# Patient Record
Sex: MALE | Race: BLACK OR AFRICAN AMERICAN | NOT HISPANIC OR LATINO | Employment: FULL TIME | URBAN - METROPOLITAN AREA
[De-identification: names, ages, dates, MRNs, and addresses within clinical notes are randomized per-mention and may not be internally consistent; named-entity substitution may affect disease eponyms.]

---

## 2017-04-03 ENCOUNTER — HOSPITAL ENCOUNTER (EMERGENCY)
Facility: MEDICAL CENTER | Age: 42
End: 2017-04-04
Attending: EMERGENCY MEDICINE
Payer: MEDICAID

## 2017-04-03 ENCOUNTER — APPOINTMENT (OUTPATIENT)
Dept: RADIOLOGY | Facility: MEDICAL CENTER | Age: 42
End: 2017-04-03
Attending: EMERGENCY MEDICINE
Payer: MEDICAID

## 2017-04-03 DIAGNOSIS — S29.012A MUSCLE STRAIN OF RIGHT UPPER BACK, INITIAL ENCOUNTER: ICD-10-CM

## 2017-04-03 DIAGNOSIS — R10.9 RIGHT FLANK PAIN: ICD-10-CM

## 2017-04-03 PROCEDURE — 700111 HCHG RX REV CODE 636 W/ 250 OVERRIDE (IP): Performed by: EMERGENCY MEDICINE

## 2017-04-03 PROCEDURE — 700102 HCHG RX REV CODE 250 W/ 637 OVERRIDE(OP): Performed by: EMERGENCY MEDICINE

## 2017-04-03 PROCEDURE — A9270 NON-COVERED ITEM OR SERVICE: HCPCS | Performed by: EMERGENCY MEDICINE

## 2017-04-03 PROCEDURE — 80048 BASIC METABOLIC PNL TOTAL CA: CPT

## 2017-04-03 PROCEDURE — 85025 COMPLETE CBC W/AUTO DIFF WBC: CPT

## 2017-04-03 PROCEDURE — 81003 URINALYSIS AUTO W/O SCOPE: CPT

## 2017-04-03 PROCEDURE — 96372 THER/PROPH/DIAG INJ SC/IM: CPT

## 2017-04-03 PROCEDURE — 71020 DX-CHEST-2 VIEWS: CPT

## 2017-04-03 PROCEDURE — 99284 EMERGENCY DEPT VISIT MOD MDM: CPT

## 2017-04-03 RX ORDER — KETOROLAC TROMETHAMINE 30 MG/ML
60 INJECTION, SOLUTION INTRAMUSCULAR; INTRAVENOUS ONCE
Status: COMPLETED | OUTPATIENT
Start: 2017-04-03 | End: 2017-04-03

## 2017-04-03 RX ORDER — DIAZEPAM 5 MG/1
5 TABLET ORAL ONCE
Status: COMPLETED | OUTPATIENT
Start: 2017-04-03 | End: 2017-04-03

## 2017-04-03 RX ADMIN — DIAZEPAM 5 MG: 5 TABLET ORAL at 23:58

## 2017-04-03 RX ADMIN — KETOROLAC TROMETHAMINE 60 MG: 30 INJECTION, SOLUTION INTRAMUSCULAR; INTRAVENOUS at 23:57

## 2017-04-03 ASSESSMENT — LIFESTYLE VARIABLES: DO YOU DRINK ALCOHOL: NO

## 2017-04-03 NOTE — ED AVS SNAPSHOT
4/4/2017          Jonna Brian  [address] [place] [address]  [place] [address]    Dear Jonna:    [facility] wants to ensure your discharge home is safe and you or your loved ones have had all your questions answered regarding your care after you leave the hospital.    You may receive a telephone call within two days of your discharge.  This call is to make certain you understand your discharge instructions as well as ensure we provided you with the best care possible during your stay with us.     The call will only last approximately 3-5 minutes and will be done by a nurse.    Once again, we want to ensure your discharge home is safe and that you have a clear understanding of any next steps in your care.  If you have any questions or concerns, please do not hesitate to contact us, we are here for you.  Thank you for choosing [facility] for your healthcare needs.    Sincerely,    Hasmukh Riddle    [facility]

## 2017-04-03 NOTE — ED AVS SNAPSHOT
Home Care Instructions                                                                                                                Jonna Brian   MRN: [id number]    Department:  [facility], Emergency Dept   Date of Visit:  4/3/2017            [facility], Emergency Dept    [address] [place] [id number]    Phone:  [phone]      You were seen by     Sherry Pack M.D.      Your Diagnosis Was     Right flank pain     R10.9       These are the medications you received during your hospitalization from 04/03/2017 2109 to 04/04/2017 0041     Date/Time Order Dose Route Action    04/03/2017 2357 ketorolac (TORADOL) injection 60 mg 60 mg Intramuscular Given    04/03/2017 2358 diazepam (VALIUM) tablet 5 mg 5 mg Oral Given      Follow-up Information     1. Follow up with [facility], Emergency Dept.    Specialty:  Emergency Medicine    Why:  If symptoms worsen - difficulty breathing, chest pain, nausea, vomiting    Contact information    [address] [place] [id number] [phone]      Medication Information     Review all of your home medications and newly ordered medications with your primary doctor and/or pharmacist as soon as possible. Follow medication instructions as directed by your doctor and/or pharmacist.     Please keep your complete medication list with you and share with your physician. Update the information when medications are discontinued, doses are changed, or new medications (including over-the-counter products) are added; and carry medication information at all times in the event of emergency situations.               Medication List      ASK your doctor about these medications        Instructions    Morning Afternoon Evening Bedtime    * hydrocodone-acetaminophen 5-325 MG Tabs per tablet   Commonly known as:  NORCO        Take 1-2 Tabs by mouth every four hours as needed.   Dose:  1-2 Tab                        *  hydrocodone-acetaminophen 5-325 MG Tabs per tablet   Commonly known as:  NORCO        Take 1 Tab by mouth every 6 hours as needed.   Dose:  1 Tab                        * lisinopril 20 MG Tabs   Commonly known as:  PRINIVIL        Take 1 Tab by mouth every day.   Dose:  20 mg                        * lisinopril 10 MG Tabs   Commonly known as:  PRINIVIL        Take 1 Tab by mouth every day.   Dose:  10 mg                        * lisinopril 20 MG Tabs   Commonly known as:  PRINIVIL        Take 1 Tab by mouth every day.   Dose:  20 mg                        * Notice:  This list has 5 medication(s) that are the same as other medications prescribed for you. Read the directions carefully, and ask your doctor or other care provider to review them with you.            Procedures and tests performed during your visit     BASIC METABOLIC PANEL    CBC WITH DIFFERENTIAL    DX-CHEST-2 VIEWS    ESTIMATED GFR    URINALYSIS CULTURE, IF INDICATED        Discharge Instructions       Flank Pain  Flank pain refers to pain that is located on the side of the body between the upper abdomen and the back. The pain may occur over a short period of time (acute) or may be long-term or reoccurring (chronic). It may be mild or severe. Flank pain can be caused by many things.  CAUSES   Some of the more common causes of flank pain include:  · Muscle strains.    · Muscle spasms.    · A disease of your spine (vertebral disk disease).    · A lung infection (pneumonia).    · Fluid around your lungs (pulmonary edema).    · A kidney infection.    · Kidney stones.    · A very painful skin rash caused by the chickenpox virus (shingles).    · Gallbladder disease.    HOME CARE INSTRUCTIONS   Home care will depend on the cause of your pain. In general,  · Rest as directed by your caregiver.  · Drink enough fluids to keep your urine clear or pale yellow.  · Only take over-the-counter or prescription medicines as directed by your caregiver. Some medicines may  help relieve the pain.  · Tell your caregiver about any changes in your pain.  · Follow up with your caregiver as directed.  SEEK IMMEDIATE MEDICAL CARE IF:   · Your pain is not controlled with medicine.    · You have new or worsening symptoms.  · Your pain increases.    · You have abdominal pain.    · You have shortness of breath.    · You have persistent nausea or vomiting.    · You have swelling in your abdomen.    · You feel faint or pass out.    · You have blood in your urine.  · You have a fever or persistent symptoms for more than 2-3 days.  · You have a fever and your symptoms suddenly get worse.  MAKE SURE YOU:   · Understand these instructions.  · Will watch your condition.  · Will get help right away if you are not doing well or get worse.     This information is not intended to replace advice given to you by your health care provider. Make sure you discuss any questions you have with your health care provider.     Document Released: 02/08/2007 Document Revised: 09/11/2013 Document Reviewed: 08/01/2013  [employer] Patient Education ©2016 [employer]            Patient Information     Patient Information    Following emergency treatment: all patient requiring follow-up care must return either to a private physician or a clinic if your condition worsens before you are able to obtain further medical attention, please return to the emergency room.     Billing Information    At [facility], we work to make the billing process streamlined for our patients.  Our Representatives are here to answer any questions you may have regarding your hospital bill.  If you have insurance coverage and have supplied your insurance information to us, we will submit a claim to your insurer on your behalf.  Should you have any questions regarding your bill, we can be reached online or by phone as follows:  Online: You are able pay your bills online or live chat with our representatives about any billing questions  you may have. We are here to help Monday - Friday from 8:00am to 7:30pm and 9:00am - 12:00pm on Saturdays.  Please visit [email or web address]  for more information.   Phone:  [phone] or [phone]    Please note that your emergency physician, surgeon, pathologist, radiologist, anesthesiologist, and other specialists are not employed by [facility] and will therefore bill separately for their services.  Please contact them directly for any questions concerning their bills at the numbers below:     Emergency Physician Services:  [phone]  [facility]:  [phone]  [facility]:  [phone]  [facility]:  [phone]    1. Your final bill may vary from the amount quoted upon discharge if all procedures are not complete at that time, or if your doctor has additional procedures of which we are not aware. You will receive an additional bill if you return to the Emergency Department at [facility] for suture removal regardless of the facility of which the sutures were placed.     2. Please arrange for settlement of this account at the emergency registration.    3. All self-pay accounts are due in full at the time of treatment.  If you are unable to meet this obligation then payment is expected within 4-5 days.     4. If you have had radiology studies (CT, X-ray, Ultrasound, MRI), you have received a preliminary result during your emergency department visit. Please contact the radiology department [phone] to receive a copy of your final result. Please discuss the Final result with your primary physician or with the follow up physician provided.     Crisis Hotline:  National Crisis Hotline:  7-618-HFNJKEX or 1-483.944.4445  [place] Crisis Hotline:    1-822.180.1188 or 402-941-8602         ED Discharge Follow Up Questions    1. In order to provide you with very good care, we would like to follow up with a phone call in the  next few days.  May we have your permission to contact you?     YES /  NO    2. What is the best phone number to call you? (       )_____-__________    3. What is the best time to call you?      Morning  /  Afternoon  /  Evening                   Patient Signature:  ____________________________________________________________    Date:  ____________________________________________________________

## 2017-04-03 NOTE — ED AVS SNAPSHOT
[employer] Access Code: [email or web address]  Expires: 4/9/2017  2:54 PM    Your email address is not on file at [facility].  Email Addresses are required for you to sign up for [employer], please contact [phone] to verify your personal information and to provide your email address prior to attempting to register for [employer].    Jonna Brian  [address] [place] [address] [address]  [place] [address]    [employer]  A secure, online tool to manage your health information     [facility]’s CollegeMapper® is a secure, online tool that connects you to your personalized health information from the privacy of your home -- day or night - making it very easy for you to manage your healthcare. Once the activation process is completed, you can even access your medical information using the CollegeMapper roxanna, which is available for free in the Apple Roxanna store or Google Play store.     To learn more about CollegeMapper, visit [email or web address]    There are two levels of access available (as shown below):   My Chart Features  [facility] Primary Care Doctor [facility]  Specialists [facility]  Urgent  Care Non-[facility] Primary Care Doctor   Email your healthcare team securely and privately 24/7 X X X    Manage appointments: schedule your next appointment; view details of past/upcoming appointments X      Request prescription refills. X      View recent personal medical records, including lab and immunizations X X X X   View health record, including health history, allergies, medications X X X X   Read reports about your outpatient visits, procedures, consult and ER notes X X X X   See your discharge summary, which is a recap of your hospital and/or ER visit that includes your diagnosis, lab results, and care plan X X  X     How to register for [employer]Mapper:  Once your e-mail address has been verified, follow the following steps to sign up for LifeSc[employer]et.     1. Go to  [email or web address]  2. Click on the Sign Up Now box, which takes you to the New Member Sign Up page.  You will need to provide the following information:  a. Enter your Q-go Access Code exactly as it appears at the top of this page. (You will not need to use this code after you’ve completed the sign-up process. If you do not sign up before the expiration date, you must request a new code.)   b. Enter your date of birth.   c. Enter your home email address.   d. Click Submit, and follow the next screen’s instructions.  3. Create a OKpandat ID. This will be your Q-go login ID and cannot be changed, so think of one that is secure and easy to remember.  4. Create a Q-go password. You can change your password at any time.  5. Enter your Password Reset Question and Answer. This can be used at a later time if you forget your password.   6. Enter your e-mail address. This allows you to receive e-mail notifications when new information is available in Q-go.  7. Click Sign Up. You can now view your health information.    For assistance activating your Q-go account, call [phone]

## 2017-04-04 VITALS
SYSTOLIC BLOOD PRESSURE: 155 MMHG | RESPIRATION RATE: 18 BRPM | HEIGHT: 73 IN | OXYGEN SATURATION: 96 % | HEART RATE: 67 BPM | DIASTOLIC BLOOD PRESSURE: 77 MMHG | WEIGHT: 256.39 LBS | TEMPERATURE: 98.1 F | BODY MASS INDEX: 33.98 KG/M2

## 2017-04-04 LAB
ANION GAP SERPL CALC-SCNC: 5 MMOL/L (ref 0–11.9)
APPEARANCE UR: CLEAR
BASOPHILS # BLD AUTO: 0.5 % (ref 0–1.8)
BASOPHILS # BLD: 0.03 K/UL (ref 0–0.12)
BILIRUB UR QL STRIP.AUTO: NEGATIVE
BUN SERPL-MCNC: 9 MG/DL (ref 8–22)
CALCIUM SERPL-MCNC: 9.3 MG/DL (ref 8.5–10.5)
CHLORIDE SERPL-SCNC: 105 MMOL/L (ref 96–112)
CO2 SERPL-SCNC: 25 MMOL/L (ref 20–33)
COLOR UR: YELLOW
CREAT SERPL-MCNC: 1.06 MG/DL (ref 0.5–1.4)
CULTURE IF INDICATED INDCX: NO UA CULTURE
EOSINOPHIL # BLD AUTO: 0.09 K/UL (ref 0–0.51)
EOSINOPHIL NFR BLD: 1.5 % (ref 0–6.9)
ERYTHROCYTE [DISTWIDTH] IN BLOOD BY AUTOMATED COUNT: 38.5 FL (ref 35.9–50)
GFR SERPL CREATININE-BSD FRML MDRD: >60 ML/MIN/1.73 M 2
GLUCOSE SERPL-MCNC: 107 MG/DL (ref 65–99)
GLUCOSE UR STRIP.AUTO-MCNC: NEGATIVE MG/DL
HCT VFR BLD AUTO: 44.7 % (ref 42–52)
HGB BLD-MCNC: 14.9 G/DL (ref 14–18)
IMM GRANULOCYTES # BLD AUTO: 0.02 K/UL (ref 0–0.11)
IMM GRANULOCYTES NFR BLD AUTO: 0.3 % (ref 0–0.9)
KETONES UR STRIP.AUTO-MCNC: NEGATIVE MG/DL
LEUKOCYTE ESTERASE UR QL STRIP.AUTO: NEGATIVE
LYMPHOCYTES # BLD AUTO: 2.2 K/UL (ref 1–4.8)
LYMPHOCYTES NFR BLD: 37.4 % (ref 22–41)
MCH RBC QN AUTO: 27.3 PG (ref 27–33)
MCHC RBC AUTO-ENTMCNC: 33.3 G/DL (ref 33.7–35.3)
MCV RBC AUTO: 82 FL (ref 81.4–97.8)
MICRO URNS: NORMAL
MONOCYTES # BLD AUTO: 0.47 K/UL (ref 0–0.85)
MONOCYTES NFR BLD AUTO: 8 % (ref 0–13.4)
NEUTROPHILS # BLD AUTO: 3.08 K/UL (ref 1.82–7.42)
NEUTROPHILS NFR BLD: 52.3 % (ref 44–72)
NITRITE UR QL STRIP.AUTO: NEGATIVE
NRBC # BLD AUTO: 0 K/UL
NRBC BLD AUTO-RTO: 0 /100 WBC
PH UR STRIP.AUTO: 5.5 [PH]
PLATELET # BLD AUTO: 228 K/UL (ref 164–446)
PMV BLD AUTO: 9.4 FL (ref 9–12.9)
POTASSIUM SERPL-SCNC: 3.8 MMOL/L (ref 3.6–5.5)
PROT UR QL STRIP: NEGATIVE MG/DL
RBC # BLD AUTO: 5.45 M/UL (ref 4.7–6.1)
RBC UR QL AUTO: NEGATIVE
SODIUM SERPL-SCNC: 135 MMOL/L (ref 135–145)
SP GR UR STRIP.AUTO: 1.02
WBC # BLD AUTO: 5.9 K/UL (ref 4.8–10.8)

## 2017-04-04 PROCEDURE — 36415 COLL VENOUS BLD VENIPUNCTURE: CPT

## 2017-04-04 NOTE — ED PROVIDER NOTES
"ED Provider Note    CHIEF COMPLAINT  Chief Complaint   Patient presents with   • Flank Pain     right sided. it would come and go in the morning. has been constant since.    • Numbness     right arm x 10 minutes around 1900 this evening while at work        HPI  Jonna Brian is a 41 y.o. male who presents to the emergency Department chief complaint right-sided flank pain. The patient states began this morning and then continued and became consistent throughout the afternoon. He endorses a worsening of that back pain with a deep inhale of breath however denies any difficulty breathing or chest pain. He states that his right arm was numb for about 10 minutes earlier this evening and then after he shook it out for a while this numbness resolved. He denies any weakness pain onset tingling in the lower extremities any headache any vision changes any difficulty in breathing. He denies any trauma any blood in his urine denies any other medical problems besides hypertension. Pain is currently a 4 out of 10 and only worse with movement and palpation.    REVIEW OF SYSTEMS  See [facility] for further details. All other systems are negative.     PAST MEDICAL HISTORY   has a past medical history of Hypertension.    SOCIAL HISTORY  Social History     Social History Main Topics   • Smoking status: Never Smoker    • Smokeless tobacco: Not on file   • Alcohol Use: Yes      Comment: Rare   • Drug Use: No   • Sexual Activity: Not on file       SURGICAL HISTORY  patient denies any surgical history    CURRENT MEDICATIONS  Home Medications     **Home medications have not yet been reviewed for this encounter**          ALLERGIES  No Known Allergies    PHYSICAL EXAM  VITAL SIGNS: /77 mmHg  Pulse 77  Temp(Src) 36.7 °C (98.1 °F)  Resp 18  Ht 1.854 m (6' 1\")  Wt 116.3 kg (256 lb 6.3 oz)  BMI 33.83 kg/m2  SpO2 95%   Pulse ox interpretation: I interpret this pulse ox as normal.  Constitutional: Alert in no apparent distress.  HENT: " Normocephalic, Atraumatic  Eyes: Pupils are equal and reactive. Conjunctiva normal, non-icteric.   Heart: Regular rate and rythm, no murmurs.    Lungs: Clear to auscultation bilaterally.  Abdomen: Non-tender, non-distended, normal bowel sounds; R CVA ttp including paraspinal muscular tenderness no midline tenderness no erythema and no swelling no rash  Skin: Warm, Dry, No erythema, No rash.   Neurologic: Alert, Grossly non-focal. Symmetric smile, eyes shut tight bilaterally, forehead wrinkles bilaterally, sensation intact to light touch bilateral face, tongue midline, head turn and shoulder shrug with full strength. Hearing intact grossly bilaterally. 5/5  strength bilaterally, 5/5 tricep and bicep strength bilaterally. Sensation intact to light touch r, m, u, axillary nerves bilaterally. 5/5 strength quadricep, plantarflexion/dorsiflexion/extensor hallicus longus bilaterally. Sensation intact to light touch bilateral lower extremities in all nerve distributions. No clonus at ankle or elbow. normal gait.        DIFFERENTIAL DIAGNOSIS AND WORK UP PLAN    This is a 41 y.o. male who presents with right flank pain and paraspinal muscular tenderness differential includes muscle strain or sprain versus pilonidal versus renal stone versus occult effusion though breath sounds are clear versus costochondritis. Discussed with patient will perform laboratory analysis urinalysis chest x-ray as well as IM Toradol and Valium for his muscle spasm and discomfort. Patient understands and feels comfortable w the plan    Pertinent Lab Findings  CBC BMP within normal limits except for mildly elevated glucose of 107, urinalysis without evidence of red blood cells or infection      Radiology  DX-CHEST-2 VIEWS   Final Result         1.  No acute cardiopulmonary disease.        The radiologist's interpretation of all radiological studies have been reviewed by me.    COURSE & MEDICAL DECISION MAKING  Pertinent Labs & Imaging studies  "reviewed. (See chart for details)    12:30 AM  Reassessed the patient discussed with him all the normal findings unsure with the mild numbness of his hand was advised and a partial nerve palsy but no evidence or concern. CVA. The patient otherwise has a normal examination he likely has a posterior back muscle strain or sprain versus early zoster. Discussed with the patient strict return precautions which include nor worsening back pain associated with difficulty breathing nausea vomiting or diaphoresis. Patient understands feels comfortable going home and following up with his primary physician for blood pressure management though he does have a history of hypertension    /91 mmHg  Pulse 67  Temp(Src) 36.7 °C (98.1 °F)  Resp 18  Ht 1.854 m (6' 1\")  Wt 116.3 kg (256 lb 6.3 oz)  BMI 33.83 kg/m2  SpO2 96%    FOLLOW UP    [facility], Emergency Dept  [address] [facility] [id number] [phone]    If symptoms worsen - difficulty breathing, chest pain, nausea, vomiting      FINAL IMPRESSION  1. Right flank pain    2. Muscle strain of right upper back, initial encounter        Electronically signed by: Sherry Pack, 4/3/2017 11:26 PM    This dictation has been created using voice recognition software and/or scribes. The accuracy of the dictation is limited by the abilities of the software and the expertise of the scribes. I expect there may be some errors of grammar and possibly content. I made every attempt to manually correct the errors within my dictation. However, errors related to voice recognition software and/or scribes may still exist and should be interpreted within the appropriate context.    "

## 2017-04-04 NOTE — ED NOTES
Pt medicated per MAR for pain.  Blood drawn  Pt amb to and from restroom. Urine sample obtained  All samples sent to lab

## 2017-04-04 NOTE — ED NOTES
Discharge instructions provided to pt.  Pt states understanding.  Pt states all questions have been answered.  Copy of discharge provided to pt.  Signed copy in chart. Pt states that all personal belongings are in possession. Pt amb from red 1 with steady gait.

## 2017-04-04 NOTE — ED NOTES
Chief Complaint   Patient presents with   • Flank Pain     right sided. it would come and go in the morning. has been constant since.    • Numbness     right arm x 10 minutes around 1900 this evening while at work      Patient ambulatory to triage for the above complaints. NAD observed.

## 2017-07-06 ENCOUNTER — RESOLUTE PROFESSIONAL BILLING HOSPITAL PROF FEE (OUTPATIENT)
Dept: HOSPITALIST | Facility: MEDICAL CENTER | Age: 42
End: 2017-07-06
Payer: MEDICAID

## 2017-07-06 ENCOUNTER — HOSPITAL ENCOUNTER (INPATIENT)
Facility: MEDICAL CENTER | Age: 42
LOS: 1 days | DRG: 153 | End: 2017-07-07
Attending: EMERGENCY MEDICINE | Admitting: INTERNAL MEDICINE
Payer: MEDICAID

## 2017-07-06 ENCOUNTER — APPOINTMENT (OUTPATIENT)
Dept: RADIOLOGY | Facility: MEDICAL CENTER | Age: 42
DRG: 153 | End: 2017-07-06
Attending: EMERGENCY MEDICINE
Payer: MEDICAID

## 2017-07-06 DIAGNOSIS — J39.2 PHARYNGEAL EDEMA: ICD-10-CM

## 2017-07-06 DIAGNOSIS — J02.9 PHARYNGITIS, UNSPECIFIED ETIOLOGY: ICD-10-CM

## 2017-07-06 PROBLEM — J05.0: Status: ACTIVE | Noted: 2017-07-06

## 2017-07-06 PROBLEM — I10 HYPERTENSION: Status: ACTIVE | Noted: 2017-07-06

## 2017-07-06 PROBLEM — T78.3XXA ANGIOEDEMA: Status: ACTIVE | Noted: 2017-07-06

## 2017-07-06 PROBLEM — J04.0 LARYNGITIS WITHOUT OBSTRUCTION, ACUTE: Status: ACTIVE | Noted: 2017-07-06

## 2017-07-06 LAB
ALBUMIN SERPL BCP-MCNC: 4 G/DL (ref 3.2–4.9)
ALBUMIN/GLOB SERPL: 1.3 G/DL
ALP SERPL-CCNC: 71 U/L (ref 30–99)
ALT SERPL-CCNC: 30 U/L (ref 2–50)
AMPHET UR QL SCN: NEGATIVE
ANION GAP SERPL CALC-SCNC: 7 MMOL/L (ref 0–11.9)
APPEARANCE UR: CLEAR
AST SERPL-CCNC: 24 U/L (ref 12–45)
BARBITURATES UR QL SCN: NEGATIVE
BASOPHILS # BLD AUTO: 0.3 % (ref 0–1.8)
BASOPHILS # BLD: 0.02 K/UL (ref 0–0.12)
BENZODIAZ UR QL SCN: NEGATIVE
BILIRUB SERPL-MCNC: 0.3 MG/DL (ref 0.1–1.5)
BILIRUB UR QL STRIP.AUTO: NEGATIVE
BUN SERPL-MCNC: 9 MG/DL (ref 8–22)
BZE UR QL SCN: NEGATIVE
CALCIUM SERPL-MCNC: 9.3 MG/DL (ref 8.5–10.5)
CANNABINOIDS UR QL SCN: NEGATIVE
CHLORIDE SERPL-SCNC: 106 MMOL/L (ref 96–112)
CO2 SERPL-SCNC: 25 MMOL/L (ref 20–33)
COLOR UR: YELLOW
CREAT SERPL-MCNC: 0.93 MG/DL (ref 0.5–1.4)
CRP SERPL HS-MCNC: 1.37 MG/DL (ref 0–0.75)
EOSINOPHIL # BLD AUTO: 0.05 K/UL (ref 0–0.51)
EOSINOPHIL NFR BLD: 0.7 % (ref 0–6.9)
ERYTHROCYTE [DISTWIDTH] IN BLOOD BY AUTOMATED COUNT: 38.5 FL (ref 35.9–50)
ERYTHROCYTE [SEDIMENTATION RATE] IN BLOOD BY WESTERGREN METHOD: 12 MM/HOUR (ref 0–15)
FERRITIN SERPL-MCNC: 119.7 NG/ML (ref 22–322)
GFR SERPL CREATININE-BSD FRML MDRD: >60 ML/MIN/1.73 M 2
GLOBULIN SER CALC-MCNC: 3.1 G/DL (ref 1.9–3.5)
GLUCOSE SERPL-MCNC: 110 MG/DL (ref 65–99)
GLUCOSE UR STRIP.AUTO-MCNC: NEGATIVE MG/DL
HAV IGM SERPL QL IA: NEGATIVE
HBV CORE IGM SER QL: NEGATIVE
HBV SURFACE AG SER QL: NEGATIVE
HCT VFR BLD AUTO: 45.2 % (ref 42–52)
HCV AB SER QL: NEGATIVE
HETEROPH AB SER QL: NEGATIVE
HGB BLD-MCNC: 15 G/DL (ref 14–18)
HIV 1+2 AB+HIV1 P24 AG SERPL QL IA: NON REACTIVE
IMM GRANULOCYTES # BLD AUTO: 0.04 K/UL (ref 0–0.11)
IMM GRANULOCYTES NFR BLD AUTO: 0.6 % (ref 0–0.9)
KETONES UR STRIP.AUTO-MCNC: NEGATIVE MG/DL
LEUKOCYTE ESTERASE UR QL STRIP.AUTO: NEGATIVE
LYMPHOCYTES # BLD AUTO: 1.28 K/UL (ref 1–4.8)
LYMPHOCYTES NFR BLD: 18.3 % (ref 22–41)
MCH RBC QN AUTO: 27.1 PG (ref 27–33)
MCHC RBC AUTO-ENTMCNC: 33.2 G/DL (ref 33.7–35.3)
MCV RBC AUTO: 81.6 FL (ref 81.4–97.8)
METHADONE UR QL SCN: NEGATIVE
MICRO URNS: ABNORMAL
MONOCYTES # BLD AUTO: 0.56 K/UL (ref 0–0.85)
MONOCYTES NFR BLD AUTO: 8 % (ref 0–13.4)
NEUTROPHILS # BLD AUTO: 5.05 K/UL (ref 1.82–7.42)
NEUTROPHILS NFR BLD: 72.1 % (ref 44–72)
NITRITE UR QL STRIP.AUTO: NEGATIVE
NRBC # BLD AUTO: 0 K/UL
NRBC BLD AUTO-RTO: 0 /100 WBC
OPIATES UR QL SCN: NEGATIVE
OXYCODONE UR QL SCN: NEGATIVE
PCP UR QL SCN: NEGATIVE
PH UR STRIP.AUTO: 8.5 [PH]
PLATELET # BLD AUTO: 247 K/UL (ref 164–446)
PMV BLD AUTO: 9.3 FL (ref 9–12.9)
POTASSIUM SERPL-SCNC: 3.8 MMOL/L (ref 3.6–5.5)
PROPOXYPH UR QL SCN: NEGATIVE
PROT SERPL-MCNC: 7.1 G/DL (ref 6–8.2)
PROT UR QL STRIP: NEGATIVE MG/DL
RBC # BLD AUTO: 5.54 M/UL (ref 4.7–6.1)
RBC UR QL AUTO: NEGATIVE
SODIUM SERPL-SCNC: 138 MMOL/L (ref 135–145)
SP GR UR REFRACTOMETRY: >1.05
WBC # BLD AUTO: 7 K/UL (ref 4.8–10.8)

## 2017-07-06 PROCEDURE — 700105 HCHG RX REV CODE 258: Performed by: EMERGENCY MEDICINE

## 2017-07-06 PROCEDURE — 80053 COMPREHEN METABOLIC PANEL: CPT

## 2017-07-06 PROCEDURE — 96365 THER/PROPH/DIAG IV INF INIT: CPT | Mod: XU

## 2017-07-06 PROCEDURE — 80307 DRUG TEST PRSMV CHEM ANLYZR: CPT

## 2017-07-06 PROCEDURE — 99223 1ST HOSP IP/OBS HIGH 75: CPT | Mod: GC | Performed by: INTERNAL MEDICINE

## 2017-07-06 PROCEDURE — 80074 ACUTE HEPATITIS PANEL: CPT

## 2017-07-06 PROCEDURE — 99285 EMERGENCY DEPT VISIT HI MDM: CPT

## 2017-07-06 PROCEDURE — 81003 URINALYSIS AUTO W/O SCOPE: CPT

## 2017-07-06 PROCEDURE — 700111 HCHG RX REV CODE 636 W/ 250 OVERRIDE (IP): Performed by: INTERNAL MEDICINE

## 2017-07-06 PROCEDURE — 85652 RBC SED RATE AUTOMATED: CPT

## 2017-07-06 PROCEDURE — 96375 TX/PRO/DX INJ NEW DRUG ADDON: CPT | Mod: XU

## 2017-07-06 PROCEDURE — 70491 CT SOFT TISSUE NECK W/DYE: CPT

## 2017-07-06 PROCEDURE — 700105 HCHG RX REV CODE 258: Performed by: INTERNAL MEDICINE

## 2017-07-06 PROCEDURE — 770020 HCHG ROOM/CARE - TELE (206)

## 2017-07-06 PROCEDURE — 87389 HIV-1 AG W/HIV-1&-2 AB AG IA: CPT

## 2017-07-06 PROCEDURE — 85025 COMPLETE CBC W/AUTO DIFF WBC: CPT

## 2017-07-06 PROCEDURE — 36415 COLL VENOUS BLD VENIPUNCTURE: CPT

## 2017-07-06 PROCEDURE — 86140 C-REACTIVE PROTEIN: CPT

## 2017-07-06 PROCEDURE — 94760 N-INVAS EAR/PLS OXIMETRY 1: CPT

## 2017-07-06 PROCEDURE — 700101 HCHG RX REV CODE 250: Performed by: INTERNAL MEDICINE

## 2017-07-06 PROCEDURE — 96361 HYDRATE IV INFUSION ADD-ON: CPT

## 2017-07-06 PROCEDURE — 700111 HCHG RX REV CODE 636 W/ 250 OVERRIDE (IP): Performed by: EMERGENCY MEDICINE

## 2017-07-06 PROCEDURE — 700117 HCHG RX CONTRAST REV CODE 255: Performed by: EMERGENCY MEDICINE

## 2017-07-06 PROCEDURE — 82728 ASSAY OF FERRITIN: CPT

## 2017-07-06 PROCEDURE — 86308 HETEROPHILE ANTIBODY SCREEN: CPT

## 2017-07-06 RX ORDER — DIPHENHYDRAMINE HYDROCHLORIDE 50 MG/ML
25 INJECTION INTRAMUSCULAR; INTRAVENOUS EVERY 6 HOURS
Status: DISCONTINUED | OUTPATIENT
Start: 2017-07-06 | End: 2017-07-06

## 2017-07-06 RX ORDER — METHYLPREDNISOLONE SODIUM SUCCINATE 125 MG/2ML
62.5 INJECTION, POWDER, LYOPHILIZED, FOR SOLUTION INTRAMUSCULAR; INTRAVENOUS EVERY 8 HOURS
Status: DISCONTINUED | OUTPATIENT
Start: 2017-07-06 | End: 2017-07-06

## 2017-07-06 RX ORDER — SODIUM CHLORIDE 9 MG/ML
INJECTION, SOLUTION INTRAVENOUS CONTINUOUS
Status: DISCONTINUED | OUTPATIENT
Start: 2017-07-06 | End: 2017-07-07

## 2017-07-06 RX ORDER — KETOROLAC TROMETHAMINE 30 MG/ML
30 INJECTION, SOLUTION INTRAMUSCULAR; INTRAVENOUS ONCE
Status: COMPLETED | OUTPATIENT
Start: 2017-07-06 | End: 2017-07-06

## 2017-07-06 RX ORDER — LABETALOL HYDROCHLORIDE 5 MG/ML
10 INJECTION, SOLUTION INTRAVENOUS EVERY 4 HOURS PRN
Status: DISCONTINUED | OUTPATIENT
Start: 2017-07-06 | End: 2017-07-07 | Stop reason: HOSPADM

## 2017-07-06 RX ORDER — SODIUM CHLORIDE 9 MG/ML
1000 INJECTION, SOLUTION INTRAVENOUS ONCE
Status: COMPLETED | OUTPATIENT
Start: 2017-07-06 | End: 2017-07-06

## 2017-07-06 RX ORDER — DIPHENHYDRAMINE HYDROCHLORIDE 50 MG/ML
25 INJECTION INTRAMUSCULAR; INTRAVENOUS EVERY 6 HOURS
Status: DISCONTINUED | OUTPATIENT
Start: 2017-07-06 | End: 2017-07-07 | Stop reason: HOSPADM

## 2017-07-06 RX ORDER — CEFTRIAXONE 1 G/1
1 INJECTION, POWDER, FOR SOLUTION INTRAMUSCULAR; INTRAVENOUS ONCE
Status: COMPLETED | OUTPATIENT
Start: 2017-07-06 | End: 2017-07-06

## 2017-07-06 RX ORDER — DEXAMETHASONE SODIUM PHOSPHATE 4 MG/ML
10 INJECTION, SOLUTION INTRA-ARTICULAR; INTRALESIONAL; INTRAMUSCULAR; INTRAVENOUS; SOFT TISSUE ONCE
Status: COMPLETED | OUTPATIENT
Start: 2017-07-06 | End: 2017-07-06

## 2017-07-06 RX ORDER — SODIUM CHLORIDE 9 MG/ML
INJECTION, SOLUTION INTRAVENOUS
Status: ACTIVE
Start: 2017-07-06 | End: 2017-07-07

## 2017-07-06 RX ORDER — DEXAMETHASONE SODIUM PHOSPHATE 4 MG/ML
4 INJECTION, SOLUTION INTRA-ARTICULAR; INTRALESIONAL; INTRAMUSCULAR; INTRAVENOUS; SOFT TISSUE EVERY 6 HOURS
Status: DISCONTINUED | OUTPATIENT
Start: 2017-07-06 | End: 2017-07-07 | Stop reason: HOSPADM

## 2017-07-06 RX ORDER — IPRATROPIUM BROMIDE AND ALBUTEROL SULFATE 2.5; .5 MG/3ML; MG/3ML
3 SOLUTION RESPIRATORY (INHALATION)
Status: DISCONTINUED | OUTPATIENT
Start: 2017-07-06 | End: 2017-07-07 | Stop reason: HOSPADM

## 2017-07-06 RX ADMIN — KETOROLAC TROMETHAMINE 30 MG: 30 INJECTION, SOLUTION INTRAMUSCULAR at 09:52

## 2017-07-06 RX ADMIN — LABETALOL HYDROCHLORIDE 10 MG: 5 INJECTION, SOLUTION INTRAVENOUS at 21:05

## 2017-07-06 RX ADMIN — DEXAMETHASONE SODIUM PHOSPHATE 10 MG: 4 INJECTION, SOLUTION INTRAMUSCULAR; INTRAVENOUS at 09:52

## 2017-07-06 RX ADMIN — SODIUM CHLORIDE 1000 ML: 9 INJECTION, SOLUTION INTRAVENOUS at 09:52

## 2017-07-06 RX ADMIN — CEFTRIAXONE 1 G: 1 INJECTION, POWDER, FOR SOLUTION INTRAMUSCULAR; INTRAVENOUS at 09:52

## 2017-07-06 RX ADMIN — LABETALOL HYDROCHLORIDE 10 MG: 5 INJECTION, SOLUTION INTRAVENOUS at 22:22

## 2017-07-06 RX ADMIN — SODIUM CHLORIDE: 9 INJECTION, SOLUTION INTRAVENOUS at 14:57

## 2017-07-06 RX ADMIN — SODIUM CHLORIDE: 9 INJECTION, SOLUTION INTRAVENOUS at 22:24

## 2017-07-06 RX ADMIN — DIPHENHYDRAMINE HYDROCHLORIDE 25 MG: 50 INJECTION, SOLUTION INTRAMUSCULAR; INTRAVENOUS at 23:08

## 2017-07-06 RX ADMIN — DEXAMETHASONE SODIUM PHOSPHATE 4 MG: 4 INJECTION, SOLUTION INTRAMUSCULAR; INTRAVENOUS at 23:08

## 2017-07-06 RX ADMIN — DIPHENHYDRAMINE HYDROCHLORIDE 25 MG: 50 INJECTION, SOLUTION INTRAMUSCULAR; INTRAVENOUS at 17:21

## 2017-07-06 RX ADMIN — DEXAMETHASONE SODIUM PHOSPHATE 4 MG: 4 INJECTION, SOLUTION INTRAMUSCULAR; INTRAVENOUS at 17:21

## 2017-07-06 RX ADMIN — IOHEXOL 100 ML: 350 INJECTION, SOLUTION INTRAVENOUS at 12:05

## 2017-07-06 ASSESSMENT — LIFESTYLE VARIABLES
ALCOHOL_USE: NO
EVER_SMOKED: NEVER
DO YOU DRINK ALCOHOL: NO
DO YOU DRINK ALCOHOL: NO
SUBSTANCE_ABUSE: 0
EVER_SMOKED: NEVER

## 2017-07-06 ASSESSMENT — ENCOUNTER SYMPTOMS
DOUBLE VISION: 0
BLURRED VISION: 0
EYE REDNESS: 0
WEIGHT LOSS: 0
FOCAL WEAKNESS: 0
INSOMNIA: 0
BLOOD IN STOOL: 0
NECK PAIN: 0
MYALGIAS: 0
DIAPHORESIS: 0
SPEECH CHANGE: 0
PND: 0
CONSTIPATION: 0
VOMITING: 0
POLYDIPSIA: 0
STRIDOR: 0
DIZZINESS: 0
EYE DISCHARGE: 0
SPUTUM PRODUCTION: 0
ABDOMINAL PAIN: 0
HEMOPTYSIS: 1
BRUISES/BLEEDS EASILY: 0
NERVOUS/ANXIOUS: 0
FLANK PAIN: 0
HEADACHES: 0
CLAUDICATION: 0
SENSORY CHANGE: 0
FALLS: 0
EYE PAIN: 0
DIARRHEA: 0
SEIZURES: 0
TINGLING: 0
LOSS OF CONSCIOUSNESS: 0
COUGH: 1
HEARTBURN: 0
MUSCULOSKELETAL NEGATIVE: 1
SHORTNESS OF BREATH: 1
DEPRESSION: 0
PALPITATIONS: 0
WEAKNESS: 0
ORTHOPNEA: 0
WHEEZING: 1
PHOTOPHOBIA: 0
FEVER: 0
BACK PAIN: 0
NAUSEA: 0
TREMORS: 0
CHILLS: 0

## 2017-07-06 ASSESSMENT — PAIN SCALES - GENERAL
PAINLEVEL_OUTOF10: 1
PAINLEVEL_OUTOF10: 0
PAINLEVEL_OUTOF10: 7

## 2017-07-06 ASSESSMENT — COPD QUESTIONNAIRES
DURING THE PAST 4 WEEKS HOW MUCH DID YOU FEEL SHORT OF BREATH: SOME OF THE TIME
COPD SCREENING SCORE: 1
COPD SCREENING SCORE: 1
DO YOU EVER COUGH UP ANY MUCUS OR PHLEGM?: NO/ONLY WITH OCCASIONAL COLDS OR INFECTIONS
HAVE YOU SMOKED AT LEAST 100 CIGARETTES IN YOUR ENTIRE LIFE: NO/DON'T KNOW
DO YOU EVER COUGH UP ANY MUCUS OR PHLEGM?: NO/ONLY WITH OCCASIONAL COLDS OR INFECTIONS
DURING THE PAST 4 WEEKS HOW MUCH DID YOU FEEL SHORT OF BREATH: SOME OF THE TIME
HAVE YOU SMOKED AT LEAST 100 CIGARETTES IN YOUR ENTIRE LIFE: NO/DON'T KNOW

## 2017-07-06 NOTE — ED NOTES
Pt to triage c/o SOB since yesterday with productive cough. Blood in phlegm. Denies fever. Pt able to speak in full sentences.

## 2017-07-06 NOTE — PROGRESS NOTES
Pt laying in bed, no signs of distress, wants to eat is only request, denies other needs, VS stable.

## 2017-07-06 NOTE — PROGRESS NOTES
MD paged back and is consulting with attending to see if the pt is appropriate to eat, aware of swallow eval and pts agitation due to inability to eat.

## 2017-07-06 NOTE — PROGRESS NOTES
Pt arrived to the floor from ED,  With transport on gurOolitic, no signs of distress, alert and oriented.

## 2017-07-06 NOTE — IP AVS SNAPSHOT
" Home Care Instructions                                                                                                                  Name:Jonna Brian  Medical Record Number:[id number]  CSN: [id number]    YOB: 1975   Age: 41 y.o.  Sex: male  HT:1.854 m (6' 1\") WT: 117.4 kg (258 lb 13.1 oz)          Admit Date: 7/6/2017     Discharge Date:   Today's Date: 7/7/2017  Attending Doctor:  CARLO Guadarrama*                  Allergies:  Review of patient's allergies indicates no known allergies.            Discharge Instructions       Discharge Instructions    Discharged to home by car with relative. Discharged via wheelchair, hospital escort: Yes.  Special equipment needed: Not Applicable    Be sure to schedule a follow-up appointment with your primary care doctor or any specialists as instructed.     Discharge Plan:   Diet Plan: Discussed  Activity Level: Discussed  Confirmed Follow up Appointment: Patient to Call and Schedule Appointment  Confirmed Symptoms Management: Discussed  Medication Reconciliation Updated: Yes  Influenza Vaccine Indication: Patient Refuses    I understand that a diet low in cholesterol, fat, and sodium is recommended for good health. Unless I have been given specific instructions below for another diet, I accept this instruction as my diet prescription.   Other diet: regular diet    Special Instructions: None    · Is patient discharged on Warfarin / Coumadin?   No     · Is patient Post Blood Transfusion?  No    Pharyngitis  Pharyngitis is redness, pain, and swelling (inflammation) of your pharynx.   CAUSES   Pharyngitis is usually caused by infection. Most of the time, these infections are from viruses (viral) and are part of a cold. However, sometimes pharyngitis is caused by bacteria (bacterial). Pharyngitis can also be caused by allergies. Viral pharyngitis may be spread from person to person by coughing, sneezing, and personal items or utensils (cups, forks, spoons, " toothbrushes). Bacterial pharyngitis may be spread from person to person by more intimate contact, such as kissing.   SIGNS AND SYMPTOMS   Symptoms of pharyngitis include:    · Sore throat.    · Tiredness (fatigue).    · Low-grade fever.    · Headache.  · Joint pain and muscle aches.  · Skin rashes.  · Swollen lymph nodes.  · Plaque-like film on throat or tonsils (often seen with bacterial pharyngitis).  DIAGNOSIS   Your health care provider will ask you questions about your illness and your symptoms. Your medical history, along with a physical exam, is often all that is needed to diagnose pharyngitis. Sometimes, a rapid strep test is done. Other lab tests may also be done, depending on the suspected cause.   TREATMENT   Viral pharyngitis will usually get better in 3-4 days without the use of medicine. Bacterial pharyngitis is treated with medicines that kill germs (antibiotics).   HOME CARE INSTRUCTIONS   · Drink enough water and fluids to keep your urine clear or pale yellow.    · Only take over-the-counter or prescription medicines as directed by your health care provider:    ¨ If you are prescribed antibiotics, make sure you finish them even if you start to feel better.    ¨ Do not take aspirin.    · Get lots of rest.    · Gargle with 8 oz of salt water (½ tsp of salt per 1 qt of water) as often as every 1-2 hours to soothe your throat.    · Throat lozenges (if you are not at risk for choking) or sprays may be used to soothe your throat.  SEEK MEDICAL CARE IF:   · You have large, tender lumps in your neck.  · You have a rash.  · You cough up green, yellow-brown, or bloody spit.  SEEK IMMEDIATE MEDICAL CARE IF:   · Your neck becomes stiff.  · You drool or are unable to swallow liquids.  · You vomit or are unable to keep medicines or liquids down.  · You have severe pain that does not go away with the use of recommended medicines.  · You have trouble breathing (not caused by a stuffy nose).  MAKE SURE YOU:    · Understand these instructions.  · Will watch your condition.  · Will get help right away if you are not doing well or get worse.     This information is not intended to replace advice given to you by your health care provider. Make sure you discuss any questions you have with your health care provider.     Document Released: 12/18/2006 Document Revised: 10/08/2014 Document Reviewed: 08/25/2014  [employer] [facility] Interactive Patient Education ©2016 [employer]      Depression / Suicide Risk    As you are discharged from this [facility] facility, it is important to learn how to keep safe from harming yourself.    Recognize the warning signs:  · Abrupt changes in personality, positive or negative- including increase in energy   · Giving away possessions  · Change in eating patterns- significant weight changes-  positive or negative  · Change in sleeping patterns- unable to sleep or sleeping all the time   · Unwillingness or inability to communicate  · Depression  · Unusual sadness, discouragement and loneliness  · Talk of wanting to die  · Neglect of personal appearance   · Rebelliousness- reckless behavior  · Withdrawal from people/activities they love  · Confusion- inability to concentrate     If you or a loved one observes any of these behaviors or has concerns about self-harm, here's what you can do:  · Talk about it- your feelings and reasons for harming yourself  · Remove any means that you might use to hurt yourself (examples: pills, rope, extension cords, firearm)  · Get professional help from the community (Mental Health, Substance Abuse, psychological counseling)  · Do not be alone:Call your Safe Contact- someone whom you trust who will be there for you.  · Call your local CRISIS HOTLINE 400-6236 or 294-435-3799  · Call your local Children's Mobile Crisis Response Team [place] (940) 179-2643 or www.Bloc  · Call the toll free National Suicide Prevention Hotlines   · National Suicide  Prevention Lifeline 169-092-UADF (8208)  · National [place] Line Network 800-SUICIDE (068-8797)        Your appointments     Jul 24, 2017 10:00 AM   New Patient with Dalton Vogel M.D.   [facility] - Internal Medicine (--)    [address]  [address]  [place] [address]   [phone]           Please bring Photo ID, Insurance Cards, All Medication Bottles and copies of any legal documents (such as Living Will, Power of ) If speaking a language besides English please bring an adult . Please arrive 30 minutes prior for check in and registration. You will be receiving a confirmation call a few days before your appointment from our automated call confirmation system.                 Discharge Medication Instructions:    Below are the medications your physician expects you to take upon discharge:    Review all your home medications and newly ordered medications with your doctor and/or pharmacist. Follow medication instructions as directed by your doctor and/or pharmacist.    Please keep your medication list with you and share with your physician.               Medication List      START taking these medications        Instructions    Morning Afternoon Evening Bedtime    amlodipine 10 MG Tabs   Last time this was given:  10 mg on 7/7/2017  1:07 PM   Commonly known as:  NORVASC   Next Dose Due:  tomorrow        Take 1 Tab by mouth every day.   Dose:  10 mg                        EPINEPHrine 0.3 MG/0.3ML Soaj solution for injection   Commonly known as:  EPIPEN   Next Dose Due:  As needed         Doctor's comments:  Keep this medication with you all the time   0.3 mL by Intramuscular route Once for 1 dose.   Dose:  0.3 mg                        hydrochlorothiazide 12.5 MG tablet   Last time this was given:  12.5 mg on 7/7/2017  1:07 PM   Commonly known as:  HYDRODIURIL   Next Dose Due:  tomorrow        Take 1 Tab by mouth every day.   Dose:  12.5 mg                         predniSONE 20 MG Tabs   Commonly known as:  DELTASONE   Next Dose Due:  tomorrow        Doctor's comments:  60mg for 07/08 40mg for 07/09-07/10 20mg for 07/11-07/12   60mg for 07/08, 40mg for 07/09-07/10, 20mg for 07/11-07/12                          CONTINUE taking these medications        Instructions    Morning Afternoon Evening Bedtime    diphenhydrAMINE 25 MG capsule   Commonly known as:  BENADRYL   Next Dose Due:  tonight        Take 1 Cap by mouth 3 times a day.   Dose:  25 mg                          STOP taking these medications     lisinopril 10 MG Tabs   Commonly known as:  PRINIVIL                    Where to Get Your Medications      Information about where to get these medications is not yet available     ! Ask your nurse or doctor about these medications    - amlodipine 10 MG Tabs  - EPINEPHrine 0.3 MG/0.3ML Soaj solution for injection  - hydrochlorothiazide 12.5 MG tablet  - predniSONE 20 MG Tabs            Instructions           Diet / Nutrition:    Follow any diet instructions given to you by your doctor or the dietician, including how much salt (sodium) you are allowed each day.    If you are overweight, talk to your doctor about a weight reduction plan.    Activity:    Remain physically active following your doctor's instructions about exercise and activity.    Rest often.     Any time you become even a little tired or short of breath, SIT DOWN and rest.    Worsening Symptoms:    Report any of the following signs and symptoms to the doctor's office immediately:    *Pain of jaw, arm, or neck  *Chest pain not relieved by medication                               *Dizziness or loss of consciousness  *Difficulty breathing even when at rest   *More tired than usual                                       *Bleeding drainage or swelling of surgical site  *Swelling of feet, ankles, legs or stomach                 *Fever (>100ºF)  *Pink or blood tinged sputum  *Weight gain (3lbs/day or 5lbs /week)            *Shock from internal defibrillator (if applicable)  *Palpitations or irregular heartbeats                *Cool and/or numb extremities    Stroke Awareness    Common Risk Factors for Stroke include:    Age  Atrial Fibrillation  Carotid Artery Stenosis  Diabetes Mellitus  Excessive alcohol consumption  High blood pressure  Overweight   Physical inactivity  Smoking    Warning signs and symptoms of a stroke include:    *Sudden numbness or weakness of the face, arm or leg (especially on one side of the body).  *Sudden confusion, trouble speaking or understanding.  *Sudden trouble seeing in one or both eyes.  *Sudden trouble walking, dizziness, loss of balance or coordination.Sudden severe headache with no known cause.    It is very important to get treatment quickly when a stroke occurs. If you experience any of the above warning signs, call 911 immediately.                   Disclaimer         Quit Smoking / Tobacco Use:    I understand the use of any tobacco products increases my chance of suffering from future heart disease or stroke and could cause other illnesses which may shorten my life. Quitting the use of tobacco products is the single most important thing I can do to improve my health. For further information on smoking / tobacco cessation call a Toll Free Quit Line at 1-884.713.1647 (*National Cancer [place]) or 1-718.307.2491 (American Lung Association) or you can access the web based program at www.lungusa.org.    [place] Tobacco Users Help Line:  (644) 334-8874       Toll Free: 1-642.175.2581  Quit Tobacco Program [facility] (896)524-2038    Crisis Hotline:    [place] Crisis Hotline:  4-744-IXUIXQF or 1-210.201.4778    [place] Crisis Hotline:    1-115.232.6421 or 241-838-3365    Discharge Survey:   Thank you for choosing [facility]. We hope we did everything we could to make your hospital stay a pleasant one. You may be receiving a phone survey and we would appreciate your time  and participation in answering the questions. Your input is very valuable to us in our efforts to improve our service to our patients and their families.        My signature on this form indicates that:    1. I have reviewed and understand the above information.  2. My questions regarding this information have been answered to my satisfaction.  3. I have formulated a plan with my discharge nurse to obtain my prescribed medications for home.                  Disclaimer         __________________________________                     __________       ________                       Patient Signature                                                 Date                    Time

## 2017-07-06 NOTE — H&P
[facility] Ad[facility] and Physical    Name Jonna Brian       1975   Age/Sex 41 y.o. male   MRN [id number]   Code Status FULL CODE     After 5PM or if no immediate response to page, please call for cross-coverage  Attending/Team: Dr. Dockery/Cleopatra Use Tiger Text to page 6AM-5PM  Call [phone] to page after hours   1st Call - Day Intern (R1):   Dr. OQUENDO 2nd Call - Day Sr. Resident (R2/R3):   Dr. ORTEGA       Chief Complaint:  SOB x 2 days, Cough    HPI:  Patient is a 42 yo male with PMH of HTN, presents with shortness of breath and a dry cough. These symptoms have been present for 2 days and have been increasing severity since this AM. The patient notes that his cough initially had streaks of blood but it currently does not. The patient also started having a choking feeling with difficulty swallowing. He denies any drooling. Patient aiso c/o of having a hoarse voice with heavy breathing. Patient had no episodes of dizziness or syncopal attacks. Patient is a known hypertensive on lisinopril but is not compliant with his medication and his last dose was 2 months ago.     In the ED, the patients BP qll778/112, HR was 77, Temp was 36.8, RR was 10, SpO2 of 97% on RA. Patient then received toradol 30mg, Decadron 10mg and Rocephin 1g. CT with contrast revealed thickening and edema of the mucosa of the nasopharynx, oropharynx and hypopharynx. It also showed no evidence of retropharyngeal or peritonsillar abscess.     Patient was then admitted to the floor and placed on NPO status. He states that he is feeling better and that his voice is less hoarse. He also notes that the SOB has decreased since this morning.         Review of Systems   Constitutional: Negative for fever, chills, weight loss, malaise/fatigue and diaphoresis.   HENT: Negative for congestion, ear pain, hearing loss, nosebleeds and tinnitus.    Eyes: Negative for blurred vision, double vision, photophobia, pain,  discharge and redness.   Respiratory: Positive for cough, hemoptysis, shortness of breath and wheezing. Negative for sputum production and stridor.    Cardiovascular: Negative for chest pain, palpitations, orthopnea, claudication, leg swelling and PND.   Gastrointestinal: Negative for heartburn, nausea, vomiting, abdominal pain, diarrhea, constipation, blood in stool and melena.   Genitourinary: Negative.  Negative for dysuria, urgency, frequency, hematuria and flank pain.   Musculoskeletal: Negative.  Negative for myalgias, back pain, joint pain, falls and neck pain.   Skin: Negative for itching and rash.   Neurological: Negative for dizziness, tingling, tremors, sensory change, speech change, focal weakness, seizures, loss of consciousness, weakness and headaches.   Endo/Heme/Allergies: Negative for environmental allergies and polydipsia. Does not bruise/bleed easily.   Psychiatric/Behavioral: Negative for depression, suicidal ideas and substance abuse. The patient is not nervous/anxious and does not have insomnia.              Past Medical History:   Past Medical History   Diagnosis Date   • Hypertension        Past Surgical History:.N   No past surgical history on file.    Current Outpatient Medications:  Home Medications     Reviewed by Sarahi Parekh (Pharmacy Tech) on 07/06/17 at 1330  Med List Status: Complete    Medication Last Dose Status    lisinopril (PRINIVIL) 10 MG Tab unknown Active                Medication Allergy/Sensitivities:  Allergies   Allergen Reactions   • Bloodless          Family History:  No family history on file.    Social History:  Social History     Social History   • Marital Status: Single     Spouse Name: N/A   • Number of Children: N/A   • Years of Education: N/A     Occupational History   • Not on file.     Social History Main Topics   • Smoking status: Never Smoker    • Smokeless tobacco: Not on file   • Alcohol Use: Yes      Comment: Rare   • Drug Use: No   • Sexual  "Activity: Not on file     Other Topics Concern   • Not on file     Social History Narrative     Living situation:   PCP :       Physical Exam     Filed Vitals:    07/06/17 1301 07/06/17 1438 07/06/17 1503 07/06/17 1617   BP:  172/108 163/95    Pulse: 71 84 79 84   Temp:  36.3 °C (97.4 °F) 36.1 °C (97 °F)    TempSrc:       Resp: 7 18 17 16   Height:       Weight:       SpO2: 93% 93% 90% 96%     Body mass index is 34.15 kg/(m^2).  /95 mmHg  Pulse 84  Temp(Src) 36.1 °C (97 °F) (Temporal)  Resp 16  Ht 1.854 m (6' 1\")  Wt 117.4 kg (258 lb 13.1 oz)  BMI 34.15 kg/m2  SpO2 96%  O2 therapy: Pulse Oximetry: 96 %, O2 (LPM): 0, O2 Delivery: None (Room Air)    Physical Exam   Constitutional: He is oriented to person, place, and time. He appears distressed.   HENT:   Head: Normocephalic and atraumatic.   Mouth/Throat: Oropharyngeal exudate present.   Neck: Neck supple. No JVD present. No tracheal deviation present. No thyromegaly present.   Cardiovascular: Normal rate, regular rhythm, normal heart sounds and intact distal pulses.  Exam reveals no gallop and no friction rub.    Pulmonary/Chest: Effort normal. Stridor present. No respiratory distress. He has wheezes. He has no rales. He exhibits no tenderness.   Abdominal: Soft. Bowel sounds are normal. He exhibits no distension and no mass. There is no tenderness. There is no rebound and no guarding.   Musculoskeletal: He exhibits no edema or tenderness.   Lymphadenopathy:     He has no cervical adenopathy.   Neurological: He is alert and oriented to person, place, and time. He displays normal reflexes. No cranial nerve deficit. He exhibits normal muscle tone. Coordination normal. GCS score is 15.   Skin: Skin is warm and dry. No rash noted. No erythema. No pallor.   Psychiatric: Memory, affect and judgment normal. His mood appears anxious. His affect is not inappropriate. He is not agitated. He does not express impulsivity. He does not exhibit a depressed mood. "             Data Review       Old Records Request:   Completed  Current Records review and summary: Completed    Lab Data Review:  Recent Results (from the past 24 hour(s))   MONONUCLEOSIS TEST QUAL    Collection Time: 07/06/17  9:43 AM   Result Value Ref Range    Heterophile Screen Negative Negative   HIV ANTIBODIES    Collection Time: 07/06/17  9:43 AM   Result Value Ref Range    HIV Ag/Ab Combo Assay Non Reactive Non Reactive   HEPATITIS PANEL ACUTE(4 COMPONENTS)    Collection Time: 07/06/17  9:43 AM   Result Value Ref Range    Hepatitis B Surface Antigen Negative Negative    Hepatitis C Antibody Negative Negative    Hepatitis B Cors Ab,IgM Negative Negative    Hepatitis A Virus Ab, IgM Negative Negative   CBC WITH DIFFERENTIAL    Collection Time: 07/06/17  9:45 AM   Result Value Ref Range    WBC 7.0 4.8 - 10.8 K/uL    RBC 5.54 4.70 - 6.10 M/uL    Hemoglobin 15.0 14.0 - 18.0 g/dL    Hematocrit 45.2 42.0 - 52.0 %    MCV 81.6 81.4 - 97.8 fL    MCH 27.1 27.0 - 33.0 pg    MCHC 33.2 (L) 33.7 - 35.3 g/dL    RDW 38.5 35.9 - 50.0 fL    Platelet Count 247 164 - 446 K/uL    MPV 9.3 9.0 - 12.9 fL    Neutrophils-Polys 72.10 (H) 44.00 - 72.00 %    Lymphocytes 18.30 (L) 22.00 - 41.00 %    Monocytes 8.00 0.00 - 13.40 %    Eosinophils 0.70 0.00 - 6.90 %    Basophils 0.30 0.00 - 1.80 %    Immature Granulocytes 0.60 0.00 - 0.90 %    Nucleated RBC 0.00 /100 WBC    Neutrophils (Absolute) 5.05 1.82 - 7.42 K/uL    Lymphs (Absolute) 1.28 1.00 - 4.80 K/uL    Monos (Absolute) 0.56 0.00 - 0.85 K/uL    Eos (Absolute) 0.05 0.00 - 0.51 K/uL    Baso (Absolute) 0.02 0.00 - 0.12 K/uL    Immature Granulocytes (abs) 0.04 0.00 - 0.11 K/uL    NRBC (Absolute) 0.00 K/uL   COMP METABOLIC PANEL    Collection Time: 07/06/17  9:45 AM   Result Value Ref Range    Sodium 138 135 - 145 mmol/L    Potassium 3.8 3.6 - 5.5 mmol/L    Chloride 106 96 - 112 mmol/L    Co2 25 20 - 33 mmol/L    Anion Gap 7.0 0.0 - 11.9    Glucose 110 (H) 65 - 99 mg/dL    Bun 9 8 - 22  mg/dL    Creatinine 0.93 0.50 - 1.40 mg/dL    Calcium 9.3 8.5 - 10.5 mg/dL    AST(SGOT) 24 12 - 45 U/L    ALT(SGPT) 30 2 - 50 U/L    Alkaline Phosphatase 71 30 - 99 U/L    Total Bilirubin 0.3 0.1 - 1.5 mg/dL    Albumin 4.0 3.2 - 4.9 g/dL    Total Protein 7.1 6.0 - 8.2 g/dL    Globulin 3.1 1.9 - 3.5 g/dL    A-G Ratio 1.3 g/dL   ESTIMATED GFR    Collection Time: 07/06/17  9:45 AM   Result Value Ref Range    GFR If African American >60 >60 mL/min/1.73 m 2    GFR If Non African American >60 >60 mL/min/1.73 m 2   WESTERGREN SED RATE    Collection Time: 07/06/17  9:45 AM   Result Value Ref Range    Sed Rate Westergren 12 0 - 15 mm/hour   CRP QUANTITIVE (NON-CARDIAC)    Collection Time: 07/06/17  9:45 AM   Result Value Ref Range    Stat C-Reactive Protein 1.37 (H) 0.00 - 0.75 mg/dL   FERRITIN    Collection Time: 07/06/17  9:45 AM   Result Value Ref Range    Ferritin 119.7 22.0 - 322.0 ng/mL   URINE DRUG SCREEN    Collection Time: 07/06/17  2:50 PM   Result Value Ref Range    Amphetamines Urine Negative Negative    Barbiturates Negative Negative    Benzodiazepines Negative Negative    Cocaine Metabolite Negative Negative    Methadone Negative Negative    Opiates Negative Negative    Oxycodone Negative Negative    Phencyclidine -Pcp Negative Negative    Propoxyphene Negative Negative    Cannabinoid Metab Negative Negative       Imaging/Procedures Review:    ndependant Imaging Review: Completed  CT-SOFT TISSUE NECK WITH   Final Result      1.  Thickening and edema of the mucosa of the nasopharynx, oropharynx and hypopharynx and mild prominence of the palatine tonsils. No evidence of retropharyngeal or peritonsillar abscess.      2.  Mucosal thickening within the maxillary sinuses bilaterally.               EKG:   EKG Independant Review: Deferred  QTc:, HR: , Normal Sinus Rhythm, no ST/T changes     Records reviewed and summarized in current documentation             Assessment/Plan     Laryngitis without obstruction,  acute  Assessment & Plan  - Hoarseness of voice this AM associated with mild stridor  - Examination revealed oropharangeal exudate, tonsillary swelling and hyperaemia   - CT scan 7/6/16 reveals mucus retention cysts in maxillary sinus bilaterally.with no significant sinus disease and diffuse thickening and edema of the mucosa of nasopharynx,pharynx and hypopharynx with prominence of the palatine tonsils  - Received Rocephin 1 gm   - Currently on Decadron 4mg IV every 6 hours, Benadryl 25mg IV Q6H      Angioedema  Assessment & Plan  - Patient presented with difficulty breathing with mild stridor and intermittent wheezing this AM,swelling in the neck, difficulty swallowing  - Patient currently on steroid therapy with some resolution of symptoms, voice slowly returning to normal  - If patient has difficulty breathing or SOB, increase Decadron to 10 mg IV and consider use of nebulizer      Hypertension  Assessment & Plan  - Patients BP is 140-150/  - Was non compliant on medications for hypertension, was prescribed lisinopril 20 mg.  - Patient started on labetolol 10mg IV PRN for hypertension  - Will continue to monitor BP and adjust medications accordingly      Pharyngitis  Assessment & Plan  - Swelling  in the throat with posterior pharyngeal wall swelling and erythema on exam.  - CT scan reveals post pharangeal wall edema  - Continue Decadron, Benadryl        Anticipated Hospital stay: Observation admit        Quality Measures  Labs reviewed, Medications reviewed and Radiology images reviewed  Argueta catheter: No Argueta

## 2017-07-06 NOTE — IP AVS SNAPSHOT
[email or web address] Access Code: [email or web address]  Expires: 8/6/2017  3:08 PM    Your email address is not on file at [facility].  Email Addresses are required for you to sign up for [email or web address], please contact [phone] to verify your personal information and to provide your email address prior to attempting to register for [email or web address].    Jonna Aran[place]ad  [place] [address]    [employer] System[employer]  A secure, online tool to manage your health information     [facility]’s Teez.mobi® is a secure, online tool that connects you to your personalized health information from the privacy of your home -- day or night - making it very easy for you to manage your healthcare. Once the activation process is completed, you can even access your medical information using the Teez.mobi roxanna, which is available for free in the Apple Roxanna store or Google Play store.     To learn more about [email or web address].[email or web address], visit [email or web address]    There are two levels of access available (as shown below):   My Chart Features  [facility] Primary Care Doctor [facility]  Specialists [facility]  Urgent  Care Non-[facility] Primary Care Doctor   Email your healthcare team securely and privately 24/7 X X X    Manage appointments: schedule your next appointment; view details of past/upcoming appointments X      Request prescription refills. X      View recent personal medical records, including lab and immunizations X X X X   View health record, including health history, allergies, medications X X X X   Read reports about your outpatient visits, procedures, consult and ER notes X X X X   See your discharge summary, which is a recap of your hospital and/or ER visit that includes your diagnosis, lab results, and care plan X X  X     How to register for [employer] System[employer]:  Once your e-mail address has been verified, follow the following steps to sign up for [employer].     1. Go to  [email or web address]  2. Click on the Sign Up Now box, which takes you to the New Member Sign Up page. You will need  to provide the following information:  a. Enter your [employer] Access Code exactly as it appears at the top of this page. (You will not need to use this code after you’ve completed the sign-up process. If you do not sign up before the expiration date, you must request a new code.)   b. Enter your date of birth.   c. Enter your home email address.   d. Click Submit, and follow the next screen’s instructions.  3. Create a [employer]ud ID. This will be your [employer] login ID and cannot be changed, so think of one that is secure and easy to remember.  4. Create a [employer] password. You can change your password at any time.  5. Enter your Password Reset Question and Answer. This can be used at a later time if you forget your password.   6. Enter your e-mail address. This allows you to receive e-mail notifications when new information is available in [employer].  7. Click Sign Up. You can now view your health information.    For assistance activating your [employer] account, call [phone]

## 2017-07-06 NOTE — ED NOTES
Pt ambulatory to [facility]. Pt states SOB and hoarse voice. No hx of same. Pt denies any other associated symptoms.  Cardiac monitor, BP and Spo2 in place.  Awaiting ERP eval.

## 2017-07-06 NOTE — ED NOTES
Med rec complete. Patient stated he ran out of meds and it has been over a month.   Called his pharmacy, he picked up Lisinopril 10mg on 6-30-17. Patient stated he didn't pick them up.  Allergies reviewed  No antibiotics within last 30 days

## 2017-07-06 NOTE — ED NOTES
Pt states he feels like something is blocking his airway. Pt does not recall eating something that might have gotten stuck.

## 2017-07-06 NOTE — IP AVS SNAPSHOT
" <p align=\"LEFT\"><IMG SRC=\"//[email or web address]$/[email or web address]\" alt=\"Image\" WIDTH=\"50%\" HEIGHT=\"200\" BORDER=\"\"></p>                   Name:Jonna Brian  Medical Record Number:[id number]  CSN: [id number]    YOB: 1975   Age: 41 y.o.  Sex: male  HT:1.854 m (6' 1\") WT: 117.4 kg (258 lb 13.1 oz)          Admit Date: 7/6/2017     Discharge Date:   Today's Date: 7/7/2017  Attending Doctor:  CARLO Guadarrama*                  Allergies:  Review of patient's allergies indicates no known allergies.          Your appointments     Jul 24, 2017 10:00 AM   New Patient with Dalton Vogel M.D.   [facility] - Internal Medicine (--)    [address] [place] [id number] [phone]           Please bring Photo ID, Insurance Cards, All Medication Bottles and copies of any legal documents (such as Living Will, Power of ) If speaking a language besides English please bring an adult . Please arrive 30 minutes prior for check in and registration. You will be receiving a confirmation call a few days before your appointment from our automated call confirmation system.                 Medication List      Take these Medications        Instructions    amlodipine 10 MG Tabs   Commonly known as:  NORVASC    Take 1 Tab by mouth every day.   Dose:  10 mg       diphenhydrAMINE 25 MG capsule   Commonly known as:  BENADRYL    Take 1 Cap by mouth 3 times a day.   Dose:  25 mg       EPINEPHrine 0.3 MG/0.3ML Soaj solution for injection   Commonly known as:  EPIPEN    Doctor's comments:  Keep this medication with you all the time   0.3 mL by Intramuscular route Once for 1 dose.   Dose:  0.3 mg       hydrochlorothiazide 12.5 MG tablet   Commonly known as:  HYDRODIURIL    Take 1 Tab by mouth every day.   Dose:  12.5 mg       predniSONE 20 MG Tabs   Commonly known as:  DELTASONE    Doctor's comments:  60mg for 07/08 40mg for 07/09-07/10 20mg for 07/11-07/12   60mg for 07/08, " 40mg for 07/09-07/10, 20mg for 07/11-07/12

## 2017-07-06 NOTE — ED PROVIDER NOTES
ED Provider Note    Scribed for Joleen Dumont M.D. by Armaan Montejo. 7/6/2017  9:34 AM    Primary care provider: Pcp Pt States None  Means of arrival: walk in  History obtained from: patient  History limited by: none    CHIEF COMPLAINT  Chief Complaint   Patient presents with   • Shortness of Breath       HPI  Jonna Brian is a 41 y.o. male who presents to the Emergency Department complaining of shortness of breath starting 2 days ago. He states that movement exacerbates his shortness of breath. Patient reports associated mild productive cough. He has a history of hypertension. Patient denies fever, history of diabetes, history of asthma.    REVIEW OF SYSTEMS  HEENT:  No ear pain, congestion, or sore throat   CARDIAC: no chest pain, no palpitations    PULMONARY: Positive dyspnea, cough. No congestion   GI: no vomiting, diarrhea, or abdominal pain   Musculoskeletal: no swelling, deformity, pain, or joint swelling  Endocrine: no fevers, sweating, or weight loss   SKIN: no rash, erythema, or contusions     See history of present illness. All other systems are negative.  C.    PAST MEDICAL HISTORY   has a past medical history of Hypertension.    SURGICAL HISTORY  patient denies any surgical history    SOCIAL HISTORY  Social History   Substance Use Topics   • Smoking status: Never Smoker    • Smokeless tobacco: None noted   • Alcohol Use: Yes      Comment: Rare      History   Drug Use No       FAMILY HISTORY  None noted    CURRENT MEDICATIONS  No current facility-administered medications for this encounter.    Current outpatient prescriptions:   •  lisinopril (PRINIVIL) 20 MG Tab, Take 1 Tab by mouth every day., Disp: 30 Tab, Rfl: 0  •  hydrocodone-acetaminophen (NORCO) 5-325 MG Tab per tablet, Take 1 Tab by mouth every 6 hours as needed., Disp: 12 Tab, Rfl: 0  •  lisinopril (PRINIVIL) 10 MG Tab, Take 1 Tab by mouth every day., Disp: 30 Tab, Rfl: 11  •  hydrocodone-acetaminophen (NORCO) 5-325 MG Tab per tablet,  "Take 1-2 Tabs by mouth every four hours as needed., Disp: 20 Tab, Rfl: 0  •  lisinopril (PRINIVIL) 20 MG TABS, Take 1 Tab by mouth every day., Disp: 30 Tab, Rfl: 11    ALLERGIES  No Known Allergies    PHYSICAL EXAM  VITAL SIGNS: /112 mmHg  Pulse 77  Temp(Src) 36.8 °C (98.3 °F) (Temporal)  Resp 10  Ht 1.854 m (6' 1\")  Wt 117.4 kg (258 lb 13.1 oz)  BMI 34.15 kg/m2  SpO2 97%    Constitutional: Well developed, Well nourished, No acute distress, Non-toxic appearance. Hoarse and stridorous speech.  HEENT: Normocephalic, Atraumatic,  external ears normal,  Mucous  Membranes moist, No rhinorrhea or mucosal edema. Bilateral tonsillar edema and erythema. No peritonsillar fullness. No deformities noted.   Eyes: PERRL, EOMI, Conjunctiva normal, No discharge.   Neck: Normal range of motion, No tenderness, Supple, No stridor.   Lymphatic: No lymphadenopathy    Cardiovascular: Regular Rate and Rhythm, No murmurs,  rubs, or gallops.   Thorax & Lungs: Lungs clear to auscultation bilaterally, No respiratory distress, No wheezes, rhales or rhonchi, No chest wall tenderness. Hoarse and stridorous speech.    Abdomen: Bowel sounds normal, Soft, non tender, non distended,  No pulsatile masses., no rebound guarding or peritoneal signs.   Skin: Warm, Dry, No erythema, No rash,   Back:  No CVA tenderness,  No spinal tenderness, bony crepitance, step offs, or instability.   Neurologic: Alert & oriented x 3, Normal motor function, Normal sensory function, No focal deficits noted. Normal reflexes. Normal Cranial Nerves.  Extremities: Equal, intact distal pulses, No cyanosis, clubbing or edema,  No tenderness.   Musculoskeletal: Good range of motion in all major joints. No tenderness to palpation or major    DIAGNOSTIC STUDIES / PROCEDURES    LABS  Results for orders placed or performed during the hospital encounter of 07/06/17   CBC WITH DIFFERENTIAL   Result Value Ref Range    WBC 7.0 4.8 - 10.8 K/uL    RBC 5.54 4.70 - 6.10 M/uL "    Hemoglobin 15.0 14.0 - 18.0 g/dL    Hematocrit 45.2 42.0 - 52.0 %    MCV 81.6 81.4 - 97.8 fL    MCH 27.1 27.0 - 33.0 pg    MCHC 33.2 (L) 33.7 - 35.3 g/dL    RDW 38.5 35.9 - 50.0 fL    Platelet Count 247 164 - 446 K/uL    MPV 9.3 9.0 - 12.9 fL    Neutrophils-Polys 72.10 (H) 44.00 - 72.00 %    Lymphocytes 18.30 (L) 22.00 - 41.00 %    Monocytes 8.00 0.00 - 13.40 %    Eosinophils 0.70 0.00 - 6.90 %    Basophils 0.30 0.00 - 1.80 %    Immature Granulocytes 0.60 0.00 - 0.90 %    Nucleated RBC 0.00 /100 WBC    Neutrophils (Absolute) 5.05 1.82 - 7.42 K/uL    Lymphs (Absolute) 1.28 1.00 - 4.80 K/uL    Monos (Absolute) 0.56 0.00 - 0.85 K/uL    Eos (Absolute) 0.05 0.00 - 0.51 K/uL    Baso (Absolute) 0.02 0.00 - 0.12 K/uL    Immature Granulocytes (abs) 0.04 0.00 - 0.11 K/uL    NRBC (Absolute) 0.00 K/uL   COMP METABOLIC PANEL   Result Value Ref Range    Sodium 138 135 - 145 mmol/L    Potassium 3.8 3.6 - 5.5 mmol/L    Chloride 106 96 - 112 mmol/L    Co2 25 20 - 33 mmol/L    Anion Gap 7.0 0.0 - 11.9    Glucose 110 (H) 65 - 99 mg/dL    Bun 9 8 - 22 mg/dL    Creatinine 0.93 0.50 - 1.40 mg/dL    Calcium 9.3 8.5 - 10.5 mg/dL    AST(SGOT) 24 12 - 45 U/L    ALT(SGPT) 30 2 - 50 U/L    Alkaline Phosphatase 71 30 - 99 U/L    Total Bilirubin 0.3 0.1 - 1.5 mg/dL    Albumin 4.0 3.2 - 4.9 g/dL    Total Protein 7.1 6.0 - 8.2 g/dL    Globulin 3.1 1.9 - 3.5 g/dL    A-G Ratio 1.3 g/dL   ESTIMATED GFR   Result Value Ref Range    GFR If African American >60 >60 mL/min/1.73 m 2    GFR If Non African American >60 >60 mL/min/1.73 m 2   All labs reviewed by me.    RADIOLOGY  CT-SOFT TISSUE NECK WITH   Final Result      1.  Thickening and edema of the mucosa of the nasopharynx, oropharynx and hypopharynx and mild prominence of the palatine tonsils. No evidence of retropharyngeal or peritonsillar abscess.      2.  Mucosal thickening within the maxillary sinuses bilaterally.      The radiologist's interpretation of all radiological studies have been  reviewed by me.    COURSE & MEDICAL DECISION MAKING  Nursing notes, VS, PMSFHx reviewed in chart.    9:34 AM - Patient seen and examined at bedside. Informed the patient that he will be treated with IV antibiotics and IV steroids to improve his airway restriction and improve his breathing. Patient verbalizes understanding and agreement to this plan of care. Patient will be treated with NS 1000 ml for fluid resuscitation, Toradol 30 mg, Decadron 10 mg, Rocephin 1 g. Ordered Estimated GFR, CBC with differential, CMP to evaluate his symptoms. The differential diagnoses include but are not limited to: tonsillitis, pharyngitis, croup, rule out retropharyngeal abscess    10:32 AM - Patient reevaluated at bedside. Patient reports that he does not feel improved after interventions. Discussed evaluation with CT with contrast. He verbalizes agreement. Ordered CT soft tissue of neck    12:28 PM - Based on his CT I informed the patient that he should be admitted for observation and additional steroid treatment. He verbalizes agreement.     12:30 PM - Paged [facility] internal medicine.     12:34 PM - I discussed the patient's case and the above findings with Dr. To ([facility] internal medicine) who agrees to admit the patient.     DISPOSITION:  Patient will be admitted to hospital in guarded condition.      FINAL IMPRESSION  1. Pharyngitis, unspecified etiology    2. Pharyngeal edema          Armaan GRIFFIN (Mona), am scribing for, and in the presence of, Joleen Dumont M.D..    Electronically signed by: Armaan Montejo (Mona), 7/6/2017    Joleen GRIFFIN M.D. personally performed the services described in this documentation, as scribed by Armaan Montejo in my presence, and it is both accurate and complete.    The note accurately reflects work and decisions made by me.  Joleen Dumont  7/6/2017  1:27 PM

## 2017-07-07 VITALS
HEIGHT: 73 IN | WEIGHT: 258.82 LBS | DIASTOLIC BLOOD PRESSURE: 101 MMHG | HEART RATE: 90 BPM | RESPIRATION RATE: 17 BRPM | TEMPERATURE: 97.4 F | OXYGEN SATURATION: 95 % | SYSTOLIC BLOOD PRESSURE: 154 MMHG | BODY MASS INDEX: 34.3 KG/M2

## 2017-07-07 PROBLEM — J02.9 PHARYNGITIS: Status: RESOLVED | Noted: 2017-07-06 | Resolved: 2017-07-07

## 2017-07-07 PROBLEM — T78.3XXA ANGIOEDEMA: Status: RESOLVED | Noted: 2017-07-06 | Resolved: 2017-07-07

## 2017-07-07 PROBLEM — J04.0 LARYNGITIS WITHOUT OBSTRUCTION, ACUTE: Status: RESOLVED | Noted: 2017-07-06 | Resolved: 2017-07-07

## 2017-07-07 LAB
ALBUMIN SERPL BCP-MCNC: 3.7 G/DL (ref 3.2–4.9)
ALBUMIN/GLOB SERPL: 1.1 G/DL
ALP SERPL-CCNC: 68 U/L (ref 30–99)
ALT SERPL-CCNC: 31 U/L (ref 2–50)
ANION GAP SERPL CALC-SCNC: 9 MMOL/L (ref 0–11.9)
AST SERPL-CCNC: 20 U/L (ref 12–45)
BASOPHILS # BLD AUTO: 0.2 % (ref 0–1.8)
BASOPHILS # BLD: 0.02 K/UL (ref 0–0.12)
BILIRUB SERPL-MCNC: 0.3 MG/DL (ref 0.1–1.5)
BUN SERPL-MCNC: 15 MG/DL (ref 8–22)
CALCIUM SERPL-MCNC: 9.3 MG/DL (ref 8.5–10.5)
CHLORIDE SERPL-SCNC: 104 MMOL/L (ref 96–112)
CO2 SERPL-SCNC: 21 MMOL/L (ref 20–33)
CREAT SERPL-MCNC: 1.06 MG/DL (ref 0.5–1.4)
EOSINOPHIL # BLD AUTO: 0 K/UL (ref 0–0.51)
EOSINOPHIL NFR BLD: 0 % (ref 0–6.9)
ERYTHROCYTE [DISTWIDTH] IN BLOOD BY AUTOMATED COUNT: 38.7 FL (ref 35.9–50)
GFR SERPL CREATININE-BSD FRML MDRD: >60 ML/MIN/1.73 M 2
GLOBULIN SER CALC-MCNC: 3.3 G/DL (ref 1.9–3.5)
GLUCOSE SERPL-MCNC: 212 MG/DL (ref 65–99)
HCT VFR BLD AUTO: 43.8 % (ref 42–52)
HGB BLD-MCNC: 14.4 G/DL (ref 14–18)
IMM GRANULOCYTES # BLD AUTO: 0.07 K/UL (ref 0–0.11)
IMM GRANULOCYTES NFR BLD AUTO: 0.6 % (ref 0–0.9)
LYMPHOCYTES # BLD AUTO: 0.8 K/UL (ref 1–4.8)
LYMPHOCYTES NFR BLD: 6.8 % (ref 22–41)
MAGNESIUM SERPL-MCNC: 1.6 MG/DL (ref 1.5–2.5)
MCH RBC QN AUTO: 26.9 PG (ref 27–33)
MCHC RBC AUTO-ENTMCNC: 32.9 G/DL (ref 33.7–35.3)
MCV RBC AUTO: 81.7 FL (ref 81.4–97.8)
MONOCYTES # BLD AUTO: 0.47 K/UL (ref 0–0.85)
MONOCYTES NFR BLD AUTO: 4 % (ref 0–13.4)
NEUTROPHILS # BLD AUTO: 10.46 K/UL (ref 1.82–7.42)
NEUTROPHILS NFR BLD: 88.4 % (ref 44–72)
NRBC # BLD AUTO: 0 K/UL
NRBC BLD AUTO-RTO: 0 /100 WBC
PLATELET # BLD AUTO: 255 K/UL (ref 164–446)
PMV BLD AUTO: 9.9 FL (ref 9–12.9)
POTASSIUM SERPL-SCNC: 3.8 MMOL/L (ref 3.6–5.5)
PROT SERPL-MCNC: 7 G/DL (ref 6–8.2)
RBC # BLD AUTO: 5.36 M/UL (ref 4.7–6.1)
SODIUM SERPL-SCNC: 134 MMOL/L (ref 135–145)
T3 SERPL-MCNC: 80.1 NG/DL (ref 60–181)
T4 FREE SERPL-MCNC: 0.87 NG/DL (ref 0.53–1.43)
TSH SERPL DL<=0.005 MIU/L-ACNC: 0.29 UIU/ML (ref 0.3–3.7)
WBC # BLD AUTO: 11.8 K/UL (ref 4.8–10.8)

## 2017-07-07 PROCEDURE — G8997 SWALLOW GOAL STATUS: HCPCS | Mod: CH

## 2017-07-07 PROCEDURE — 83735 ASSAY OF MAGNESIUM: CPT

## 2017-07-07 PROCEDURE — 80053 COMPREHEN METABOLIC PANEL: CPT

## 2017-07-07 PROCEDURE — A9270 NON-COVERED ITEM OR SERVICE: HCPCS | Performed by: INTERNAL MEDICINE

## 2017-07-07 PROCEDURE — 92610 EVALUATE SWALLOWING FUNCTION: CPT

## 2017-07-07 PROCEDURE — 84439 ASSAY OF FREE THYROXINE: CPT

## 2017-07-07 PROCEDURE — G8998 SWALLOW D/C STATUS: HCPCS | Mod: CH

## 2017-07-07 PROCEDURE — 84480 ASSAY TRIIODOTHYRONINE (T3): CPT

## 2017-07-07 PROCEDURE — 36415 COLL VENOUS BLD VENIPUNCTURE: CPT

## 2017-07-07 PROCEDURE — 85025 COMPLETE CBC W/AUTO DIFF WBC: CPT

## 2017-07-07 PROCEDURE — 84443 ASSAY THYROID STIM HORMONE: CPT

## 2017-07-07 PROCEDURE — 700111 HCHG RX REV CODE 636 W/ 250 OVERRIDE (IP): Performed by: INTERNAL MEDICINE

## 2017-07-07 PROCEDURE — 700105 HCHG RX REV CODE 258: Performed by: INTERNAL MEDICINE

## 2017-07-07 PROCEDURE — G8996 SWALLOW CURRENT STATUS: HCPCS | Mod: CH

## 2017-07-07 PROCEDURE — 99239 HOSP IP/OBS DSCHRG MGMT >30: CPT | Mod: GC | Performed by: INTERNAL MEDICINE

## 2017-07-07 PROCEDURE — 700101 HCHG RX REV CODE 250: Performed by: INTERNAL MEDICINE

## 2017-07-07 PROCEDURE — 700102 HCHG RX REV CODE 250 W/ 637 OVERRIDE(OP): Performed by: INTERNAL MEDICINE

## 2017-07-07 RX ORDER — AMLODIPINE BESYLATE 10 MG/1
10 TABLET ORAL
Status: DISCONTINUED | OUTPATIENT
Start: 2017-07-07 | End: 2017-07-07 | Stop reason: HOSPADM

## 2017-07-07 RX ORDER — AMLODIPINE BESYLATE 10 MG/1
10 TABLET ORAL DAILY
Qty: 30 TAB | Refills: 1 | Status: SHIPPED | OUTPATIENT
Start: 2017-07-07 | End: 2017-07-24 | Stop reason: SINTOL

## 2017-07-07 RX ORDER — DIPHENHYDRAMINE HCL 25 MG
25 CAPSULE ORAL 3 TIMES DAILY
Qty: 9 CAP | Refills: 0 | COMMUNITY
Start: 2017-07-07 | End: 2017-08-30

## 2017-07-07 RX ORDER — HYDROCHLOROTHIAZIDE 25 MG/1
12.5 TABLET ORAL
Status: DISCONTINUED | OUTPATIENT
Start: 2017-07-07 | End: 2017-07-07 | Stop reason: HOSPADM

## 2017-07-07 RX ORDER — PREDNISONE 20 MG/1
TABLET ORAL
Qty: 9 TAB | Refills: 0 | Status: SHIPPED | OUTPATIENT
Start: 2017-07-07 | End: 2017-07-24

## 2017-07-07 RX ORDER — EPINEPHRINE 0.3 MG/.3ML
0.3 INJECTION SUBCUTANEOUS ONCE
Qty: 0.3 ML | Refills: 0 | Status: SHIPPED | OUTPATIENT
Start: 2017-07-07 | End: 2017-07-07

## 2017-07-07 RX ORDER — HYDROCHLOROTHIAZIDE 12.5 MG/1
12.5 TABLET ORAL DAILY
Qty: 30 TAB | Refills: 1 | Status: SHIPPED | OUTPATIENT
Start: 2017-07-07 | End: 2017-07-24

## 2017-07-07 RX ADMIN — AMLODIPINE BESYLATE 10 MG: 10 TABLET ORAL at 13:07

## 2017-07-07 RX ADMIN — SODIUM CHLORIDE: 9 INJECTION, SOLUTION INTRAVENOUS at 05:59

## 2017-07-07 RX ADMIN — DEXAMETHASONE SODIUM PHOSPHATE 4 MG: 4 INJECTION, SOLUTION INTRAMUSCULAR; INTRAVENOUS at 11:50

## 2017-07-07 RX ADMIN — LABETALOL HYDROCHLORIDE 10 MG: 5 INJECTION, SOLUTION INTRAVENOUS at 11:59

## 2017-07-07 RX ADMIN — DEXAMETHASONE SODIUM PHOSPHATE 4 MG: 4 INJECTION, SOLUTION INTRAMUSCULAR; INTRAVENOUS at 05:59

## 2017-07-07 RX ADMIN — DIPHENHYDRAMINE HYDROCHLORIDE 25 MG: 50 INJECTION, SOLUTION INTRAMUSCULAR; INTRAVENOUS at 11:51

## 2017-07-07 RX ADMIN — HYDROCHLOROTHIAZIDE 12.5 MG: 25 TABLET ORAL at 13:07

## 2017-07-07 RX ADMIN — DIPHENHYDRAMINE HYDROCHLORIDE 25 MG: 50 INJECTION, SOLUTION INTRAMUSCULAR; INTRAVENOUS at 06:00

## 2017-07-07 ASSESSMENT — ENCOUNTER SYMPTOMS
HALLUCINATIONS: 0
DEPRESSION: 0
LOSS OF CONSCIOUSNESS: 0
WEIGHT LOSS: 0
SENSORY CHANGE: 0
NERVOUS/ANXIOUS: 0
HEADACHES: 0
FOCAL WEAKNESS: 0
SORE THROAT: 0
ORTHOPNEA: 0
BLURRED VISION: 0
INSOMNIA: 0
SHORTNESS OF BREATH: 0
VOMITING: 0
FEVER: 0
TREMORS: 0
PALPITATIONS: 0
POLYDIPSIA: 0
WEAKNESS: 0
BRUISES/BLEEDS EASILY: 0
HEARTBURN: 0
FLANK PAIN: 0
SEIZURES: 0
HEMOPTYSIS: 0
BLOOD IN STOOL: 0
SPUTUM PRODUCTION: 0
MYALGIAS: 0
FALLS: 0
WHEEZING: 0
TINGLING: 0
DIZZINESS: 0
DIAPHORESIS: 0
EYE PAIN: 0
SPEECH CHANGE: 0
NECK PAIN: 0
COUGH: 0
BACK PAIN: 0
CHILLS: 0
CONSTITUTIONAL NEGATIVE: 1
NAUSEA: 0
ABDOMINAL PAIN: 0

## 2017-07-07 ASSESSMENT — PAIN SCALES - GENERAL
PAINLEVEL_OUTOF10: 0

## 2017-07-07 ASSESSMENT — LIFESTYLE VARIABLES
SUBSTANCE_ABUSE: 0
DO YOU DRINK ALCOHOL: NO

## 2017-07-07 NOTE — ASSESSMENT & PLAN NOTE
- Patients BP is 140-150/  - Was non compliant on medications for hypertension, was prescribed lisinopril 20 mg,previously .  - Patient started on labetolol 10mg IV PRN for hypertension  - Will continue to monitor BP and adjust medications accordingly.  -called by Nurse increasing Bp150/109 mm hg on iv fluids.asked to discontinue fluids.  -started on Norvasc 10 mg and Hctz 12.5 mg for hypertension,D/clisinopril.IV Labetoloi 10 mg .  -

## 2017-07-07 NOTE — PROGRESS NOTES
MD called back and stated the pt is to remain NPO except ice chips at this time, MD feels it is too risky and unsafe to feed the pt at this time, will notify pt of this.

## 2017-07-07 NOTE — DISCHARGE SUMMARY
[facility] [place] [facility] Discharge Summary      Admit Date:  7/6/2017       Discharge Date:   07/07 2017    Service:   [facility] Internal Medicine Whtie Team  Attending Physician(s):   Dr. Dockery       Senior Resident(s):   Dr. Franco  D[place] Resident(s):   Dr. Lagunas      Primary Diagnosis:   Angioedema, mucosal swelling over nasopharynx, oropharynx and hypopharynx    Hypertension   Subclinical hyperthyroidism  High sugar, might be related to steroid     Secondary Diagnoses:                Active Problems:    Laryngitis without obstruction, acute    Angioedema POA: Unknown    Hypertension POA: Unknown    Pharyngitis POA: Unknown  Resolved Problems:    * No resolved hospital problems. *    H/P note written by Dr. Lagunas on 07/06  Patient is a 42 yo male with PMH of HTN, presents with shortness of breath and a dry cough. These symptoms have been present for 2 days and have been increasing severity since this AM. The patient notes that his cough initially had streaks of blood but it currently does not. The patient also started having a choking feeling with difficulty swallowing. He denies any drooling. Patient aiso c/o of having a hoarse voice with heavy breathing. Patient had no episodes of dizziness or syncopal attacks. Patient is a known hypertensive on lisinopril but is not compliant with his medication and his last dose was 2 months ago.     In the ED, the patients BP voq730/112, HR was 77, Temp was 36.8, RR was 10, SpO2 of 97% on RA. Patient then received toradol 30mg, Decadron 10mg and Rocephin 1g. CT with contrast revealed thickening and edema of the mucosa of the nasopharynx, oropharynx and hypopharynx. It also showed no evidence of retropharyngeal or peritonsillar abscess.     Patient was then admitted to the floor and placed on NPO status. He states that he is feeling better and that his voice is less hoarse. He also notes that the SOB has decreased since this morning.      Hospital Summary (Brief  Narrative):       Decadron and benadryl continued after admission. The patient started to eat after he passed his bedside swallowing eval. The patient's breathing pattern/sound improved slowly. On 07/07, the patient has no drooling, no SOB, mild subjective feeling of throat swelling, passed full speech/swallow evaluation. The patient is discharged with stable condition.     Patient /Hospital Summary (Details -- Problem Oriented) :        Angioedema, mucosal swelling over nasopharynx, oropharynx and hypopharynx    Improved after admission with steroid and anti histamine.   No resp distress, no drooling, no definite stridor.   Passed swallow eval, on regular diet.   No sure about the etiology of his event, however, she SHOULD not take ACEI or ARB in his future.   Steroid will be continued for 5 days with taper. Epi shot will be given. OTC benadryl.     Hypertension   Up to 180-190 systolic on 07/07. No symptoms from his high blood pressure.   Has been high for a while, no HTN meds in last 2 months, per the patient.   Changed to Norvasc and HCTZ.     Subclinical hyperthyroidism  Normal T4 and T3.   No evidence of acute heart issue or osteoporosis  41 yrs old.   Observe now, PCP follow up    High sugar, might be related to steroid   Might be related to high dose steroid now.   PCP follow up. Need A1C at follow up (lab team busy, probably not done before he left).       Consultants:     None    Procedures:        None    Imaging/ Testing:      CT-SOFT TISSUE NECK WITH   Final Result      1.  Thickening and edema of the mucosa of the nasopharynx, oropharynx and hypopharynx and mild prominence of the palatine tonsils. No evidence of retropharyngeal or peritonsillar abscess.      2.  Mucosal thickening within the maxillary sinuses bilaterally.            Discharge Medications:         Medication Reconciliation: Completed     Medication List      START taking these medications       Instructions    amlodipine 10 MG Tabs    Last time this was given:  10 mg on 7/7/2017  1:07 PM   Commonly known as:  NORVASC    Take 1 Tab by mouth every day.   Dose:  10 mg       EPINEPHrine 0.3 MG/0.3ML Soaj solution for injection   Commonly known as:  EPIPEN    Doctor's comments:  Keep this medication with you all the time   0.3 mL by Intramuscular route Once for 1 dose.   Dose:  0.3 mg       hydrochlorothiazide 12.5 MG tablet   Last time this was given:  12.5 mg on 7/7/2017  1:07 PM   Commonly known as:  HYDRODIURIL    Take 1 Tab by mouth every day.   Dose:  12.5 mg       predniSONE 20 MG Tabs   Commonly known as:  DELTASONE    Doctor's comments:  60mg for 07/08 40mg for 07/09-07/10 20mg for 07/11-07/12   60mg for 07/08, 40mg for 07/09-07/10, 20mg for 07/11-07/12         CONTINUE taking these medications       Instructions    diphenhydrAMINE 25 MG capsule   Commonly known as:  BENADRYL    Take 1 Cap by mouth 3 times a day.   Dose:  25 mg         STOP taking these medications          lisinopril 10 MG Tabs   Commonly known as:  PRINIVIL                 Disposition:   home    Diet:   There is no new restriction from this admission.       Activity:   There is no new restriction from this admission.       Instructions:         The patient was instructed to return to the ER in the event of worsening symptoms. I have counseled the patient on the importance of compliance and the patient has agreed to proceed with all medical recommendations and follow up plan indicated above.   The patient understands that all medications come with benefits and risks. Risks may include permanent injury or death and these risks can be minimized with close reassessment and monitoring.        Primary Care Provider:    BRENDONR  Discharge summary faxed to primary care provider:  Completed  Copy of discharge summary given to the patient: Deferred    Follow up appointment details :      July 24 10am Dr. Vilma OLIVASR IM    Pending Studies:        None    Time spent on discharge  day patient visit, preparing discharge paperwork and arranging for patient follow up.    Summary of follow up issues:   Blood pressure  Throat condition  BMI  High sugar  Subclinical hyperthyroid.     Discharge Time (Minutes) :    46mins        Condition on Discharge    ______________________________________________________________________    Interval history/exam for day of discharge:    Stable vitals, better blood pressure after meds.   No problem swallowing.   No resp distress.     Filed Vitals:    07/07/17 0842 07/07/17 1135 07/07/17 1312 07/07/17 1415   BP: 166/104 198/107 185/106 154/101   Pulse: 87 92 80 90   Temp: 36.2 °C (97.2 °F) 36.3 °C (97.4 °F)     TempSrc:       Resp: 17 17     Height:       Weight:       SpO2: 97% 95%       Weight/BMI: Body mass index is 34.15 kg/(m^2).  Pulse Oximetry: 95 %, O2 (LPM): 0, O2 Delivery: None (Room Air)    General: Stable vitals. Good spirit/activity.   CVS: S1 S2 noted, no chest pain, no palpitation   Neck: stridor subsided.   PULM: Clear breathing sound. No resp distress.   Other systems checked; grossly normal.       Most Recent Labs:    Lab Results   Component Value Date/Time    WBC 11.8* 07/07/2017 02:54 AM    RBC 5.36 07/07/2017 02:54 AM    HEMOGLOBIN 14.4 07/07/2017 02:54 AM    HEMATOCRIT 43.8 07/07/2017 02:54 AM    MCV 81.7 07/07/2017 02:54 AM    MCH 26.9* 07/07/2017 02:54 AM    MCHC 32.9* 07/07/2017 02:54 AM    MPV 9.9 07/07/2017 02:54 AM    NEUTROPHILS-POLYS 88.40* 07/07/2017 02:54 AM    LYMPHOCYTES 6.80* 07/07/2017 02:54 AM    MONOCYTES 4.00 07/07/2017 02:54 AM    EOSINOPHILS 0.00 07/07/2017 02:54 AM    BASOPHILS 0.20 07/07/2017 02:54 AM      Lab Results   Component Value Date/Time    SODIUM 134* 07/07/2017 02:54 AM    POTASSIUM 3.8 07/07/2017 02:54 AM    CHLORIDE 104 07/07/2017 02:54 AM    CO2 21 07/07/2017 02:54 AM    GLUCOSE 212* 07/07/2017 02:54 AM    BUN 15 07/07/2017 02:54 AM    CREATININE 1.06 07/07/2017 02:54 AM      Lab Results   Component Value  Date/Time    ALT(SGPT) 31 07/07/2017 02:54 AM    AST(SGOT) 20 07/07/2017 02:54 AM    ALKALINE PHOSPHATASE 68 07/07/2017 02:54 AM    TOTAL BILIRUBIN 0.3 07/07/2017 02:54 AM    ALBUMIN 3.7 07/07/2017 02:54 AM    GLOBULIN 3.3 07/07/2017 02:54 AM     No results found for: PROTHROMBTM, INR

## 2017-07-07 NOTE — CARE PLAN
Problem: Safety  Goal: Will remain free from injury  Outcome: PROGRESSING AS EXPECTED  Fall precautions in place. Bed wheels locked, bed is in the lowest position. Call light in reach. Non-skid socks provided. Patient provides successful verbalization of fall and safety precautions and demonstration of use of call bell. Upper side rails are up. Hourly rounding in progress.         Problem: Knowledge Deficit  Goal: Knowledge of disease process/condition, treatment plan, diagnostic tests, and medications will improve  Outcome: PROGRESSING SLOWER THAN EXPECTED  POC discussed with the patient and family. All questions and concerns addressed and answered. Patient is involved in POC and verbalizes the understanding of the disease process, medications, tests and treatments. Questions on POC encouraged throughout care. Pt noncompliant with NPO status. Pt verbalizes understanding on why order is in place and the risks of eating with throat swelling but continues to be non-compliant with the order.

## 2017-07-07 NOTE — ASSESSMENT & PLAN NOTE
Posterior pharyngeal wall swelling and thickening initially,resolving on iv decadron 4 mg.and benadryl 25mg.  -blood tests reviewed hepatitis/heterophile antibody test/Hiv tests negative.  -  -

## 2017-07-07 NOTE — PROGRESS NOTES
[facility] [place] [facility] Medicine Interval Note    Name Jonna Brian       1975   Age/Sex 41 y.o. male   MRN [id number]   Code Status FULL CODE     After 5PM or if no immediate response to page, please call for cross-coverage  Attending/Team: DR.RAHEEL PARKER Use [place] Text to page  6AM-5PM  Call [phone] to page afterhours   1st Call - Day Intern (R1):DR.SESHAGIRI OQUENDO 2nd Call - Day Sr. Resident (R2/R3):   .         Chief complaint/ reason for interval visit (Primary Diagnosis)   ANGIOEDEMA.  ACUTE LARYNGITIS WITHOUT OBSTRUCTION.  HYPERTENSION.  ACUTE PHARYNGITIS.        Interval Problem Daily Status Update    Patient comfortable,concious,coperative,anicteric,acyanotic,mucus membranes pink and moist no pedal edema,  Review of Systems   Constitutional: Negative.  Negative for fever, chills, weight loss, malaise/fatigue and diaphoresis.   HENT: Negative for congestion, ear pain and sore throat.    Eyes: Negative for blurred vision and pain.   Respiratory: Negative for cough, hemoptysis, sputum production, shortness of breath and wheezing.    Cardiovascular: Negative for chest pain, palpitations, orthopnea and leg swelling.   Gastrointestinal: Negative for heartburn, nausea, vomiting, abdominal pain, blood in stool and melena.   Genitourinary: Negative for dysuria, urgency, frequency, hematuria and flank pain.   Musculoskeletal: Negative for myalgias, back pain, joint pain, falls and neck pain.   Skin: Negative for itching and rash.   Neurological: Negative for dizziness, tingling, tremors, sensory change, speech change, focal weakness, seizures, loss of consciousness, weakness and headaches.   Endo/Heme/Allergies: Negative for environmental allergies and polydipsia. Does not bruise/bleed easily.   Psychiatric/Behavioral: Negative for depression, suicidal ideas, hallucinations and substance abuse. The patient is not nervous/anxious and does not have insomnia.    All  other systems reviewed and are negative.    Patient in no cardiopulmonary distress,pharyngeal,mucosal edema and neck swelling resolving-cough resolving,air entry equal bilaterally,no added sounds noted on respiratory examination.  -patient does not complain of any headaches,weakness or syncopal attacks.  Consultants/Specialty  Internal medicine.      Disposition  inpatient    Quality Measures  Labs reviewed, Medications reviewed and Radiology images reviewed  Argueta catheter: No Argueta      DVT Prophylaxis: Enoxaparin (Lovenox)                  Physical Exam       Filed Vitals:    07/07/17 0500 07/07/17 0842 07/07/17 1135 07/07/17 1312   BP: 134/97 166/104 198/107 185/106   Pulse: 87 87 92 80   Temp: 36.7 °C (98.1 °F) 36.2 °C (97.2 °F) 36.3 °C (97.4 °F)    TempSrc:       Resp: 18 17 17    Height:       Weight:       SpO2: 97% 97% 95%      Body mass index is 34.15 kg/(m^2).    Oxygen Therapy:  Pulse Oximetry: 95 %, O2 (LPM): 0, O2 Delivery: None (Room Air)    Physical Exam   Constitutional: He is oriented to person, place, and time and well-developed, well-nourished, and in no distress. No distress.   HENT:   Head: Normocephalic and atraumatic.   Mouth/Throat: Oropharynx is clear and moist. No oropharyngeal exudate.   Eyes: Pupils are equal, round, and reactive to light.   Neck: Neck supple. No JVD present. No tracheal deviation present.   Cardiovascular: Normal rate, regular rhythm and normal heart sounds.  Exam reveals no friction rub.    Pulmonary/Chest: Effort normal and breath sounds normal. No respiratory distress. He has no wheezes. He has no rales. He exhibits no tenderness.   Abdominal: Soft. Bowel sounds are normal. He exhibits no distension. There is no tenderness. There is no rebound and no guarding.   Musculoskeletal: Normal range of motion. He exhibits no edema or tenderness.   Lymphadenopathy:     He has no cervical adenopathy.   Neurological: He is alert and oriented to person, place, and time. He is  not agitated and not disoriented. He displays no tremor, normal speech and normal reflexes. No cranial nerve deficit or sensory deficit. He exhibits normal muscle tone. Coordination normal. GCS score is 15.   Reflexes DTR2+.   Skin: No bruising, no ecchymosis, no laceration, no petechiae and no rash noted. Rash is not vesicular. He is not diaphoretic. No erythema. No pallor.   Psychiatric: Mood, affect and judgment normal.         Lab Data Review:      7/7/2017  1:58 PM    Recent Labs      07/06/17 0945  07/07/17   0254   SODIUM  138  134*   POTASSIUM  3.8  3.8   CHLORIDE  106  104   CO2  25  21   BUN  9  15   CREATININE  0.93  1.06   MAGNESIUM   --   1.6   CALCIUM  9.3  9.3       Recent Labs      07/06/17 0945  07/07/17   0254   ALTSGPT  30  31   ASTSGOT  24  20   ALKPHOSPHAT  71  68   TBILIRUBIN  0.3  0.3   GLUCOSE  110*  212*       Recent Labs      07/06/17 0945  07/07/17   0254   RBC  5.54  5.36   HEMOGLOBIN  15.0  14.4   HEMATOCRIT  45.2  43.8   PLATELETCT  247  255   FERRITIN  119.7   --        Recent Labs      07/06/17 0945  07/07/17   0254   WBC  7.0  11.8*   NEUTSPOLYS  72.10*  88.40*   LYMPHOCYTES  18.30*  6.80*   MONOCYTES  8.00  4.00   EOSINOPHILS  0.70  0.00   BASOPHILS  0.30  0.20   ASTSGOT  24  20   ALTSGPT  30  31   ALKPHOSPHAT  71  68   TBILIRUBIN  0.3  0.3           Assessment/Plan     Laryngitis without obstruction, acute  Assessment & Plan  - Hoarseness of voice resolved,no evidence of stridor.  - Examination revealed oropharangeal exudate, tonsillary swelling and hyperaemia   - CT scan 7/6/16 reveals mucus retention cysts in maxillary sinus bilaterally.with no significant sinus disease and diffuse thickening and edema of the mucosa of nasopharynx,pharynx and hypopharynx with prominence of the palatine tonsils  - Received Rocephin 1 gm   - Currently on Decadron 4mg IV every 6 hours, Benadryl 25mg IV Q6H.  -Change and tapering down to oral prednisolone.  -Patient passed a swallow  evaluation test,for oral feeds  --Resume oral feeds prn,MONITOR CLOSELY.      Angioedema  Assessment & Plan  - Patient presented with difficulty breathing with mild stridor and intermittent wheezing and,swelling in the neck with difficulty swallowing on presentation.  - Patient currently on steroid therapy with  resolution of symptoms and voice slowly returning to normal,and decrease in neck swelling and with better air entry.  - If patient has difficulty breathing or SOB, increase Decadron to 10 mg IV and consider use of nebulizer  -leucocytosis-wbc count-11.8,and blood glucose levels of 212 due to concurrent steroid use.  -Epinephrine pen to be given at discharge for any  further anaphylactic reactions.    Hypertension  Assessment & Plan  - Patients BP is 140-150/  - Was non compliant on medications for hypertension, was prescribed lisinopril 20 mg,previously .  - Patient started on labetolol 10mg IV PRN for hypertension  - Will continue to monitor BP and adjust medications accordingly.  -called by Nurse increasing Bp150/109 mm hg on iv fluids.asked to discontinue fluids.  -started on Norvasc 10 mg and Hctz 12.5 mg for hypertension,D/clisinopril.IV Labetoloi 10 mg .  -      Pharyngitis  Assessment & Plan  Posterior pharyngeal wall swelling and thickening initially,resolving on iv decadron 4 mg.and benadryl 25mg.  -blood tests reviewed hepatitis/heterophile antibody test/Hiv tests negative.  -  -

## 2017-07-07 NOTE — PROGRESS NOTES
Bedside report received from day RN. Assumed pt care. Pt sitting up in chair. POC discussed. Pt denies any needs at this time. Call light within reach. Hourly rounding in place.

## 2017-07-07 NOTE — SENIOR ADMIT NOTE
41 years old M with Hx of HTN (should be on lisinopril, but not taking it for 2 months) and high BMI presented to ED due to sudden onset throat discomfort in early morning.     The patient was doing okay last night, no problem eating, swallowing or breathing. Today, in early morning, the patient feels his throat is different, a bit difficulty speech and felt something swollen there later on. The patient skip his breakfast and went ED. Some SOB mentioned, but no definite resp distress. The patient one dose of Ceftriaxone, 10mg decadron at ED, which mildly improved his symptoms.     Lab was grossly normal and CT neck showed swelling of this pharyngeal region. [facility] was paged to admit the patient.     We saw the patient at ED, on RA with good Sat, no overt distress, difficult making sound out, no drooling, increased breathing sound at throat, both insp and exp phase.     1. Angioedema, mucosal swelling over nasopharynx, oropharynx and hypopharynx   - Probably not related to his lisinopril  - No definite etiology could be found at this moment.  - Some response to steroid at ED.   - Not much resp distress now, but need close monitor with tele floor, saturation and regular bedside check-up.   - Decadron and iv benadryl, NPO, Iv fluid.   - Need ICU transfer and Pul/airway specialist if rest distress noted.

## 2017-07-07 NOTE — H&P
[facility] ATTENDING NOTE:       Date & Time note created:   7/6/2017   7:56 PM           PATIENT ID  Name:             Jonna Brian     YOB: 1975  Age:                 41 y.o.  male   MRN:               [id number]  Admit:  7/6/2017    The patient was evaluated with the resident staff.  Please refer resident  note for complete information.I am actively involved in the patient's care.                                                                             Filed Vitals:    07/06/17 1301 07/06/17 1438 07/06/17 1503 07/06/17 1617   BP:  172/108 163/95    Pulse: 71 84 79 84   Temp:  36.3 °C (97.4 °F) 36.1 °C (97 °F)    TempSrc:       Resp: 7 18 17 16   Height:       Weight:       SpO2: 93% 93% 90% 96%     Weight/BMI: Body mass index is 34.15 kg/(m^2).  Pulse Oximetry: 96 %, O2 (LPM): 0, O2 Delivery: None (Room Air)  No intake or output data in the 24 hours ending 07/06/17 1956        ASSESSMENT/PLAN;         Active Hospital Problems    Diagnosis   •         • Angioedema [T78.3XXA]   • Hypertension [I10]   • Pharyngitis [J02.9]   • Pharynx or nasopharynx edema [J39.2]     Monitor resp status/airways closely  Dexamethasone,can increase the dose and frequency if minimal or no improvement  IS,Continous pulse ox  ESR,CRP,Ferritin  Discussed plan with Pt and RN                                                                           LALO PARKER MD   [facility] Hospitalist

## 2017-07-07 NOTE — PROGRESS NOTES
Pt refuses bed alarm. Two RN's educated pt on fall risk and benefits of alarm. Pt verbalizes understanding and continues to refuse.

## 2017-07-07 NOTE — PROGRESS NOTES
"Pt's family brought him food despite signs on door stating no food or drink to be brought in. Pt states \"I do not have a problem swallowing food and I will not starve myself.\" Pt and family educated on risks of eating and drinking with angioedema. Both verbalize understanding. Pt states \"I understand that but I will not starve myself and I will not wait all day for them to do the swallow evaluation.\" Family was asked not to bring pt anymore food until after swallow evaluation has been completed.   "

## 2017-07-07 NOTE — THERAPY
"Speech Language Therapy Clinical Swallow Evaluation completed.    Functional Status: Patient AAOx4 and reported he consumed pizza and nuggets last night despite NPO status without difficulty. He was very eager for PO. Oral motor examination was WFL. Patient consumed ice chips, NTL, puree, soft solids, mixed consistencies, hard solids, and thin liquids via straw with no overt s/sx of aspiration. Swallow trigger timely and voice remained clear throughout all trials. Laryngeal elevation and hyolaryngeal excursion complete upon palpation. Patient educated in regards to current status, s/sx of aspiration, risk of aspiration, and SLP recs. At this time, no further speech therapy services warranted at the acute level of care. Please re-consult with change in status.    Recommendations - Diet: Regular/Thin Liquids                             Strategies: Monitor during meals and Head of Bed at 90 Degrees                            Medication Administration: Whole with Liquid Wash     Plan of Care: Patient with no further skilled SLP needs in the acute care setting at this time    Post-Acute Therapy: Currently anticipate no further skilled therapy needs once patient is discharged from the inpatient setting.    See \"Rehab Therapy-Acute\" Patient Summary Report for complete documentation. Thank you for the consult.       "

## 2017-07-07 NOTE — PROGRESS NOTES
Pt given paperwork for bloodless program and educated on bloodless program. Pt made decision to not be apart of the bloodless program at this time.

## 2017-07-07 NOTE — PROGRESS NOTES
Pt educated on purpose of lovenox as ordered, pt continues to refuse, verbalized understanding of risks.

## 2017-07-07 NOTE — PROGRESS NOTES
Speech eval completed by speech therapist, therapist requested I place the pt on regular diet as he had no complications swallowing, order was placed.

## 2017-07-07 NOTE — PROGRESS NOTES
Bedside report received, pt laying in bed, no signs of distress, denies SOB or upper airway swelling, pt denies needs, VS stable

## 2017-07-07 NOTE — PROGRESS NOTES
Paged to notify MD of BP and possibly d/c fluids as well as to notify that the pt did pass swallow eval. Waiting on callback.

## 2017-07-07 NOTE — PROGRESS NOTES
"RN noted a pizza box at pt bedside. Pt states \"you did not see that.\" Pt educated on NPO status and the risk of eating food with angioedema and swollen airway. Pt states \"I swallow fine and have no problems.\" Will continue to educate pt and notify MD.   "

## 2017-07-07 NOTE — ASSESSMENT & PLAN NOTE
- Hoarseness of voice resolved,no evidence of stridor.  - Examination revealed oropharangeal exudate, tonsillary swelling and hyperaemia   - CT scan 7/6/16 reveals mucus retention cysts in maxillary sinus bilaterally.with no significant sinus disease and diffuse thickening and edema of the mucosa of nasopharynx,pharynx and hypopharynx with prominence of the palatine tonsils  - Received Rocephin 1 gm   - Currently on Decadron 4mg IV every 6 hours, Benadryl 25mg IV Q6H.  -Change and tapering down to oral prednisolone.  -Patient passed a swallow evaluation test,for oral feeds  --Resume oral feeds prn,MONITOR CLOSELY.

## 2017-07-07 NOTE — DISCHARGE INSTRUCTIONS
Discharge Instructions    Discharged to home by car with relative. Discharged via wheelchair, hospital escort: Yes.  Special equipment needed: Not Applicable    Be sure to schedule a follow-up appointment with your primary care doctor or any specialists as instructed.     Discharge Plan:   Diet Plan: Discussed  Activity Level: Discussed  Confirmed Follow up Appointment: Patient to Call and Schedule Appointment  Confirmed Symptoms Management: Discussed  Medication Reconciliation Updated: Yes  Influenza Vaccine Indication: Patient Refuses    I understand that a diet low in cholesterol, fat, and sodium is recommended for good health. Unless I have been given specific instructions below for another diet, I accept this instruction as my diet prescription.   Other diet: regular diet    Special Instructions: None    · Is patient discharged on Warfarin / Coumadin?   No     · Is patient Post Blood Transfusion?  No    Pharyngitis  Pharyngitis is redness, pain, and swelling (inflammation) of your pharynx.   CAUSES   Pharyngitis is usually caused by infection. Most of the time, these infections are from viruses (viral) and are part of a cold. However, sometimes pharyngitis is caused by bacteria (bacterial). Pharyngitis can also be caused by allergies. Viral pharyngitis may be spread from person to person by coughing, sneezing, and personal items or utensils (cups, forks, spoons, toothbrushes). Bacterial pharyngitis may be spread from person to person by more intimate contact, such as kissing.   SIGNS AND SYMPTOMS   Symptoms of pharyngitis include:    · Sore throat.    · Tiredness (fatigue).    · Low-grade fever.    · Headache.  · Joint pain and muscle aches.  · Skin rashes.  · Swollen lymph nodes.  · Plaque-like film on throat or tonsils (often seen with bacterial pharyngitis).  DIAGNOSIS   Your health care provider will ask you questions about your illness and your symptoms. Your medical history, along with a physical exam, is  often all that is needed to diagnose pharyngitis. Sometimes, a rapid strep test is done. Other lab tests may also be done, depending on the suspected cause.   TREATMENT   Viral pharyngitis will usually get better in 3-4 days without the use of medicine. Bacterial pharyngitis is treated with medicines that kill germs (antibiotics).   HOME CARE INSTRUCTIONS   · Drink enough water and fluids to keep your urine clear or pale yellow.    · Only take over-the-counter or prescription medicines as directed by your health care provider:    ¨ If you are prescribed antibiotics, make sure you finish them even if you start to feel better.    ¨ Do not take aspirin.    · Get lots of rest.    · Gargle with 8 oz of salt water (½ tsp of salt per 1 qt of water) as often as every 1-2 hours to soothe your throat.    · Throat lozenges (if you are not at risk for choking) or sprays may be used to soothe your throat.  SEEK MEDICAL CARE IF:   · You have large, tender lumps in your neck.  · You have a rash.  · You cough up green, yellow-brown, or bloody spit.  SEEK IMMEDIATE MEDICAL CARE IF:   · Your neck becomes stiff.  · You drool or are unable to swallow liquids.  · You vomit or are unable to keep medicines or liquids down.  · You have severe pain that does not go away with the use of recommended medicines.  · You have trouble breathing (not caused by a stuffy nose).  MAKE SURE YOU:   · Understand these instructions.  · Will watch your condition.  · Will get help right away if you are not doing well or get worse.     This information is not intended to replace advice given to you by your health care provider. Make sure you discuss any questions you have with your health care provider.     Document Released: 12/18/2006 Document Revised: 10/08/2014 Document Reviewed: 08/25/2014  [employer] Interactive Patient Education ©2016 [employer]      Depression / Suicide Risk    As you are discharged from this [facility] facility, it is important  to learn how to keep safe from harming yourself.    Recognize the warning signs:  · Abrupt changes in personality, positive or negative- including increase in energy   · Giving away possessions  · Change in eating patterns- significant weight changes-  positive or negative  · Change in sleeping patterns- unable to sleep or sleeping all the time   · Unwillingness or inability to communicate  · Depression  · Unusual sadness, discouragement and loneliness  · Talk of wanting to die  · Neglect of personal appearance   · Rebelliousness- reckless behavior  · Withdrawal from people/activities they love  · Confusion- inability to concentrate     If you or a loved one observes any of these behaviors or has concerns about self-harm, here's what you can do:  · Talk about it- your feelings and reasons for harming yourself  · Remove any means that you might use to hurt yourself (examples: pills, rope, extension cords, firearm)  · Get professional help from the community (Mental Health, Substance Abuse, psychological counseling)  · Do not be alone:Call your Safe Contact- someone whom you trust who will be there for you.  · Call your local CRISIS HOTLINE 313-5325 or 219-622-5039  · Call your local Children's Mobile Crisis Response Team [place] (226) 330-6238 or www.[employer]  · Call the toll free National Suicide Prevention Hotlines   · National Suicide Prevention Lifeline 637-961-CZOI (6224)  · National Hope Line Network 800-SUICIDE (731-3065)

## 2017-07-07 NOTE — ASSESSMENT & PLAN NOTE
- Patient presented with difficulty breathing with mild stridor and intermittent wheezing and,swelling in the neck with difficulty swallowing on presentation.  - Patient currently on steroid therapy with  resolution of symptoms and voice slowly returning to normal,and decrease in neck swelling and with better air entry.  - If patient has difficulty breathing or SOB, increase Decadron to 10 mg IV and consider use of nebulizer  -leucocytosis-wbc count-11.8,and blood glucose levels of 212 due to concurrent steroid use.  -Epinephrine pen to be given at discharge for any  further anaphylactic reactions.

## 2017-07-07 NOTE — PROGRESS NOTES
Pt resting comfortably in bed. VSS. Denies any needs at this time. Bed locked and in lowest position. Call light within reach. Hourly rounding in place.

## 2017-07-07 NOTE — PROGRESS NOTES
D/c instructions given to the pt, verbalized understanding, denies needs or questions. Pt with no signs of distress at time of d/c home. MR notified of pts d/c home. Removed tele monitor.

## 2017-07-07 NOTE — PROGRESS NOTES
Pt educated to keep continuous pulse ox meter in place per MD order, the pt verbalized understanding, however continues to take the monitor off to walk in the hallways.

## 2017-07-07 NOTE — PROGRESS NOTES
Pt laying in bed, no signs of distress, VS with elevated BP, medicated per MAR and will recheck, otherwise denies needs.

## 2017-07-24 ENCOUNTER — OFFICE VISIT (OUTPATIENT)
Dept: INTERNAL MEDICINE | Facility: MEDICAL CENTER | Age: 42
End: 2017-07-24
Payer: MEDICAID

## 2017-07-24 VITALS
HEART RATE: 97 BPM | BODY MASS INDEX: 33.66 KG/M2 | WEIGHT: 254 LBS | HEIGHT: 73 IN | DIASTOLIC BLOOD PRESSURE: 98 MMHG | OXYGEN SATURATION: 99 % | TEMPERATURE: 98.6 F | SYSTOLIC BLOOD PRESSURE: 148 MMHG

## 2017-07-24 DIAGNOSIS — F32.1 MODERATE SINGLE CURRENT EPISODE OF MAJOR DEPRESSIVE DISORDER (HCC): ICD-10-CM

## 2017-07-24 DIAGNOSIS — I10 ESSENTIAL HYPERTENSION: ICD-10-CM

## 2017-07-24 DIAGNOSIS — N52.9 ERECTILE DYSFUNCTION, UNSPECIFIED ERECTILE DYSFUNCTION TYPE: ICD-10-CM

## 2017-07-24 DIAGNOSIS — T50.905A MEDICATION SIDE EFFECT, INITIAL ENCOUNTER: ICD-10-CM

## 2017-07-24 PROCEDURE — 99204 OFFICE O/P NEW MOD 45 MIN: CPT | Mod: GC | Performed by: INTERNAL MEDICINE

## 2017-07-24 RX ORDER — HYDROCHLOROTHIAZIDE 25 MG/1
25 TABLET ORAL DAILY
Qty: 30 TAB | Refills: 0 | Status: SHIPPED | OUTPATIENT
Start: 2017-07-24 | End: 2017-08-30

## 2017-07-24 ASSESSMENT — PAIN SCALES - GENERAL: PAINLEVEL: 9=SEVERE PAIN

## 2017-07-24 ASSESSMENT — PATIENT HEALTH QUESTIONNAIRE - PHQ9
CLINICAL INTERPRETATION OF PHQ2 SCORE: 6
SUM OF ALL RESPONSES TO PHQ QUESTIONS 1-9: 20
5. POOR APPETITE OR OVEREATING: 3 - NEARLY EVERY DAY

## 2017-07-25 ENCOUNTER — TELEPHONE (OUTPATIENT)
Dept: INTERNAL MEDICINE | Facility: MEDICAL CENTER | Age: 42
End: 2017-07-25

## 2017-07-25 PROBLEM — F32.1 MODERATE SINGLE CURRENT EPISODE OF MAJOR DEPRESSIVE DISORDER (HCC): Status: ACTIVE | Noted: 2017-07-25

## 2017-07-25 PROBLEM — F32.1 MODERATE SINGLE CURRENT EPISODE OF MAJOR DEPRESSIVE DISORDER (HCC): Status: RESOLVED | Noted: 2017-07-25 | Resolved: 2017-07-25

## 2017-07-25 PROBLEM — N52.9 ERECTILE DYSFUNCTION: Status: ACTIVE | Noted: 2017-07-25

## 2017-07-25 NOTE — PROGRESS NOTES
"New Patient to Establish    Reason to establish: Hospital follow-up    CC: Headache     HPI: 42 y/o M with PMH of HTN and hospitalization for angioedema 2/2 ACEi use on 7/6/2017 comes to the clinic for a follow up visit. On discharge, Pt was given Amlodipine and HCTZ for BP MGMT. Today, he c/o intense episodic non radiating frontal headache that starts 1 hr after BP medication ingestion but denies orthostatic changes. Additionally, He c/o problems with obtaining and maintaining and erections that started 4 months ago. He states that when he does get an erection, they are 5/10 strength and last for 3-4 min. He reports loss of morning erections and an inability to masturbate. This has brought several arguments with his curent partner and anxiety. Pt reports anhedonia to sexual activities and for things he used to enjoy (going to the [employer]). He is waking up earlier and has problems staying asleep. He admits to feeling sad, and having suicidal thought but states \"Will never do it because I have to stay alive for my kids\". He denies chest pain, SOB, palpitations, ABD pain, or N/V., He admits to frequent urination and polydipsia but denies dysuria, urinary tenesmus, or dribbling.        Patient Active Problem List    Diagnosis Date Noted   • Moderate single current episode of major depressive disorder (CMS-[facility]) 07/25/2017   • Erectile dysfunction 07/25/2017   • Hypertension 07/06/2017       Past Medical History   Diagnosis Date   • Hypertension    • Headache    • Depression        Current Outpatient Prescriptions   Medication Sig Dispense Refill   • hydrochlorothiazide (HYDRODIURIL) 25 MG Tab Take 1 Tab by mouth every day. 30 Tab 0   • diphenhydrAMINE (BENADRYL) 25 MG capsule Take 1 Cap by mouth 3 times a day. 9 Cap 0     No current facility-administered medications for this visit.       Allergies as of 07/24/2017   • (No Known Allergies)       Social History     Social History   • Marital Status: Single     Spouse Name: " "N/A   • Number of Children: N/A   • Years of Education: N/A     Occupational History   • Not on file.     Social History Main Topics   • Smoking status: Never Smoker    • Smokeless tobacco: Not on file   • Alcohol Use: Yes      Comment: Rare   • Drug Use: No   • Sexual Activity: Not on file     Other Topics Concern   • Not on file     Social History Narrative       Family History   Problem Relation Age of Onset   • Family history unknown: Yes       History reviewed. No pertinent past surgical history.    ROS: As per HPI. Additional pertinent symptoms as noted below.    Eyes: Blurry vision  ENT: none  Cardiovascular: none  Respiratory: none  GI: none  : frequent urination  Psychological: Depressed mood    /98 mmHg  Pulse 97  Temp(Src) 37 °C (98.6 °F)  Ht 1.854 m (6' 1\")  Wt 115.214 kg (254 lb)  BMI 33.52 kg/m2  SpO2 99%    Physical Exam  General:  Alert and oriented, No apparent distress. At times, Joking and laughing.   Eyes: Pupils equal and reactive. No scleral icterus.  Throat: Clear no erythema or exudates noted.  Neck: Supple. No lymphadenopathy noted. Thyroid not enlarged.  Lungs: Clear to auscultation and percussion bilaterally.  Cardiovascular: Regular rate and rhythm. No murmurs, rubs or gallops.  Abdomen:  Benign. No rebound or guarding noted.  Rectal exam: No prostate enlargement.    Extremities: No clubbing, cyanosis, edema.  Skin: Clear. No rash or suspicious skin lesions noted.      Assessment and Plan    1. Medication side effect, initial encounter  - Headache probably secondary to Amlodipine  - D/C Amlodipine       2. Erectile dysfunction, unspecified erectile dysfunction type  - Pt w/o morning erections suggestive of Organic ED Possibly 2/2 long term HTN  - Arguments with partner 2/2 Organic ED has resulted in sex anhedonia and may be contributing to new onset Phycogenic ED.  - R/O other causes of Organinc ED  - Rectal exam with no sign of BPH  - Possibly 2/2 Testosterone deficiency  - " Ordered serum Testosterone   - F/U in 5 weeks      3. MDD  - Reports feeling sad  - Anhedonia  - Weakling up early  - Fights with partner and going through child support fights with Childrens mother back in [place].  - thought of suicide but denies plan or possibility of such  - Denies Guilt, loss of concentration, loss of energy or change in appetite.   - Shows new interest in controlling HTN with aragon in diet and taking medication  - Discussed CBT and Antidepressant drugs  - Will F/U in 5 weeks    4. HTN  - D/C Amlodipine  - Doubled HCTZ dose      Signed by: Aquiles Jimenes M.D.

## 2017-07-25 NOTE — TELEPHONE ENCOUNTER
1. Caller Name: Pt                      Call Back Number: [phone] (home)     2. Message: Patient called and left a message stating he picked up his new BP meds but didn't take them since he still had some left from his old prescription. He states he took his BP this morning around 10:45am and reading was 158/112. He is calling to find out what he Dr. Esparza would like for him to do. Jonna is wondering if Dr. Esparza would like for him to take new BP meds or come back to office. He still continues to have a headache.    3. Patient approves office to leave a detailed voicemail/MyChart message: N\A

## 2017-07-26 DIAGNOSIS — N52.9 ERECTILE DYSFUNCTION, UNSPECIFIED ERECTILE DYSFUNCTION TYPE: ICD-10-CM

## 2017-07-26 NOTE — TELEPHONE ENCOUNTER
Spoke to pt. He reports that today, he obtained his new BP medication and took it today. He reports his BP readings were better today and has not had a headache. We discoursed starting CBT but pt preferred holding off on that on till next visit.

## 2017-08-04 NOTE — TELEPHONE ENCOUNTER
Patient called again 08/04, his employer is worried about him. His B/P readings yesterday were 218/118. He has been having headaches with those readings. Doesn't know what to do. I called patient back with no answer and I left him a voicemail letting him know we had an opening today if he would like to get scheduled to get seen in office, before the weekend. Let him know to call [phone] and ask for our  to further assist with scheduling.

## 2017-08-28 ENCOUNTER — TELEPHONE (OUTPATIENT)
Dept: INTERNAL MEDICINE | Facility: MEDICAL CENTER | Age: 42
End: 2017-08-28

## 2017-08-28 NOTE — TELEPHONE ENCOUNTER
Spoke to Pt. and reports non compliance with BP medication. States that medication made him feel weak and stopped taking it. Reports that BP is at the 160/110 range. Would like to come to clinic to explore other BP mgmt options. Pt to be scheduled as soon as possible. Thank you.

## 2017-08-30 ENCOUNTER — OFFICE VISIT (OUTPATIENT)
Dept: INTERNAL MEDICINE | Facility: MEDICAL CENTER | Age: 42
End: 2017-08-30
Payer: MEDICAID

## 2017-08-30 VITALS
SYSTOLIC BLOOD PRESSURE: 158 MMHG | OXYGEN SATURATION: 95 % | DIASTOLIC BLOOD PRESSURE: 94 MMHG | WEIGHT: 267 LBS | BODY MASS INDEX: 35.39 KG/M2 | HEIGHT: 73 IN | RESPIRATION RATE: 16 BRPM | TEMPERATURE: 98.6 F | HEART RATE: 79 BPM

## 2017-08-30 DIAGNOSIS — I10 ESSENTIAL HYPERTENSION: ICD-10-CM

## 2017-08-30 PROCEDURE — 99214 OFFICE O/P EST MOD 30 MIN: CPT | Mod: GC | Performed by: INTERNAL MEDICINE

## 2017-08-30 RX ORDER — CHLORTHALIDONE 25 MG/1
25 TABLET ORAL DAILY
Qty: 30 TAB | Refills: 0 | Status: SHIPPED | OUTPATIENT
Start: 2017-08-30 | End: 2017-10-03 | Stop reason: SDUPTHER

## 2017-08-30 RX ORDER — AMLODIPINE BESYLATE 10 MG/1
10 TABLET ORAL DAILY
Qty: 30 TAB | Refills: 0 | Status: SHIPPED | OUTPATIENT
Start: 2017-08-30 | End: 2017-10-03 | Stop reason: SDUPTHER

## 2017-08-30 ASSESSMENT — ENCOUNTER SYMPTOMS
FOCAL WEAKNESS: 0
TINGLING: 0
BLURRED VISION: 0
WEIGHT LOSS: 0
HEARTBURN: 0
FEVER: 0
EYE REDNESS: 0
NAUSEA: 0
DIARRHEA: 0
ABDOMINAL PAIN: 0
VOMITING: 0
EYE PAIN: 0
COUGH: 0
SHORTNESS OF BREATH: 0
CONSTIPATION: 0
PALPITATIONS: 0
DOUBLE VISION: 0
DIZZINESS: 0
HEADACHES: 1
LOSS OF CONSCIOUSNESS: 0
SENSORY CHANGE: 0
MYALGIAS: 0
CHILLS: 0

## 2017-08-30 ASSESSMENT — PAIN SCALES - GENERAL: PAINLEVEL: NO PAIN

## 2017-08-30 NOTE — PATIENT INSTRUCTIONS
- start taking amlodipine 10 mg po and chlorthalidone 25 mg po   -do the labs 2 days before the next visit   -  Schedule appointment within 10 days

## 2017-08-30 NOTE — PROGRESS NOTES
Established Patient    Jonna presents today with the following:    CC: Following up for hypertension    HPI: Mr. Coyle is a pleasant 42 years old male with history of hypertension, erectile dysfunction, headache and angioedema recently diagnosed, patient was diagnosed with hypertension two and a half years ago, Bp was controlled with lisinopril 10 mg od, patient was hospitalized a month ago because of SOB and cough, diagnosed with angioedema, discharged with Amlodipine 10mg and then was switched to HCTZ in his follow up visit, patient reports that he took the med for 2 weeks only because his BP was still high he was feeling tired because of the med, he is monitoring his BP at home and his reading ranging from 150-170/ 112-120, also he is complaining of headache sometimes, denies change in vision, chest pain, SOB, palpitation and exertional dyspnea.      Patient Active Problem List    Diagnosis Date Noted   • Moderate single current episode of major depressive disorder (CMS-[facility]) 07/25/2017   • Erectile dysfunction 07/25/2017   • Hypertension 07/06/2017       Current Outpatient Prescriptions   Medication Sig Dispense Refill   • amlodipine (NORVASC) 10 MG Tab Take 1 Tab by mouth every day. 30 Tab 0   • chlorthalidone (HYGROTON) 25 MG Tab Take 1 Tab by mouth every day. 30 Tab 0     No current facility-administered medications for this visit.        Social History     Social History   • Marital status: Single     Spouse name: N/A   • Number of children: N/A   • Years of education: N/A     Occupational History   • Not on file.     Social History Main Topics   • Smoking status: Never Smoker   • Smokeless tobacco: Never Used   • Alcohol use Yes      Comment: Rare   • Drug use: No   • Sexual activity: Not on file     Other Topics Concern   • Not on file     Social History Narrative   • No narrative on file       Family History   Problem Relation Age of Onset   • Family history unknown: Yes       ROS: As per HPI. Additional  "pertinent symptoms as noted below.  Review of Systems   Constitutional: Negative for chills, fever, malaise/fatigue and weight loss.   Eyes: Negative for blurred vision, double vision, pain and redness.   Respiratory: Negative for cough and shortness of breath.    Cardiovascular: Negative for chest pain and palpitations.   Gastrointestinal: Negative for abdominal pain, constipation, diarrhea, heartburn, nausea and vomiting.   Musculoskeletal: Negative for myalgias.   Skin: Negative for itching and rash.   Neurological: Positive for headaches (on and off ). Negative for dizziness, tingling, sensory change, focal weakness and loss of consciousness.            /94   Pulse 79   Temp 37 °C (98.6 °F)   Resp 16   Ht 1.854 m (6' 1\")   Wt 121.1 kg (267 lb)   SpO2 95%   BMI 35.23 kg/m²     Physical Exam   Constitutional: He is oriented to person, place, and time and well-developed, well-nourished, and in no distress. No distress.   HENT:   Head: Normocephalic and atraumatic.   Cardiovascular: Normal rate, regular rhythm and normal heart sounds.  Exam reveals no gallop and no friction rub.    No murmur heard.  Pulmonary/Chest: Effort normal and breath sounds normal. No respiratory distress. He has no wheezes. He has no rales.   Neurological: He is alert and oriented to person, place, and time.   Skin: He is not diaphoretic.   Vitals reviewed.         Assessment and Plan    1. Essential hypertension  History of hypertension diagnosed 2 years ago, also control with lisinopril 10 mg once a day, recently diagnosed with angioedema, swish to hydrochlorothiazide, home blood pressure monitoring range from 150-170/112- 120.  Plan:  - Stop the hydrochlorothiazide  - Prescribed Chlorthalidone 25 mg once daily  - Prescribed amlodipine 10 mg once daily  - ordered CMP to be done next week   - Follow up in 10 days.          Signed by: Rubén Moody M.D.    "

## 2017-09-08 ENCOUNTER — APPOINTMENT (OUTPATIENT)
Dept: INTERNAL MEDICINE | Facility: MEDICAL CENTER | Age: 42
End: 2017-09-08
Payer: MEDICAID

## 2017-09-22 ENCOUNTER — HOSPITAL ENCOUNTER (EMERGENCY)
Facility: MEDICAL CENTER | Age: 42
End: 2017-09-22
Payer: MEDICAID

## 2017-09-23 ENCOUNTER — APPOINTMENT (OUTPATIENT)
Dept: RADIOLOGY | Facility: MEDICAL CENTER | Age: 42
End: 2017-09-23
Attending: EMERGENCY MEDICINE
Payer: MEDICAID

## 2017-09-23 ENCOUNTER — HOSPITAL ENCOUNTER (EMERGENCY)
Facility: MEDICAL CENTER | Age: 42
End: 2017-09-23
Attending: EMERGENCY MEDICINE
Payer: MEDICAID

## 2017-09-23 VITALS
BODY MASS INDEX: 35.53 KG/M2 | HEART RATE: 88 BPM | SYSTOLIC BLOOD PRESSURE: 150 MMHG | TEMPERATURE: 98.5 F | OXYGEN SATURATION: 95 % | HEIGHT: 73 IN | WEIGHT: 268.08 LBS | DIASTOLIC BLOOD PRESSURE: 94 MMHG | RESPIRATION RATE: 18 BRPM

## 2017-09-23 DIAGNOSIS — M72.2 PLANTAR FASCIITIS OF LEFT FOOT: ICD-10-CM

## 2017-09-23 DIAGNOSIS — G89.29 OTHER CHRONIC PAIN: ICD-10-CM

## 2017-09-23 DIAGNOSIS — M79.672 FOOT PAIN, LEFT: ICD-10-CM

## 2017-09-23 PROCEDURE — 73630 X-RAY EXAM OF FOOT: CPT | Mod: LT

## 2017-09-23 PROCEDURE — 99284 EMERGENCY DEPT VISIT MOD MDM: CPT | Mod: EDC

## 2017-09-23 RX ORDER — HYDROCODONE BITARTRATE AND ACETAMINOPHEN 5; 325 MG/1; MG/1
1-2 TABLET ORAL EVERY 6 HOURS PRN
Qty: 20 TAB | Refills: 0 | Status: SHIPPED | OUTPATIENT
Start: 2017-09-23 | End: 2017-10-17

## 2017-09-23 ASSESSMENT — ENCOUNTER SYMPTOMS: TINGLING: 0

## 2017-09-23 ASSESSMENT — PAIN SCALES - GENERAL: PAINLEVEL_OUTOF10: 10

## 2017-09-23 NOTE — DISCHARGE INSTRUCTIONS
Take Norco for pain.  Follow up with orthopedics and your doctor.  Return to the ER for pain, or other concerns.  Stretch  You calf twice a day    Plantar Fasciitis  Plantar fasciitis is a painful foot condition that affects the heel. It occurs when the band of tissue that connects the toes to the heel bone (plantar fascia) becomes irritated. This can happen after exercising too much or doing other repetitive activities (overuse injury). The pain from plantar fasciitis can range from mild irritation to severe pain that makes it difficult for you to walk or move. The pain is usually worse in the morning or after you have been sitting or lying down for a while.  CAUSES  This condition may be caused by:  · Standing for long periods of time.  · Wearing shoes that do not fit.  · Doing high-impact activities, including running, aerobics, and ballet.  · Being overweight.  · Having an abnormal way of walking (gait).  · Having tight calf muscles.  · Having high arches in your feet.  · Starting a new athletic activity.  SYMPTOMS  The main symptom of this condition is heel pain. Other symptoms include:  · Pain that gets worse after activity or exercise.  · Pain that is worse in the morning or after resting.  · Pain that goes away after you walk for a few minutes.  DIAGNOSIS  This condition may be diagnosed based on your signs and symptoms. Your health care provider will also do a physical exam to check for:  · A tender area on the bottom of your foot.  · A high arch in your foot.  · Pain when you move your foot.  · Difficulty moving your foot.  You may also need to have imaging studies to confirm the diagnosis. These can include:  · X-rays.  · Ultrasound.  · MRI.  TREATMENT   Treatment for plantar fasciitis depends on the severity of the condition. Your treatment may include:  · Rest, ice, and over-the-counter pain medicines to manage your pain.  · Exercises to stretch your calves and your plantar fascia.  · A splint that  holds your foot in a stretched, upward position while you sleep (night splint).  · Physical therapy to relieve symptoms and prevent problems in the future.  · Cortisone injections to relieve severe pain.  · Extracorporeal shockwave therapy (ESWT) to stimulate damaged plantar fascia with electrical impulses. It is often used as a last resort before surgery.  · Surgery, if other treatments have not worked after 12 months.  HOME CARE INSTRUCTIONS  · Take medicines only as directed by your health care provider.  · Avoid activities that cause pain.  · Roll the bottom of your foot over a bag of ice or a bottle of cold water. Do this for 20 minutes, 3-4 times a day.  · Perform simple stretches as directed by your health care provider.  · Try wearing athletic shoes with air-sole or gel-sole cushions or soft shoe inserts.  · Wear a night splint while sleeping, if directed by your health care provider.  · Keep all follow-up appointments with your health care provider.  PREVENTION   · Do not perform exercises or activities that cause heel pain.  · Consider finding low-impact activities if you continue to have problems.  · Lose weight if you need to.  The best way to prevent plantar fasciitis is to avoid the activities that aggravate your plantar fascia.  SEEK MEDICAL CARE IF:  · Your symptoms do not go away after treatment with home care measures.  · Your pain gets worse.  · Your pain affects your ability to move or do your daily activities.     This information is not intended to replace advice given to you by your health care provider. Make sure you discuss any questions you have with your health care provider.     Document Released: 09/12/2002 Document Revised: 05/03/2016 Document Reviewed: 10/28/2015  [employer] Interactive Patient Education ©2016 [employer]        Heel Spur  A heel spur is a bony growth that forms on the bottom of your heel bone (calcaneus). Heel spurs are common and do not always cause pain. However,  heel spurs often cause inflammation in the strong band of tissue that runs underneath the bone of your foot (plantar fascia). When this happens, you may feel pain on the bottom of your foot, near your heel.   CAUSES   The cause of heel spurs is not completely understood. They may be caused by pressure on the heel. Or, they may stem from the muscle attachments (tendons) near the spur pulling on the heel.   RISK FACTORS  You may be at risk for a heel spur if you:  · Are older than 40.  · Are overweight.  · Have wear and tear arthritis (osteoarthritis).  · Have plantar fascia inflammation.  SIGNS AND SYMPTOMS   Some people have heel spurs but no symptoms. If you do have symptoms, they may include:   · Pain in the bottom of your heel.  · Pain that is worse when you first get out of bed.  · Pain that gets worse after walking or standing.  DIAGNOSIS   Your health care provider may diagnose a heel spur based on your symptoms and a physical exam. You may also have an X-ray of your foot to check for a bony growth coming from the calcaneus.   TREATMENT  Treatment aims to relieve the pain from the heel spur. This may include:  · Stretching exercises.  · Losing weight.  · Wearing specific shoes, inserts, or orthotics for comfort and support.  · Wearing splints at night to properly position your feet.  · Taking over-the-counter medicine to relieve pain.  · Being treated with high-intensity sound waves to break up the heel spur (extracorporeal shock wave therapy).  · Getting steroid injections in your heel to reduce swelling and ease pain.  · Having surgery if your heel spur causes long-term (chronic) pain.  HOME CARE INSTRUCTIONS   · Take medicines only as directed by your health care provider.  · Ask your health care provider if you should use ice or cold packs on the painful areas of your heel or foot.  · Avoid activities that cause you pain until you recover or as directed by your health care provider.  · Stretch before  exercising or being physically active.  · Wear supportive shoes that fit well as directed by your health care provider. You might need to buy new shoes. Wearing old shoes or shoes that do not fit correctly may not provide the support that you need.  · Lose weight if your health care provider thinks you should. This can relieve pressure on your foot that may be causing pain and discomfort.  SEEK MEDICAL CARE IF:   · Your pain continues or gets worse.     This information is not intended to replace advice given to you by your health care provider. Make sure you discuss any questions you have with your health care provider.     Document Released: 01/24/2007 Document Revised: 01/08/2016 Document Reviewed: 02/18/2015  [employer] Interactive Patient Education ©2016 [employer]

## 2017-09-23 NOTE — ED NOTES
".  Chief Complaint   Patient presents with   • Foot Pain     10/10 left foot pain, denies recent trauma, \"I think I broke it a while ago...over ten years ago...and it didn't heal right...I just need to have it looked at\"     ./88   Pulse 88   Temp 37.2 °C (98.9 °F)   Resp 18   Ht 1.854 m (6' 1\")   Wt 121.6 kg (268 lb 1.3 oz)   SpO2 95%   BMI 35.37 kg/m²     Ambulatory to triage with above complaint, educated on triage process, placed in lobby, told to inform staff of any changes in condition.    "

## 2017-09-23 NOTE — ED PROVIDER NOTES
"ED Provider Note    Scribed for Vincenzo Otoole M.D. by Marcella Mandel. 9/23/2017, 11:17 AM.    Primary care provider: Dalton Vogel M.D.  Means of arrival: Walk-in  History obtained from: Patient  History limited by: None    CHIEF COMPLAINT  Chief Complaint   Patient presents with   • Foot Pain     10/10 left foot pain, denies recent trauma, \"I think I broke it a while ago...over ten years ago...and it didn't heal right...I just need to have it looked at\"     ROSA Brian is a 42 y.o. male who presents to the Emergency Department for left foot pain. Patient reports chronic foot pain onset one month ago. He states foot pain is especially bad at this time. Pain is sharp in nature and rated 10/10 at this time. Denies recent trauma to the foot. Denies numbness or tingling. He states pain is worst in the morning. Patient reports history of possible left foot fracture approximately 10 years ago. He denies foot was evaluated by a podiatrist. Patient reports history of hypertension.     REVIEW OF SYSTEMS  Review of Systems   Musculoskeletal: Joint pain: left foot pain    Neurological: Negative for tingling.     PAST MEDICAL HISTORY   has a past medical history of Depression; Headache; and Hypertension.    SURGICAL HISTORY  patient denies any surgical history    SOCIAL HISTORY  Social History   Substance Use Topics   • Smoking status: Never Smoker   • Smokeless tobacco: Never Used   • Alcohol use Yes      Comment: Rare      History   Drug Use No     FAMILY HISTORY  Family History   Problem Relation Age of Onset   • Family history unknown: Yes     CURRENT MEDICATIONS  No current facility-administered medications on file prior to encounter.      Current Outpatient Prescriptions on File Prior to Encounter   Medication Sig Dispense Refill   • amlodipine (NORVASC) 10 MG Tab Take 1 Tab by mouth every day. 30 Tab 0   • chlorthalidone (HYGROTON) 25 MG Tab Take 1 Tab by mouth every day. 30 Tab 0 " "    ALLERGIES  No Known Allergies    PHYSICAL EXAM  VITAL SIGNS: /88   Pulse 88   Temp 37.2 °C (98.9 °F)   Resp 18   Ht 1.854 m (6' 1\")   Wt 121.6 kg (268 lb 1.3 oz)   SpO2 95%   BMI 35.37 kg/m²   Vitals reviewed.  Constitutional: Well developed, Well nourished, No acute distress, Non-toxic appearance.   Left foot: Good pulses, normal perfusion.  No ankle tenderness.  Tenderness of the medial calcaneal tubercle.  He has significant flatness in his foot.  There is no redness, infection, or tenderness.  Neurologic: Alert, No focal deficits noted.   Psychiatric: Affect normal    RADIOLOGY  DX-FOOT-COMPLETE 3+ LEFT   Final Result      1.  Negative for fracture or malalignment      2.  Plantar calcaneal spur        The radiologist's interpretation of all radiological studies have been reviewed by me.    COURSE & MEDICAL DECISION MAKING  Pertinent Labs & Imaging studies reviewed. (See chart for details)    11:17 AM Patient seen and examined at bedside. The patient presents with Foot pain and tenderness that is chronic in nature.  Ordered for DX-foot complete 3+ left to evaluate.     12:27 PM Recheck: Patient is resting comfortably and reports feeling improved. I updated him on his results, which showed no indication of fracture but showed heel spur. I explained that he is now stable for discharge with a prescription for Norco. I instructed patient to do calf stretches twice daily as well to relieve plantar fascitis pain. I advised him to follow up with a podiatrist in two days and to return to the ED for worsening foot pain. He understands and will comply.     The patient does have some tenderness of the medial calcaneal tubercle.  This could be from his heel spur or could be from plantar fasciitis.  He indicates he has chronic pain.  He is referred to orthopedic doctor on call.  Does not require crutches.  He is prescribed pain medicines.  He is advised on the stretcher.  Plantar fasciitis and return " precautions for both heel spur and plantar fasciitis.  He is discharged in good condition.    I reviewed prescription monitoring program for patient's narcotic use before prescribing a scheduled drug.The patient will not drink alcohol nor drive with prescribed medications. The patient will return for new or worsening symptoms and is stable at the time of discharge.    The patient is referred to a primary physician for blood pressure management, diabetic screening, and for all other preventative health concerns.    DISPOSITION:  Patient will be discharged home in stable condition.    FOLLOW UP:  Rosas Jimenez M.D.  [address]  [address]  [facility] [address]  [phone]    In 2 days    OUTPATIENT MEDICATIONS:  New Prescriptions    HYDROCODONE-ACETAMINOPHEN (NORCO) 5-325 MG TAB PER TABLET    Take 1-2 Tabs by mouth every 6 hours as needed.     FINAL IMPRESSION  1. Foot pain, left    2. Plantar fasciitis of left foot    3. Other chronic pain        IMarcella (Scribe), am scribing for, and in the presence of, Vincenzo Otoole M.D..    Electronically signed by: Marcella Mandel (Scribe), 9/23/2017    IVincenzo M.D. personally performed the services described in this documentation, as scribed by Marcella Mandel in my presence, and it is both accurate and complete.    The note accurately reflects work and decisions made by me.  Vincenzo Otoole  9/23/2017  4:47 PM

## 2017-10-03 ENCOUNTER — OFFICE VISIT (OUTPATIENT)
Dept: INTERNAL MEDICINE | Facility: MEDICAL CENTER | Age: 42
End: 2017-10-03
Payer: MEDICAID

## 2017-10-03 VITALS
DIASTOLIC BLOOD PRESSURE: 90 MMHG | HEART RATE: 84 BPM | HEIGHT: 73 IN | BODY MASS INDEX: 35.92 KG/M2 | SYSTOLIC BLOOD PRESSURE: 110 MMHG | WEIGHT: 271 LBS | TEMPERATURE: 98 F | OXYGEN SATURATION: 95 %

## 2017-10-03 DIAGNOSIS — I10 ESSENTIAL HYPERTENSION: ICD-10-CM

## 2017-10-03 PROCEDURE — 99214 OFFICE O/P EST MOD 30 MIN: CPT | Mod: GC | Performed by: INTERNAL MEDICINE

## 2017-10-03 RX ORDER — CHLORTHALIDONE 25 MG/1
25 TABLET ORAL DAILY
Qty: 30 TAB | Refills: 2 | Status: SHIPPED | OUTPATIENT
Start: 2017-10-03 | End: 2017-11-09 | Stop reason: SDUPTHER

## 2017-10-03 RX ORDER — AMLODIPINE BESYLATE 10 MG/1
10 TABLET ORAL DAILY
Qty: 30 TAB | Refills: 2 | Status: SHIPPED | OUTPATIENT
Start: 2017-10-03 | End: 2017-11-09 | Stop reason: SDUPTHER

## 2017-10-03 ASSESSMENT — PAIN SCALES - GENERAL: PAINLEVEL: 10=SEVERE PAIN

## 2017-10-03 NOTE — PROGRESS NOTES
Established Patient    Jonna presents today with the following:    CC: Left foot plantar fasciitis    HPI: 42-year-old patient with past medical history of hypertension, ED, headaches, angioedema, and recent discharge from the ED on 9/23 for which she was diagnosed with left foot plantar fascia fasciitis and is to follow up today with orthopedic surgery comes to clinic for a blood pressure follow-up visit. Patient denies headaches, chest pain, palpitation, blurry vision, dysuria, dizziness or loss of consciousness. Patient denies SI/HD. Patient's blood pressure is stable today, and is to continue with chlorthalidone and amlodipine regimen. Follow up in 5 weeks with orthopedic surgery recommendations.    Patient Active Problem List    Diagnosis Date Noted   • Moderate single current episode of major depressive disorder (CMS-HCC) 07/25/2017   • Erectile dysfunction 07/25/2017   • Hypertension 07/06/2017       Current Outpatient Prescriptions   Medication Sig Dispense Refill   • chlorthalidone (HYGROTON) 25 MG Tab Take 1 Tab by mouth every day. 30 Tab 2   • amlodipine (NORVASC) 10 MG Tab Take 1 Tab by mouth every day. 30 Tab 2   • hydrocodone-acetaminophen (NORCO) 5-325 MG Tab per tablet Take 1-2 Tabs by mouth every 6 hours as needed. 20 Tab 0     No current facility-administered medications for this visit.        ROS: As per HPI. Additional pertinent symptoms as noted below.  Constitutional: no fevers, chills, weight change  Eyes: no blurred vision, discharge, eye pain  ENT: no rhinorrhea, sore throat, neck masses  Cardiovascular: no angina, palpitations, PND, orthopnea, edema  Respiratory: no cough, sputum, or dyspnea  GI: no nausea, vomiting, abdominal pain, constipation, or diarrhea  : no dysuria, hematuria, frequency   Musculo-skeletal: Left foot pain  Skin: no rashes or open wounds  Neurological: no headaches, dizziness, motor/sensory loss  Psychological: no anxiety or depression      /90   Pulse  "84   Temp 36.7 °C (98 °F)   Ht 1.854 m (6' 1\")   Wt 122.9 kg (271 lb)   SpO2 95%   BMI 35.75 kg/m²     Physical Exam   Constitutional: oriented to person, place, and time. No distress.   Eyes: Pupils are equal, round, and reactive to light. No scleral icterus.  Neck: Neck supple. No thyromegaly present.   Cardiovascular: Normal rate, regular rhythm and normal heart sounds.  Exam reveals no gallop and no friction rub.  No murmur heard.  Pulmonary/Chest: Breath sounds normal. Chest wall is not dull to percussion.   Musculoskeletal:   no edema.   Lymphadenopathy: no cervical adenopathy  Neurological: alert and oriented to person, place, and time.   Skin: No cyanosis. Nails show no clubbing.    Note: I have reviewed all pertinent labs and diagnostic tests associated with this visit with specific comments listed under the assessment and plan below    Assessment and Plan    1. Essential hypertension  - Patient's blood pressure stable today  - Refilled chlorthalidone and amlodipine  - Follow up in 5 weeks    2. Left foot plantar fasciitis  - Patient discharged from the ED on 9/23 for plantar fasciitis  - ERP referred to orthopedic surgery   - Patient has orthopedic surgery appointment later on today  - Will follow orthopedic surgery recommendation  - Follow up in 5 weeks    Followup: Return in about 5 weeks (around 11/7/2017).      Signed by: Dalton Vogel M.D.  "

## 2017-10-13 ENCOUNTER — HOSPITAL ENCOUNTER (EMERGENCY)
Facility: MEDICAL CENTER | Age: 42
End: 2017-10-13
Attending: EMERGENCY MEDICINE
Payer: MEDICAID

## 2017-10-13 ENCOUNTER — APPOINTMENT (OUTPATIENT)
Dept: RADIOLOGY | Facility: MEDICAL CENTER | Age: 42
End: 2017-10-13
Attending: EMERGENCY MEDICINE
Payer: MEDICAID

## 2017-10-13 VITALS
RESPIRATION RATE: 18 BRPM | TEMPERATURE: 98.2 F | SYSTOLIC BLOOD PRESSURE: 142 MMHG | HEIGHT: 73 IN | HEART RATE: 90 BPM | OXYGEN SATURATION: 98 % | BODY MASS INDEX: 35.94 KG/M2 | WEIGHT: 271.17 LBS | DIASTOLIC BLOOD PRESSURE: 80 MMHG

## 2017-10-13 DIAGNOSIS — R10.9 LEFT FLANK PAIN: ICD-10-CM

## 2017-10-13 LAB
ALBUMIN SERPL BCP-MCNC: 4.5 G/DL (ref 3.2–4.9)
ALBUMIN/GLOB SERPL: 1.2 G/DL
ALP SERPL-CCNC: 83 U/L (ref 30–99)
ALT SERPL-CCNC: 98 U/L (ref 2–50)
ANION GAP SERPL CALC-SCNC: 10 MMOL/L (ref 0–11.9)
APPEARANCE UR: CLEAR
APPEARANCE UR: CLEAR
AST SERPL-CCNC: 44 U/L (ref 12–45)
BACTERIA #/AREA URNS HPF: NEGATIVE /HPF
BASOPHILS # BLD AUTO: 0.3 % (ref 0–1.8)
BASOPHILS # BLD: 0.02 K/UL (ref 0–0.12)
BILIRUB SERPL-MCNC: 0.3 MG/DL (ref 0.1–1.5)
BILIRUB UR QL STRIP.AUTO: NEGATIVE
BUN SERPL-MCNC: 8 MG/DL (ref 8–22)
CALCIUM SERPL-MCNC: 10.2 MG/DL (ref 8.5–10.5)
CHLORIDE SERPL-SCNC: 99 MMOL/L (ref 96–112)
CO2 SERPL-SCNC: 26 MMOL/L (ref 20–33)
COLOR UR AUTO: YELLOW
COLOR UR: YELLOW
CREAT SERPL-MCNC: 1.03 MG/DL (ref 0.5–1.4)
CULTURE IF INDICATED INDCX: NO UA CULTURE
EOSINOPHIL # BLD AUTO: 0.13 K/UL (ref 0–0.51)
EOSINOPHIL NFR BLD: 1.9 % (ref 0–6.9)
EPI CELLS #/AREA URNS HPF: NEGATIVE /HPF
ERYTHROCYTE [DISTWIDTH] IN BLOOD BY AUTOMATED COUNT: 38.3 FL (ref 35.9–50)
GFR SERPL CREATININE-BSD FRML MDRD: >60 ML/MIN/1.73 M 2
GLOBULIN SER CALC-MCNC: 3.7 G/DL (ref 1.9–3.5)
GLUCOSE SERPL-MCNC: 118 MG/DL (ref 65–99)
GLUCOSE UR QL STRIP.AUTO: NEGATIVE MG/DL
GLUCOSE UR STRIP.AUTO-MCNC: NEGATIVE MG/DL
HCT VFR BLD AUTO: 48.2 % (ref 42–52)
HGB BLD-MCNC: 16.3 G/DL (ref 14–18)
HYALINE CASTS #/AREA URNS LPF: ABNORMAL /LPF
IMM GRANULOCYTES # BLD AUTO: 0.03 K/UL (ref 0–0.11)
IMM GRANULOCYTES NFR BLD AUTO: 0.4 % (ref 0–0.9)
KETONES UR QL STRIP.AUTO: NEGATIVE MG/DL
KETONES UR STRIP.AUTO-MCNC: NEGATIVE MG/DL
LEUKOCYTE ESTERASE UR QL STRIP.AUTO: NEGATIVE
LEUKOCYTE ESTERASE UR QL STRIP.AUTO: NEGATIVE
LIPASE SERPL-CCNC: 8 U/L (ref 11–82)
LYMPHOCYTES # BLD AUTO: 1.45 K/UL (ref 1–4.8)
LYMPHOCYTES NFR BLD: 21.3 % (ref 22–41)
MCH RBC QN AUTO: 27.5 PG (ref 27–33)
MCHC RBC AUTO-ENTMCNC: 33.8 G/DL (ref 33.7–35.3)
MCV RBC AUTO: 81.3 FL (ref 81.4–97.8)
MICRO URNS: ABNORMAL
MONOCYTES # BLD AUTO: 0.55 K/UL (ref 0–0.85)
MONOCYTES NFR BLD AUTO: 8.1 % (ref 0–13.4)
NEUTROPHILS # BLD AUTO: 4.64 K/UL (ref 1.82–7.42)
NEUTROPHILS NFR BLD: 68 % (ref 44–72)
NITRITE UR QL STRIP.AUTO: NEGATIVE
NITRITE UR QL STRIP.AUTO: NEGATIVE
NRBC # BLD AUTO: 0 K/UL
NRBC BLD AUTO-RTO: 0 /100 WBC
PH UR STRIP.AUTO: 6.5 [PH]
PH UR STRIP.AUTO: 7 [PH]
PLATELET # BLD AUTO: 277 K/UL (ref 164–446)
PMV BLD AUTO: 9.4 FL (ref 9–12.9)
POTASSIUM SERPL-SCNC: 3.8 MMOL/L (ref 3.6–5.5)
PROT SERPL-MCNC: 8.2 G/DL (ref 6–8.2)
PROT UR QL STRIP: NEGATIVE MG/DL
PROT UR QL STRIP: NEGATIVE MG/DL
RBC # BLD AUTO: 5.93 M/UL (ref 4.7–6.1)
RBC # URNS HPF: ABNORMAL /HPF
RBC UR QL AUTO: ABNORMAL
RBC UR QL AUTO: ABNORMAL
SODIUM SERPL-SCNC: 135 MMOL/L (ref 135–145)
SP GR UR STRIP.AUTO: 1.02
SP GR UR: 1.02
UROBILINOGEN UR STRIP.AUTO-MCNC: 0.2 MG/DL
WBC # BLD AUTO: 6.8 K/UL (ref 4.8–10.8)
WBC #/AREA URNS HPF: ABNORMAL /HPF

## 2017-10-13 PROCEDURE — 85025 COMPLETE CBC W/AUTO DIFF WBC: CPT

## 2017-10-13 PROCEDURE — 81001 URINALYSIS AUTO W/SCOPE: CPT

## 2017-10-13 PROCEDURE — 80053 COMPREHEN METABOLIC PANEL: CPT

## 2017-10-13 PROCEDURE — A9270 NON-COVERED ITEM OR SERVICE: HCPCS | Performed by: EMERGENCY MEDICINE

## 2017-10-13 PROCEDURE — 36415 COLL VENOUS BLD VENIPUNCTURE: CPT

## 2017-10-13 PROCEDURE — 96360 HYDRATION IV INFUSION INIT: CPT

## 2017-10-13 PROCEDURE — 99285 EMERGENCY DEPT VISIT HI MDM: CPT

## 2017-10-13 PROCEDURE — 81002 URINALYSIS NONAUTO W/O SCOPE: CPT

## 2017-10-13 PROCEDURE — 74176 CT ABD & PELVIS W/O CONTRAST: CPT

## 2017-10-13 PROCEDURE — 700102 HCHG RX REV CODE 250 W/ 637 OVERRIDE(OP): Performed by: EMERGENCY MEDICINE

## 2017-10-13 PROCEDURE — 700105 HCHG RX REV CODE 258: Performed by: EMERGENCY MEDICINE

## 2017-10-13 PROCEDURE — 83690 ASSAY OF LIPASE: CPT

## 2017-10-13 RX ORDER — IBUPROFEN 800 MG/1
800 TABLET ORAL EVERY 8 HOURS PRN
Qty: 30 TAB | Refills: 0 | Status: SHIPPED | OUTPATIENT
Start: 2017-10-13 | End: 2018-01-15

## 2017-10-13 RX ORDER — SODIUM CHLORIDE 9 MG/ML
1000 INJECTION, SOLUTION INTRAVENOUS ONCE
Status: COMPLETED | OUTPATIENT
Start: 2017-10-13 | End: 2017-10-13

## 2017-10-13 RX ORDER — CYCLOBENZAPRINE HCL 10 MG
10 TABLET ORAL 3 TIMES DAILY PRN
Qty: 15 TAB | Refills: 0 | Status: SHIPPED | OUTPATIENT
Start: 2017-10-13 | End: 2017-10-17

## 2017-10-13 RX ORDER — CYCLOBENZAPRINE HCL 10 MG
10 TABLET ORAL ONCE
Status: COMPLETED | OUTPATIENT
Start: 2017-10-13 | End: 2017-10-13

## 2017-10-13 RX ORDER — IBUPROFEN 600 MG/1
600 TABLET ORAL ONCE
Status: COMPLETED | OUTPATIENT
Start: 2017-10-13 | End: 2017-10-13

## 2017-10-13 RX ADMIN — CYCLOBENZAPRINE 10 MG: 10 TABLET, FILM COATED ORAL at 11:19

## 2017-10-13 RX ADMIN — SODIUM CHLORIDE 1000 ML: 9 INJECTION, SOLUTION INTRAVENOUS at 09:58

## 2017-10-13 RX ADMIN — IBUPROFEN 600 MG: 600 TABLET, FILM COATED ORAL at 11:20

## 2017-10-13 ASSESSMENT — ENCOUNTER SYMPTOMS
DIZZINESS: 0
FEVER: 1
HEADACHES: 0
SHORTNESS OF BREATH: 1
HEARTBURN: 0
ABDOMINAL PAIN: 1
FLANK PAIN: 1

## 2017-10-13 ASSESSMENT — LIFESTYLE VARIABLES: DO YOU DRINK ALCOHOL: NO

## 2017-10-13 NOTE — ED PROVIDER NOTES
"ED Provider Note    Scribed for Marissa Bean D.O. by Minnie Thornton. 10/13/2017, 9:38 AM.    Primary care provider: Dalton Vogel M.D.  Means of arrival: Walk-in  History obtained from: Patient  History limited by: None    CHIEF COMPLAINT  Chief Complaint   Patient presents with   • Flank Pain     bilat, 2 days    • Abdominal Pain       HPI  Jonna Brian is a 42 y.o. male who presents to the Emergency Department for evaluation of bilateral flank pain and abdominal pain onset 2 days ago. He describes his pain as a \"pressure.\"  He rates his pain worse than a 10/10 in severity. The patient also reports a tactile fever which he thought was due to a recent sickness and difficulty breathing secondary to pain. Per patient, nothing alleviates or exacerbates his pain. He denies any changes in his urine, pain with urination, or nausea. He denies any recent trauma or falls. No new workout. History of kidney stones. The patient does not have a history of similar symptoms.    REVIEW OF SYSTEMS  Review of Systems   Constitutional: Positive for fever.   Respiratory: Positive for shortness of breath.    Cardiovascular: Negative for chest pain.   Gastrointestinal: Positive for abdominal pain. Negative for heartburn.   Genitourinary: Positive for flank pain. Negative for dysuria and hematuria.   Neurological: Negative for dizziness and headaches.   All other systems reviewed and are negative.  C.    PAST MEDICAL HISTORY   has a past medical history of Depression; Headache(784.0); and Hypertension.    SURGICAL HISTORY  patient denies any surgical history    SOCIAL HISTORY  Social History   Substance Use Topics   • Smoking status: Never Smoker   • Smokeless tobacco: Never Used   • Alcohol use No      History   Drug Use No       FAMILY HISTORY  Family History   Problem Relation Age of Onset   • Family history unknown: Yes       CURRENT MEDICATIONS  No current facility-administered medications on file prior to " "encounter.      Current Outpatient Prescriptions on File Prior to Encounter   Medication Sig Dispense Refill   • chlorthalidone (HYGROTON) 25 MG Tab Take 1 Tab by mouth every day. 30 Tab 2   • amlodipine (NORVASC) 10 MG Tab Take 1 Tab by mouth every day. 30 Tab 2   • hydrocodone-acetaminophen (NORCO) 5-325 MG Tab per tablet Take 1-2 Tabs by mouth every 6 hours as needed. 20 Tab 0       ALLERGIES  No Known Allergies    PHYSICAL EXAM  VITAL SIGNS: /80   Pulse 92   Temp 36.7 °C (98.1 °F) (Temporal)   Resp 18   Ht 1.854 m (6' 1\")   Wt 123 kg (271 lb 2.7 oz)   SpO2 93%   BMI 35.78 kg/m²   Vitals reviewed.  Constitutional: Patient is oriented to person, place, and time. Appears well-developed and well-nourished. No distress.    Head: Normocephalic and atraumatic.   Ears: Normal external ears bilaterally.   Mouth/Throat: Oropharynx is clear and moist.  Eyes: Conjunctivae are normal. Pupils are equal, round, and reactive to light.   Cardiovascular: Normal rate, regular rhythm and normal heart sounds. Normal peripheral pulses.  Pulmonary/Chest: Effort normal and breath sounds normal. No respiratory distress, no wheezes, rhonchi, or rales.   Abdominal: Soft. Bowel sounds are normal. There is no tenderness, rebound or guarding, or peritoneal signs. Left CVA tenderness.  Musculoskeletal: Left lower extremity in immobilizer boot. No edema and no tenderness.   Neurological: No focal deficits.   Skin: Skin is warm and dry. No erythema. No pallor.   Psychiatric: Patient has a normal mood and affect.     LABS  Results for orders placed or performed during the hospital encounter of 10/13/17   CBC WITH DIFFERENTIAL   Result Value Ref Range    WBC 6.8 4.8 - 10.8 K/uL    RBC 5.93 4.70 - 6.10 M/uL    Hemoglobin 16.3 14.0 - 18.0 g/dL    Hematocrit 48.2 42.0 - 52.0 %    MCV 81.3 (L) 81.4 - 97.8 fL    MCH 27.5 27.0 - 33.0 pg    MCHC 33.8 33.7 - 35.3 g/dL    RDW 38.3 35.9 - 50.0 fL    Platelet Count 277 164 - 446 K/uL    MPV 9.4 " 9.0 - 12.9 fL    Neutrophils-Polys 68.00 44.00 - 72.00 %    Lymphocytes 21.30 (L) 22.00 - 41.00 %    Monocytes 8.10 0.00 - 13.40 %    Eosinophils 1.90 0.00 - 6.90 %    Basophils 0.30 0.00 - 1.80 %    Immature Granulocytes 0.40 0.00 - 0.90 %    Nucleated RBC 0.00 /100 WBC    Neutrophils (Absolute) 4.64 1.82 - 7.42 K/uL    Lymphs (Absolute) 1.45 1.00 - 4.80 K/uL    Monos (Absolute) 0.55 0.00 - 0.85 K/uL    Eos (Absolute) 0.13 0.00 - 0.51 K/uL    Baso (Absolute) 0.02 0.00 - 0.12 K/uL    Immature Granulocytes (abs) 0.03 0.00 - 0.11 K/uL    NRBC (Absolute) 0.00 K/uL   COMP METABOLIC PANEL   Result Value Ref Range    Sodium 135 135 - 145 mmol/L    Potassium 3.8 3.6 - 5.5 mmol/L    Chloride 99 96 - 112 mmol/L    Co2 26 20 - 33 mmol/L    Anion Gap 10.0 0.0 - 11.9    Glucose 118 (H) 65 - 99 mg/dL    Bun 8 8 - 22 mg/dL    Creatinine 1.03 0.50 - 1.40 mg/dL    Calcium 10.2 8.5 - 10.5 mg/dL    AST(SGOT) 44 12 - 45 U/L    ALT(SGPT) 98 (H) 2 - 50 U/L    Alkaline Phosphatase 83 30 - 99 U/L    Total Bilirubin 0.3 0.1 - 1.5 mg/dL    Albumin 4.5 3.2 - 4.9 g/dL    Total Protein 8.2 6.0 - 8.2 g/dL    Globulin 3.7 (H) 1.9 - 3.5 g/dL    A-G Ratio 1.2 g/dL   LIPASE   Result Value Ref Range    Lipase 8 (L) 11 - 82 U/L   URINALYSIS CULTURE, IF INDICATED   Result Value Ref Range    Color Yellow     Character Clear     Specific Gravity 1.017 <1.035    Ph 6.5 5.0 - 8.0    Glucose Negative Negative mg/dL    Ketones Negative Negative mg/dL    Protein Negative Negative mg/dL    Bilirubin Negative Negative    Urobilinogen, Urine 0.2 Negative    Nitrite Negative Negative    Leukocyte Esterase Negative Negative    Occult Blood Trace (A) Negative    Micro Urine Req Microscopic     Culture Indicated No UA Culture   URINE MICROSCOPIC (W/UA)   Result Value Ref Range    WBC 0-2 (A) /hpf    RBC 2-5 (A) /hpf    Bacteria Negative None /hpf    Epithelial Cells Negative /hpf    Hyaline Cast 0-2 /lpf   ESTIMATED GFR   Result Value Ref Range    GFR If   American >60 >60 mL/min/1.73 m 2    GFR If Non African American >60 >60 mL/min/1.73 m 2   POC UA   Result Value Ref Range    POC Color Yellow     POC Appearance Clear     POC Glucose Negative Negative mg/dL    POC Ketones Negative Negative mg/dL    POC Specific Gravity 1.020 1.005 - 1.030    POC Blood Small (A) Negative    POC Urine PH 7.0 5.0 - 8.0    POC Protein Negative Negative mg/dL    POC Nitrites Negative Negative    POC Leukocyte Esterase Negative Negative     All labs reviewed by me.    EKG Interpretation  Interpreted by me    Rhythm: normal sinus   Rate: normal  Axis: normal  Ectopy: none  Conduction: normal  ST Segments: no acute change  T Waves: no acute change  Q Waves: none    Clinical Impression: no acute changes and normal EKG    RADIOLOGY  CT-RENAL COLIC EVALUATION(A/P W/O)   Final Result      No evidence of nephroureterolithiasis or obstructive uropathy.        The radiologist's interpretation of all radiological studies have been reviewed by me.    COURSE & MEDICAL DECISION MAKING  Pertinent Labs & Imaging studies reviewed. (See chart for details)    9:38 AM - Patient seen and examined at bedside. He is overall well-appearing and nontoxic. He presents with left flank pain. He declines any pain medication. His vital signs are normal. He is not tachypneic, tachycardic or toxic. Patient will be treated with 1,000 ml NS Infusion secondary to suspected kidney stone and assistance with passage. Ordered renal colic CT, CBC with differential, CMP, Lipase, Urinalysis with culture if indicated, and POC UA to evaluate his symptoms. The differential diagnoses include but are not limited to: kidney stone, UTI, pyelonephritis, muscle strain.     12:50 PM patient's reevaluated. No new complaints. We discussed CT findings which show no evidence of kidney stone, pyelonephritis tumor or other abnormalities. Kidney function was normal. No evidence of urinary tract infection. At this time, I feel the patient can  safely be discharged home. I suspect now, that this is more likely musculoskeletal in its etiology and I have suggested anti-inflammatories and a muscle relaxer. Patient was requesting the day off work. Then he will rest over the weekend and return to work on Monday. This is a reasonable plan of care. He's been given strict return precautions regarding fever, worsening pain, nausea, vomiting or other concerning symptoms. Wife at the bedside. She will assist him home. Patient be discharged to home in stable condition.    FINAL IMPRESSION  1. Left flank pain          Minnie GRIFFIN (Mona), am scribing for, and in the presence of, Marissa Bean D.O..    Electronically signed by: Minnie Thornton (Mona), 10/13/2017    Marissa GRIFFIN D.O. personally performed the services described in this documentation, as scribed by Minnie Thornton in my presence, and it is both accurate and complete.    The note accurately reflects work and decisions made by me.  Marissa Bean  10/13/2017  11:39 AM

## 2017-10-13 NOTE — ED NOTES
41 y/o male ambulate to triage   Chief Complaint   Patient presents with   • Flank Pain     bilat, 2 days    • Abdominal Pain     Pt denies dysuria

## 2017-10-13 NOTE — ED NOTES
PT ambulates to room.  Changed into gown.  A&ox4.  Urine collected & dipped.  Chart up for ERP evaluation.

## 2017-10-13 NOTE — ED NOTES
Discharge instructions given, pt verbalized understanding.  Prescription instructions given, pt verbalized understanding.  Understands NOT to drive or drink alcohol while taking flexeril.  A&ox4.  VSS.  Ambulates out of ER with friend.  Friend to drive pt home.

## 2017-10-17 ENCOUNTER — APPOINTMENT (OUTPATIENT)
Dept: RADIOLOGY | Facility: MEDICAL CENTER | Age: 42
End: 2017-10-17
Attending: EMERGENCY MEDICINE
Payer: MEDICAID

## 2017-10-17 ENCOUNTER — RESOLUTE PROFESSIONAL BILLING HOSPITAL PROF FEE (OUTPATIENT)
Dept: HOSPITALIST | Facility: MEDICAL CENTER | Age: 42
End: 2017-10-17
Payer: MEDICAID

## 2017-10-17 ENCOUNTER — HOSPITAL ENCOUNTER (OUTPATIENT)
Facility: MEDICAL CENTER | Age: 42
End: 2017-10-17
Attending: EMERGENCY MEDICINE | Admitting: HOSPITALIST
Payer: MEDICAID

## 2017-10-17 VITALS
OXYGEN SATURATION: 92 % | WEIGHT: 269.84 LBS | HEART RATE: 84 BPM | RESPIRATION RATE: 18 BRPM | DIASTOLIC BLOOD PRESSURE: 84 MMHG | SYSTOLIC BLOOD PRESSURE: 151 MMHG | HEIGHT: 73 IN | BODY MASS INDEX: 35.76 KG/M2 | TEMPERATURE: 98 F

## 2017-10-17 DIAGNOSIS — R07.9 CHEST PAIN, UNSPECIFIED TYPE: ICD-10-CM

## 2017-10-17 PROBLEM — E66.9 OBESITY (BMI 30-39.9): Status: ACTIVE | Noted: 2017-10-17

## 2017-10-17 PROBLEM — M72.2 PLANTAR FASCIITIS OF LEFT FOOT: Status: ACTIVE | Noted: 2017-10-17

## 2017-10-17 LAB
DEPRECATED D DIMER PPP IA-ACNC: <200 NG/ML(D-DU)
EKG IMPRESSION: NORMAL
EKG IMPRESSION: NORMAL
TROPONIN I SERPL-MCNC: <0.01 NG/ML (ref 0–0.04)
TROPONIN I SERPL-MCNC: <0.01 NG/ML (ref 0–0.04)

## 2017-10-17 PROCEDURE — 700111 HCHG RX REV CODE 636 W/ 250 OVERRIDE (IP): Performed by: HOSPITALIST

## 2017-10-17 PROCEDURE — 36415 COLL VENOUS BLD VENIPUNCTURE: CPT

## 2017-10-17 PROCEDURE — 99285 EMERGENCY DEPT VISIT HI MDM: CPT

## 2017-10-17 PROCEDURE — 99218 PR INITIAL OBSERVATION CARE,LEVL I: CPT | Performed by: HOSPITALIST

## 2017-10-17 PROCEDURE — 93005 ELECTROCARDIOGRAM TRACING: CPT | Performed by: HOSPITALIST

## 2017-10-17 PROCEDURE — A9270 NON-COVERED ITEM OR SERVICE: HCPCS | Performed by: HOSPITALIST

## 2017-10-17 PROCEDURE — G0378 HOSPITAL OBSERVATION PER HR: HCPCS

## 2017-10-17 PROCEDURE — 700102 HCHG RX REV CODE 250 W/ 637 OVERRIDE(OP): Performed by: HOSPITALIST

## 2017-10-17 PROCEDURE — 71020 DX-CHEST-2 VIEWS: CPT

## 2017-10-17 PROCEDURE — 96374 THER/PROPH/DIAG INJ IV PUSH: CPT

## 2017-10-17 PROCEDURE — 85379 FIBRIN DEGRADATION QUANT: CPT

## 2017-10-17 PROCEDURE — 93005 ELECTROCARDIOGRAM TRACING: CPT | Performed by: EMERGENCY MEDICINE

## 2017-10-17 PROCEDURE — 84484 ASSAY OF TROPONIN QUANT: CPT

## 2017-10-17 PROCEDURE — 93010 ELECTROCARDIOGRAM REPORT: CPT | Performed by: INTERNAL MEDICINE

## 2017-10-17 RX ORDER — KETOROLAC TROMETHAMINE 30 MG/ML
30 INJECTION, SOLUTION INTRAMUSCULAR; INTRAVENOUS EVERY 6 HOURS
Status: DISCONTINUED | OUTPATIENT
Start: 2017-10-17 | End: 2017-10-17 | Stop reason: HOSPADM

## 2017-10-17 RX ORDER — AMLODIPINE BESYLATE 10 MG/1
10 TABLET ORAL DAILY
Status: DISCONTINUED | OUTPATIENT
Start: 2017-10-18 | End: 2017-10-17 | Stop reason: HOSPADM

## 2017-10-17 RX ORDER — ACETAMINOPHEN 325 MG/1
650 TABLET ORAL EVERY 6 HOURS PRN
Status: DISCONTINUED | OUTPATIENT
Start: 2017-10-17 | End: 2017-10-17 | Stop reason: HOSPADM

## 2017-10-17 RX ORDER — OXYCODONE HYDROCHLORIDE 5 MG/1
5 TABLET ORAL
Status: DISCONTINUED | OUTPATIENT
Start: 2017-10-17 | End: 2017-10-17 | Stop reason: HOSPADM

## 2017-10-17 RX ORDER — HYDROCODONE BITARTRATE AND ACETAMINOPHEN 5; 325 MG/1; MG/1
1 TABLET ORAL
Qty: 15 TAB | Refills: 0 | Status: SHIPPED | OUTPATIENT
Start: 2017-10-17 | End: 2017-10-17

## 2017-10-17 RX ORDER — CHLORTHALIDONE 25 MG/1
25 TABLET ORAL DAILY
Status: DISCONTINUED | OUTPATIENT
Start: 2017-10-18 | End: 2017-10-17 | Stop reason: HOSPADM

## 2017-10-17 RX ORDER — PROMETHAZINE HYDROCHLORIDE 25 MG/1
12.5-25 SUPPOSITORY RECTAL EVERY 4 HOURS PRN
Status: DISCONTINUED | OUTPATIENT
Start: 2017-10-17 | End: 2017-10-17 | Stop reason: HOSPADM

## 2017-10-17 RX ORDER — CLONIDINE HYDROCHLORIDE 0.1 MG/1
0.1 TABLET ORAL EVERY 6 HOURS PRN
Status: DISCONTINUED | OUTPATIENT
Start: 2017-10-17 | End: 2017-10-17 | Stop reason: HOSPADM

## 2017-10-17 RX ORDER — ONDANSETRON 2 MG/ML
4 INJECTION INTRAMUSCULAR; INTRAVENOUS EVERY 4 HOURS PRN
Status: DISCONTINUED | OUTPATIENT
Start: 2017-10-17 | End: 2017-10-17 | Stop reason: HOSPADM

## 2017-10-17 RX ORDER — OXYCODONE HYDROCHLORIDE 5 MG/1
2.5 TABLET ORAL
Status: DISCONTINUED | OUTPATIENT
Start: 2017-10-17 | End: 2017-10-17 | Stop reason: HOSPADM

## 2017-10-17 RX ORDER — MORPHINE SULFATE 4 MG/ML
2 INJECTION, SOLUTION INTRAMUSCULAR; INTRAVENOUS
Status: DISCONTINUED | OUTPATIENT
Start: 2017-10-17 | End: 2017-10-17 | Stop reason: HOSPADM

## 2017-10-17 RX ORDER — KETOROLAC TROMETHAMINE 30 MG/ML
30 INJECTION, SOLUTION INTRAMUSCULAR; INTRAVENOUS EVERY 6 HOURS PRN
Status: DISCONTINUED | OUTPATIENT
Start: 2017-10-18 | End: 2017-10-17 | Stop reason: HOSPADM

## 2017-10-17 RX ORDER — POLYETHYLENE GLYCOL 3350 17 G/17G
1 POWDER, FOR SOLUTION ORAL
Status: DISCONTINUED | OUTPATIENT
Start: 2017-10-17 | End: 2017-10-17 | Stop reason: HOSPADM

## 2017-10-17 RX ORDER — BISACODYL 10 MG
10 SUPPOSITORY, RECTAL RECTAL
Status: DISCONTINUED | OUTPATIENT
Start: 2017-10-17 | End: 2017-10-17 | Stop reason: HOSPADM

## 2017-10-17 RX ORDER — NAPROXEN 500 MG/1
500 TABLET ORAL 2 TIMES DAILY WITH MEALS
Qty: 14 TAB | Refills: 0 | Status: SHIPPED | OUTPATIENT
Start: 2017-10-17 | End: 2018-01-15

## 2017-10-17 RX ORDER — ONDANSETRON 4 MG/1
4 TABLET, ORALLY DISINTEGRATING ORAL EVERY 4 HOURS PRN
Status: DISCONTINUED | OUTPATIENT
Start: 2017-10-17 | End: 2017-10-17 | Stop reason: HOSPADM

## 2017-10-17 RX ORDER — PROMETHAZINE HYDROCHLORIDE 25 MG/1
12.5-25 TABLET ORAL EVERY 4 HOURS PRN
Status: DISCONTINUED | OUTPATIENT
Start: 2017-10-17 | End: 2017-10-17 | Stop reason: HOSPADM

## 2017-10-17 RX ORDER — KETOROLAC TROMETHAMINE 30 MG/ML
30 INJECTION, SOLUTION INTRAMUSCULAR; INTRAVENOUS EVERY 6 HOURS
Status: DISCONTINUED | OUTPATIENT
Start: 2017-10-17 | End: 2017-10-17

## 2017-10-17 RX ORDER — AMOXICILLIN 250 MG
2 CAPSULE ORAL 2 TIMES DAILY
Status: DISCONTINUED | OUTPATIENT
Start: 2017-10-18 | End: 2017-10-17 | Stop reason: HOSPADM

## 2017-10-17 RX ADMIN — OXYCODONE HYDROCHLORIDE 5 MG: 5 TABLET ORAL at 13:16

## 2017-10-17 RX ADMIN — KETOROLAC TROMETHAMINE 30 MG: 30 INJECTION, SOLUTION INTRAMUSCULAR at 14:00

## 2017-10-17 ASSESSMENT — ENCOUNTER SYMPTOMS
SPUTUM PRODUCTION: 0
CHILLS: 0
FEVER: 0
BLURRED VISION: 0
DIARRHEA: 0
SHORTNESS OF BREATH: 0
NAUSEA: 0
DOUBLE VISION: 0
ABDOMINAL PAIN: 0
PALPITATIONS: 0
COUGH: 0
VOMITING: 0
CONSTIPATION: 0
HEADACHES: 0

## 2017-10-17 ASSESSMENT — LIFESTYLE VARIABLES
ALCOHOL_USE: NO
EVER_SMOKED: NEVER

## 2017-10-17 ASSESSMENT — PATIENT HEALTH QUESTIONNAIRE - PHQ9
2. FEELING DOWN, DEPRESSED, IRRITABLE, OR HOPELESS: NOT AT ALL
1. LITTLE INTEREST OR PLEASURE IN DOING THINGS: NOT AT ALL
SUM OF ALL RESPONSES TO PHQ9 QUESTIONS 1 AND 2: 0
SUM OF ALL RESPONSES TO PHQ QUESTIONS 1-9: 0

## 2017-10-17 ASSESSMENT — PAIN SCALES - GENERAL
PAINLEVEL_OUTOF10: 7
PAINLEVEL_OUTOF10: 10

## 2017-10-17 NOTE — PROGRESS NOTES
PT ADMITTED TO UNIT  ORIENTED TO ROOM DISCUSSED PLAN OF CARE ALL QUESTIONS ANSWERED BED IN LOW POSITION CALL LIGHT WITHIN REACH NURSING HISTORY AND ASSESSMENT DONE CONDITION STABLE

## 2017-10-17 NOTE — ASSESSMENT & PLAN NOTE
Most likely musculoskeletal, however in the setting of essential hypertension, need to rule out presence of cardiac disease as a cause. Plan for trending troponin. Electrocardiogram for any further chest pain.  Toradol standing order placed.

## 2017-10-17 NOTE — LETTER
October 17, 2017    To Whom It May Concern:         This is confirmation that Jonna Brian was admitted to the hospital on 10/17/2017.  He should be excused from work until 10/23/2017, at which time he will be able to return to work without limitations.           If you have any questions please do not hesitate to call me at the phone number listed below.    Sincerely,          Darnell Bledsoe  [phone]

## 2017-10-17 NOTE — H&P
Hospital Medicine History and Physical    Date of Service  10/17/2017    Chief Complaint:  Chest pain     History of Presenting Illness  42 y.o. Male with history of essential hypertension was in his usual state of health until approximately one week prior to admission. He reports at that time he was wearing a special boot for plantar fasciitis on the left side, and developed an unevenness of his gait. He began to develop pain in the left inferior portion of his chest, which over the ensuing days became worse, was associated with activity, not associated with shortness of breath, was pressure-like in nature, and nonradiating. He initially presented to the emergency department for a workup, and was given muscle relaxants and pain medication, and went home. He continued to have the pain despite all this and presented again to the emergency department for further evaluation. He currently states that his pain is controlled, he has no shortness of breath, he denies headache or vision changes, no bowel pain, nausea vomiting, diarrhea or constipation. He has no other complaints.     Primary Care Physician  Dalton Vogel M.D.    Consultants  None    Code Status  Full code    Review of Systems  Review of Systems   Constitutional: Negative for chills and fever.   Eyes: Negative for blurred vision and double vision.   Respiratory: Negative for cough, sputum production and shortness of breath.    Cardiovascular: Negative for chest pain and palpitations.        Left lower chest wall pain    Gastrointestinal: Negative for abdominal pain, constipation, diarrhea, nausea and vomiting.   Genitourinary: Negative for dysuria.   Neurological: Negative for headaches.        Past Medical History  Past Medical History:   Diagnosis Date   • Depression    • Headache(784.0)    • Hypertension        Surgical History  No past surgical history on file.    Medications  No current facility-administered medications on file prior to  encounter.      Current Outpatient Prescriptions on File Prior to Encounter   Medication Sig Dispense Refill   • cyclobenzaprine (FLEXERIL) 10 MG Tab Take 1 Tab by mouth 3 times a day as needed. 15 Tab 0   • ibuprofen (MOTRIN) 800 MG Tab Take 1 Tab by mouth every 8 hours as needed for Moderate Pain or Inflammation. 30 Tab 0   • chlorthalidone (HYGROTON) 25 MG Tab Take 1 Tab by mouth every day. 30 Tab 2   • amlodipine (NORVASC) 10 MG Tab Take 1 Tab by mouth every day. 30 Tab 2       Family History  Family History   Problem Relation Age of Onset   • Family history unknown: Yes       Social History  Social History   Substance Use Topics   • Smoking status: Never Smoker   • Smokeless tobacco: Never Used   • Alcohol use No       Allergies  No Known Allergies     Physical Exam  Laboratory   Hemodynamics  Temp (24hrs), Av.7 °C (98 °F), Min:36.7 °C (98 °F), Max:36.7 °C (98 °F)   Temperature: 36.7 °C (98 °F)  Pulse  Av.3  Min: 69  Max: 90    Blood Pressure: 145/64, NIBP: 120/77      Respiratory      Respiration: 16, Pulse Oximetry: 94 %             Physical Exam   Constitutional: He is oriented to person, place, and time. He appears well-developed and well-nourished. No distress.   HENT:   Head: Normocephalic.   Eyes: Pupils are equal, round, and reactive to light.   Neck: Normal range of motion. Neck supple.   Cardiovascular: Normal rate, regular rhythm and normal heart sounds.  Exam reveals no gallop and no friction rub.    No murmur heard.  Pulmonary/Chest: Effort normal and breath sounds normal. No respiratory distress. He has no wheezes.   Tenderness to palpation of the ribs in the left lower chest.     Abdominal: Soft. Bowel sounds are normal. He exhibits no distension and no mass. There is no tenderness. There is no rebound and no guarding.   Musculoskeletal: Normal range of motion. He exhibits no edema.   Neurological: He is alert and oriented to person, place, and time. No cranial nerve deficit.   Skin: He  is not diaphoretic.               No results for input(s): ALTSGPT, ASTSGOT, ALKPHOSPHAT, TBILIRUBIN, DBILIRUBIN, GAMMAGT, AMYLASE, LIPASE, ALB, PREALBUMIN, GLUCOSE in the last 72 hours.              Lab Results   Component Value Date    TROPONINI <0.01 10/17/2017     Urinalysis:    Lab Results  Component Value Date/Time   SPECGRAVITY 1.017 10/13/2017 0915   GLUCOSEUR Negative 10/13/2017 0915   KETONES Negative 10/13/2017 0915   NITRITE Negative 10/13/2017 0915   WBCURINE 0-2 (A) 10/13/2017 0915   RBCURINE 2-5 (A) 10/13/2017 0915   BACTERIA Negative 10/13/2017 0915   EPITHELCELL Negative 10/13/2017 0915        Imaging  Normal chest radiograph    Assessment/Plan     I anticipate this patient is appropriate for observation status at this time.    * Chest pain   Assessment & Plan    Most likely musculoskeletal, however in the setting of essential hypertension, need to rule out presence of cardiac disease as a cause. Plan for trending troponin. Electrocardiogram for any further chest pain.  Toradol standing order placed.          Plantar fasciitis of left foot   Assessment & Plan    Possible cause of chest wall pain, given patient needing to compensate on left side.          Obesity (BMI 30-39.9)   Assessment & Plan    Stable        Essential hypertension- (present on admission)   Assessment & Plan    Controlled with current medication regimen of amlodipine and chlorthalidone. Continue the same. As needed medications added.            VTE prophylaxis: lovenox, SCDS

## 2017-10-17 NOTE — ED PROVIDER NOTES
"ED Provider Note    CHIEF COMPLAINT  Rib pain or left-sided chest pain    HPI  Jonna Brian is a 42 y.o. male who presentsComplaining of some pain to the left lateral midaxillary line area of his chest. The pain is worse with deep inspiration. The pain is worse with lifting his left arm. He was here a few days ago and was evaluated by Dr. Marissa whitfield thought that he might be passing a kidney stone. The CT of his abdomen was normal. Lab work was normal. He has no anterior chest pain. He has no left-sided chest pain. It appears to be musculoskeletal pain in the left trapezius area and left ribs.    REVIEW OF SYSTEMS  No headache, no difficulty breathing. No abdominal pain.  ALL OTHER SYSTEMS NEGATIVE    ALLERGIES  No Known Allergies    CURRENT MEDICATIONS  Home Medications     Reviewed by Aparna Neal R.N. (Registered Nurse) on 10/17/17 at 0920  Med List Status: Complete   Medication Last Dose Status   amlodipine (NORVASC) 10 MG Tab 10/17/2017 Active   chlorthalidone (HYGROTON) 25 MG Tab 10/17/2017 Active   cyclobenzaprine (FLEXERIL) 10 MG Tab not filled Active   ibuprofen (MOTRIN) 800 MG Tab 10/17/2017 Active                PAST MEDICAL HISTORY  Past Medical History:   Diagnosis Date   • Depression    • Headache(784.0)    • Hypertension        FAMILY HISTORY  Family History   Problem Relation Age of Onset   • Family history unknown: Yes       SOCIAL HISTORY  Nonsmoker    PHYSICAL EXAM  GENERAL: Alert smiling male adult  VITAL SIGNS: /64   Pulse 90   Temp 36.7 °C (98 °F)   Resp 16   Ht 1.854 m (6' 1\")   Wt 122.4 kg (269 lb 13.5 oz)   SpO2 98%   BMI 35.60 kg/m²    Constitutional: Alert healthy-appearing adult HENT: Scalp is normal size and nontender. Ears are clear. Nose is clear. Throat is clear with no stridor no drooling no trismus. Teeth are all intact.  Eyes: Pupils equal round and reactive to light, extraocular motor fall. There is no scleral icterus.  Neck: Neck is supple and nontender. " There is no meningismus. No adenitis. No thyromegaly.  Lymphatic: No adenopathy.   Cardiovascular: Heart regular rhythm without murmurs or gallops   Thorax & Lungs: No chest wall tenderness. Lungs are clear. Patient has good breath sounds bilateral. No rales, no rhonchi, no wheezes.  Abdomen: Abdomen is soft, nontender, not rigid, no guarding, and no organomegaly. There is no palpable hernia   Skin: Warm, pink, and dry with no erythema and no rash.   Back: Nontender, no midline bony tenderness, no flank tenderness.  Extremities: Full range of motion  No tenderness to palpation and no deformities noted. No calf or thigh swelling. No calf or thigh tenderness. No clinical DVT.  Neurologic: Alert & oriented . Cranial nerves are grossly intact as tested. Patient moves all 4 extremities well. Patient has good strong flexion and extension of the ankle joints knee joints hip joints and elbow joints. Sensation is normal and symmetrical in the upper and lower extremities.   Psychiatric: Patient is alert oriented coherent and rational.     EKG  EKG Interpretation    Interpreted by me    Rhythm: normal sinus   Rate: normal  Axis: normal  Ectopy: none  Conduction: normal  ST Segments: no acute change  T Waves: no acute change  Q Waves: none    Clinical Impression: Normal sinus rhythm with no specific ST-T wave change. Early repolarization change.    RADIOLOGY/PROCEDURES  DX-CHEST-2 VIEWS   Final Result      No consolidation.            COURSE & MEDICAL DECISION MAKING  Patient appears to musculoskeletal left lateral chest wall midaxillary line rib pain. Patient has no pain at rest. He only has pain when he lifts his left arm laterally with abduction. And he has tenderness over the left latissimus dorsi area and left chest wall .    Plan: #1 d-dimer #2 chest x-ray #3 EKG #4 troponin. 5 patient just had an evaluation with CBC and chemistry panel etc. Also a CAT scan of the abdomen was normal.    Laboratory and reexamination: Chest  x-ray is normal. D-dimer is negative. Troponin is negative. EKG is normal.  Results for orders placed or performed during the hospital encounter of 10/17/17   D-DIMER   Result Value Ref Range    D-Dimer Screen <200 <250 ng/mL(D-DU)   TROPONIN   Result Value Ref Range    Troponin I <0.01 0.00 - 0.04 ng/mL   EKG (ER)   Result Value Ref Range    Report       [facility] Emergency Dept.    Test Date:  2017-10-17  Pt Name:    CHARLENE BUCKLEY              Department: ER  MRN:        [id number]                      Room:       [facility]  Gender:     M                            Technician: [id number]  :        1975                   Requested By:GARY GANSERT  Order #:    [id number]                    Reading MD:    Measurements  Intervals                                Axis  Rate:       82                           P:          18  CA:         176                          QRS:        23  QRSD:       88                           T:          -9  QT:         364  QTc:        425    Interpretive Statements  SINUS RHYTHM  BORDERLINE T ABNORMALITIES, INFERIOR LEADS  ST ELEV, PROBABLE NORMAL EARLY REPOL PATTERN  No previous ECG available for comparison         The patient been admitted for chest pain evaluation. The hospitalist has been called and case discussed. He'll need a stress test. Dr. Bledsoe the hospitalist.    FINAL IMPRESSION  1.Left latissimus dorsi and left chest wall rib pain.  2. Chest pain           Electronically signed by: Gary Gansert, 10/17/2017 9:33 AM

## 2017-10-17 NOTE — ED NOTES
Chief Complaint   Patient presents with   • Abdominal Pain     Pt BIB self to triage/ pt reports left side abd pain/ was told to come back for re eval if pain increased/ pt describes pain to be constant no position of comfort.      Explained to pt triage process, made pt aware to tell this RN of any changes/concerns, pt verbalized understanding of process and instructions given. Pt to ER lobby.

## 2017-10-17 NOTE — DISCHARGE SUMMARY
CHIEF COMPLAINT ON ADMISSION  Chief Complaint   Patient presents with   • Abdominal Pain     Pt BIB self to triage/ pt reports left side abd pain/ was told to come back for re eval if pain increased/ pt describes pain to be constant no position of comfort.        CODE STATUS  Full Code    HPI & HOSPITAL COURSE  This is a 42 y.o. male here with chest pain.     Patient was monitored on the hospitalist service.  He was noted to have tenderness to his left chest wall, which improved somewhat with toradol and heat/ice packs.  Out of concern for possible cardiac etiology in the setting of prior diagnosis of hypertension, troponin was trended and was negative.  There were no concerning ECG findings, and when he was in clinically stable condition, he was discharged home.  He will continue oral pain management regimen, as well as massage and heat/ice packs, and should be excused from work until 10/23/17.      Therefore, he is discharged in good and stable condition with close outpatient follow-up.    SPECIFIC OUTPATIENT FOLLOW-UP  Primary care physician as scheduled     DISCHARGE PROBLEM LIST  Principal Problem:    Chest pain POA: Unknown  Active Problems:    Essential hypertension POA: Yes    Obesity (BMI 30-39.9) POA: Unknown    Plantar fasciitis of left foot POA: Unknown  Resolved Problems:    * No resolved hospital problems. *      FOLLOW UP  No future appointments.  No follow-up provider specified.    MEDICATIONS ON DISCHARGE   Jonna Brian   Home Medication Instructions TARAH:[id number]    Printed on:10/17/17 1612   Medication Information                      amlodipine (NORVASC) 10 MG Tab  Take 1 Tab by mouth every day.             chlorthalidone (HYGROTON) 25 MG Tab  Take 1 Tab by mouth every day.             hydrocodone-acetaminophen (NORCO) 5-325 MG Tab per tablet  Take 1 Tab by mouth 1 time daily as needed (nighttime for pain).             ibuprofen (MOTRIN) 800 MG Tab  Take 1 Tab by mouth every 8 hours as needed  for Moderate Pain or Inflammation.             naproxen (NAPROSYN) 500 MG Tab  Take 1 Tab by mouth 2 times a day, with meals.                 DIET  Orders Placed This Encounter   Procedures   • DIET ORDER     Standing Status:   Standing     Number of Occurrences:   1     Order Specific Question:   Diet:     Answer:   Cardiac [6]     Order Specific Question:   Miscellaneous modifications:     Answer:   No Decaf, No Caffeine(for test) [11]       ACTIVITY  As tolerated.  Weight bearing as tolerated      CONSULTATIONS  none    PROCEDURES  None     LABORATORY  Lab Results   Component Value Date/Time    SODIUM 135 10/13/2017 09:54 AM    POTASSIUM 3.8 10/13/2017 09:54 AM    CHLORIDE 99 10/13/2017 09:54 AM    CO2 26 10/13/2017 09:54 AM    GLUCOSE 118 (H) 10/13/2017 09:54 AM    BUN 8 10/13/2017 09:54 AM    CREATININE 1.03 10/13/2017 09:54 AM        Lab Results   Component Value Date/Time    WBC 6.8 10/13/2017 09:54 AM    HEMOGLOBIN 16.3 10/13/2017 09:54 AM    HEMATOCRIT 48.2 10/13/2017 09:54 AM    PLATELETCT 277 10/13/2017 09:54 AM        Total time of the discharge process exceeds 31 minutes

## 2017-10-17 NOTE — ASSESSMENT & PLAN NOTE
Controlled with current medication regimen of amlodipine and chlorthalidone. Continue the same. As needed medications added.

## 2017-10-17 NOTE — DISCHARGE INSTRUCTIONS
Discharge Instructions    Discharged to home by car with self. Discharged via walking, hospital escort: Yes.  Special equipment needed: Not Applicable    Be sure to schedule a follow-up appointment with your primary care doctor or any specialists as instructed.     Discharge Plan:   Diet Plan: Discussed  Activity Level: Discussed  Confirmed Follow up Appointment: Patient to Call and Schedule Appointment  Confirmed Symptoms Management: Discussed  Medication Reconciliation Updated: Yes  Influenza Vaccine Indication: Patient Refuses    I understand that a diet low in cholesterol, fat, and sodium is recommended for good health. Unless I have been given specific instructions below for another diet, I accept this instruction as my diet prescription.   Other diet: LOW FAT    Special Instructions: None    · Is patient discharged on Warfarin / Coumadin?   No     · Is patient Post Blood Transfusion?  No    Depression / Suicide Risk    As you are discharged from this [facility] facility, it is important to learn how to keep safe from harming yourself.    Recognize the warning signs:  · Abrupt changes in personality, positive or negative- including increase in energy   · Giving away possessions  · Change in eating patterns- significant weight changes-  positive or negative  · Change in sleeping patterns- unable to sleep or sleeping all the time   · Unwillingness or inability to communicate  · Depression  · Unusual sadness, discouragement and loneliness  · Talk of wanting to die  · Neglect of personal appearance   · Rebelliousness- reckless behavior  · Withdrawal from people/activities they love  · Confusion- inability to concentrate     If you or a loved one observes any of these behaviors or has concerns about self-harm, here's what you can do:  · Talk about it- your feelings and reasons for harming yourself  · Remove any means that you might use to hurt yourself (examples: pills, rope, extension cords, firearm)  · Get  professional help from the community (Mental Health, Substance Abuse, psychological counseling)  · Do not be alone:Call your Safe Contact- someone whom you trust who will be there for you.  · Call your local CRISIS HOTLINE 380-6745 or 163-374-8300  · Call your local Children's Mobile Crisis Response Team [place] (370) 913-5062 or www.Flocasts  · Call the toll free National Suicide Prevention Hotlines   · National Suicide Prevention Lifeline 980-850-LHIM (8545)  · National Hope Line Network 800-SUICIDE (043-8119)

## 2017-10-17 NOTE — ED NOTES
"Pt states that he was here Friday for same, pain has continued. Was diagnosed with musculoskeletal etiology at that time. States that now he is having difficulty lifting his L arm because the pain is so severe. States that he was told to stay home from work but he has continued to work and is now \"miserable\". Pt states that he has not filled his medications for muscle relaxers. Has been taking Advil.   "

## 2017-11-02 ENCOUNTER — TELEPHONE (OUTPATIENT)
Dept: INTERNAL MEDICINE | Facility: MEDICAL CENTER | Age: 42
End: 2017-11-02

## 2017-11-02 NOTE — TELEPHONE ENCOUNTER
1. Caller Name: Pt                      Call Back Number: [phone] (home)       2. Message: Patient called and left a message stating he needed a call from Dr. Esparza.    I called patient to see what we can help him with. Left a message asking for him to return our call.    3. Patient approves office to leave a detailed voicemail/MyChart message: N\A

## 2017-11-03 NOTE — TELEPHONE ENCOUNTER
Patient called once again and would not like to speak with me he states his questions are personal and would like for Dr. Esparza to call him back.

## 2017-11-09 ENCOUNTER — OFFICE VISIT (OUTPATIENT)
Dept: INTERNAL MEDICINE | Facility: MEDICAL CENTER | Age: 42
End: 2017-11-09
Payer: MEDICAID

## 2017-11-09 VITALS
SYSTOLIC BLOOD PRESSURE: 122 MMHG | TEMPERATURE: 98 F | HEIGHT: 73 IN | DIASTOLIC BLOOD PRESSURE: 80 MMHG | HEART RATE: 85 BPM | BODY MASS INDEX: 35.2 KG/M2 | WEIGHT: 265.6 LBS | OXYGEN SATURATION: 96 %

## 2017-11-09 DIAGNOSIS — I10 ESSENTIAL HYPERTENSION: ICD-10-CM

## 2017-11-09 DIAGNOSIS — N52.9 ERECTILE DYSFUNCTION, UNSPECIFIED ERECTILE DYSFUNCTION TYPE: ICD-10-CM

## 2017-11-09 DIAGNOSIS — M72.2 PLANTAR FASCIITIS OF LEFT FOOT: ICD-10-CM

## 2017-11-09 PROCEDURE — 99214 OFFICE O/P EST MOD 30 MIN: CPT | Mod: GC | Performed by: INTERNAL MEDICINE

## 2017-11-09 RX ORDER — CYCLOBENZAPRINE HCL 10 MG
10 TABLET ORAL 3 TIMES DAILY PRN
Qty: 30 TAB | Refills: 0 | Status: SHIPPED | OUTPATIENT
Start: 2017-11-09 | End: 2018-01-15

## 2017-11-09 RX ORDER — CHLORTHALIDONE 25 MG/1
25 TABLET ORAL DAILY
Qty: 30 TAB | Refills: 2 | Status: SHIPPED | OUTPATIENT
Start: 2017-11-09 | End: 2018-02-20 | Stop reason: SDUPTHER

## 2017-11-09 RX ORDER — TADALAFIL 10 MG/1
20 TABLET ORAL PRN
Qty: 2 TAB | Refills: 0 | Status: SHIPPED | OUTPATIENT
Start: 2017-11-09 | End: 2017-12-12 | Stop reason: SDUPTHER

## 2017-11-09 RX ORDER — AMLODIPINE BESYLATE 10 MG/1
10 TABLET ORAL DAILY
Qty: 30 TAB | Refills: 2 | Status: SHIPPED | OUTPATIENT
Start: 2017-11-09 | End: 2018-02-20 | Stop reason: SDUPTHER

## 2017-11-09 NOTE — TELEPHONE ENCOUNTER
Called patient today to scheduled follow up for today. He did not answered. Left a message to call back.

## 2017-11-10 ENCOUNTER — TELEPHONE (OUTPATIENT)
Dept: INTERNAL MEDICINE | Facility: MEDICAL CENTER | Age: 42
End: 2017-11-10

## 2017-11-10 NOTE — PROGRESS NOTES
Established Patient    Jonna presents today with the following:    CC: Erectile dysfunction    HPI: 42-year-old patient with past medical history of hypertension, headaches, angioedema, and left foot plantar fasciitis comes to the clinic complaining of continuous erectile dysfunction since priorly reported on 7/24/2017. His last trial of intercourse was on 10/4/2017 for which he sustained a 3 out of 10 level erection that resulted in unsuccessful attempt. He denies morning erections or night admissions. Patient reports lack of interest in going to the gym for which he attributes to plantar fasciitis induced physical limitations. He denies change in energy, concentration, appetite and denies guilt, psychomotor retardation and suicidal ideation. Of note, patient's blood pressure is adequately managed with current medical regimen. Additionally, patient complains of left foot plantar fasciitis induced pain for which he is running out of his orthopedic prescribed pain regimen and desires a refill. Patient denies chest pain, palpitation, dyspnea on rest or exertion, abdominal pain, orthostatic changes, dizziness or loss of consciousness.    Patient Active Problem List    Diagnosis Date Noted   • Chest pain 10/17/2017   • Obesity (BMI 30-39.9) 10/17/2017   • Plantar fasciitis of left foot 10/17/2017   • Moderate single current episode of major depressive disorder (CMS-[facility]) 07/25/2017   • Erectile dysfunction 07/25/2017   • Essential hypertension 07/06/2017       Current Outpatient Prescriptions   Medication Sig Dispense Refill   • chlorthalidone (HYGROTON) 25 MG Tab Take 1 Tab by mouth every day. 30 Tab 2   • amlodipine (NORVASC) 10 MG Tab Take 1 Tab by mouth every day. 30 Tab 2   • cyclobenzaprine (FLEXERIL) 10 MG Tab Take 1 Tab by mouth 3 times a day as needed. 30 Tab 0   • tadalafil (CIALIS) 10 MG tablet Take 2 Tabs by mouth as needed for Erectile Dysfunction. 2 Tab 0   • ibuprofen (MOTRIN) 800 MG Tab Take 1 Tab  "by mouth every 8 hours as needed for Moderate Pain or Inflammation. 30 Tab 0   • naproxen (NAPROSYN) 500 MG Tab Take 1 Tab by mouth 2 times a day, with meals. 14 Tab 0     No current facility-administered medications for this visit.        ROS: As per HPI. Additional pertinent symptoms as noted below.  Constitutional: no fevers, chills, weight change  Eyes: no blurred vision, discharge, eye pain  ENT: no rhinorrhea, sore throat, neck masses  Cardiovascular: no angina, palpitations, PND, orthopnea, edema  Respiratory: no cough, sputum, or dyspnea  GI: no nausea, vomiting, abdominal pain, constipation, or diarrhea  : no dysuria, hematuria, frequency   Musculo-skeletal: Left foot plantar pain  Skin: no rashes or open wounds  Neurological: no headaches, dizziness, motor/sensory loss  Psychological: no anxiety or depression      /80   Pulse 85   Temp 36.7 °C (98 °F)   Ht 1.854 m (6' 1\")   Wt 120.5 kg (265 lb 9.6 oz)   SpO2 96%   BMI 35.04 kg/m²     Physical Exam   Constitutional:  oriented to person, place, and time. No distress.   Eyes: Pupils are equal, round, and reactive to light. No scleral icterus.  Neck: Neck supple. No thyromegaly present.   Cardiovascular: Normal rate, regular rhythm and normal heart sounds.  Exam reveals no gallop and no friction rub.  No murmur heard.  Pulmonary/Chest: Breath sounds normal. Chest wall is not dull to percussion.   Musculoskeletal: Patient noted for left orthopedic boot  Lymphadenopathy: no cervical adenopathy  Neurological: alert and oriented to person, place, and time.   Skin: No cyanosis. Nails show no clubbing.    Note: I have reviewed all pertinent labs and diagnostic tests associated with this visit with specific comments listed under the assessment and plan below    Assessment and Plan    1. Erectile dysfunction, unspecified erectile dysfunction type  - Patient with erectile dysfunction symptoms since 7/24/17  - ED seems to be refractory to blood pressure " correction  - Reports morning erections or night admissions  - Last trial of intercourse on 10/4/2017 for which patient reports a 3 out of 10 level erection  - SIGECAPS only positive for lack of interest in going to the gym for which he attributes to plantar fasciitis induced physical limitations.  - Testosterone levels within reference range  - Patient was started on a 2 pill trial dose of Cialis  - Patient was counseled on Cialis side effect and advised to go to the ED if he experiences any chest pain, palpitations, orthostatic changes, presyncope or syncope  - Patient is to report results of trial to PCP  - If Cialis trial unsuccessful, follow-up with urology will be warranted  -Patient follow-up in 5 weeks     2. Plantar fasciitis of left foot  - Patient discharged from the ED on 9/23 for plantar fasciitis  - ERP referred to orthopedic surgery   - Orthopedic surgery following with patient's case  - Next orthopedic surgery follow-up scheduled for 11/21/2017  - Refilled cyclobenzaprine for foot pain  - Follow up in 5 weeks    3. Essential hypertension  - Patient's blood pressure stable today  - Refilled chlorthalidone and amlodipine  - Follow up in 5 weeks      Followup: Return in about 5 weeks (around 12/14/2017) for Short.      Signed by: Dalton Vogel M.D.

## 2017-11-10 NOTE — TELEPHONE ENCOUNTER
[facility]    [facility]armacy Phone#: [phone]    Pharmacy Fax#: [phone]    Request received via: fax    Message: plan requires specific directions to process rx please fax back with frequency of how pt will be using the medication and days supply limitations (cialis)

## 2017-11-10 NOTE — TELEPHONE ENCOUNTER
Patient called and left a message stating pharmacy let him know medication is not covered under insurance. Needs alternative

## 2017-12-07 ENCOUNTER — TELEPHONE (OUTPATIENT)
Dept: INTERNAL MEDICINE | Facility: MEDICAL CENTER | Age: 42
End: 2017-12-07

## 2017-12-07 NOTE — TELEPHONE ENCOUNTER
1. Caller Name: Pt                      Call Back Number: [phone] (home)     2. Message: Patient called and left a message stating his rx that was prescribed was not covered and what is the next step? He would like a call back from Dr. Esparza when possible. As well he would like for Dr. Esparza to know that he has his surgery date set up for 01/23 with [facility].    3. Patient approves office to leave a detailed voicemail/MyChart message: N\A

## 2017-12-12 RX ORDER — TADALAFIL 10 MG/1
20 TABLET ORAL PRN
Qty: 4 TAB | Refills: 0 | Status: SHIPPED | OUTPATIENT
Start: 2017-12-12 | End: 2018-01-15

## 2017-12-12 NOTE — TELEPHONE ENCOUNTER
Call patient. Patient Cialis trial regimen was adjusted to 2 trials of 20 mg. patient stated that he has surgery with orthopedics scheduled for 1/23/18. Patient to follow-up after surgery, preferably on 2/19/17. Patient stated that his new number for which to contact him is [phone].

## 2018-01-02 LAB
ALBUMIN SERPL BCP-MCNC: 4.3 G/DL (ref 3.2–4.9)
ALBUMIN/GLOB SERPL: 1.5 G/DL
ALP SERPL-CCNC: 43 U/L (ref 30–99)
ALT SERPL-CCNC: 15 U/L (ref 2–50)
ANION GAP SERPL CALC-SCNC: 12 MMOL/L (ref 0–11.9)
APTT PPP: 30.3 SEC (ref 24.7–36)
AST SERPL-CCNC: 19 U/L (ref 12–45)
BASOPHILS # BLD AUTO: 0.5 % (ref 0–1.8)
BASOPHILS # BLD: 0.03 K/UL (ref 0–0.12)
BILIRUB SERPL-MCNC: 0.4 MG/DL (ref 0.1–1.5)
BNP SERPL-MCNC: 5 PG/ML (ref 0–100)
BUN SERPL-MCNC: 11 MG/DL (ref 8–22)
CALCIUM SERPL-MCNC: 9.2 MG/DL (ref 8.5–10.5)
CHLORIDE SERPL-SCNC: 100 MMOL/L (ref 96–112)
CO2 SERPL-SCNC: 22 MMOL/L (ref 20–33)
CREAT SERPL-MCNC: 1.33 MG/DL (ref 0.5–1.4)
EKG IMPRESSION: NORMAL
EOSINOPHIL # BLD AUTO: 0.05 K/UL (ref 0–0.51)
EOSINOPHIL NFR BLD: 0.8 % (ref 0–6.9)
ERYTHROCYTE [DISTWIDTH] IN BLOOD BY AUTOMATED COUNT: 37.8 FL (ref 35.9–50)
FLUAV RNA SPEC QL NAA+PROBE: POSITIVE
FLUBV RNA SPEC QL NAA+PROBE: NEGATIVE
GFR SERPL CREATININE-BSD FRML MDRD: 59 ML/MIN/1.73 M 2
GLOBULIN SER CALC-MCNC: 2.8 G/DL (ref 1.9–3.5)
GLUCOSE SERPL-MCNC: 115 MG/DL (ref 65–99)
HCT VFR BLD AUTO: 41.7 % (ref 42–52)
HGB BLD-MCNC: 14.3 G/DL (ref 14–18)
IMM GRANULOCYTES # BLD AUTO: 0.02 K/UL (ref 0–0.11)
IMM GRANULOCYTES NFR BLD AUTO: 0.3 % (ref 0–0.9)
INR PPP: 0.96 (ref 0.87–1.13)
LIPASE SERPL-CCNC: 15 U/L (ref 11–82)
LYMPHOCYTES # BLD AUTO: 0.64 K/UL (ref 1–4.8)
LYMPHOCYTES NFR BLD: 10.6 % (ref 22–41)
MCH RBC QN AUTO: 27.4 PG (ref 27–33)
MCHC RBC AUTO-ENTMCNC: 34.3 G/DL (ref 33.7–35.3)
MCV RBC AUTO: 80 FL (ref 81.4–97.8)
MONOCYTES # BLD AUTO: 0.74 K/UL (ref 0–0.85)
MONOCYTES NFR BLD AUTO: 12.3 % (ref 0–13.4)
NEUTROPHILS # BLD AUTO: 4.55 K/UL (ref 1.82–7.42)
NEUTROPHILS NFR BLD: 75.5 % (ref 44–72)
NRBC # BLD AUTO: 0 K/UL
NRBC BLD-RTO: 0 /100 WBC
PLATELET # BLD AUTO: 252 K/UL (ref 164–446)
PMV BLD AUTO: 9.3 FL (ref 9–12.9)
POTASSIUM SERPL-SCNC: 3 MMOL/L (ref 3.6–5.5)
PROT SERPL-MCNC: 7.1 G/DL (ref 6–8.2)
PROTHROMBIN TIME: 12.5 SEC (ref 12–14.6)
RBC # BLD AUTO: 5.21 M/UL (ref 4.7–6.1)
SODIUM SERPL-SCNC: 134 MMOL/L (ref 135–145)
TROPONIN I SERPL-MCNC: <0.01 NG/ML (ref 0–0.04)
WBC # BLD AUTO: 6 K/UL (ref 4.8–10.8)

## 2018-01-02 PROCEDURE — 87502 INFLUENZA DNA AMP PROBE: CPT

## 2018-01-02 PROCEDURE — 80053 COMPREHEN METABOLIC PANEL: CPT

## 2018-01-02 PROCEDURE — 84484 ASSAY OF TROPONIN QUANT: CPT

## 2018-01-02 PROCEDURE — 85730 THROMBOPLASTIN TIME PARTIAL: CPT

## 2018-01-02 PROCEDURE — 85610 PROTHROMBIN TIME: CPT

## 2018-01-02 PROCEDURE — 83880 ASSAY OF NATRIURETIC PEPTIDE: CPT

## 2018-01-02 PROCEDURE — 93005 ELECTROCARDIOGRAM TRACING: CPT

## 2018-01-02 PROCEDURE — 99283 EMERGENCY DEPT VISIT LOW MDM: CPT

## 2018-01-02 PROCEDURE — 36415 COLL VENOUS BLD VENIPUNCTURE: CPT

## 2018-01-02 PROCEDURE — 85025 COMPLETE CBC W/AUTO DIFF WBC: CPT

## 2018-01-02 PROCEDURE — 83690 ASSAY OF LIPASE: CPT

## 2018-01-02 PROCEDURE — 93005 ELECTROCARDIOGRAM TRACING: CPT | Performed by: EMERGENCY MEDICINE

## 2018-01-03 ENCOUNTER — APPOINTMENT (OUTPATIENT)
Dept: RADIOLOGY | Facility: MEDICAL CENTER | Age: 43
End: 2018-01-03
Attending: EMERGENCY MEDICINE
Payer: MEDICAID

## 2018-01-03 ENCOUNTER — HOSPITAL ENCOUNTER (EMERGENCY)
Facility: MEDICAL CENTER | Age: 43
End: 2018-01-03
Attending: EMERGENCY MEDICINE
Payer: MEDICAID

## 2018-01-03 VITALS
WEIGHT: 264.77 LBS | DIASTOLIC BLOOD PRESSURE: 76 MMHG | RESPIRATION RATE: 18 BRPM | OXYGEN SATURATION: 97 % | TEMPERATURE: 98.6 F | HEIGHT: 72 IN | HEART RATE: 74 BPM | BODY MASS INDEX: 35.86 KG/M2 | SYSTOLIC BLOOD PRESSURE: 125 MMHG

## 2018-01-03 DIAGNOSIS — J10.1 INFLUENZA A VIRUS PRESENT: ICD-10-CM

## 2018-01-03 PROCEDURE — A9270 NON-COVERED ITEM OR SERVICE: HCPCS | Performed by: EMERGENCY MEDICINE

## 2018-01-03 PROCEDURE — 700102 HCHG RX REV CODE 250 W/ 637 OVERRIDE(OP): Performed by: EMERGENCY MEDICINE

## 2018-01-03 RX ORDER — ACETAMINOPHEN 325 MG/1
650 TABLET ORAL ONCE
Status: COMPLETED | OUTPATIENT
Start: 2018-01-03 | End: 2018-01-03

## 2018-01-03 RX ORDER — IBUPROFEN 600 MG/1
600 TABLET ORAL ONCE
Status: COMPLETED | OUTPATIENT
Start: 2018-01-03 | End: 2018-01-03

## 2018-01-03 RX ADMIN — IBUPROFEN 600 MG: 600 TABLET, FILM COATED ORAL at 01:17

## 2018-01-03 RX ADMIN — ACETAMINOPHEN 650 MG: 325 TABLET, FILM COATED ORAL at 01:17

## 2018-01-03 NOTE — ED NOTES
Charge RN: Lab called with critical result of Flu A positive at 2013. Triage tech informed. Mask placed on patient

## 2018-01-03 NOTE — ED NOTES
Reviewed discharge instructions, pt verbalized understanding of instructions. States he will schedule follow-up appointment. Denies further questions at this time. Provided additional masks for home. Pt ambulatory out of ER with stable gait.

## 2018-01-03 NOTE — DISCHARGE INSTRUCTIONS
"You were seen in the ER for cough, fever, and loss of consciousness. Your symptoms are due to the flu and the remainder of your labs do not reveal any acute abnormality that requires further workup, consultation, or admission to the hospital. You are safe for discharge. I would like you to remain hydrated by drinking lots of clear liquids, please take Tylenol and ibuprofen as directed on the bottle for symptom management. I would like you to follow up with your primary care doctor within the next 2-3 days for recheck. Return to the ER with new or worsening symptoms.    Influenza, Adult  Influenza (\"the flu\") is a viral infection of the respiratory tract. It occurs more often in winter months because people spend more time in close contact with one another. Influenza can make you feel very sick. Influenza easily spreads from person to person (contagious).  CAUSES   Influenza is caused by a virus that infects the respiratory tract. You can catch the virus by breathing in droplets from an infected person's cough or sneeze. You can also catch the virus by touching something that was recently contaminated with the virus and then touching your mouth, nose, or eyes.  RISKS AND COMPLICATIONS  You may be at risk for a more severe case of influenza if you smoke cigarettes, have diabetes, have chronic heart disease (such as heart failure) or lung disease (such as asthma), or if you have a weakened immune system. Elderly people and pregnant women are also at risk for more serious infections. The most common problem of influenza is a lung infection (pneumonia). Sometimes, this problem can require emergency medical care and may be life threatening.  SIGNS AND SYMPTOMS   Symptoms typically last 4 to 10 days and may include:  · Fever.  · Chills.  · Headache, body aches, and muscle aches.  · Sore throat.  · Chest discomfort and cough.  · Poor appetite.  · Weakness or feeling tired.  · Dizziness.  · Nausea or vomiting.  DIAGNOSIS "   Diagnosis of influenza is often made based on your history and a physical exam. A nose or throat swab test can be done to confirm the diagnosis.  TREATMENT   In mild cases, influenza goes away on its own. Treatment is directed at relieving symptoms. For more severe cases, your health care provider may prescribe antiviral medicines to shorten the sickness. Antibiotic medicines are not effective because the infection is caused by a virus, not by bacteria.  HOME CARE INSTRUCTIONS  · Take medicines only as directed by your health care provider.  · Use a cool mist humidifier to make breathing easier.  · Get plenty of rest until your temperature returns to normal. This usually takes 3 to 4 days.  · Drink enough fluid to keep your urine clear or pale yellow.  · Cover your mouth and nose when coughing or sneezing, and wash your hands well to prevent the virus from spreading.  · Stay home from work or school until the fever is gone for at least 1 full day.  PREVENTION   An annual influenza vaccination (flu shot) is the best way to avoid getting influenza. An annual flu shot is now routinely recommended for all adults in the [place]  SEEK MEDICAL CARE IF:  · You experience chest pain, your cough worsens, or you produce more mucus.  · You have nausea, vomiting, or diarrhea.  · Your fever returns or gets worse.  SEEK IMMEDIATE MEDICAL CARE IF:  · You have trouble breathing, you become short of breath, or your skin or nails become bluish.  · You have severe pain or stiffness in the neck.  · You develop a sudden headache, or pain in the face or ear.  · You have nausea or vomiting that you cannot control.  MAKE SURE YOU:   · Understand these instructions.  · Will watch your condition.  · Will get help right away if you are not doing well or get worse.     This information is not intended to replace advice given to you by your health care provider. Make sure you discuss any questions you have with your health care provider.      Document Released: 12/15/2001 Document Revised: 01/08/2016 Document Reviewed: 03/18/2013  [employer] Interactive Patient Education ©2016 [employer]

## 2018-01-03 NOTE — ED NOTES
".Jonna Brian  .  Chief Complaint   Patient presents with   • Cough     x 2 days   • Syncope     patient reports \"I pass out everytime i cough really hard\".   • Chest Pain     x 2 days     Patient to triage with above complaint. EKG completed. ./76   Pulse (!) 119   Temp 37.2 °C (99 °F) (Temporal)   Resp 16   Ht 1.829 m (6')   Wt 120.1 kg (264 lb 12.4 oz)   SpO2 97%   BMI 35.91 kg/m²     Patient to lobby and instructed to inform staff of any needs.  "

## 2018-01-03 NOTE — ED PROVIDER NOTES
"ED Provider Note    Scribed for Tarun Metz M.D. by Israel Finnegan. 1/3/2018, 12:43 AM.    Primary care provider: Dalton Vogel M.D.  Means of arrival: Walk-in  History obtained from: Patient  History limited by: None    CHIEF COMPLAINT  Chief Complaint   Patient presents with   • Cough     x 2 days   • Syncope     patient reports \"I pass out everytime i cough really hard\".   • Chest Pain     x 2 days       HPI  Jonna Brian is a 42 y.o. male with as history of hypertension who presents to the Emergency Department complaining of syncope that occurs every time he experiences an episode of severe coughing. He reports experiencing four episodes of syncope today. His most recent syncopal episode occurred while laying supine, but the patient fell from ground level during one syncopal episode today. He denies any head trauma. He denies a history of similar symptoms. No one else has witnessed his syncopal episodes and states that he arouses without an idea of how long he was unconscious. The patient reports that he experieneces generalized chest pain and shortness of breath when coughing. The patient reports an associated tactile fever. He denies any relief of his fever after taking two 800 mg ibuprofen every four hours. He denies any bowel incontinence, urinary incontinence, vomiting, hemoptysis, head pain, or productive cough. He denies a history DVT or PE, surgeries within the last four weeks, or recent trauma. Patient denies any alcohol consumption, illicit drug use, or smoking cigarettes. 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    REVIEW OF SYSTEMS  Pertinent positives include syncope, cough, generalized chest pain and shortness of breath when coughing, and tactile fever. Pertinent negatives include no head trauma, bowel incontinence, urinary incontinence, vomiting, hemoptysis, head pain, or productive cough. As " above, all other systems reviewed and are negative.   See HPI for further details.   C    PAST MEDICAL HISTORY   has a past medical history of Depression; Headache(784.0); and Hypertension.    SURGICAL HISTORY  patient denies any surgical history    SOCIAL HISTORY  Social History   Substance Use Topics   • Smoking status: Never Smoker   • Smokeless tobacco: Never Used   • Alcohol use Yes      History   Drug Use No       FAMILY HISTORY  Family History   Problem Relation Age of Onset   • Family history unknown: Yes       CURRENT MEDICATIONS  Home Medications     Reviewed by Robert Lopez R.N. (Registered Nurse) on 01/02/18 at 1900  Med List Status: Complete   Medication Last Dose Status   amlodipine (NORVASC) 10 MG Tab  Active   chlorthalidone (HYGROTON) 25 MG Tab  Active   cyclobenzaprine (FLEXERIL) 10 MG Tab  Active   ibuprofen (MOTRIN) 800 MG Tab 11/9/2017 Active   naproxen (NAPROSYN) 500 MG Tab  Active   tadalafil (CIALIS) 10 MG tablet  Active                ALLERGIES  No Known Allergies    PHYSICAL EXAM  VITAL SIGNS: /76   Pulse (!) 119   Temp 37.2 °C (99 °F) (Temporal)   Resp 16   Ht 1.829 m (6')   Wt 120.1 kg (264 lb 12.4 oz)   SpO2 97%   BMI 35.91 kg/m²   Vitals reviewed.  Constitutional: Alert in no apparent distress.  HENT: No signs of trauma, No signs of depressed skull fracture, Bilateral external ears normal, Nose normal. No hemotypanum, no calhoun's sign, no periorbital ecchymosis. No blood in the posterior oropharynx  Eyes: Pupils are equal and reactive, Conjunctiva normal, Non-icteric.   Neck: Normal range of motion, No cervical spine tenderness, Supple, No stridor.   Lymphatic: No lymphadenopathy noted.   Cardiovascular: Regular rate and rhythm, no murmurs.   Thorax & Lungs: Normal breath sounds, No respiratory distress, No wheezing, No chest tenderness.   Abdomen: Bowel sounds normal, Soft, No tenderness, No peritoneal signs, No masses, No pulsatile masses.   Skin: Warm, Dry, No  erythema, No rash.   Back: No bony tenderness, No CVA tenderness.   Extremities: Intact distal pulses, No edema, No tenderness, No cyanosis  Musculoskeletal: Good range of motion in all major joints. No tenderness to palpation or major deformities noted.   Neurologic: Alert , Normal motor function, Normal sensory function, No focal deficits noted.   Psychiatric: Affect normal, Judgment normal, Mood normal.     DIAGNOSTIC STUDIES / PROCEDURES    LABS  Labs Reviewed   CBC WITH DIFFERENTIAL - Abnormal; Notable for the following:        Result Value    Hematocrit 41.7 (*)     MCV 80.0 (*)     Neutrophils-Polys 75.50 (*)     Lymphocytes 10.60 (*)     Lymphs (Absolute) 0.64 (*)     All other components within normal limits    Narrative:     Indicate which anticoagulants the patient is on:->UNKNOWN   COMP METABOLIC PANEL - Abnormal; Notable for the following:     Sodium 134 (*)     Potassium 3.0 (*)     Anion Gap 12.0 (*)     Glucose 115 (*)     All other components within normal limits    Narrative:     Indicate which anticoagulants the patient is on:->UNKNOWN   INFLUENZA A/B BY PCR - Abnormal; Notable for the following:     Influenza virus A RNA POSITIVE (*)     All other components within normal limits   ESTIMATED GFR - Abnormal; Notable for the following:     GFR If Non  59 (*)     All other components within normal limits    Narrative:     Indicate which anticoagulants the patient is on:->UNKNOWN   TROPONIN    Narrative:     Indicate which anticoagulants the patient is on:->UNKNOWN   BTYPE NATRIURETIC PEPTIDE    Narrative:     Indicate which anticoagulants the patient is on:->UNKNOWN   PROTHROMBIN TIME    Narrative:     Indicate which anticoagulants the patient is on:->UNKNOWN   APTT    Narrative:     Indicate which anticoagulants the patient is on:->UNKNOWN   LIPASE    Narrative:     Indicate which anticoagulants the patient is on:->UNKNOWN      All labs reviewed by me.    EKG  Interpretation:  Interpreted by me    12 Lead EKG interpreted by me to show:  Sinus tachycardia  Rate: 102  Axis: Normal  Intervals: Normal  Isolated T wave inversion in lead III  Isolated J point elevation in lead II  Normal ST segments  My impression of this EKG: Does not indicate STEMI or arrhythmia at this time.    RADIOLOGY  DX-CHEST-LIMITED (1 VIEW)    (Results Pending)     The radiologist's interpretation of all radiological studies have been reviewed by me.    COURSE & MEDICAL DECISION MAKING  Nursing notes, VS, PMSFHx reviewed in chart.  Differential diagnoses include but not limited to: Influenza, PE ,viral URI, ACS. Less likely CVA/TIA.     12:43 AM Patient seen and examined at bedside. Patient arrives to triage tachycardic with normal vital signs. By the time I evaluated the patient, his heart rate had returned to normal without intervention. Patient appears well hydrated and non-toxic. The physical exam is unremarkable. Ordered for DX chest, Estimated GFR, Troponin, BNP, CBC with differential, CMP, PTT, APTT, Lipase, Influenza A/B by PCR, old EKG, and EKG to evaluate.     1:00 AM Review of laboratory results reveal the patient is positive for influenza.    Per [place] [facility] CT rules I will not perform a CT head today. Per [facility] Criteria, I will not perform c-spine imagine today.    1:07 AM - Re-examined; The patient is resting in bed comfortably. I discussed his above findings plans for discharge with a referral to his primary care and instructed to return to the ED if his symptoms worsen. He was also told to continue taking alternating Motrin and Tylenol and in-taking copious fluids. I also discussed this with his girlfriend over the telephone at the patient's request. Further instructions are outlined in the patient's discharge instructions handout. Patient understands and agrees.    The patient will return for new or worsening symptoms and is stable at the time of discharge.    DISPOSITION:  Patient  will be discharged home in stable condition.    FOLLOW UP:  Dalton Vogel M.D.  [address]  [address]  [place] [id number] [phone]    Schedule an appointment as soon as possible for a visit in 2 days  for recheck    FINAL IMPRESSION  1. Influenza A virus present          IIsrael (Scribe), am scribing for, and in the presence of, Tarun Metz M.D..    Electronically signed by: Israel Finnegan (Scribe), 1/3/2018    ITarun M.D. personally performed the services described in this documentation, as scribed by Israel Finnegan in my presence, and it is both accurate and complete.    The note accurately reflects work and decisions made by me.  Tarun Metz  1/3/2018  5:59 AM

## 2018-01-15 ENCOUNTER — APPOINTMENT (OUTPATIENT)
Dept: ADMISSIONS | Facility: MEDICAL CENTER | Age: 43
End: 2018-01-15
Attending: ORTHOPAEDIC SURGERY
Payer: MEDICAID

## 2018-01-23 ENCOUNTER — APPOINTMENT (OUTPATIENT)
Dept: RADIOLOGY | Facility: MEDICAL CENTER | Age: 43
End: 2018-01-23
Attending: ORTHOPAEDIC SURGERY
Payer: MEDICAID

## 2018-01-23 ENCOUNTER — HOSPITAL ENCOUNTER (OUTPATIENT)
Facility: MEDICAL CENTER | Age: 43
End: 2018-01-23
Attending: ORTHOPAEDIC SURGERY | Admitting: ORTHOPAEDIC SURGERY
Payer: MEDICAID

## 2018-01-23 VITALS
RESPIRATION RATE: 20 BRPM | HEIGHT: 73 IN | HEART RATE: 100 BPM | BODY MASS INDEX: 33.86 KG/M2 | DIASTOLIC BLOOD PRESSURE: 65 MMHG | TEMPERATURE: 97.7 F | OXYGEN SATURATION: 100 % | SYSTOLIC BLOOD PRESSURE: 133 MMHG | WEIGHT: 255.51 LBS

## 2018-01-23 PROCEDURE — 73610 X-RAY EXAM OF ANKLE: CPT | Mod: LT

## 2018-01-23 PROCEDURE — 160009 HCHG ANES TIME/MIN: Performed by: ORTHOPAEDIC SURGERY

## 2018-01-23 PROCEDURE — 160041 HCHG SURGERY MINUTES - EA ADDL 1 MIN LEVEL 4: Performed by: ORTHOPAEDIC SURGERY

## 2018-01-23 PROCEDURE — A9270 NON-COVERED ITEM OR SERVICE: HCPCS

## 2018-01-23 PROCEDURE — E0114 CRUTCH UNDERARM PAIR NO WOOD: HCPCS | Performed by: ORTHOPAEDIC SURGERY

## 2018-01-23 PROCEDURE — 160047 HCHG PACU  - EA ADDL 30 MINS PHASE II: Performed by: ORTHOPAEDIC SURGERY

## 2018-01-23 PROCEDURE — 160046 HCHG PACU - 1ST 60 MINS PHASE II: Performed by: ORTHOPAEDIC SURGERY

## 2018-01-23 PROCEDURE — C1713 ANCHOR/SCREW BN/BN,TIS/BN: HCPCS | Performed by: ORTHOPAEDIC SURGERY

## 2018-01-23 PROCEDURE — 700111 HCHG RX REV CODE 636 W/ 250 OVERRIDE (IP)

## 2018-01-23 PROCEDURE — 500881 HCHG PACK, EXTREMITY: Performed by: ORTHOPAEDIC SURGERY

## 2018-01-23 PROCEDURE — 160025 RECOVERY II MINUTES (STATS): Performed by: ORTHOPAEDIC SURGERY

## 2018-01-23 PROCEDURE — 501838 HCHG SUTURE GENERAL: Performed by: ORTHOPAEDIC SURGERY

## 2018-01-23 PROCEDURE — 160035 HCHG PACU - 1ST 60 MINS PHASE I: Performed by: ORTHOPAEDIC SURGERY

## 2018-01-23 PROCEDURE — 160029 HCHG SURGERY MINUTES - 1ST 30 MINS LEVEL 4: Performed by: ORTHOPAEDIC SURGERY

## 2018-01-23 PROCEDURE — A6223 GAUZE >16<=48 NO W/SAL W/O B: HCPCS | Performed by: ORTHOPAEDIC SURGERY

## 2018-01-23 PROCEDURE — 503036 HCHG GUIDE PIN,OIC: Performed by: ORTHOPAEDIC SURGERY

## 2018-01-23 PROCEDURE — 700102 HCHG RX REV CODE 250 W/ 637 OVERRIDE(OP)

## 2018-01-23 PROCEDURE — 160048 HCHG OR STATISTICAL LEVEL 1-5: Performed by: ORTHOPAEDIC SURGERY

## 2018-01-23 PROCEDURE — 160002 HCHG RECOVERY MINUTES (STAT): Performed by: ORTHOPAEDIC SURGERY

## 2018-01-23 PROCEDURE — 500423 HCHG DRESSING, ABD COMBINE: Performed by: ORTHOPAEDIC SURGERY

## 2018-01-23 PROCEDURE — A6454 SELF-ADHER BAND W>=3" <5"/YD: HCPCS | Performed by: ORTHOPAEDIC SURGERY

## 2018-01-23 PROCEDURE — 700101 HCHG RX REV CODE 250

## 2018-01-23 PROCEDURE — A4306 DRUG DELIVERY SYSTEM <=50 ML: HCPCS | Performed by: ANESTHESIOLOGY

## 2018-01-23 PROCEDURE — 700111 HCHG RX REV CODE 636 W/ 250 OVERRIDE (IP): Performed by: ANESTHESIOLOGY

## 2018-01-23 PROCEDURE — 110371 HCHG SHELL REV 272: Performed by: ORTHOPAEDIC SURGERY

## 2018-01-23 PROCEDURE — 160036 HCHG PACU - EA ADDL 30 MINS PHASE I: Performed by: ORTHOPAEDIC SURGERY

## 2018-01-23 DEVICE — WASHER FOR 7.3MM CANNULATED SCREWS 13.0MM  (3TX18=54) (6EA/PK): Type: IMPLANTABLE DEVICE | Status: FUNCTIONAL

## 2018-01-23 DEVICE — SCREW CANNULATED FULLY THREADED 7.3MM X 105MM (3TX3=9): Type: IMPLANTABLE DEVICE | Status: FUNCTIONAL

## 2018-01-23 RX ORDER — LIDOCAINE AND PRILOCAINE 25; 25 MG/G; MG/G
1 CREAM TOPICAL
Status: DISCONTINUED | OUTPATIENT
Start: 2018-01-23 | End: 2018-01-23 | Stop reason: HOSPADM

## 2018-01-23 RX ORDER — SODIUM CHLORIDE, SODIUM LACTATE, POTASSIUM CHLORIDE, CALCIUM CHLORIDE 600; 310; 30; 20 MG/100ML; MG/100ML; MG/100ML; MG/100ML
INJECTION, SOLUTION INTRAVENOUS CONTINUOUS
Status: DISCONTINUED | OUTPATIENT
Start: 2018-01-23 | End: 2018-01-23 | Stop reason: HOSPADM

## 2018-01-23 RX ORDER — OXYCODONE HCL 5 MG/5 ML
SOLUTION, ORAL ORAL
Status: COMPLETED
Start: 2018-01-23 | End: 2018-01-23

## 2018-01-23 RX ORDER — LIDOCAINE HYDROCHLORIDE 10 MG/ML
0.5 INJECTION, SOLUTION INFILTRATION; PERINEURAL
Status: DISCONTINUED | OUTPATIENT
Start: 2018-01-23 | End: 2018-01-23 | Stop reason: HOSPADM

## 2018-01-23 RX ORDER — MAGNESIUM HYDROXIDE 1200 MG/15ML
LIQUID ORAL
Status: DISCONTINUED | OUTPATIENT
Start: 2018-01-23 | End: 2018-01-23 | Stop reason: HOSPADM

## 2018-01-23 RX ORDER — LIDOCAINE HYDROCHLORIDE 10 MG/ML
INJECTION, SOLUTION INFILTRATION; PERINEURAL
Status: COMPLETED
Start: 2018-01-23 | End: 2018-01-23

## 2018-01-23 RX ORDER — HYDROMORPHONE HYDROCHLORIDE 2 MG/ML
INJECTION, SOLUTION INTRAMUSCULAR; INTRAVENOUS; SUBCUTANEOUS
Status: COMPLETED
Start: 2018-01-23 | End: 2018-01-23

## 2018-01-23 RX ADMIN — LIDOCAINE HYDROCHLORIDE 0.5 ML: 10 INJECTION, SOLUTION INFILTRATION; PERINEURAL at 12:45

## 2018-01-23 RX ADMIN — HYDROMORPHONE HYDROCHLORIDE 0.5 MG: 2 INJECTION INTRAMUSCULAR; INTRAVENOUS; SUBCUTANEOUS at 17:15

## 2018-01-23 RX ADMIN — HYDROMORPHONE HYDROCHLORIDE 0.5 MG: 2 INJECTION INTRAMUSCULAR; INTRAVENOUS; SUBCUTANEOUS at 17:30

## 2018-01-23 RX ADMIN — FENTANYL CITRATE 50 MCG: 50 INJECTION, SOLUTION INTRAMUSCULAR; INTRAVENOUS at 17:35

## 2018-01-23 RX ADMIN — FENTANYL CITRATE 25 MCG: 50 INJECTION, SOLUTION INTRAMUSCULAR; INTRAVENOUS at 17:45

## 2018-01-23 RX ADMIN — FENTANYL CITRATE 50 MCG: 50 INJECTION, SOLUTION INTRAMUSCULAR; INTRAVENOUS at 17:01

## 2018-01-23 RX ADMIN — OXYCODONE HYDROCHLORIDE 10 MG: 5 SOLUTION ORAL at 17:05

## 2018-01-23 RX ADMIN — SODIUM CHLORIDE, SODIUM LACTATE, POTASSIUM CHLORIDE, CALCIUM CHLORIDE: 600; 310; 30; 20 INJECTION, SOLUTION INTRAVENOUS at 12:45

## 2018-01-23 ASSESSMENT — PAIN SCALES - GENERAL
PAINLEVEL_OUTOF10: 0
PAINLEVEL_OUTOF10: 10
PAINLEVEL_OUTOF10: 10
PAINLEVEL_OUTOF10: 8
PAINLEVEL_OUTOF10: 8

## 2018-01-24 NOTE — DISCHARGE INSTRUCTIONS
ACTIVITY: Rest and take it easy for the first 24 hours.  A responsible adult is recommended to remain with you during that time.  It is normal to feel sleepy.  We encourage you to not do anything that requires balance, judgment or coordination.    MILD FLU-LIKE SYMPTOMS ARE NORMAL. YOU MAY EXPERIENCE GENERALIZED MUSCLE ACHES, THROAT IRRITATION, HEADACHE AND/OR SOME NAUSEA.    FOR 24 HOURS DO NOT:  Drive, operate machinery or run household appliances.  Drink beer or alcoholic beverages.   Make important decisions or sign legal documents.    SPECIAL INSTRUCTIONS: Non Weight Bearing Left leg  Keep splint clean/dry/intact  Elevate/ice  Take pain meds as prescribed  Follow up with Dr. Kohler in 2 weeks    DIET: To avoid nausea, slowly advance diet as tolerated, avoiding spicy or greasy foods for the first day.  Add more substantial food to your diet according to your physician's instructions.  Babies can be fed formula or breast milk as soon as they are hungry.  INCREASE FLUIDS AND FIBER TO AVOID CONSTIPATION.    SURGICAL DRESSING/BATHING: see above    FOLLOW-UP APPOINTMENT:  A follow-up appointment should be arranged with your doctor in 1-2 weeks; call to schedule.    You should CALL YOUR PHYSICIAN if you develop:  Fever greater than 101 degrees F.  Pain not relieved by medication, or persistent nausea or vomiting.  Excessive bleeding (blood soaking through dressing) or unexpected drainage from the wound.  Extreme redness or swelling around the incision site, drainage of pus or foul smelling drainage.  Inability to urinate or empty your bladder within 8 hours.  Problems with breathing or chest pain.    You should call 911 if you develop problems with breathing or chest pain.  If you are unable to contact your doctor or surgical center, you should go to the nearest emergency room or urgent care center.  Physician's telephone #: Dr. Kohler [phone]    If any questions arise, call your doctor.  If your doctor is  not available, please feel free to call the Surgical Center at [phone].  The Center is open Monday through Friday from 7AM to 7PM.  You can also call the HEALTH HOTLINE open 24 hours/day, 7 days/week and speak to a nurse at [phone], or toll free at [phone].    A registered nurse may call you a few days after your surgery to see how you are doing after your procedure.    MEDICATIONS: Resume taking daily medication.  Take prescribed pain medication with food.  If no medication is prescribed, you may take non-aspirin pain medication if needed.  PAIN MEDICATION CAN BE VERY CONSTIPATING.  Take a stool softener or laxative such as senokot, pericolace, or milk of magnesia if needed.    Prescriptions with family.  Last pain medication given at 5:05 pm.    If your physician has prescribed pain medication that includes Acetaminophen (Tylenol), do not take additional Acetaminophen (Tylenol) while taking the prescribed medication.    Depression / Suicide Risk    As you are discharged from this [facility] facility, it is important to learn how to keep safe from harming yourself.    Recognize the warning signs:  · Abrupt changes in personality, positive or negative- including increase in energy   · Giving away possessions  · Change in eating patterns- significant weight changes-  positive or negative  · Change in sleeping patterns- unable to sleep or sleeping all the time   · Unwillingness or inability to communicate  · Depression  · Unusual sadness, discouragement and loneliness  · Talk of wanting to die  · Neglect of personal appearance   · Rebelliousness- reckless behavior  · Withdrawal from people/activities they love  · Confusion- inability to concentrate     If you or a loved one observes any of these behaviors or has concerns about self-harm, here's what you can do:  · Talk about it- your feelings and reasons for harming yourself  · Remove any means that you might use to hurt yourself (examples:  pills, rope, extension cords, firearm)  · Get professional help from the community (Mental Health, Substance Abuse, psychological counseling)  · Do not be alone:Call your Safe Contact- someone whom you trust who will be there for you.  · Call your local CRISIS HOTLINE 833-0052 or 425-625-5277  · Call your local Children's Mobile Crisis Response Team [place] (141) 210-6193 or www.Ensequence  · Call the toll free National Suicide Prevention Hotlines   · National Suicide Prevention Lifeline 243-360-YZJT (6485)  · National Hope Line Network 800-SUICIDE (214-1313)

## 2018-01-24 NOTE — OP REPORT
DATE OF SERVICE:  01/23/2018    PREOPERATIVE DIAGNOSES:  1.  Left tarsal coalition.  2.  Left subtalar arthritis.  3.  Left planovalgus foot alignment.    POSTOPERATIVE DIAGNOSES:  1.  Left tarsal coalition.  2.  Left subtalar arthritis.  3.  Left planovalgus foot alignment.    PROCEDURES PERFORMED:  1.  Left gastrocsoleus recession.  2.  Left subtalar fusion.  3.  Excision of tarsal coalition.  4.  Left open intertarsal medial talonavicular release for contracture.    SURGEON:  Abhilash Kohler MD.    FIRST ASSISTANT:  Dalton Lopez MD.    SECOND ASSISTANT:  Priscila Bain.    ANESTHESIA:  General endotracheal with popliteal block per my request for   postoperative pain management.    ESTIMATED BLOOD LOSS:  None.    COMPLICATIONS:  None.    POSTOPERATIVE PLAN:  1.  Nonweightbearing.  2.  Preoperative antibiotics.  3.  Follow up in 2 weeks.    INDICATIONS:  Patient is a pleasant 42-year-old male who has had problems with   his left foot.    Diagnosis was made and options were discussed with him including operative and   nonoperative.  He elected to undergo operative intervention.  The above   procedure was discussed and all questions were answered.  Risks of surgery   were explained, which include, but not limited to wound problems, infection,   nerve injury, vascular injury, need for further surgery.  He understands he   could have persistent risk of his pain, malunion, nonunion, and need for   further surgery.  He understands and accepts these risks and agrees to   proceed.  His site was marked by myself prior to receiving psychotropic   medicines.    PROCEDURE IN DETAIL:  The patient was given a popliteal block per my request   for postoperative pain management.  He was brought into the operating room and   underwent general endotracheal anesthesia without complications.  His left   lower extremity was prepped and draped in standard fashion in supine position   with all appropriate padding.  Positive  site verification confirmed his left   lower extremity as well as the above procedure and confirmation that he   received preoperative antibiotics.  Esmarch was used to exsanguinate his foot   and ankle and leg tourniquet was inflated to 250 mmHg.  Posteromedial incision   was made at the level of musculotendinous junction of the gastroc.  It was   directed through the crural fascia exposing the gastroc tendon.  Under direct   visualization from lateral to medial, the gastroc tendon was released.  This   gave significant improvement in dorsiflexion.  The wound was then irrigated   with copious irrigation and was closed in layered fashion using 3-0 Vicryl and   3-0 nylon.  Next, a lateral incision was made starting at the tip of the   lateral malleolus and extended distally.  It was brought through the sinus   tarsi area.  Through there, the osteotome was then used to punch through the   medial facet coalition area to free up this joint.  A lamina  was   placed.  This allowed for removal of all cartilage and other soft tissue in   the subtalar joint.  The joint surfaces were then prepped and a guidepin was   placed from the calcaneus into the neck of the talus.  Its position was   confirmed on C-arm imaging.  It was then filled with an OIC 7.3 cannulated   screw under lag screw technique.  It had excellent fixation.  Next, we saw   some forefoot contracture and varus.  A medial incision was made, it was   dissected through the spring ligament and was released and repaired and this   allowed for rotation of the foot out of forefoot varus.  On standing he had   mild-to-moderate valgus of the hindfoot, neutral supination and pronation and   forefoot varus valgus.  Wounds were then irrigated with copious irrigation and   closed in layered fashion using 3-0 Vicryl and 3-0 nylon.  Sterile dressings   were applied.  Tourniquet was released.  All toes were pink.  He was placed in   a posterior sugar tong splint.  He  awoke from general anesthesia without   complications and was transferred to the recovery room in good condition.    Utilization of Dr. Lopez was necessary for patient's positioning,   holding, retracting, wound closure, dressing and splint placement.  He was   present throughout the entire procedure.       ____________________________________     MD CANDY TERRELL / BETSY    DD:  01/23/2018 16:57:17  DT:  01/23/2018 18:00:33    D#:  [id number]  Job#:  [id number]

## 2018-01-29 ENCOUNTER — RESOLUTE PROFESSIONAL BILLING HOSPITAL PROF FEE (OUTPATIENT)
Dept: HOSPITALIST | Facility: MEDICAL CENTER | Age: 43
End: 2018-01-29
Payer: MEDICAID

## 2018-01-29 ENCOUNTER — APPOINTMENT (OUTPATIENT)
Dept: RADIOLOGY | Facility: MEDICAL CENTER | Age: 43
End: 2018-01-29
Attending: EMERGENCY MEDICINE
Payer: MEDICAID

## 2018-01-29 ENCOUNTER — HOSPITAL ENCOUNTER (OUTPATIENT)
Facility: MEDICAL CENTER | Age: 43
LOS: 1 days | End: 2018-01-30
Attending: EMERGENCY MEDICINE | Admitting: HOSPITALIST
Payer: MEDICAID

## 2018-01-29 DIAGNOSIS — M72.2 PLANTAR FASCIITIS OF LEFT FOOT: ICD-10-CM

## 2018-01-29 DIAGNOSIS — R07.9 ACUTE CHEST PAIN: ICD-10-CM

## 2018-01-29 DIAGNOSIS — Z98.890 S/P FOOT SURGERY, LEFT: ICD-10-CM

## 2018-01-29 PROBLEM — R79.89 ABNORMAL LFTS: Status: ACTIVE | Noted: 2018-01-29

## 2018-01-29 PROBLEM — M79.605 LEFT LEG PAIN: Status: ACTIVE | Noted: 2018-01-29

## 2018-01-29 PROBLEM — E87.1 HYPONATREMIA: Status: ACTIVE | Noted: 2018-01-29

## 2018-01-29 PROBLEM — R73.9 HYPERGLYCEMIA: Status: ACTIVE | Noted: 2018-01-29

## 2018-01-29 LAB
ALBUMIN SERPL BCP-MCNC: 4.7 G/DL (ref 3.2–4.9)
ALBUMIN/GLOB SERPL: 1.5 G/DL
ALP SERPL-CCNC: 96 U/L (ref 30–99)
ALT SERPL-CCNC: 77 U/L (ref 2–50)
ANION GAP SERPL CALC-SCNC: 12 MMOL/L (ref 0–11.9)
APTT PPP: 30.8 SEC (ref 24.7–36)
AST SERPL-CCNC: 25 U/L (ref 12–45)
BASOPHILS # BLD AUTO: 0.3 % (ref 0–1.8)
BASOPHILS # BLD: 0.03 K/UL (ref 0–0.12)
BILIRUB SERPL-MCNC: 0.7 MG/DL (ref 0.1–1.5)
BNP SERPL-MCNC: <2 PG/ML (ref 0–100)
BUN SERPL-MCNC: 11 MG/DL (ref 8–22)
CALCIUM SERPL-MCNC: 9.8 MG/DL (ref 8.5–10.5)
CHLORIDE SERPL-SCNC: 97 MMOL/L (ref 96–112)
CO2 SERPL-SCNC: 23 MMOL/L (ref 20–33)
CREAT SERPL-MCNC: 1.18 MG/DL (ref 0.5–1.4)
DEPRECATED D DIMER PPP IA-ACNC: 530 NG/ML(D-DU)
EKG IMPRESSION: NORMAL
EOSINOPHIL # BLD AUTO: 0.06 K/UL (ref 0–0.51)
EOSINOPHIL NFR BLD: 0.7 % (ref 0–6.9)
ERYTHROCYTE [DISTWIDTH] IN BLOOD BY AUTOMATED COUNT: 35.6 FL (ref 35.9–50)
GLOBULIN SER CALC-MCNC: 3.1 G/DL (ref 1.9–3.5)
GLUCOSE SERPL-MCNC: 132 MG/DL (ref 65–99)
HCT VFR BLD AUTO: 44.3 % (ref 42–52)
HGB BLD-MCNC: 15.1 G/DL (ref 14–18)
IMM GRANULOCYTES # BLD AUTO: 0.05 K/UL (ref 0–0.11)
IMM GRANULOCYTES NFR BLD AUTO: 0.6 % (ref 0–0.9)
INR PPP: 1.03 (ref 0.87–1.13)
LIPASE SERPL-CCNC: 8 U/L (ref 11–82)
LYMPHOCYTES # BLD AUTO: 1.81 K/UL (ref 1–4.8)
LYMPHOCYTES NFR BLD: 20 % (ref 22–41)
MCH RBC QN AUTO: 27.3 PG (ref 27–33)
MCHC RBC AUTO-ENTMCNC: 34.1 G/DL (ref 33.7–35.3)
MCV RBC AUTO: 80.1 FL (ref 81.4–97.8)
MONOCYTES # BLD AUTO: 0.79 K/UL (ref 0–0.85)
MONOCYTES NFR BLD AUTO: 8.7 % (ref 0–13.4)
NEUTROPHILS # BLD AUTO: 6.29 K/UL (ref 1.82–7.42)
NEUTROPHILS NFR BLD: 69.7 % (ref 44–72)
NRBC # BLD AUTO: 0 K/UL
NRBC BLD-RTO: 0 /100 WBC
PLATELET # BLD AUTO: 348 K/UL (ref 164–446)
PMV BLD AUTO: 9.1 FL (ref 9–12.9)
POTASSIUM SERPL-SCNC: 3.3 MMOL/L (ref 3.6–5.5)
PROT SERPL-MCNC: 7.8 G/DL (ref 6–8.2)
PROTHROMBIN TIME: 13.2 SEC (ref 12–14.6)
RBC # BLD AUTO: 5.53 M/UL (ref 4.7–6.1)
SODIUM SERPL-SCNC: 132 MMOL/L (ref 135–145)
TROPONIN I SERPL-MCNC: <0.01 NG/ML (ref 0–0.04)
TROPONIN I SERPL-MCNC: <0.01 NG/ML (ref 0–0.04)
WBC # BLD AUTO: 9 K/UL (ref 4.8–10.8)

## 2018-01-29 PROCEDURE — 71045 X-RAY EXAM CHEST 1 VIEW: CPT

## 2018-01-29 PROCEDURE — 94760 N-INVAS EAR/PLS OXIMETRY 1: CPT

## 2018-01-29 PROCEDURE — 93005 ELECTROCARDIOGRAM TRACING: CPT | Performed by: EMERGENCY MEDICINE

## 2018-01-29 PROCEDURE — 83690 ASSAY OF LIPASE: CPT

## 2018-01-29 PROCEDURE — 99285 EMERGENCY DEPT VISIT HI MDM: CPT

## 2018-01-29 PROCEDURE — G0378 HOSPITAL OBSERVATION PER HR: HCPCS

## 2018-01-29 PROCEDURE — 700102 HCHG RX REV CODE 250 W/ 637 OVERRIDE(OP): Performed by: HOSPITALIST

## 2018-01-29 PROCEDURE — 700105 HCHG RX REV CODE 258: Performed by: HOSPITALIST

## 2018-01-29 PROCEDURE — 71275 CT ANGIOGRAPHY CHEST: CPT

## 2018-01-29 PROCEDURE — 84484 ASSAY OF TROPONIN QUANT: CPT

## 2018-01-29 PROCEDURE — 93005 ELECTROCARDIOGRAM TRACING: CPT

## 2018-01-29 PROCEDURE — 700117 HCHG RX CONTRAST REV CODE 255: Performed by: EMERGENCY MEDICINE

## 2018-01-29 PROCEDURE — 96376 TX/PRO/DX INJ SAME DRUG ADON: CPT

## 2018-01-29 PROCEDURE — 85610 PROTHROMBIN TIME: CPT

## 2018-01-29 PROCEDURE — 85025 COMPLETE CBC W/AUTO DIFF WBC: CPT

## 2018-01-29 PROCEDURE — 99220 PR INITIAL OBSERVATION CARE,LEVL III: CPT | Performed by: HOSPITALIST

## 2018-01-29 PROCEDURE — 83880 ASSAY OF NATRIURETIC PEPTIDE: CPT

## 2018-01-29 PROCEDURE — 85730 THROMBOPLASTIN TIME PARTIAL: CPT

## 2018-01-29 PROCEDURE — 700111 HCHG RX REV CODE 636 W/ 250 OVERRIDE (IP): Performed by: HOSPITALIST

## 2018-01-29 PROCEDURE — 96374 THER/PROPH/DIAG INJ IV PUSH: CPT

## 2018-01-29 PROCEDURE — 36415 COLL VENOUS BLD VENIPUNCTURE: CPT

## 2018-01-29 PROCEDURE — 96375 TX/PRO/DX INJ NEW DRUG ADDON: CPT

## 2018-01-29 PROCEDURE — 700111 HCHG RX REV CODE 636 W/ 250 OVERRIDE (IP): Performed by: EMERGENCY MEDICINE

## 2018-01-29 PROCEDURE — 80053 COMPREHEN METABOLIC PANEL: CPT

## 2018-01-29 PROCEDURE — A9270 NON-COVERED ITEM OR SERVICE: HCPCS | Performed by: HOSPITALIST

## 2018-01-29 PROCEDURE — 96372 THER/PROPH/DIAG INJ SC/IM: CPT | Mod: XU

## 2018-01-29 PROCEDURE — 85379 FIBRIN DEGRADATION QUANT: CPT

## 2018-01-29 RX ORDER — POTASSIUM CHLORIDE 20 MEQ/1
40 TABLET, EXTENDED RELEASE ORAL ONCE
Status: COMPLETED | OUTPATIENT
Start: 2018-01-29 | End: 2018-01-29

## 2018-01-29 RX ORDER — AMLODIPINE BESYLATE 10 MG/1
10 TABLET ORAL DAILY
Status: DISCONTINUED | OUTPATIENT
Start: 2018-01-30 | End: 2018-01-30 | Stop reason: HOSPADM

## 2018-01-29 RX ORDER — POLYETHYLENE GLYCOL 3350 17 G/17G
1 POWDER, FOR SOLUTION ORAL
Status: DISCONTINUED | OUTPATIENT
Start: 2018-01-29 | End: 2018-01-30 | Stop reason: HOSPADM

## 2018-01-29 RX ORDER — CHLORTHALIDONE 25 MG/1
25 TABLET ORAL DAILY
Status: DISCONTINUED | OUTPATIENT
Start: 2018-01-30 | End: 2018-01-30 | Stop reason: HOSPADM

## 2018-01-29 RX ORDER — MORPHINE SULFATE 4 MG/ML
4 INJECTION, SOLUTION INTRAMUSCULAR; INTRAVENOUS ONCE
Status: COMPLETED | OUTPATIENT
Start: 2018-01-29 | End: 2018-01-29

## 2018-01-29 RX ORDER — ASPIRIN 300 MG/1
300 SUPPOSITORY RECTAL DAILY
Status: DISCONTINUED | OUTPATIENT
Start: 2018-01-29 | End: 2018-01-30 | Stop reason: HOSPADM

## 2018-01-29 RX ORDER — ONDANSETRON 2 MG/ML
4 INJECTION INTRAMUSCULAR; INTRAVENOUS ONCE
Status: COMPLETED | OUTPATIENT
Start: 2018-01-29 | End: 2018-01-29

## 2018-01-29 RX ORDER — AMOXICILLIN 250 MG
2 CAPSULE ORAL 2 TIMES DAILY
Status: DISCONTINUED | OUTPATIENT
Start: 2018-01-29 | End: 2018-01-30 | Stop reason: HOSPADM

## 2018-01-29 RX ORDER — SODIUM CHLORIDE 9 MG/ML
INJECTION, SOLUTION INTRAVENOUS CONTINUOUS
Status: DISCONTINUED | OUTPATIENT
Start: 2018-01-29 | End: 2018-01-30 | Stop reason: HOSPADM

## 2018-01-29 RX ORDER — HEPARIN SODIUM 5000 [USP'U]/ML
5000 INJECTION, SOLUTION INTRAVENOUS; SUBCUTANEOUS EVERY 8 HOURS
Status: DISCONTINUED | OUTPATIENT
Start: 2018-01-29 | End: 2018-01-30 | Stop reason: HOSPADM

## 2018-01-29 RX ORDER — BISACODYL 10 MG
10 SUPPOSITORY, RECTAL RECTAL
Status: DISCONTINUED | OUTPATIENT
Start: 2018-01-29 | End: 2018-01-30 | Stop reason: HOSPADM

## 2018-01-29 RX ORDER — ASPIRIN 81 MG/1
324 TABLET, CHEWABLE ORAL DAILY
Status: DISCONTINUED | OUTPATIENT
Start: 2018-01-29 | End: 2018-01-30 | Stop reason: HOSPADM

## 2018-01-29 RX ORDER — MORPHINE SULFATE 4 MG/ML
4 INJECTION, SOLUTION INTRAMUSCULAR; INTRAVENOUS EVERY 4 HOURS PRN
Status: DISCONTINUED | OUTPATIENT
Start: 2018-01-29 | End: 2018-01-30 | Stop reason: HOSPADM

## 2018-01-29 RX ORDER — ASPIRIN 325 MG
325 TABLET ORAL DAILY
Status: DISCONTINUED | OUTPATIENT
Start: 2018-01-29 | End: 2018-01-30 | Stop reason: HOSPADM

## 2018-01-29 RX ADMIN — IOHEXOL 100 ML: 350 INJECTION, SOLUTION INTRAVENOUS at 18:06

## 2018-01-29 RX ADMIN — HEPARIN SODIUM 5000 UNITS: 5000 INJECTION, SOLUTION INTRAVENOUS; SUBCUTANEOUS at 22:21

## 2018-01-29 RX ADMIN — SODIUM CHLORIDE: 9 INJECTION, SOLUTION INTRAVENOUS at 22:22

## 2018-01-29 RX ADMIN — ASPIRIN 325 MG: 325 TABLET ORAL at 19:57

## 2018-01-29 RX ADMIN — ONDANSETRON 4 MG: 2 INJECTION INTRAMUSCULAR; INTRAVENOUS at 15:26

## 2018-01-29 RX ADMIN — MORPHINE SULFATE 4 MG: 4 INJECTION INTRAVENOUS at 15:26

## 2018-01-29 RX ADMIN — MORPHINE SULFATE 4 MG: 4 INJECTION INTRAVENOUS at 20:28

## 2018-01-29 RX ADMIN — POTASSIUM CHLORIDE 40 MEQ: 1500 TABLET, EXTENDED RELEASE ORAL at 22:21

## 2018-01-29 ASSESSMENT — ENCOUNTER SYMPTOMS
HEADACHES: 0
FEVER: 0
CHILLS: 0
BRUISES/BLEEDS EASILY: 0
EYE DISCHARGE: 0
NECK PAIN: 0
NAUSEA: 0
DEPRESSION: 0
BLURRED VISION: 0
SENSORY CHANGE: 0
WEAKNESS: 0
NERVOUS/ANXIOUS: 0
DIZZINESS: 0
CONSTIPATION: 0
DOUBLE VISION: 0
SHORTNESS OF BREATH: 1
HEARTBURN: 0
SPEECH CHANGE: 0
FLANK PAIN: 0
MYALGIAS: 0

## 2018-01-29 ASSESSMENT — PAIN SCALES - GENERAL
PAINLEVEL_OUTOF10: 10
PAINLEVEL_OUTOF10: 5
PAINLEVEL_OUTOF10: 3

## 2018-01-29 ASSESSMENT — PATIENT HEALTH QUESTIONNAIRE - PHQ9
SUM OF ALL RESPONSES TO PHQ9 QUESTIONS 1 AND 2: 0
1. LITTLE INTEREST OR PLEASURE IN DOING THINGS: NOT AT ALL
SUM OF ALL RESPONSES TO PHQ QUESTIONS 1-9: 0
2. FEELING DOWN, DEPRESSED, IRRITABLE, OR HOPELESS: NOT AT ALL

## 2018-01-29 ASSESSMENT — LIFESTYLE VARIABLES
ALCOHOL_USE: NO
EVER_SMOKED: NEVER

## 2018-01-29 NOTE — ED PROVIDER NOTES
"ED Provider Note    Scribed for Yg Pearce M.D. by Debbie Esparza. 1/29/2018, 3:08 PM.    Primary care provider: Dalton Vogel M.D.  Means of arrival: ambulance   History obtained from: patient   History limited by: none     CHIEF COMPLAINT  Chief Complaint   Patient presents with   • Shortness of Breath     sudden onset of sob 1hour ago   • Chest Injury       HPI  Jonna Brian is a 42 y.o. male who presents to the Emergency Department via ambulance with complaints of chest pain onset one hour prior to arrival. Patient reports that his chest pain was sudden in onset while sitting on the couch. His pain is located on his mid to left chest.He states the quality of his pain feels as through \"he has something stuck there and as though he had to vomit\". Patient reports associated shortness of breath. Patient denies dyspnea. He recently had subtalor triple fusion performed on his left foot by Dr. Brandt. He denies a known family history of blood clots. Patient denies any other acute complaints or concerns.     REVIEW OF SYSTEMS  Pertinent positives include shortness of breath, chest pain. Pertinent negatives include no dyspnea.   As above, all other systems reviewed and are negative.   See HPI for further details.   C    PAST MEDICAL HISTORY   has a past medical history of Cold; Depression; Headache(784.0); Hypertension; and Influenza A.    SURGICAL HISTORY   has a past surgical history that includes subtalor triple fusion (Left, 1/23/2018).    SOCIAL HISTORY  Social History   Substance Use Topics   • Smoking status: Never Smoker   • Smokeless tobacco: Never Used   • Alcohol use No      History   Drug Use No       FAMILY HISTORY  Family History   Problem Relation Age of Onset   • Family history unknown: Yes       CURRENT MEDICATIONS  Current medications can be reviewed in the nurse's note.     ALLERGIES  Allergies   Allergen Reactions   • Lisinopril Shortness of Breath     Closes throat       PHYSICAL " EXAM  VITAL SIGNS: /77   Pulse (!) 120   Temp 36.6 °C (97.9 °F)   Resp (!) 27   Ht 1.829 m (6')   Wt (!) 126.1 kg (278 lb)   SpO2 100%   BMI 37.70 kg/m²   Vitals reviewed.  Constitutional: Alert.  HENT: No signs of trauma, Bilateral external ears normal, Nose normal.   Eyes: Pupils are equal and reactive, Conjunctiva normal, Non-icteric.   Neck: Normal range of motion, No tenderness, Supple, No stridor.   Lymphatic: No lymphadenopathy noted.   Cardiovascular: Tachycardic. Regular rhythm, no murmurs.   Thorax & Lungs: Normal breath sounds, No respiratory distress, No wheezing, No chest tenderness.   Abdomen: Bowel sounds normal, Soft, No tenderness, No peritoneal signs, No masses, No pulsatile masses.   Skin: Warm, Dry, No erythema, No rash.   Back: No bony tenderness, No CVA tenderness.   Extremities: Intact distal pulses, No edema,  No cyanosis. Cast noted on the left lower extremity.   Musculoskeletal: Cast noted on the left lower extremity.   Neurologic: Alert , Normal motor function, Normal sensory function, No focal deficits noted.   Psychiatric: Affect normal, Judgment normal, Mood normal.     DIAGNOSTIC STUDIES / PROCEDURES    LABS  Labs Reviewed   CBC WITH DIFFERENTIAL - Abnormal; Notable for the following:        Result Value    MCV 80.1 (*)     RDW 35.6 (*)     Lymphocytes 20.00 (*)     All other components within normal limits    Narrative:     Indicate which anticoagulants the patient is on:->UNKNOWN   COMP METABOLIC PANEL - Abnormal; Notable for the following:     Sodium 132 (*)     Potassium 3.3 (*)     Anion Gap 12.0 (*)     Glucose 132 (*)     ALT(SGPT) 77 (*)     All other components within normal limits    Narrative:     Indicate which anticoagulants the patient is on:->UNKNOWN   LIPASE - Abnormal; Notable for the following:     Lipase 8 (*)     All other components within normal limits    Narrative:     Indicate which anticoagulants the patient is on:->UNKNOWN   D-DIMER - Abnormal;  Notable for the following:     D-Dimer Screen 530 (*)     All other components within normal limits   BTYPE NATRIURETIC PEPTIDE    Narrative:     Indicate which anticoagulants the patient is on:->UNKNOWN   PROTHROMBIN TIME    Narrative:     Indicate which anticoagulants the patient is on:->UNKNOWN   APTT    Narrative:     Indicate which anticoagulants the patient is on:->UNKNOWN   TROPONIN    Narrative:     Indicate which anticoagulants the patient is on:->UNKNOWN   ESTIMATED GFR    Narrative:     Indicate which anticoagulants the patient is on:->UNKNOWN      All labs reviewed by me.    EKG Interpretation:  Interpreted by me    12 Lead EKG interpreted by me to show:  Sinus Tachycardic  Rate 116  Axis: Normal  Intervals: Normal  Nonspecific ST-T wave changes   Compared to prior EKG performed on January 7th, 2018, no significant changes   My impression of this EKG: Does not meet STEMI criteria at this time.    RADIOLOGY  CT-CTA CHEST PULMONARY ARTERY W/ RECONS   Final Result      No evidence pulmonary embolus.            DX-CHEST-PORTABLE (1 VIEW)   Final Result      Negative single view of the chest.        The radiologist's interpretation of all radiological studies have been reviewed by me.    COURSE & MEDICAL DECISION MAKING  Nursing notes, VS, PMSFHx reviewed in chart.    Differential diagnoses include but not limited to: ACS, PE, noncardiac chest pain    3:08 PM Patient seen and examined at bedside. Ordered for EKG, CBC, CMP, BNP, PT/INR, APTT, lipase, troponin, DX chest, cardiac monitoring, pulse ox to evaluate. Patient will be treated with morphine 4 mg, Zofran 4 mg for his symptoms.      3:19 PM- The patient was not ruled out for PE by PERC criteria :    AGE < 50  No estrogens, BCPs, recent surgery (4 weeks)  No hx of: DVT/PE or hemoptysis  No unilateral leg swelling  Pulse Ox > 94% and HR <100  Patient failed PERC criteria and therefore I will order a CT-CTA chest pulmonary artery to rule out a PE.     4:54  PM- Reviewed the patient's lab and imaging results. I am still waiting for the patient's CT-CTA pulmonary artery results.     6:41 PM- I spoke with the hospitalist Dr. Navarro who will admit the patient. CT PE study is negative.      FINAL IMPRESSION  1. Acute chest pain    2.      Tachycardia     Debbie GRIFFIN (Scribe), am scribing for, and in the presence of, Yg Pearce M.D..    Electronically signed by: Debbie Esparza (Mona), 1/29/2018    Yg GRIFFIN M.D. personally performed the services described in this documentation, as scribed by Debbie Esparza in my presence, and it is both accurate and complete.    The note accurately reflects work and decisions made by me.  Yg Pearce  1/29/2018  6:42 PM

## 2018-01-29 NOTE — ED TRIAGE NOTES
Chief Complaint   Patient presents with   • Shortness of Breath     sudden onset of sob 1hour ago   • Chest Injury     Pt bib ems , per report , sudden onset mid to left chest pain with sob while sitting couch.   Pt had left lower orthopedic surgery on 1/23.   Blood pressure 114/77, pulse (!) 117, temperature 36.6 °C (97.9 °F), resp. rate 20, height 1.829 m (6'), weight (!) 126.1 kg (278 lb), SpO2 100 %.

## 2018-01-30 ENCOUNTER — APPOINTMENT (OUTPATIENT)
Dept: RADIOLOGY | Facility: MEDICAL CENTER | Age: 43
End: 2018-01-30
Attending: HOSPITALIST
Payer: MEDICAID

## 2018-01-30 VITALS
HEART RATE: 84 BPM | TEMPERATURE: 97.9 F | OXYGEN SATURATION: 98 % | RESPIRATION RATE: 16 BRPM | BODY MASS INDEX: 36.46 KG/M2 | HEIGHT: 72 IN | DIASTOLIC BLOOD PRESSURE: 63 MMHG | WEIGHT: 269.18 LBS | SYSTOLIC BLOOD PRESSURE: 119 MMHG

## 2018-01-30 PROBLEM — E78.6 LOW HDL (UNDER 40): Status: ACTIVE | Noted: 2018-01-30

## 2018-01-30 PROBLEM — Z98.890 S/P FOOT SURGERY, LEFT: Status: ACTIVE | Noted: 2018-01-30

## 2018-01-30 LAB
ALBUMIN SERPL BCP-MCNC: 3.9 G/DL (ref 3.2–4.9)
ALBUMIN/GLOB SERPL: 1.3 G/DL
ALP SERPL-CCNC: 81 U/L (ref 30–99)
ALT SERPL-CCNC: 57 U/L (ref 2–50)
ANION GAP SERPL CALC-SCNC: 10 MMOL/L (ref 0–11.9)
AST SERPL-CCNC: 17 U/L (ref 12–45)
BASOPHILS # BLD AUTO: 0.3 % (ref 0–1.8)
BASOPHILS # BLD: 0.03 K/UL (ref 0–0.12)
BILIRUB SERPL-MCNC: 0.5 MG/DL (ref 0.1–1.5)
BUN SERPL-MCNC: 16 MG/DL (ref 8–22)
CALCIUM SERPL-MCNC: 8.9 MG/DL (ref 8.5–10.5)
CHLORIDE SERPL-SCNC: 99 MMOL/L (ref 96–112)
CHOLEST SERPL-MCNC: 166 MG/DL (ref 100–199)
CO2 SERPL-SCNC: 27 MMOL/L (ref 20–33)
CREAT SERPL-MCNC: 1.24 MG/DL (ref 0.5–1.4)
EKG IMPRESSION: NORMAL
EOSINOPHIL # BLD AUTO: 0.15 K/UL (ref 0–0.51)
EOSINOPHIL NFR BLD: 1.7 % (ref 0–6.9)
ERYTHROCYTE [DISTWIDTH] IN BLOOD BY AUTOMATED COUNT: 35.9 FL (ref 35.9–50)
EST. AVERAGE GLUCOSE BLD GHB EST-MCNC: 146 MG/DL
GLOBULIN SER CALC-MCNC: 3 G/DL (ref 1.9–3.5)
GLUCOSE SERPL-MCNC: 115 MG/DL (ref 65–99)
HBA1C MFR BLD: 6.7 % (ref 0–5.6)
HCT VFR BLD AUTO: 39.8 % (ref 42–52)
HDLC SERPL-MCNC: 30 MG/DL
HGB BLD-MCNC: 13.7 G/DL (ref 14–18)
IMM GRANULOCYTES # BLD AUTO: 0.04 K/UL (ref 0–0.11)
IMM GRANULOCYTES NFR BLD AUTO: 0.5 % (ref 0–0.9)
LDLC SERPL CALC-MCNC: 102 MG/DL
LYMPHOCYTES # BLD AUTO: 2.28 K/UL (ref 1–4.8)
LYMPHOCYTES NFR BLD: 26.3 % (ref 22–41)
MCH RBC QN AUTO: 27.8 PG (ref 27–33)
MCHC RBC AUTO-ENTMCNC: 34.4 G/DL (ref 33.7–35.3)
MCV RBC AUTO: 80.7 FL (ref 81.4–97.8)
MONOCYTES # BLD AUTO: 1.03 K/UL (ref 0–0.85)
MONOCYTES NFR BLD AUTO: 11.9 % (ref 0–13.4)
NEUTROPHILS # BLD AUTO: 5.14 K/UL (ref 1.82–7.42)
NEUTROPHILS NFR BLD: 59.3 % (ref 44–72)
NRBC # BLD AUTO: 0 K/UL
NRBC BLD-RTO: 0 /100 WBC
PLATELET # BLD AUTO: 288 K/UL (ref 164–446)
PMV BLD AUTO: 9 FL (ref 9–12.9)
POTASSIUM SERPL-SCNC: 3.7 MMOL/L (ref 3.6–5.5)
PROT SERPL-MCNC: 6.9 G/DL (ref 6–8.2)
RBC # BLD AUTO: 4.93 M/UL (ref 4.7–6.1)
SODIUM SERPL-SCNC: 136 MMOL/L (ref 135–145)
TRIGL SERPL-MCNC: 170 MG/DL (ref 0–149)
TROPONIN I SERPL-MCNC: <0.01 NG/ML (ref 0–0.04)
TSH SERPL DL<=0.005 MIU/L-ACNC: 1.47 UIU/ML (ref 0.38–5.33)
WBC # BLD AUTO: 8.7 K/UL (ref 4.8–10.8)

## 2018-01-30 PROCEDURE — A9502 TC99M TETROFOSMIN: HCPCS

## 2018-01-30 PROCEDURE — 36415 COLL VENOUS BLD VENIPUNCTURE: CPT

## 2018-01-30 PROCEDURE — 93970 EXTREMITY STUDY: CPT

## 2018-01-30 PROCEDURE — 80061 LIPID PANEL: CPT

## 2018-01-30 PROCEDURE — 84443 ASSAY THYROID STIM HORMONE: CPT

## 2018-01-30 PROCEDURE — 83036 HEMOGLOBIN GLYCOSYLATED A1C: CPT

## 2018-01-30 PROCEDURE — 700105 HCHG RX REV CODE 258: Performed by: HOSPITALIST

## 2018-01-30 PROCEDURE — 99217 PR OBSERVATION CARE DISCHARGE: CPT | Performed by: INTERNAL MEDICINE

## 2018-01-30 PROCEDURE — G8979 MOBILITY GOAL STATUS: HCPCS | Mod: CI

## 2018-01-30 PROCEDURE — 85025 COMPLETE CBC W/AUTO DIFF WBC: CPT

## 2018-01-30 PROCEDURE — G8978 MOBILITY CURRENT STATUS: HCPCS | Mod: CJ

## 2018-01-30 PROCEDURE — A9270 NON-COVERED ITEM OR SERVICE: HCPCS | Performed by: HOSPITALIST

## 2018-01-30 PROCEDURE — 700111 HCHG RX REV CODE 636 W/ 250 OVERRIDE (IP)

## 2018-01-30 PROCEDURE — G0378 HOSPITAL OBSERVATION PER HR: HCPCS

## 2018-01-30 PROCEDURE — 84484 ASSAY OF TROPONIN QUANT: CPT

## 2018-01-30 PROCEDURE — 97161 PT EVAL LOW COMPLEX 20 MIN: CPT

## 2018-01-30 PROCEDURE — 700111 HCHG RX REV CODE 636 W/ 250 OVERRIDE (IP): Performed by: HOSPITALIST

## 2018-01-30 PROCEDURE — 96372 THER/PROPH/DIAG INJ SC/IM: CPT

## 2018-01-30 PROCEDURE — 80053 COMPREHEN METABOLIC PANEL: CPT

## 2018-01-30 PROCEDURE — 700102 HCHG RX REV CODE 250 W/ 637 OVERRIDE(OP): Performed by: HOSPITALIST

## 2018-01-30 RX ORDER — POLYETHYLENE GLYCOL 3350 17 G/17G
POWDER, FOR SOLUTION ORAL
Refills: 3 | COMMUNITY
Start: 2018-01-30 | End: 2018-02-20

## 2018-01-30 RX ORDER — AMOXICILLIN 250 MG
2 CAPSULE ORAL 2 TIMES DAILY
Qty: 30 TAB | Refills: 0 | Status: SHIPPED | OUTPATIENT
Start: 2018-01-30 | End: 2018-02-20

## 2018-01-30 RX ORDER — REGADENOSON 0.08 MG/ML
INJECTION, SOLUTION INTRAVENOUS
Status: COMPLETED
Start: 2018-01-30 | End: 2018-01-30

## 2018-01-30 RX ADMIN — AMLODIPINE BESYLATE 10 MG: 10 TABLET ORAL at 15:27

## 2018-01-30 RX ADMIN — REGADENOSON 0.4 MG: 0.08 INJECTION, SOLUTION INTRAVENOUS at 11:34

## 2018-01-30 RX ADMIN — CHLORTHALIDONE 25 MG: 25 TABLET ORAL at 15:28

## 2018-01-30 RX ADMIN — HEPARIN SODIUM 5000 UNITS: 5000 INJECTION, SOLUTION INTRAVENOUS; SUBCUTANEOUS at 06:45

## 2018-01-30 RX ADMIN — SODIUM CHLORIDE: 9 INJECTION, SOLUTION INTRAVENOUS at 06:48

## 2018-01-30 RX ADMIN — ASPIRIN 325 MG: 325 TABLET ORAL at 15:27

## 2018-01-30 ASSESSMENT — COGNITIVE AND FUNCTIONAL STATUS - GENERAL
WALKING IN HOSPITAL ROOM: A LITTLE
MOVING FROM LYING ON BACK TO SITTING ON SIDE OF FLAT BED: A LOT
CLIMB 3 TO 5 STEPS WITH RAILING: A LOT
MOBILITY SCORE: 18
SUGGESTED CMS G CODE MODIFIER MOBILITY: CK
STANDING UP FROM CHAIR USING ARMS: A LITTLE

## 2018-01-30 ASSESSMENT — GAIT ASSESSMENTS
DEVIATION: ANTALGIC;STEP TO;DECREASED BASE OF SUPPORT
ASSISTIVE DEVICE: FRONT WHEEL WALKER
DISTANCE (FEET): 15
GAIT LEVEL OF ASSIST: CONTACT GUARD ASSIST

## 2018-01-30 ASSESSMENT — PAIN SCALES - GENERAL: PAINLEVEL_OUTOF10: 2

## 2018-01-30 NOTE — THERAPY
"Physical Therapy Evaluation completed.   Bed Mobility:  Supine to Sit: Supervised  Transfers: Sit to Stand: Contact Guard Assist  Gait: Level Of Assist: Contact Guard Assist with Front-Wheel Walker       Plan of Care: Will benefit from Physical Therapy 2 times per week and Plan to complete next treatment by Friday 2/2  Discharge Recommendations: Equipment: Wheelchair. Post-acute therapy Discharge to home with outpatient or home health for additional skilled therapy services.    Pt is a 42 year old male admitted to the hospital for SOB and chest pain. Pt underwent surgery on 1/23 for subtalar fusion, planovalgus foot alignment and gastroc resection. Pt was NWB with crutches following surgery. Since surgery, pt has had 3 falls with crutches. Pt is homeless but currently staying at a friends home. At time of initial evaluation, pt presents with generalized weakness, difficulty with sit to stand transfers, decreased standing balance, difficulty ambulating, pain and poor activity tolerance. Pt only ambulated 15 feet in room and was generally unstable with FWW. Pt reports fatigue with limited mobility and states he feels as through R LE could \"give out\" at any time which is why he has fallen 3x. Pt would benefit from skilled PT intervention while in the acute care setting to address the listed deficits and improve mobility prior to DC. Pt would benefit from WC with elevating leg rest upon DC given recent surgery, NWB status and recent falls. Pt would also benefit from home health or outpatient physical therapy intervention given deficits.     See \"Rehab Therapy-Acute\" Patient Summary Report for complete documentation.     "

## 2018-01-30 NOTE — ASSESSMENT & PLAN NOTE
Cont home BP medications   Advise Jonna Brian to check blood pressure at home and have a log for primary provider to review  Follow up with Dalton Vogel M.D.

## 2018-01-30 NOTE — H&P
Hospital Medicine History and Physical    Date of Service  1/29/2018    Chief Complaint  Chief Complaint   Patient presents with   • Shortness of Breath     sudden onset of sob 1hour ago   • Chest Injury       History of Presenting Illness  42 y.o. male who presented 1/29/2018 with shortness of breath and chest pain. Patient did recently have left leg surgical intervention for chronic plantar fasciitis. He has been bedbound for the last several days almost a week. He does have a history of hypertension and obesity. Presents with sudden onset left sided chest pain feels as if something is stuck there associated shortness of breath. No exacerbating or alleviating factors. No known history of blood clots. No recent travel.   Primary Care Physician  Dalton Vogel M.D.    Consultants  None    Code Status  Full    Review of Systems  Review of Systems   Constitutional: Negative for chills and fever.   HENT: Negative for congestion, hearing loss and tinnitus.    Eyes: Negative for blurred vision, double vision and discharge.   Respiratory: Positive for shortness of breath.    Cardiovascular: Positive for chest pain. Negative for leg swelling.   Gastrointestinal: Negative for constipation, heartburn and nausea.   Genitourinary: Negative for dysuria, flank pain and urgency.   Musculoskeletal: Negative for joint pain, myalgias and neck pain.   Skin: Negative for rash.   Neurological: Negative for dizziness, sensory change, speech change, weakness and headaches.   Endo/Heme/Allergies: Does not bruise/bleed easily.   Psychiatric/Behavioral: Negative for depression. The patient is not nervous/anxious.         Past Medical History  Past Medical History:   Diagnosis Date   • Cold       1/15/17 - no symptoms at this time.   • Depression    • Headache(784.0)    • Hypertension    • Influenza A     positive influenza A RNA test 1/2/2018- no symptoms today       Surgical History  Past Surgical History:   Procedure  Laterality Date   • SUBTALOR TRIPLE FUSION Left 2018    Procedure: SUBTALOR TRIPLE FUSION- W/TALO-CALCANEAL COALITION EXCISION & GASTROC SOLEUS RECESSION;  Surgeon: Abhilash Kohler M.D.;  Location: SURGERY [facility];  Service: Orthopedics       Medications  No current facility-administered medications on file prior to encounter.      Current Outpatient Prescriptions on File Prior to Encounter   Medication Sig Dispense Refill   • chlorthalidone (HYGROTON) 25 MG Tab Take 1 Tab by mouth every day. 30 Tab 2   • amlodipine (NORVASC) 10 MG Tab Take 1 Tab by mouth every day. 30 Tab 2       Family History  Family History   Problem Relation Age of Onset   • Family history unknown: Yes       Social History  Social History   Substance Use Topics   • Smoking status: Never Smoker   • Smokeless tobacco: Never Used   • Alcohol use No       Allergies  Allergies   Allergen Reactions   • Lisinopril Shortness of Breath     Closes throat        Physical Exam  Laboratory   Hemodynamics  Temp (24hrs), Av.6 °C (97.9 °F), Min:36.6 °C (97.9 °F), Max:36.6 °C (97.9 °F)   Temperature: 36.6 °C (97.9 °F)  Pulse  Av.6  Min: 104  Max: 120 Heart Rate (Monitored): 100  Blood Pressure: 114/77, NIBP: 103/77      Respiratory      Respiration: (!) 21, Pulse Oximetry: 99 %        RUL Breath Sounds: Diminished, RML Breath Sounds: Diminished, RLL Breath Sounds: Diminished, SHABNAM Breath Sounds: Diminished, LLL Breath Sounds: Diminished    Physical Exam   Constitutional: He is oriented to person, place, and time. He appears well-developed and well-nourished.   HENT:   Head: Normocephalic and atraumatic.   Eyes: Conjunctivae and EOM are normal. Pupils are equal, round, and reactive to light.   Neck: Normal range of motion. Neck supple. No JVD present.   Cardiovascular: Normal heart sounds and intact distal pulses.    No murmur heard.  tachycardic   Pulmonary/Chest: Effort normal and breath sounds normal. No respiratory distress. He  exhibits no tenderness.   Abdominal: Soft. Bowel sounds are normal. He exhibits no distension. There is no tenderness.   Musculoskeletal:   Left leg cast   Neurological: He is alert and oriented to person, place, and time. No cranial nerve deficit. He exhibits normal muscle tone.   Skin: Skin is warm and dry. No erythema.   Psychiatric: He has a normal mood and affect. His behavior is normal. Judgment and thought content normal.   Nursing note and vitals reviewed.      Recent Labs      01/29/18   1516   WBC  9.0   RBC  5.53   HEMOGLOBIN  15.1   HEMATOCRIT  44.3   MCV  80.1*   MCH  27.3   MCHC  34.1   RDW  35.6*   PLATELETCT  348   MPV  9.1     Recent Labs      01/29/18   1516   SODIUM  132*   POTASSIUM  3.3*   CHLORIDE  97   CO2  23   GLUCOSE  132*   BUN  11   CREATININE  1.18   CALCIUM  9.8     Recent Labs      01/29/18   1516   ALTSGPT  77*   ASTSGOT  25   ALKPHOSPHAT  96   TBILIRUBIN  0.7   LIPASE  8*   GLUCOSE  132*     Recent Labs      01/29/18   1516   APTT  30.8   INR  1.03     Recent Labs      01/29/18   1516   BNPBTYPENAT  <2         Lab Results   Component Value Date    TROPONINI <0.01 01/29/2018     Urinalysis:    Lab Results  Component Value Date/Time   SPECGRAVITY 1.017 10/13/2017 0915   GLUCOSEUR Negative 10/13/2017 0915   KETONES Negative 10/13/2017 0915   NITRITE Negative 10/13/2017 0915   WBCURINE 0-2 (A) 10/13/2017 0915   RBCURINE 2-5 (A) 10/13/2017 0915   BACTERIA Negative 10/13/2017 0915   EPITHELCELL Negative 10/13/2017 0915        Imaging  reviewed   Assessment/Plan     I anticipate this patient will require at least two midnights for appropriate medical management, necessitating inpatient admission.    * Chest pain   Assessment & Plan    Acute onset chest pain, negative troponin EKG wnl   Tachycardic d dimmer elevated CT for PE ruled out this patient does have risk factors given recent surgery  I will check for dvt  Trend trops  Stress test in am        Left leg pain   Assessment & Plan     Recent surgical intervention   Pain control         Hyperglycemia   Assessment & Plan    a1c ordered        Hyponatremia   Assessment & Plan    Hypovolemic   IVF ordered        Abnormal LFTs   Assessment & Plan    Slightly elevated alt   Repeat in am after IVF        Obesity (BMI 30-39.9)- (present on admission)   Assessment & Plan    enrcourage weight loss        Essential hypertension- (present on admission)   Assessment & Plan    Cont home BP medications               VTE prophylaxis: Heparin

## 2018-01-30 NOTE — ASSESSMENT & PLAN NOTE
1.  Left gastrocsoleus recession.  2.  Left subtalar fusion.  3.  Excision of tarsal coalition.  4.  Left open intertarsal medial talonavicular release for contracture.  By Dr. Kohler on 1/23  PT saw him recommended wheelchair and C or outpt PT  Patient prefers home PT and is considering [facility]  Follow up Dalton Vogel M.D. In 1 week  Follow up Dr. Kohler within a week for postop visit

## 2018-01-30 NOTE — DISCHARGE PLANNING
[facility] for [facility] and [facility] for wheel chair faxed to [facility].

## 2018-01-30 NOTE — DISCHARGE SUMMARY
CHIEF COMPLAINT ON ADMISSION  Chief Complaint   Patient presents with   • Shortness of Breath     sudden onset of sob 1hour ago   • Chest Injury       CODE STATUS  Full Code    HPI & HOSPITAL COURSE  Please review Dr. Loyd Rodriguez M.D. notes for further details of history of present illness, past medical/social/family histories, allergies and medications.     * Chest pain   Assessment & Plan    Presented acute onset chest pain, negative troponin EKG wnl   Tachycardic d dimmer elevated CT for PE ruled out this patient does have risk factors given recent surgery  Trend trops  CTPE no PE  LE Duplex no DVT but posterior tibial artery not evaluated as there were bandages there.  Stress test negative for ischemia  At discharge date, afebrile, hemodynamically stable. No chest pain. He wants to go home. PT saw him, recommended HHC. Offerred HHC. Wheelchair recommended. Ordered wheel chair.  Follow up with Dalton Vogel M.D. In 1-2 weeks, and can order left LE Duplex once bandages removed and cleared by Surgery  Follow up Dr. Kohler within a week for postop visit        S/P foot surgery, left   Assessment & Plan    1.  Left gastrocsoleus recession.  2.  Left subtalar fusion.  3.  Excision of tarsal coalition.  4.  Left open intertarsal medial talonavicular release for contracture.  By Dr. Kohler on 1/23  PT saw him recommended wheelchair and HHC or outpt PT  Patient prefers home PT and is considering HHC  Follow up Dalton Vogel M.D. In 1 week  Follow up Dr. Kohler within a week for postop visit        Low HDL (under 40)   Assessment & Plan    Mild.  Encourage exercise and dietary modifications.  Defer to Dalton Vogel M.D. For starting statin since he has slightly elevated ALT        Left leg pain   Assessment & Plan    Recent surgical intervention   Pain control   Stable.        Hyperglycemia   Assessment & Plan    A1c still pending        Hyponatremia   Assessment & Plan     Hypovolemic   IVF ordered        Abnormal LFTs   Assessment & Plan    Slightly elevated ALT  Repeat in am after IVF  Mild at best, follow up with Dalton Vogel M.D. In 1-2 weeks and can order Liver U/S electively        Obesity (BMI 30-39.9)- (present on admission)   Assessment & Plan    enrcourage weight loss        Essential hypertension- (present on admission)   Assessment & Plan    Cont home BP medications   Advise Jonna Brian to check blood pressure at home and have a log for primary provider to review  Follow up with Dalton Vogel M.D.                Discharge Physical Exam  General/Constitutional: No acute distress.   Head: Normocephalic, atraumatic  ENT: Oral mucosa is moist. No obvious pharyngeal exudates  Eyes: Pink conjunctiva, no scleral icterus  Neck: Supple, no lymphadenopathy  Cardiovascular: Normal rate and regular rhythm. S1,2 noted. No murmurs, gallops or rubs.  Pulmonary: Clear to auscultation bilaterally. No wheezes, rales or rhonchi.  Abdominal: Soft, nontender, not distended, bowel sounds normoactive. No guarding or peritoneal signs.  Musculoskeletal: No tenderness to palpation of chest wall. L foot cast/bandages CDI  Neurologic: Alert and oriented. Grossly nonfocal, moving all extremities.  Genitourinary: No gross hematuria  Skin: No obvious rash.  Psychiatric: Pleasant, cooperative.  Vitals Reviewed  Labs Reviewed  Imaging reviewed  Nursing notes reviewed        Therefore, he is discharged in good and stable condition with close outpatient follow-up.    SPECIFIC OUTPATIENT FOLLOW-UP  Follow up Dalton Vogel M.D. In 1 week  Follow up Dr. Kohler within a week for postop visit    FOLLOW UP  Future Appointments  Date Time Provider Department Center   2/20/2018 10:00 AM Dalton Vogel M.D. [facility] None     No follow-up provider specified.    MEDICATIONS ON DISCHARGE   Jonna Brian   Home Medication Instructions TARAH:[id number]    Printed  on:01/30/18 1551   Medication Information                      amlodipine (NORVASC) 10 MG Tab  Take 1 Tab by mouth every day.             chlorthalidone (HYGROTON) 25 MG Tab  Take 1 Tab by mouth every day.             polyethylene glycol/lytes (MIRALAX) Pack  Take  by mouth 1 time daily as needed (if sennosides and docusate ineffective after 24 hours).             senna-docusate (PERICOLACE OR SENOKOT S) 8.6-50 MG Tab  Take 2 Tabs by mouth 2 Times a Day.                 DIET  Orders Placed This Encounter   Procedures   • Diet Order     Standing Status:   Standing     Number of Occurrences:   1     Order Specific Question:   Diet:     Answer:   Cardiac [6]       ACTIVITY  As per outpatient PT or home PT      CONSULTATIONS      PROCEDURES  Dx-ankle 3+ Views Left    Result Date: 1/23/2018 1/23/2018 2:00 PM HISTORY/REASON FOR EXAM:  Intraoperative evaluation TECHNIQUE/EXAM DESCRIPTION AND NUMBER OF VIEWS:  2 views of the LEFT ankle. COMPARISON: None FINDINGS: 3 images were obtained in the operating room. A screw traverses the talus and calcaneus A total of 6 seconds of fluoroscopy time utilized.     Intraoperative images as above described.    Dx-chest-portable (1 View)    Result Date: 1/29/2018 1/29/2018 3:25 PM HISTORY/REASON FOR EXAM:  Chest Pain. TECHNIQUE/EXAM DESCRIPTION AND NUMBER OF VIEWS: Single portable view of the chest. COMPARISON: 1 view chest 10/17/2017 FINDINGS: The lungs are clear. Cardiac silhouette: normal in size. Pleura: There are no pleural effusion or pneumothoraces. Osseous structures: No significant bony abnormality is present.     Negative single view of the chest.    Dx-portable Fluoro > 1 Hour    Result Date: 1/23/2018 1/23/2018 2:00 PM HISTORY/REASON FOR EXAM:  Left subtalar triple fusion, Main OR TECHNIQUE/EXAM DESCRIPTION AND NUMBER OF VIEWS: Portable fluoroscopy for greater than one hour FINDINGS:      The portable fluoroscopy unit was obligated to the procedure for greater than one  hour.   Actual fluoro time was 6 seconds.     Portable fluoroscopy utilized for 6 seconds.    Le Venous Duplex - Dvt ([facility] Only)    Result Date: 2018   Vascular Laboratory  CONCLUSIONS  Normal bilateral superficial and deep venous examination of the lower  extremities.  `Unable to visualize common femoral, and origins of profunda femoral,  superficial femoral and greater saphenous veins.  CHARLENE BUCKLEY  Exam Date:     2018 07:30  Room #:     Inpatient  Priority:     Routine  Ht (in):             Wt (lb):  Ordering Physician:        CARMINA HERNANDEZ  Referring Physician:       [id number] Castellanos  Sonographer:               Kota Dasilva RDCS, RVT  Study Type:                Complete Bilateral  Technical Quality:         Adequate  Age:    42    Gender:     M  MRN:    [id number]  :    1975      BSA:  Indications:     Chest pain, unspecified  CPT Codes:       02896  ICD Codes:       R079  History:         Admitted with chest pain. No pulmonary embolus identified                   with CT. Had left foot surgery one week ago.  Limitations:     Can't evaluate left leg below the knee due to bandages there.  PROCEDURES:  Bilateral lower extremity venous duplex imaging.  The following venous structures were evaluated: common femoral, profunda  femoral, greater saphenous, femoral, popliteal , peroneal and posterior  tibial veins.  Serial compression, augmentation maneuvers,  color and spectral Doppler  flow evaluations were performed.  FINDINGS:  Bilateral lower extremities -.  Complete color filling and compressibility with normal venous flow dynamics  including spontaneous flow, response to augmentation maneuvers, and  respiratory phasicity.  No superficial or deep venous thrombosis.  The left posterior tibial and peroneal veins were not imaged due to  bandages there.  Radames Hernandez  (Electronically Signed)  Final Date:      2018                    09:32    Nm-cardiac Stress Test    Result Date: 2018   Myocardial Perfusion  Report  NUCLEAR IMAGING INTERPRETATION  No evidence of significant jeopardized viable myocardium or prior myocardial  infarction.  Normal left ventricular size, ejection fraction, and wall motion.  CHARLENE BCUKLEY  MRN:    [id number]         Gender:    M  Exam Date: 2018 06:25  Exam Location:      Inpatient  Ordering Phys:     CARMINA HERNANDEZ INDERJIT  NucMed Tech:       Tosha Garcia  Age:    42    :    1975        Ht (in):     72  Wt (lb):     269    BMI:    36.46       Radiologist  Risk Factors:             Hypertension  Indications:              Other chest pain  ICD Codes:                  Cardiac History:          Positive risk factors, Chest Pain  Cardiac Meds:  Meds Past 24 hrs:  Pretest Chest Pain:       No symptoms  STRESS TEST      Pharmacologi                   c  Protoc   Lexiscan       Dose: 0.4 mg  ol:  Post-Injection Exercise:        No exercise followed the intravenous injection  Resting HR (bpm):      90  Peak HR (bpm):         113  Resting BP (mmHg):       144    /   94  Peak BP (mmHg):       151   /   82  MaxPHR:     180     Target HR (bpm):       153  % MaxPHR:     63  Double Product:       87912  BP Response:  Stress Termination:       Completed Protocol  Stress Symptoms:  Chest Pain: None  ECG  Resting ECG:  Stress ECG:  IMAGE PROTOCOL      Rest/Stress 1                      Day          RadiopharmaceuticalDose (mCi)   Imaging  Date      Imaging  Time         Inj to Img Time (min)  Rest:   Tc-99m             7.89         2018        11:07                 30          Tetrofosmin  Stress: Tc-99m             26.5         2018        13:32                 120          Tetrofosmin  Rest:  Administration Site:       Other  Administered by:      Mynor Bertrand RN  Stress:  Administration Site:       Other  Administered by:      Mynor Bertrand RN  % Percent HR Achieved:  SPECT RESULTS   Technical Quality:       Good  Raw Data Analysis:  Summed Stress Score:    0  Summed Rest Score:    0  Summed Difference Score:        1  PERFUSION:  Normal myocardial perfusion with no ischemia.  FUNCTIONAL RESULTS (calculated via Gated SPECT)  Stress Image LV EF:        63     %  Upper Normal Limit  Stress EDV:      96     ml   EDVI:    40      ml/m²  Stress ESV:      36     ml   ESVI:    15      ml/m²  TID:    0.89   TID - 1.19      TID (ed) - 1.23  LV Function:  Normal left ventricular wall motion.  LV ejection fraction = 63%.  Ric Bates  (Electronically Signed)  Final Date:      30 January 2018                   15:06    Ct-cta Chest Pulmonary Artery W/ Recons    Result Date: 1/29/2018 1/29/2018 5:24 PM HISTORY/REASON FOR EXAM:  Pain Shortness of breath. Recent surgery. Tachycardia TECHNIQUE/EXAM DESCRIPTION: CT angiogram scan for pulmonary embolism with contrast, with reconstructions. 1.25 mm helical sections were obtained from the lung apices through the lung bases following the rapid bolus administration of 80 mL of Omnipaque 350 nonionic contrast. Thin-section overlapping reconstruction interval was utilized. Coronal reconstructions were generated from the axial data. MIP post processing was performed and utilized for the interpretation. Low dose optimization technique was utilized for this CT exam including automated exposure control and adjustment of the mA and/or kV according to patient size. COMPARISON: None FINDINGS: Pulmonary Embolism: No. Main Pulmonary Arteries: No. Segmental branches: No. Subsegmental branches: No. Only if positive for PE: RV diameter: cm. LV diameter: cm. RV/LV ratio: . (Greater than 1.3 is abnormal.) Additional Comments: None. Lungs: No suspicious nodules or air space process. Pleura: No pleural effusion. Nodes: No enlarged lymph nodes. Additional findings: .     No evidence pulmonary embolus.       LABORATORY  Lab Results   Component Value Date/Time    SODIUM 136  01/30/2018 12:59 AM    POTASSIUM 3.7 01/30/2018 12:59 AM    CHLORIDE 99 01/30/2018 12:59 AM    CO2 27 01/30/2018 12:59 AM    GLUCOSE 115 (H) 01/30/2018 12:59 AM    BUN 16 01/30/2018 12:59 AM    CREATININE 1.24 01/30/2018 12:59 AM        Lab Results   Component Value Date/Time    WBC 8.7 01/30/2018 12:59 AM    HEMOGLOBIN 13.7 (L) 01/30/2018 12:59 AM    HEMATOCRIT 39.8 (L) 01/30/2018 12:59 AM    PLATELETCT 288 01/30/2018 12:59 AM        Total time of the discharge process exceeds 40 minutes

## 2018-01-30 NOTE — DISCHARGE PLANNING
"Pt signed \"Notification of status change\", explained Code 44 to pt, faxed doc to [phone] and [phone], orig to chart pt provided with copy and letter of explanation.   "

## 2018-01-30 NOTE — ASSESSMENT & PLAN NOTE
Slightly elevated ALT  Repeat in am after IVF  Mild at best, follow up with Dalton Vogel M.D. In 1-2 weeks and can order Liver U/S electively

## 2018-01-30 NOTE — PROGRESS NOTES
Pt is calm and resting. VS stable. On SR 78. Call light within reach. No needs at this time. Will continue to monitor

## 2018-01-30 NOTE — PROGRESS NOTES
Received Pt from ER, AOx 4. On cardiac monitor with SR/Tachy. Fluids started NS runs 100 at ml/hr. Plan of care discussed includes safety, Pain control, labs, stress test in AM (NPO since MN) monitoring and pt understands.    Pt concern about d/c planning since he is homeless, and wheelchair or any mobility aid/devices to move around since he fell using his crutches at home and consulting Dr trujillo for his previous Sx.

## 2018-01-30 NOTE — ASSESSMENT & PLAN NOTE
Presented acute onset chest pain, negative troponin EKG wnl   Tachycardic d dimmer elevated CT for PE ruled out this patient does have risk factors given recent surgery  Trend trops  CTPE no PE  LE Duplex no DVT but posterior tibial artery not evaluated as there were bandages there.  Stress test  Follow up with Dalton Vogel M.D. In 1-2 weeks, and can order left LE Duplex once bandages removed and cleared by Surgery  Follow up Dr. Kohler within a week for postop visit

## 2018-01-30 NOTE — ASSESSMENT & PLAN NOTE
Mild.  Encourage exercise and dietary modifications.  Defer to Dalton Vogel M.D. For starting statin since he has slightly elevated ALT

## 2018-01-30 NOTE — PROGRESS NOTES
Blood drawn and sent to lab. Pt is resting. Call light within reach. No needs at this time. No complains of chest pain, just pain on Lt leg. S.Tachy on 104. Will continue to monitor

## 2018-01-30 NOTE — FACE TO FACE
Face to Face Note  -  Durable Medical Equipment    Hugh Campuzano M.D. - NPI: [id number]  I certify that this patient is under my care and that they had a durable medical equipment(DME)face to face encounter by myself that meets the physician DME face-to-face encounter requirements with this patient on:    Date of encounter:   Patient:                    MRN:                       YOB: 2016  Jonna Brian  [id number]  1975     The encounter with the patient was in whole, or in part, for the following medical condition, which is the primary reason for durable medical equipment:  Post-Op Surgery    I certify that, based on my findings, the following durable medical equipment is medically necessary:  Wheel Chair.    HOME O2 Saturation Measurements:(Values must be present for Home Oxygen orders)         ,     ,         My Clinical findings support the need for the above equipment due to:  Other - postsurgical     Supporting Symptoms: L foot cast/bandages

## 2018-01-30 NOTE — FACE TO FACE
Face to Face Supporting Documentation - Home Health    The encounter with this patient was in whole or in part the primary reason for home health admission.    Date of encounter:   Patient:                    MRN:                       YOB: 2016  Jonna Brian  [id number]  1975     Home health to see patient for:  Physical Therapy evaluation and treatment    Skilled need for:  Surgical Aftercare surgery L foot    Skilled nursing interventions to include:  Comment: wound care    Homebound status evidenced by:  Need the aid of supportive devices such as crutches, canes, wheelchairs or walkers. Leaving home requires a considerable and taxing effort. There is a normal inability to leave the home.    Community Physician to provide follow up care: Dalton Vogel M.D.     Optional Interventions? No      I certify the face to face encounter for this home health care referral meets the CMS requirements and the encounter/clinical assessment with the patient was, in whole, or in part, for the medical condition(s) listed above, which is the primary reason for home health care. Based on my clinical findings: the service(s) are medically necessary, support the need for home health care, and the homebound criteria are met.  I certify that this patient has had a face to face encounter by myself.  Hugh Campuzano M.D. - NPI: [id number]

## 2018-01-31 NOTE — DISCHARGE PLANNING
Received DME choice from Emily at [phone].  DME referral for wheel chair sent to [facility] at 1611.

## 2018-01-31 NOTE — DISCHARGE INSTRUCTIONS
Discharge Instructions    Discharged to home by car with friend. Discharged via wheelchair, hospital escort: Yes.  Special equipment needed: Not Applicable    Be sure to schedule a follow-up appointment with your primary care doctor or any specialists as instructed.     Discharge Plan:   Diet Plan: Discussed  Activity Level: Discussed  Confirmed Follow up Appointment: Appointment Scheduled  Confirmed Symptoms Management: Discussed  Medication Reconciliation Updated: Yes  Influenza Vaccine Indication: Patient Refuses    I understand that a diet low in cholesterol, fat, and sodium is recommended for good health. Unless I have been given specific instructions below for another diet, I accept this instruction as my diet prescription.   Other diet: low fat    Special Instructions: None    · Is patient discharged on Warfarin / Coumadin?   No     Depression / Suicide Risk    As you are discharged from this [facility] facility, it is important to learn how to keep safe from harming yourself.    Recognize the warning signs:  · Abrupt changes in personality, positive or negative- including increase in energy   · Giving away possessions  · Change in eating patterns- significant weight changes-  positive or negative  · Change in sleeping patterns- unable to sleep or sleeping all the time   · Unwillingness or inability to communicate  · Depression  · Unusual sadness, discouragement and loneliness  · Talk of wanting to die  · Neglect of personal appearance   · Rebelliousness- reckless behavior  · Withdrawal from people/activities they love  · Confusion- inability to concentrate     If you or a loved one observes any of these behaviors or has concerns about self-harm, here's what you can do:  · Talk about it- your feelings and reasons for harming yourself  · Remove any means that you might use to hurt yourself (examples: pills, rope, extension cords, firearm)  · Get professional help from the community (Mental Health, Substance  Abuse, psychological counseling)  · Do not be alone:Call your Safe Contact- someone whom you trust who will be there for you.  · Call your local CRISIS HOTLINE 061-5217 or 345-549-0071  · Call your local Children's Mobile Crisis Response Team [place] (613) 940-6219 or www.SUPENTA  · Call the toll free National Suicide Prevention Hotlines   · National Suicide Prevention Lifeline 222-190-HJFE (5083)  · National Hope Line Network 800-SUICIDE (863-6730)

## 2018-01-31 NOTE — DISCHARGE PLANNING
Received Home Health choice from Fabio) at 1553.  Home Health referral sent to [facility] at Home Health.

## 2018-01-31 NOTE — PROGRESS NOTES
IV AND TELE DCED INSTRUCTIONS GIVEN ALL QUESTIONS ANSWERED W/C DELIVERED TO PATIENT  TO LOBBY VIA W/C CONDITION STABLE

## 2018-02-02 ENCOUNTER — TELEPHONE (OUTPATIENT)
Dept: INTERNAL MEDICINE | Facility: MEDICAL CENTER | Age: 43
End: 2018-02-02

## 2018-02-02 NOTE — TELEPHONE ENCOUNTER
1. Caller Name: Pt                      Call Back Number: [phone] (home)     2. Message: Patient called and left a message stating he is out of his pain meds and would like a refill. As well as he is requesting a physical therapy referral.    3. Patient approves office to leave a detailed voicemail/MyChart message: N\A    I do not see any active prescription for pain meds.

## 2018-02-20 ENCOUNTER — OFFICE VISIT (OUTPATIENT)
Dept: INTERNAL MEDICINE | Facility: MEDICAL CENTER | Age: 43
End: 2018-02-20
Payer: MEDICAID

## 2018-02-20 ENCOUNTER — HOME HEALTH ADMISSION (OUTPATIENT)
Dept: HOME HEALTH SERVICES | Facility: HOME HEALTHCARE | Age: 43
End: 2018-02-20
Payer: MEDICAID

## 2018-02-20 ENCOUNTER — TELEPHONE (OUTPATIENT)
Dept: INTERNAL MEDICINE | Facility: MEDICAL CENTER | Age: 43
End: 2018-02-20

## 2018-02-20 VITALS
HEART RATE: 101 BPM | BODY MASS INDEX: 32.74 KG/M2 | DIASTOLIC BLOOD PRESSURE: 82 MMHG | TEMPERATURE: 97.9 F | HEIGHT: 73 IN | OXYGEN SATURATION: 95 % | SYSTOLIC BLOOD PRESSURE: 114 MMHG | WEIGHT: 247 LBS

## 2018-02-20 DIAGNOSIS — E66.9 OBESITY (BMI 30-39.9): ICD-10-CM

## 2018-02-20 DIAGNOSIS — N52.9 ERECTILE DYSFUNCTION, UNSPECIFIED ERECTILE DYSFUNCTION TYPE: ICD-10-CM

## 2018-02-20 DIAGNOSIS — I10 ESSENTIAL HYPERTENSION: ICD-10-CM

## 2018-02-20 DIAGNOSIS — M72.2 PLANTAR FASCIITIS OF LEFT FOOT: ICD-10-CM

## 2018-02-20 DIAGNOSIS — F32.1 MODERATE SINGLE CURRENT EPISODE OF MAJOR DEPRESSIVE DISORDER (HCC): ICD-10-CM

## 2018-02-20 DIAGNOSIS — E78.6 LOW HDL (UNDER 40): ICD-10-CM

## 2018-02-20 PROCEDURE — 99214 OFFICE O/P EST MOD 30 MIN: CPT | Mod: GC | Performed by: INTERNAL MEDICINE

## 2018-02-20 RX ORDER — AMLODIPINE BESYLATE 10 MG/1
10 TABLET ORAL DAILY
Qty: 30 TAB | Refills: 3 | Status: SHIPPED | OUTPATIENT
Start: 2018-02-20 | End: 2018-09-19

## 2018-02-20 RX ORDER — ACETAMINOPHEN 325 MG/1
650 TABLET ORAL EVERY 4 HOURS PRN
Qty: 30 TAB | Refills: 2 | Status: SHIPPED | OUTPATIENT
Start: 2018-02-20 | End: 2018-08-16

## 2018-02-20 RX ORDER — MELOXICAM 7.5 MG/1
7.5 TABLET ORAL DAILY
Qty: 30 TAB | Refills: 0 | Status: SHIPPED | OUTPATIENT
Start: 2018-02-20 | End: 2018-04-09 | Stop reason: SDUPTHER

## 2018-02-20 RX ORDER — CHLORTHALIDONE 25 MG/1
25 TABLET ORAL DAILY
Qty: 30 TAB | Refills: 3 | Status: SHIPPED | OUTPATIENT
Start: 2018-02-20 | End: 2018-08-16

## 2018-02-20 ASSESSMENT — PATIENT HEALTH QUESTIONNAIRE - PHQ9
SUM OF ALL RESPONSES TO PHQ QUESTIONS 1-9: 21
CLINICAL INTERPRETATION OF PHQ2 SCORE: 6
5. POOR APPETITE OR OVEREATING: 3 - NEARLY EVERY DAY

## 2018-02-20 NOTE — PROGRESS NOTES
Face to Face Supporting Documentation - Home Health    The encounter with this patient was in whole or in part the primary reason for home health admission.    Date of encounter:   Patient:                    MRN:                       YOB: 2018  Jonna Brian  [id number]  1975     Home health to see patient for:  Physical Therapy evaluation and treatment    Skilled need for:  Surgical Aftercare Surgical after care and New Onset Medical Diagnosis .    Skilled nursing interventions to include:  Comment: Physical therapy    Homebound status evidenced by:  Have a condition such that leaving his or her home is medically contraindicated. Leaving home requires a considerable and taxing effort. There is a normal inability to leave the home.    Community Physician to provide follow up care: Dalton Vogel M.D.     Optional Interventions? No      I certify the face to face encounter for this home health care referral meets the CMS requirements and the encounter/clinical assessment with the patient was, in whole, or in part, for the medical condition(s) listed above, which is the primary reason for home health care. Based on my clinical findings: the service(s) are medically necessary, support the need for home health care, and the homebound criteria are met.  I certify that this patient has had a face to face encounter by myself.  Dalton Vogel M.D. - NPI: [id number]

## 2018-02-20 NOTE — PROGRESS NOTES
Established Patient    Jonna presents today with the following:    CC: Left foot plantar fasciitis    HPI: 42-year-old patient with past medical history of hypertension, ED, headaches, angioedema, and left foot plantar fascia fasciitis s/p orthopedic intervention By Dr. Kohler on 1/23/18 comes clinic for follow-up. He reports a persistent, worsening, sharp, non radiating Lt. Foot pain that started after his orthopedic surgery on 1/23/18. He f/u with Dr. Kohler and was prescribed oxycodone which he finished earlier than scheduled 2/2 to intense pain. Pt comes to clinic requesting alternative pain mgmt as his apointment with ortho. Is on march and he is  requesting home physical therapy as he is unable to ambulate and has limited means for transportation. Pt reports episodes of anxiety and depression regarding living situations and current medical condition but is optimistic about healing and mantaining communication with his daughters. Additionally, he's happy about controlling his BP and would like Refills for he's BP meds. He denies chest pain, dyspnea at rest/exertion, orthostatic changes, dizziness or LOC.           Patient Active Problem List    Diagnosis Date Noted   • Chest pain 10/17/2017     Priority: High   • Low HDL (under 40) 01/30/2018   • S/P foot surgery, left 01/30/2018   • Abnormal LFTs 01/29/2018   • Hyponatremia 01/29/2018   • Hyperglycemia 01/29/2018   • Left leg pain 01/29/2018   • Obesity (BMI 30-39.9) 10/17/2017   • Plantar fasciitis of left foot 10/17/2017   • Moderate single current episode of major depressive disorder (CMS-[facility]) 07/25/2017   • Erectile dysfunction 07/25/2017   • Essential hypertension 07/06/2017       Current Outpatient Prescriptions   Medication Sig Dispense Refill   • chlorthalidone (HYGROTON) 25 MG Tab Take 1 Tab by mouth every day. 30 Tab 3   • amLODIPine (NORVASC) 10 MG Tab Take 1 Tab by mouth every day. 30 Tab 3   • meloxicam (MOBIC) 7.5 MG Tab Take 1 Tab by  "mouth every day. 30 Tab 0   • acetaminophen (TYLENOL) 325 MG Tab Take 2 Tabs by mouth every four hours as needed. 30 Tab 2     No current facility-administered medications for this visit.        ROS: As per HPI. Additional pertinent symptoms as noted below.  Constitutional: no fevers, chills, weight change  Eyes: no blurred vision, discharge, eye pain  ENT: no rhinorrhea, sore throat, neck masses  Cardiovascular: no angina, palpitations, PND, orthopnea, edema  Respiratory: no cough, sputum, or dyspnea  GI: no nausea, vomiting, abdominal pain, constipation, or diarrhea  : no dysuria, hematuria, frequency   Musculo-skeletal: Lt ankle/foot pain  Skin: no rashes or open wounds  Neurological: no headaches, dizziness, motor/sensory loss  Psychological: no anxiety or depression      /82   Pulse (!) 101   Temp 36.6 °C (97.9 °F)   Ht 1.854 m (6' 1\")   Wt 112 kg (247 lb)   SpO2 95%   BMI 32.59 kg/m²     Physical Exam   Constitutional:  oriented to person, place, and time. Rocking side-to-side, left foot with cast  Eyes: Pupils are equal, round, and reactive to light. No scleral icterus.  Neck: Neck supple. No thyromegaly present.   Cardiovascular: Normal rate, regular rhythm and normal heart sounds.  Exam reveals no gallop and no friction rub.  No murmur heard.  Pulmonary/Chest: Breath sounds normal. Chest wall is not dull to percussion.   Musculoskeletal:  no edema.   Lymphadenopathy: no cervical adenopathy  Neurological: alert and oriented to person, place, and time.   Skin: No cyanosis. Nails show no clubbing.      Note: I have reviewed all pertinent labs and diagnostic tests associated with this visit with specific comments listed under the assessment and plan below    Assessment and Plan    1. Plantar fasciitis of left foot  - S/P Left gastrocsoleus recession, Left subtalar fusion, Excision of tarsal coalition, and Left open intertarsal medial talonavicular release for contracture  By Dr. Kohler on 1/23  - " Reports constant sharp pain  - Orthopedics Rx Oxycodone 5mg which he finish as he is having a lot of pain  - Next f/u with Ortho on March 8th  - On gabapentin HS   - Start Home health for frequent PT f/u  - Start Meloxicam and Tylenol for pain  - Follow up in 5 weeks    2. Moderate single current episode of major depressive disorder (CMS-HCC)  - Reports feeling sad  - Weakling up early  - Frequent Fights with partner and going through child support fights with Childrens mother back in [place].  - Good support system, frequently communicates with daughter and plays video games with here.  - Thought of suicide but denies plan or possibility of such  - No Guilt, loss of concentration, loss of energy or change in appetite.   - Happy about good BP today and looking forward resolution of Lt. Foot pain  - Currently wants to focus one his foot  - Follow up in 5 weeks    3. Essential hypertension  - BP stable today  - Continue Amlodipine and chlorthalidone  - Follow up in 5 weeks    4. Obesity (BMI 30-39.9)  - Maintains a healthy diet  - Unable to exercise 2/2 cast on Lt. Foot    5. Erectile dysfunction, unspecified erectile dysfunction type  - Pt recived an Rx for Cialis trial  - He reports that his Insurance does not cover it and thus can't afford it.    6. Low HDL  - Last HDL at 30  - Probably 2/2 pt current low level of activity due to cast  - Follow-up in 5 weeks      Followup: Return in about 5 weeks (around 3/27/2018) for Long.      Signed by: Dalton Vogel M.D.

## 2018-02-21 ENCOUNTER — PATIENT OUTREACH (OUTPATIENT)
Dept: HEALTH INFORMATION MANAGEMENT | Facility: OTHER | Age: 43
End: 2018-02-21

## 2018-02-21 NOTE — TELEPHONE ENCOUNTER
Pt with ventricular fasciitis status post orthopedic intervention with home health PT, called incuareing about alternative living options and information. Placed a referral for Social work and called patient to notify our such which she understood and agreed. F/U in 5 weeks

## 2018-02-21 NOTE — TELEPHONE ENCOUNTER
1. Caller Name: Pt                      Call Back Number: [phone] (home)     2. Message: Patient called stating he is in today and needs a call back from Dr. Vilma RAMOS.    3. Patient approves office to leave a detailed voicemail/MyChart message: N\A

## 2018-02-22 PROBLEM — R07.9 CHEST PAIN: Status: RESOLVED | Noted: 2017-10-17 | Resolved: 2018-02-22

## 2018-02-22 PROBLEM — R79.89 ABNORMAL LFTS: Status: RESOLVED | Noted: 2018-01-29 | Resolved: 2018-02-22

## 2018-02-22 PROBLEM — E87.1 HYPONATREMIA: Status: RESOLVED | Noted: 2018-01-29 | Resolved: 2018-02-22

## 2018-02-27 ENCOUNTER — TELEPHONE (OUTPATIENT)
Dept: INTERNAL MEDICINE | Facility: MEDICAL CENTER | Age: 43
End: 2018-02-27

## 2018-02-27 NOTE — TELEPHONE ENCOUNTER
1. Caller Name: Pt                      Call Back Number: [phone] (home)     2. Message: Patient called and left a message stating he received a letter from patient care coordinators stating he needs a Prior Auth to why he needs Care management. He said Dr. Esparza would need to call this number [phone] to give them the information on why he needs this type of referral.    3. Patient approves office to leave a detailed voicemail/MyChart message: N\A

## 2018-03-08 NOTE — TELEPHONE ENCOUNTER
Called [phone] which is the number for [facility] for requested Prior Auth. I was inform that the patient was not present in there system.

## 2018-03-15 ENCOUNTER — TELEPHONE (OUTPATIENT)
Dept: INTERNAL MEDICINE | Facility: MEDICAL CENTER | Age: 43
End: 2018-03-15

## 2018-03-15 NOTE — TELEPHONE ENCOUNTER
1. Caller Name: Pt                      Call Back Number: [phone] (home)     2. Message: Patient called and left a message stating he was checking on his physical therapy referral.    3. Patient approves office to leave a detailed voicemail/MyChart message: N\A    I called patient back and let him know I got his message but I didn't see any referral for physical therapy I let him know that I remember Dr. Esparza had put a home health order with physical therapy and I asked him if no one has helped him with it when they visit him and he stated no. I told him I would ask for Dr. Esparza to place a new referral for him for physical therapy. Patient asked me if we could help him with transportation and I let him know if he has looked into any places. He told me no he didn't get anything for resources on transportation. I asked him if he has heard of access rides he said yes he has. I gave him the phone number to call and see if he can get an application faxed over to our facility so he can get rides with them.

## 2018-03-16 NOTE — TELEPHONE ENCOUNTER
Spoke to Dr. Esparza about this on the phone and he said patient could wait until 03/26 if it was okay. Patient had let me know that he needed the referral placed but no one ever started PT. I saw on patient's last ov note the face to face never got co-signed. Hopefully now the patient will be able to get it started.

## 2018-04-09 ENCOUNTER — OFFICE VISIT (OUTPATIENT)
Dept: INTERNAL MEDICINE | Facility: MEDICAL CENTER | Age: 43
End: 2018-04-09
Payer: MEDICAID

## 2018-04-09 VITALS
BODY MASS INDEX: 33.4 KG/M2 | WEIGHT: 252 LBS | DIASTOLIC BLOOD PRESSURE: 87 MMHG | HEART RATE: 83 BPM | SYSTOLIC BLOOD PRESSURE: 140 MMHG | HEIGHT: 73 IN | OXYGEN SATURATION: 95 % | TEMPERATURE: 98.5 F

## 2018-04-09 DIAGNOSIS — E66.9 OBESITY (BMI 30-39.9): ICD-10-CM

## 2018-04-09 DIAGNOSIS — I10 ESSENTIAL HYPERTENSION: ICD-10-CM

## 2018-04-09 DIAGNOSIS — M79.672 LEFT FOOT PAIN: ICD-10-CM

## 2018-04-09 PROCEDURE — 99214 OFFICE O/P EST MOD 30 MIN: CPT | Mod: GC | Performed by: INTERNAL MEDICINE

## 2018-04-09 RX ORDER — MELOXICAM 15 MG/1
15 TABLET ORAL DAILY
Qty: 30 TAB | Refills: 0 | Status: SHIPPED | OUTPATIENT
Start: 2018-04-09 | End: 2018-08-16

## 2018-04-09 RX ORDER — MELOXICAM 7.5 MG/1
7.5 TABLET ORAL DAILY
Qty: 30 TAB | Refills: 0 | Status: SHIPPED | OUTPATIENT
Start: 2018-04-09 | End: 2018-04-09

## 2018-04-09 ASSESSMENT — PATIENT HEALTH QUESTIONNAIRE - PHQ9
SUM OF ALL RESPONSES TO PHQ QUESTIONS 1-9: 5
5. POOR APPETITE OR OVEREATING: 0 - NOT AT ALL
CLINICAL INTERPRETATION OF PHQ2 SCORE: 1

## 2018-04-09 NOTE — PATIENT INSTRUCTIONS
Meloxicam tablets  What is this medicine?  MELOXICAM (haydee OX i cam) is a non-steroidal anti-inflammatory drug (NSAID). It is used to reduce swelling and to treat pain. It may be used for osteoarthritis, rheumatoid arthritis, or juvenile rheumatoid arthritis.  This medicine may be used for other purposes; ask your health care provider or pharmacist if you have questions.  COMMON BRAND NAME(S): Landen  What should I tell my health care provider before I take this medicine?  They need to know if you have any of these conditions:  -bleeding disorders  -cigarette smoker  -coronary artery bypass graft (CABG) surgery within the past 2 weeks  -drink more than 3 alcohol-containing drinks per day  -heart disease  -high blood pressure  -history of stomach bleeding  -kidney disease  -liver disease  -lung or breathing disease, like asthma  -stomach or intestine problems  -an unusual or allergic reaction to meloxicam, aspirin, other NSAIDs, other medicines, foods, dyes, or preservatives  -pregnant or trying to get pregnant  -breast-feeding  How should I use this medicine?  Take this medicine by mouth with a full glass of water. Follow the directions on the prescription label. You can take it with or without food. If it upsets your stomach, take it with food. Take your medicine at regular intervals. Do not take it more often than directed. Do not stop taking except on your doctor's advice.  A special MedGuide will be given to you by the pharmacist with each prescription and refill. Be sure to read this information carefully each time.  Talk to your pediatrician regarding the use of this medicine in children. While this drug may be prescribed for selected conditions, precautions do apply.  Patients over 65 years old may have a stronger reaction and need a smaller dose.  Overdosage: If you think you have taken too much of this medicine contact a poison control center or emergency room at once.  NOTE: This medicine is only for you. Do  not share this medicine with others.  What if I miss a dose?  If you miss a dose, take it as soon as you can. If it is almost time for your next dose, take only that dose. Do not take double or extra doses.  What may interact with this medicine?  Do not take this medicine with any of the following medications:  -cidofovir  -ketorolac  This medicine may also interact with the following medications:  -aspirin and aspirin-like medicines  -certain medicines for blood pressure, heart disease, irregular heart beat  -certain medicines for depression, anxiety, or psychotic disturbances  -certain medicines that treat or prevent blood clots like warfarin, enoxaparin, dalteparin, apixaban, dabigatran, rivaroxaban  -cyclosporine  -digoxin  -diuretics  -methotrexate  -other NSAIDs, medicines for pain and inflammation, like ibuprofen and naproxen  -pemetrexed  This list may not describe all possible interactions. Give your health care provider a list of all the medicines, herbs, non-prescription drugs, or dietary supplements you use. Also tell them if you smoke, drink alcohol, or use illegal drugs. Some items may interact with your medicine.  What should I watch for while using this medicine?  Tell your doctor or healthcare professional if your symptoms do not start to get better or if they get worse.  Do not take other medicines that contain aspirin, ibuprofen, or naproxen with this medicine. Side effects such as stomach upset, nausea, or ulcers may be more likely to occur. Many medicines available without a prescription should not be taken with this medicine.  This medicine can cause ulcers and bleeding in the stomach and intestines at any time during treatment. This can happen with no warning and may cause death. There is increased risk with taking this medicine for a long time. Smoking, drinking alcohol, older age, and poor health can also increase risks. Call your doctor right away if you have stomach pain or blood in your  vomit or stool.  This medicine does not prevent heart attack or stroke. In fact, this medicine may increase the chance of a heart attack or stroke. The chance may increase with longer use of this medicine and in people who have heart disease. If you take aspirin to prevent heart attack or stroke, talk with your doctor or health care professional.  What side effects may I notice from receiving this medicine?  Side effects that you should report to your doctor or health care professional as soon as possible:  -allergic reactions like skin rash, itching or hives, swelling of the face, lips, or tongue  -nausea, vomiting  -signs and symptoms of a blood clot such as breathing problems; changes in vision; chest pain; severe, sudden headache; pain, swelling, warmth in the leg; trouble speaking; sudden numbness or weakness of the face, arm, or leg  -signs and symptoms of bleeding such as bloody or black, tarry stools; red or dark-brown urine; spitting up blood or brown material that looks like coffee grounds; red spots on the skin; unusual bruising or bleeding from the eye, gums, or nose  -signs and symptoms of liver injury like dark yellow or brown urine; general ill feeling or flu-like symptoms; light-colored stools; loss of appetite; nausea; right upper belly pain; unusually weak or tired; yellowing of the eyes or skin  -signs and symptoms of stroke like changes in vision; confusion; trouble speaking or understanding; severe headaches; sudden numbness or weakness of the face, arm, or leg; trouble walking; dizziness; loss of balance or coordination  Side effects that usually do not require medical attention (report to your doctor or health care professional if they continue or are bothersome):  -constipation  -diarrhea  -gas  This list may not describe all possible side effects. Call your doctor for medical advice about side effects. You may report side effects to FDA at 6-060-FDA-6160.  Where should I keep my  medicine?  Keep out of the reach of children.  Store at room temperature between 15 and 30 degrees C (59 and 86 degrees F). Throw away any unused medicine after the expiration date.  NOTE: This sheet is a summary. It may not cover all possible information. If you have questions about this medicine, talk to your doctor, pharmacist, or health care provider.  © 2018 Elsevier/Gold Standard (2017-01-18 19:28:16)  Foot Pain  Introduction  Many things can cause foot pain. Some common causes are:  · An injury.  · A sprain.  · Arthritis.  · Blisters.  · Bunions.  Follow these instructions at home:  Pay attention to any changes in your symptoms. Take these actions to help with your discomfort:  · If directed, put ice on the affected area:  ¨ Put ice in a plastic bag.  ¨ Place a towel between your skin and the bag.  ¨ Leave the ice on for 15-20 minutes, 3?4 times a day for 2 days.  · Take over-the-counter and prescription medicines only as told by your health care provider.  · Wear comfortable, supportive shoes that fit you well. Do not wear high heels.  · Do not stand or walk for long periods of time.  · Do not lift a lot of weight. This can put added pressure on your feet.  · Do stretches to relieve foot pain and stiffness as told by your health care provider.  · Rub your foot gently.  · Keep your feet clean and dry.  Contact a health care provider if:  · Your pain does not get better after a few days of self-care.  · Your pain gets worse.  · You cannot stand on your foot.  Get help right away if:  · Your foot is numb or tingling.  · Your foot or toes are swollen.  · Your foot or toes turn white or blue.  · You have warmth and redness along your foot.  This information is not intended to replace advice given to you by your health care provider. Make sure you discuss any questions you have with your health care provider.  Document Released: 01/13/2017 Document Revised: 05/25/2017 Document Reviewed: 01/13/2016  © 2017  Elsevier

## 2018-04-10 NOTE — PROGRESS NOTES
Established Patient    Jonna presents today with the following:    CC: left foot pain    HPI: 42 yr old male patient with PMHx of obesity, s/p Left gastrocsoleus recession, Left subtalar fusion, Excision of tarsal coalition and Left open intertarsal medial talonavicular release for contracture by Dr. Kohler on 1/23/18 comes in for left foot pain.  Patient states he underwent above surgery in January and since discharge experiencing pain. Followed up with orthopedics twice every 6 weeks. During his follow up he was told by Orthopedician and their staff that healing is going on well. Was initially on oxycodone after the discharge. Later was put on meloxicam. 6 weeks back his present cast was put on at orthopedic clinic. He ran out medications for pain for the past few weeks. Currently he states his pain is 8/10 in severity, dull in quality with no radiation or triggering factors. Patient states tylenol or advil OTC does not help his pain. Denies any symptoms of infection. No swelling or discharge. Unable to walk on the affected side because of pain. Was referred to pain clinic by his orthopedician and has upcoming appointment with Dr Moore in May. Has upcoming appointment at [facility] on 15th April.    Otherwise denies any other complaints.    Patient Active Problem List    Diagnosis Date Noted   • Low HDL (under 40) 01/30/2018   • S/P foot surgery, left 01/30/2018   • Hyperglycemia 01/29/2018   • Left leg pain 01/29/2018   • Obesity (BMI 30-39.9) 10/17/2017   • Plantar fasciitis of left foot 10/17/2017   • Moderate single current episode of major depressive disorder (CMS-[facility]) 07/25/2017   • Erectile dysfunction 07/25/2017   • Essential hypertension 07/06/2017       Current Outpatient Prescriptions   Medication Sig Dispense Refill   • meloxicam (MOBIC) 15 MG tablet Take 1 Tab by mouth every day. 30 Tab 0   • chlorthalidone (HYGROTON) 25 MG Tab Take 1 Tab by mouth every day. 30 Tab 3   • amLODIPine  "(NORVASC) 10 MG Tab Take 1 Tab by mouth every day. 30 Tab 3   • acetaminophen (TYLENOL) 325 MG Tab Take 2 Tabs by mouth every four hours as needed. 30 Tab 2     No current facility-administered medications for this visit.        Social History     Social History   • Marital status: Single     Spouse name: N/A   • Number of children: N/A   • Years of education: N/A     Occupational History   • Not on file.     Social History Main Topics   • Smoking status: Never Smoker   • Smokeless tobacco: Never Used   • Alcohol use No   • Drug use: No   • Sexual activity: Yes     Partners: Female     Other Topics Concern   • Not on file     Social History Narrative   • No narrative on file       Family History   Problem Relation Age of Onset   • Family history unknown: Yes       ROS: As per HPI. Additional pertinent symptoms as noted below.    Constitutional: Denies fever/chills/weight changes.   Eyes: Denies changes/pain in vision  ENT: Denies sore throat/congestion/ear ache.   Cardiovascular: Denies chest pain /palpitations/edema.   Respiratory: Denies SOB/cough/PND/orthopnea  Abdomen: Denies difficulty swallowing/ diarrhea/constipation/abdominal pain/nausea/vomiting  Genitourinary: Normal urinary habits.   Musculo-skeletal: normal ambulation.Left foot in cast with no swelling but positive for pain  Skin: Denies rash/lesions.  Neurological: Denies weakness/tingling/numbness/headache  Psychological: good mood and cooperative. Denies anxiety /depression       /87   Pulse 83   Temp 36.9 °C (98.5 °F)   Ht 1.854 m (6' 1\")   Wt 114.3 kg (252 lb)   SpO2 95%   BMI 33.25 kg/m²     Physical Exam  General:  Alert and oriented, No apparent distress.    Eyes: Pupils equal and reactive. No scleral icterus.    Throat: Clear no erythema or exudates noted.    Neck: Supple. No lymphadenopathy noted. Thyroid not enlarged.    Lungs: Clear to auscultation and percussion bilaterally.    Cardiovascular: Regular rate and rhythm. No murmurs, " rubs or gallops.    Abdomen:  Benign. No rebound or guarding noted.    Extremities: No clubbing, cyanosis, edema. Left foot in Cast     Skin: Clear. No rash or suspicious skin lesions noted.    Neurological: Oriented to time, place, and person .Cranial nerves intact. No motor/sensory deficits.Reflexes were normal and symmetrical in both upper and lower extremities     Musculoskeletal : NROM of all extremities. No tenderness or deformity noted.     Note: I have reviewed all pertinent labs and diagnostic tests associated with this visit with specific comments listed under the assessment and plan below      Assessment and Plan    1. Left foot pain  - has upcoming appointment with [facility] on 15th April.  - current cast of left foot was put on about 6 weeks back at orthopedic clinic  - s/p surgery on 23rd jan 2018, initially was on oxycodone and followed up with orthopedics later twice post discharge and was told wound is healing well.  - was on meloxicam and currently not any medications and complaining of severe pain with no swelling or any symptoms of infection.  - ordered meloxicam 15mg PO once daily for 30 days with 0 refills.  - patient has upcoming appointment at pain clinic with Dr. Moore office in May  - advised to go to [facility] if patient feels severe pain.    2. Essential hypertension  - today BP is 140/87  - currently on amlodipine 10 mg PO once daily and chlorthalidone 25 mg PO once daily   - patient does not record BP at home and advised to monitor at home or at near by pharmacy and bring the log for next appointment  - previous readings normal in the office so will not adjust the medications and will continue to monitor    3. Obesity (BMI 30-39.9)  - advised to eat healthy - DASH diet  - advised to exercise regularly atleast 30 mins a day for 5days a week.      Signed by: Debora Mejia M.D.

## 2018-04-11 ENCOUNTER — TELEPHONE (OUTPATIENT)
Dept: INTERNAL MEDICINE | Facility: MEDICAL CENTER | Age: 43
End: 2018-04-11

## 2018-04-11 NOTE — TELEPHONE ENCOUNTER
[facility] called asking to verify patient medication for the meloxicam.  We sent one in for meloxicam 7.5mg and then sent one in for meloxicam 15mg.    I called back and notified Basia we discontinued the meloxicam 7.5mg.

## 2018-04-12 NOTE — Clinical Note
[facility]  [facility] MED - INTERNAL MEDICINE  [address] [facility] [address]     July 24, 2017    Patient: Jonna Brian   YOB: 1975   Date of Visit: 7/24/2017       To Whom It May Concern:    Jonna Brian was seen and treated in our department on 7/24/2017. Ready to go back to work on 07/25/2017     Sincerely,     Dalton Vogel M.D.                 Yes

## 2018-07-10 ENCOUNTER — TELEPHONE (OUTPATIENT)
Dept: INTERNAL MEDICINE | Facility: MEDICAL CENTER | Age: 43
End: 2018-07-10

## 2018-07-10 RX ORDER — TADALAFIL 10 MG/1
10 TABLET ORAL PRN
Qty: 2 TAB | Refills: 0 | Status: SHIPPED | OUTPATIENT
Start: 2018-07-10 | End: 2018-09-19

## 2018-07-10 NOTE — TELEPHONE ENCOUNTER
1. Caller Name: Pt                      Call Back Number: [phone] (home)     2. Message: Patient has called stating he hasn't been seen by Dr. Esparza in a while but would really like a call from Dr. Vilma RAMOS. He states it is urgent.    3. Patient approves office to leave a detailed voicemail/MyChart message: N\A

## 2018-08-14 ENCOUNTER — TELEPHONE (OUTPATIENT)
Dept: INTERNAL MEDICINE | Facility: MEDICAL CENTER | Age: 43
End: 2018-08-14

## 2018-08-14 NOTE — TELEPHONE ENCOUNTER
1. Caller Name: Pt                      Call Back Number: [phone] (home)     2. Message: Patient called and left a message stating he would like a call back from Dr. Esparza as soon as he could.    3. Patient approves office to leave a detailed voicemail/MyChart message: N\A

## 2018-08-14 NOTE — TELEPHONE ENCOUNTER
DOCUMENTATION OF PAR STATUS:    1. Name of Medication & Dose: Cilias 10 mg     2. Name of Prescription Coverage Company & phone #: Medicaid HPN    3. Date Prior Auth Submitted: 08/14/18    4. What information was given to obtain insurance decision? Dx codes    5. Prior Auth Letter Approved or Denied? pending    6. Action Taken: Pharmacy/Patient Notified: No

## 2018-08-16 ENCOUNTER — OFFICE VISIT (OUTPATIENT)
Dept: INTERNAL MEDICINE | Facility: MEDICAL CENTER | Age: 43
End: 2018-08-16
Payer: MEDICAID

## 2018-08-16 VITALS
WEIGHT: 257.25 LBS | TEMPERATURE: 98.3 F | SYSTOLIC BLOOD PRESSURE: 134 MMHG | DIASTOLIC BLOOD PRESSURE: 94 MMHG | HEART RATE: 68 BPM | HEIGHT: 73 IN | BODY MASS INDEX: 34.09 KG/M2 | OXYGEN SATURATION: 95 %

## 2018-08-16 DIAGNOSIS — I10 ESSENTIAL HYPERTENSION: ICD-10-CM

## 2018-08-16 DIAGNOSIS — Z11.3 SCREENING FOR STD (SEXUALLY TRANSMITTED DISEASE): ICD-10-CM

## 2018-08-16 DIAGNOSIS — N52.9 ERECTILE DYSFUNCTION, UNSPECIFIED ERECTILE DYSFUNCTION TYPE: ICD-10-CM

## 2018-08-16 PROCEDURE — 99214 OFFICE O/P EST MOD 30 MIN: CPT | Mod: GC | Performed by: INTERNAL MEDICINE

## 2018-08-17 NOTE — PROGRESS NOTES
Established Patient    Jonna presents today with the following:    CC: STI screen    HPI: 43-year-old patient with past medical history of hypertension, ED, headaches, angioedema 2/2 ACEi use, and left foot plantar fascia fasciitis s/p orthopedic intervention By Dr. Kohler on 1/23/18 for which he is scheduled for surgery on 1/23/18 comes clinic for STI screen. He has been with one partner within the last year and does not use condoms during intercourse. He denies F/C, night sweats, lymphadenopathy, dysuria or burning on urination, oral/geniatal ulcers or lesions. No diffused body rash, or recreational drug use. He still is unable to sustain a strong erection for intercourse. He has had multiple unsuccessful intercourse attempts with partner. He was provided with a 2 pill trail of Cialis for which he has not been able to perches as he has to save up money for such. Reports marked improvement with mood and relationship with girlfriend. He was on chorthalidone and amlodipine for BP but reports that he has not been taking these medications for the past 4 months as he has been juicing and taking supplements (Tumeric, L-arg). BP at 134/94 today. ASCVD score < 4%. He was noted for A1C of 6.7 on 1/30/18 for which he would like to continue with dietary modification.       Patient Active Problem List    Diagnosis Date Noted   • Screening for STD (sexually transmitted disease) 08/16/2018   • Low HDL (under 40) 01/30/2018   • S/P foot surgery, left 01/30/2018   • Hyperglycemia 01/29/2018   • Left leg pain 01/29/2018   • Obesity (BMI 30-39.9) 10/17/2017   • Plantar fasciitis of left foot 10/17/2017   • Moderate single current episode of major depressive disorder (HCC) 07/25/2017   • Erectile dysfunction 07/25/2017   • Essential hypertension 07/06/2017       Current Outpatient Prescriptions   Medication Sig Dispense Refill   • tadalafil (CIALIS) 10 MG tablet Take 1 Tab by mouth as needed for Erectile Dysfunction. (Patient  "not taking: Reported on 8/16/2018) 2 Tab 0   • amLODIPine (NORVASC) 10 MG Tab Take 1 Tab by mouth every day. (Patient not taking: Reported on 8/16/2018) 30 Tab 3     No current facility-administered medications for this visit.        ROS: As per HPI. Additional pertinent symptoms as noted below.  Constitutional: no fevers, chills, weight change  Eyes: no blurred vision, discharge, eye pain  ENT: no rhinorrhea, sore throat, neck masses  Cardiovascular: no angina, palpitations, PND, orthopnea, edema  Respiratory: no cough, sputum, or dyspnea  GI: no nausea, vomiting, abdominal pain, constipation, or diarrhea  : no dysuria, hematuria, frequency   Musculo-skeletal: no joint or muscle pain  Skin: no rashes or open wounds  Neurological: no headaches, dizziness, motor/sensory loss  Psychological: no anxiety or depression    /94   Pulse 68   Temp 36.8 °C (98.3 °F)   Ht 1.854 m (6' 1\")   Wt 116.7 kg (257 lb 4 oz) Comment: Pt has Left leg with a Boot on  SpO2 95%   BMI 33.94 kg/m²     Physical Exam   Constitutional:  oriented to person, place, and time. No distress.   Eyes: Pupils are equal, round, and reactive to light. No scleral icterus.  Neck: Neck supple. No thyromegaly present.   Cardiovascular: Normal rate, regular rhythm and normal heart sounds.  Exam reveals no gallop and no friction rub.  No murmur heard.  Pulmonary/Chest: Breath sounds normal. Chest wall is not dull to percussion.   Musculoskeletal:   no edema.   Lymphadenopathy: no cervical adenopathy  Neurological: alert and oriented to person, place, and time.  Ext: Wearing left LE brace   Skin: No cyanosis. Nails show no clubbing.      Note: I have reviewed all pertinent labs and diagnostic tests associated with this visit with specific comments listed under the assessment and plan below    Assessment and Plan    1. Screening for STD (sexually transmitted disease)  -Denies constitutional symptoms or LAD  -Would like to be screen for STI as planing " to have a baby  -Pending CG/chlamydia, RPR, HIV, Genital herpes, and Hepatitis panel  -F/U in 5 weeks     2. Erectile dysfunction, unspecified erectile dysfunction type  -2/2 long term HTN  -Testosterone wnl  -Was provided with 2 pill trail of Cialis  -Patient saving money for medication  -Placed referral to Urology  -F/U in 5 weeks      3. Essential hypertension  -BP stable today w/o medications  -Patient takening L-arg  -DC chorthalidone  -Continue Amlodipine  -Patient was instructed to continue monitoring BP  -F/U in 5 weeks      Followup: Return in about 5 weeks (around 9/20/2018) for Long.      Signed by: Dalton Vogel M.D.

## 2018-08-22 ENCOUNTER — TELEPHONE (OUTPATIENT)
Dept: INTERNAL MEDICINE | Facility: MEDICAL CENTER | Age: 43
End: 2018-08-22

## 2018-08-22 DIAGNOSIS — Z11.3 SCREENING FOR STD (SEXUALLY TRANSMITTED DISEASE): ICD-10-CM

## 2018-08-22 DIAGNOSIS — I10 ESSENTIAL HYPERTENSION: ICD-10-CM

## 2018-08-22 NOTE — TELEPHONE ENCOUNTER
1. Caller Name: Pt                      Call Back Number: [phone] (home)     2. Message: Patient called and left a message stating his referral hasn't gotten through to the urologist office.    3. Patient approves office to leave a detailed voicemail/MyChart message: N\A

## 2018-09-19 ENCOUNTER — APPOINTMENT (OUTPATIENT)
Dept: ADMISSIONS | Facility: MEDICAL CENTER | Age: 43
End: 2018-09-19
Attending: ORTHOPAEDIC SURGERY
Payer: MEDICAID

## 2018-09-21 ENCOUNTER — OFFICE VISIT (OUTPATIENT)
Dept: INTERNAL MEDICINE | Facility: MEDICAL CENTER | Age: 43
End: 2018-09-21
Payer: MEDICAID

## 2018-09-21 VITALS
DIASTOLIC BLOOD PRESSURE: 99 MMHG | SYSTOLIC BLOOD PRESSURE: 171 MMHG | HEART RATE: 76 BPM | HEIGHT: 73 IN | WEIGHT: 249 LBS | OXYGEN SATURATION: 95 % | BODY MASS INDEX: 33 KG/M2 | TEMPERATURE: 98.3 F

## 2018-09-21 DIAGNOSIS — M72.2 PLANTAR FASCIITIS OF LEFT FOOT: ICD-10-CM

## 2018-09-21 DIAGNOSIS — R73.9 HYPERGLYCEMIA: ICD-10-CM

## 2018-09-21 DIAGNOSIS — Z11.3 SCREEN FOR STD (SEXUALLY TRANSMITTED DISEASE): ICD-10-CM

## 2018-09-21 DIAGNOSIS — I10 ESSENTIAL HYPERTENSION: ICD-10-CM

## 2018-09-21 PROCEDURE — 99214 OFFICE O/P EST MOD 30 MIN: CPT | Mod: GC | Performed by: INTERNAL MEDICINE

## 2018-09-21 RX ORDER — IBUPROFEN 800 MG/1
800 TABLET ORAL EVERY 8 HOURS PRN
Qty: 30 TAB | Refills: 2 | Status: SHIPPED | OUTPATIENT
Start: 2018-09-21 | End: 2018-09-25

## 2018-09-21 RX ORDER — HYDROCHLOROTHIAZIDE 12.5 MG/1
12.5 TABLET ORAL DAILY
Qty: 30 TAB | Refills: 2 | Status: SHIPPED | OUTPATIENT
Start: 2018-09-21 | End: 2019-01-13 | Stop reason: SDUPTHER

## 2018-09-21 RX ORDER — LIDOCAINE 50 MG/G
1 OINTMENT TOPICAL PRN
Qty: 1 TUBE | Refills: 2 | Status: SHIPPED | OUTPATIENT
Start: 2018-09-21 | End: 2018-09-25

## 2018-09-21 RX ORDER — NAPROXEN 500 MG/1
500 TABLET ORAL 2 TIMES DAILY WITH MEALS
Qty: 60 TAB | Refills: 1 | Status: SHIPPED | OUTPATIENT
Start: 2018-09-21 | End: 2018-09-21

## 2018-09-21 RX ORDER — AMLODIPINE BESYLATE 5 MG/1
5 TABLET ORAL DAILY
Qty: 30 TAB | Refills: 5 | Status: SHIPPED | OUTPATIENT
Start: 2018-09-21 | End: 2019-04-08 | Stop reason: SDUPTHER

## 2018-09-21 NOTE — PROGRESS NOTES
Established Patient    Jonna presents today with the following:    CC: Lab result discussion    HPI: 43-year-old patient with past medical history of hypertension, ED, headaches, angioedema 2/2 ACEi use, and left foot plantar fascia fasciitis s/p orthopedic intervention By Dr. Kohler on 1/23/18  for which he is scheduled for surgery on 10/8/18 comes clinic for STI screening result discussion. 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 Discussed results with patient for which he denies h/o of oral or genital out brakes, or recognition of intercourse with partner with out brakes. Additionally, he was noted for SBP in the 170s and Fasting BG at 145 for which he admits falling of diet and eating a lot of fast foods. No chest pain, palpitation, dyspnea, N/V, dizziness, orthostatic changes or LOC.     Patient Active Problem List    Diagnosis Date Noted   • Screening for STD (sexually transmitted disease) 08/16/2018   • Low HDL (under 40) 01/30/2018   • S/P foot surgery, left 01/30/2018   • Hyperglycemia 01/29/2018   • Left leg pain 01/29/2018   • Obesity (BMI 30-39.9) 10/17/2017   • Plantar fasciitis of left foot 10/17/2017   • Moderate single current episode of major depressive disorder (HCC) 07/25/2017   • Erectile dysfunction 07/25/2017   • Essential hypertension 07/06/2017       Current Outpatient Prescriptions   Medication Sig Dispense Refill   • amLODIPine (NORVASC) 5 MG Tab Take 1 Tab by mouth every day. 30 Tab 5   • hydroCHLOROthiazide (HYDRODIURIL) 12.5 MG tablet Take 1 Tab by mouth every day. 30 Tab 2   • lidocaine (XYLOCAINE) 5 % Ointment Apply 1 g to affected area(s) as needed. 1 Tube 2   • ibuprofen (MOTRIN) 800 MG Tab Take 1 Tab by mouth every 8 hours as needed. 30 Tab 2     No current facility-administered medications for this visit.        ROS: As per HPI. Additional pertinent symptoms as noted below.  Constitutional: no fevers, chills, weight change  Eyes: no blurred vision, discharge, eye  "pain  ENT: no rhinorrhea, sore throat, neck masses  Cardiovascular: no angina, palpitations, PND, orthopnea, edema  Respiratory: no cough, sputum, or dyspnea  GI: no nausea, vomiting, abdominal pain, constipation, or diarrhea  : no dysuria, hematuria, frequency   Musculo-skeletal: no joint or muscle pain  Skin: no rashes or open wounds  Neurological: no headaches, dizziness, motor/sensory loss  Psychological: no anxiety or depression      BP (!) 171/99 (BP Location: Left arm, Patient Position: Sitting, BP Cuff Size: Large adult)   Pulse 76   Temp 36.8 °C (98.3 °F) (Temporal)   Ht 1.854 m (6' 1\")   Wt 112.9 kg (249 lb)   SpO2 95%   BMI 32.85 kg/m²     Physical Exam   Constitutional:  oriented to person, place, and time. No distress.   Eyes: Pupils are equal, round, and reactive to light. No scleral icterus.  Neck: Neck supple. No thyromegaly present.   Cardiovascular: Normal rate, regular rhythm and normal heart sounds.  Exam reveals no gallop and no friction rub.  No murmur heard.  Pulmonary/Chest: Breath sounds normal. Chest wall is not dull to percussion.   Musculoskeletal:   no edema.   Lymphadenopathy: no cervical adenopathy  Neurological: alert and oriented to person, place, and time.   Skin: No cyanosis. Nails show no clubbing.    Note: I have reviewed all pertinent labs and diagnostic tests associated with this visit with specific comments listed under the assessment and plan below    Assessment and Plan    1. Essential hypertension  -SBP in the 170s  -Patient takening L-Arg  -Restart chorthalidone  -Continue Amlodipine  -Patient was instructed to continue monitoring BP  -F/U in 5 weeks    2. Hyperglycemia  -Last A1C at 6.7  -Fasting BG at 145  -Patient reports diet non compliance  -Patient will like to back to diet  -A1C when we meet in 5 weeks  -Follow up in 5 weeks    3. Plantar fasciitis of left foot  -S/P Left gastrocsoleus recession, Left subtalar fusion, Excision of tarsal coalition, and Left " open intertarsal medial talonavicular release for contracture  By Dr. Kohler on 1/23/18  -Reports constant sharp pain  -Started IBU and lidocain cream for foot pain  -F/U with ortho next month  -Follow up in 5 weeks    4. 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      Signed by: Dalton Vogel M.D.

## 2018-09-25 ENCOUNTER — APPOINTMENT (OUTPATIENT)
Dept: RADIOLOGY | Facility: MEDICAL CENTER | Age: 43
End: 2018-09-25
Attending: EMERGENCY MEDICINE
Payer: MEDICAID

## 2018-09-25 ENCOUNTER — HOSPITAL ENCOUNTER (EMERGENCY)
Facility: MEDICAL CENTER | Age: 43
End: 2018-09-25
Attending: EMERGENCY MEDICINE
Payer: MEDICAID

## 2018-09-25 ENCOUNTER — TELEPHONE (OUTPATIENT)
Dept: INTERNAL MEDICINE | Facility: MEDICAL CENTER | Age: 43
End: 2018-09-25

## 2018-09-25 VITALS
SYSTOLIC BLOOD PRESSURE: 139 MMHG | HEART RATE: 97 BPM | DIASTOLIC BLOOD PRESSURE: 79 MMHG | TEMPERATURE: 98.1 F | OXYGEN SATURATION: 98 % | WEIGHT: 247.58 LBS | HEIGHT: 73 IN | RESPIRATION RATE: 16 BRPM | BODY MASS INDEX: 32.81 KG/M2

## 2018-09-25 DIAGNOSIS — S39.011A STRAIN OF ABDOMINAL MUSCLE, INITIAL ENCOUNTER: ICD-10-CM

## 2018-09-25 LAB
ALBUMIN SERPL BCP-MCNC: 4.5 G/DL (ref 3.2–4.9)
ALBUMIN/GLOB SERPL: 1.5 G/DL
ALP SERPL-CCNC: 77 U/L (ref 30–99)
ALT SERPL-CCNC: 13 U/L (ref 2–50)
ANION GAP SERPL CALC-SCNC: 11 MMOL/L (ref 0–11.9)
AST SERPL-CCNC: 16 U/L (ref 12–45)
BASOPHILS # BLD AUTO: 0.4 % (ref 0–1.8)
BASOPHILS # BLD: 0.02 K/UL (ref 0–0.12)
BILIRUB SERPL-MCNC: 0.5 MG/DL (ref 0.1–1.5)
BUN SERPL-MCNC: 13 MG/DL (ref 8–22)
CALCIUM SERPL-MCNC: 10.2 MG/DL (ref 8.5–10.5)
CHLORIDE SERPL-SCNC: 100 MMOL/L (ref 96–112)
CO2 SERPL-SCNC: 24 MMOL/L (ref 20–33)
CREAT SERPL-MCNC: 1.15 MG/DL (ref 0.5–1.4)
DEPRECATED D DIMER PPP IA-ACNC: <0.4 UG/ML (FEU) (ref 0–0.5)
EKG IMPRESSION: NORMAL
EOSINOPHIL # BLD AUTO: 0.04 K/UL (ref 0–0.51)
EOSINOPHIL NFR BLD: 0.8 % (ref 0–6.9)
ERYTHROCYTE [DISTWIDTH] IN BLOOD BY AUTOMATED COUNT: 39.1 FL (ref 35.9–50)
GLOBULIN SER CALC-MCNC: 3.1 G/DL (ref 1.9–3.5)
GLUCOSE SERPL-MCNC: 107 MG/DL (ref 65–99)
HCT VFR BLD AUTO: 48.2 % (ref 42–52)
HGB BLD-MCNC: 16.5 G/DL (ref 14–18)
IMM GRANULOCYTES # BLD AUTO: 0.01 K/UL (ref 0–0.11)
IMM GRANULOCYTES NFR BLD AUTO: 0.2 % (ref 0–0.9)
LIPASE SERPL-CCNC: 10 U/L (ref 11–82)
LYMPHOCYTES # BLD AUTO: 1.25 K/UL (ref 1–4.8)
LYMPHOCYTES NFR BLD: 25.3 % (ref 22–41)
MCH RBC QN AUTO: 27.6 PG (ref 27–33)
MCHC RBC AUTO-ENTMCNC: 34.2 G/DL (ref 33.7–35.3)
MCV RBC AUTO: 80.6 FL (ref 81.4–97.8)
MONOCYTES # BLD AUTO: 0.78 K/UL (ref 0–0.85)
MONOCYTES NFR BLD AUTO: 15.8 % (ref 0–13.4)
NEUTROPHILS # BLD AUTO: 2.85 K/UL (ref 1.82–7.42)
NEUTROPHILS NFR BLD: 57.5 % (ref 44–72)
NRBC # BLD AUTO: 0 K/UL
NRBC BLD-RTO: 0 /100 WBC
PLATELET # BLD AUTO: 262 K/UL (ref 164–446)
PMV BLD AUTO: 9.6 FL (ref 9–12.9)
POTASSIUM SERPL-SCNC: 3.3 MMOL/L (ref 3.6–5.5)
PROT SERPL-MCNC: 7.6 G/DL (ref 6–8.2)
RBC # BLD AUTO: 5.98 M/UL (ref 4.7–6.1)
SODIUM SERPL-SCNC: 135 MMOL/L (ref 135–145)
TROPONIN I SERPL-MCNC: <0.01 NG/ML (ref 0–0.04)
WBC # BLD AUTO: 5 K/UL (ref 4.8–10.8)

## 2018-09-25 PROCEDURE — 85025 COMPLETE CBC W/AUTO DIFF WBC: CPT

## 2018-09-25 PROCEDURE — 83690 ASSAY OF LIPASE: CPT

## 2018-09-25 PROCEDURE — 84484 ASSAY OF TROPONIN QUANT: CPT

## 2018-09-25 PROCEDURE — 99285 EMERGENCY DEPT VISIT HI MDM: CPT

## 2018-09-25 PROCEDURE — 80053 COMPREHEN METABOLIC PANEL: CPT

## 2018-09-25 PROCEDURE — 700111 HCHG RX REV CODE 636 W/ 250 OVERRIDE (IP): Performed by: EMERGENCY MEDICINE

## 2018-09-25 PROCEDURE — 700101 HCHG RX REV CODE 250: Performed by: EMERGENCY MEDICINE

## 2018-09-25 PROCEDURE — 93005 ELECTROCARDIOGRAM TRACING: CPT | Performed by: EMERGENCY MEDICINE

## 2018-09-25 PROCEDURE — 71045 X-RAY EXAM CHEST 1 VIEW: CPT

## 2018-09-25 PROCEDURE — 96374 THER/PROPH/DIAG INJ IV PUSH: CPT

## 2018-09-25 PROCEDURE — 85379 FIBRIN DEGRADATION QUANT: CPT

## 2018-09-25 RX ORDER — KETOROLAC TROMETHAMINE 30 MG/ML
15 INJECTION, SOLUTION INTRAMUSCULAR; INTRAVENOUS ONCE
Status: COMPLETED | OUTPATIENT
Start: 2018-09-25 | End: 2018-09-25

## 2018-09-25 RX ORDER — LIDOCAINE 50 MG/G
1 PATCH TOPICAL EVERY 24 HOURS
Status: DISCONTINUED | OUTPATIENT
Start: 2018-09-25 | End: 2018-09-25 | Stop reason: HOSPADM

## 2018-09-25 RX ORDER — LIDOCAINE 50 MG/G
1 PATCH TOPICAL EVERY 24 HOURS
Qty: 10 PATCH | Refills: 0 | Status: ON HOLD | OUTPATIENT
Start: 2018-09-25 | End: 2018-10-08

## 2018-09-25 RX ORDER — NAPROXEN 250 MG/1
250 TABLET ORAL 2 TIMES DAILY WITH MEALS
COMMUNITY
End: 2018-09-25

## 2018-09-25 RX ORDER — IBUPROFEN 400 MG/1
400 TABLET ORAL EVERY 6 HOURS PRN
Qty: 30 TAB | Refills: 0 | Status: ON HOLD | OUTPATIENT
Start: 2018-09-25 | End: 2018-10-08

## 2018-09-25 RX ADMIN — LIDOCAINE 1 PATCH: 50 PATCH CUTANEOUS at 19:15

## 2018-09-25 RX ADMIN — KETOROLAC TROMETHAMINE 15 MG: 30 INJECTION, SOLUTION INTRAMUSCULAR at 18:17

## 2018-09-25 ASSESSMENT — PAIN SCALES - GENERAL
PAINLEVEL_OUTOF10: 8
PAINLEVEL_OUTOF10: 10

## 2018-09-25 ASSESSMENT — PAIN DESCRIPTION - DESCRIPTORS: DESCRIPTORS: NAGGING;PRESSURE

## 2018-09-25 ASSESSMENT — ENCOUNTER SYMPTOMS
ABDOMINAL PAIN: 1
CHILLS: 0
DIARRHEA: 0
VOMITING: 0
FEVER: 0
BLOOD IN STOOL: 0
CONSTIPATION: 0
BACK PAIN: 0
NAUSEA: 0
NECK PAIN: 0

## 2018-09-25 NOTE — TELEPHONE ENCOUNTER
DOCUMENTATION OF PAR STATUS:    1. Name of Medication & Dose: lidocaine 5% ointment apply 1g to affected area(s) prn    2. Name of Prescription Coverage Company & phone #: medicaid [employer] [phone] #[id number]    3. Date Prior Auth Submitted: 09/25/18    4. What information was given to obtain insurance decision?     5. Prior Auth Letter Approved or Denied? Pending    6. Action Taken: Pharmacy/Patient Notified: No

## 2018-09-25 NOTE — ED TRIAGE NOTES
"Jonna Brian  Chief Complaint   Patient presents with   • Abdominal Pain     Pt ambulatory to triage with above complaint. Pt states s/s started 2 days ago. Pt c/o pain located on LEFT upper and lower abd. Pt states it hurts to take deep breaths and pain increases when coughing. Pt denies N/V/D. Pt denies any recent trauma or fall.    /94   Pulse 85   Temp 36.7 °C (98.1 °F) (Temporal)   Resp 18   Ht 1.854 m (6' 1\")   Wt 112.3 kg (247 lb 9.2 oz)   SpO2 97%   BMI 32.66 kg/m²     Pt informed of triage process and encouraged to notify staff of any changes or concerns. Pt verbalized understanding of instructions. Apologized for long wait time. Pt placed back in lobby.     "

## 2018-09-26 ENCOUNTER — PATIENT OUTREACH (OUTPATIENT)
Dept: HEALTH INFORMATION MANAGEMENT | Facility: OTHER | Age: 43
End: 2018-09-26

## 2018-09-26 NOTE — DISCHARGE INSTRUCTIONS
Your labs today were all unremarkable.  Your EKG showed some nonspecific findings and would like you to follow-up with cardiology for this, therefore I have asked that her scheduling service call you tomorrow to schedule an appointment.  If you develop shortness of breath or find that you get the pain every single time he walk or climb stairs return immediately.  If you develop major changes or bowel movements blood or slimy black stool or have multiple episodes of vomiting or inability to eat please return to the emergency department.  I believe your symptoms are most likely secondary to a muscle strain given your exam.

## 2018-09-26 NOTE — ED NOTES
RN went through discharge paperwork with the pt.   RN emphasized the importance of following up and how/when to take medications.   RN just waiting on lidocaine patch from pharmacy.

## 2018-09-26 NOTE — ED NOTES
Patient discharged with instructions for muscle strain with prescriptions x1 signs and symptoms explained to patient for reasons to return to emergency department, Patient is understanding and has no further questions.

## 2018-09-26 NOTE — ED PROVIDER NOTES
ED Provider Note    CHIEF COMPLAINT  Chief Complaint   Patient presents with   • Abdominal Pain       HPI  Jonna Brian is a 43 y.o. male who presents with chief complaint of upper abdominal pain.  Patient reports that abdominal pain is been present for 3 days.  He reports pain is worse on movement of his trunk and coughing.  He reports one episode of hemoptysis.  He reports overwhelmingly all his cough has been white and yellow mucus.  He denies any fevers or constitutional symptoms.  Patient denies any changes in bowel movements, no melena or hematochezia.  He denies any emesis or anorexia.  Patient reports that he can walk as far seedlike without any difficulty, the pain has no association with exertion, he has no associated diaphoresis or nausea.  Patient denies any decreased exertional capacity.  Patient denies any shortness of breath.  Patient denies any history of blood clots any recent surgery or long bone fracture any history of cancer or use of exogenous estrogens.  The pain is aching in nature, no associated back pain.  Patient denies any urinary symptoms, no hematuria dysuria or testicular pain.    REVIEW OF SYSTEMS  Review of Systems   Constitutional: Negative for chills and fever.   Gastrointestinal: Positive for abdominal pain. Negative for blood in stool, constipation, diarrhea, nausea and vomiting.   Genitourinary: Negative for dysuria, frequency and urgency.   Musculoskeletal: Negative for back pain and neck pain.     See HPI for further details. All other systems are negative.     PAST MEDICAL HISTORY   has a past medical history of Cold; Depression; Headache(784.0); Hypertension; and Influenza A.    SOCIAL HISTORY  Social History     Social History Main Topics   • Smoking status: Never Smoker   • Smokeless tobacco: Never Used   • Alcohol use No   • Drug use: No   • Sexual activity: Yes     Partners: Female       SURGICAL HISTORY   has a past surgical history that includes subtalor triple  fusion (Left, 1/23/2018).    CURRENT MEDICATIONS  Home Medications     Reviewed by Magalis Eduardo R.N. (Registered Nurse) on 09/25/18 at 1526  Med List Status: Partial   Medication Last Dose Status   amLODIPine (NORVASC) 5 MG Tab 9/25/2018 Active   hydroCHLOROthiazide (HYDRODIURIL) 12.5 MG tablet 9/25/2018 Active                ALLERGIES  Allergies   Allergen Reactions   • Lisinopril Shortness of Breath     Closes throat       PHYSICAL EXAM  Physical Exam   Constitutional: He is oriented to person, place, and time. He appears well-developed and well-nourished.   HENT:   Head: Normocephalic.   Eyes: Pupils are equal, round, and reactive to light. Conjunctivae are normal.   Neck: Normal range of motion. Neck supple.   Cardiovascular: Normal rate and regular rhythm.    Pulmonary/Chest: Effort normal and breath sounds normal. No respiratory distress. He has no wheezes. He has no rales.   Abdominal: Soft. Bowel sounds are normal. He exhibits no distension. There is no rebound and no guarding.   Left upper quadrant tenderness to palpation, bedside ultrasound is unremarkable for any signs of hydronephrosis, spleen is grossly normal   Musculoskeletal: Normal range of motion.   Neurological: He is alert and oriented to person, place, and time.   Skin: Skin is warm and dry.     Results for orders placed or performed during the hospital encounter of 09/25/18   CBC WITH DIFFERENTIAL   Result Value Ref Range    WBC 5.0 4.8 - 10.8 K/uL    RBC 5.98 4.70 - 6.10 M/uL    Hemoglobin 16.5 14.0 - 18.0 g/dL    Hematocrit 48.2 42.0 - 52.0 %    MCV 80.6 (L) 81.4 - 97.8 fL    MCH 27.6 27.0 - 33.0 pg    MCHC 34.2 33.7 - 35.3 g/dL    RDW 39.1 35.9 - 50.0 fL    Platelet Count 262 164 - 446 K/uL    MPV 9.6 9.0 - 12.9 fL    Neutrophils-Polys 57.50 44.00 - 72.00 %    Lymphocytes 25.30 22.00 - 41.00 %    Monocytes 15.80 (H) 0.00 - 13.40 %    Eosinophils 0.80 0.00 - 6.90 %    Basophils 0.40 0.00 - 1.80 %    Immature Granulocytes 0.20 0.00 -  0.90 %    Nucleated RBC 0.00 /100 WBC    Neutrophils (Absolute) 2.85 1.82 - 7.42 K/uL    Lymphs (Absolute) 1.25 1.00 - 4.80 K/uL    Monos (Absolute) 0.78 0.00 - 0.85 K/uL    Eos (Absolute) 0.04 0.00 - 0.51 K/uL    Baso (Absolute) 0.02 0.00 - 0.12 K/uL    Immature Granulocytes (abs) 0.01 0.00 - 0.11 K/uL    NRBC (Absolute) 0.00 K/uL   COMP METABOLIC PANEL   Result Value Ref Range    Sodium 135 135 - 145 mmol/L    Potassium 3.3 (L) 3.6 - 5.5 mmol/L    Chloride 100 96 - 112 mmol/L    Co2 24 20 - 33 mmol/L    Anion Gap 11.0 0.0 - 11.9    Glucose 107 (H) 65 - 99 mg/dL    Bun 13 8 - 22 mg/dL    Creatinine 1.15 0.50 - 1.40 mg/dL    Calcium 10.2 8.5 - 10.5 mg/dL    AST(SGOT) 16 12 - 45 U/L    ALT(SGPT) 13 2 - 50 U/L    Alkaline Phosphatase 77 30 - 99 U/L    Total Bilirubin 0.5 0.1 - 1.5 mg/dL    Albumin 4.5 3.2 - 4.9 g/dL    Total Protein 7.6 6.0 - 8.2 g/dL    Globulin 3.1 1.9 - 3.5 g/dL    A-G Ratio 1.5 g/dL   LIPASE   Result Value Ref Range    Lipase 10 (L) 11 - 82 U/L   ESTIMATED GFR   Result Value Ref Range    GFR If African American >60 >60 mL/min/1.73 m 2    GFR If Non African American >60 >60 mL/min/1.73 m 2   D-DIMER   Result Value Ref Range    D-Dimer Screen <0.40 0.00 - 0.50 ug/mL (FEU)   TROPONIN   Result Value Ref Range    Troponin I <0.01 0.00 - 0.04 ng/mL   EKG   Result Value Ref Range    Report       [facility] Emergency Dept.    Test Date:  2018  Pt Name:    CHARLENE BUCKLEY              Department: ER  MRN:        [id number]                      Room:       [facility]  Gender:     Male                         Technician: [id number]  :        1975                   Requested By:ESTEFANY LIU  Order #:    [id number]                    Reading MD:    Measurements  Intervals                                Axis  Rate:       73                           P:          27  AZ:         188                          QRS:        49  QRSD:       80                           T:          -16  QT:          372  QTc:        410    Interpretive Statements  SINUS RHYTHM  VENTRICULAR PREMATURE COMPLEX  ABNORMAL T, CONSIDER ISCHEMIA, INFERIOR LEADS  Compared to ECG 01/29/2018 23:19:50  Ventricular premature complex(es) now present  Possible ischemia now present  Sinus tachycardia no longer present  T-wave abnormality still present       DX-CHEST-PORTABLE (1 VIEW)   Final Result      No acute cardiac or pulmonary abnormality is noted.            COURSE & MEDICAL DECISION MAKING  Pertinent Labs & Imaging studies reviewed. (See chart for details)  Patient here with exam most consistent with abdominal strain.  He has pain on lateral movement and when he tries to flex his abscess the pain is exacerbated.  He has no peritoneal signs however, he can walk without any difficulty and has no rebound or guarding.  Patient is very well-appearing.  He has no anorexia nausea or vomiting or changes in bowel movements to suggest an abdominal pathology.  Patient without any associated chest pain or decreased exertional capacity or any association with symptoms with exertion however his EKG does show some T wave inversions and therefore I have provided patient with an outpatient cardiology follow-up via our expedited cardiology follow-up and scheduling service.  Patient's troponin is negative, given patient's one episode of hemoptysis a d-dimer was also checked and this was also within normal limits.  Patient's chest x-ray failed to reveal any pneumonia or effusion.  Patient will be sent home with supportive care and I discussed return precautions in depth with patient.  The patient will not drink alcohol nor drive with prescribed medications. The patient will return for worsening symptoms and is stable at the time of discharge. The patient verbalizes understanding and will comply.    FINAL IMPRESSION  1.  Abdominal muscle strain, left sided abdominal pain         Electronically signed by: Bony Caicedo, 9/25/2018 5:36 PM

## 2018-10-08 ENCOUNTER — HOSPITAL ENCOUNTER (OUTPATIENT)
Facility: MEDICAL CENTER | Age: 43
End: 2018-10-08
Attending: ORTHOPAEDIC SURGERY | Admitting: ORTHOPAEDIC SURGERY
Payer: MEDICAID

## 2018-10-08 ENCOUNTER — APPOINTMENT (OUTPATIENT)
Dept: RADIOLOGY | Facility: MEDICAL CENTER | Age: 43
End: 2018-10-08
Attending: ORTHOPAEDIC SURGERY
Payer: MEDICAID

## 2018-10-08 VITALS
DIASTOLIC BLOOD PRESSURE: 88 MMHG | BODY MASS INDEX: 32.55 KG/M2 | SYSTOLIC BLOOD PRESSURE: 138 MMHG | WEIGHT: 245.59 LBS | OXYGEN SATURATION: 99 % | TEMPERATURE: 97.1 F | RESPIRATION RATE: 17 BRPM | HEIGHT: 73 IN | HEART RATE: 88 BPM

## 2018-10-08 DIAGNOSIS — Z98.890 S/P FOOT SURGERY, LEFT: ICD-10-CM

## 2018-10-08 PROCEDURE — 500881 HCHG PACK, EXTREMITY: Performed by: ORTHOPAEDIC SURGERY

## 2018-10-08 PROCEDURE — 501838 HCHG SUTURE GENERAL: Performed by: ORTHOPAEDIC SURGERY

## 2018-10-08 PROCEDURE — 160035 HCHG PACU - 1ST 60 MINS PHASE I: Performed by: ORTHOPAEDIC SURGERY

## 2018-10-08 PROCEDURE — 73600 X-RAY EXAM OF ANKLE: CPT | Mod: LT

## 2018-10-08 PROCEDURE — 160002 HCHG RECOVERY MINUTES (STAT): Performed by: ORTHOPAEDIC SURGERY

## 2018-10-08 PROCEDURE — 160029 HCHG SURGERY MINUTES - 1ST 30 MINS LEVEL 4: Performed by: ORTHOPAEDIC SURGERY

## 2018-10-08 PROCEDURE — 160009 HCHG ANES TIME/MIN: Performed by: ORTHOPAEDIC SURGERY

## 2018-10-08 PROCEDURE — 503036 HCHG GUIDE PIN,OIC: Performed by: ORTHOPAEDIC SURGERY

## 2018-10-08 PROCEDURE — 110454 HCHG SHELL REV 250: Performed by: ORTHOPAEDIC SURGERY

## 2018-10-08 PROCEDURE — 700101 HCHG RX REV CODE 250: Mod: JW

## 2018-10-08 PROCEDURE — A6402 STERILE GAUZE <= 16 SQ IN: HCPCS | Performed by: ORTHOPAEDIC SURGERY

## 2018-10-08 PROCEDURE — E0114 CRUTCH UNDERARM PAIR NO WOOD: HCPCS | Performed by: ORTHOPAEDIC SURGERY

## 2018-10-08 PROCEDURE — 160022 HCHG BLOCK: Performed by: ORTHOPAEDIC SURGERY

## 2018-10-08 PROCEDURE — A6454 SELF-ADHER BAND W>=3" <5"/YD: HCPCS | Performed by: ORTHOPAEDIC SURGERY

## 2018-10-08 PROCEDURE — 160036 HCHG PACU - EA ADDL 30 MINS PHASE I: Performed by: ORTHOPAEDIC SURGERY

## 2018-10-08 PROCEDURE — C1713 ANCHOR/SCREW BN/BN,TIS/BN: HCPCS | Performed by: ORTHOPAEDIC SURGERY

## 2018-10-08 PROCEDURE — 502000 HCHG MISC OR IMPLANTS RC 0278: Performed by: ORTHOPAEDIC SURGERY

## 2018-10-08 PROCEDURE — 160046 HCHG PACU - 1ST 60 MINS PHASE II: Performed by: ORTHOPAEDIC SURGERY

## 2018-10-08 PROCEDURE — 160048 HCHG OR STATISTICAL LEVEL 1-5: Performed by: ORTHOPAEDIC SURGERY

## 2018-10-08 PROCEDURE — 500423 HCHG DRESSING, ABD COMBINE: Performed by: ORTHOPAEDIC SURGERY

## 2018-10-08 PROCEDURE — A6223 GAUZE >16<=48 NO W/SAL W/O B: HCPCS | Performed by: ORTHOPAEDIC SURGERY

## 2018-10-08 PROCEDURE — 160047 HCHG PACU  - EA ADDL 30 MINS PHASE II: Performed by: ORTHOPAEDIC SURGERY

## 2018-10-08 PROCEDURE — 160025 RECOVERY II MINUTES (STATS): Performed by: ORTHOPAEDIC SURGERY

## 2018-10-08 PROCEDURE — 160041 HCHG SURGERY MINUTES - EA ADDL 1 MIN LEVEL 4: Performed by: ORTHOPAEDIC SURGERY

## 2018-10-08 PROCEDURE — 700111 HCHG RX REV CODE 636 W/ 250 OVERRIDE (IP)

## 2018-10-08 DEVICE — SUTURE ANCHOR TWINFIX 3.5 WITH NEEDLES SMALL JOINT: Type: IMPLANTABLE DEVICE | Site: ANKLE | Status: FUNCTIONAL

## 2018-10-08 RX ORDER — LIDOCAINE 5% 5 G/100G
1 CREAM TOPICAL EVERY 12 HOURS PRN
COMMUNITY

## 2018-10-08 RX ORDER — ONDANSETRON 2 MG/ML
4 INJECTION INTRAMUSCULAR; INTRAVENOUS
Status: DISCONTINUED | OUTPATIENT
Start: 2018-10-08 | End: 2018-10-08 | Stop reason: HOSPADM

## 2018-10-08 RX ORDER — MAGNESIUM HYDROXIDE 1200 MG/15ML
LIQUID ORAL
Status: COMPLETED | OUTPATIENT
Start: 2018-10-08 | End: 2018-10-08

## 2018-10-08 RX ORDER — HALOPERIDOL 5 MG/ML
1 INJECTION INTRAMUSCULAR
Status: DISCONTINUED | OUTPATIENT
Start: 2018-10-08 | End: 2018-10-08 | Stop reason: HOSPADM

## 2018-10-08 RX ORDER — DIPHENHYDRAMINE HCL 25 MG
25 TABLET ORAL EVERY 6 HOURS PRN
Status: DISCONTINUED | OUTPATIENT
Start: 2018-10-08 | End: 2018-10-08 | Stop reason: HOSPADM

## 2018-10-08 RX ORDER — SODIUM CHLORIDE, SODIUM LACTATE, POTASSIUM CHLORIDE, CALCIUM CHLORIDE 600; 310; 30; 20 MG/100ML; MG/100ML; MG/100ML; MG/100ML
INJECTION, SOLUTION INTRAVENOUS CONTINUOUS
Status: ACTIVE | OUTPATIENT
Start: 2018-10-08 | End: 2018-10-08

## 2018-10-08 RX ORDER — OXYCODONE HYDROCHLORIDE 5 MG/1
10 TABLET ORAL EVERY 4 HOURS PRN
Qty: 60 TAB | Refills: 0 | Status: SHIPPED | OUTPATIENT
Start: 2018-10-08 | End: 2018-10-22

## 2018-10-08 ASSESSMENT — PAIN SCALES - WONG BAKER
WONGBAKER_NUMERICALRESPONSE: DOESN'T HURT AT ALL
WONGBAKER_NUMERICALRESPONSE: DOESN'T HURT AT ALL

## 2018-10-08 ASSESSMENT — PAIN SCALES - GENERAL
PAINLEVEL_OUTOF10: 0

## 2018-10-08 NOTE — OP REPORT
DATE OF SERVICE:  10/08/2018    PREOPERATIVE DIAGNOSES:  1.  Right ankle arthrofibrosis.  2.  Right ankle osteochondral defect of the talus.  3.  Right forefoot varus.    POSTOPERATIVE DIAGNOSES:  1.  Right ankle arthrofibrosis.  2.  Right ankle osteochondral defect of the talus.  3.  Right forefoot varus.    PROCEDURES PERFORMED:  1.  Right ankle arthroscopic extensive debridement.  2.  Right ankle curettage for bone marrow stimulation, osteochondral defect of   the medial talar dome.  3.  Right Cotton osteotomy of the medial cuneiform with harvesting autologous   bone graft.  4.  Right talonavicular arthrotomy and soft tissue release.    SURGEON:  Abhilash Kohler MD    FIRST ASSISTANT:  Saroj Amin MD    SECOND ASSISTANT:  Priscila Bain    ANESTHESIA:  General endotracheal with popliteal block per my request for   postoperative pain management.    ESTIMATED BLOOD LOSS:  None.    COMPLICATIONS:  None.    POSTOPERATIVE PLAN:  1.  Nonweightbearing.  2.  Perioperative antibiotics.  3.  Follow up in 2 weeks.    INDICATIONS:  The patient is a pleasant 43-year-old male who has had problems   with his right foot.  The above diagnoses made and options were discussed with   him including operative and nonoperative.  He elected to undergo operative   intervention.  The above procedures were discussed and all questions were   answered.  Risks of surgery were explained, which include but not limited to   wound problems, infection, nerve injury, vascular injury, need for further   surgery.  He understands he could have persistent risk of his pain, malunion,   nonunion, persistence of deformity, and need for further surgery.  He   understands and accepts these risks and agrees to proceed.  His site was   marked by myself prior to receiving psychotropic medicines.    PROCEDURE IN DETAIL:  The patient was given a popliteal block per my request   for postoperative pain management.  He was brought into the operating room  and   underwent general endotracheal anesthesia without complications.  His right   lower extremity was prepped and draped in standard fashion in supine position   over the well leg waddell.  Positive site verification confirmed his right   lower extremity as well as procedure and confirmation that he received   preoperative antibiotics.  Esmarch was used to exsanguinate his foot and ankle   and leg tourniquet was inflated to 250 mmHg.  An 18-gauge needle was placed   medial to the preoperatively marked tibialis anterior to the level of the   joint line.  Ankle was insufflated with 10 mL of normal saline.  A 15 blade   was used to make full-thickness arthrotomy and a 4.0 scope was introduced.  An   18-gauge needle was then placed lateral to the preoperatively marked peroneus   tertius at the level of the joint line.  Skin incision was made and blunt   dissection was made down into the joint.  Shaver was introduced and debrided   out the anterolateral corner.  He had extensive arthrofibrosis throughout the   anterior part of his ankle.  He was noted to have a medial talar dome lesion.    All instrumentation was removed.  Scope was placed in the lateral portal.    Shaver was introduced to the anteromedial side debriding out the anteromedial   aspect of the ankle doing extensive debridement.  Care was then used to   curette out the osteochondral defect for bone marrow stimulation.  Its final   measurements were approximately 8 mm in diameter.  All instrumentation was   removed.  Scope portals were closed with interrupted nylon suture.  Next, an   incision was made over the posterior aspect of his heel.  It was bluntly   dissected down to the level of the hardware.  The guide pin from the 7.3   cannulated screw was placed up through the screw hole and then using the   appropriate screwdriver, the screw was removed as well as the washer.  This   wound was irrigated out and was closed with interrupted nylon suture.  Next,  a   dorsal incision was made over the medial cuneiform.  It was dissected down to   the level of the medial cuneiform.  Microsagittal saw was used to make an   osteotomy dorsal to plantar, not going through the plantar surface.  This was   then opened up with a distractor.  An 8 mm seemed to do the most correction   without overloading the first ray.  A [employer] 8 mm Puente plate was   placed with 2 screws proximal and 2 screws distal to the osteotomy site.  A   stab incision was then made over the lateral aspect of the calcaneus.    Multiple cores of bone were taken and were placed into the osteotomy site for   packing.  He still had some forefoot varus left approximately 5-10 degrees and   so an incision was made over the talonavicular joint.  It was dissected down   to the level of the joint where soft tissue was released.  This allowed for   some increased mobilization, but not as much as anticipated.  Using a 3.5   anchor, we used to try to de-rotate the talonavicular joint back down to the   posterior tibialis insertion, which did help.  Wound was then irrigated with   copious irrigation, was closed in layered fashion using 3-0 Vicryl and 3-0   nylon.  Sterile dressing was applied.  Tourniquet released.  All toes were   pink.  He was transferred to the recovery room in good condition.    Utilization of Dr. Amin was necessary for patient positioning, holding,   retracting, wound closure, dressing placement.  He was present throughout the   entire procedure.       ____________________________________     MD CANDY TERRELL / BETSY    DD:  10/08/2018 11:20:20  DT:  10/08/2018 11:49:10    D#:  [id number]  Job#:  [id number]

## 2018-10-08 NOTE — DISCHARGE INSTRUCTIONS
ACTIVITY: Rest and take it easy for the first 24 hours.  A responsible adult is recommended to remain with you during that time.  It is normal to feel sleepy.  We encourage you to not do anything that requires balance, judgment or coordination.    MILD FLU-LIKE SYMPTOMS ARE NORMAL. YOU MAY EXPERIENCE GENERALIZED MUSCLE ACHES, THROAT IRRITATION, HEADACHE AND/OR SOME NAUSEA.    FOR 24 HOURS DO NOT:  Drive, operate machinery or run household appliances.  Drink beer or alcoholic beverages.   Make important decisions or sign legal documents.    SPECIAL INSTRUCTIONS:   Non- weight bearing to Left Lower extremity  Elevate Left lower extremity above heart to decrease pain and swelling  Keep dressing clean, dry and intact until follow-up  Resume regular diet  Follow-up Dr. Kohler in 2 weeks      DIET: To avoid nausea, slowly advance diet as tolerated, avoiding spicy or greasy foods for the first day.  Add more substantial food to your diet according to your physician's instructions. INCREASE FLUIDS AND FIBER TO AVOID CONSTIPATION.    SURGICAL DRESSING/BATHING: Keep dressing clean and dry until follow-up    FOLLOW-UP APPOINTMENT:  A follow-up appointment should be arranged with your doctor in 2 weeks; call to schedule.    You should CALL YOUR PHYSICIAN if you develop:  Fever greater than 101 degrees F.  Pain not relieved by medication, or persistent nausea or vomiting.  Excessive bleeding (blood soaking through dressing) or unexpected drainage from the wound.  Extreme redness or swelling around the incision site, drainage of pus or foul smelling drainage.  Inability to urinate or empty your bladder within 8 hours.  Problems with breathing or chest pain.    You should call 911 if you develop problems with breathing or chest pain.  If you are unable to contact your doctor or surgical center, you should go to the nearest emergency room or urgent care center.  Physician's telephone #: Dr. Kohler ([phone])    If any  questions arise, call your doctor.  If your doctor is not available, please feel free to call the Surgical Center at [phone].  The Center is open Monday through Friday from 7AM to 7PM.  You can also call the HEALTH HOTLINE open 24 hours/day, 7 days/week and speak to a nurse at [phone], or toll free at [phone].    A registered nurse may call you a few days after your surgery to see how you are doing after your procedure.    MEDICATIONS: Resume taking daily medication.  Take prescribed pain medication with food.  If no medication is prescribed, you may take non-aspirin pain medication if needed.  PAIN MEDICATION CAN BE VERY CONSTIPATING.  Take a stool softener or laxative such as senokot, pericolace, or milk of magnesia if needed.    Prescription given for Oxycodone.  Last pain medication given at *none**.    If your physician has prescribed pain medication that includes Acetaminophen (Tylenol), do not take additional Acetaminophen (Tylenol) while taking the prescribed medication.    Depression / Suicide Risk    As you are discharged from this [facility] facility, it is important to learn how to keep safe from harming yourself.    Recognize the warning signs:  · Abrupt changes in personality, positive or negative- including increase in energy   · Giving away possessions  · Change in eating patterns- significant weight changes-  positive or negative  · Change in sleeping patterns- unable to sleep or sleeping all the time   · Unwillingness or inability to communicate  · Depression  · Unusual sadness, discouragement and loneliness  · Talk of wanting to die  · Neglect of personal appearance   · Rebelliousness- reckless behavior  · Withdrawal from people/activities they love  · Confusion- inability to concentrate     If you or a loved one observes any of these behaviors or has concerns about self-harm, here's what you can do:  · Talk about it- your feelings and reasons for harming yourself  · Remove  any means that you might use to hurt yourself (examples: pills, rope, extension cords, firearm)  · Get professional help from the community (Mental Health, Substance Abuse, psychological counseling)  · Do not be alone:Call your Safe Contact- someone whom you trust who will be there for you.  · Call your local CRISIS HOTLINE 220-5319 or 488-497-5432  · Call your local Children's Mobile Crisis Response Team [place] (538) 386-6505 or www.[employer]Cred[employer]  · Call the toll free National Suicide Prevention Hotlines   · National Suicide Prevention Lifeline 941-062-LDNN (8045)  · National Hope Line Network 800-SUICIDE (595-4854)

## 2018-10-08 NOTE — OR NURSING
Received from pacu at 1158. Vss. Taking po well.  Dressing dry and intact.  Crutches to bedside.  So called and will be here soon.

## 2018-10-08 NOTE — OR NURSING
POC reviewed with patient. Patient resting comfortably in bed with girlfriend at bedside. Instructed to call for assistance to bathroom. Hourly rounding in place.

## 2018-10-16 VITALS
OXYGEN SATURATION: 98 % | HEART RATE: 101 BPM | DIASTOLIC BLOOD PRESSURE: 74 MMHG | SYSTOLIC BLOOD PRESSURE: 115 MMHG | WEIGHT: 240.08 LBS | RESPIRATION RATE: 18 BRPM | BODY MASS INDEX: 31.67 KG/M2 | TEMPERATURE: 98.9 F

## 2018-10-16 PROCEDURE — 99284 EMERGENCY DEPT VISIT MOD MDM: CPT

## 2018-10-16 PROCEDURE — 96372 THER/PROPH/DIAG INJ SC/IM: CPT

## 2018-10-17 ENCOUNTER — HOSPITAL ENCOUNTER (EMERGENCY)
Facility: MEDICAL CENTER | Age: 43
End: 2018-10-17
Attending: EMERGENCY MEDICINE
Payer: MEDICAID

## 2018-10-17 DIAGNOSIS — G89.18 POSTOPERATIVE PAIN: ICD-10-CM

## 2018-10-17 PROCEDURE — 700111 HCHG RX REV CODE 636 W/ 250 OVERRIDE (IP): Performed by: EMERGENCY MEDICINE

## 2018-10-17 PROCEDURE — 302874 HCHG BANDAGE ACE 2 OR 3""

## 2018-10-17 PROCEDURE — 29515 APPLICATION SHORT LEG SPLINT: CPT

## 2018-10-17 RX ORDER — ONDANSETRON 4 MG/1
4 TABLET, ORALLY DISINTEGRATING ORAL ONCE
Status: COMPLETED | OUTPATIENT
Start: 2018-10-17 | End: 2018-10-17

## 2018-10-17 RX ORDER — HYDROMORPHONE HYDROCHLORIDE 2 MG/ML
0.5 INJECTION, SOLUTION INTRAMUSCULAR; INTRAVENOUS; SUBCUTANEOUS ONCE
Status: COMPLETED | OUTPATIENT
Start: 2018-10-17 | End: 2018-10-17

## 2018-10-17 RX ORDER — TRAMADOL HYDROCHLORIDE 50 MG/1
100 TABLET ORAL EVERY 8 HOURS PRN
Qty: 15 TAB | Refills: 0 | Status: SHIPPED | OUTPATIENT
Start: 2018-10-17 | End: 2018-10-20

## 2018-10-17 RX ADMIN — HYDROMORPHONE HYDROCHLORIDE 0.5 MG: 2 INJECTION INTRAMUSCULAR; INTRAVENOUS; SUBCUTANEOUS at 01:30

## 2018-10-17 RX ADMIN — ONDANSETRON 4 MG: 4 TABLET, ORALLY DISINTEGRATING ORAL at 01:30

## 2018-10-17 ASSESSMENT — PAIN SCALES - GENERAL: PAINLEVEL_OUTOF10: 6

## 2018-10-17 NOTE — ED TRIAGE NOTES
Pt to rm 16 , c/o severe pain/throbbing to left ankle s/p surgery approx 10 days ago, pt states out of pain meds x 2 days without follow up with surgeon, [id number]

## 2018-10-17 NOTE — ED PROVIDER NOTES
ED Provider Note    CHIEF COMPLAINT  Chief Complaint   Patient presents with   • Leg Pain   • Post-Op Pain       HPI  Jonna Brian is a 43 y.o. male here for evaluation of postoperative foot pain.  The patient states that he had surgery couple of weeks ago by Dr. Kohler, and states that over the last few days he has had some increasing pain to the foot itself.  He has no fever, no vomiting, and denies any trauma.  He has been using his crutches and leaving the splint in place as directed.  He denies any new trauma.  He states he is not given anything for pain after his surgery.  He has no noted drainage to any of the incision sites.    PAST MEDICAL HISTORY   has a past medical history of Cold; Depression; Headache(784.0); Hypertension; and Influenza A.    SOCIAL HISTORY  Social History     Social History Main Topics   • Smoking status: Never Smoker   • Smokeless tobacco: Never Used   • Alcohol use No   • Drug use: No   • Sexual activity: Yes     Partners: Female       SURGICAL HISTORY   has a past surgical history that includes subtalor triple fusion (Left, 1/23/2018); ankle arthroscopy (Right, 10/8/2018); hardware removal ortho (Right, 10/8/2018); and orthopedic osteotomy (Right, 10/8/2018).    CURRENT MEDICATIONS  Home Medications     Reviewed by Nidia Galeas R.N. (Registered Nurse) on 10/16/18 at 2348  Med List Status: Complete   Medication Last Dose Status   amLODIPine (NORVASC) 5 MG Tab 10/16/2018 Active   hydroCHLOROthiazide (HYDRODIURIL) 12.5 MG tablet 10/16/2018 Active   L-ARGININE PO 10/1/2018 Active   Lidocaine 5 % Cream not started yet Active   oxyCODONE immediate-release (ROXICODONE) 5 MG Tab out Active   TURMERIC PO 10/7/2018 Active                ALLERGIES  Allergies   Allergen Reactions   • Lisinopril Shortness of Breath     Closes throat       REVIEW OF SYSTEMS  See HPI for further details. Review of systems as above, otherwise all other systems are negative.     PHYSICAL EXAM  VITAL SIGNS:  /74   Pulse (!) 101   Temp 37.2 °C (98.9 °F) (Temporal)   Resp 18   Wt 108.9 kg (240 lb 1.3 oz)   SpO2 98%   BMI 31.67 kg/m²     Constitutional: Well developed, well nourished. mild acute distress.  HEENT: Normocephalic, atraumatic. MMM  Neck: Supple, Full range of motion   Chest/Pulmonary:  No respiratory distress.  Equal expansion   Musculoskeletal: LLE:  Foot with sutures in place.  No drainage, no discharge.  No erythema.  Non tender ankle.   Neuro: Clear speech, appropriate, cooperative, cranial nerves II-XII grossly intact.  Psych: Normal mood and affect      PROCEDURES     MEDICAL RECORD  I have reviewed patient's medical record and pertinent results are listed above.    COURSE & MEDICAL DECISION MAKING  I have reviewed any medical record information, laboratory studies and radiographic results as noted above.    1:22 AM  The pt has a scheduled appt with Edita this week, and agrees to follow up.  We have re splinted his foot, written his prescription for pain, and he has his crutches with him.  He will return for any other medical concerns.  At this time, I do not see a reason for any antibiotics, and he has no calf pain to indicate a dvt.  His discomfort is on the dorsum of the foot.     I you have had any blood pressure issues while here in the emergency department, please see your doctor for a further evaluation or work up.    Differential diagnoses include but not limited to: infection, strain, dvt,     This patient presents with left foot pain, post op .  At this time, I have counseled the patient/family regarding their medications, pain control, and follow up.  They will continue their medications, if any, as prescribed.  They will return immediately for any worsening symptoms and/or any other medical concerns.  They will see their doctor, or contact the doctor provided, in 1-2 days for follow up.       FINAL IMPRESSION  1. Postoperative pain          Electronically signed by: Aniket JIMENEZ  Jasmin, 10/17/2018 1:19 AM

## 2018-10-17 NOTE — ED TRIAGE NOTES
"Chief Complaint   Patient presents with   • Leg Pain   • Post-Op Pain       Patient wheeled to triage. Patient stated he had left foot and leg surgery last Monday. Patient states that he has uncontrolled pain since Sunday. Patient states that the pain pill did not help the pain and he is now out. Patient states \"he just wants the pain to stop\". Patient rocking in wheelchair. Per patient pain is in middle toes and top of foot. CMS intact in left foot.     Patient placed out in lobby and educated on triage process.   "

## 2018-12-10 ENCOUNTER — TELEPHONE (OUTPATIENT)
Dept: INTERNAL MEDICINE | Facility: MEDICAL CENTER | Age: 43
End: 2018-12-10

## 2018-12-10 NOTE — TELEPHONE ENCOUNTER
Patient had called Saturday and left a message with only his name and phone number. I called patient back today and asked what he needed. He just needed the number of his urologist office to see if he was still able to get the medications that were suppose to get prescribed to him.

## 2019-03-07 ENCOUNTER — TELEPHONE (OUTPATIENT)
Dept: INTERNAL MEDICINE | Facility: MEDICAL CENTER | Age: 44
End: 2019-03-07

## 2019-03-07 RX ORDER — METRONIDAZOLE 500 MG/1
2000 TABLET ORAL ONCE
Qty: 4 TAB | Refills: 0 | Status: SHIPPED | OUTPATIENT
Start: 2019-03-07 | End: 2019-03-07

## 2019-03-07 NOTE — TELEPHONE ENCOUNTER
Patient called and left a message. I gave him a call back and he let me know that he had sexual intercourse with his girlfriend but after they were done she notified him that she currently is positive with trichomonas and would like to know if he could get a script sent to the pharmacy for antibiotics to treat this for him and his girlfriend?

## 2019-03-08 NOTE — TELEPHONE ENCOUNTER
Pleased order for Metronidazole. Spoke with patient regarding Rx. He will schedule an appointment to follow up with me. Thank you.

## 2019-03-18 ENCOUNTER — OFFICE VISIT (OUTPATIENT)
Dept: INTERNAL MEDICINE | Facility: MEDICAL CENTER | Age: 44
End: 2019-03-18
Payer: MEDICAID

## 2019-03-18 VITALS
WEIGHT: 250.6 LBS | SYSTOLIC BLOOD PRESSURE: 146 MMHG | HEART RATE: 90 BPM | TEMPERATURE: 97.2 F | DIASTOLIC BLOOD PRESSURE: 93 MMHG | OXYGEN SATURATION: 93 % | HEIGHT: 73 IN | BODY MASS INDEX: 33.21 KG/M2

## 2019-03-18 DIAGNOSIS — I10 ESSENTIAL HYPERTENSION: ICD-10-CM

## 2019-03-18 DIAGNOSIS — E11.9 TYPE 2 DIABETES MELLITUS WITHOUT COMPLICATION, WITHOUT LONG-TERM CURRENT USE OF INSULIN (HCC): ICD-10-CM

## 2019-03-18 DIAGNOSIS — E66.9 OBESITY (BMI 30-39.9): ICD-10-CM

## 2019-03-18 DIAGNOSIS — Z11.3 SCREENING FOR STD (SEXUALLY TRANSMITTED DISEASE): ICD-10-CM

## 2019-03-18 DIAGNOSIS — R73.9 HYPERGLYCEMIA: ICD-10-CM

## 2019-03-18 DIAGNOSIS — Z00.00 ENCOUNTER FOR PREVENTIVE CARE: ICD-10-CM

## 2019-03-18 LAB
HBA1C MFR BLD: 6.1 % (ref ?–5.8)
INT CON NEG: NORMAL
INT CON POS: NORMAL

## 2019-03-18 PROCEDURE — 99214 OFFICE O/P EST MOD 30 MIN: CPT | Mod: GC | Performed by: INTERNAL MEDICINE

## 2019-03-18 PROCEDURE — 83036 HEMOGLOBIN GLYCOSYLATED A1C: CPT | Performed by: INTERNAL MEDICINE

## 2019-03-18 RX ORDER — AMLODIPINE BESYLATE 5 MG/1
TABLET ORAL
COMMUNITY
Start: 2019-01-13 | End: 2019-03-18

## 2019-03-18 RX ORDER — IBUPROFEN 800 MG/1
800 TABLET ORAL
COMMUNITY
End: 2019-04-08 | Stop reason: SDUPTHER

## 2019-03-18 RX ORDER — CHLORTHALIDONE 25 MG/1
25 TABLET ORAL DAILY
Qty: 30 TAB | Refills: 3 | Status: SHIPPED | OUTPATIENT
Start: 2019-03-18

## 2019-03-18 RX ORDER — HYDROCHLOROTHIAZIDE 12.5 MG/1
12.5 TABLET ORAL
Qty: 30 TAB | Refills: 3 | Status: SHIPPED | OUTPATIENT
Start: 2019-03-18 | End: 2019-03-18

## 2019-03-18 RX ORDER — LIDOCAINE 50 MG/G
OINTMENT TOPICAL
Refills: 2 | COMMUNITY
Start: 2019-02-27

## 2019-03-18 NOTE — PROGRESS NOTES
Established Patient    Jonna presents today with the following:    CC:   Presents to request lab work for STD screening    HPI:     Patient is a 43-year-old male presents to the clinic, established patient a PCP Dalton Vogel M.D., requesting lab work for sexually transmitted disease screening.    Patient states early March of this year that he had a sexual intercourse encounter with someone other than his partner, states he developed an itch a day after that sexual intercourse encounter, denies any discharge, bleeding, urinary symptoms such as burning or frequency, skin lesions, erythema.  States that this person was tested and tested positive for trichomonas, received treatment for trichomonas.  Patient contacted medical office and primary care provider, was given metronidazole treatment for his symptoms.    Patient states that after completing metronidazole treatment his symptoms and itch have resolved completely and he has had no recurrent symptoms.  However he is concerned about other sexually transmitted diseases at this time and wants to know that his trichomonas infection has cleared.  He states that his partner is also being tested for trichomonas and is seeing a primary care provider today for possible treatment if that is needed.    Patient also states it has been over a year since his lipid panel was last checked, states his hemoglobin A1c was elevated in the past and that he never got a chance to do the 5-week follow-up hemoglobin A1c that was requested by his primary care provider.    Patient has no other complaints or concerns at this time.      Patient Active Problem List    Diagnosis Date Noted   • Diabetes ([facility]) 03/18/2019   • Type 2 diabetes mellitus without complication, without long-term current use of insulin ([facility]) 03/18/2019   • Screening for STD (sexually transmitted disease) 08/16/2018   • Low HDL (under 40) 01/30/2018   • S/P foot surgery, left 01/30/2018   • Hyperglycemia  "01/29/2018   • Left leg pain 01/29/2018   • Obesity (BMI 30-39.9) 10/17/2017   • Plantar fasciitis of left foot 10/17/2017   • Moderate single current episode of major depressive disorder (HCC) 07/25/2017   • Erectile dysfunction 07/25/2017   • Essential hypertension 07/06/2017       Current Outpatient Prescriptions   Medication Sig Dispense Refill   • NON SPECIFIED Brain Awake     • ibuprofen (MOTRIN) 800 MG Tab Take 800 mg by mouth 1 time daily as needed.     • chlorthalidone (HYGROTON) 25 MG Tab Take 1 Tab by mouth every day. 30 Tab 3   • TURMERIC PO Take 2 Tabs by mouth every day.     • amLODIPine (NORVASC) 5 MG Tab Take 1 Tab by mouth every day. 30 Tab 5   • lidocaine (XYLOCAINE) 5 % Ointment APPLY 1 GRAM TO AFFECTED AREA PRN  2   • Lidocaine 5 % Cream 1 Each by Apply externally route every 12 hours as needed.     • L-ARGININE PO Take 1 Each by mouth every day.       No current facility-administered medications for this visit.        Social History     Social History   • Marital status: Single     Spouse name: N/A   • Number of children: N/A   • Years of education: N/A     Occupational History   • Not on file.     Social History Main Topics   • Smoking status: Never Smoker   • Smokeless tobacco: Never Used   • Alcohol use No   • Drug use: No   • Sexual activity: Yes     Partners: Female     Other Topics Concern   • Not on file     Social History Narrative   • No narrative on file       Family History   Problem Relation Age of Onset   • Family history unknown: Yes       ROS: As per HPI.  All other systems reviewed and were negative.  Additional pertinent symptoms as noted below.    All others negative    /93 (BP Location: Left arm, Patient Position: Sitting)   Pulse 90   Temp 36.2 °C (97.2 °F) (Temporal)   Ht 1.854 m (6' 1\")   Wt 113.7 kg (250 lb 9.6 oz)   SpO2 93%   BMI 33.06 kg/m²     Physical Exam  General:  Alert and oriented, No apparent distress.    Eyes: Pupils equal and reactive. No scleral " icterus.    Throat: Clear no erythema or exudates noted.    Neck: Supple. No lymphadenopathy noted. Thyroid not enlarged.    Lungs: Clear to auscultation and percussion bilaterally.    Cardiovascular: Regular rate and rhythm. No murmurs, rubs or gallops.    Abdomen:  Benign. No rebound or guarding noted.    Extremities: No clubbing, cyanosis, edema.    Skin: Clear. No rash or suspicious skin lesions noted.          Assessment and Plan    1. Screening for STD (sexually transmitted disease)  - States early March of this year that he had a sexual intercourse encounter with someone other than his partner, states he developed an itch a day after that sexual intercourse encounter, denies any discharge, bleeding, urinary symptoms such as burning or frequency, skin lesions, erythema.  - States that this person was tested and tested positive for trichomonas, received treatment for trichomonas.    - Patient contacted medical office and primary care provider, was given metronidazole treatment for his symptoms  - Requests screening for sexually transmitted disease at this time  - Lab work urine gonorrhea and chlamydia swab, trichomonas lab work, HIV screening ordered for patient  - Will follow-up labs and determine whether patient needs any other treatment      2. Essential hypertension  -Patient has history of hypertension on amlodipine 5 mg and hydrochlorothiazide 12.5 mg, per chart review history of elevated blood pressures  -Blood pressure at office visit today 146/93  - Patient states severe allergy to lisinopril ACE inhibitor that almost killed him in the past, not candidate for ACE inhibitor or ARB  - Hydrochlorothiazide 12.5 mg medication discontinued  - Chlorthalidone 25 mg to be ordered to patient's pharmacy  - Continue amlodipine 5 mg daily at this time  - Patient to purchase blood pressure monitoring device and keep blood pressure log with at least 1-2 blood pressures every day randomly  - Discussed DASH diet with  patient, in addition to lifestyle measures such as losing weight with exercise and improving diet with reduce salt and monitoring sodium intake  - Will continue to monitor      3. Type 2 diabetes mellitus without complication, without long-term current use of insulin (HCC)  4. Hyperglycemia  - Hemoglobin A1c from January 2018 revealed A1c 6.7%  - Dietary measures crease exercise lifestyle measures were discussed at last office visit in September by PCP Dalton Vogel M.D., for patient to repeat A1c in 5 weeks, however patient never got this lab work performed  - POCT A1c office visit today 6.1%  - This is diabetes diagnosis as this patient had elevated A1c in the past, currently diet controlled  - Extensive education counseling was provided regarding weight loss and improving dietary intake with more fruits and vegetables, reducing sugar intake, patient declines diabetes education class referral at this time  - May consider metformin medication in the future if elevated A1c  - Will continue to monitor hemoglobin A1c every 6-months        5. Obesity (BMI 30-39.9)  - BMI 33.06  - Extensive time was spent on education and counseling regarding weight loss efforts, patient states he will try to increase exercise efforts are going to the gym in addition to improving dietary intake with more fruits and vegetables, understands the risks of poor diet and lifestyle  - Obesity is a risk factor to develop hyperlipidemia in addition to diabetes, lipid panel ordered  - Will follow-up lab work continue to monitor      6. Encounter for preventive care  - States last Tdap about 5 years ago, declines flu immunization at this time, was educated that he can get the flu vaccination at his local pharmacy      Signed by: Marco Aguirre M.D.

## 2019-03-18 NOTE — PATIENT INSTRUCTIONS
"- Please get all lab work done  - Start chlorthalidone medication for blood pressure 25 mg daily in addition to amlodipine 5 mg daily  - Discontinue hydrochlorothiazide at this time  - Keep blood pressure log with 1-2 readings daily randomly and bring to next office visit  - Monitor dietary intake with reduced salt and sugars  - Exercise daily for weight loss  - Follow-up with Dr Esparza in 3 months.     DASH Eating Plan  DASH stands for \"Dietary Approaches to Stop Hypertension.\" The DASH eating plan is a healthy eating plan that has been shown to reduce high blood pressure (hypertension). Additional health benefits may include reducing the risk of type 2 diabetes mellitus, heart disease, and stroke. The DASH eating plan may also help with weight loss.  What do I need to know about the DASH eating plan?  For the DASH eating plan, you will follow these general guidelines:  · Choose foods with less than 150 milligrams of sodium per serving (as listed on the food label).  · Use salt-free seasonings or herbs instead of table salt or sea salt.  · Check with your health care provider or pharmacist before using salt substitutes.  · Eat lower-sodium products. These are often labeled as \"low-sodium\" or \"no salt added.\"  · Eat fresh foods. Avoid eating a lot of canned foods.  · Eat more vegetables, fruits, and low-fat dairy products.  · Choose whole grains. Look for the word \"whole\" as the first word in the ingredient list.  · Choose fish and skinless chicken or turkey more often than red meat. Limit fish, poultry, and meat to 6 oz (170 g) each day.  · Limit sweets, desserts, sugars, and sugary drinks.  · Choose heart-healthy fats.  · Eat more home-cooked food and less restaurant, buffet, and fast food.  · Limit fried foods.  · Do not anderson foods. Cook foods using methods such as baking, boiling, grilling, and broiling instead.  · When eating at a restaurant, ask that your food be prepared with less salt, or no salt if " possible.  What foods can I eat?  Seek help from a dietitian for individual calorie needs.  Grains   Whole grain or whole wheat bread. Brown rice. Whole grain or whole wheat pasta. Quinoa, bulgur, and whole grain cereals. Low-sodium cereals. Corn or whole wheat flour tortillas. Whole grain cornbread. Whole grain crackers. Low-sodium crackers.  Vegetables   Fresh or frozen vegetables (raw, steamed, roasted, or grilled). Low-sodium or reduced-sodium tomato and vegetable juices. Low-sodium or reduced-sodium tomato sauce and paste. Low-sodium or reduced-sodium canned vegetables.  Fruits   All fresh, canned (in natural juice), or frozen fruits.  Meat and Other Protein Products   Ground beef (85% or leaner), grass-fed beef, or beef trimmed of fat. Skinless chicken or turkey. Ground chicken or turkey. Pork trimmed of fat. All fish and seafood. Eggs. Dried beans, peas, or lentils. Unsalted nuts and seeds. Unsalted canned beans.  Dairy   Low-fat dairy products, such as skim or 1% milk, 2% or reduced-fat cheeses, low-fat ricotta or cottage cheese, or plain low-fat yogurt. Low-sodium or reduced-sodium cheeses.  Fats and Oils   Tub margarines without trans fats. Light or reduced-fat mayonnaise and salad dressings (reduced sodium). Avocado. Safflower, olive, or canola oils. Natural peanut or almond butter.  Other   Unsalted popcorn and pretzels.  The items listed above may not be a complete list of recommended foods or beverages. Contact your dietitian for more options.   What foods are not recommended?  Grains   White bread. White pasta. White rice. Refined cornbread. Bagels and croissants. Crackers that contain trans fat.  Vegetables   Creamed or fried vegetables. Vegetables in a cheese sauce. Regular canned vegetables. Regular canned tomato sauce and paste. Regular tomato and vegetable juices.  Fruits   Canned fruit in light or heavy syrup. Fruit juice.  Meat and Other Protein Products   Fatty cuts of meat. Ribs, chicken  wings, martinez, sausage, bologna, salami, chitterlings, fatback, hot dogs, bratwurst, and packaged luncheon meats. Salted nuts and seeds. Canned beans with salt.  Dairy   Whole or 2% milk, cream, half-and-half, and cream cheese. Whole-fat or sweetened yogurt. Full-fat cheeses or blue cheese. Nondairy creamers and whipped toppings. Processed cheese, cheese spreads, or cheese curds.  Condiments   Onion and garlic salt, seasoned salt, table salt, and sea salt. Canned and packaged gravies. Worcestershire sauce. Tartar sauce. Barbecue sauce. Teriyaki sauce. Soy sauce, including reduced sodium. Steak sauce. Fish sauce. Oyster sauce. Cocktail sauce. Horseradish. Ketchup and mustard. Meat flavorings and tenderizers. Bouillon cubes. Hot sauce. Tabasco sauce. Marinades. Taco seasonings. Relishes.  Fats and Oils   Butter, stick margarine, lard, shortening, ghee, and martinez fat. Coconut, palm kernel, or palm oils. Regular salad dressings.  Other   Pickles and olives. Salted popcorn and pretzels.  The items listed above may not be a complete list of foods and beverages to avoid. Contact your dietitian for more information.   Where can I find more information?  National [employer], [employer], [employer] [place]: www.nhlbi.nih.gov/health/health-topics/topics/dash/  This information is not intended to replace advice given to you by your health care provider. Make sure you discuss any questions you have with your health care provider.  Document Released: 12/06/2012 Document Revised: 05/25/2017 Document Reviewed: 10/22/2014  [employer] Interactive Patient Education © 2017 [employer].    Trichomoniasis  Trichomoniasis is an infection, caused by the Trichomonas organism, that affects both women and men. In women, the outer female genitalia and the vagina are affected. In men, the penis is mainly affected, but the prostate and other reproductive organs can also be involved. Trichomoniasis is a sexually transmitted disease (STD) and is most often  passed to another person through sexual contact. The majority of people who get trichomoniasis, do so from a sexual encounter and are also at risk for other STDs, including gonorrhea and chlamydia, hepatitis B, and HIV. Trichomoniasis can increase the risk for HIV and pregnancy problems.  SYMPTOMS   · Abnormal gray-green, frothy vaginal discharge in women.   · Itching and irritation of the area outside the vagina in women.   · Penile discharge with or without pain in males.   · Inflammation of the urethra (urethritis), causing painful urination.   · Bleeding after sexual intercourse.   HOME CARE INSTRUCTIONS   · Take all medication prescribed by your caregiver.   · Take over-the-counter medication for itching or irritation as directed by your caregiver.   · Do not have sexual intercourse while you have the infection.   · Do not douche or wear tampons while you have the infection.   · Discuss the infection with your partner, as your partner may have acquired the infection from you. Or, your partner may have been the person who transmitted the infection to you.   · Have your sex partner examined and treated if necessary.   · Practice safe, informed, and protected sex.   · See your caregiver for other STD testing.   SEEK MEDICAL CARE IF:  · You still have symptoms after you finish the medication.   · You have an oral temperature above 102° F (38.9° C).   · You develop belly (abdominal) pain.   · You have pain when you urinate.   · You have bleeding after sexual intercourse.   · You develop a rash.   · The medication makes you sick or throw up (vomit).   Document Released: 01/25/2006 Document Revised: 03/11/2013 Document Reviewed: 07/09/2010  [employer]Care® Patient Information ©2013 [employer].    Diabetes Mellitus and Food  It is important for you to manage your blood sugar (glucose) level. Your blood glucose level can be greatly affected by what you eat. Eating healthier foods in the appropriate amounts throughout the  day at about the same time each day will help you control your blood glucose level. It can also help slow or prevent worsening of your diabetes mellitus. Healthy eating may even help you improve the level of your blood pressure and reach or maintain a healthy weight.  General recommendations for healthful eating and cooking habits include:  · Eating meals and snacks regularly. Avoid going long periods of time without eating to lose weight.  · Eating a diet that consists mainly of plant-based foods, such as fruits, vegetables, nuts, legumes, and whole grains.  · Using low-heat cooking methods, such as baking, instead of high-heat cooking methods, such as deep frying.  Work with your dietitian to make sure you understand how to use the Nutrition Facts information on food labels.  How can food affect me?  Carbohydrates   Carbohydrates affect your blood glucose level more than any other type of food. Your dietitian will help you determine how many carbohydrates to eat at each meal and teach you how to count carbohydrates. Counting carbohydrates is important to keep your blood glucose at a healthy level, especially if you are using insulin or taking certain medicines for diabetes mellitus.  Alcohol   Alcohol can cause sudden decreases in blood glucose (hypoglycemia), especially if you use insulin or take certain medicines for diabetes mellitus. Hypoglycemia can be a life-threatening condition. Symptoms of hypoglycemia (sleepiness, dizziness, and disorientation) are similar to symptoms of having too much alcohol.  If your health care provider has given you approval to drink alcohol, do so in moderation and use the following guidelines:  · Women should not have more than one drink per day, and men should not have more than two drinks per day. One drink is equal to:  ¨ 12 oz of beer.  ¨ 5 oz of wine.  ¨ 1½ oz of hard liquor.  · Do not drink on an empty stomach.  · Keep yourself hydrated. Have water, diet soda, or  unsweetened iced tea.  · Regular soda, juice, and other mixers might contain a lot of carbohydrates and should be counted.  What foods are not recommended?  As you make food choices, it is important to remember that all foods are not the same. Some foods have fewer nutrients per serving than other foods, even though they might have the same number of calories or carbohydrates. It is difficult to get your body what it needs when you eat foods with fewer nutrients. Examples of foods that you should avoid that are high in calories and carbohydrates but low in nutrients include:  · Trans fats (most processed foods list trans fats on the Nutrition Facts label).  · Regular soda.  · Juice.  · Candy.  · Sweets, such as cake, pie, doughnuts, and cookies.  · Fried foods.  What foods can I eat?  Eat nutrient-rich foods, which will nourish your body and keep you healthy. The food you should eat also will depend on several factors, including:  · The calories you need.  · The medicines you take.  · Your weight.  · Your blood glucose level.  · Your blood pressure level.  · Your cholesterol level.  You should eat a variety of foods, including:  · Protein.  ¨ Lean cuts of meat.  ¨ Proteins low in saturated fats, such as fish, egg whites, and beans. Avoid processed meats.  · Fruits and vegetables.  ¨ Fruits and vegetables that may help control blood glucose levels, such as apples, mangoes, and yams.  · Dairy products.  ¨ Choose fat-free or low-fat dairy products, such as milk, yogurt, and cheese.  · Grains, bread, pasta, and rice.  ¨ Choose whole grain products, such as multigrain bread, whole oats, and brown rice. These foods may help control blood pressure.  · Fats.  ¨ Foods containing healthful fats, such as nuts, avocado, olive oil, canola oil, and fish.  Does everyone with diabetes mellitus have the same meal plan?  Because every person with diabetes mellitus is different, there is not one meal plan that works for everyone. It  is very important that you meet with a dietitian who will help you create a meal plan that is just right for you.  This information is not intended to replace advice given to you by your health care provider. Make sure you discuss any questions you have with your health care provider.  Document Released: 09/14/2006 Document Revised: 05/25/2017 Document Reviewed: 11/14/2014  [employer] Interactive Patient Education © 2017 [employer]

## 2019-03-25 ENCOUNTER — TELEPHONE (OUTPATIENT)
Dept: INTERNAL MEDICINE | Facility: MEDICAL CENTER | Age: 44
End: 2019-03-25

## 2019-03-25 RX ORDER — NAPROXEN 500 MG/1
TABLET ORAL
Qty: 60 TAB | Refills: 0 | OUTPATIENT
Start: 2019-03-25

## 2019-03-25 NOTE — TELEPHONE ENCOUNTER
Last seen: 03/18/19 by Dr. Aguirre  Next appt: None     Was the patient seen in the last year in this department? Yes   Does patient have an active prescription for medications requested? No   Received Request Via: Pharmacy

## 2019-03-25 NOTE — TELEPHONE ENCOUNTER
Patient called and asked about labs. Notified I did see them come in and that everything for STD testing came back negative. He is requesting for lab results to be mailed out to him once scanned in

## 2019-04-04 ENCOUNTER — TELEPHONE (OUTPATIENT)
Dept: INTERNAL MEDICINE | Facility: MEDICAL CENTER | Age: 44
End: 2019-04-04

## 2019-04-04 NOTE — TELEPHONE ENCOUNTER
Marco Aguirre M.D.  [facility]; Dalton Vogel M.D.             Patient's labs reviewed, screening for sexually transmitted diseases trichomonas, chlamydia, gonorrhea, HIV were negative.  Lipid panel abnormal, however ASCVD risk 2.3%, no risk factors such as smoking or coronary artery disease, recommend lifestyle modifications with reduce saturated fats in diet and increased fruits and vegetables, and increase exercise efforts.     Best,   Dr. Aguirre      Patient notified

## 2019-04-08 NOTE — TELEPHONE ENCOUNTER
Last seen: 03/18/19 by Dr. Aguirre  Next appt: None     Was the patient seen in the last year in this department? Yes   Does patient have an active prescription for medications requested? No   Received Request Via: Patient

## 2019-04-14 RX ORDER — IBUPROFEN 800 MG/1
800 TABLET ORAL
Qty: 30 TAB | Refills: 1 | Status: SHIPPED | OUTPATIENT
Start: 2019-04-14 | End: 2019-04-26

## 2019-04-14 RX ORDER — AMLODIPINE BESYLATE 5 MG/1
5 TABLET ORAL DAILY
Qty: 90 TAB | Refills: 9 | Status: SHIPPED | OUTPATIENT
Start: 2019-04-14

## 2019-04-26 ENCOUNTER — APPOINTMENT (OUTPATIENT)
Dept: RADIOLOGY | Facility: MEDICAL CENTER | Age: 44
End: 2019-04-26
Attending: EMERGENCY MEDICINE
Payer: MEDICAID

## 2019-04-26 ENCOUNTER — HOSPITAL ENCOUNTER (EMERGENCY)
Facility: MEDICAL CENTER | Age: 44
End: 2019-04-26
Attending: EMERGENCY MEDICINE
Payer: MEDICAID

## 2019-04-26 VITALS
RESPIRATION RATE: 16 BRPM | HEART RATE: 60 BPM | BODY MASS INDEX: 31.76 KG/M2 | OXYGEN SATURATION: 98 % | WEIGHT: 239.64 LBS | TEMPERATURE: 97.6 F | DIASTOLIC BLOOD PRESSURE: 62 MMHG | SYSTOLIC BLOOD PRESSURE: 137 MMHG | HEIGHT: 73 IN

## 2019-04-26 DIAGNOSIS — M77.9 TENDONITIS: ICD-10-CM

## 2019-04-26 PROCEDURE — 73610 X-RAY EXAM OF ANKLE: CPT | Mod: LT

## 2019-04-26 PROCEDURE — 73630 X-RAY EXAM OF FOOT: CPT | Mod: LT

## 2019-04-26 PROCEDURE — 99283 EMERGENCY DEPT VISIT LOW MDM: CPT

## 2019-04-26 RX ORDER — IBUPROFEN 800 MG/1
800 TABLET ORAL EVERY 8 HOURS PRN
Qty: 90 TAB | Refills: 0 | Status: SHIPPED | OUTPATIENT
Start: 2019-04-26

## 2019-04-26 NOTE — ED TRIAGE NOTES
Amb to triage w/ c/o n/v x 4 since last night.  Pt also c/o L foot pain x 3days at area where he has surgery in January.  Denies s/s of infection.

## 2019-04-27 NOTE — ED PROVIDER NOTES
ED Provider Note    Scribed for Saroj Sunshine M.D. by Janet Richards. 4/26/2019,  5:29 PM.    Means of Arrival: Walk-in  History obtained from: Patient   History limited by: None    CHIEF COMPLAINT  Chief Complaint   Patient presents with   • N/V     since last night   • Foot Pain     x 2 days       HPI  Jonna Brian is a 43 y.o. male with hypertension (controlled) who presents to the Emergency Department with complaints of progressively worsening left-sided lateral foot pain over the last 3 days. The patient states he had titanium plates placed in January. He reports he has had intermittent pain following the surgery, but endorses significantly worse pain over the last few days. He denies any recent falls or injuries. Patient reports having one episode of vomiting and very minimal diarrhea 3 hours prior to examination followed by dry heaving resulting in his presentation this evening. He denies any dysuria, fevers, chills or tingling. No prior abdominal surgeries. The patient denies taking any over-the-counter medications. He denies any alleviating factors.    REVIEW OF SYSTEMS  CONSTITUTIONAL:  No fever or chills.  GASTROINTESTINAL:  No abdominal pain.  : No dysuria.  MUSCULOSKELETAL: Left lateral foot pain.  NEUROLOGIC: No tingling.    See HPI for further details.     PAST MEDICAL HISTORY  Past Medical History:   Diagnosis Date   • Cold       1/15/17 - no symptoms at this time.   • Depression    • Headache(784.0)    • Hypertension    • Influenza A     positive influenza A RNA test 1/2/2018- no symptoms today       FAMILY HISTORY  Family History   Problem Relation Age of Onset   • Family history unknown: Yes       SOCIAL HISTORY   reports that he has never smoked. He has never used smokeless tobacco. He reports that he does not drink alcohol or use drugs.    SURGICAL HISTORY  Past Surgical History:   Procedure Laterality Date   • ANKLE ARTHROSCOPY Right 10/8/2018    Procedure: ANKLE ARTHROSCOPY;   "Surgeon: Abhilash Kohler M.D.;  Location: SURGERY [facility];  Service: Orthopedics   • HARDWARE REMOVAL ORTHO Right 10/8/2018    Procedure: HARDWARE REMOVAL ORTHO- OF SUBTALAR SCREW;  Surgeon: Abhilash Kohler M.D.;  Location: [facility];  Service: Orthopedics   • ORTHOPEDIC OSTEOTOMY Right 10/8/2018    Procedure: ORTHOPEDIC OSTEOTOMY- COTTON WITH BONE GRAFT;  Surgeon: Abhilash Kohler M.D.;  Location: SURGERY [facility];  Service: Orthopedics   • SUBTALOR TRIPLE FUSION Left 1/23/2018    Procedure: SUBTALOR TRIPLE FUSION- W/TALO-CALCANEAL COALITION EXCISION & GASTROC SOLEUS RECESSION;  Surgeon: Abhilash Kohler M.D.;  Location: [facility];  Service: Orthopedics     ALLERGIES  Allergies   Allergen Reactions   • Lisinopril Shortness of Breath     Closes throat       PHYSICAL EXAM  VITAL SIGNS: /84   Pulse 73   Temp 36.4 °C (97.6 °F) (Temporal)   Resp 16   Ht 1.854 m (6' 1\")   Wt 108.7 kg (239 lb 10.2 oz)   SpO2 98%   BMI 31.62 kg/m²    Gen: alert, no acute distress  HENT: ATNC  Eyes: normal conjuctiva  Resp: No resipiratory distress.   CV: No JVD   Abd: Non-distended  Extremities: No deformity. Left lower extremity; tenderness to palpation on the lateral aspect of the foot from the base of the fifth metatarsal rearward towards the heel and behind the lateral malleolus, neurovascularly intact with persistent diminished sensation over the lateral 3 toes unchanged per patient from postsurgical findings, no bony tenderness.  Well-healing incisions present on calf, dorsum of foot, lateral foot.  No calf tenderness or swelling.    DIAGNOSTIC STUDIES / PROCEDURES     RADIOLOGY  DX-ANKLE 3+ VIEWS LEFT   Final Result      No radiographic evidence of acute bony destruction, displaced fracture or subluxation.      Interval midfoot and hindfoot operative procedures without complications of the procedure is identified      DX-FOOT-COMPLETE 3+ LEFT   Final Result      No " radiographic evidence of acute bony destruction, displaced fracture or subluxation.      Interval midfoot and hindfoot operative procedures without complications of the procedure is identified        The radiologist’s interpretation of all radiology studies have been reviewed by me.    COURSE & MEDICAL DECISION MAKING  Pertinent Labs & Imaging studies reviewed. (See chart for details)    5:29 PM Patient seen and examined at bedside. Ordered for DX-foot and DX-ankle to evaluate. Patient will have orders placed for a PO challenge.    6:54 PM  Patient asymptomatic with p.o. challenge, x-rays initially negative for issues with the foot.  Patient updated on these findings    Medical Decision Making:  Patient with lateral foot pain tenderness over the base of the fifth metatarsal.  The pain extends along the tenderness path concerning for possible tendinitis.  No evidence of obvious trauma.  No evidence of cellulitis.  Will obtain x-rays to rule out occult fracture, other abnormalities such as migration of hardware.  Patient had episodes of vomiting, however denies any nausea, abdominal pain.  There is no abdominal tenderness.  He is not interested in addressing the symptoms.  Will trial p.o. challenge.    X-rays negative, patient has no ongoing vomiting or abdominal symptoms.  Unclear etiology for his vomiting, however given normal exam and normal p.o. challenge, I believe patient is safe to discharge home.        FINAL IMPRESSION  1. Tendonitis            Janet GRIFFIN (Scribe), am scribing for, and in the presence of, Saroj Sunshine M.D..    Electronically signed by: Janet Richards (Scribe), 4/26/2019    Saroj GRIFFIN M.D. personally performed the services described in this documentation, as scribed by Janet Richards in my presence, and it is both accurate and complete. E.    The note accurately reflects work and decisions made by me.  Saroj Sunshine  4/26/2019  6:55 PM

## 2019-04-27 NOTE — DISCHARGE INSTRUCTIONS
He was seen in the emergency department for pain in your foot.  Tenderness appears to run along a tendon Spath, suggestive of tendinitis.  Your x-rays were reassuring.  There is no evidence of fracture or complication from the surgery.  We recommend rest, ice, elevation of the foot.    For pain you can take ibuprofen (Motrin), 600mg every 6 hours as needed for pain (take with food to avoid GI upset). You can also take acetaminophen (Tylenol), 1000mg every 8 hours as needed for pain. Do not take more than 3000mg of acetaminophen in any 24 hour period.  Taking these medications regularly during the day can be very effective in controlling pain.    Please return to the emergency department or seek medical attention if you develop:  [place] of the toes, cold dusky toes, abdominal pain, fevers, recurrent vomiting, any other new or concerning findings

## 2020-07-25 ENCOUNTER — APPOINTMENT (OUTPATIENT)
Dept: RADIOLOGY | Facility: MEDICAL CENTER | Age: 45
End: 2020-07-25
Payer: OTHER MISCELLANEOUS

## 2020-07-25 ENCOUNTER — HOSPITAL ENCOUNTER (EMERGENCY)
Facility: MEDICAL CENTER | Age: 45
End: 2020-07-25
Attending: EMERGENCY MEDICINE
Payer: OTHER MISCELLANEOUS

## 2020-07-25 VITALS
RESPIRATION RATE: 18 BRPM | SYSTOLIC BLOOD PRESSURE: 196 MMHG | TEMPERATURE: 98.3 F | DIASTOLIC BLOOD PRESSURE: 117 MMHG | WEIGHT: 250 LBS | OXYGEN SATURATION: 98 % | HEART RATE: 90 BPM
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
## 2020-07-25 DIAGNOSIS — S16.1XXA STRAIN OF NECK MUSCLE, INITIAL ENCOUNTER: ICD-10-CM

## 2020-07-25 DIAGNOSIS — I10 UNCONTROLLED HYPERTENSION: ICD-10-CM

## 2020-07-25 PROCEDURE — 305948 HCHG GREEN TRAUMA ACT PRE-NOTIFY NO CC

## 2020-07-25 PROCEDURE — 99284 EMERGENCY DEPT VISIT MOD MDM: CPT

## 2020-07-25 RX ORDER — AMLODIPINE BESYLATE 5 MG/1
5 TABLET ORAL DAILY
Qty: 30 TAB | Refills: 3 | Status: SHIPPED | OUTPATIENT
Start: 2020-07-25 | End: 2020-07-25 | Stop reason: SDUPTHER

## 2020-07-25 RX ORDER — AMLODIPINE BESYLATE 5 MG/1
5 TABLET ORAL DAILY
Qty: 30 TAB | Refills: 3 | Status: SHIPPED | OUTPATIENT
Start: 2020-07-25

## 2020-07-25 SDOH — HEALTH STABILITY: MENTAL HEALTH: HOW OFTEN DO YOU HAVE A DRINK CONTAINING ALCOHOL?: NEVER
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
## 2020-07-26 NOTE — ED NOTES
PT discharged. Given paper prescription, lab slip and work note. Discharge instructions given and signed. VSS, hypertensive, given prescription for bp medication.
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
## 2020-07-28 ENCOUNTER — HOSPITAL ENCOUNTER (EMERGENCY)
Facility: MEDICAL CENTER | Age: 45
End: 2020-07-29
Attending: EMERGENCY MEDICINE | Admitting: EMERGENCY MEDICINE

## 2020-07-28 DIAGNOSIS — S06.0X0A CONCUSSION WITHOUT LOSS OF CONSCIOUSNESS, INITIAL ENCOUNTER: Primary | ICD-10-CM

## 2020-07-28 DIAGNOSIS — T14.8XXA MUSCLE STRAIN: ICD-10-CM

## 2020-07-28 PROCEDURE — 99283 EMERGENCY DEPT VISIT LOW MDM: CPT

## 2020-07-28 RX ORDER — ONDANSETRON 4 MG/1
4 TABLET, ORALLY DISINTEGRATING ORAL EVERY 6 HOURS PRN
Qty: 16 TAB | Refills: 0 | Status: SHIPPED | OUTPATIENT
Start: 2020-07-28 | End: 2020-08-01

## 2020-07-28 RX ORDER — METHOCARBAMOL 500 MG/1
1000 TABLET, FILM COATED ORAL ONCE
Status: COMPLETED | OUTPATIENT
Start: 2020-07-29 | End: 2020-07-29

## 2020-07-28 RX ORDER — METHOCARBAMOL 750 MG/1
1500 TABLET, FILM COATED ORAL
Qty: 10 TAB | Refills: 0 | Status: SHIPPED | OUTPATIENT
Start: 2020-07-28 | End: 2020-08-02

## 2020-07-28 RX ORDER — ACETAMINOPHEN 500 MG
1000 TABLET ORAL ONCE
Status: COMPLETED | OUTPATIENT
Start: 2020-07-29 | End: 2020-07-29

## 2020-07-28 ASSESSMENT — FIBROSIS 4 INDEX: FIB4 SCORE: 0.71

## 2020-07-29 VITALS
HEART RATE: 90 BPM | DIASTOLIC BLOOD PRESSURE: 106 MMHG | RESPIRATION RATE: 18 BRPM | TEMPERATURE: 97.6 F | SYSTOLIC BLOOD PRESSURE: 161 MMHG | BODY MASS INDEX: 33.31 KG/M2 | OXYGEN SATURATION: 98 % | WEIGHT: 251.32 LBS | HEIGHT: 73 IN

## 2020-07-29 PROCEDURE — A9270 NON-COVERED ITEM OR SERVICE: HCPCS | Performed by: EMERGENCY MEDICINE

## 2020-07-29 PROCEDURE — 700102 HCHG RX REV CODE 250 W/ 637 OVERRIDE(OP): Performed by: EMERGENCY MEDICINE

## 2020-07-29 RX ADMIN — METHOCARBAMOL TABLETS 1000 MG: 500 TABLET, COATED ORAL at 00:09

## 2020-07-29 RX ADMIN — ACETAMINOPHEN 1000 MG: 500 TABLET ORAL at 00:09
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
## 2020-07-29 NOTE — ED NOTES
Pt to room from lobby. Changed into gown.Agree with triage note. Chart up for ERP. Call light in reach.

## 2020-08-18 ENCOUNTER — HOSPITAL ENCOUNTER (EMERGENCY)
Facility: MEDICAL CENTER | Age: 45
End: 2020-08-19
Attending: EMERGENCY MEDICINE | Admitting: EMERGENCY MEDICINE

## 2020-08-18 ENCOUNTER — APPOINTMENT (OUTPATIENT)
Dept: RADIOLOGY | Facility: MEDICAL CENTER | Age: 45
End: 2020-08-18
Attending: EMERGENCY MEDICINE

## 2020-08-18 DIAGNOSIS — R51.9 RIGHT-SIDED HEADACHE: ICD-10-CM

## 2020-08-18 DIAGNOSIS — M54.2 NECK PAIN ON RIGHT SIDE: ICD-10-CM

## 2020-08-18 LAB
BASOPHILS # BLD AUTO: 0.7 % (ref 0–1.8)
BASOPHILS # BLD: 0.04 K/UL (ref 0–0.12)
EOSINOPHIL # BLD AUTO: 0.13 K/UL (ref 0–0.51)
EOSINOPHIL NFR BLD: 2.3 % (ref 0–6.9)
ERYTHROCYTE [DISTWIDTH] IN BLOOD BY AUTOMATED COUNT: 38.6 FL (ref 35.9–50)
HCT VFR BLD AUTO: 46.1 % (ref 42–52)
HGB BLD-MCNC: 15.3 G/DL (ref 14–18)
IMM GRANULOCYTES # BLD AUTO: 0.01 K/UL (ref 0–0.11)
IMM GRANULOCYTES NFR BLD AUTO: 0.2 % (ref 0–0.9)
LYMPHOCYTES # BLD AUTO: 2.06 K/UL (ref 1–4.8)
LYMPHOCYTES NFR BLD: 36 % (ref 22–41)
MCH RBC QN AUTO: 27.6 PG (ref 27–33)
MCHC RBC AUTO-ENTMCNC: 33.2 G/DL (ref 33.7–35.3)
MCV RBC AUTO: 83.2 FL (ref 81.4–97.8)
MONOCYTES # BLD AUTO: 0.41 K/UL (ref 0–0.85)
MONOCYTES NFR BLD AUTO: 7.2 % (ref 0–13.4)
NEUTROPHILS # BLD AUTO: 3.07 K/UL (ref 1.82–7.42)
NEUTROPHILS NFR BLD: 53.6 % (ref 44–72)
NRBC # BLD AUTO: 0 K/UL
NRBC BLD-RTO: 0 /100 WBC
PLATELET # BLD AUTO: 243 K/UL (ref 164–446)
PMV BLD AUTO: 9.4 FL (ref 9–12.9)
RBC # BLD AUTO: 5.54 M/UL (ref 4.7–6.1)
WBC # BLD AUTO: 5.7 K/UL (ref 4.8–10.8)

## 2020-08-18 PROCEDURE — 70498 CT ANGIOGRAPHY NECK: CPT | Mod: MG

## 2020-08-18 PROCEDURE — 85025 COMPLETE CBC W/AUTO DIFF WBC: CPT

## 2020-08-18 PROCEDURE — 80053 COMPREHEN METABOLIC PANEL: CPT

## 2020-08-18 PROCEDURE — 700117 HCHG RX CONTRAST REV CODE 255: Performed by: EMERGENCY MEDICINE

## 2020-08-18 PROCEDURE — 96374 THER/PROPH/DIAG INJ IV PUSH: CPT

## 2020-08-18 PROCEDURE — 700111 HCHG RX REV CODE 636 W/ 250 OVERRIDE (IP): Performed by: EMERGENCY MEDICINE

## 2020-08-18 PROCEDURE — 99284 EMERGENCY DEPT VISIT MOD MDM: CPT

## 2020-08-18 PROCEDURE — 70496 CT ANGIOGRAPHY HEAD: CPT | Mod: ME

## 2020-08-18 RX ORDER — MORPHINE SULFATE 4 MG/ML
4 INJECTION, SOLUTION INTRAMUSCULAR; INTRAVENOUS ONCE
Status: COMPLETED | OUTPATIENT
Start: 2020-08-18 | End: 2020-08-18

## 2020-08-18 RX ADMIN — MORPHINE SULFATE 4 MG: 4 INJECTION INTRAVENOUS at 22:57

## 2020-08-18 RX ADMIN — IOHEXOL 80 ML: 350 INJECTION, SOLUTION INTRAVENOUS at 11:40

## 2020-08-18 ASSESSMENT — FIBROSIS 4 INDEX: FIB4 SCORE: 0.73

## 2020-08-18 ASSESSMENT — LIFESTYLE VARIABLES: DO YOU DRINK ALCOHOL: NO

## 2020-08-19 ENCOUNTER — APPOINTMENT (OUTPATIENT)
Dept: RADIOLOGY | Facility: MEDICAL CENTER | Age: 45
End: 2020-08-19
Attending: EMERGENCY MEDICINE

## 2020-08-19 VITALS
OXYGEN SATURATION: 95 % | DIASTOLIC BLOOD PRESSURE: 96 MMHG | RESPIRATION RATE: 15 BRPM | BODY MASS INDEX: 41.75 KG/M2 | HEART RATE: 72 BPM | SYSTOLIC BLOOD PRESSURE: 176 MMHG | TEMPERATURE: 98.1 F | HEIGHT: 73 IN | WEIGHT: 315 LBS

## 2020-08-19 LAB
ALBUMIN SERPL BCP-MCNC: 4.1 G/DL (ref 3.2–4.9)
ALBUMIN/GLOB SERPL: 1.5 G/DL
ALP SERPL-CCNC: 87 U/L (ref 30–99)
ALT SERPL-CCNC: 23 U/L (ref 2–50)
ANION GAP SERPL CALC-SCNC: 14 MMOL/L (ref 7–16)
AST SERPL-CCNC: 21 U/L (ref 12–45)
BILIRUB SERPL-MCNC: <0.2 MG/DL (ref 0.1–1.5)
BUN SERPL-MCNC: 12 MG/DL (ref 8–22)
CALCIUM SERPL-MCNC: 9 MG/DL (ref 8.5–10.5)
CHLORIDE SERPL-SCNC: 102 MMOL/L (ref 96–112)
CO2 SERPL-SCNC: 23 MMOL/L (ref 20–33)
CREAT SERPL-MCNC: 1.01 MG/DL (ref 0.5–1.4)
GLOBULIN SER CALC-MCNC: 2.7 G/DL (ref 1.9–3.5)
GLUCOSE SERPL-MCNC: 113 MG/DL (ref 65–99)
POTASSIUM SERPL-SCNC: 3.6 MMOL/L (ref 3.6–5.5)
PROT SERPL-MCNC: 6.8 G/DL (ref 6–8.2)
SODIUM SERPL-SCNC: 139 MMOL/L (ref 135–145)

## 2020-08-19 PROCEDURE — 73030 X-RAY EXAM OF SHOULDER: CPT | Mod: RT

## 2020-08-19 NOTE — ED NOTES
IV started, labs collected and sent, and pt medicated per MAR for pain. No other needs at this time. Will continue to monitor.

## 2020-08-19 NOTE — ED NOTES
Pt back from CT. Pt complaining of rt shoulder pain where he can't lift his arm up. Notified Dr. Barger. No other needs at this time.

## 2020-08-19 NOTE — ED NOTES
Rounded on pt, pt is alert and orientated, speaking in full sentences, responds to commands and answers appropriately.  Resp are even and unlabored.  No behavioral indicators of pain. Patient/family updated on wait status, answered questions, addressed needs,  made pt aware to tell the RN/staff of any changes/concerns, pt verbalized understanding of process and instructions given.  Pt in ER lobby with family

## 2020-08-19 NOTE — ED NOTES
"Assist RN: Pt discharged home via ambulatory to lobby with steady gait, AOx4. IV discontinued and gauze placed, pt in possession of belongings. Pt provided discharge education and information pertaining to medications and follow up appointments. Pt received copy of discharge instructions and verbalized understanding.     BP (!) 176/96 Comment: Hx HTN, denies neuro deficits  Pulse 72   Temp 36.7 °C (98.1 °F) (Temporal)   Resp 15   Ht 1.854 m (6' 1\")   Wt (!) 152 kg (335 lb 1.6 oz)   SpO2 95%   BMI 44.21 kg/m²   "
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
## 2020-08-19 NOTE — ED NOTES
Per Dr. Charbel rob for the pt to eat and drink. Pt provided juice and crackers. No other needs at this time.

## 2020-08-21 ENCOUNTER — HOSPITAL ENCOUNTER (EMERGENCY)
Facility: MEDICAL CENTER | Age: 45
End: 2020-08-21
Attending: EMERGENCY MEDICINE

## 2020-08-21 VITALS
BODY MASS INDEX: 33.43 KG/M2 | SYSTOLIC BLOOD PRESSURE: 182 MMHG | DIASTOLIC BLOOD PRESSURE: 108 MMHG | RESPIRATION RATE: 17 BRPM | TEMPERATURE: 98.4 F | OXYGEN SATURATION: 98 % | HEIGHT: 73 IN | HEART RATE: 90 BPM | WEIGHT: 252.21 LBS

## 2020-08-21 DIAGNOSIS — M79.641 HAND PAIN, RIGHT: ICD-10-CM

## 2020-08-21 DIAGNOSIS — M54.12 CERVICAL RADICULAR PAIN: ICD-10-CM

## 2020-08-21 LAB — EKG IMPRESSION: NORMAL

## 2020-08-21 PROCEDURE — 99284 EMERGENCY DEPT VISIT MOD MDM: CPT

## 2020-08-21 PROCEDURE — 93005 ELECTROCARDIOGRAM TRACING: CPT

## 2020-08-21 PROCEDURE — 700111 HCHG RX REV CODE 636 W/ 250 OVERRIDE (IP): Performed by: EMERGENCY MEDICINE

## 2020-08-21 PROCEDURE — 93005 ELECTROCARDIOGRAM TRACING: CPT | Performed by: EMERGENCY MEDICINE

## 2020-08-21 RX ORDER — PREDNISONE 20 MG/1
60 TABLET ORAL ONCE
Status: COMPLETED | OUTPATIENT
Start: 2020-08-21 | End: 2020-08-21

## 2020-08-21 RX ORDER — PREDNISONE 20 MG/1
60 TABLET ORAL DAILY
Status: DISCONTINUED | OUTPATIENT
Start: 2020-08-22 | End: 2020-08-21 | Stop reason: CLARIF

## 2020-08-21 RX ORDER — METHYLPREDNISOLONE 4 MG/1
TABLET ORAL
Qty: 1 EACH | Refills: 0 | Status: SHIPPED | OUTPATIENT
Start: 2020-08-21

## 2020-08-21 RX ADMIN — PREDNISONE 60 MG: 20 TABLET ORAL at 22:11

## 2020-08-21 ASSESSMENT — FIBROSIS 4 INDEX: FIB4 SCORE: 0.77

## 2020-08-21 NOTE — Clinical Note
Jonna Brian was seen and treated in our emergency department on 8/21/2020.  He may return to work on 08/28/2020.       If you have any questions or concerns, please don't hesitate to call.      Joleen Dumont M.D.
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
## 2020-08-22 NOTE — ED NOTES
Pt medicated per MAR. Given work note. Splint applied to R wrist  Pt discharged home. Pt verbalized understand of discharge and medication instructions, all questions addressed. Pt advised to follow-up with ortho or return to ED for any new or worsening of symptoms. Pt is ambulating well and steady on feet. VSS.

## 2020-08-22 NOTE — ED TRIAGE NOTES
.  Chief Complaint   Patient presents with   • Hand Pain     Right hand      Pt ambulate to triage with above complaint. Pt was seen here Tuesday for neck pain, diagnosed with cervical strain. Now Pt reports right hand pain with shooting pains radiating up and down his right arm. Pt note loss of  strength in right hand with intermittent numbness.    Pt BP in triage is elevated, Pt has Hx of HTN but is not on medication at this time. Pt has been dieting and exercising.    Pt educated on triage process and returned to lobby.

## 2020-08-25 ENCOUNTER — HOSPITAL ENCOUNTER (OUTPATIENT)
Dept: RADIOLOGY | Facility: MEDICAL CENTER | Age: 45
End: 2020-08-25
Attending: EMERGENCY MEDICINE

## 2020-08-25 DIAGNOSIS — M79.641 HAND PAIN, RIGHT: ICD-10-CM

## 2020-08-25 DIAGNOSIS — M54.12 CERVICAL RADICULAR PAIN: ICD-10-CM

## 2020-08-25 PROCEDURE — 72141 MRI NECK SPINE W/O DYE: CPT

## 2020-08-31 ENCOUNTER — APPOINTMENT (OUTPATIENT)
Dept: RADIOLOGY | Facility: MEDICAL CENTER | Age: 45
End: 2020-08-31
Attending: EMERGENCY MEDICINE

## 2020-08-31 ENCOUNTER — HOSPITAL ENCOUNTER (EMERGENCY)
Facility: MEDICAL CENTER | Age: 45
End: 2020-09-01
Attending: EMERGENCY MEDICINE

## 2020-08-31 VITALS
SYSTOLIC BLOOD PRESSURE: 191 MMHG | OXYGEN SATURATION: 94 % | BODY MASS INDEX: 33.66 KG/M2 | TEMPERATURE: 97.7 F | HEIGHT: 73 IN | DIASTOLIC BLOOD PRESSURE: 104 MMHG | RESPIRATION RATE: 18 BRPM | HEART RATE: 80 BPM | WEIGHT: 253.97 LBS

## 2020-08-31 DIAGNOSIS — M79.601 RIGHT ARM PAIN: ICD-10-CM

## 2020-08-31 DIAGNOSIS — S46.311A STRAIN OF RIGHT TRICEPS, INITIAL ENCOUNTER: ICD-10-CM

## 2020-08-31 PROCEDURE — 700111 HCHG RX REV CODE 636 W/ 250 OVERRIDE (IP): Performed by: EMERGENCY MEDICINE

## 2020-08-31 PROCEDURE — 96372 THER/PROPH/DIAG INJ SC/IM: CPT

## 2020-08-31 PROCEDURE — 73060 X-RAY EXAM OF HUMERUS: CPT | Mod: RT

## 2020-08-31 PROCEDURE — 700102 HCHG RX REV CODE 250 W/ 637 OVERRIDE(OP): Performed by: EMERGENCY MEDICINE

## 2020-08-31 PROCEDURE — A9270 NON-COVERED ITEM OR SERVICE: HCPCS | Performed by: EMERGENCY MEDICINE

## 2020-08-31 PROCEDURE — 99284 EMERGENCY DEPT VISIT MOD MDM: CPT

## 2020-08-31 RX ORDER — DIAZEPAM 5 MG/1
5 TABLET ORAL ONCE
Status: COMPLETED | OUTPATIENT
Start: 2020-08-31 | End: 2020-08-31

## 2020-08-31 RX ORDER — KETOROLAC TROMETHAMINE 30 MG/ML
30 INJECTION, SOLUTION INTRAMUSCULAR; INTRAVENOUS ONCE
Status: COMPLETED | OUTPATIENT
Start: 2020-08-31 | End: 2020-08-31

## 2020-08-31 RX ADMIN — DIAZEPAM 5 MG: 5 TABLET ORAL at 21:53

## 2020-08-31 RX ADMIN — KETOROLAC TROMETHAMINE 30 MG: 30 INJECTION, SOLUTION INTRAMUSCULAR at 21:53

## 2020-08-31 ASSESSMENT — FIBROSIS 4 INDEX: FIB4 SCORE: 0.77

## 2020-09-01 PROCEDURE — 99284 EMERGENCY DEPT VISIT MOD MDM: CPT

## 2020-09-01 PROCEDURE — 96372 THER/PROPH/DIAG INJ SC/IM: CPT

## 2020-09-01 NOTE — DISCHARGE INSTRUCTIONS
Follow-up with Dr. Case, orthopedic surgeon, within the next 1 to 2 days.  Please call tomorrow to schedule an appointment.    Also follow-up with the work comp clinic tomorrow.    Wear your sling for the next 2 days.  While you are in the sling be sure you move your arm around several times a day to help prevent frozen shoulder and frozen elbow.    Return to the ER for any worsening arm pain, changing arm pain, worsening swelling of your arm, numbness or tingling into your hand or fingers, discoloration to your arm, blistering or redness over the skin, or for any concerns.    Use ice to help with the pain.    Take over-the-counter Advil to help with the pain.

## 2020-09-01 NOTE — ED NOTES
"Pt discharged home. Pt in possession of belongings. Pt provided discharge education and information pertaining to medications and follow up appointments. Pt received copy of discharge instructions and verbalized understanding. BP (!) 191/104 Comment: patient states has high blood pressure but does not take any medication  Pulse 80   Temp 36.5 °C (97.7 °F) (Oral)   Resp 18   Ht 1.854 m (6' 1\")   Wt 115.2 kg (253 lb 15.5 oz)   SpO2 94%   BMI 33.51 kg/m²     "

## 2020-09-01 NOTE — ED TRIAGE NOTES
"Jonna Slaterd   45 y.o. male   Chief Complaint   Patient presents with   • Arm Pain     Right x 20 minutes     The patient presents to the ED via triage for evaluation of right arm pain related to a pinched nerve in his neck.The patient had an MRI of the cervical spine 6 days ago and has been unable to follow up. The patient was at work and reports having a flare up that made the patient leave work.     Patient ambulatory to triage with a steady gait for above mentioned complaint.  Patient is alert and oriented, speaking in full sentences, following commands and responds appropriately to questions. Not in any apparent distress. Respirations are even and unlabored.   Patient placed in lobby. Patient educated on triage process. Patient encouraged to alert staff of any changes in status.     BP (!) 191/104 Comment: patient states has high blood pressure but does not take any medication  Pulse 80   Temp 36.5 °C (97.7 °F) (Oral)   Resp 18   Ht 1.854 m (6' 1\")   Wt 115.2 kg (253 lb 15.5 oz)   SpO2 94%   BMI 33.51 kg/m²       "
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
## 2021-12-01 ENCOUNTER — NON-PROVIDER VISIT (OUTPATIENT)
Dept: NEUROLOGY | Facility: MEDICAL CENTER | Age: 46
End: 2021-12-01
Attending: PSYCHIATRY & NEUROLOGY
Payer: COMMERCIAL

## 2021-12-01 DIAGNOSIS — G57.52 TARSAL TUNNEL SYNDROME OF LEFT SIDE: ICD-10-CM

## 2021-12-01 PROCEDURE — 95910 NRV CNDJ TEST 7-8 STUDIES: CPT | Performed by: PSYCHIATRY & NEUROLOGY

## 2021-12-01 PROCEDURE — 95886 MUSC TEST DONE W/N TEST COMP: CPT | Mod: 26 | Performed by: PSYCHIATRY & NEUROLOGY

## 2021-12-01 PROCEDURE — 95886 MUSC TEST DONE W/N TEST COMP: CPT | Performed by: PSYCHIATRY & NEUROLOGY

## 2021-12-01 PROCEDURE — 95910 NRV CNDJ TEST 7-8 STUDIES: CPT | Mod: 26 | Performed by: PSYCHIATRY & NEUROLOGY

## 2021-12-01 NOTE — PROCEDURES
"NERVE CONDUCTION STUDIES AND ELECTROMYOGRAPHY REPORT  [facility]  12/01/21          IMPRESSION:  This is an abnormal study.  There is electrodiagnostic evidence of low amplitude left tibial/medial plantar responses.  Left tarsal tunnel with primarily axonal involvement can be considered however there is no robust asymmetry or evidence of demyelination to strongly support this.  Differential includes low amplitude secondary to postsurgical changes.  There is no evidence of left lumbosacral radiculopathy.  Recommend clinical correlation and dedicated studies for evaluation of peripheral polyneuropathy if clinically indicated.        Sherry Rodriguez MD  Neurology - Neurophysiology        REASON FOR REFERRAL:  Mr. Jonna Brian 46 y.o. referred by Dr. Abhilash Kohler for an evaluation of possible tarsal tunnel syndrome.  Patient has had left ankle/foot surgery years ago.  Since then he has noticed gradually progressing numbness in a stocking distribution primarily affecting the sole of his foot.  He is beginning to have symptoms in the right foot as well.  He does have a history of back pain.     Height: 6'1\"  Weight: 260 lbs    Symptom focused neurological exam shows surgical scars in the left medial ankle/foot.  There is decreased sensation to light touch there.  He has difficulty with dorsi and plantar flexion in the left foot.  Achilles reflex is unobtainable.      ELECTRODIAGNOSTIC EXAMINATION:  Nerve conduction studies (NCS) and electromyography (EMG) are utilized to evaluate direct or indirect damage to the peripheral nervous system. NCS are performed to measure the nerve(s) response(s) to electrostimulation across a given nerve segment. EMG evaluates the passive and active electrical activity of the muscle(s) in question.  Muscles are innervated by specific peripheral nerves and roots. Often times, several nerves the muscle to be examined in order to determine the presence or absence of " the disease process. Furthermore, nerves and muscles may need to be tested in a tuxg-ec-[id number] comparison, as well as in additional extremities, as this may be crucial in characterizing the extent of the disease process, which may be diffuse or isolated and of varying degree of severity. The extent of the neurodiagnostic exam is justified as it may help arrive to a proper diagnosis, which ultimately may contribute to better management of the patient. Therefore, the nerves to muscles examined during the study were medically necessary.    Unless otherwise noted, temperature of the extremity(s) study was monitored before and during the examination and remained between 32 and 36 degrees C for the upper extremities, and between 30 and 36 degrees C for the lower extremities. The patient tolerated testing well, without any complications.       NERVE CONDUCTION STUDY SUMMARY:  Selected nerves of the bilateral lower extremity are studied.    Normal left sural sensory response.  Normal left common peroneal motor response at the extensor digitorum brevis.  Abnormal left tibial motor response at the abductor hallucis brevis due to low amplitude.  Normal bilateral lateral plantar mixed responses.  Normal right medial plantar mixed response.  Abnormal left medial plantar response due to low amplitude.      NEEDLE EMG SUMMARY:  Concentric needle study of selected left lower extremity muscles is performed.     Insertion activity is normal in all muscles sampled.   With activation, there are normal morphology (amplitude/duration) motor unit action potentials firing with normal recruitment in muscles tested.       PATIENT DATA TABLES  Nerve Conduction Studies     Stim Site NR Onset (ms) Norm Onset (ms) O-P Amp (µV) Norm O-P Amp Site1 Site2 Delta-P (ms) Dist (cm) Dante (m/s) Norm Dante (m/s)   Left Sural Anti Sensory (Lat Mall)  35.5°C   Calf    3.3 <4.5 8.4 >5 Calf Lat Mall 4.1 14.0 *34 >40        Stim Site NR Onset (ms) Norm Onset (ms)  O-P Amp (mV) Norm O-P Amp Site1 Site2 Delta-0 (ms) Dist (cm) Dante (m/s) Norm Dante (m/s)   Left Peroneal EDB Motor (Ext Dig Brev)  35.5°C   Ankle    3.9 <5.5 5.0 >3.0 B Fib Ankle 9.2 38.0 41 >40   B Fib    13.1  3.9  Poplt B Fib 2.4 10.0 42    Poplt    15.5  3.7          Left Tibial Motor (Abd Sosa Brev)  35.5°C   Ankle    4.5 <6 *6.5 >8 Knee Ankle 11.1 45.5 41 >40   Knee    15.6  5.2               Stim Site NR Peak (ms) Norm Peak (ms) P-T Amp (µV) Norm P-T Amp Site1 Site2 Delta-P (ms) Dist (cm) Dante (m/s) Norm Dante (m/s)   Left Lateral Plantar Mixed (Med Malleolus)  35.5°C   Lateral Foot    3.0 <3.7 8.6 >8             2.8  3.6              3.1  5.5          Right Lateral Plantar Mixed (Med Malleolus)  35.5°C       2.1 <3.7 10.1 >8             2.4  8.3          Left Medial Plantar Mixed (Med Malleolus)  35.5°C   Medial Foot    2.8 <3.7 *8.2 >10             2.8  6.5              2.9  6.0          Right Medial Plantar Mixed (Med Malleolus)  35.5°C   Medial Foot    2.6 <3.7 10.2 >10             2.8  11.9                                Electromyography     Side Muscle Nerve Root Ins Act Fibs Psw Amp Dur Poly Recrt Int Pat Comment   Left AntTibialis Dp Br Fibular L4-5 Nml Nml Nml Nml Nml 0 *Reduced *75%    Left Gastroc Tibial S1-2 Nml Nml Nml Nml Nml 0 *Reduced *50%    Left VastusLat Femoral L2-4 Nml Nml Nml Nml Nml 0 Nml Nml    Left GluteusMed SupGluteal L5-S1 Nml Nml Nml Nml Nml 0 Nml Nml    Left BicepsFemS Sciatic L5-S1 Nml Nml Nml Nml Nml 0 Nml Nml

## 2021-12-09 PROBLEM — G57.52 TARSAL TUNNEL SYNDROME OF LEFT SIDE: Status: ACTIVE | Noted: 2021-12-09

## 2022-04-15 ENCOUNTER — HOSPITAL ENCOUNTER (OUTPATIENT)
Facility: MEDICAL CENTER | Age: 47
End: 2022-04-15
Attending: ANESTHESIOLOGY
Payer: COMMERCIAL

## 2022-04-15 LAB
COVID ORDER STATUS COVID19: NORMAL
SARS-COV-2 RNA RESP QL NAA+PROBE: NOTDETECTED
SPECIMEN SOURCE: NORMAL

## 2022-04-15 PROCEDURE — U0005 INFEC AGEN DETEC AMPLI PROBE: HCPCS

## 2022-04-15 PROCEDURE — U0003 INFECTIOUS AGENT DETECTION BY NUCLEIC ACID (DNA OR RNA); SEVERE ACUTE RESPIRATORY SYNDROME CORONAVIRUS 2 (SARS-COV-2) (CORONAVIRUS DISEASE [COVID-19]), AMPLIFIED PROBE TECHNIQUE, MAKING USE OF HIGH THROUGHPUT TECHNOLOGIES AS DESCRIBED BY CMS-2020-01-R: HCPCS

## 2024-01-01 NOTE — DISCHARGE INSTRUCTIONS
Neck Injury  (Cervical Strain, Care After)    Take ibuprofen 800 mg every 6 hours or naproxen 500 mg every 12 hours for 3-7 days unless it upsets the stomach.  Add Tylenol if needed for persistent pain.  Return for weakness or fever and headache.  See your doctor if not better in 7-10 days.  Obtain blood draw at [facility] as ordered and follow-up at [place] heart clinic for results and blood pressure management.    You had a borderline or high normal blood pressure reading today.  This does not necessarily mean you have hypertension.  Please followup with your/a primary physician for comprehensive blood pressure evaluation and yearly fasting cholesterol assessment.  BP Readings from Last 3 Encounters:   07/25/20 (!) 196/117         You have a cervical strain. The muscles and ligaments in your neck have been stretched. The bones are not broken.    HOME CARE INSTRUCTIONS  While awake, apply ice packs to the neck or areas of pain about every 1-2 hours, for 20 to 30 minutes at a time. Do this for 2 days or as directed. If you were given a cervical collar for support, ask your caregiver if you may remove it for bathing or applying ice.  After 2 days, you may apply heat to the area for 20 to 30 minutes, 4 to 5 times a day.  If given a [place] collar, wear as instructed. Do not remove any collar unless instructed by a caregiver.  Take prescribed medication as directed. You may use ibuprofen (Advil® or Motrin®) and acetaminophen (Tylenol®) for discomfort. Only use these if your caregiver has not given medications that interfere    Recheck with the hospital or clinic after a radiologist has read your x-rays. Recheck with the hospital or clinic to make sure the initial readings are correct. Do this also to determine if you need further studies. It is your responsibility to find out your x-ray results. X-rays are sometimes repeated in one week to ten days. These are often repeated to make sure that a hairline  fracture was not overlooked. Ask your caregiver how you are to find out about your radiology (x-ray) results.    SEEK IMMEDIATE MEDICAL ATTENTION IF:  You develop difficulties swallowing or breathing.  You have numbness, weakness or movement problems in you or your arms or legs.  You develop increasing pain which is uncontrolled with medications.    AGREEMENT BETWEEN PATIENT AND HEALTHCARE TEAM:  Your signature on this document represents an understanding between you and the healthcare team that took care of you today.  That means that you:  Understand these discharge instructions.   Will monitor your condition.  Will seek immediate medical attention as instructed.    Document Released: 12/18/2006  Document Re-Released: 06/30/2008  [employer]® Patient Information ©2008 [employer].     >120

## 2024-07-09 NOTE — ED NOTES
Discharge Planning Assessment  [facility]     Patient Name: Michelle Hoff  MRN: [id number]  Today's Date: 7/9/2024    Admit Date: 7/8/2024    Plan: rehab   Discharge Needs Assessment       Row Name 07/09/24 1028       Living Environment    People in Home alone    Current Living Arrangements home    Potentially Unsafe Housing Conditions none    In the past 12 months has the electric, gas, oil, or water company threatened to shut off services in your home? No    Primary Care Provided by self    Provides Primary Care For no one    Family Caregiver if Needed child(colton), adult    Quality of Family Relationships involved;helpful    Able to Return to Prior Arrangements yes       Resource/Environmental Concerns    Resource/Environmental Concerns none    Transportation Concerns none       Transportation Needs    In the past 12 months, has lack of transportation kept you from medical appointments or from getting medications? no    In the past 12 months, has lack of transportation kept you from meetings, work, or from getting things needed for daily living? No       Food Insecurity    Within the past 12 months, you worried that your food would run out before you got the money to buy more. Never true    Within the past 12 months, the food you bought just didn't last and you didn't have money to get more. Never true       Transition Planning    Patient/Family Anticipates Transition to inpatient rehabilitation facility    Patient/Family Anticipated Services at Transition none    Transportation Anticipated family or friend will provide       Discharge Needs Assessment    Readmission Within the Last 30 Days no previous admission in last 30 days    Equipment Currently Used at Home walker, rolling;grab bar;commode    Concerns to be Addressed discharge planning    Anticipated Changes Related to Illness none    Equipment Needed After Discharge none                   Discharge Plan       Row Name 07/09/24 1029       Plan    Plan  Medicated pt for pain. Updated poc   rehab    Patient/Family in Agreement with Plan yes    Plan Comments Pt lives alone in [facility]. She had been independent with ADLs and mobility prior to admit. Pt owns a rolling walker. She is followed by her PCP and has drug coverage. Pt hopes to return home at discharge and is aware she may need inpt rehab. CM will follow for PT/OT evals and will make referrals as needed                  Continued Care and Services - Admitted Since 7/8/2024    No active coordination exists for this encounter.       Expected Discharge Date and Time       Expected Discharge Date Expected Discharge Time    Jul 10, 2024            Demographic Summary       Row Name 07/09/24 1027       General Information    Admission Type inpatient    Referral Source physician    Reason for Consult discharge planning    Preferred Language English    General Information Comments PCP Alma Casanova       Contact Information    Permission Granted to Share Info With family/designee    Contact Information Comments Sarah [phone]                   Functional Status       Row Name 07/09/24 1027       Functional Status    Usual Activity Tolerance good    Current Activity Tolerance good       Physical Activity    On average, how many days per week do you engage in moderate to strenuous exercise (like a brisk walk)? 0 days    On average, how many minutes do you engage in exercise at this level? 0 min    Number of minutes of exercise per week 0       Functional Status, IADL    Medications independent    Meal Preparation independent    Housekeeping independent    Laundry independent    Shopping independent       Mental Status    General Appearance WDL WDL       Mental Status Summary    Recent Changes in Mental Status/Cognitive Functioning no changes       Employment/    Employment Status retired    Current or Previous Occupation not applicable                   Psychosocial    No documentation.                  Abuse/Neglect    No  documentation.                  Legal    No documentation.                  Substance Abuse    No documentation.                  Patient Forms    No documentation.                     Gabby Jackson RN

## 2025-03-25 ENCOUNTER — APPOINTMENT (OUTPATIENT)
Dept: RADIOLOGY | Facility: MEDICAL CENTER | Age: 50
DRG: 004 | End: 2025-03-25
Attending: EMERGENCY MEDICINE
Payer: COMMERCIAL

## 2025-03-25 ENCOUNTER — HOSPITAL ENCOUNTER (INPATIENT)
Facility: MEDICAL CENTER | Age: 50
End: 2025-03-25
Attending: EMERGENCY MEDICINE
Payer: COMMERCIAL

## 2025-03-25 DIAGNOSIS — G83.9 PARALYSIS (HCC): ICD-10-CM

## 2025-03-25 DIAGNOSIS — R29.818 ACUTE FOCAL NEUROLOGIC DEFICIT WITH PARTIAL RESOLUTION: ICD-10-CM

## 2025-03-25 DIAGNOSIS — R41.82 ALTERED MENTAL STATUS, UNSPECIFIED ALTERED MENTAL STATUS TYPE: Primary | ICD-10-CM

## 2025-03-25 DIAGNOSIS — J96.01 ACUTE RESPIRATORY FAILURE WITH HYPOXIA (HCC): ICD-10-CM

## 2025-03-25 DIAGNOSIS — K63.9 SIGMOID THICKENING: ICD-10-CM

## 2025-03-25 DIAGNOSIS — I63.9 ACUTE ISCHEMIC STROKE (HCC): ICD-10-CM

## 2025-03-25 PROBLEM — G93.40 ACUTE ENCEPHALOPATHY: Status: ACTIVE | Noted: 2025-03-25

## 2025-03-25 PROBLEM — R53.1 ACUTE WEAKNESS: Status: ACTIVE | Noted: 2025-03-25

## 2025-03-25 LAB
ABO + RH BLD: NORMAL
ABO GROUP BLD: NORMAL
ALBUMIN SERPL BCP-MCNC: 4.3 G/DL (ref 3.2–4.9)
ALBUMIN/GLOB SERPL: 1.3 G/DL
ALP SERPL-CCNC: 67 U/L (ref 30–99)
ALT SERPL-CCNC: 25 U/L (ref 2–50)
AMPHET UR QL SCN: NEGATIVE
ANION GAP SERPL CALC-SCNC: 18 MMOL/L (ref 7–16)
APTT PPP: 28.4 SEC (ref 24.7–36)
AST SERPL-CCNC: 25 U/L (ref 12–45)
BARBITURATES UR QL SCN: NEGATIVE
BASOPHILS # BLD AUTO: 0.4 % (ref 0–1.8)
BASOPHILS # BLD: 0.03 K/UL (ref 0–0.12)
BENZODIAZ UR QL SCN: NEGATIVE
BILIRUB SERPL-MCNC: 0.7 MG/DL (ref 0.1–1.5)
BLD GP AB SCN SERPL QL: NORMAL
BUN SERPL-MCNC: 13 MG/DL (ref 8–22)
BURR CELLS/RBC NFR CSF MANUAL: 0 %
BURR CELLS/RBC NFR CSF MANUAL: 0 %
BZE UR QL SCN: NEGATIVE
C GATTII+NEOFOR DNA CSF QL NAA+NON-PROBE: NOT DETECTED
CALCIUM ALBUM COR SERPL-MCNC: 9.4 MG/DL (ref 8.5–10.5)
CALCIUM SERPL-MCNC: 9.6 MG/DL (ref 8.5–10.5)
CANNABINOIDS UR QL SCN: NEGATIVE
CHLORIDE SERPL-SCNC: 101 MMOL/L (ref 96–112)
CLARITY CSF: CLEAR
CLARITY CSF: CLEAR
CMV DNA CSF QL NAA+NON-PROBE: NOT DETECTED
CO2 SERPL-SCNC: 18 MMOL/L (ref 20–33)
COLOR CSF: COLORLESS
COLOR CSF: COLORLESS
COLOR SPUN CSF: COLORLESS
COLOR SPUN CSF: COLORLESS
CREAT SERPL-MCNC: 0.91 MG/DL (ref 0.5–1.4)
CSF COMMENTS 1658: NORMAL
CSF COMMENTS 1658: NORMAL
E COLI K1 DNA CSF QL NAA+NON-PROBE: NOT DETECTED
EKG IMPRESSION: NORMAL
EOSINOPHIL # BLD AUTO: 0.01 K/UL (ref 0–0.51)
EOSINOPHIL NFR BLD: 0.1 % (ref 0–6.9)
ERYTHROCYTE [DISTWIDTH] IN BLOOD BY AUTOMATED COUNT: 38.5 FL (ref 35.9–50)
ETHANOL BLD-MCNC: <10.1 MG/DL
EV RNA CSF QL NAA+NON-PROBE: NOT DETECTED
FENTANYL UR QL: NEGATIVE
FLUAV RNA SPEC QL NAA+PROBE: NEGATIVE
FLUBV RNA SPEC QL NAA+PROBE: NEGATIVE
GFR SERPLBLD CREATININE-BSD FMLA CKD-EPI: 104 ML/MIN/1.73 M 2
GLOBULIN SER CALC-MCNC: 3.2 G/DL (ref 1.9–3.5)
GLUCOSE BLD STRIP.AUTO-MCNC: 190 MG/DL (ref 65–99)
GLUCOSE CSF-MCNC: 71 MG/DL (ref 40–80)
GLUCOSE SERPL-MCNC: 186 MG/DL (ref 65–99)
GP B STREP DNA CSF QL NAA+NON-PROBE: NOT DETECTED
GRAM STN SPEC: NORMAL
HAEM INFLU DNA CSF QL NAA+NON-PROBE: NOT DETECTED
HCT VFR BLD AUTO: 45 % (ref 42–52)
HGB BLD-MCNC: 15.2 G/DL (ref 14–18)
HHV6 DNA CSF QL NAA+NON-PROBE: NOT DETECTED
HOLDING TUBE BB 8507: NORMAL
HSV1 DNA CSF QL NAA+NON-PROBE: NOT DETECTED
HSV2 DNA CSF QL NAA+NON-PROBE: NOT DETECTED
IMM GRANULOCYTES # BLD AUTO: 0.02 K/UL (ref 0–0.11)
IMM GRANULOCYTES NFR BLD AUTO: 0.3 % (ref 0–0.9)
INR PPP: 1.01 (ref 0.87–1.13)
L MONOCYTOG DNA CSF QL NAA+NON-PROBE: NOT DETECTED
LACTATE SERPL-SCNC: 4 MMOL/L (ref 0.5–2)
LYMPHOCYTES # BLD AUTO: 1.36 K/UL (ref 1–4.8)
LYMPHOCYTES NFR BLD: 17.6 % (ref 22–41)
MAGNESIUM SERPL-MCNC: 2 MG/DL (ref 1.5–2.5)
MCH RBC QN AUTO: 27.3 PG (ref 27–33)
MCHC RBC AUTO-ENTMCNC: 33.8 G/DL (ref 32.3–36.5)
MCV RBC AUTO: 80.9 FL (ref 81.4–97.8)
METHADONE UR QL SCN: NEGATIVE
MONOCYTES # BLD AUTO: 0.55 K/UL (ref 0–0.85)
MONOCYTES NFR BLD AUTO: 7.1 % (ref 0–13.4)
N MEN DNA CSF QL NAA+NON-PROBE: NOT DETECTED
NEUTROPHILS # BLD AUTO: 5.74 K/UL (ref 1.82–7.42)
NEUTROPHILS NFR BLD: 74.5 % (ref 44–72)
NRBC # BLD AUTO: 0 K/UL
NRBC BLD-RTO: 0 /100 WBC (ref 0–0.2)
NUC CELL # CSF: 1 CELLS/UL (ref 0–10)
NUC CELL # CSF: 1 CELLS/UL (ref 0–10)
OPIATES UR QL SCN: NEGATIVE
OXYCODONE UR QL SCN: NEGATIVE
PARECHOVIRUS A RNA CSF QL NAA+NON-PROBE: NOT DETECTED
PCP UR QL SCN: NEGATIVE
PLATELET # BLD AUTO: 287 K/UL (ref 164–446)
PMV BLD AUTO: 9 FL (ref 9–12.9)
POTASSIUM SERPL-SCNC: 3.1 MMOL/L (ref 3.6–5.5)
PROPOXYPH UR QL SCN: NEGATIVE
PROT CSF-MCNC: 91 MG/DL (ref 15–45)
PROT SERPL-MCNC: 7.5 G/DL (ref 6–8.2)
PROTHROMBIN TIME: 13.3 SEC (ref 12–14.6)
RBC # BLD AUTO: 5.56 M/UL (ref 4.7–6.1)
RBC # CSF: 48 CELLS/UL
RBC # CSF: <1 CELLS/UL
RH BLD: NORMAL
RSV RNA SPEC QL NAA+PROBE: NEGATIVE
S PNEUM DNA CSF QL NAA+NON-PROBE: NOT DETECTED
SARS-COV-2 RNA RESP QL NAA+PROBE: NOTDETECTED
SIGNIFICANT IND 70042: NORMAL
SITE SITE: NORMAL
SODIUM SERPL-SCNC: 137 MMOL/L (ref 135–145)
SOURCE SOURCE: NORMAL
SPECIMEN VOL CSF: 5.5 ML
SPECIMEN VOL CSF: 5.5 ML
TROPONIN T SERPL-MCNC: 9 NG/L (ref 6–19)
TUBE # CSF: 4
TUBE # CSF: 4
TUBE # CSF: NORMAL
TUBE # CSF: NORMAL
VZV DNA CSF QL NAA+NON-PROBE: NOT DETECTED
WBC # BLD AUTO: 7.7 K/UL (ref 4.8–10.8)

## 2025-03-25 PROCEDURE — 93005 ELECTROCARDIOGRAM TRACING: CPT | Mod: TC | Performed by: EMERGENCY MEDICINE

## 2025-03-25 PROCEDURE — 82784 ASSAY IGA/IGD/IGG/IGM EACH: CPT

## 2025-03-25 PROCEDURE — 0241U HCHG SARS-COV-2 COVID-19 NFCT DS RESP RNA 4 TRGT ED POC: CPT

## 2025-03-25 PROCEDURE — 71045 X-RAY EXAM CHEST 1 VIEW: CPT

## 2025-03-25 PROCEDURE — 85025 COMPLETE CBC W/AUTO DIFF WBC: CPT

## 2025-03-25 PROCEDURE — 700102 HCHG RX REV CODE 250 W/ 637 OVERRIDE(OP)

## 2025-03-25 PROCEDURE — 85730 THROMBOPLASTIN TIME PARTIAL: CPT

## 2025-03-25 PROCEDURE — 83916 OLIGOCLONAL BANDS: CPT

## 2025-03-25 PROCEDURE — 99285 EMERGENCY DEPT VISIT HI MDM: CPT

## 2025-03-25 PROCEDURE — 87483 CNS DNA AMP PROBE TYPE 12-25: CPT

## 2025-03-25 PROCEDURE — 36415 COLL VENOUS BLD VENIPUNCTURE: CPT

## 2025-03-25 PROCEDURE — 70496 CT ANGIOGRAPHY HEAD: CPT

## 2025-03-25 PROCEDURE — 87205 SMEAR GRAM STAIN: CPT

## 2025-03-25 PROCEDURE — 85610 PROTHROMBIN TIME: CPT

## 2025-03-25 PROCEDURE — 99222 1ST HOSP IP/OBS MODERATE 55: CPT

## 2025-03-25 PROCEDURE — 86900 BLOOD TYPING SEROLOGIC ABO: CPT

## 2025-03-25 PROCEDURE — 84484 ASSAY OF TROPONIN QUANT: CPT

## 2025-03-25 PROCEDURE — 94760 N-INVAS EAR/PLS OXIMETRY 1: CPT

## 2025-03-25 PROCEDURE — 770006 HCHG ROOM/CARE - MED/SURG/GYN SEMI*

## 2025-03-25 PROCEDURE — 80307 DRUG TEST PRSMV CHEM ANLYZR: CPT

## 2025-03-25 PROCEDURE — 89051 BODY FLUID CELL COUNT: CPT | Mod: 91

## 2025-03-25 PROCEDURE — 70498 CT ANGIOGRAPHY NECK: CPT

## 2025-03-25 PROCEDURE — 700101 HCHG RX REV CODE 250: Performed by: EMERGENCY MEDICINE

## 2025-03-25 PROCEDURE — 700105 HCHG RX REV CODE 258: Performed by: EMERGENCY MEDICINE

## 2025-03-25 PROCEDURE — 0042T CT-CEREBRAL PERFUSION ANALYSIS: CPT

## 2025-03-25 PROCEDURE — A9270 NON-COVERED ITEM OR SERVICE: HCPCS

## 2025-03-25 PROCEDURE — 700117 HCHG RX CONTRAST REV CODE 255: Performed by: EMERGENCY MEDICINE

## 2025-03-25 PROCEDURE — 86901 BLOOD TYPING SEROLOGIC RH(D): CPT | Mod: 91

## 2025-03-25 PROCEDURE — 80053 COMPREHEN METABOLIC PANEL: CPT

## 2025-03-25 PROCEDURE — 83735 ASSAY OF MAGNESIUM: CPT

## 2025-03-25 PROCEDURE — 82945 GLUCOSE OTHER FLUID: CPT

## 2025-03-25 PROCEDURE — 86850 RBC ANTIBODY SCREEN: CPT

## 2025-03-25 PROCEDURE — 84157 ASSAY OF PROTEIN OTHER: CPT

## 2025-03-25 PROCEDURE — 83605 ASSAY OF LACTIC ACID: CPT

## 2025-03-25 PROCEDURE — 62270 DX LMBR SPI PNXR: CPT

## 2025-03-25 PROCEDURE — 82962 GLUCOSE BLOOD TEST: CPT | Mod: 91

## 2025-03-25 PROCEDURE — 87070 CULTURE OTHR SPECIMN AEROBIC: CPT

## 2025-03-25 PROCEDURE — 70450 CT HEAD/BRAIN W/O DYE: CPT

## 2025-03-25 PROCEDURE — 82077 ASSAY SPEC XCP UR&BREATH IA: CPT

## 2025-03-25 PROCEDURE — 700111 HCHG RX REV CODE 636 W/ 250 OVERRIDE (IP)

## 2025-03-25 PROCEDURE — 99222 1ST HOSP IP/OBS MODERATE 55: CPT | Mod: GC | Performed by: STUDENT IN AN ORGANIZED HEALTH CARE EDUCATION/TRAINING PROGRAM

## 2025-03-25 RX ORDER — AMLODIPINE BESYLATE 10 MG/1
10 TABLET ORAL DAILY
Status: DISCONTINUED | OUTPATIENT
Start: 2025-03-26 | End: 2025-03-27

## 2025-03-25 RX ORDER — HYDROCHLOROTHIAZIDE 25 MG/1
25 TABLET ORAL DAILY
Status: ON HOLD | COMMUNITY
End: 2025-05-13

## 2025-03-25 RX ORDER — ACETAMINOPHEN 325 MG/1
650 TABLET ORAL EVERY 6 HOURS PRN
Status: DISCONTINUED | OUTPATIENT
Start: 2025-03-25 | End: 2025-03-27

## 2025-03-25 RX ORDER — LORAZEPAM 1 MG/1
0.5 TABLET ORAL EVERY 6 HOURS PRN
Status: DISCONTINUED | OUTPATIENT
Start: 2025-03-25 | End: 2025-03-26

## 2025-03-25 RX ORDER — ONDANSETRON 4 MG/1
4 TABLET, ORALLY DISINTEGRATING ORAL EVERY 4 HOURS PRN
Status: DISCONTINUED | OUTPATIENT
Start: 2025-03-25 | End: 2025-03-27

## 2025-03-25 RX ORDER — SODIUM CHLORIDE, SODIUM LACTATE, POTASSIUM CHLORIDE, AND CALCIUM CHLORIDE .6; .31; .03; .02 G/100ML; G/100ML; G/100ML; G/100ML
1000 INJECTION, SOLUTION INTRAVENOUS ONCE
Status: COMPLETED | OUTPATIENT
Start: 2025-03-25 | End: 2025-03-25

## 2025-03-25 RX ORDER — GUAIFENESIN/DEXTROMETHORPHAN 100-10MG/5
10 SYRUP ORAL EVERY 6 HOURS PRN
Status: DISCONTINUED | OUTPATIENT
Start: 2025-03-25 | End: 2025-03-27

## 2025-03-25 RX ORDER — POTASSIUM CHLORIDE 1500 MG/1
40 TABLET, EXTENDED RELEASE ORAL ONCE
Status: COMPLETED | OUTPATIENT
Start: 2025-03-25 | End: 2025-03-25

## 2025-03-25 RX ORDER — INSULIN LISPRO 100 [IU]/ML
1-6 INJECTION, SOLUTION INTRAVENOUS; SUBCUTANEOUS
Status: DISCONTINUED | OUTPATIENT
Start: 2025-03-25 | End: 2025-03-28

## 2025-03-25 RX ORDER — PROMETHAZINE HYDROCHLORIDE 25 MG/1
12.5-25 TABLET ORAL EVERY 4 HOURS PRN
Status: DISCONTINUED | OUTPATIENT
Start: 2025-03-25 | End: 2025-03-27

## 2025-03-25 RX ORDER — DEXTROSE MONOHYDRATE 25 G/50ML
25 INJECTION, SOLUTION INTRAVENOUS
Status: DISCONTINUED | OUTPATIENT
Start: 2025-03-25 | End: 2025-03-25

## 2025-03-25 RX ORDER — AMOXICILLIN 250 MG
2 CAPSULE ORAL EVERY EVENING
Status: DISCONTINUED | OUTPATIENT
Start: 2025-03-25 | End: 2025-03-27

## 2025-03-25 RX ORDER — PROMETHAZINE HYDROCHLORIDE 25 MG/1
12.5-25 SUPPOSITORY RECTAL EVERY 4 HOURS PRN
Status: DISCONTINUED | OUTPATIENT
Start: 2025-03-25 | End: 2025-04-03

## 2025-03-25 RX ORDER — LABETALOL HYDROCHLORIDE 5 MG/ML
10 INJECTION, SOLUTION INTRAVENOUS EVERY 4 HOURS PRN
Status: DISCONTINUED | OUTPATIENT
Start: 2025-03-25 | End: 2025-03-27

## 2025-03-25 RX ORDER — PROCHLORPERAZINE EDISYLATE 5 MG/ML
5-10 INJECTION INTRAMUSCULAR; INTRAVENOUS EVERY 4 HOURS PRN
Status: DISCONTINUED | OUTPATIENT
Start: 2025-03-25 | End: 2025-04-03

## 2025-03-25 RX ORDER — POLYETHYLENE GLYCOL 3350 17 G/17G
1 POWDER, FOR SOLUTION ORAL
Status: DISCONTINUED | OUTPATIENT
Start: 2025-03-25 | End: 2025-03-27

## 2025-03-25 RX ORDER — DEXTROSE MONOHYDRATE 25 G/50ML
25 INJECTION, SOLUTION INTRAVENOUS
Status: DISCONTINUED | OUTPATIENT
Start: 2025-03-25 | End: 2025-03-28

## 2025-03-25 RX ORDER — AMLODIPINE BESYLATE 10 MG/1
10 TABLET ORAL
Status: ON HOLD | COMMUNITY
Start: 2025-02-28 | End: 2025-05-13

## 2025-03-25 RX ORDER — HYDROCHLOROTHIAZIDE 25 MG/1
25 TABLET ORAL DAILY
Status: DISCONTINUED | OUTPATIENT
Start: 2025-03-26 | End: 2025-03-27

## 2025-03-25 RX ORDER — METFORMIN HYDROCHLORIDE 500 MG/1
500 TABLET, EXTENDED RELEASE ORAL
Status: ON HOLD | COMMUNITY
End: 2025-05-13

## 2025-03-25 RX ORDER — ENOXAPARIN SODIUM 100 MG/ML
40 INJECTION SUBCUTANEOUS DAILY
Status: DISCONTINUED | OUTPATIENT
Start: 2025-03-25 | End: 2025-04-03

## 2025-03-25 RX ORDER — LIDOCAINE HYDROCHLORIDE 20 MG/ML
20 INJECTION, SOLUTION INFILTRATION; PERINEURAL ONCE
Status: COMPLETED | OUTPATIENT
Start: 2025-03-25 | End: 2025-03-25

## 2025-03-25 RX ORDER — ONDANSETRON 2 MG/ML
4 INJECTION INTRAMUSCULAR; INTRAVENOUS EVERY 4 HOURS PRN
Status: DISCONTINUED | OUTPATIENT
Start: 2025-03-25 | End: 2025-04-29

## 2025-03-25 RX ADMIN — POTASSIUM CHLORIDE 40 MEQ: 1500 TABLET, EXTENDED RELEASE ORAL at 23:45

## 2025-03-25 RX ADMIN — LIDOCAINE HYDROCHLORIDE 20 ML: 20 INJECTION, SOLUTION INFILTRATION; PERINEURAL at 17:50

## 2025-03-25 RX ADMIN — IOHEXOL 120 ML: 350 INJECTION, SOLUTION INTRAVENOUS at 13:15

## 2025-03-25 RX ADMIN — Medication 5 MG: at 23:45

## 2025-03-25 RX ADMIN — ACETAMINOPHEN 650 MG: 325 TABLET ORAL at 23:45

## 2025-03-25 RX ADMIN — LABETALOL HYDROCHLORIDE 10 MG: 5 INJECTION, SOLUTION INTRAVENOUS at 20:14

## 2025-03-25 RX ADMIN — SODIUM CHLORIDE, POTASSIUM CHLORIDE, SODIUM LACTATE AND CALCIUM CHLORIDE 1000 ML: 600; 310; 30; 20 INJECTION, SOLUTION INTRAVENOUS at 18:20

## 2025-03-25 RX ADMIN — IBUPROFEN 600 MG: 200 TABLET, FILM COATED ORAL at 23:37

## 2025-03-25 RX ADMIN — ENOXAPARIN SODIUM 40 MG: 100 INJECTION SUBCUTANEOUS at 20:14

## 2025-03-25 ASSESSMENT — ENCOUNTER SYMPTOMS
LOSS OF CONSCIOUSNESS: 0
SPEECH CHANGE: 1
WEAKNESS: 1
TREMORS: 0
DIZZINESS: 1
HEADACHES: 1
SEIZURES: 0
RESPIRATORY NEGATIVE: 1
FALLS: 1
TINGLING: 1
SENSORY CHANGE: 1
CONSTITUTIONAL NEGATIVE: 1
PSYCHIATRIC NEGATIVE: 1
CARDIOVASCULAR NEGATIVE: 1
FOCAL WEAKNESS: 1
EYES NEGATIVE: 1
GASTROINTESTINAL NEGATIVE: 1

## 2025-03-25 ASSESSMENT — PAIN DESCRIPTION - PAIN TYPE
TYPE: ACUTE PAIN
TYPE: ACUTE PAIN

## 2025-03-25 NOTE — ED NOTES
from lab called with a critical result of lactic acid = 4.1 at 1459.  Critical lab read back to .  Dr. Holley notified of critical lab result at 1500.  Critical lab result read back by .

## 2025-03-25 NOTE — ED TRIAGE NOTES
"Chief Complaint   Patient presents with    Possible Stroke     Pt BIB EMS for possible stroke. Pt LKW 1630 3/24. At 0400 3/25 pt noted to have global weakness, aphasia and ataxia. NIH 17 on arrival.          BP (!) 196/91   Pulse 63   Resp 20   Ht 1.854 m (6' 0.99\")   Wt 120 kg (264 lb 8.8 oz)   SpO2 100%   BMI 34.91 kg/m²     "

## 2025-03-25 NOTE — CONSULTS
"Neurology STROKE CODE H&P  Vascular Neurology Service, [facility]    Referring Physician: Sedrick Holley M.D.    STROKE CODE: No chief complaint on file.      To obtain the most accurate data regarding the time called, and time patient seen, refer to the stroke run-sheet and chart.  For time of CT, refer to the radiology report. See A&P below for TPA Decision and door to needle time if and when applicable.    HPI: Jonna Brian is a 49 y.o. male  with a past medical history of HTN, DM who presented as pre arrival stroke alert for new onset dizziness, generalized weakness and speech difficulty. No AC use. LKW 1630 night prior, awoke at 0400 with symptoms. On EMS arrival /95, FSBG > 200. On arrival to ER no lateralizing symptoms, generalized weakness, speech and comprehension inconsistent. Words are clear when he answers questions \"I don't know\" \" my eyes are open\". CT stroke protocol completed.         Review of systems: In addition to what is detailed in the HPI above, all other systems reviewed and are negative.    Past Medical History:    has a past medical history of Cold, Depression, Headache(784.0), Hypertension, and Influenza A.    FHx:  Family history is unknown by patient.    SHx:   reports that he has never smoked. He has never used smokeless tobacco. He reports that he does not drink alcohol and does not use drugs.    Allergies:  Allergies   Allergen Reactions    Lisinopril Shortness of Breath     Closes throat    Lisinopril        Medications:  No current facility-administered medications for this encounter.    Current Outpatient Medications:     [employer] LIDOCAINE MAXIMUM STRENGTH 4 % Cream, APPLY 1 G TOPICALLY 3 TIMES A DAY., Disp: 90 g, Rfl: 0    lidocaine (XYLOCAINE) 5 % Ointment, Apply 1 g topically as needed (pain)., Disp: 30 g, Rfl: 2    gabapentin (NEURONTIN) 300 MG Cap, Take 1-2 po qhs, Disp: 30 Capsule, Rfl: 0    hydroCHLOROthiazide (HYDRODIURIL) 25 MG Tab, Take 25 " "mg by mouth every day., Disp: , Rfl:     hydrOXYzine pamoate (VISTARIL) 50 MG Cap, Take 1 Capsule by mouth 3 times a day as needed for Itching (nausea)., Disp: 20 Capsule, Rfl: 1    gabapentin (NEURONTIN) 300 MG Cap, Take 1 po qhs, Disp: 7 Capsule, Rfl: 0    methylPREDNISolone (MEDROL DOSEPAK) 4 MG Tablet Therapy Pack, Use as directed, Disp: 1 Each, Rfl: 0    amLODIPine (NORVASC) 5 MG Tab, Take 1 Tab by mouth every day., Disp: 30 Tab, Rfl: 3    ibuprofen (MOTRIN) 800 MG Tab, Take 1 Tab by mouth every 8 hours as needed., Disp: 90 Tab, Rfl: 0    amLODIPine (NORVASC) 5 MG Tab, Take 1 Tab by mouth every day., Disp: 90 Tab, Rfl: 9    NON SPECIFIED, Brain Awake, Disp: , Rfl:     lidocaine (XYLOCAINE) 5 % Ointment, APPLY 1 GRAM TO AFFECTED AREA PRN, Disp: , Rfl: 2    chlorthalidone (HYGROTON) 25 MG Tab, Take 1 Tab by mouth every day., Disp: 30 Tab, Rfl: 3    Lidocaine 5 % Cream, 1 Each by Apply externally route every 12 hours as needed., Disp: , Rfl:     TURMERIC PO, Take 2 Tabs by mouth every day., Disp: , Rfl:     L-ARGININE PO, Take 1 Each by mouth every day., Disp: , Rfl:     Physical Examination:    There were no vitals filed for this visit.      General: Patient is awake, appears anxious on gurney  Eye: Examination of optic disks not indicated at this time given acuity of consult, conjunctival injection  Neck: There is normal range of motion  CV: Regular rate   Extremities:  Clear, dry, intact, without peripheral edema    NEUROLOGICAL EXAM:     Mental status: Awake, not answering orientation questions   Speech and language: Speech is dependent on examiner, garbled and incomprehensible at times, then clear \"I don't know\", \"my eyes are open\" limited following of commands   Cranial nerve exam: Pupils are equal, round and reactive to light bilaterally. Visual fields are full. There is no nystagmus. Extraocular muscles are intact. Face is symmetric.   Motor exam: No antigravity effort to any extremities. While " "repositioning to CT held arms on chest. No abnormal movements were seen on exam.  Sensory exam:  Reacts to tactile in all 4 distal extremities  Coordination: JAMARI not following   Gait: Deferred due to patient preference.    NIHSS: National Institutes of Health Stroke Scale    [0] 1a:Level of Consciousness    0-alert 1-drowsy   2-stupor   3-coma  [2] 1b:LOC Questions                  0-both  1-one      2-neither  [1] 1c:LOC Commands                   0-both  1-one      2-neither  [0] 2: Best Gaze                     0-nl    1-partial  2-forced  [0] 3: Visual Fields                   0-nl    1-partial  2-complete 3-bilat  [0] 4: Facial Paresis                0-nl    1-minor    2-partial  3-full  MOTOR                       0-nl  [3] 5: Right Arm           1-drift  [3] 6: Left Arm             2-some effort vs gravity  [3] 7: Right Leg           3-no effort vs gravity  [3] 8: Left Leg             4-no movement                             x-untestable  [0] 9: Limb Ataxia                    0-abs   1-1_limb   2-2+_limbs       x-untestable  [0] 10:Sensory                        0-nl    1-partial  2-dense  [1] 11:Best Language/Aphasia         0-nl    1-mild/mod 2-severe   3-mute  [1] 12:Dysarthria                     0-nl    1-mild/mod 2-severe       x-untestable  [0] 13:Neglect/Inattention            0-none  1-partial  2-complete  [17] TOTAL        Baseline Modified Gale Scale (MRS): 0 = No symptoms    Objective Data:    Labs:  CrCl cannot be calculated (Patient's most recent lab result is older than the maximum 7 days allowed.).  No results for input(s): \"SODIUM\", \"POTASSIUM\", \"CHLORIDE\", \"CO2\", \"GLUCOSE\", \"BUN\", \"CREATININE\", \"BUNCREATRAT\" in the last 72 hours.    Invalid input(s): \"GFR\"  No results for input(s): \"GLUCOSE\", \"POCGLUCOSE\" in the last 72 hours.  No results for input(s): \"ASTSGOT\", \"ALTSGPT\", \"ALKPHOSPHAT\" in the last 72 hours.    Invalid input(s): \"BILI\"  No results for input(s): \"WBC\", \"HEMOGLOBIN\", " "\"PLATELETCT\" in the last 72 hours.  Lab Results   Component Value Date/Time    PROTHROMBTM 13.2 01/29/2018 03:16 PM    INR 1.03 01/29/2018 03:16 PM      No results found for: \"TROPONINT\", \"NTPROBNP\"  Lab Results   Component Value Date/Time    HBA1C 6.1 03/18/2019 11:00 AM    HBA1C 6.7 (H) 01/30/2018 12:59 AM     Lab Results   Component Value Date/Time     (H) 01/30/2018 12:59 AM    HDL 30 (A) 01/30/2018 12:59 AM    TRIGLYCERIDE 170 (H) 01/30/2018 12:59 AM    CHOLSTRLTOT 166 01/30/2018 12:59 AM       Imaging/Testing:    I interpreted and/or reviewed the patient's neuroimaging    DX-CHEST-PORTABLE (1 VIEW)   Final Result      No acute cardiopulmonary disease evident.      CT-CEREBRAL PERFUSION ANALYSIS   Final Result      1. Cerebral blood flow less than 30% possibly representing completed infarct = 0 mL. Based on distribution of this finding, this is unlikely to represent artifact.      2. T Max more than 6 seconds possibly representing combination of completed infarct and ischemia = 7 mL. Based on the distribution of this finding, this is possibly artifact.      3. Mismatched volume possibly representing ischemic brain/penumbra= 0 mL      4.  Please note that this cerebral perfusion study and report is Quantitative and targets supratentorial (cerebral) perfusion for evaluation of large vessel territory acute ischemia/infarction. For example, lacunar infarcts, and brainstem/posterior fossa    ischemia/infarction are not evaluated on this study.  Data acquisition is subject to artifacts which can yield non-anatomically plausible perfusion maps which may be due to motion, bolus timing, signal to noise ratio, or other technical factors.    Perfusion map abnormalities which show non-anatomic distributions are likely artifact.   This study is not \"stand-alone\" and should only be utilized for diagnosis, management/treatment in correlation with CT, CTA, and/or MRI and clinical factors.         CT-CTA NECK WITH & " W/O-POST PROCESSING   Final Result      CT angiogram of the neck within normal limits.      CT-CTA HEAD WITH & W/O-POST PROCESS   Final Result      CT angiogram of the [place] within normal limits.      CT-HEAD W/O   Final Result      Head CT without contrast within normal limits. No evidence of acute cerebral infarction, hemorrhage or mass lesion.                       Assessment and Plan:    Jonna Brian is a 49 y.o. male  with vascular risk factors presenting with non-specific neurological findings, NIH 17, artificially elevated d/t generalized weakness and participatory effort.  NCCTH unremarkable. CTA head and neck negative for high grade stenosis or vessel occlusion. CTP with slight motion artifact, otherwise unremarkable. Overall low suspicion for an acute ischemic process given lack of lateralizing symptoms and normal imaging. Not a candidate for acute stroke interventions given time since LKW and failure to identify culprit lesion.  Recommend toxic metabolic workup per ERP. Please reach out to Neurology with any questions.           VANESA Alanis  Vascular Neurology, Acute Care Services         Case reviewed and plan created with Dr. Teixeira, Vascular Neurology. Please call with any questions.

## 2025-03-25 NOTE — ED NOTES
"Labs drawn from pt, who is able to raise arm on command for lab draw.  He is requesting \"something to eat and drink.\"    Covid swab running.  "

## 2025-03-25 NOTE — ED NOTES
Pt passed swallow evaluation and is able to hold glass and lift up to lips.  No issues with swallow noted.      PT also able to sign consent form stating he does not want to be a bloodless candidate anymore and would accept a blood transfusion if needed.

## 2025-03-25 NOTE — ED NOTES
Received call from [facility] requesting Consent for blood transfusion to be signed by pt to specify that he does not want to be a part of Bloodless Program.  Electronic chart shows pt declined participation from bloodless program in 2017.  Will F/U with pt's wishes presently.

## 2025-03-25 NOTE — ED PROVIDER NOTES
ED Provider Note    CHIEF COMPLAINT  Altered mental status    EXTERNAL RECORDS REVIEWED  Mated to outlying facility earlier this month for upper respiratory tract infection Acute bronchitis hypertension and cardiomegaly.    HPI/ROS  LIMITATION TO HISTORY   Select: : None  OUTSIDE HISTORIAN(S):  EMS at time of arrival    Jonna Brian is a 49 y.o. male who presents to the emergency department as a stroke alert from EMS.  Reportedly last seen normal 4:30 PM last night.  Patient reports that after returning from work last night he began having left frontal headache and feeling very weak.  Noted to be more weak and less responsive by family this morning.  Patient reportedly has been a GCS 14 with some intermittent confusion and route.  He has been hesitant to move any extremities and will not localize.  Patient reports ongoing headache at this time.  He reports minor nausea.  No chest pain no palpitations no recent fevers chills cough abdominal pain problems with urination or bowel movements.    PAST MEDICAL HISTORY   has a past medical history of Cold, Depression, Headache(784.0), Hypertension, and Influenza A.    SURGICAL HISTORY   has a past surgical history that includes subtalor triple fusion (Left, 1/23/2018); ankle arthroscopy (Right, 10/8/2018); hardware removal ortho (Right, 10/8/2018); orthopedic osteotomy (Right, 10/8/2018); and tarsal tunnel release (Left, 4/20/2022).    FAMILY HISTORY  Family History   Family history unknown: Yes       SOCIAL HISTORY  Social History     Tobacco Use    Smoking status: Never    Smokeless tobacco: Never   Vaping Use    Vaping status: Never Used   Substance and Sexual Activity    Alcohol use: Never    Drug use: Never    Sexual activity: Yes     Partners: Female       CURRENT MEDICATIONS  Home Medications    **Home medications have not yet been reviewed for this encounter**       Audit from Redirected Encounters    **Home medications have not yet been reviewed for this  "encounter**         ALLERGIES  Allergies   Allergen Reactions    Lisinopril Shortness of Breath     Closes throat    Lisinopril        PHYSICAL EXAM  VITAL SIGNS: BP (!) 176/92   Pulse (!) 59   Temp 36.7 °C (98 °F) (Temporal)   Resp (!) 7   Ht 1.854 m (6' 0.99\")   Wt 120 kg (264 lb 8.8 oz)   SpO2 96%   BMI 34.91 kg/m²      Pulse ox interpretation: I interpret this pulse ox as normal.  Constitutional: Somnolent and somewhat disoriented, moderate distress  HEENT: Atraumatic normocephalic, pupils are equal round reactive to light extraocular movements are intact. The nares is clear, external ears are normal, mouth shows moist mucous membranes normal dentition for age  Neck: Supple, no JVD no tracheal deviation  Cardiovascular: Regular rate and rhythm no murmur rub or gallop 2+ pulses peripherally x4  Thorax & Lungs: No respiratory distress, no wheezes rales or rhonchi, No chest tenderness.   GI: Soft nontender nondistended positive bowel sounds, no peritoneal signs  Skin: Warm dry no acute rash or lesion  Neurologic/MSK:4/5 strength in bilateral upper extremities 2-3 out of 5 strength in bilateral lower extremities and perceived paresthesia of the right lower extremity.  No facial droop minimal   dysarthria minimal confusion no aphasia      NIHSS: National Institutes of Health Stroke Scale     [0] 1a:Level of Consciousness           0-alert 1-drowsy   2-stupor   3-coma  [2] 1b:LOC Questions                         0-both  1-one      2-neither  [1] 1c:LOC Commands                       0-both  1-one      2-neither  [0] 2: Best Gaze                      0-nl    1-partial  2-forced  [0] 3: Visual Fields                              0-nl    1-partial  2-complete 3-bilat  [0] 4: Facial Paresis                0-nl    1-minor    2-partial  3-full  MOTOR                                              0-nl  [3] 5: Right Arm                       1-drift  [3] 6: Left Arm                                     2-some effort vs " gravity  [3] 7: Right Leg                       3-no effort vs gravity  [3] 8: Left Leg                                      4-no movement                                                              x-untestable  [0] 9: Limb Ataxia                    0-abs   1-1_limb   2-2+_limbs                                                              x-untestable  [0] 10:Sensory                        0-nl    1-partial  2-dense  [0] 11:Best Language/Aphasia         0-nl    1-mild/mod 2-severe   3-mute  [0] 12:Dysarthria                     0-nl    1-mild/mod 2-severe                                                              x-untestable  [0] 13:Neglect/Inattention                   0-none  1-partial  2-complete  [15] TOTAL       Psychiatric: Appropriate affect for situation at this time      EKG/LABS  Results for orders placed or performed during the hospital encounter of 03/25/25   CBC WITH DIFFERENTIAL    Collection Time: 03/25/25 12:44 PM   Result Value Ref Range    WBC 7.7 4.8 - 10.8 K/uL    RBC 5.56 4.70 - 6.10 M/uL    Hemoglobin 15.2 14.0 - 18.0 g/dL    Hematocrit 45.0 42.0 - 52.0 %    MCV 80.9 (L) 81.4 - 97.8 fL    MCH 27.3 27.0 - 33.0 pg    MCHC 33.8 32.3 - 36.5 g/dL    RDW 38.5 35.9 - 50.0 fL    Platelet Count 287 164 - 446 K/uL    MPV 9.0 9.0 - 12.9 fL    Neutrophils-Polys 74.50 (H) 44.00 - 72.00 %    Lymphocytes 17.60 (L) 22.00 - 41.00 %    Monocytes 7.10 0.00 - 13.40 %    Eosinophils 0.10 0.00 - 6.90 %    Basophils 0.40 0.00 - 1.80 %    Immature Granulocytes 0.30 0.00 - 0.90 %    Nucleated RBC 0.00 0.00 - 0.20 /100 WBC    Neutrophils (Absolute) 5.74 1.82 - 7.42 K/uL    Lymphs (Absolute) 1.36 1.00 - 4.80 K/uL    Monos (Absolute) 0.55 0.00 - 0.85 K/uL    Eos (Absolute) 0.01 0.00 - 0.51 K/uL    Baso (Absolute) 0.03 0.00 - 0.12 K/uL    Immature Granulocytes (abs) 0.02 0.00 - 0.11 K/uL    NRBC (Absolute) 0.00 K/uL   COMP METABOLIC PANEL    Collection Time: 03/25/25 12:44 PM   Result Value Ref Range    Sodium 137 135  - 145 mmol/L    Potassium 3.1 (L) 3.6 - 5.5 mmol/L    Chloride 101 96 - 112 mmol/L    Co2 18 (L) 20 - 33 mmol/L    Anion Gap 18.0 (H) 7.0 - 16.0    Glucose 186 (H) 65 - 99 mg/dL    Bun 13 8 - 22 mg/dL    Creatinine 0.91 0.50 - 1.40 mg/dL    Calcium 9.6 8.5 - 10.5 mg/dL    Correct Calcium 9.4 8.5 - 10.5 mg/dL    AST(SGOT) 25 12 - 45 U/L    ALT(SGPT) 25 2 - 50 U/L    Alkaline Phosphatase 67 30 - 99 U/L    Total Bilirubin 0.7 0.1 - 1.5 mg/dL    Albumin 4.3 3.2 - 4.9 g/dL    Total Protein 7.5 6.0 - 8.2 g/dL    Globulin 3.2 1.9 - 3.5 g/dL    A-G Ratio 1.3 g/dL   PROTHROMBIN TIME    Collection Time: 03/25/25 12:44 PM   Result Value Ref Range    PT 13.3 12.0 - 14.6 sec    INR 1.01 0.87 - 1.13   APTT    Collection Time: 03/25/25 12:44 PM   Result Value Ref Range    APTT 28.4 24.7 - 36.0 sec   TROPONIN    Collection Time: 03/25/25 12:44 PM   Result Value Ref Range    Troponin T 9 6 - 19 ng/L   ESTIMATED GFR    Collection Time: 03/25/25 12:44 PM   Result Value Ref Range    GFR (CKD-EPI) 104 >60 mL/min/1.73 m 2   HOLD BLOOD BANK SPECIMEN (NOT TESTED)    Collection Time: 03/25/25 12:44 PM   Result Value Ref Range    Holding Tube - Bb DONE    COD - Adult (Type and Screen)    Collection Time: 03/25/25 12:44 PM   Result Value Ref Range    ABO Grouping Only O     Rh Grouping Only POS     Antibody Screen-Cod NEG    POCT glucose device results    Collection Time: 03/25/25 12:46 PM   Result Value Ref Range    POC Glucose, Blood 190 (H) 65 - 99 mg/dL   LACTIC ACID    Collection Time: 03/25/25  1:36 PM   Result Value Ref Range    Lactic Acid 4.0 (HH) 0.5 - 2.0 mmol/L   ABO Rh Confirm    Collection Time: 03/25/25  2:15 PM   Result Value Ref Range    ABO Rh Confirm O POS    DIAGNOSTIC ALCOHOL    Collection Time: 03/25/25  2:18 PM   Result Value Ref Range    Diagnostic Alcohol <10.1 <10.1 mg/dL   POC CoV-2, FLU A/B, RSV by PCR    Collection Time: 03/25/25  2:31 PM   Result Value Ref Range    POC Influenza A RNA, PCR Negative Negative     POC Influenza B RNA, PCR Negative Negative    POC RSV, by PCR Negative Negative    POC SARS-CoV-2, PCR NotDetected NotDetected   URINE DRUG SCREEN    Collection Time: 25  4:16 PM   Result Value Ref Range    Amphetamines Urine Negative Negative    Barbiturates Negative Negative    Benzodiazepines Negative Negative    Cocaine Metabolite Negative Negative    Fentanyl, Urine Negative Negative    Methadone Negative Negative    Opiates Negative Negative    Oxycodone Negative Negative    Phencyclidine -Pcp Negative Negative    Propoxyphene Negative Negative    Cannabinoid Metab Negative Negative   CSF CELL COUNT    Collection Time: 25  6:00 PM   Result Value Ref Range    Number Of Tubes 4     Volume 5.5 mL    Color-Body Fluid Colorless     Character-Body Fluid Clear     Supernatant Appearance Colorless     Total RBC Count 48 cells/uL    Crenated RBC 0 %    CSF Total Nucleated Cells 1 0 - 10 cells/uL    Comments See Comment     CSF Tube Number Tube 1    CSF CELL COUNT    Collection Time: 25  6:00 PM   Result Value Ref Range    Number Of Tubes 4     Volume 5.5 mL    Color-Body Fluid Colorless     Character-Body Fluid Clear     Supernatant Appearance Colorless     Total RBC Count <1 cells/uL    Crenated RBC 0 %    CSF Total Nucleated Cells 1 0 - 10 cells/uL    Comments See Comment     CSF Tube Number Tube 4    EKG (NOW)    Collection Time: 25  6:48 PM   Result Value Ref Range    Report       [facility] Emergency Dept.    Test Date:  2025  Pt Name:    CHARLENE BUCKLEY              Department: ER  MRN:        [id number]                      Room:       [facility]  Gender:     Male                         Technician: [id number]  :        1975                   Requested By:THIAGO MORE  Order #:    [id number]                    Reading MD: THIAGO MORE MD    Measurements  Intervals                                Axis  Rate:       61                           P:           28  NH:         184                          QRS:        53  QRSD:       97                           T:          34  QT:         425  QTc:        428    Interpretive Statements  Sinus rhythm  Probable left ventricular hypertrophy  Anterior ST elevation, probably due to LVH  Compared to ECG 08/21/2020 21:15:58  Left ventricular hypertrophy now present  T-wave abnormality no longer present  ST (T wave) deviation still present  Electronically Signed On 03- 18:48:56 PDT  by THIAGO MORE MD         I have independently interpreted this EKG    RADIOLOGY/PROCEDURES   I have independently interpreted the diagnostic imaging associated with this visit and am waiting the final reading from the radiologist.   My preliminary interpretation is as follows: No acute intracranial abnormality    Radiologist interpretation:  DX-CHEST-PORTABLE (1 VIEW)   Final Result      No acute cardiopulmonary disease evident.      CT-CEREBRAL PERFUSION ANALYSIS   Final Result      1. Cerebral blood flow less than 30% possibly representing completed infarct = 0 mL. Based on distribution of this finding, this is unlikely to represent artifact.      2. T Max more than 6 seconds possibly representing combination of completed infarct and ischemia = 7 mL. Based on the distribution of this finding, this is possibly artifact.      3. Mismatched volume possibly representing ischemic brain/penumbra= 0 mL      4.  Please note that this cerebral perfusion study and report is Quantitative and targets supratentorial (cerebral) perfusion for evaluation of large vessel territory acute ischemia/infarction. For example, lacunar infarcts, and brainstem/posterior fossa    ischemia/infarction are not evaluated on this study.  Data acquisition is subject to artifacts which can yield non-anatomically plausible perfusion maps which may be due to motion, bolus timing, signal to noise ratio, or other technical factors.    Perfusion map abnormalities which  "show non-anatomic distributions are likely artifact.   This study is not \"stand-alone\" and should only be utilized for diagnosis, management/treatment in correlation with CT, CTA, and/or MRI and clinical factors.         CT-CTA NECK WITH & W/O-POST PROCESSING   Final Result      CT angiogram of the neck within normal limits.      CT-CTA HEAD WITH & W/O-POST PROCESS   Final Result      CT angiogram of the [place] within normal limits.      CT-HEAD W/O   Final Result      Head CT without contrast within normal limits. No evidence of acute cerebral infarction, hemorrhage or mass lesion.                   COURSE & MEDICAL DECISION MAKING      Lumbar Puncture Procedure Note    Indication: to obtain spinal fluid for diagnostic testing    Consent: The patient was counseled regarding the procedure, it's indications, risks, potential complications and alternatives and any questions were answered. Consent was obtained.    Procedure: The patient was placed in the sitting, supported by bed side stand, position and the appropriate landmarks were identified. The area was prepped and draped in the usual sterile fashion. Anesthesia was obtained using 5 cc of 2% Lidocaine without epinephrine. A spinal needle was inserted at the L4- L5 level with the stylet in place until spinal fluid was returned. Opening pressure was not measured. At this point 6.0 cc of clear cerebral spinal fluid was obtained and sent for appropriate testing. The stylet was then replaced and the needle was withdrawn. A sterile dressing was placed over the site and the patient was placed in the supine position.    The patient tolerated the procedure well.    Complications: None      ASSESSMENT, COURSE AND PLAN  Care Narrative: 49-year-old male presents with altered mental status and global weakness, slight confusion paresthesias at arrival.  NIH of 15.  His NIH is fluctuated heavily throughout his time here.  Intermittently not able to move extremities " and then will be able to stand/move his arms.  He does have a lactic at 4.  Reports ongoing symptoms been very hypertensive throughout arrival.  Due to the profound weakness in his lower extremities as well as perceived paresthesia without other clear source I did perform lumbar puncture and send these fluids for evaluation patient will also require MRI of the brain and likely entire spine.  I discussed this with the internal medicine housestaff up to this point and patient is admitted for ongoing evaluation and treatment admitted in guarded condition.                FINAL DIAGNOSIS  1. Altered mental status, unspecified altered mental status type Active   2. Paralysis (HCC) Active   3.  Lactic acidosis  4.  Lumbar puncture by ERP       Electronically signed by: Sedrick Holley M.D.,

## 2025-03-26 ENCOUNTER — APPOINTMENT (OUTPATIENT)
Dept: RADIOLOGY | Facility: MEDICAL CENTER | Age: 50
DRG: 004 | End: 2025-03-26
Payer: COMMERCIAL

## 2025-03-26 PROBLEM — F41.9 ANXIETY: Status: ACTIVE | Noted: 2025-03-26

## 2025-03-26 LAB
ALBUMIN SERPL BCP-MCNC: 4.2 G/DL (ref 3.2–4.9)
ALBUMIN/GLOB SERPL: 1.3 G/DL
ALP SERPL-CCNC: 68 U/L (ref 30–99)
ALT SERPL-CCNC: 22 U/L (ref 2–50)
ANION GAP SERPL CALC-SCNC: 13 MMOL/L (ref 7–16)
AST SERPL-CCNC: 20 U/L (ref 12–45)
BASOPHILS # BLD AUTO: 0.3 % (ref 0–1.8)
BASOPHILS # BLD: 0.02 K/UL (ref 0–0.12)
BILIRUB SERPL-MCNC: 0.5 MG/DL (ref 0.1–1.5)
BUN SERPL-MCNC: 12 MG/DL (ref 8–22)
CALCIUM ALBUM COR SERPL-MCNC: 9.5 MG/DL (ref 8.5–10.5)
CALCIUM SERPL-MCNC: 9.7 MG/DL (ref 8.5–10.5)
CHLORIDE SERPL-SCNC: 102 MMOL/L (ref 96–112)
CO2 SERPL-SCNC: 22 MMOL/L (ref 20–33)
CREAT SERPL-MCNC: 0.9 MG/DL (ref 0.5–1.4)
EOSINOPHIL # BLD AUTO: 0.04 K/UL (ref 0–0.51)
EOSINOPHIL NFR BLD: 0.6 % (ref 0–6.9)
ERYTHROCYTE [DISTWIDTH] IN BLOOD BY AUTOMATED COUNT: 39.1 FL (ref 35.9–50)
EST. AVERAGE GLUCOSE BLD GHB EST-MCNC: 134 MG/DL
GFR SERPLBLD CREATININE-BSD FMLA CKD-EPI: 106 ML/MIN/1.73 M 2
GLOBULIN SER CALC-MCNC: 3.3 G/DL (ref 1.9–3.5)
GLUCOSE BLD STRIP.AUTO-MCNC: 134 MG/DL (ref 65–99)
GLUCOSE BLD STRIP.AUTO-MCNC: 147 MG/DL (ref 65–99)
GLUCOSE BLD STRIP.AUTO-MCNC: 154 MG/DL (ref 65–99)
GLUCOSE SERPL-MCNC: 152 MG/DL (ref 65–99)
HBA1C MFR BLD: 6.3 % (ref 4–5.6)
HCT VFR BLD AUTO: 46.3 % (ref 42–52)
HGB BLD-MCNC: 15.7 G/DL (ref 14–18)
IMM GRANULOCYTES # BLD AUTO: 0.02 K/UL (ref 0–0.11)
IMM GRANULOCYTES NFR BLD AUTO: 0.3 % (ref 0–0.9)
LYMPHOCYTES # BLD AUTO: 1.59 K/UL (ref 1–4.8)
LYMPHOCYTES NFR BLD: 24.7 % (ref 22–41)
MCH RBC QN AUTO: 27.4 PG (ref 27–33)
MCHC RBC AUTO-ENTMCNC: 33.9 G/DL (ref 32.3–36.5)
MCV RBC AUTO: 80.8 FL (ref 81.4–97.8)
MONOCYTES # BLD AUTO: 0.63 K/UL (ref 0–0.85)
MONOCYTES NFR BLD AUTO: 9.8 % (ref 0–13.4)
NEUTROPHILS # BLD AUTO: 4.13 K/UL (ref 1.82–7.42)
NEUTROPHILS NFR BLD: 64.3 % (ref 44–72)
NRBC # BLD AUTO: 0 K/UL
NRBC BLD-RTO: 0 /100 WBC (ref 0–0.2)
PLATELET # BLD AUTO: 285 K/UL (ref 164–446)
PMV BLD AUTO: 8.9 FL (ref 9–12.9)
POTASSIUM SERPL-SCNC: 3.6 MMOL/L (ref 3.6–5.5)
PROT SERPL-MCNC: 7.5 G/DL (ref 6–8.2)
RBC # BLD AUTO: 5.73 M/UL (ref 4.7–6.1)
SODIUM SERPL-SCNC: 137 MMOL/L (ref 135–145)
WBC # BLD AUTO: 6.4 K/UL (ref 4.8–10.8)

## 2025-03-26 PROCEDURE — 82962 GLUCOSE BLOOD TEST: CPT | Mod: 91

## 2025-03-26 PROCEDURE — 700111 HCHG RX REV CODE 636 W/ 250 OVERRIDE (IP): Mod: JZ

## 2025-03-26 PROCEDURE — 92610 EVALUATE SWALLOWING FUNCTION: CPT

## 2025-03-26 PROCEDURE — 36415 COLL VENOUS BLD VENIPUNCTURE: CPT

## 2025-03-26 PROCEDURE — 700102 HCHG RX REV CODE 250 W/ 637 OVERRIDE(OP): Performed by: STUDENT IN AN ORGANIZED HEALTH CARE EDUCATION/TRAINING PROGRAM

## 2025-03-26 PROCEDURE — 85025 COMPLETE CBC W/AUTO DIFF WBC: CPT

## 2025-03-26 PROCEDURE — 770006 HCHG ROOM/CARE - MED/SURG/GYN SEMI*

## 2025-03-26 PROCEDURE — A9270 NON-COVERED ITEM OR SERVICE: HCPCS | Performed by: STUDENT IN AN ORGANIZED HEALTH CARE EDUCATION/TRAINING PROGRAM

## 2025-03-26 PROCEDURE — 51798 US URINE CAPACITY MEASURE: CPT

## 2025-03-26 PROCEDURE — 99232 SBSQ HOSP IP/OBS MODERATE 35: CPT | Performed by: STUDENT IN AN ORGANIZED HEALTH CARE EDUCATION/TRAINING PROGRAM

## 2025-03-26 PROCEDURE — 83036 HEMOGLOBIN GLYCOSYLATED A1C: CPT

## 2025-03-26 PROCEDURE — A9270 NON-COVERED ITEM OR SERVICE: HCPCS

## 2025-03-26 PROCEDURE — 80053 COMPREHEN METABOLIC PANEL: CPT

## 2025-03-26 PROCEDURE — 700102 HCHG RX REV CODE 250 W/ 637 OVERRIDE(OP)

## 2025-03-26 RX ORDER — HYDROXYZINE HYDROCHLORIDE 25 MG/1
25 TABLET, FILM COATED ORAL 3 TIMES DAILY PRN
Status: DISCONTINUED | OUTPATIENT
Start: 2025-03-26 | End: 2025-03-27

## 2025-03-26 RX ORDER — ASPIRIN 300 MG/1
300 SUPPOSITORY RECTAL DAILY
Status: DISCONTINUED | OUTPATIENT
Start: 2025-03-26 | End: 2025-03-27

## 2025-03-26 RX ORDER — METHOCARBAMOL 500 MG/1
500 TABLET, FILM COATED ORAL 3 TIMES DAILY
Status: DISCONTINUED | OUTPATIENT
Start: 2025-03-26 | End: 2025-03-27

## 2025-03-26 RX ORDER — CYCLOBENZAPRINE HCL 10 MG
10 TABLET ORAL 3 TIMES DAILY PRN
Status: DISCONTINUED | OUTPATIENT
Start: 2025-03-26 | End: 2025-03-27

## 2025-03-26 RX ORDER — ASPIRIN 81 MG/1
81 TABLET, CHEWABLE ORAL DAILY
Status: DISCONTINUED | OUTPATIENT
Start: 2025-03-26 | End: 2025-03-27

## 2025-03-26 RX ORDER — HYDRALAZINE HYDROCHLORIDE 50 MG/1
25 TABLET, FILM COATED ORAL EVERY 8 HOURS
Status: DISCONTINUED | OUTPATIENT
Start: 2025-03-26 | End: 2025-03-27

## 2025-03-26 RX ORDER — QUETIAPINE FUMARATE 25 MG/1
25 TABLET, FILM COATED ORAL ONCE
Status: ACTIVE | OUTPATIENT
Start: 2025-03-26 | End: 2025-03-27

## 2025-03-26 RX ORDER — ATORVASTATIN CALCIUM 40 MG/1
40 TABLET, FILM COATED ORAL EVERY EVENING
Status: DISCONTINUED | OUTPATIENT
Start: 2025-03-26 | End: 2025-03-27

## 2025-03-26 RX ADMIN — HYDROCHLOROTHIAZIDE 25 MG: 25 TABLET ORAL at 05:20

## 2025-03-26 RX ADMIN — CYCLOBENZAPRINE 10 MG: 10 TABLET, FILM COATED ORAL at 12:50

## 2025-03-26 RX ADMIN — ACETAMINOPHEN 650 MG: 325 TABLET ORAL at 05:20

## 2025-03-26 RX ADMIN — LABETALOL HYDROCHLORIDE 10 MG: 5 INJECTION, SOLUTION INTRAVENOUS at 17:16

## 2025-03-26 RX ADMIN — PROCHLORPERAZINE EDISYLATE 10 MG: 5 INJECTION INTRAMUSCULAR; INTRAVENOUS at 05:50

## 2025-03-26 RX ADMIN — ENOXAPARIN SODIUM 40 MG: 100 INJECTION SUBCUTANEOUS at 17:16

## 2025-03-26 RX ADMIN — LABETALOL HYDROCHLORIDE 10 MG: 5 INJECTION, SOLUTION INTRAVENOUS at 08:33

## 2025-03-26 RX ADMIN — IBUPROFEN 600 MG: 200 TABLET, FILM COATED ORAL at 05:21

## 2025-03-26 RX ADMIN — LORAZEPAM 0.5 MG: 1 TABLET ORAL at 01:34

## 2025-03-26 RX ADMIN — AMLODIPINE BESYLATE 10 MG: 5 TABLET ORAL at 05:20

## 2025-03-26 RX ADMIN — HYDRALAZINE HYDROCHLORIDE 25 MG: 25 TABLET ORAL at 10:01

## 2025-03-26 RX ADMIN — ASPIRIN 81 MG: 81 TABLET, CHEWABLE ORAL at 12:47

## 2025-03-26 RX ADMIN — HYDRALAZINE HYDROCHLORIDE 25 MG: 25 TABLET ORAL at 16:11

## 2025-03-26 ASSESSMENT — ENCOUNTER SYMPTOMS
EYE PAIN: 0
BLURRED VISION: 0
SPEECH CHANGE: 1
CHILLS: 0
LOSS OF CONSCIOUSNESS: 0
PALPITATIONS: 0
TINGLING: 1
DIZZINESS: 0
FEVER: 0
INSOMNIA: 0
SHORTNESS OF BREATH: 0
BACK PAIN: 0
SENSORY CHANGE: 1
FOCAL WEAKNESS: 1
ABDOMINAL PAIN: 0
VOMITING: 0
HEADACHES: 1
NAUSEA: 0
COUGH: 0

## 2025-03-26 ASSESSMENT — COGNITIVE AND FUNCTIONAL STATUS - GENERAL
EATING MEALS: A LITTLE
MOVING FROM LYING ON BACK TO SITTING ON SIDE OF FLAT BED: A LITTLE
SUGGESTED CMS G CODE MODIFIER DAILY ACTIVITY: CK
TOILETING: A LITTLE
HELP NEEDED FOR BATHING: A LOT
DAILY ACTIVITIY SCORE: 16
MOBILITY SCORE: 14
DRESSING REGULAR LOWER BODY CLOTHING: A LOT
CLIMB 3 TO 5 STEPS WITH RAILING: A LOT
MOVING TO AND FROM BED TO CHAIR: A LOT
STANDING UP FROM CHAIR USING ARMS: A LOT
SUGGESTED CMS G CODE MODIFIER MOBILITY: CL
WALKING IN HOSPITAL ROOM: A LOT
PERSONAL GROOMING: A LITTLE
DRESSING REGULAR UPPER BODY CLOTHING: A LITTLE
TURNING FROM BACK TO SIDE WHILE IN FLAT BAD: A LITTLE

## 2025-03-26 ASSESSMENT — PAIN DESCRIPTION - PAIN TYPE
TYPE: ACUTE PAIN
TYPE: ACUTE PAIN

## 2025-03-26 ASSESSMENT — LIFESTYLE VARIABLES: SUBSTANCE_ABUSE: 0

## 2025-03-26 NOTE — PROGRESS NOTES
"[facility] Medicine Daily Progress Note    Date of Service  3/26/2025    Chief Complaint  Jonna Brian is a 49 y.o. male admitted 3/25/2025 with possible stroke.    Hospital Course  Jonna Brian is a 49 y.o. male who presented 3/25/2025 with global weakness, aphasia and ataxia.  Last known normal was at 4:30 PM on 24 March.  Patient tells me he had headaches and dizziness yesterday in the morning and through the day.  But after he went to bed he felt his right arm was numb at 11 PM yesterday like he was sleeping on it and he cannot move it.  But at 4 AM in the morning, patient noticed that he could not get out of bed.  Patient also reported dizziness when he tried to walk with support which is described as feeling of passing out.  He then called his friend who calls the paramedics.  Patient's friend who is at the bedside also tells me that he has been mumbling words.  Patient reports that he had jaw weakness but does not report any word finding difficulties patient and describes it as inability to get the words out.  Patient also reports pins and needle sensation on bilateral feet and spasms in the right arm.  Patient tells me there is no past history of similar symptoms or episodes.  During the entire episode patient never had any loss of consciousness or disorientation.  I noticed that the patient was able to move his right arm and bilateral legs, not against gravity but at least lateralized movements, but patient tells me that he was not able to do it up until 6:30 PM.     Context:  Upon my chart review-\"Labs drawn from pt, who is able to raise arm on command for lab draw. He is requesting \"something to eat and drink.\" \"   \"Pt passed swallow evaluation and is able to hold glass and lift up to lips.  No issues with swallow noted.     \"     In the ED:  -NIH stroke scale 17 on presentation.  Deemed artificially elevated due to generalized weakness and poor effort.  -CBC no white count elevation.  CMP: Potassium " at 3.1.  Bicarb of 18.  Anion gap 18.  Glucose 186.  Kidney function test and liver labs normal.  Troponins normal.  -Code neuro was called.  NCCT head and CTA head and neck unremarkable.  Deemed not a candidate for acute stroke interventions.  -EKG normal.  Chest x-ray no acute disease.  -Lactic acid at 4.0.  Alcohol level less than 10..  Mini respiratory panel negative.  -Urine drug screen negative.  -Subsequent NIH stroke scale 6.  -Lumbar puncture done..    Interval Problem Update  Patient with assisted fall overnight, no head trauma or loss of consciousness.  Patient reports ongoing right sided weakness, right arm pain, difficulty speaking.  He denies hx of strokes or clots in the past, reports compliance with home medications for diabetes and hypertension.  He is quite hypertensive this morning, goal normotension given no large vessel occlusion on CT imaging.  Started hydralazine PO.  MRI brain and spine pending to evaluate patients symptoms.  PT/OT/SLP evaluations pending.  Neurology do not believe patients symptoms are consistent with acute stroke.    I have discussed this patient's plan of care and discharge plan at IDT rounds today with Case Management, Nursing, Nursing leadership, and other members of the IDT team.    Consultants/Specialty  neurology    Code Status  Full Code    Disposition  Medically Cleared  I have placed the appropriate orders for post-discharge needs.    Review of Systems  Review of Systems   Constitutional:  Negative for chills and fever.   Eyes:  Negative for blurred vision and pain.   Respiratory:  Negative for cough and shortness of breath.    Cardiovascular:  Negative for chest pain, palpitations and leg swelling.   Gastrointestinal:  Negative for abdominal pain, nausea and vomiting.   Genitourinary:  Negative for dysuria and urgency.   Musculoskeletal:  Negative for back pain.   Skin:  Negative for itching and rash.   Neurological:  Positive for tingling, sensory change, speech  change, focal weakness and headaches. Negative for dizziness and loss of consciousness.   Psychiatric/Behavioral:  Negative for substance abuse. The patient does not have insomnia.         Physical Exam  Temp:  [36.4 °C (97.5 °F)-36.7 °C (98.1 °F)] 36.4 °C (97.5 °F)  Pulse:  [58-69] 69  Resp:  [10-26] 16  BP: (160-195)/() 163/100  SpO2:  [95 %-98 %] 96 %    Physical Exam  Constitutional:       General: He is not in acute distress.     Appearance: He is not ill-appearing.   HENT:      Head: Normocephalic and atraumatic.      Right Ear: External ear normal.      Left Ear: External ear normal.      Mouth/Throat:      Pharynx: No oropharyngeal exudate or posterior oropharyngeal erythema.   Eyes:      Extraocular Movements: Extraocular movements intact.      Pupils: Pupils are equal, round, and reactive to light.   Cardiovascular:      Rate and Rhythm: Normal rate and regular rhythm.      Pulses: Normal pulses.      Heart sounds: Normal heart sounds.   Pulmonary:      Effort: Pulmonary effort is normal. No respiratory distress.      Breath sounds: Normal breath sounds. No wheezing.   Abdominal:      General: Bowel sounds are normal. There is no distension.      Palpations: Abdomen is soft.      Tenderness: There is no abdominal tenderness. There is no guarding.   Musculoskeletal:         General: No swelling or tenderness.      Cervical back: Normal range of motion and neck supple.   Skin:     General: Skin is warm and dry.   Neurological:      Mental Status: He is oriented to person, place, and time.      Cranial Nerves: No cranial nerve deficit.      Sensory: Sensory deficit present.      Motor: Weakness present.      Comments: Right arm and leg weakness, 2/5 strength; word finding difficulty and stuttering   Psychiatric:         Mood and Affect: Mood normal.         Behavior: Behavior normal.         Fluids    Intake/Output Summary (Last 24 hours) at 3/26/2025 1505  Last data filed at 3/26/2025 0900  Gross per  "24 hour   Intake 1000 ml   Output 750 ml   Net 250 ml        Laboratory  Recent Labs     03/25/25  1244 03/26/25  0303   WBC 7.7 6.4   RBC 5.56 5.73   HEMOGLOBIN 15.2 15.7   HEMATOCRIT 45.0 46.3   MCV 80.9* 80.8*   MCH 27.3 27.4   MCHC 33.8 33.9   RDW 38.5 39.1   PLATELETCT 287 285   MPV 9.0 8.9*     Recent Labs     03/25/25  1244 03/26/25  0303   SODIUM 137 137   POTASSIUM 3.1* 3.6   CHLORIDE 101 102   CO2 18* 22   GLUCOSE 186* 152*   BUN 13 12   CREATININE 0.91 0.90   CALCIUM 9.6 9.7     Recent Labs     03/25/25  1244   APTT 28.4   INR 1.01               Imaging  DX-CHEST-PORTABLE (1 VIEW)   Final Result      No acute cardiopulmonary disease evident.      CT-CEREBRAL PERFUSION ANALYSIS   Final Result      1. Cerebral blood flow less than 30% possibly representing completed infarct = 0 mL. Based on distribution of this finding, this is unlikely to represent artifact.      2. T Max more than 6 seconds possibly representing combination of completed infarct and ischemia = 7 mL. Based on the distribution of this finding, this is possibly artifact.      3. Mismatched volume possibly representing ischemic brain/penumbra= 0 mL      4.  Please note that this cerebral perfusion study and report is Quantitative and targets supratentorial (cerebral) perfusion for evaluation of large vessel territory acute ischemia/infarction. For example, lacunar infarcts, and brainstem/posterior fossa    ischemia/infarction are not evaluated on this study.  Data acquisition is subject to artifacts which can yield non-anatomically plausible perfusion maps which may be due to motion, bolus timing, signal to noise ratio, or other technical factors.    Perfusion map abnormalities which show non-anatomic distributions are likely artifact.   This study is not \"stand-alone\" and should only be utilized for diagnosis, management/treatment in correlation with CT, CTA, and/or MRI and clinical factors.         CT-CTA NECK WITH & W/O-POST PROCESSING "   Final Result      CT angiogram of the neck within normal limits.      CT-CTA HEAD WITH & W/O-POST PROCESS   Final Result      CT angiogram of the [place] within normal limits.      CT-HEAD W/O   Final Result      Head CT without contrast within normal limits. No evidence of acute cerebral infarction, hemorrhage or mass lesion.               MR-BRAIN-WITH & W/O    (Results Pending)   MR-LUMBAR SPINE-WITH & W/O    (Results Pending)   MR-CERVICAL SPINE-WITH & W/O    (Results Pending)   MR-THORACIC SPINE-WITH & W/O    (Results Pending)        Assessment/Plan  * Acute focal neurologic deficit with partial resolution  Assessment & Plan  Patient is a 49-year-old man presenting with acute onset weakness in right arm and bilateral legs [improving], dysarthria [resolved], dizziness, pins-and-needles paresthesias which started at 11 PM last night.  Absolute areflexia noted on right patellar reflex when compared to left patellar reflex at 2+.  Code neuro was called in the ED-deemed not a candidate for acute interventions.  The clinical presentation is not clearly localizing or does not have clear ascending or descending pattern.  However there is acute variation in the weakness, which can be suspecting of psychosomatic component.  Differential is broad at this moment, includes TIA, acute inflammatory demyelinating polyneuropathy, likely to be variant of GBS, but less likely to be transverse myelitis without a distinct sensory level.    lt could be immunologic but less suspicious of infectious pathology.  -Every 4 neurochecks.  -Await lumbar puncture studies.-CSF cell count, protein, glucose, culture.    -Ordered IGG Csf, Oligoclonal bands, and viral PCR CSF.   -Brain MRI with and without contrast.  -Neurology consult in the morning.  -PT OT when able.    Anxiety  Assessment & Plan  Ativan for anxiety.     Type 2 diabetes mellitus (HCC)- (present on admission)  Assessment & Plan  Patient tells me that he does not have  a diagnosis of diabetes, but his blood glucose levels were high in the ED and on presentation.  -Sign scale insulin.  -Hypoglycemia protocol.    Hypertension- (present on admission)  Assessment & Plan  Resume home amlodipine and hydrochlorothiazide.  -Labetalol as needed's ordered.  Start hydralazine         VTE prophylaxis: VTE Selection    I have performed a physical exam and reviewed and updated ROS and Plan today (3/26/2025). In review of yesterday's note (3/25/2025), there are no changes except as documented above.

## 2025-03-26 NOTE — PROGRESS NOTES
The patient had an assisted fall. The patient pressed the call light and when the CNA entered the room the patient was at the edge of the bed and attempting to stand up and requesting to go to the chair. The CNA attempted to help the patient using a bear-hug maneuver. The patient's legs gave out at the CNA helped lower the patient to the ground onto his buttocks. No injury occurred. More staff arrived in response to the bed alarm and assisted the patient into the chair and then back into bed.

## 2025-03-26 NOTE — ED NOTES
Med Rec complete per patient   Allergies reviewed  Antibiotics in the past 30 days:no  Anticoagulant in past 14 days:no  Pharmacy patient utilizes:[facility] on [address]    Pt states he takes his Metformin QOD because he feels loopy every time he takes it

## 2025-03-26 NOTE — PROGRESS NOTES
2000 per ED report patient passed dysphasia screen as was given a turkey sandwhich. This RN also performed dysphasia screen, patient passed with no issues and was provided a dinner tray.    2345 patient presented with slurred speech upon midnight neuro exam and difficulty managing thin liquids, medications were given crushed in applesauce with no issues.    0520 patient still experiencing slurred speech and did not feel confident attempting thin liquids, morning medications given crushed in applesauce with no issues. Provider informed, this RN made patient NPO strict until speech eval can be completed.

## 2025-03-26 NOTE — ASSESSMENT & PLAN NOTE
Differential is broad at this point.  Due to acutely fluctuating nature of the weakness, there could be psychosomatic component.

## 2025-03-26 NOTE — ASSESSMENT & PLAN NOTE
Resume home amlodipine and hydrochlorothiazide.  -Labetalol as needed's ordered.  Start hydralazine

## 2025-03-26 NOTE — PROGRESS NOTES
4 Eyes Skin Assessment Completed by STEVEN Perez and STEVEN Thompson.    Head WDL  Ears WDL  Nose WDL  Mouth WDL  Neck WDL  Breast/Chest WDL  Shoulder Blades WDL  Spine WDL  (R) Arm/Elbow/Hand WDL  (L) Arm/Elbow/Hand Scar  Abdomen WDL  Groin WDL  Scrotum/Coccyx/Buttocks WDL  (R) Leg Scar and Scab  (L) Leg Scar and Scab  (R) Heel/Foot/Toe heel discoloration/calloused  (L) Heel/Foot/Toe calloused          Devices In Places Pulse Ox      Interventions In Place Pillows    Possible Skin Injury No    Pictures Uploaded Into Epic N/A  Wound Consult Placed N/A  RN Wound Prevention Protocol Ordered No

## 2025-03-26 NOTE — PROGRESS NOTES
Pt reporting numbness, tingling, decreased sensations to right side. States he is unable to move right side. Was noted to move right earlier this am. MD notified, at bedside to see patient. MRI pending.

## 2025-03-26 NOTE — ASSESSMENT & PLAN NOTE
Patient tells me that he does not have a diagnosis of diabetes, but his blood glucose levels were high in the ED and on presentation.  -Sign scale insulin.  -Hypoglycemia protocol.

## 2025-03-26 NOTE — H&P
[facility] Internal Medicine History & Physical Note    Date of Service  3/25    [facility] Team: VINICIO   Attending: Mya Montemayor M.d.  Senior Resident: Dr. Dao  Intern:  Dr. Polo   Contact Number: [phone]    Primary Care Physician  Pcp Pt States None    Consultants  neurology    Specialist Names: Adán.      Code Status  Full Code    Chief Complaint  Chief Complaint   Patient presents with    Possible Stroke     Pt BIB EMS for possible stroke. Pt LKW 1630 3/24. At 0400 3/25 pt noted to have global weakness, aphasia and ataxia. NIH 17 on arrival.        History of Presenting Illness (HPI): Jonna Brian is a 49 y.o. male who presented 3/25/2025 with global weakness, aphasia and ataxia.  Last known normal was at 4:30 PM on 24 March.  Patient tells me he had headaches and dizziness yesterday in the morning and through the day.  But after he went to bed he felt his right arm was numb at 11 PM yesterday like he was sleeping on it and he cannot move it.  But at 4 AM in the morning, patient noticed that he could not get out of bed.  Patient also reported dizziness when he tried to walk with support which is described as feeling of passing out.  He then called his friend who calls the paramedics.  Patient's friend who is at the bedside also tells me that he has been mumbling words.  Patient reports that he had jaw weakness but does not report any word finding difficulties patient and describes it as inability to get the words out.  Patient also reports pins and needle sensation on bilateral feet and spasms in the right arm.  Patient tells me there is no past history of similar symptoms or episodes.  During the entire episode patient never had any loss of consciousness or disorientation.  I noticed that the patient was able to move his right arm and bilateral legs, not against gravity but at least lateralized movements, but patient tells me that he was not able to do it up until 6:30 PM.    Context:  Upon my chart  "review-\"Labs drawn from pt, who is able to raise arm on command for lab draw. He is requesting \"something to eat and drink.\" \"   \"Pt passed swallow evaluation and is able to hold glass and lift up to lips.  No issues with swallow noted.     \"    In the ED:  -NIH stroke scale 17 on presentation.  Deemed artificially elevated due to generalized weakness and poor effort.  -CBC no white count elevation.  CMP: Potassium at 3.1.  Bicarb of 18.  Anion gap 18.  Glucose 186.  Kidney function test and liver labs normal.  Troponins normal.  -Code neuro was called.  NCCT head and CTA head and neck unremarkable.  Deemed not a candidate for acute stroke interventions.  -EKG normal.  Chest x-ray no acute disease.  -Lactic acid at 4.0.  Alcohol level less than 10..  Mini respiratory panel negative.  -Urine drug screen negative.  -Subsequent NIH stroke scale 6.  -Lumbar puncture done..    I discussed the plan of care with patient.    Review of Systems  Review of Systems   Constitutional: Negative.    HENT: Negative.     Eyes: Negative.    Respiratory: Negative.     Cardiovascular: Negative.    Gastrointestinal: Negative.    Genitourinary: Negative.    Musculoskeletal:  Positive for falls.   Skin: Negative.    Neurological:  Positive for dizziness, tingling, sensory change, speech change, focal weakness, weakness and headaches. Negative for tremors, seizures and loss of consciousness.   Endo/Heme/Allergies: Negative.    Psychiatric/Behavioral: Negative.         Past Medical History   has a past medical history of Cold, Depression, Headache(784.0), Hypertension, and Influenza A.    Surgical History   has a past surgical history that includes subtalor triple fusion (Left, 1/23/2018); ankle arthroscopy (Right, 10/8/2018); hardware removal ortho (Right, 10/8/2018); orthopedic osteotomy (Right, 10/8/2018); and pr tarsal tunnel release (Left, 4/20/2022).     Family History  Family history is unknown by patient.   Family history reviewed " with patient.     Social History  Patient does not smoke, drink alcohol or use recreational drugs.    Allergies  Allergies   Allergen Reactions    Lisinopril Shortness of Breath     Closes throat    Lisinopril        Medications  Prior to Admission Medications   Prescriptions Last Dose Informant Patient Reported? Taking?   [employer] LIDOCAINE MAXIMUM STRENGTH 4 % Cream   No No   Sig: APPLY 1 G TOPICALLY 3 TIMES A DAY.   L-ARGININE PO  Patient Yes No   Sig: Take 1 Each by mouth every day.   Lidocaine 5 % Cream  Patient Yes No   Si Each by Apply externally route every 12 hours as needed.   NON SPECIFIED   Yes No   Sig: Brain Awake   TURMERIC PO  Patient Yes No   Sig: Take 2 Tabs by mouth every day.   amLODIPine (NORVASC) 5 MG Tab   No No   Sig: Take 1 Tab by mouth every day.   amLODIPine (NORVASC) 5 MG Tab   No No   Sig: Take 1 Tab by mouth every day.   chlorthalidone (HYGROTON) 25 MG Tab   No No   Sig: Take 1 Tab by mouth every day.   gabapentin (NEURONTIN) 300 MG Cap   No No   Sig: Take 1 po qhs   gabapentin (NEURONTIN) 300 MG Cap   No No   Sig: Take 1-2 po qhs   hydrOXYzine pamoate (VISTARIL) 50 MG Cap   No No   Sig: Take 1 Capsule by mouth 3 times a day as needed for Itching (nausea).   hydroCHLOROthiazide (HYDRODIURIL) 25 MG Tab   Yes No   Sig: Take 25 mg by mouth every day.   ibuprofen (MOTRIN) 800 MG Tab   No No   Sig: Take 1 Tab by mouth every 8 hours as needed.   lidocaine (XYLOCAINE) 5 % Ointment   Yes No   Sig: APPLY 1 GRAM TO AFFECTED AREA PRN   lidocaine (XYLOCAINE) 5 % Ointment   No No   Sig: Apply 1 g topically as needed (pain).   methylPREDNISolone (MEDROL DOSEPAK) 4 MG Tablet Therapy Pack   No No   Sig: Use as directed      Facility-Administered Medications: None       Physical Exam  Temp:  [36.7 °C (98 °F)-36.7 °C (98.1 °F)] 36.7 °C (98.1 °F)  Pulse:  [59-68] 63  Resp:  [10-26] 20  BP: (160-196)/() 195/107  SpO2:  [96 %-100 %] 98 %  Blood Pressure: (!) 160/92   Temperature: 36.7 °C (98 °F)    Pulse: (!) 59   Respiration: (!) 10   Pulse Oximetry: 97 %       Physical Exam  Constitutional:       Appearance: Normal appearance. He is normal weight.   HENT:      Head: Normocephalic and atraumatic.      Nose: Nose normal.      Mouth/Throat:      Mouth: Mucous membranes are moist.   Eyes:      Extraocular Movements: Extraocular movements intact.      Conjunctiva/sclera: Conjunctivae normal.      Pupils: Pupils are equal, round, and reactive to light.   Cardiovascular:      Rate and Rhythm: Normal rate and regular rhythm.      Pulses: Normal pulses.   Pulmonary:      Effort: Pulmonary effort is normal.      Breath sounds: Normal breath sounds.   Abdominal:      General: Abdomen is flat.   Musculoskeletal:         General: No swelling or tenderness.      Cervical back: Normal range of motion.   Skin:     General: Skin is warm.      Capillary Refill: Capillary refill takes less than 2 seconds.   Neurological:      Mental Status: He is alert and oriented to person, place, and time. Mental status is at baseline.      GCS: GCS eye subscore is 4. GCS verbal subscore is 5. GCS motor subscore is 6.      Cranial Nerves: No cranial nerve deficit.      Sensory: Sensory deficit present.      Motor: Weakness present. No seizure activity.      Deep Tendon Reflexes: Reflexes abnormal.      Reflex Scores:       Bicep reflexes are 2+ on the right side and 2+ on the left side.       Brachioradialis reflexes are 2+ on the right side and 2+ on the left side.       Patellar reflexes are 0 on the right side and 2+ on the left side.       Achilles reflexes are 0 on the right side and 2+ on the left side.     Comments: Patient strength in right arm is 2 out of 5 and left arm is 4 out of 5.  -Strength in right leg-2 out of 5 and left leg 3 out of 5.  Right foot dorsiflexion plantarflexion-3 out of 5.  Left foot dorsiflexion plantarflexion-3 out of 5.   Psychiatric:         Mood and Affect: Mood normal.         Behavior: Behavior  "normal.         Laboratory:  Recent Labs     03/25/25  1244   WBC 7.7   RBC 5.56   HEMOGLOBIN 15.2   HEMATOCRIT 45.0   MCV 80.9*   MCH 27.3   MCHC 33.8   RDW 38.5   PLATELETCT 287   MPV 9.0     Recent Labs     03/25/25  1244   SODIUM 137   POTASSIUM 3.1*   CHLORIDE 101   CO2 18*   GLUCOSE 186*   BUN 13   CREATININE 0.91   CALCIUM 9.6     Recent Labs     03/25/25  1244   ALTSGPT 25   ASTSGOT 25   ALKPHOSPHAT 67   TBILIRUBIN 0.7   GLUCOSE 186*     Recent Labs     03/25/25  1244   APTT 28.4   INR 1.01     No results for input(s): \"NTPROBNP\" in the last 72 hours.      Recent Labs     03/25/25  1244   TROPONINT 9       Imaging:  DX-CHEST-PORTABLE (1 VIEW)   Final Result      No acute cardiopulmonary disease evident.      CT-CEREBRAL PERFUSION ANALYSIS   Final Result      1. Cerebral blood flow less than 30% possibly representing completed infarct = 0 mL. Based on distribution of this finding, this is unlikely to represent artifact.      2. T Max more than 6 seconds possibly representing combination of completed infarct and ischemia = 7 mL. Based on the distribution of this finding, this is possibly artifact.      3. Mismatched volume possibly representing ischemic brain/penumbra= 0 mL      4.  Please note that this cerebral perfusion study and report is Quantitative and targets supratentorial (cerebral) perfusion for evaluation of large vessel territory acute ischemia/infarction. For example, lacunar infarcts, and brainstem/posterior fossa    ischemia/infarction are not evaluated on this study.  Data acquisition is subject to artifacts which can yield non-anatomically plausible perfusion maps which may be due to motion, bolus timing, signal to noise ratio, or other technical factors.    Perfusion map abnormalities which show non-anatomic distributions are likely artifact.   This study is not \"stand-alone\" and should only be utilized for diagnosis, management/treatment in correlation with CT, CTA, and/or MRI and clinical " factors.         CT-CTA NECK WITH & W/O-POST PROCESSING   Final Result      CT angiogram of the neck within normal limits.      CT-CTA HEAD WITH & W/O-POST PROCESS   Final Result      CT angiogram of the [place] within normal limits.      CT-HEAD W/O   Final Result      Head CT without contrast within normal limits. No evidence of acute cerebral infarction, hemorrhage or mass lesion.               MR-BRAIN-WITH & W/O    (Results Pending)       X-Ray:  I have personally reviewed the images and compared with prior images.    Assessment/Plan:  Problem Representation: I anticipate this patient will require at least two midnights for appropriate medical management, necessitating inpatient admission because acute presentation of the weakness and nonlocalizing presentation.        * Acute focal neurologic deficit with partial resolution  Assessment & Plan  Patient is a 49-year-old man presenting with acute onset weakness in right arm and bilateral legs [improving], dysarthria [resolved], dizziness, pins-and-needles paresthesias which started at 11 PM last night.  Absolute areflexia noted on right patellar reflex when compared to left patellar reflex at 2+.  Code neuro was called in the ED-deemed not a candidate for acute interventions.  The clinical presentation is not clearly localizing or does not have clear ascending or descending pattern.  However there is acute variation in the weakness, which can be suspecting of psychosomatic component.  Differential is broad at this moment, includes TIA, acute inflammatory demyelinating polyneuropathy, likely to be variant of GBS, but less likely to be transverse myelitis without a distinct sensory level.    lt could be immunologic but less suspicious of infectious pathology.  -Every 4 neurochecks.  -Await lumbar puncture studies.-CSF cell count, protein, glucose, culture.    -Ordered IGG Csf, Oligoclonal bands, and viral PCR CSF.   -Brain MRI with and without  contrast.  -Neurology consult in the morning.  -PT OT when able.    Hypertension- (present on admission)  Assessment & Plan  Resume home amlodipine and hydrochlorothiazide.  -Labetalol as needed's ordered.    Anxiety  Assessment & Plan  Ativan for anxiety.     Type 2 diabetes mellitus (HCC)- (present on admission)  Assessment & Plan  Patient tells me that he does not have a diagnosis of diabetes, but his blood glucose levels were high in the ED and on presentation.  -Sign scale insulin.  -Hypoglycemia protocol.        VTE prophylaxis: SCDs/TEDs and enoxaparin ppx

## 2025-03-26 NOTE — CARE PLAN
The patient is Watcher - Medium risk of patient condition declining or worsening    Shift Goals  Clinical Goals: stable neuro status, safety, pain management  Patient Goals: pain management, sleep  Family Goals: comfort    Progress made toward(s) clinical / shift goals:    Problem: Knowledge Deficit - Standard  Goal: Patient and family/care givers will demonstrate understanding of plan of care, disease process/condition, diagnostic tests and medications  Outcome: Progressing     Problem: Pain - Standard  Goal: Alleviation of pain or a reduction in pain to the patient’s comfort goal  Outcome: Progressing     Problem: Optimal Care of the Stroke Patient  Goal: Optimal acute care for the stroke patient  Outcome: Progressing     Problem: Knowledge Deficit - Stroke Education  Goal: Patient's knowledge of stroke and risk factors will improve  Outcome: Progressing     Problem: Discharge Planning - Stroke  Goal: Patient’s continuum of care needs will be met  Outcome: Progressing     Problem: Neuro Status  Goal: Neuro status will remain stable or improve  Outcome: Progressing       Patient is not progressing towards the following goals:

## 2025-03-26 NOTE — ED NOTES
Pt remained flat x 30 min post LP.  Pt requesting food, allowed to eat a sandwich per VO from ERP.    Hospitalist at bedside.

## 2025-03-26 NOTE — ED NOTES
Report to floor nurse Ana RN, sent with patient transport, all belongings accounted for and sent with patient. Pt in NAD and VSS for transport

## 2025-03-26 NOTE — THERAPY
"Speech Language Pathology   Clinical Swallow Evaluation     Patient Name: Jonna Brian  AGE:  49 y.o., SEX:  male  Medical Record #: [id number]  Date of Service: 3/26/2025      History of Present Illness  Patient is a 49 y.o. male who presented n 2/35 with global weakness, aphasia, and ataxia.    PMHx:  Cold, Depression, Headache(784.0), Hypertension, and Influenza A    SLP Hx:  [place] 07/2017: WFL rec RG7/TN0    CXR:  No acute cardiopulmonary disease evident.    CT-Head:  Head CT without contrast within normal limits. No evidence of acute cerebral infarction, hemorrhage or mass lesion.     MRI pending       General Information:  Vitals  O2 Delivery Device: None - Room Air  Level of Consciousness: Awake  Patient Behaviors: Anxious      Prior Living Situation & Level of Function:   Swallowing: WFL       Oral Mechanism Evaluation:  Dentition: Fair   Facial Symmetry: Equal  Facial Sensation: Impaired - right     Labial Observations: WFL   Lingual Observations: Midline       Laryngeal Function:  Secretion Management: Adequate  Voice Quality: WFL (though limited voicing)  Cough: Perceptually WNL       Subjective  Patient asleep, rolled over in bed upon arrival. Roused to verbal cues. Pt reported \"this side is dead,\" referring to right side of body; RN aware. Pt mostly communicating via nonverbal communications; very limited verbalizations.        Assessment  Current Method of Nutrition: NPO until cleared by speech pathology  Positioning: Carreon's (60-90 degrees)  Bolus Administration: Patient  O2 Delivery Device: None - Room Air       Swallowing Trials:  Swallowing Trials  Thin Liquid (TN0): Impaired  Liquidised (LQ3): WFL      Comments: Patient brought cup to oral cavity without difficulty. Immediately following first sip of water; pt reactive and reaching for throat stating \"help.\" Pt reported feeling water was stuck in his throat; SLP cued pt to cough; strong cough appreciated, no vocal wetness observed. Pt refused " further trials of water though agreeable to trial of applesauce. Pt denied difficulties with applesauce; however, refused any further PO with SLP despite education. RN updated.       Clinical Impressions  Patient with limited participation in oral intake this date. Pt demo'd difficulty swallowing single trial of water. Unable to recommend safe initiation of oral diet at this time. Recommend NPO except ice chips for comfort, oral hydration, reduced risk of disuse atrophy, and pharyngeal secretion management. SLP to follow to determine pt readiness for oral diet vs need for a diagnostic swallow study.       Recommendations  Diet Consistency: NPO except ice chips  Instrumentation: Instrumental swallow study pending clinical progress  Medication: Crush with applesauce, as appropriate  Oral Care: Q4h       SLP Treatment Plan  Treatment Plan: Dysphagia Treatment  SLP Frequency: 3x Per Week  Estimated Duration: Until Therapy Goals Met      Anticipated Discharge Needs  Discharge Recommendations: Recommend post-acute placement for additional speech therapy services prior to discharge home   Therapy Recommendations Upon DC: Dysphagia Training, Patient / Family / Caregiver Education        Patient / Family Goals  Patient / Family Goal #1: None expressed  Short Term Goals  Short Term Goal # 1: Patient will participate in prefeeding trials with SLP without overt s/sx of aspiration or decline in respiratory status      Blake Koenig, SLP

## 2025-03-26 NOTE — ASSESSMENT & PLAN NOTE
Patient is a 49-year-old man presenting with acute onset weakness in right arm and bilateral legs [improving], dysarthria [resolved], dizziness, pins-and-needles paresthesias which started at 11 PM last night.  Absolute areflexia noted on right patellar reflex when compared to left patellar reflex at 2+.  Code neuro was called in the ED-deemed not a candidate for acute interventions.  The clinical presentation is not clearly localizing or does not have clear ascending or descending pattern.  However there is acute variation in the weakness, which can be suspecting of psychosomatic component.  Differential is broad at this moment, includes TIA, acute inflammatory demyelinating polyneuropathy, likely to be variant of GBS, but less likely to be transverse myelitis without a distinct sensory level.    lt could be immunologic but less suspicious of infectious pathology.  -Every 4 neurochecks.  -Await lumbar puncture studies.-CSF cell count, protein, glucose, culture.    -Ordered IGG Csf, Oligoclonal bands, and viral PCR CSF.   -Brain MRI with and without contrast pending

## 2025-03-26 NOTE — ED NOTES
Pt found sitting at side of bed, able to transfer himself with girlfriend's help.  PT continues to demonstrate weakness to B LEs.  ERP aware.  Plan for Lumbar puncture.

## 2025-03-26 NOTE — CARE PLAN
Problem: Knowledge Deficit - Standard  Goal: Patient and family/care givers will demonstrate understanding of plan of care, disease process/condition, diagnostic tests and medications  Outcome: Progressing     Problem: Pain - Standard  Goal: Alleviation of pain or a reduction in pain to the patient’s comfort goal  Outcome: Progressing     Problem: Optimal Care of the Stroke Patient  Goal: Optimal acute care for the stroke patient  Outcome: Progressing     Problem: Knowledge Deficit - Stroke Education  Goal: Patient's knowledge of stroke and risk factors will improve  Outcome: Progressing     Problem: Neuro Status  Goal: Neuro status will remain stable or improve  Outcome: Not Progressing     Problem: Mobility - Stroke  Goal: Patient's capacity to carry out activities will improve  Outcome: Not Met     Problem: Self Care  Goal: Patient will have the ability to perform ADLs independently or with assistance (bathe, groom, dress, toilet and feed)  Outcome: Progressing     Problem: Fall Risk  Goal: Patient will remain free from falls  Outcome: Not Met   The patient is Stable - Low risk of patient condition declining or worsening    Shift Goals  Clinical Goals: safety, stable neuro, MRI, slp re-eval  Patient Goals: rest  Family Goals: jennifer    Progress made toward(s) clinical / shift goals:  Verbalized understand of plan of care, stroke risk factors, and pending MRI.     Patient is not progressing towards the following goals: Continued weakness to right side, MD aware, MRI pending. Assisted fall this am, fall precautions and bed alarm in place.       Problem: Neuro Status  Goal: Neuro status will remain stable or improve  Outcome: Not Progressing     Problem: Mobility - Stroke  Goal: Patient's capacity to carry out activities will improve  Outcome: Not Met     Problem: Fall Risk  Goal: Patient will remain free from falls  Outcome: Not Met

## 2025-03-26 NOTE — ED NOTES
Bedside report received from off going RN/tech: Janell HARRIS, assumed care of patient.  POC discussed with patient. Call light within reach, all needs addressed at this time.       Fall risk interventions in place: Move the patient closer to the nurse's station, Patient's personal possessions are with in their safe reach, Place socks on patient, Place fall risk sign on patient's door, Give patient urinal if applicable, and Keep floor surfaces clean and dry (all applicable per [place] Fall risk assessment)   Continuous monitoring: Cardiac Leads, Pulse Ox, or Blood Pressure  IVF/IV medications: Not Applicable   Oxygen: Room Air  Bedside sitter: Not Applicable   Isolation: Not Applicable

## 2025-03-27 ENCOUNTER — APPOINTMENT (OUTPATIENT)
Dept: RADIOLOGY | Facility: MEDICAL CENTER | Age: 50
DRG: 004 | End: 2025-03-27
Attending: STUDENT IN AN ORGANIZED HEALTH CARE EDUCATION/TRAINING PROGRAM
Payer: COMMERCIAL

## 2025-03-27 ENCOUNTER — APPOINTMENT (OUTPATIENT)
Dept: RADIOLOGY | Facility: MEDICAL CENTER | Age: 50
DRG: 004 | End: 2025-03-27
Payer: COMMERCIAL

## 2025-03-27 ENCOUNTER — APPOINTMENT (OUTPATIENT)
Dept: RADIOLOGY | Facility: MEDICAL CENTER | Age: 50
DRG: 004 | End: 2025-03-27
Attending: INTERNAL MEDICINE
Payer: COMMERCIAL

## 2025-03-27 ENCOUNTER — APPOINTMENT (OUTPATIENT)
Dept: RADIOLOGY | Facility: MEDICAL CENTER | Age: 50
End: 2025-03-27
Attending: STUDENT IN AN ORGANIZED HEALTH CARE EDUCATION/TRAINING PROGRAM
Payer: COMMERCIAL

## 2025-03-27 PROBLEM — R06.89 AIRWAY CLEARANCE IMPAIRMENT: Status: ACTIVE | Noted: 2025-03-27

## 2025-03-27 PROBLEM — R29.818 ACUTE FOCAL NEUROLOGIC DEFICIT WITH PARTIAL RESOLUTION: Status: RESOLVED | Noted: 2025-03-25 | Resolved: 2025-03-27

## 2025-03-27 PROBLEM — G70.9 ACUTE NEUROMUSCULAR RESPIRATORY FAILURE (HCC): Status: ACTIVE | Noted: 2025-03-27

## 2025-03-27 PROBLEM — J96.00 ACUTE NEUROMUSCULAR RESPIRATORY FAILURE (HCC): Status: ACTIVE | Noted: 2025-03-27

## 2025-03-27 PROBLEM — J96.90 ACUTE NEUROMUSCULAR RESPIRATORY FAILURE (HCC): Status: ACTIVE | Noted: 2025-03-27

## 2025-03-27 PROBLEM — I63.9 ACUTE ISCHEMIC STROKE (HCC): Status: ACTIVE | Noted: 2025-03-27

## 2025-03-27 LAB
BASE EXCESS BLDA CALC-SCNC: -1 MMOL/L (ref -4–3)
BASE EXCESS BLDA CALC-SCNC: 0 MMOL/L (ref -4–3)
BODY TEMPERATURE: 36.6 CENTIGRADE
BODY TEMPERATURE: ABNORMAL DEGREES
BREATHS SETTING VENT: 20
CHOLEST SERPL-MCNC: 191 MG/DL (ref 100–199)
CO2 BLDA-SCNC: 24 MMOL/L (ref 32–48)
DELSYS IDSYS: ABNORMAL
END TIDAL CARBON DIOXIDE IECO2: 30 MMHG
GLUCOSE BLD STRIP.AUTO-MCNC: 116 MG/DL (ref 65–99)
GLUCOSE BLD STRIP.AUTO-MCNC: 117 MG/DL (ref 65–99)
GLUCOSE BLD STRIP.AUTO-MCNC: 126 MG/DL (ref 65–99)
GLUCOSE BLD STRIP.AUTO-MCNC: 127 MG/DL (ref 65–99)
HCO3 BLDA-SCNC: 22.6 MMOL/L (ref 21–28)
HCO3 BLDA-SCNC: 23 MMOL/L (ref 21–28)
HDLC SERPL-MCNC: 51 MG/DL
HOROWITZ INDEX BLDA+IHG-RTO: 293 MM[HG]
IGG CSF-MCNC: 9.3 MG/DL (ref 0–6)
LACTATE BLD-SCNC: 0.8 MMOL/L (ref 0.5–2)
LACTATE SERPL-SCNC: 0.9 MMOL/L (ref 0.5–2)
LACTATE SERPL-SCNC: 1.8 MMOL/L (ref 0.5–2)
LDLC SERPL CALC-MCNC: 114 MG/DL
MODE IMODE: ABNORMAL
O2/TOTAL GAS SETTING VFR VENT: 90 %
OLIGOCLONAL BANDS CSF ELPH-IMP: NORMAL
OLIGOCLONAL BANDS CSF IEF: NORMAL BANDS (ref 0–1)
OLIGOCLONAL BANDS.IT SER+CSF QL: NEGATIVE
PCO2 BLDA: 31.8 MMHG (ref 32–48)
PCO2 BLDA: 35.9 MMHG (ref 32–48)
PCO2 TEMP ADJ BLDA: 31.1 MMHG (ref 32–48)
PCO2 TEMP ADJ BLDA: 35.1 MMHG (ref 32–48)
PEEP END EXPIRATORY PRESSURE IPEEP: 8 CMH20
PH BLDA: 7.42 [PH] (ref 7.35–7.45)
PH BLDA: 7.46 [PH] (ref 7.35–7.45)
PH TEMP ADJ BLDA: 7.43 [PH] (ref 7.35–7.45)
PH TEMP ADJ BLDA: 7.47 [PH] (ref 7.35–7.45)
PO2 BLDA: 264 MMHG (ref 83–108)
PO2 BLDA: 82 MMHG (ref 83–108)
PO2 TEMP ADJ BLDA: 262 MMHG (ref 83–108)
PO2 TEMP ADJ BLDA: 79.4 MMHG (ref 83–108)
SAO2 % BLDA: 100 % (ref 93–99)
SAO2 % BLDA: 95.8 % (ref 93–99)
SPECIMEN DRAWN FROM PATIENT: ABNORMAL
TIDAL VOLUME IVT: 460 ML
TRIGL SERPL-MCNC: 129 MG/DL (ref 0–149)

## 2025-03-27 PROCEDURE — 70450 CT HEAD/BRAIN W/O DYE: CPT

## 2025-03-27 PROCEDURE — 99291 CRITICAL CARE FIRST HOUR: CPT | Mod: 25 | Performed by: INTERNAL MEDICINE

## 2025-03-27 PROCEDURE — A9270 NON-COVERED ITEM OR SERVICE: HCPCS | Performed by: INTERNAL MEDICINE

## 2025-03-27 PROCEDURE — 31500 INSERT EMERGENCY AIRWAY: CPT | Performed by: INTERNAL MEDICINE

## 2025-03-27 PROCEDURE — 97163 PT EVAL HIGH COMPLEX 45 MIN: CPT

## 2025-03-27 PROCEDURE — 700102 HCHG RX REV CODE 250 W/ 637 OVERRIDE(OP)

## 2025-03-27 PROCEDURE — 770022 HCHG ROOM/CARE - ICU (200)

## 2025-03-27 PROCEDURE — 700111 HCHG RX REV CODE 636 W/ 250 OVERRIDE (IP): Mod: JZ

## 2025-03-27 PROCEDURE — 94002 VENT MGMT INPAT INIT DAY: CPT

## 2025-03-27 PROCEDURE — 80061 LIPID PANEL: CPT

## 2025-03-27 PROCEDURE — 700111 HCHG RX REV CODE 636 W/ 250 OVERRIDE (IP): Performed by: INTERNAL MEDICINE

## 2025-03-27 PROCEDURE — 700111 HCHG RX REV CODE 636 W/ 250 OVERRIDE (IP)

## 2025-03-27 PROCEDURE — 700101 HCHG RX REV CODE 250: Performed by: INTERNAL MEDICINE

## 2025-03-27 PROCEDURE — 94003 VENT MGMT INPAT SUBQ DAY: CPT

## 2025-03-27 PROCEDURE — 82962 GLUCOSE BLOOD TEST: CPT

## 2025-03-27 PROCEDURE — 99232 SBSQ HOSP IP/OBS MODERATE 35: CPT | Performed by: STUDENT IN AN ORGANIZED HEALTH CARE EDUCATION/TRAINING PROGRAM

## 2025-03-27 PROCEDURE — 99232 SBSQ HOSP IP/OBS MODERATE 35: CPT

## 2025-03-27 PROCEDURE — 5A1955Z RESPIRATORY VENTILATION, GREATER THAN 96 CONSECUTIVE HOURS: ICD-10-PCS | Performed by: INTERNAL MEDICINE

## 2025-03-27 PROCEDURE — 99223 1ST HOSP IP/OBS HIGH 75: CPT | Mod: 25 | Performed by: INTERNAL MEDICINE

## 2025-03-27 PROCEDURE — 36600 WITHDRAWAL OF ARTERIAL BLOOD: CPT

## 2025-03-27 PROCEDURE — 700105 HCHG RX REV CODE 258: Performed by: STUDENT IN AN ORGANIZED HEALTH CARE EDUCATION/TRAINING PROGRAM

## 2025-03-27 PROCEDURE — 82803 BLOOD GASES ANY COMBINATION: CPT

## 2025-03-27 PROCEDURE — A9270 NON-COVERED ITEM OR SERVICE: HCPCS

## 2025-03-27 PROCEDURE — 71045 X-RAY EXAM CHEST 1 VIEW: CPT

## 2025-03-27 PROCEDURE — A9579 GAD-BASE MR CONTRAST NOS,1ML: HCPCS | Mod: JZ

## 2025-03-27 PROCEDURE — 700102 HCHG RX REV CODE 250 W/ 637 OVERRIDE(OP): Performed by: INTERNAL MEDICINE

## 2025-03-27 PROCEDURE — 700117 HCHG RX CONTRAST REV CODE 255: Mod: JZ

## 2025-03-27 PROCEDURE — 700105 HCHG RX REV CODE 258: Performed by: INTERNAL MEDICINE

## 2025-03-27 PROCEDURE — 97535 SELF CARE MNGMENT TRAINING: CPT

## 2025-03-27 PROCEDURE — 92526 ORAL FUNCTION THERAPY: CPT

## 2025-03-27 PROCEDURE — 97167 OT EVAL HIGH COMPLEX 60 MIN: CPT

## 2025-03-27 PROCEDURE — 0BH17EZ INSERTION OF ENDOTRACHEAL AIRWAY INTO TRACHEA, VIA NATURAL OR ARTIFICIAL OPENING: ICD-10-PCS | Performed by: INTERNAL MEDICINE

## 2025-03-27 PROCEDURE — 83605 ASSAY OF LACTIC ACID: CPT

## 2025-03-27 RX ORDER — LABETALOL HYDROCHLORIDE 5 MG/ML
10 INJECTION, SOLUTION INTRAVENOUS EVERY 4 HOURS PRN
Status: DISCONTINUED | OUTPATIENT
Start: 2025-03-27 | End: 2025-03-31

## 2025-03-27 RX ORDER — ASPIRIN 300 MG/1
300 SUPPOSITORY RECTAL DAILY
Status: DISCONTINUED | OUTPATIENT
Start: 2025-03-28 | End: 2025-04-01

## 2025-03-27 RX ORDER — FAMOTIDINE 20 MG/1
20 TABLET, FILM COATED ORAL EVERY 12 HOURS
Status: DISCONTINUED | OUTPATIENT
Start: 2025-03-27 | End: 2025-03-31

## 2025-03-27 RX ORDER — HYDRALAZINE HYDROCHLORIDE 20 MG/ML
20 INJECTION INTRAMUSCULAR; INTRAVENOUS EVERY 6 HOURS PRN
Status: DISCONTINUED | OUTPATIENT
Start: 2025-03-27 | End: 2025-03-31

## 2025-03-27 RX ORDER — AMOXICILLIN 250 MG
2 CAPSULE ORAL 2 TIMES DAILY
Status: DISCONTINUED | OUTPATIENT
Start: 2025-03-27 | End: 2025-05-13 | Stop reason: HOSPADM

## 2025-03-27 RX ORDER — NOREPINEPHRINE BITARTRATE 0.03 MG/ML
0-1 INJECTION, SOLUTION INTRAVENOUS CONTINUOUS
Status: DISCONTINUED | OUTPATIENT
Start: 2025-03-27 | End: 2025-03-30

## 2025-03-27 RX ORDER — LORAZEPAM 2 MG/ML
0.5 INJECTION INTRAMUSCULAR ONCE
Status: COMPLETED | OUTPATIENT
Start: 2025-03-27 | End: 2025-03-27

## 2025-03-27 RX ORDER — IBUPROFEN 600 MG/1
600 TABLET, FILM COATED ORAL EVERY 6 HOURS PRN
Status: DISCONTINUED | OUTPATIENT
Start: 2025-03-27 | End: 2025-04-29

## 2025-03-27 RX ORDER — BISACODYL 10 MG
10 SUPPOSITORY, RECTAL RECTAL
Status: DISCONTINUED | OUTPATIENT
Start: 2025-03-27 | End: 2025-05-13 | Stop reason: HOSPADM

## 2025-03-27 RX ORDER — HYDROCHLOROTHIAZIDE 25 MG/1
25 TABLET ORAL DAILY
Status: DISCONTINUED | OUTPATIENT
Start: 2025-03-28 | End: 2025-03-31

## 2025-03-27 RX ORDER — SODIUM CHLORIDE 9 MG/ML
INJECTION, SOLUTION INTRAVENOUS CONTINUOUS
Status: DISCONTINUED | OUTPATIENT
Start: 2025-03-27 | End: 2025-03-27

## 2025-03-27 RX ORDER — AMLODIPINE BESYLATE 5 MG/1
10 TABLET ORAL DAILY
Status: DISCONTINUED | OUTPATIENT
Start: 2025-03-28 | End: 2025-05-13 | Stop reason: HOSPADM

## 2025-03-27 RX ORDER — ONDANSETRON 4 MG/1
4 TABLET, ORALLY DISINTEGRATING ORAL EVERY 4 HOURS PRN
Status: DISCONTINUED | OUTPATIENT
Start: 2025-03-27 | End: 2025-05-13 | Stop reason: HOSPADM

## 2025-03-27 RX ORDER — ASPIRIN 81 MG/1
81 TABLET, CHEWABLE ORAL DAILY
Status: DISCONTINUED | OUTPATIENT
Start: 2025-03-28 | End: 2025-05-13 | Stop reason: HOSPADM

## 2025-03-27 RX ORDER — ETOMIDATE 2 MG/ML
20 INJECTION INTRAVENOUS ONCE
Status: COMPLETED | OUTPATIENT
Start: 2025-03-27 | End: 2025-03-27

## 2025-03-27 RX ORDER — PROMETHAZINE HYDROCHLORIDE 25 MG/1
12.5-25 TABLET ORAL EVERY 4 HOURS PRN
Status: DISCONTINUED | OUTPATIENT
Start: 2025-03-27 | End: 2025-04-03

## 2025-03-27 RX ORDER — PROPOFOL 10 MG/ML
20 INJECTION, EMULSION INTRAVENOUS ONCE
Status: COMPLETED | OUTPATIENT
Start: 2025-03-27 | End: 2025-03-27

## 2025-03-27 RX ORDER — HYDROXYZINE HYDROCHLORIDE 25 MG/1
25 TABLET, FILM COATED ORAL 3 TIMES DAILY PRN
Status: DISCONTINUED | OUTPATIENT
Start: 2025-03-27 | End: 2025-03-27

## 2025-03-27 RX ORDER — METHOCARBAMOL 500 MG/1
500 TABLET, FILM COATED ORAL 3 TIMES DAILY
Status: DISCONTINUED | OUTPATIENT
Start: 2025-03-27 | End: 2025-03-27

## 2025-03-27 RX ORDER — SODIUM CHLORIDE, SODIUM LACTATE, POTASSIUM CHLORIDE, CALCIUM CHLORIDE 600; 310; 30; 20 MG/100ML; MG/100ML; MG/100ML; MG/100ML
INJECTION, SOLUTION INTRAVENOUS CONTINUOUS
Status: DISCONTINUED | OUTPATIENT
Start: 2025-03-27 | End: 2025-03-31

## 2025-03-27 RX ORDER — POLYETHYLENE GLYCOL 3350 17 G/17G
1 POWDER, FOR SOLUTION ORAL
Status: DISCONTINUED | OUTPATIENT
Start: 2025-03-27 | End: 2025-05-13 | Stop reason: HOSPADM

## 2025-03-27 RX ORDER — HYDRALAZINE HYDROCHLORIDE 50 MG/1
25 TABLET, FILM COATED ORAL EVERY 8 HOURS
Status: DISCONTINUED | OUTPATIENT
Start: 2025-03-27 | End: 2025-03-31

## 2025-03-27 RX ORDER — ATORVASTATIN CALCIUM 40 MG/1
80 TABLET, FILM COATED ORAL EVERY EVENING
Status: DISCONTINUED | OUTPATIENT
Start: 2025-03-27 | End: 2025-03-31

## 2025-03-27 RX ORDER — ACETAMINOPHEN 325 MG/1
650 TABLET ORAL EVERY 6 HOURS PRN
Status: DISCONTINUED | OUTPATIENT
Start: 2025-03-27 | End: 2025-04-03

## 2025-03-27 RX ORDER — ROCURONIUM BROMIDE 10 MG/ML
140 INJECTION, SOLUTION INTRAVENOUS ONCE
Status: COMPLETED | OUTPATIENT
Start: 2025-03-27 | End: 2025-03-27

## 2025-03-27 RX ORDER — CYCLOBENZAPRINE HCL 10 MG
10 TABLET ORAL 3 TIMES DAILY PRN
Status: DISCONTINUED | OUTPATIENT
Start: 2025-03-27 | End: 2025-03-27

## 2025-03-27 RX ADMIN — LORAZEPAM 0.5 MG: 2 INJECTION INTRAMUSCULAR; INTRAVENOUS at 00:49

## 2025-03-27 RX ADMIN — SENNOSIDES AND DOCUSATE SODIUM 2 TABLET: 50; 8.6 TABLET ORAL at 18:26

## 2025-03-27 RX ADMIN — FENTANYL CITRATE 100 MCG: 50 INJECTION, SOLUTION INTRAMUSCULAR; INTRAVENOUS at 18:13

## 2025-03-27 RX ADMIN — PROPOFOL 20 MG: 10 INJECTION, EMULSION INTRAVENOUS at 17:00

## 2025-03-27 RX ADMIN — FAMOTIDINE 20 MG: 20 TABLET, FILM COATED ORAL at 18:26

## 2025-03-27 RX ADMIN — PROPOFOL 30 MCG/KG/MIN: 10 INJECTION, EMULSION INTRAVENOUS at 18:00

## 2025-03-27 RX ADMIN — ETOMIDATE 25 MG: 2 INJECTION, SOLUTION INTRAVENOUS at 16:44

## 2025-03-27 RX ADMIN — GADOTERIDOL 20 ML: 279.3 INJECTION, SOLUTION INTRAVENOUS at 23:31

## 2025-03-27 RX ADMIN — ATORVASTATIN CALCIUM 80 MG: 40 TABLET, FILM COATED ORAL at 18:26

## 2025-03-27 RX ADMIN — PROPOFOL 20 MG: 10 INJECTION, EMULSION INTRAVENOUS at 16:49

## 2025-03-27 RX ADMIN — FENTANYL CITRATE 100 MCG: 50 INJECTION, SOLUTION INTRAMUSCULAR; INTRAVENOUS at 18:33

## 2025-03-27 RX ADMIN — NOREPINEPHRINE BITARTRATE 0.1 MCG/KG/MIN: 1 INJECTION, SOLUTION, CONCENTRATE INTRAVENOUS at 19:59

## 2025-03-27 RX ADMIN — SODIUM CHLORIDE: 9 INJECTION, SOLUTION INTRAVENOUS at 09:30

## 2025-03-27 RX ADMIN — PROPOFOL 30 MCG/KG/MIN: 10 INJECTION, EMULSION INTRAVENOUS at 17:07

## 2025-03-27 RX ADMIN — ENOXAPARIN SODIUM 40 MG: 100 INJECTION SUBCUTANEOUS at 18:26

## 2025-03-27 RX ADMIN — ROCURONIUM BROMIDE 140 MG: 10 INJECTION, SOLUTION INTRAVENOUS at 16:45

## 2025-03-27 ASSESSMENT — COGNITIVE AND FUNCTIONAL STATUS - GENERAL
MOBILITY SCORE: 9
SUGGESTED CMS G CODE MODIFIER MOBILITY: CM
WALKING IN HOSPITAL ROOM: TOTAL
SUGGESTED CMS G CODE MODIFIER DAILY ACTIVITY: CL
EATING MEALS: TOTAL
STANDING UP FROM CHAIR USING ARMS: A LOT
DAILY ACTIVITIY SCORE: 13
TURNING FROM BACK TO SIDE WHILE IN FLAT BAD: A LOT
MOVING FROM LYING ON BACK TO SITTING ON SIDE OF FLAT BED: A LOT
DRESSING REGULAR LOWER BODY CLOTHING: A LOT
HELP NEEDED FOR BATHING: A LOT
MOVING TO AND FROM BED TO CHAIR: TOTAL
CLIMB 3 TO 5 STEPS WITH RAILING: TOTAL
DRESSING REGULAR UPPER BODY CLOTHING: A LITTLE
PERSONAL GROOMING: A LITTLE
TOILETING: A LOT

## 2025-03-27 ASSESSMENT — ENCOUNTER SYMPTOMS
PALPITATIONS: 0
HEADACHES: 1
TINGLING: 0
LOSS OF CONSCIOUSNESS: 0
INSOMNIA: 0
NAUSEA: 0
DIARRHEA: 0
DIZZINESS: 0
EYE PAIN: 0
TINGLING: 1
CLAUDICATION: 0
CHILLS: 0
BLURRED VISION: 0
SENSORY CHANGE: 1
WEAKNESS: 1
BACK PAIN: 0
COUGH: 0
FEVER: 0
SHORTNESS OF BREATH: 0
SPEECH CHANGE: 1
FOCAL WEAKNESS: 1
SPUTUM PRODUCTION: 1
ABDOMINAL PAIN: 0
HEADACHES: 0
VOMITING: 0
SORE THROAT: 0

## 2025-03-27 ASSESSMENT — LIFESTYLE VARIABLES: SUBSTANCE_ABUSE: 0

## 2025-03-27 ASSESSMENT — PAIN DESCRIPTION - PAIN TYPE
TYPE: ACUTE PAIN

## 2025-03-27 ASSESSMENT — ACTIVITIES OF DAILY LIVING (ADL): TOILETING: INDEPENDENT

## 2025-03-27 ASSESSMENT — COPD QUESTIONNAIRES: HAVE YOU SMOKED AT LEAST 100 CIGARETTES IN YOUR ENTIRE LIFE: NO/DON'T KNOW

## 2025-03-27 ASSESSMENT — FIBROSIS 4 INDEX: FIB4 SCORE: 0.7

## 2025-03-27 ASSESSMENT — GAIT ASSESSMENTS: GAIT LEVEL OF ASSIST: UNABLE TO PARTICIPATE

## 2025-03-27 NOTE — CARE PLAN
"The patient is Watcher - Medium risk of patient condition declining or worsening    Shift Goals  Clinical Goals: SLP eval, manage secretions, stable neuro status  Patient Goals: \"help me\"  Family Goals: Updates on overnight, POC    Progress made toward(s) clinical / shift goals:    Problem: Mobility - Stroke  Goal: Patient's capacity to carry out activities will improve  Outcome: Progressing  Note: PT/OT evaluated patient for mobility needs. At this time therapy recommending post acute placement for further therapy needs post discharge. Patient continues to experience muscle spasms, rigidity and paralysis in RUE and RLE. Extremities supported with pillows and repositioning as needed     Problem: Neuro Status  Goal: Neuro status will remain stable or improve  Outcome: Progressing  Note: Patient remains oriented x4, anxious and easily agitated.        Patient is not progressing towards the following goals:      Problem: Respiratory - Stroke Patient  Goal: Patient will achieve/maintain optimum respiratory rate/effort  Outcome: Not Progressing  Note: O2 sats remain in the high 90's despite patient consistently struggling to evacuate phlegm in airway. PRN suctioning provided with reassurance and techniques to promote clearance of throat     Problem: Dysphagia  Goal: Dysphagia will improve  Outcome: Not Progressing  Note: Patient refused to participate in speech therapy, therefore patient continues to be NPO.      Problem: Risk for Aspiration  Goal: Patient's risk for aspiration will be absent or decrease  Outcome: Not Progressing  Note: Patient continues to struggle with managing secretions. Provided suctioning as needed. Updated MD on status, new orders received     "

## 2025-03-27 NOTE — CARE PLAN
The patient is Stable - Low risk of patient condition declining or worsening    Shift Goals  Clinical Goals: Stable neuro status, safety  Patient Goals: rest  Family Goals: jennifer    Progress made toward(s) clinical / shift goals:  Pt has been AAOx4 throughout the shift, Q4h neuro checks, fall/aspiration precautions in place. Attempted to get MRI this shift, however pt did not tolerate laying flat after IV ativan given.     Problem: Neuro Status  Goal: Neuro status will remain stable or improve  Outcome: Progressing  Note: Q4h neuro checks      Problem: Optimal Care of the Stroke Patient  Goal: Optimal acute care for the stroke patient  Outcome: Progressing  Note: NIHSS assessment perfromed, NIH score of 9. CT imaging complete, pending MRI of brain and spine.      Problem: Hemodynamic Monitoring  Goal: Patient's hemodynamics, fluid balance and neurologic status will be stable or improve  Outcome: Progressing  Note: Maintain SBP <180.      Problem: Dysphagia  Goal: Dysphagia will improve  Outcome: Progressing  Note: Pt strict NPO, pending SLP evaluation.      Problem: Fall Risk  Goal: Patient will remain free from falls  Outcome: Progressing  Note: Bed alarm on, bed locked and in lowest position, three side rails up, non-skid socks provided, call light and belonging within reach. Pt educated to use call light for assistance. Pt verbalizes understanding and calls appropriately.        Patient is not progressing towards the following goals:

## 2025-03-27 NOTE — PROGRESS NOTES
"[facility] Medicine Daily Progress Note    Date of Service  3/27/2025    Chief Complaint  Jonna Brian is a 49 y.o. male admitted 3/25/2025 with possible stroke.    Hospital Course  Jonna Brian is a 49 y.o. male who presented 3/25/2025 with global weakness, aphasia and ataxia.  Last known normal was at 4:30 PM on 24 March.  Patient tells me he had headaches and dizziness yesterday in the morning and through the day.  But after he went to bed he felt his right arm was numb at 11 PM yesterday like he was sleeping on it and he cannot move it.  But at 4 AM in the morning, patient noticed that he could not get out of bed.  Patient also reported dizziness when he tried to walk with support which is described as feeling of passing out.  He then called his friend who calls the paramedics.  Patient's friend who is at the bedside also tells me that he has been mumbling words.  Patient reports that he had jaw weakness but does not report any word finding difficulties patient and describes it as inability to get the words out.  Patient also reports pins and needle sensation on bilateral feet and spasms in the right arm.  Patient tells me there is no past history of similar symptoms or episodes.  During the entire episode patient never had any loss of consciousness or disorientation.  I noticed that the patient was able to move his right arm and bilateral legs, not against gravity but at least lateralized movements, but patient tells me that he was not able to do it up until 6:30 PM.     Context:  Upon my chart review-\"Labs drawn from pt, who is able to raise arm on command for lab draw. He is requesting \"something to eat and drink.\" \"   \"Pt passed swallow evaluation and is able to hold glass and lift up to lips.  No issues with swallow noted.     \"     In the ED:  -NIH stroke scale 17 on presentation.  Deemed artificially elevated due to generalized weakness and poor effort.  -CBC no white count elevation.  CMP: Potassium " at 3.1.  Bicarb of 18.  Anion gap 18.  Glucose 186.  Kidney function test and liver labs normal.  Troponins normal.  -Code neuro was called.  NCCT head and CTA head and neck unremarkable.  Deemed not a candidate for acute stroke interventions.  -EKG normal.  Chest x-ray no acute disease.  -Lactic acid at 4.0.  Alcohol level less than 10..  Mini respiratory panel negative.  -Urine drug screen negative.  -Subsequent NIH stroke scale 6.  -Lumbar puncture done..    Interval Problem Update  No acute events overnight.  This morning patient stating he can't breathe, appears to be in acute distress. Still having some aphasia.  Not able to tolerate MRI overnight due to secretions/phelgm production.  CXR is clear. Vital signs stable, oxygen saturation good on room air.  STAT ABG and LA ordered which are normal.  Intensivist consulted given respiratory distress and concern for possible [place] syndrome given worsening symptoms and elevated isolation protein in CSF.  STAT CT head recommended, otherwise no acute needs for higher level of care at this time. CT head shows no acute findings, remote R cerebellar stroke. Will continue to monitor closely on neuro floor.  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 Appreciate their recommendations.  Vascular neuro on admission in the ED did not suspect stroke.  NPO, pending SLP swallow evaluation.      I have discussed this patient's plan of care and discharge plan at IDT rounds today with Case Management, Nursing, Nursing leadership, and other members of the IDT team.    Consultants/Specialty  neurology    Code Status  Full Code    Disposition  Medically Cleared  I have placed the appropriate orders for post-discharge needs.    Review of Systems  Review of Systems   Constitutional:  Negative for chills and fever.   Eyes:  Negative for blurred vision and pain.   Respiratory:  Negative for cough and shortness of breath.    Cardiovascular:  Negative for  chest pain, palpitations and leg swelling.   Gastrointestinal:  Negative for abdominal pain, nausea and vomiting.   Genitourinary:  Negative for dysuria and urgency.   Musculoskeletal:  Negative for back pain.   Skin:  Negative for itching and rash.   Neurological:  Positive for tingling, sensory change, speech change, focal weakness and headaches. Negative for dizziness and loss of consciousness.   Psychiatric/Behavioral:  Negative for substance abuse. The patient does not have insomnia.         Physical Exam  Temp:  [36.5 °C (97.7 °F)-36.7 °C (98.1 °F)] 36.5 °C (97.7 °F)  Pulse:  [60-81] 81  Resp:  [16] 16  BP: (154-197)/() 154/98  SpO2:  [91 %-100 %] 100 %    Physical Exam  Constitutional:       General: He is not in acute distress.     Appearance: He is not ill-appearing.   HENT:      Head: Normocephalic and atraumatic.      Right Ear: External ear normal.      Left Ear: External ear normal.      Mouth/Throat:      Pharynx: No oropharyngeal exudate or posterior oropharyngeal erythema.   Eyes:      Extraocular Movements: Extraocular movements intact.      Pupils: Pupils are equal, round, and reactive to light.   Cardiovascular:      Rate and Rhythm: Normal rate and regular rhythm.      Pulses: Normal pulses.      Heart sounds: Normal heart sounds.   Pulmonary:      Effort: Pulmonary effort is normal. No respiratory distress.      Breath sounds: Normal breath sounds. No wheezing.   Abdominal:      General: Bowel sounds are normal. There is no distension.      Palpations: Abdomen is soft.      Tenderness: There is no abdominal tenderness. There is no guarding.   Musculoskeletal:         General: No swelling or tenderness.      Cervical back: Normal range of motion and neck supple.   Skin:     General: Skin is warm and dry.   Neurological:      Mental Status: He is oriented to person, place, and time.      Cranial Nerves: No cranial nerve deficit.      Sensory: Sensory deficit present.      Motor: Weakness  present.      Comments: Right arm and leg weakness, 2/5 strength; word finding difficulty and stuttering   Psychiatric:         Mood and Affect: Mood normal.         Behavior: Behavior normal.         Fluids    Intake/Output Summary (Last 24 hours) at 3/27/2025 1457  Last data filed at 3/27/2025 0816  Gross per 24 hour   Intake 0 ml   Output 250 ml   Net -250 ml        Laboratory  Recent Labs     03/25/25  1244 03/26/25  0303   WBC 7.7 6.4   RBC 5.56 5.73   HEMOGLOBIN 15.2 15.7   HEMATOCRIT 45.0 46.3   MCV 80.9* 80.8*   MCH 27.3 27.4   MCHC 33.8 33.9   RDW 38.5 39.1   PLATELETCT 287 285   MPV 9.0 8.9*     Recent Labs     03/25/25  1244 03/26/25  0303   SODIUM 137 137   POTASSIUM 3.1* 3.6   CHLORIDE 101 102   CO2 18* 22   GLUCOSE 186* 152*   BUN 13 12   CREATININE 0.91 0.90   CALCIUM 9.6 9.7     Recent Labs     03/25/25  1244   APTT 28.4   INR 1.01         Recent Labs     03/27/25  0136   TRIGLYCERIDE 129   HDL 51   *       Imaging  CT-HEAD W/O   Final Result      1.  No acute intracranial abnormality.   2.  Remote right cerebellar infarct.               DX-CHEST-PORTABLE (1 VIEW)   Final Result      No acute cardiopulmonary disease evident.      DX-CHEST-PORTABLE (1 VIEW)   Final Result      No acute cardiopulmonary disease evident.      CT-CEREBRAL PERFUSION ANALYSIS   Final Result      1. Cerebral blood flow less than 30% possibly representing completed infarct = 0 mL. Based on distribution of this finding, this is unlikely to represent artifact.      2. T Max more than 6 seconds possibly representing combination of completed infarct and ischemia = 7 mL. Based on the distribution of this finding, this is possibly artifact.      3. Mismatched volume possibly representing ischemic brain/penumbra= 0 mL      4.  Please note that this cerebral perfusion study and report is Quantitative and targets supratentorial (cerebral) perfusion for evaluation of large vessel territory acute ischemia/infarction. For example,  "lacunar infarcts, and brainstem/posterior fossa    ischemia/infarction are not evaluated on this study.  Data acquisition is subject to artifacts which can yield non-anatomically plausible perfusion maps which may be due to motion, bolus timing, signal to noise ratio, or other technical factors.    Perfusion map abnormalities which show non-anatomic distributions are likely artifact.   This study is not \"stand-alone\" and should only be utilized for diagnosis, management/treatment in correlation with CT, CTA, and/or MRI and clinical factors.         CT-CTA NECK WITH & W/O-POST PROCESSING   Final Result      CT angiogram of the neck within normal limits.      CT-CTA HEAD WITH & W/O-POST PROCESS   Final Result      CT angiogram of the [place] within normal limits.      CT-HEAD W/O   Final Result      Head CT without contrast within normal limits. No evidence of acute cerebral infarction, hemorrhage or mass lesion.               MR-BRAIN-WITH & W/O    (Results Pending)   MR-LUMBAR SPINE-WITH & W/O    (Results Pending)   MR-CERVICAL SPINE-WITH & W/O    (Results Pending)   MR-THORACIC SPINE-WITH & W/O    (Results Pending)        Assessment/Plan  * Acute focal neurologic deficit with partial resolution  Assessment & Plan  Patient is a 49-year-old man presenting with acute onset weakness in right arm and bilateral legs [improving], dysarthria [resolved], dizziness, pins-and-needles paresthesias which started at 11 PM last night.  Absolute areflexia noted on right patellar reflex when compared to left patellar reflex at 2+.  Code neuro was called in the ED-deemed not a candidate for acute interventions.  The clinical presentation is not clearly localizing or does not have clear ascending or descending pattern.  However there is acute variation in the weakness, which can be suspecting of psychosomatic component.  Differential is broad at this moment, includes TIA, acute inflammatory demyelinating polyneuropathy, " likely to be variant of GBS, but less likely to be transverse myelitis without a distinct sensory level.    lt could be immunologic but less suspicious of infectious pathology.  -Every 4 neurochecks.  -Await lumbar puncture studies.-CSF cell count, protein, glucose, culture.    -Ordered IGG Csf, Oligoclonal bands, and viral PCR CSF.   -Brain MRI with and without contrast pending      Anxiety  Assessment & Plan  Ativan for anxiety.     Type 2 diabetes mellitus (HCC)- (present on admission)  Assessment & Plan  Patient tells me that he does not have a diagnosis of diabetes, but his blood glucose levels were high in the ED and on presentation.  -Sign scale insulin.  -Hypoglycemia protocol.    Hypertension- (present on admission)  Assessment & Plan  Resume home amlodipine and hydrochlorothiazide.  -Labetalol as needed's ordered.  Start hydralazine         VTE prophylaxis: VTE Selection    I have performed a physical exam and reviewed and updated ROS and Plan today (3/27/2025). In review of yesterday's note (3/26/2025), there are no changes except as documented above.

## 2025-03-27 NOTE — ASSESSMENT & PLAN NOTE
"Acute infarcts in the lower jero and upper medulla c/w presentation.  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

## 2025-03-27 NOTE — THERAPY
Occupational Therapy   Initial Evaluation     Patient Name: Jonna Brian  Age:  49 y.o., Sex:  male  Medical Record #: [id number]  Today's Date: 3/27/2025     Precautions  Precautions: Fall Risk, Swallow Precautions  Comments: difficulty managing secretions    Assessment  Patient is 49 y.o. male admitted for CVA w/u 2/2 dizziness, RUE paresthesia/weakness, and slurred speech; pending MRI. Pt with difficulty managing secretions during session; SLP and RN aware. Pt's girlfriend present and supportive. Reports she is retired and can assist PRN. Currently limited by decreased functional mobility, activity tolerance, cognition, sensation, strength, AROM, coordination/ataxia, balance, vision, and pain which are currently affecting pt's ability to complete ADLs/txfs/IADLs at baseline. Will continue to follow.      Plan    Occupational Therapy Initial Treatment Plan   Treatment Interventions: Self Care / Activities of Daily Living, Adaptive Equipment, Cognitive Skill Development, Neuro Re-Education / Balance, Therapeutic Exercises, Therapeutic Activity, Family / Caregiver Training  Treatment Frequency: 4 Times per Week  Duration: Until Therapy Goals Met    DC Equipment Recommendations: Unable to determine at this time  Discharge Recommendations: Recommend post-acute placement for additional occupational therapy services prior to discharge home      Objective     03/27/25 0935   Prior Living Situation   Prior Services Home-Independent   Housing / Facility 2 Story Apartment / Condo  (condo; bedroom and bathroom on 2nd floor)   Steps Into Home 2   Bathroom Set up Bathtub / Shower Combination   Equipment Owned None   Lives with - Patient's Self Care Capacity Alone and Able to Care For Self   Comments Pt's girlfriend present and supportive. Reports she is retired and can assist PRN.   Prior Level of ADL Function   Self Feeding Independent   Grooming / Hygiene Independent   Bathing Independent   Dressing Independent   Toileting  Independent   Prior Level of IADL Function   Medication Management Independent   Laundry Independent   Kitchen Mobility Independent   Finances Independent   Home Management Independent   Shopping Independent   Prior Level Of Mobility Independent Without Device in Community;Independent Without Device in Home   Driving / Transportation Driving Independent   Occupation (Pre-Hospital Vocational) Employed Full Time   History of Falls   History of Falls Yes   Date of Last Fall 03/26/25  (fall while in house)   Precautions   Precautions Fall Risk;Swallow Precautions   Comments difficulty managing secretions   Vitals   O2 Delivery Device None - Room Air   Pain 0 - 10 Group   Location Arm;Chest   Location Orientation Right   Therapist Pain Assessment During Activity;Post Activity Pain Same as Prior to Activity;Nurse Notified  (pt with spasms to R pectoral and abdomen causing anterior LOB. RN aware)   Cognition    Cognition / Consciousness X   Speech/ Communication Delayed Responses;Dysarthric;Slurred   Level of Consciousness Responds to voice  (difficulty keeping eyes open at EOB)   Safety Awareness Impaired;Impulsive   New Learning Impaired   Attention Impaired   Sequencing Impaired   Initiation Impaired   Comments cooperative and impulsive; req max v/cs for safety and keeping eyes open, grossly delayed, but impulsive and questionable retention of education. grabbing and gesturing for bucket as unable to manage his secretions; refused using suction instead   Passive ROM Upper Body   Passive ROM Upper Body WDL   Comments tone with internal rotation   Active ROM Upper Body   Active ROM Upper Body  X   Dominant Hand Right   Comments RUE flaccid; LUE <90 degrees at shoulder, but unclear if 2/2 cognition   Strength Upper Body   Upper Body Strength  X   Sensation Upper Body   Upper Extremity Sensation  X   Comments diminished sensation in LUE   Upper Body Muscle Tone   Upper Body Muscle Tone  X   Rt Upper Extremity Muscle Tone  "Hypertonic;Non Functional   Comments RUE with internal rotation tone   Neurological Concerns   Neurological Concerns Yes   Sitting Posture During ADL's Lateral Lean Left;Posterior Lean  (intermittent anterior LOBs)   Rt Upper Extremity Functional Use Absent   Coordination Upper Body   Coordination X   Comments FM and GM absent on RUE; LUE delayed FM opposition and impaired GM. Difficult to fully test 2/2 cognition   Balance Assessment   Sitting Balance (Static) Poor   Sitting Balance (Dynamic) Trace +   Weight Shift Sitting Poor   Comments Req min-mod A for sitting balance, improves with LUE support but still req assist 2/2 \"spasms\" to R pectorals and abdominal muscles causing accelerated anterior LOB req max A to correct. multiple LOBs in multiple directions, but mostly laterally to R.   Bed Mobility    Supine to Sit Maximal Assist   Sit to Supine Maximal Assist   Scooting Maximal Assist   Rolling Maximum Assist to Lt.;Maximal Assist to Rt.   Comments x2 person assistance   ADL Assessment   Eating   (pending SLP consult; unable to manage secretions)   Grooming Maximal Assist   Lower Body Dressing Maximal Assist   Toileting Maximal Assist  (bedpan/urinal)   Comments Educated pt/SO on RUE joint protection, edema mgmt in supine/sitting, safety at EOB w/ anterior LOBs, proper neutral positioning in bed, suction vs. spitting in bucket for secretion mgmt, neuro-recovery and continued attempts to move RUE (even if unable to see it), and importance of continued EOB activity w/staff.   mRS Prior to admission   Prior to admission mRS 0   Modified [place] (mRS)   Modified [place] Score 5   Functional Mobility   Sit to Stand   (deferred for safety)   Mobility EOB only   Visual Perception   Visual Perception  X   Nystagmus Present to Left  (and when eyes in midline both horizontally and vertically)   Comments difficulty keeping eyes open at EOB. difficulty w/ moving eyes in vertical directions   Edema / Skin Assessment   Edema / " Skin  Not Assessed   Activity Tolerance   Comments limited by fatigue, cognition, weakness, and balance   Patient / Family Goals   Patient / Family Goal #1 to go home   Short Term Goals   Short Term Goal # 1 unsupported seated g/h with SPV   Short Term Goal # 2 UB dressing with min A using riddhi-technique   Short Term Goal # 3 BSC txf with mod A   Short Term Goal # 4 sitting EOB unsupported for >2min in prep for LB dressing   Education Group   Education Provided Role of Occupational Therapist;Joint Protection   Role of Occupational Therapist Patient Response Patient;Acceptance;Explanation;Verbal Demonstration;Reinforcement Needed   Joint Protection Patient Response Patient;Acceptance;Explanation;Verbal Demonstration;Reinforcement Needed   Occupational Therapy Initial Treatment Plan    Treatment Interventions Self Care / Activities of Daily Living;Adaptive Equipment;Cognitive Skill Development;Neuro Re-Education / Balance;Therapeutic Exercises;Therapeutic Activity;Family / Caregiver Training   Treatment Frequency 4 Times per Week   Duration Until Therapy Goals Met   Problem List   Problem List Decreased Active Daily Living Skills;Decreased Homemaking Skills;Decreased Upper Extremity Strength Right;Decreased Upper Extremity Strength Left;Decreased Upper Extremity AROM Left;Decreased Functional Mobility;Decreased Activity Tolerance;Safety Awareness Deficits / Cognition;Impaired Posture / Trunk Alignment;Impaired Coordination Right Upper Extremity;Impaired Coordination Left Upper Extremity;Impaired Sensation Left Upper Extremity;Impaired Cognitive Function;Impaired Upper Extremity Tone Left;Impaired Vision;Impaired Postural Control / Balance

## 2025-03-27 NOTE — PROGRESS NOTES
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 He presented 3/25 for new onset dizziness and generalized weakness, speech difficulty and was a stroke alert. He underwent CT head CTA/P with no scute findings other then remote right cerebellar infarct not discussed on admission CT.     Today he has need NT suction 3 times. He had MRI tried to be preformed but unable to be done last night. He also had LP preformed and CSF normal other then elevated protein 91 and IgG 9.3. On exam he is in no acute distress, no facial asymmetry, able to stick out tongue, he has increase tone to right upper arm and flaccid weakness, spasm, right lower leg with clonus. His left side is full strength. Recommend stat CT head to rule out acute change. Discussed with nurse and she is comfortable with secretions burden and NT suction needs. 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 On room air, ABG normal 7.42/35/82, labs, lactate all normal. Abnormal EMG 2021.     Call with change in status.     Juan A Denton MD  Critical Care Medicine

## 2025-03-27 NOTE — CONSULTS
Critical Care Consultation    Date of consult: 3/27/2025    Referring Physician  SYLVIA Guallpa    Reason for Consultation  Concerns for brainstem stroke    History of Presenting Illness  49 y.o. male who presented 3/25/2025 with Hx of HTN, anxiety, DM2, htn that presented 3/25 for dizziness, weakness and speech difficulty. He was evaluated by neuro vascular and had a stroke alert and workup preformed and no LVO was seen and recommend metabolic workup. He since has had progressive right side weakness, bulbar symptoms and difficulty with speech. I saw him earlier for concerns of GBS and elevated protein in his CNS fluid for concerns but his neuro exam didn't fit and recommend repeat CT head which did not show any acute changes. Neurology and Neurovascular evaluated him today and recommend ICU level care and concerns for brainstem stroke.      Code Status  Full Code    Review of Systems  Review of Systems   Constitutional:  Negative for fever and malaise/fatigue.   HENT:  Negative for sore throat.    Respiratory:  Positive for sputum production. Negative for cough.    Cardiovascular:  Negative for chest pain, claudication and leg swelling.   Gastrointestinal:  Negative for abdominal pain, diarrhea, nausea and vomiting.   Genitourinary:  Negative for dysuria and hematuria.   Musculoskeletal:         Right arm spasm   Neurological:  Positive for speech change, focal weakness and weakness. Negative for dizziness, tingling and headaches.       Past Medical History   has a past medical history of Cold, Depression, Headache(784.0), Hypertension, and Influenza A.    Surgical History   has a past surgical history that includes subtalor triple fusion (Left, 1/23/2018); ankle arthroscopy (Right, 10/8/2018); hardware removal ortho (Right, 10/8/2018); orthopedic osteotomy (Right, 10/8/2018); and pr tarsal tunnel release (Left, 4/20/2022).    Family History  Family history is unknown by patient.    Social History   reports that he has  never smoked. He has never used smokeless tobacco. He reports that he does not drink alcohol and does not use drugs.    Medications  Home Medications       Reviewed by Sarahi Ramirez (Pharmacy Tech) on 03/25/25 at 1926  Med List Status: Complete     Medication Last Dose Status   amLODIPine (NORVASC) 10 MG Tab 3/25/2025 Active   hydroCHLOROthiazide 25 MG Tab 3/25/2025 Active   metFORMIN ER (GLUCOPHAGE XR) 500 MG TABLET SR 24 HR 3/24/2025 Active                  Audit from Redirected Encounters    **Home medications have not yet been reviewed for this encounter**       Current Facility-Administered Medications   Medication Dose Route Frequency Provider Last Rate Last Admin    NS infusion   Intravenous Continuous Tony Odom M.D. 100 mL/hr at 03/27/25 0930 New Bag at 03/27/25 0930    hydrALAZINE (Apresoline) tablet 25 mg  25 mg Oral Q8HRS Tony Odom M.D.   25 mg at 03/26/25 1611    cyclobenzaprine (Flexeril) tablet 10 mg  10 mg Oral TID PRN Tony Odom M.D.   10 mg at 03/26/25 1250    atorvastatin (Lipitor) tablet 40 mg  40 mg Oral Q EVENING Tony Odom M.D.        aspirin (Asa) chewable tab 81 mg  81 mg Oral DAILY Tony Odom M.D.   81 mg at 03/26/25 1247    Or    aspirin (Asa) suppository 300 mg  300 mg Rectal DAILY Tony Odom M.D.        hydrOXYzine HCl (Atarax) tablet 25 mg  25 mg Oral TID PRN Tony Odom M.D.        methocarbamol (Robaxin) tablet 500 mg  500 mg Oral TID Tony Odom M.D.        enoxaparin (Lovenox) inj 40 mg  40 mg Subcutaneous DAILY AT 1800 Darrel Polo M.D.   40 mg at 03/26/25 1716    senna-docusate (Pericolace Or Senokot S) 8.6-50 MG per tablet 2 Tablet  2 Tablet Oral Q EVENING Darrel Polo M.D.        And    polyethylene glycol/lytes (Miralax) Packet 1 Packet  1 Packet Oral QDAY PRN Darrel Polo M.D.        acetaminophen (Tylenol) tablet 650 mg  650 mg Oral Q6HRS PRN Darrel Polo M.D.   650 mg at 03/26/25 0520    labetalol  (Normodyne/Trandate) injection 10 mg  10 mg Intravenous Q4HRS PRN Darrel Polo M.D.   10 mg at 03/26/25 1716    ondansetron (Zofran) syringe/vial injection 4 mg  4 mg Intravenous Q4HRS PRN Darrel Polo M.D.        ondansetron (Zofran ODT) dispertab 4 mg  4 mg Oral Q4HRS PRN Darrel Polo M.D.        promethazine (Phenergan) tablet 12.5-25 mg  12.5-25 mg Oral Q4HRS PRN Darrel Polo M.D.        promethazine (Phenergan) suppository 12.5-25 mg  12.5-25 mg Rectal Q4HRS PRN Darrel Polo M.D.        prochlorperazine (Compazine) injection 5-10 mg  5-10 mg Intravenous Q4HRS PRN Darrel Polo M.D.   10 mg at 03/26/25 0550    guaiFENesin dextromethorphan (Robitussin DM) 100-10 MG/5ML syrup 10 mL  10 mL Oral Q6HRS PRN Darrel Polo M.D.        insulin lispro (HumaLOG,AdmeLOG) subcutaneous injection  1-6 Units Subcutaneous 4X/DAY ACHS Darrel Polo M.D.        And    dextrose 50 % (D50W) injection 25 g  25 g Intravenous Q15 MIN PRN Darrel Polo M.D.        hydroCHLOROthiazide tablet 25 mg  25 mg Oral DAILY Darrel Polo M.D.   25 mg at 03/26/25 0520    amLODIPine (Norvasc) tablet 10 mg  10 mg Oral DAILY Darrel Polo M.D.   10 mg at 03/26/25 0520    melatonin tablet 5 mg  5 mg Oral Nightly Darrel Polo M.D.   5 mg at 03/25/25 2345    ibuprofen (Motrin) tablet 600 mg  600 mg Oral Q6HRS PRN Darrel Polo M.D.   600 mg at 03/26/25 0521       Allergies  Allergies   Allergen Reactions    Lisinopril Shortness of Breath     Closes throat       Vital Signs last 24 hours  Temp:  [36.5 °C (97.7 °F)-36.7 °C (98.1 °F)] 36.5 °C (97.7 °F)  Pulse:  [60-81] 81  Resp:  [16] 16  BP: (154-197)/() 154/98  SpO2:  [91 %-100 %] 100 %    Physical Exam  Physical Exam  Vitals and nursing note reviewed.   Constitutional:       General: He is not in acute distress.     Appearance: He is not ill-appearing.      Comments: Well appearing male in no acute  distress sitting up in  neuroscience   HENT:      Head: Normocephalic.      Mouth/Throat:      Mouth: Mucous membranes are moist.   Eyes:      Pupils: Pupils are equal, round, and reactive to light.   Neck:      Comments: No stridor  Cardiovascular:      Rate and Rhythm: Normal rate.      Heart sounds: No murmur heard.  Pulmonary:      Effort: No respiratory distress.      Breath sounds: No stridor. No wheezing or rhonchi.   Chest:      Chest wall: No tenderness.   Abdominal:      General: There is no distension.      Palpations: There is no mass.      Tenderness: There is no abdominal tenderness.      Hernia: No hernia is present.   Musculoskeletal:         General: No swelling or tenderness.   Skin:     Coloration: Skin is not jaundiced or pale.   Neurological:      Mental Status: He is alert.      Comments: Patient is awake has some dysconjugate of gaze, some left upper lid ptosis, left forehead weakness, able to smile symmetric, stick out his tongue, right side arm flaccid with increase tone and, right lower leg flaccid, left full strength, he has dysarthric speech.    Psychiatric:         Mood and Affect: Mood normal.      Comments: Odd affect         Fluids    Intake/Output Summary (Last 24 hours) at 3/27/2025 1617  Last data filed at 3/27/2025 0816  Gross per 24 hour   Intake 0 ml   Output 250 ml   Net -250 ml       Laboratory  Recent Results (from the past 48 hours)   CSF CULTURE    Collection Time: 03/25/25  6:00 PM    Specimen: Tap; CSF   Result Value Ref Range    Significant Indicator NEG     Source CSF     Site TAP     Culture Result No growth at 48 hours.     Gram Stain Result Rare WBCs.  No organisms seen.      CSF GLUCOSE    Collection Time: 03/25/25  6:00 PM   Result Value Ref Range    Glucose CSF 71 40 - 80 mg/dL   CSF PROTEIN    Collection Time: 03/25/25  6:00 PM   Result Value Ref Range    Total Protein, CSF 91 (H) 15 - 45 mg/dL   CSF CELL COUNT    Collection Time: 03/25/25  6:00 PM   Result Value  Ref Range    Number Of Tubes 4     Volume 5.5 mL    Color-Body Fluid Colorless     Character-Body Fluid Clear     Supernatant Appearance Colorless     Total RBC Count 48 cells/uL    Crenated RBC 0 %    CSF Total Nucleated Cells 1 0 - 10 cells/uL    Comments See Comment     CSF Tube Number Tube 1    CSF CELL COUNT    Collection Time: 03/25/25  6:00 PM   Result Value Ref Range    Number Of Tubes 4     Volume 5.5 mL    Color-Body Fluid Colorless     Character-Body Fluid Clear     Supernatant Appearance Colorless     Total RBC Count <1 cells/uL    Crenated RBC 0 %    CSF Total Nucleated Cells 1 0 - 10 cells/uL    Comments See Comment     CSF Tube Number Tube 4    GRAM STAIN    Collection Time: 03/25/25  6:00 PM    Specimen: CSF   Result Value Ref Range    Significant Indicator .     Source CSF     Site TAP     Gram Stain Result Rare WBCs.  No organisms seen.      MENINGITIS/ENCEPHALITIS CSF PANEL BY PCR    Collection Time: 03/25/25  6:00 PM   Result Value Ref Range    Cryptococcus neoformans/gattii by PCR Not Detected     Cytomegalovirus by PCR Not Detected     Enterovirus by PCR Not Detected     Escherichia coli K1 by PCR Not Detected     HAEM influenzae by PCR Not Detected     HSV 1 by PCR Not Detected     HSV 2 by PCR Not Detected     Human Herpesvirus 6 by PCR Not Detected     Human parechovirus by PCR Not Detected     Listeria Monocytogenes by PCR Not Detected     Neisseria meningitidis by PCR Not Detected     Strep Agalactiae by PCR Not Detected     Strep pneumoniae by PCR Not Detected     Varicella Zoster Virus by PCR Not Detected    IGG CSF    Collection Time: 03/25/25  6:00 PM   Result Value Ref Range    IgG CSF 9.3 (H) 0.0 - 6.0 mg/dL   EKG (NOW)    Collection Time: 03/25/25  6:48 PM   Result Value Ref Range    Report       [facility]    Test Date:  2025-03-25  Pt Name:    CHARLENE BUCKLEY              Department: ER  MRN:        [id number]                      Room:         22  Gender:     Male                         Technician: [id number]  :        [id number]                   Requested By:THIAGO MORE  Order #:    [id number]                    Reading MD: THIAGO MORE MD    Measurements  Intervals                                Axis  Rate:       61                           P:          28  OK:         184                          QRS:        53  QRSD:       97                           T:          34  QT:         425  QTc:        428    Interpretive Statements  Sinus rhythm  Probable left ventricular hypertrophy  Anterior ST elevation, probably due to LVH  Compared to ECG 2020 21:15:58  Left ventricular hypertrophy now present  T-wave abnormality no longer present  ST (T wave) deviation still present  Electronically Signed On 2025 18:48:56 PDT  by THIAGO MORE MD     POCT glucose device results    Collection Time: 25 11:50 PM   Result Value Ref Range    POC Glucose, Blood 154 (H) 65 - 99 mg/dL   CBC with Differential    Collection Time: 25  3:03 AM   Result Value Ref Range    WBC 6.4 4.8 - 10.8 K/uL    RBC 5.73 4.70 - 6.10 M/uL    Hemoglobin 15.7 14.0 - 18.0 g/dL    Hematocrit 46.3 42.0 - 52.0 %    MCV 80.8 (L) 81.4 - 97.8 fL    MCH 27.4 27.0 - 33.0 pg    MCHC 33.9 32.3 - 36.5 g/dL    RDW 39.1 35.9 - 50.0 fL    Platelet Count 285 164 - 446 K/uL    MPV 8.9 (L) 9.0 - 12.9 fL    Neutrophils-Polys 64.30 44.00 - 72.00 %    Lymphocytes 24.70 22.00 - 41.00 %    Monocytes 9.80 0.00 - 13.40 %    Eosinophils 0.60 0.00 - 6.90 %    Basophils 0.30 0.00 - 1.80 %    Immature Granulocytes 0.30 0.00 - 0.90 %    Nucleated RBC 0.00 0.00 - 0.20 /100 WBC    Neutrophils (Absolute) 4.13 1.82 - 7.42 K/uL    Lymphs (Absolute) 1.59 1.00 - 4.80 K/uL    Monos (Absolute) 0.63 0.00 - 0.85 K/uL    Eos (Absolute) 0.04 0.00 - 0.51 K/uL    Baso (Absolute) 0.02 0.00 - 0.12 K/uL    Immature Granulocytes (abs) 0.02 0.00 - 0.11 K/uL    NRBC (Absolute) 0.00 K/uL   Comp  Metabolic Panel (CMP)    Collection Time: 03/26/25  3:03 AM   Result Value Ref Range    Sodium 137 135 - 145 mmol/L    Potassium 3.6 3.6 - 5.5 mmol/L    Chloride 102 96 - 112 mmol/L    Co2 22 20 - 33 mmol/L    Anion Gap 13.0 7.0 - 16.0    Glucose 152 (H) 65 - 99 mg/dL    Bun 12 8 - 22 mg/dL    Creatinine 0.90 0.50 - 1.40 mg/dL    Calcium 9.7 8.5 - 10.5 mg/dL    Correct Calcium 9.5 8.5 - 10.5 mg/dL    AST(SGOT) 20 12 - 45 U/L    ALT(SGPT) 22 2 - 50 U/L    Alkaline Phosphatase 68 30 - 99 U/L    Total Bilirubin 0.5 0.1 - 1.5 mg/dL    Albumin 4.2 3.2 - 4.9 g/dL    Total Protein 7.5 6.0 - 8.2 g/dL    Globulin 3.3 1.9 - 3.5 g/dL    A-G Ratio 1.3 g/dL   HEMOGLOBIN A1C    Collection Time: 03/26/25  3:03 AM   Result Value Ref Range    Glycohemoglobin 6.3 (H) 4.0 - 5.6 %    Est Avg Glucose 134 mg/dL   ESTIMATED GFR    Collection Time: 03/26/25  3:03 AM   Result Value Ref Range    GFR (CKD-EPI) 106 >60 mL/min/1.73 m 2   POCT glucose device results    Collection Time: 03/26/25  8:39 AM   Result Value Ref Range    POC Glucose, Blood 147 (H) 65 - 99 mg/dL   POCT glucose device results    Collection Time: 03/26/25  1:03 PM   Result Value Ref Range    POC Glucose, Blood 134 (H) 65 - 99 mg/dL   POCT glucose device results    Collection Time: 03/26/25  5:16 PM   Result Value Ref Range    POC Glucose, Blood 134 (H) 65 - 99 mg/dL   POCT glucose device results    Collection Time: 03/26/25  9:50 PM   Result Value Ref Range    POC Glucose, Blood 134 (H) 65 - 99 mg/dL   Lipid Profile    Collection Time: 03/27/25  1:36 AM   Result Value Ref Range    Cholesterol,Tot 191 100 - 199 mg/dL    Triglycerides 129 0 - 149 mg/dL    HDL 51 >=40 mg/dL     (H) <100 mg/dL   POCT glucose device results    Collection Time: 03/27/25 10:20 AM   Result Value Ref Range    POC Glucose, Blood 126 (H) 65 - 99 mg/dL   ABG - LAB    Collection Time: 03/27/25  1:24 PM   Result Value Ref Range    Ph 7.42 7.35 - 7.45    Pco2 35.9 32.0 - 48.0 mmHg    Po2 82.0 (L)  "83.0 - 108.0 mmHg    O2 Saturation 95.8 93.0 - 99.0 %    Hco3 23 21 - 28 mmol/L    Base Excess -1 -4 - 3 mmol/L    Body Temp 36.6 Centigrade    Ph -TC 7.43 7.35 - 7.45    Pco2 -TC 35.1 32.0 - 48.0 mmHg    Po2 -TC 79.4 (L) 83.0 - 108.0 mmHg   LACTIC ACID    Collection Time: 03/27/25  1:24 PM   Result Value Ref Range    Lactic Acid 0.9 0.5 - 2.0 mmol/L   POCT glucose device results    Collection Time: 03/27/25  2:46 PM   Result Value Ref Range    POC Glucose, Blood 116 (H) 65 - 99 mg/dL       Imaging  CT-HEAD W/O   Final Result      1.  No acute intracranial abnormality.   2.  Remote right cerebellar infarct.               DX-CHEST-PORTABLE (1 VIEW)   Final Result      No acute cardiopulmonary disease evident.      DX-CHEST-PORTABLE (1 VIEW)   Final Result      No acute cardiopulmonary disease evident.      CT-CEREBRAL PERFUSION ANALYSIS   Final Result      1. Cerebral blood flow less than 30% possibly representing completed infarct = 0 mL. Based on distribution of this finding, this is unlikely to represent artifact.      2. T Max more than 6 seconds possibly representing combination of completed infarct and ischemia = 7 mL. Based on the distribution of this finding, this is possibly artifact.      3. Mismatched volume possibly representing ischemic brain/penumbra= 0 mL      4.  Please note that this cerebral perfusion study and report is Quantitative and targets supratentorial (cerebral) perfusion for evaluation of large vessel territory acute ischemia/infarction. For example, lacunar infarcts, and brainstem/posterior fossa    ischemia/infarction are not evaluated on this study.  Data acquisition is subject to artifacts which can yield non-anatomically plausible perfusion maps which may be due to motion, bolus timing, signal to noise ratio, or other technical factors.    Perfusion map abnormalities which show non-anatomic distributions are likely artifact.   This study is not \"stand-alone\" and should only be " utilized for diagnosis, management/treatment in correlation with CT, CTA, and/or MRI and clinical factors.         CT-CTA NECK WITH & W/O-POST PROCESSING   Final Result      CT angiogram of the neck within normal limits.      CT-CTA HEAD WITH & W/O-POST PROCESS   Final Result      CT angiogram of the [place] within normal limits.      CT-HEAD W/O   Final Result      Head CT without contrast within normal limits. No evidence of acute cerebral infarction, hemorrhage or mass lesion.               MR-LUMBAR SPINE-WITH & W/O    (Results Pending)   MR-THORACIC SPINE-WITH & W/O    (Results Pending)   MR-BRAIN-WITH & W/O    (Results Pending)   MR-CERVICAL SPINE-WITH & W/O    (Results Pending)       Assessment/Plan  Acute ischemic stroke (HCC)- (present on admission)  Assessment & Plan  Concerns for brainstem stroke and airway watch.   He is able to stick out his tongue has no stridor or respiratory distress, has required 3 episodes of NT today  Plan to transfer to ICU for above, will do a trial of laying in flat with our nursing staff and see if he is safe for MRI if not will intubate patient to facilitate this diagnostic test.   Would not give any sedative if able could use some dex gtt if absolutely necessary.   Neurovascular consulting  Speech and swallow therapy  Further recommendation after MRI  Aspiration precautions.    Type 2 diabetes mellitus (HCC)- (present on admission)  Assessment & Plan  Hg A1 C 6.2  Sliding scale coverage    Hypertension- (present on admission)  Assessment & Plan  Continue BP medication   Further recommendation once diagnostic preformed  Goal at this time -140         Discussed patient condition and risk of morbidity and/or mortality with Hospitalist, RN, RT, Pharmacy, Charge nurse / hot rounds, Patient, and neurology.      The patient remains critically ill acute airway monitoring.  Critical care time = 49 minutes in directly providing and coordinating critical care and  extensive data review.  No time overlap and excludes procedures.

## 2025-03-27 NOTE — PROGRESS NOTES
Referring Physician: Juan A Denton M.D.    S:  Progressive worsening of symptoms including respiratory involvement, dysphagia and right sided hemiplegia. Requiring intubation with bulbar involvement and unable to maintain secretions.     Scheduled Medications   Medication Dose Frequency    hydrALAZINE  25 mg Q8HRS    atorvastatin  40 mg Q EVENING    aspirin  81 mg DAILY    Or    aspirin  300 mg DAILY    methocarbamol  500 mg TID    enoxaparin (LOVENOX) injection  40 mg DAILY AT 1800    senna-docusate  2 Tablet Q EVENING    insulin lispro  1-6 Units 4X/DAY ACHS    hydroCHLOROthiazide  25 mg DAILY    amLODIPine  10 mg DAILY    melatonin  5 mg Nightly         O:      Vitals:    03/27/25 0816   BP: (!) 154/98   Pulse: 81   Resp: 16   Temp: 36.5 °C (97.7 °F)   SpO2: 100%       Physical Examination:   General: Patient is awake and is in respiratory distress requiring intubation.   Eye: Examination of optic disks not indicated at this time given acuity of consult  Neck: There is normal range of motion  CV: regular rate   Extremities:  clear, dry, intact, without peripheral edema    Neurologic Examination:  Mental status: Awake, alert and ill appearing. Follows commands with left side, hemiplegia on the right   Language: one word sentences, difficulty breathing and managing secretions.   Cranial nerves:    Pupils are equal, round and reactive to light  Intact visual fields  Versions are full    Left sided facial droop    Hearing is intact to conversation   Symmetric elevation of the palate; uvula appears midline   Tongue protrusion is midline  Motor: Hemiplegic on the right. Left sided strength intact 5/5.    Reflexes: Brisk reflexes in the bilateral lowers with spread. Positive hoffmans on the right.   Sensory: decreased sensation on the right.   Coordination: ataxic on the left.   Gait: Unable to assess.     CT-HEAD W/O   Final Result      1.  No acute intracranial abnormality.   2.  Remote right cerebellar infarct.  "              DX-CHEST-PORTABLE (1 VIEW)   Final Result      No acute cardiopulmonary disease evident.      DX-CHEST-PORTABLE (1 VIEW)   Final Result      No acute cardiopulmonary disease evident.      CT-CEREBRAL PERFUSION ANALYSIS   Final Result      1. Cerebral blood flow less than 30% possibly representing completed infarct = 0 mL. Based on distribution of this finding, this is unlikely to represent artifact.      2. T Max more than 6 seconds possibly representing combination of completed infarct and ischemia = 7 mL. Based on the distribution of this finding, this is possibly artifact.      3. Mismatched volume possibly representing ischemic brain/penumbra= 0 mL      4.  Please note that this cerebral perfusion study and report is Quantitative and targets supratentorial (cerebral) perfusion for evaluation of large vessel territory acute ischemia/infarction. For example, lacunar infarcts, and brainstem/posterior fossa    ischemia/infarction are not evaluated on this study.  Data acquisition is subject to artifacts which can yield non-anatomically plausible perfusion maps which may be due to motion, bolus timing, signal to noise ratio, or other technical factors.    Perfusion map abnormalities which show non-anatomic distributions are likely artifact.   This study is not \"stand-alone\" and should only be utilized for diagnosis, management/treatment in correlation with CT, CTA, and/or MRI and clinical factors.         CT-CTA NECK WITH & W/O-POST PROCESSING   Final Result      CT angiogram of the neck within normal limits.      CT-CTA HEAD WITH & W/O-POST PROCESS   Final Result      CT angiogram of the [place] within normal limits.      CT-HEAD W/O   Final Result      Head CT without contrast within normal limits. No evidence of acute cerebral infarction, hemorrhage or mass lesion.               MR-LUMBAR SPINE-WITH & W/O    (Results Pending)   MR-THORACIC SPINE-WITH & W/O    (Results Pending) "   MR-BRAIN-WITH & W/O    (Results Pending)   MR-CERVICAL SPINE-WITH & W/O    (Results Pending)         Impression & Recommendations:    49 year old male initally presented as code stroke due to generalized weakness and speech difficulty. Initial evaluation revealed inconsistent exam, stroke CT protocol unrevealing. Not a candidate for acute interventions. Consulted today for  worsening of symptoms with bulbar involvement and right sided hemiplegia with left sided facial droop. On exam, patient is noted to have brisk reflexes with spread and positive lorenzo's. Will need to obtain a STAT MRI brain and C spine with and without contrast to rule out brainstem infarct vs. cervical myelopathy. Patient is having difficulty with managing secretions with increasing shortness of breath. Stridor noted on exam, intensivist notified of patients status and the need for intubation.     High suspicion for medullary or midbrain stroke. Will need ICU admission and intubation. Case discussed with vascular neurology.     Plan:  - RICU   - Intubation/sedation for airway protection, consider early trach/peg  - Blood pressure parameters normal tensive 100-130/70-90  - Increase Lipitor to 80 mg daily   - A1c 6.3, goal <7. Acutely maintain FSBG   - DVT prophylaxis- continue lovenox   - Pending MRI results, will likely need short term DAPT   - Neuro checks q. 4 hours   - STAT MRI brain and C spine with and without contrast  - Neurology will follow along       The evaluation of the patient, and recommended management, was discussed with Dr. Casas and the care team      VANESA High  Neurohospitalist Services

## 2025-03-27 NOTE — PROGRESS NOTES
0015: Pt to MRI with transport via gurney.     1232: MRI tech informed this RN that pt was not tolerating lying flat and keeps spitting up phlegm. On call hospitalist notified, ordered one time dose of IV ativan.    0049: Pt medicated with IV ativan.     0107: MRI tech informed this RN that pt still not tolerating MRI after administering ativan. Pt will be brought back up to the unit, will attempt to do MRI at a later time.

## 2025-03-27 NOTE — THERAPY
Physical Therapy   Initial Evaluation     Patient Name: Jonna Brian  Age:  49 y.o., Sex:  male  Medical Record #: [id number]  Today's Date: 3/27/2025     Precautions  Precautions: Fall Risk;Swallow Precautions    Assessment  Patient is a 49 y.o. male with hx of DM and HTN admitted with weakness in RUE and BLE, aphasia, and ataxia. Still pending MRI brain. PT eval complete, pt currently presents below his functional baseline due to impaired strength, ROM, balance, activity tolerance, gait, and overall mobility. Pt's SO was present to provide PLOF and housing hx as pt's speech is slurred and difficult to understand. Pt is normally fully independent, however he is now at [facility] for bed mobility due to R hemiplegia and significant balance deficit. Pt noted to have left beating end range nystagmus and R foot clonus. Recommend post acute placement at this time to maximize functional independence. Will follow while admitted for skilled PT intervention.     Plan    Physical Therapy Initial Treatment Plan   Treatment Plan : Bed Mobility, Family / Caregiver Training, Gait Training, Neuro Re-Education / Balance, Self Care / Home Evaluation, Stair Training, Therapeutic Activities, Therapeutic Exercise  Treatment Frequency: 5 Times per Week  Duration: Until Therapy Goals Met    DC Equipment Recommendations: Unable to determine at this time  Discharge Recommendations: Recommend post-acute placement for additional physical therapy services prior to discharge home (IPR)         Vitals   Room Air Oximetry 96   O2 (LPM) 0   O2 Delivery Device None - Room Air   Prior Living Situation   Prior Services None;Home-Independent   Housing / Facility 2 Story House   Steps Into Home 2   Steps In Home   (FOS)   Equipment Owned None   Lives with - Patient's Self Care Capacity Alone and Able to Care For Self   Comments pt's girlfriend present to provide info, states she is retired and can provide daily support if needed   Prior Level of  Functional Mobility   Bed Mobility Independent   Transfer Status Independent   Ambulation Independent   Ambulation Distance community distances   Assistive Devices Used None   Stairs Independent   History of Falls   History of Falls Yes   Date of Last Fall   (fall 3/27/25 while admitted)   Cognition    Cognition / Consciousness X   Speech/ Communication Delayed Responses;Dysarthric;Slurred   Level of Consciousness Responds to voice   Safety Awareness Impaired   New Learning Impaired   Attention Impaired   Sequencing Impaired   Initiation Impaired   Comments pt needing cues to keep his eyes open and remain alert during evaluation. pt is mildy delayed overall but receptive to education and instructions   Passive ROM Lower Body   Passive ROM Lower Body WDL   Active ROM Lower Body    Active ROM Lower Body  X   Comments no active movement on RLE. slightly decreased left hip flexion and DF due to weakness   Strength Lower Body   Lower Body Strength  X   Lt Hip Flexion Strength 4 (G)   Lt Knee Extension Strength 4 (G)   Lt Ankle Dorsiflexion Strength 4 (G)   Lt Ankle Plantar Flexion Strength 5 (N)   Comments RLE 0/5   Sensation Lower Body   Lower Extremity Sensation   X   Comments pt reports decreased sensation to light touch throughout LLE   Neurological Concerns   Neurological Concerns Yes   Clonus   (50-60 beat clonus on the right. consistent when tested again)   Coordination Lower Body    Coordination Lower Body  X   Comments overall impaired coordination bilaterally, right technically worse due to no active movement   Vision   Vision Comments normal visual ROM, noted to have left end range nystagmus when looking to left. terri mild nystagmus when looking straight or to the right   Balance Assessment   Sitting Balance (Static) Poor   Sitting Balance (Dynamic) Trace +   Weight Shift Sitting Poor   Comments pt needing consistent support due to poor balance, pt losing his balance in multiple directions with poor righting  reactions. uses his LUE for support, however he cannot hold himself up   Bed Mobility    Supine to Sit Maximal Assist   Sit to Supine Maximal Assist   Scooting Maximal Assist   Rolling Maximal Assist to Rt.;Maximum Assist to Lt.   Comments 2p assist for safety   Gait Analysis   Gait Level Of Assist Unable to Participate   Functional Mobility   Mobility EOB only   Comments standing NT due to poor EOB balance and R hemiplegia   6 Clicks Assessment - How much HELP from from another person do you currently need... (If the patient hasn't done an activity recently, how much help from another person do you think he/she would need if he/she tried?)   Turning from your back to your side while in a flat bed without using bedrails? 2   Moving from lying on your back to sitting on the side of a flat bed without using bedrails? 2   Moving to and from a bed to a chair (including a wheelchair)? 1   Standing up from a chair using your arms (e.g., wheelchair, or bedside chair)? 2   Walking in hospital room? 1   Climbing 3-5 steps with a railing? 1   6 clicks Mobility Score 9   Short Term Goals    Short Term Goal # 1 pt will move supine<>eob with min a in 6 tx for bed mobility.   Short Term Goal # 2 pt will sit at eob for 5 min with fair- balance in 6 tx for oob tolerance.   Short Term Goal # 3 pt will complete sts with hemiwalker and min a in 6 tx for functional mobility.   Education Group   Education Provided Role of Physical Therapist   Role of Physical Therapist Patient Response Patient;Significant Other;Acceptance;Explanation;Verbal Demonstration   Physical Therapy Initial Treatment Plan    Treatment Plan  Bed Mobility;Family / Caregiver Training;Gait Training;Neuro Re-Education / Balance;Self Care / Home Evaluation;Stair Training;Therapeutic Activities;Therapeutic Exercise   Treatment Frequency 5 Times per Week   Duration Until Therapy Goals Met   Problem List    Problems Impaired Bed Mobility;Impaired Transfers;Impaired  Ambulation;Functional ROM Deficit;Functional Strength Deficit;Impaired Balance;Impaired Coordination;Decreased Activity Tolerance;Safety Awareness Deficits / Cognition   Anticipated Discharge Equipment and Recommendations   DC Equipment Recommendations Unable to determine at this time   Discharge Recommendations Recommend post-acute placement for additional physical therapy services prior to discharge home   Interdisciplinary Plan of Care Collaboration   IDT Collaboration with  Family / Caregiver;Nursing;Occupational Therapist   Patient Position at End of Therapy In Bed;Bed Alarm On;Call Light within Reach;Tray Table within Reach;Phone within Reach;Family / Friend in Room   Collaboration Comments RN updated   Session Information   Date / Session Number  3/27- 1 (1/5, 4/2)     Patient seen for team evaluation with Occupational Therapist for the following reason(s):  Patient required 2 person assistance for safety and to provide effective interventions. Each discipline assisted patient with appropriate and separate goals. Due to the medical complexity and the patient having a fall while admitted, the skill of both practitioners is needed to monitor vitals, patient status, and adjust the intervention to fit the patient's needs and goals.

## 2025-03-27 NOTE — DISCHARGE PLANNING
Case Management Discharge Planning    Admission Date: 3/25/2025  GMLOS: 3  ALOS: 2    6-Clicks ADL Score: 16  6-Clicks Mobility Score: 9  PT and/or OT Eval ordered: Yes  Post-acute Referrals Ordered: Yes  Post-acute Choice Obtained: Yes  Has referral(s) been sent to post-acute provider:  Yes      Anticipated Discharge Dispo: Discharge Disposition: Disch to IP rehab facility or distinct part unit (62)    DME Needed: No    Action(s) Taken: DC Assessment Complete (See below), Choice obtained, and Referral(s) sent    Escalations Completed: Provider    Medically Clear: No    Next Steps: f/u with pt and medical team to discuss dc needs and barriers.    Barriers to Discharge: Medical clearance and Pending Placement    Is the patient up for discharge tomorrow: No      Care Transition Team Assessment    RN CM met with pt and his SO, Susan at bedside to complete assessment. Pt A&Ox4 and able to verify the information on the face sheet. Pt lives alone in a 2 story townhouse in [facility]. Prior admission, Pt was independent  in all his ADL's and IADL's. Pt has not had previous HH nor does he use DME or home O2. Pt is working at [employer], an animal [employer] and he drives for; to (do). Pt denies any history of ETOH/drug abuse.   Pt and his SO is agreeable for post acute placement and prefers [facility]. Pt's SO, susan is Pt's dc support. Pt plans to stay with Susan after post acute placement.     Pt is not medically cleared, pending MRI with sedation. [facility] following.     Information Source  Orientation Level: Oriented X4  Information Given By: Patient, Significant Other  Who is responsible for making decisions for patient? : Patient    Readmission Evaluation  Is this a readmission?: No    Elopement Risk  Legal Hold: No  Ambulatory or Self Mobile in Wheelchair: No-Not an Elopement Risk  Disoriented: No  Psychiatric Symptoms: None  History of Wandering: No  Elopement this Admit: No  Vocalizing Wanting to Leave:  No  Displays Behaviors, Body Language Wanting to Leave: No-Not at Risk for Elopement  Elopement Risk: Not at Risk for Elopement    Interdisciplinary Discharge Planning  Lives with - Patient's Self Care Capacity: Alone and Able to Care For Self  Patient or legal guardian wants to designate a caregiver: No  Housing / Facility: [facility]  Prior Services: None, Home-Independent    Discharge Preparedness  What is your plan after discharge?: Other (comment) (IPR)  What are your discharge supports?: Partner  Prior Functional Level: Ambulatory, Independent with Activities of Daily Living, Drives Self  Difficulity with ADLs: None  Difficulity with IADLs: None    Functional Assesment  Prior Functional Level: Ambulatory, Independent with Activities of Daily Living, Drives Self    Finances  Financial Barriers to Discharge: No  Prescription Coverage: Yes    Vision / Hearing Impairment  Right Eye Vision: Impaired, Wears Glasses  Left Eye Vision: Impaired, Wears Glasses         Advance Directive  Advance Directive?: None  Advance Directive offered?: AD Booklet given    Domestic Abuse  Have you ever been the victim of abuse or violence?: No    Psychological Assessment  History of Substance Abuse: None    Discharge Risks or Barriers  Discharge risks or barriers?: Complex medical needs  Patient risk factors: Complex medical needs    Anticipated Discharge Information  Discharge Disposition: Disch to  rehab facility or distinct part unit (62)

## 2025-03-28 ENCOUNTER — APPOINTMENT (OUTPATIENT)
Dept: RADIOLOGY | Facility: MEDICAL CENTER | Age: 50
End: 2025-03-28
Attending: INTERNAL MEDICINE
Payer: COMMERCIAL

## 2025-03-28 ENCOUNTER — APPOINTMENT (OUTPATIENT)
Dept: RADIOLOGY | Facility: MEDICAL CENTER | Age: 50
DRG: 004 | End: 2025-03-28
Attending: INTERNAL MEDICINE
Payer: COMMERCIAL

## 2025-03-28 LAB
ALBUMIN SERPL BCP-MCNC: 3.9 G/DL (ref 3.2–4.9)
ALBUMIN/GLOB SERPL: 1.3 G/DL
ALP SERPL-CCNC: 66 U/L (ref 30–99)
ALT SERPL-CCNC: 18 U/L (ref 2–50)
ANION GAP SERPL CALC-SCNC: 13 MMOL/L (ref 7–16)
AST SERPL-CCNC: 21 U/L (ref 12–45)
BACTERIA CSF CULT: NORMAL
BASE EXCESS BLDA CALC-SCNC: -1 MMOL/L (ref -4–3)
BASOPHILS # BLD AUTO: 0.3 % (ref 0–1.8)
BASOPHILS # BLD: 0.03 K/UL (ref 0–0.12)
BILIRUB SERPL-MCNC: 0.6 MG/DL (ref 0.1–1.5)
BODY TEMPERATURE: ABNORMAL DEGREES
BREATHS SETTING VENT: 16
BUN SERPL-MCNC: 23 MG/DL (ref 8–22)
CALCIUM ALBUM COR SERPL-MCNC: 9.6 MG/DL (ref 8.5–10.5)
CALCIUM SERPL-MCNC: 9.5 MG/DL (ref 8.5–10.5)
CHLORIDE SERPL-SCNC: 103 MMOL/L (ref 96–112)
CO2 BLDA-SCNC: 25 MMOL/L (ref 32–48)
CO2 SERPL-SCNC: 20 MMOL/L (ref 20–33)
CREAT SERPL-MCNC: 1.13 MG/DL (ref 0.5–1.4)
CRP SERPL HS-MCNC: 1.35 MG/DL (ref 0–0.75)
DELSYS IDSYS: ABNORMAL
EKG IMPRESSION: NORMAL
END TIDAL CARBON DIOXIDE IECO2: 34 MMHG
EOSINOPHIL # BLD AUTO: 0.03 K/UL (ref 0–0.51)
EOSINOPHIL NFR BLD: 0.3 % (ref 0–6.9)
ERYTHROCYTE [DISTWIDTH] IN BLOOD BY AUTOMATED COUNT: 41.2 FL (ref 35.9–50)
GFR SERPLBLD CREATININE-BSD FMLA CKD-EPI: 80 ML/MIN/1.73 M 2
GLOBULIN SER CALC-MCNC: 3 G/DL (ref 1.9–3.5)
GLUCOSE BLD STRIP.AUTO-MCNC: 105 MG/DL (ref 65–99)
GLUCOSE BLD STRIP.AUTO-MCNC: 122 MG/DL (ref 65–99)
GLUCOSE BLD STRIP.AUTO-MCNC: 124 MG/DL (ref 65–99)
GLUCOSE BLD STRIP.AUTO-MCNC: 135 MG/DL (ref 65–99)
GLUCOSE SERPL-MCNC: 132 MG/DL (ref 65–99)
GRAM STN SPEC: NORMAL
HCO3 BLDA-SCNC: 23.7 MMOL/L (ref 21–28)
HCT VFR BLD AUTO: 45.4 % (ref 42–52)
HGB BLD-MCNC: 14.8 G/DL (ref 14–18)
HOROWITZ INDEX BLDA+IHG-RTO: 350 MM[HG]
IMM GRANULOCYTES # BLD AUTO: 0.03 K/UL (ref 0–0.11)
IMM GRANULOCYTES NFR BLD AUTO: 0.3 % (ref 0–0.9)
LACTATE BLD-SCNC: 0.7 MMOL/L (ref 0.5–2)
LYMPHOCYTES # BLD AUTO: 0.99 K/UL (ref 1–4.8)
LYMPHOCYTES NFR BLD: 9.3 % (ref 22–41)
MAGNESIUM SERPL-MCNC: 2 MG/DL (ref 1.5–2.5)
MCH RBC QN AUTO: 27.1 PG (ref 27–33)
MCHC RBC AUTO-ENTMCNC: 32.6 G/DL (ref 32.3–36.5)
MCV RBC AUTO: 83 FL (ref 81.4–97.8)
MODE IMODE: ABNORMAL
MONOCYTES # BLD AUTO: 0.82 K/UL (ref 0–0.85)
MONOCYTES NFR BLD AUTO: 7.7 % (ref 0–13.4)
NEUTROPHILS # BLD AUTO: 8.77 K/UL (ref 1.82–7.42)
NEUTROPHILS NFR BLD: 82.1 % (ref 44–72)
NRBC # BLD AUTO: 0 K/UL
NRBC BLD-RTO: 0 /100 WBC (ref 0–0.2)
O2/TOTAL GAS SETTING VFR VENT: 40 %
PCO2 BLDA: 38.1 MMHG (ref 32–48)
PCO2 TEMP ADJ BLDA: 37.6 MMHG (ref 32–48)
PEEP END EXPIRATORY PRESSURE IPEEP: 8 CMH20
PH BLDA: 7.4 [PH] (ref 7.35–7.45)
PH TEMP ADJ BLDA: 7.41 [PH] (ref 7.35–7.45)
PHOSPHATE SERPL-MCNC: 4.8 MG/DL (ref 2.5–4.5)
PLATELET # BLD AUTO: 259 K/UL (ref 164–446)
PMV BLD AUTO: 8.8 FL (ref 9–12.9)
PO2 BLDA: 140 MMHG (ref 83–108)
PO2 TEMP ADJ BLDA: 138 MMHG (ref 83–108)
POTASSIUM SERPL-SCNC: 4.1 MMOL/L (ref 3.6–5.5)
PROT SERPL-MCNC: 6.9 G/DL (ref 6–8.2)
RBC # BLD AUTO: 5.47 M/UL (ref 4.7–6.1)
SAO2 % BLDA: 99 % (ref 93–99)
SIGNIFICANT IND 70042: NORMAL
SITE SITE: NORMAL
SODIUM SERPL-SCNC: 136 MMOL/L (ref 135–145)
SOURCE SOURCE: NORMAL
SPECIMEN DRAWN FROM PATIENT: ABNORMAL
TIDAL VOLUME IVT: 480 ML
WBC # BLD AUTO: 10.7 K/UL (ref 4.8–10.8)

## 2025-03-28 PROCEDURE — 83605 ASSAY OF LACTIC ACID: CPT

## 2025-03-28 PROCEDURE — 86140 C-REACTIVE PROTEIN: CPT

## 2025-03-28 PROCEDURE — 700105 HCHG RX REV CODE 258: Performed by: NURSE PRACTITIONER

## 2025-03-28 PROCEDURE — 84100 ASSAY OF PHOSPHORUS: CPT

## 2025-03-28 PROCEDURE — 700101 HCHG RX REV CODE 250: Performed by: INTERNAL MEDICINE

## 2025-03-28 PROCEDURE — 72157 MRI CHEST SPINE W/O & W/DYE: CPT

## 2025-03-28 PROCEDURE — 36600 WITHDRAWAL OF ARTERIAL BLOOD: CPT

## 2025-03-28 PROCEDURE — 700111 HCHG RX REV CODE 636 W/ 250 OVERRIDE (IP): Mod: JZ

## 2025-03-28 PROCEDURE — 70553 MRI BRAIN STEM W/O & W/DYE: CPT

## 2025-03-28 PROCEDURE — 700111 HCHG RX REV CODE 636 W/ 250 OVERRIDE (IP): Performed by: INTERNAL MEDICINE

## 2025-03-28 PROCEDURE — 93010 ELECTROCARDIOGRAM REPORT: CPT | Performed by: INTERNAL MEDICINE

## 2025-03-28 PROCEDURE — 80053 COMPREHEN METABOLIC PANEL: CPT

## 2025-03-28 PROCEDURE — 770022 HCHG ROOM/CARE - ICU (200)

## 2025-03-28 PROCEDURE — 700102 HCHG RX REV CODE 250 W/ 637 OVERRIDE(OP): Performed by: INTERNAL MEDICINE

## 2025-03-28 PROCEDURE — 82962 GLUCOSE BLOOD TEST: CPT

## 2025-03-28 PROCEDURE — 700105 HCHG RX REV CODE 258: Performed by: INTERNAL MEDICINE

## 2025-03-28 PROCEDURE — 94003 VENT MGMT INPAT SUBQ DAY: CPT

## 2025-03-28 PROCEDURE — 700102 HCHG RX REV CODE 250 W/ 637 OVERRIDE(OP)

## 2025-03-28 PROCEDURE — 99291 CRITICAL CARE FIRST HOUR: CPT | Performed by: INTERNAL MEDICINE

## 2025-03-28 PROCEDURE — 99232 SBSQ HOSP IP/OBS MODERATE 35: CPT

## 2025-03-28 PROCEDURE — 83735 ASSAY OF MAGNESIUM: CPT

## 2025-03-28 PROCEDURE — 85025 COMPLETE CBC W/AUTO DIFF WBC: CPT

## 2025-03-28 PROCEDURE — 302136 NUTRITION PUMP: Performed by: INTERNAL MEDICINE

## 2025-03-28 PROCEDURE — A9270 NON-COVERED ITEM OR SERVICE: HCPCS

## 2025-03-28 PROCEDURE — 93005 ELECTROCARDIOGRAM TRACING: CPT | Mod: TC

## 2025-03-28 PROCEDURE — 72158 MRI LUMBAR SPINE W/O & W/DYE: CPT

## 2025-03-28 PROCEDURE — 82803 BLOOD GASES ANY COMBINATION: CPT

## 2025-03-28 PROCEDURE — 94799 UNLISTED PULMONARY SVC/PX: CPT

## 2025-03-28 PROCEDURE — A9270 NON-COVERED ITEM OR SERVICE: HCPCS | Performed by: INTERNAL MEDICINE

## 2025-03-28 PROCEDURE — 72156 MRI NECK SPINE W/O & W/DYE: CPT

## 2025-03-28 PROCEDURE — 71045 X-RAY EXAM CHEST 1 VIEW: CPT

## 2025-03-28 RX ORDER — DEXTROSE MONOHYDRATE 25 G/50ML
25 INJECTION, SOLUTION INTRAVENOUS
Status: DISCONTINUED | OUTPATIENT
Start: 2025-03-28 | End: 2025-04-06

## 2025-03-28 RX ORDER — INSULIN LISPRO 100 [IU]/ML
1-6 INJECTION, SOLUTION INTRAVENOUS; SUBCUTANEOUS EVERY 6 HOURS
Status: DISCONTINUED | OUTPATIENT
Start: 2025-03-29 | End: 2025-04-06

## 2025-03-28 RX ADMIN — Medication 5 MG: at 20:38

## 2025-03-28 RX ADMIN — PROPOFOL 50 MCG/KG/MIN: 10 INJECTION, EMULSION INTRAVENOUS at 05:56

## 2025-03-28 RX ADMIN — FENTANYL CITRATE 50 MCG: 50 INJECTION, SOLUTION INTRAMUSCULAR; INTRAVENOUS at 10:26

## 2025-03-28 RX ADMIN — POLYETHYLENE GLYCOL 3350 1 PACKET: 17 POWDER, FOR SOLUTION ORAL at 17:12

## 2025-03-28 RX ADMIN — SODIUM CHLORIDE, POTASSIUM CHLORIDE, SODIUM LACTATE AND CALCIUM CHLORIDE: 600; 310; 30; 20 INJECTION, SOLUTION INTRAVENOUS at 13:55

## 2025-03-28 RX ADMIN — FAMOTIDINE 20 MG: 20 TABLET, FILM COATED ORAL at 17:11

## 2025-03-28 RX ADMIN — FAMOTIDINE 20 MG: 20 TABLET, FILM COATED ORAL at 05:57

## 2025-03-28 RX ADMIN — ASPIRIN 81 MG: 81 TABLET, CHEWABLE ORAL at 05:57

## 2025-03-28 RX ADMIN — NOREPINEPHRINE BITARTRATE 0.1 MCG/KG/MIN: 1 INJECTION, SOLUTION, CONCENTRATE INTRAVENOUS at 08:11

## 2025-03-28 RX ADMIN — FENTANYL CITRATE 50 MCG: 50 INJECTION, SOLUTION INTRAMUSCULAR; INTRAVENOUS at 10:44

## 2025-03-28 RX ADMIN — SODIUM CHLORIDE, POTASSIUM CHLORIDE, SODIUM LACTATE AND CALCIUM CHLORIDE: 600; 310; 30; 20 INJECTION, SOLUTION INTRAVENOUS at 01:06

## 2025-03-28 RX ADMIN — SENNOSIDES AND DOCUSATE SODIUM 2 TABLET: 50; 8.6 TABLET ORAL at 05:57

## 2025-03-28 RX ADMIN — SENNOSIDES AND DOCUSATE SODIUM 2 TABLET: 50; 8.6 TABLET ORAL at 17:11

## 2025-03-28 RX ADMIN — NOREPINEPHRINE BITARTRATE 0.1 MCG/KG/MIN: 1 INJECTION, SOLUTION, CONCENTRATE INTRAVENOUS at 06:19

## 2025-03-28 RX ADMIN — ENOXAPARIN SODIUM 40 MG: 100 INJECTION SUBCUTANEOUS at 17:11

## 2025-03-28 RX ADMIN — ATORVASTATIN CALCIUM 80 MG: 40 TABLET, FILM COATED ORAL at 17:11

## 2025-03-28 RX ADMIN — PROPOFOL 50 MCG/KG/MIN: 10 INJECTION, EMULSION INTRAVENOUS at 02:55

## 2025-03-28 RX ADMIN — FENTANYL CITRATE 50 MCG: 50 INJECTION, SOLUTION INTRAMUSCULAR; INTRAVENOUS at 21:12

## 2025-03-28 ASSESSMENT — FIBROSIS 4 INDEX
FIB4 SCORE: 0.9
FIB4 SCORE: 0.9

## 2025-03-28 ASSESSMENT — PAIN DESCRIPTION - PAIN TYPE
TYPE: ACUTE PAIN

## 2025-03-28 NOTE — PROGRESS NOTES
"I was called by neurology APRN Ashwin Whelan because this patient was in extremis.  He had recently been seen by my colleague Dr. Denton and was able to stick his tongue out, smile, but had some dysarthric speech. He had been NT suctioned a few times throughout the day.  ABG was normal.  On my evaluation less than 1 hour later, patient was stridorous, moving very little air.  He appeared panicked and when I told him we were going to breath for him he moved his hands as if to say \"get on with it.\" I assisted him with the ambu bag while prepping to intubate which he didn't fight at all. SpO2 was good the entire time. BP quite high in the 180s while prepping.  Post-intubation SBP 180s/150s and I gave propofol 20mg. Next SBP 150s.  I gave additional pushes of propofol during transport and on arrival to the ICU for hypertension then started a drip.    I spoke to Dr. Guallpa.  Highest concern is brainstem stroke.  MRI with and without of brain and c-spine is ordered stat and will occur this evening.       The patient remains critically ill on mechanical ventilation.  Critical care time = 40 minutes in directly providing and coordinating critical care and extensive data review.  No time overlap and excludes procedures.    Update: BP low, likely propofol related. Adding levophed for -130 per neuro notes    "

## 2025-03-28 NOTE — ASSESSMENT & PLAN NOTE
GI prophylaxis: h2 blocker  Monitor ventilator waveforms & blood gases, titrate flow/peep and volumes according.   Daily SAT/SBT  All ventilator bundles are in place     Wean to T-Piece as able, Nishi lisandra as able

## 2025-03-28 NOTE — PROGRESS NOTES
Vascular Neurology Progress Note  Vascular Neurology Service, [facility]    Referring Physician: Grant Gabriel M.D.      Interval History: No acute events overnight.    Review of systems: In addition to what is detailed in the HPI and/or updated in the interval history, all other systems reviewed and are negative.    Past Medical History, Past Surgical History and Social History reviewed and unchanged from prior    Medications:    Current Facility-Administered Medications:     atorvastatin (Lipitor) tablet 80 mg, 80 mg, Enteral Tube, Q EVENING, Alixandjessi Whelan, A.P.R.N., 80 mg at 03/27/25 1826    Respiratory Therapy Consult, , Nebulization, Continuous RT, Maddy Torres M.D.    famotidine (Pepcid) tablet 20 mg, 20 mg, Enteral Tube, Q12HRS, 20 mg at 03/28/25 0557 **OR** famotidine (Pepcid) injection 20 mg, 20 mg, Intravenous, Q12HRS, Maddy Torres M.D.    senna-docusate (Pericolace Or Senokot S) 8.6-50 MG per tablet 2 Tablet, 2 Tablet, Enteral Tube, BID, 2 Tablet at 03/28/25 0557 **AND** polyethylene glycol/lytes (Miralax) Packet 1 Packet, 1 Packet, Enteral Tube, QDAY PRN **AND** magnesium hydroxide (Milk Of Magnesia) suspension 30 mL, 30 mL, Enteral Tube, QDAY PRN **AND** bisacodyl (Dulcolax) suppository 10 mg, 10 mg, Rectal, QDAY PRN, Maddy Torres M.D.    MD Alert...ICU Electrolyte Replacement per Pharmacy, , Other, PHARMACY TO DOSE, Maddy Torres M.D.    Pharmacy Consult: Enteral tube insertion - review meds/change route/product selection, 1 Each, Other, PHARMACY TO DOSE, Maddy Torres M.D.    acetaminophen (Tylenol) tablet 650 mg, 650 mg, Enteral Tube, Q6HRS PRN, Maddy Torres M.D.    aspirin (Asa) chewable tab 81 mg, 81 mg, Enteral Tube, DAILY, 81 mg at 03/28/25 0557 **OR** aspirin (Asa) suppository 300 mg, 300 mg, Rectal, DAILY, Maddy Torres M.D.    [Held by provider] hydrALAZINE (Apresoline) tablet 25 mg, 25 mg, Enteral Tube, Q8HRS, Maddy Torres M.D.    ibuprofen  (Motrin) tablet 600 mg, 600 mg, Enteral Tube, Q6HRS PRN, Maddy Torres M.D.    melatonin tablet 5 mg, 5 mg, Enteral Tube, Nightly, Maddy Torres M.D.    [Held by provider] amLODIPine (Norvasc) tablet 10 mg, 10 mg, Enteral Tube, DAILY, Maddy Torres M.D.    [Held by provider] hydroCHLOROthiazide tablet 25 mg, 25 mg, Enteral Tube, DAILY, Maddy Torres M.D.    promethazine (Phenergan) tablet 12.5-25 mg, 12.5-25 mg, Enteral Tube, Q4HRS PRN, Maddy Torres M.D.    ondansetron (Zofran ODT) dispertab 4 mg, 4 mg, Enteral Tube, Q4HRS PRN, Maddy Torres M.D.    fentaNYL (Sublimaze) injection 50 mcg, 50 mcg, Intravenous, Q15 MIN PRN **AND** fentaNYL (Sublimaze) injection 100 mcg, 100 mcg, Intravenous, Q15 MIN PRN, 100 mcg at 03/27/25 1833 **AND** fentaNYL (SUBLIMAZE) 50 mcg/mL in 50mL (Continuous Infusion), , Intravenous, Continuous, Dose not Required at 03/27/25 1845 **AND** propofol (DIPRIVAN) injection, 0-80 mcg/kg/min (Ideal), Intravenous, Continuous, Paused at 03/28/25 0634 **AND** [START ON 3/30/2025] Triglyceride, , , Every 3 Days (0300), Maddy Torres M.D.    labetalol (Normodyne/Trandate) injection 10 mg, 10 mg, Intravenous, Q4HRS PRN, Maddy Torres M.D.    hydrALAZINE (Apresoline) injection 20 mg, 20 mg, Intravenous, Q6HRS PRN, Maddy Torres M.D.    norepinephrine (Levophed) 8 mg in 250 mL NS infusion (premix), 0-1 mcg/kg/min (Ideal), Intravenous, Continuous, Maddy Torres M.D., Last Rate: 15 mL/hr at 03/28/25 0811, 0.1 mcg/kg/min at 03/28/25 0811    lactated ringers infusion, , Intravenous, Continuous, Marion Chen, Last Rate: 75 mL/hr at 03/28/25 0106, New Bag at 03/28/25 0106    enoxaparin (Lovenox) inj 40 mg, 40 mg, Subcutaneous, DAILY AT 1800, Darrel Polo M.D., 40 mg at 03/27/25 1826    ondansetron (Zofran) syringe/vial injection 4 mg, 4 mg, Intravenous, Q4HRS PRN, Darrel Polo M.D.    promethazine (Phenergan) suppository 12.5-25 mg, 12.5-25 mg, Rectal, Q4HRS PRN,  "Darrel Polo M.D.    prochlorperazine (Compazine) injection 5-10 mg, 5-10 mg, Intravenous, Q4HRS PRN, Darrel Polo M.D., 10 mg at 03/26/25 0550    insulin lispro (HumaLOG,AdmeLOG) subcutaneous injection, 1-6 Units, Subcutaneous, 4X/DAY ACHS **AND** POC blood glucose manual result, , , Q AC AND BEDTIME(S) **AND** NOTIFY MD and PharmD, , , Once **AND** Administer 20 grams of glucose (approximately 8 ounces of fruit juice) every 15 minutes PRN FSBG less than 70 mg/dL, , , PRN **AND** dextrose 50 % (D50W) injection 25 g, 25 g, Intravenous, Q15 MIN PRN, Darrel Polo M.D.    Physical Examination:   /60   Pulse (!) 57   Temp 36.5 °C (97.7 °F) (Temporal)   Resp 16   Ht 1.854 m (6' 0.99\")   Wt 96.6 kg (212 lb 15.4 oz)   SpO2 98%   BMI 28.10 kg/m²       General: Patient is intubated and not following commands   Neck: Unable to assess   CV: Regular rate   Extremities:  Warm, dry, and intact, without peripheral lower extremity edema    NEUROLOGICAL EXAM:     Mental status: Awake to verbal stimuli, not following commands   Speech and language: Unable to assess   Cranial nerve exam: Pupils are equal, round and reactive to light bilaterally. Visual fields are full. There is no nystagmus. Extraocular muscles are intact. Left sided facial droop.   Motor exam: Hemiplegic on the right, spontaneous movement on the left.   Sensory exam:  Unable to assess   Coordination: Ataxic on the left from yesterdays exam. Unable to examine today.         Objective Data:    Labs:  Estimated Creatinine Clearance: 99 mL/min (by C-G formula based on SCr of 1.13 mg/dL).  Recent Labs     03/25/25  1244 03/26/25  0303 03/28/25  0405   SODIUM 137 137 136   POTASSIUM 3.1* 3.6 4.1   CHLORIDE 101 102 103   CO2 18* 22 20   GLUCOSE 186* 152* 132*   BUN 13 12 23*   CREATININE 0.91 0.90 1.13     Recent Labs     03/25/25  1244 03/26/25  0303 03/28/25  0405   GLUCOSE 186* 152* 132*     Recent Labs     03/25/25  1244 " 03/26/25  0303 03/28/25  0405   ASTSGOT 25 20 21   ALTSGPT 25 22 18   ALKPHOSPHAT 67 68 66     Recent Labs     03/25/25  1244 03/26/25  0303 03/28/25  0405   WBC 7.7 6.4 10.7   HEMOGLOBIN 15.2 15.7 14.8   PLATELETCT 287 285 259     Lab Results   Component Value Date/Time    PROTHROMBTM 13.3 03/25/2025 12:44 PM    INR 1.01 03/25/2025 12:44 PM      Lab Results   Component Value Date/Time    TROPONINT 9 03/25/2025 12:44 PM     Lab Results   Component Value Date/Time    HBA1C 6.3 (H) 03/26/2025 03:03 AM    HBA1C 6.1 03/18/2019 11:00 AM     Lab Results   Component Value Date/Time     (H) 03/27/2025 01:36 AM    HDL 51 03/27/2025 01:36 AM    TRIGLYCERIDE 129 03/27/2025 01:36 AM    CHOLSTRLTOT 191 03/27/2025 01:36 AM         Imaging/Testing:    I interpreted and/or reviewed the patient's neuroimaging    DX-CHEST-PORTABLE (1 VIEW)   Final Result      Atelectasis within the left lung base.      MR-THORACIC SPINE-WITH & W/O   Final Result      1.  There is no abnormal intramedullary T2 signal intensity in the thoracic spinal cord.   2.  Mild degenerative disease at T8-9.   3.  Left lower segmental consolidation.      MR-CERVICAL SPINE-WITH & W/O   Final Result      1.  Small areas of acute infarcts in the lower jero and upper medulla involving both sides of the midline. There are chronic infarcts in the jero and right inferior cerebellum.   2.  There is no abnormal intramedullary T2 signal intensity in the cervical spinal cord to suggest demyelinating lesions. There is no MR evidence of compressive myelopathy.   3.  Mild degenerative disease.            MR-BRAIN-WITH & W/O   Final Result      1.  Small areas of acute infarcts in the lower jero and upper medulla involving both sides of the midline.   2.  There are chronic infarcts in the jero and right inferior cerebellum. . There are areas of chronic lacunae in the bilateral basal ganglia, right thalamus and right periventricular white matter.   3.  There are  nonspecific T2 hyperintensities in the periventricular white matter likely representing chronic small vessel disease.   4.  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      MR-LUMBAR SPINE-WITH & W/O   Final Result      1.  Unremarkable pre and postcontrast MR examination of the lumbar spine.   2.  Clinical suspicious for GBS: There is no abnormal enhancement of the lumbar nerve roots.      DX-ABDOMEN FOR TUBE PLACEMENT   Final Result      NG tube tip projects at the peripyloric region.      DX-CHEST-PORTABLE (1 VIEW)   Final Result      1.  Low lung volumes without definite acute cardiopulmonary abnormality.   2.  Support apparatus as above.      CT-HEAD W/O   Final Result      1.  No acute intracranial abnormality.   2.  Remote right cerebellar infarct.               DX-CHEST-PORTABLE (1 VIEW)   Final Result      No acute cardiopulmonary disease evident.      DX-CHEST-PORTABLE (1 VIEW)   Final Result      No acute cardiopulmonary disease evident.      CT-CEREBRAL PERFUSION ANALYSIS   Final Result      1. Cerebral blood flow less than 30% possibly representing completed infarct = 0 mL. Based on distribution of this finding, this is unlikely to represent artifact.      2. T Max more than 6 seconds possibly representing combination of completed infarct and ischemia = 7 mL. Based on the distribution of this finding, this is possibly artifact.      3. Mismatched volume possibly representing ischemic brain/penumbra= 0 mL      4.  Please note that this cerebral perfusion study and report is Quantitative and targets supratentorial (cerebral) perfusion for evaluation of large vessel territory acute ischemia/infarction. For example, lacunar infarcts, and brainstem/posterior fossa    ischemia/infarction are not evaluated on this study.  Data acquisition is subject to artifacts which can yield non-anatomically plausible perfusion maps which may be due to motion, bolus  "timing, signal to noise ratio, or other technical factors.    Perfusion map abnormalities which show non-anatomic distributions are likely artifact.   This study is not \"stand-alone\" and should only be utilized for diagnosis, management/treatment in correlation with CT, CTA, and/or MRI and clinical factors.         CT-CTA NECK WITH & W/O-POST PROCESSING   Final Result      CT angiogram of the neck within normal limits.      CT-CTA HEAD WITH & W/O-POST PROCESS   Final Result      CT angiogram of the [place] within normal limits.      CT-HEAD W/O   Final Result      Head CT without contrast within normal limits. No evidence of acute cerebral infarction, hemorrhage or mass lesion.                   Assessment and Plan:    Jonna Brian is a 49 year old male initally presented as code stroke due to generalized weakness and speech difficulty. Initial evaluation revealed inconsistent exam, stroke CT protocol unrevealing. Not a candidate for acute interventions. Consulted today for worsening of symptoms with bulbar involvement and right sided hemiplegia with left sided facial droop. On exam, patient is noted to have brisk reflexes with spread and positive lorenzo's. Pt was intubated and transferred to RICU for higher level of care.     STAT MRI brain with and without contrast reveal an area of infarct in the lower jero and upper medulla which involves both sides of the midline.     Problem list:  - pontine/medullary infarct (lacunar)  - Hyperlipidemia   - Hypertension     Plan:  - RICU   - Trach/peg   - Blood pressure parameters normal tensive 100-130/70-90  - A1c 6.3, goal <7. Acutely maintain FSBG   - , continue Lipitor 80 mg daily , goal LDL < 70  - DVT prophylaxis- continue lovenox   - Begin DAPT once trach/peg completed for the remainder of a 10 day period from stroke nidus    -Aspirin 81 mg daily indefinitely    -Plavix 75 mg daily [end 4/4]  - Ok to defer zio patch and TTE  - Neuro checks q. " 4 hours   - Early rehab consult, will have long term needs   - Follow up stroke clinic   - Vascular neurology will sign off    VANESA High  Vascular Neurology    The evaluation of the patient, and recommended management, was discussed with Dr. Guallpa, the attending Vascular Neurologist. I have performed a physical exam and reviewed and updated ROS and Plan today (3/28/2025).

## 2025-03-28 NOTE — CARE PLAN
Ventilator Daily Summary    Vent Day #1  Airway: 8@25    Ventilator settings: 20 460 +8 100%- ABG pending for 1 hour on vent   Weaning trials: none  Respiratory Procedures: intubated    Plan: Continue current ventilator settings and wean mechanical ventilation as tolerated per physician orders.  Problem: Ventilation  Goal: Ability to achieve and maintain unassisted ventilation or tolerate decreased levels of ventilator support  Description: Target End Date:  4 days Document on Vent flowsheet1.  Support and monitor invasive and noninvasive mechanical ventilation2.  Monitor ventilator weaning response3.  Perform ventilator associated pneumonia prevention interventions4.  Manage ventilation therapy by monitoring diagnostic test results  Outcome: Progressing

## 2025-03-28 NOTE — CARE PLAN
Problem: Ventilation  Goal: Ability to achieve and maintain unassisted ventilation or tolerate decreased levels of ventilator support  Description: Target End Date:  4 days Document on Vent flowsheet1.  Support and monitor invasive and noninvasive mechanical ventilation2.  Monitor ventilator weaning response3.  Perform ventilator associated pneumonia prevention interventions4.  Manage ventilation therapy by monitoring diagnostic test results  Outcome: Not Met     Ventilator Daily Summary    Vent Day #2  Airway: 8.0@25    Ventilator settings: CMV R 16 480 8 30    Weaning trials: None   Respiratory Procedures:     Plan: Continue current ventilator settings and wean mechanical ventilation as tolerated per physician orders.

## 2025-03-28 NOTE — PROGRESS NOTES
Iris Placement    Tube Team verified patient name and medical record number prior to tube placement. Iris feeding tube (43 inches, 10 Georgian) placed at 65cm in left nare. Per Iris picture, tube appears to be in the stomach.    Nursing Instructions: Await KUB to confirm placement before use for medications or feeding. Stylet, may be removed, please place in labeled bag with insufflation bulb and save in patient medication drawer.

## 2025-03-28 NOTE — CARE PLAN
Problem: Ventilation  Goal: Ability to achieve and maintain unassisted ventilation or tolerate decreased levels of ventilator support  Description: Target End Date:  4 days Document on Vent flowsheet1.  Support and monitor invasive and noninvasive mechanical ventilation2.  Monitor ventilator weaning response3.  Perform ventilator associated pneumonia prevention interventions4.  Manage ventilation therapy by monitoring diagnostic test results  Outcome: Not Progressing

## 2025-03-28 NOTE — ASSESSMENT & PLAN NOTE
Stridor with impaired clearance of sputum   Neurovascular providers aware  Pending MRI w/ and w/o   Intubated for airway protection  Transfer to RICU  ABCDEF bundle  Repeat ABG 1-2hrs after initiating vent  Daily SAT

## 2025-03-28 NOTE — PROCEDURES
Intubation    Date/Time: 3/27/2025 5:13 PM    Performed by: Maddy Torres M.D.  Authorized by: Maddy Torres M.D.    Consent:     Consent obtained:  Emergent situation  Pre-procedure details:     Patient status:  Awake    Pretreatment meds: etomidate.    Paralytics:  Rocuronium  Procedure details:     Preoxygenation:  Bag valve mask    CPR in progress: no      Intubation method:  Oral    Oral intubation technique:  Video-assisted    Laryngoscope type:  GlideScope    Laryngoscope blade:  Mac 4    Cormack-Lehane Classification:  Grade 1    Tube size (mm):  8.0    Tube type:  Cuffed    Number of attempts:  1    Ventilation between attempts: yes      Cricoid pressure: no      Tube visualized through cords: yes    Placement assessment:     Tube secured with:  ETT waddell    Breath sounds:  Equal and absent over the epigastrium    Placement verification: chest rise, condensation, direct visualization, equal breath sounds, ETCO2 detector and tube exhalation    Post-procedure details:     Patient tolerance of procedure:  Tolerated well, no immediate complications  Comments:      CXR pending.  Patient was in extremis requiring BVM to support his breathing prior to intubation

## 2025-03-28 NOTE — PROGRESS NOTES
4 Eyes Skin Assessment Completed by STEVEN Bustillo and STEVEN Guardado.    Head WDL  Ears WDL  Nose WDL  Mouth intubated  Neck WDL  Breast/Chest Scar  Shoulder Blades WDL  Spine Incision LP site  (R) Arm/Elbow/Hand Redness, Blanching, Bruising, and Scar  (L) Arm/Elbow/Hand Redness, Blanching, Bruising, Scab, and Scar  Abdomen WDL  Groin WDL  Scrotum/Coccyx/Buttocks WDL  (R) Leg Scar and Scab, discoloration  (L) Leg Scar and Scab, discoloration  (R) Heel/Foot/Toe Redness and Blanching, dry  (L) Heel/Foot/Toe Redness and Blanching, dry          Devices In Places ECG, Blood Pressure Cuff, Pulse Ox, Argueta, SCD's, and ET Tube      Interventions In Place TAP System, Pillows, Q2 Turns, Low Air Loss Mattress, Barrier Cream, and Heels Loaded W/Pillows    Possible Skin Injury No    Pictures Uploaded Into Epic N/A  Wound Consult Placed N/A  RN Wound Prevention Protocol Ordered No

## 2025-03-28 NOTE — DIETARY
"Nutrition Services: Initial Assessment     Day 3 of admit. Jonna Brian is a 49 y.o. male with admitting DX of Acute weakness and Acute ischemic stroke.     Consult received for TF.     Current Hospital Problems List:    Acute ischemic stroke  Acute neuromuscular respiratory failure  Anxiety  T2DM  HTN       Nutrition Assessment:      Height: 185.4 cm (6' 0.99\")  Weight: 96.6 kg (212 lb 15.4 oz)  Weight to Use in Calculations: 95.3 kg (210 lb 1.6 oz)  Weight taken via bed scale  Body mass index is 28.1 kg/m². BMI classification: Overweight.  Wt Readings from Last 6 Encounters:   25 96.6 kg (212 lb 15.4 oz)   23 120 kg (265 lb)   22 (!) 129 kg (285 lb 0.9 oz)   10/08/21 113 kg (250 lb)   20 115 kg (253 lb 15.5 oz)   20 114 kg (252 lb 3.3 oz)        Calculation Equation: REE per MSJ x 1.2 = 2260 kcals  RMR per PSU (VE: 7.7 L/min and Tmax: 36.9 C) = 1997 kcals  Total Calories / day: 2000 - 2300  (Calories / k - 24)  Total Grams Protein / day: 95 - 114  (Grams Protein / k - 1.2)    Objective:   Pertinent Medical Hx: cold, depression, HA, HTN, and influenza.  Indication for Nutrition Support: Pt is intubated on ventilator support.   Enteral Access: IRIS placed, confirmed on KUB in proximal stomach.    Pertinent Labs: Glucose 132, BUN 23, Phos 4.8, A1C 6.3  Pertinent Meds: lipitor, pepcid, senna, anti-emetics and bowel meds prn, Levophed at 0.08 mcg/kg/min  Skin/Wounds: No wound sor edema noted.   Food Allergies: NKA.  Last BM: PTA  Formula based on RD Eval: Lower phosphorus fiber free CHO-controlled formula is indicated to best meet estimated needs r/t current labs and pressor support.    Current Diet Order/Intake:   NPO, TF    Subjective:   Pt is intubated on ventilator support.     Nutrition Focused Physical Exam (NFPE)  Weight Loss: Per weight hx above, pt with 53 lbs weight loss 1.5 years, last weight on file in 2023 which is notable weight loss but not clinically " significant per ASPEN guidelines below.  Muscle Mass: Nourished.  Subcutaneous Fat: Nourished.  Fluid Accumulation: No edema noted.  Reduced  Strength: N/A in acute care setting.    Nutrition Diagnosis:      Inadequate oral intake r/t acute respiratory failure as evidenced by need for nutrition support.    Pt does not meet ASPEN malnutrition criteria at this time.    Nutrition Interventions:      Initiate TF Novasource Renal at 25 ml/hr and advance per protocol to goal rate of 45 ml/hr to provide 2160 kcals, 98 gm protein, 778 ml free water, and 907 mg phosphorus per day.   Monitor Phosphorus lab trend.  Monitor pressor infusion rate.  Fluids per MD.   Patient aware of active plan of care as appropriate.       Nutrition Monitoring and Evaluation:      Monitor for TF advancement to goal rate and tolerance.  Monitor nutrition POC.   Additional fluids per MD/DO  Monitor vital signs pertinent to nutrition.    RD following and will provide updated recommendations as indicated.

## 2025-03-28 NOTE — DISCHARGE PLANNING
Case Management Discharge Planning    Admission Date: 3/25/2025  GMLOS: 3  ALOS: 3    6-Clicks ADL Score: 13  6-Clicks Mobility Score: 9  PT and/or OT Eval ordered: Yes  Post-acute Referrals Ordered: Yes  Post-acute Choice Obtained: No  Has referral(s) been sent to post-acute provider:  Yes    Anticipated Discharge Dispo: Discharge Disposition: Disch to a long term care facility (63)    DME Needed: No    Action(s) Taken:   Chart review was completed. Patient was discussed during IDT rounds.    Pt required emergent intubation and transferred to ICU. Pt is now vent day 2. Plan for GOC discussion with pt's mother and significant other today.     Escalations Completed: None    Medically Clear: No    Next Steps:  CM RN to follow up with medical team to discuss discharge barriers or needs.     Barriers to Discharge: Medical clearance

## 2025-03-28 NOTE — CONSULTS
[facility] ICU Resident Consult Note      Admit Date: 3/25/2025    Resident(s): Aparna Zamora, Student   Attending:  MOUNIKA CHANG/ Dr. Torres    Patient ID:    Name:  Jonna Brian     YOB: 1975  Age:  49 y.o.  male   MRN:  [id number]    HPI:   Jonna Brian is a 49 y.o. male with hx HTN, T2DM, and anxiety who presented to the ED 3/25 for dizziness, weakness, and difficulty with speech. Last known normal was 3/24 at 4:30 pm, pt reports overnight 3/24 he developed left arm weakness and then could not get out of bed in the morning on 3/25. Denies trauma, back or neck pain.   In the ED, pt had stroke workup performed and no large vessel occlusion was seen. Pt was admitted for metabolic workup. Since admission, he has since had progressive right-sided weakness with spacticity, bulbar symptoms, and difficulty with speech.   Neuro and neurovascular teams recommend transfer to ICU for brainstem stroke. Patient has had increasing difficulty protecting airway due to neuromuscular weakness. Worsening respiratory status required intubation at 1700 prior to transferring to RICU.       Consultants:  Critical Care  Neurology  Neruovascular       Vitals Range last 24h:  Temp:  [36.5 °C (97.7 °F)] 36.5 °C (97.7 °F)  Pulse:  [] 133  Resp:  [16-20] 20  BP: (118-193)/() 193/127  SpO2:  [96 %-100 %] 99 %       PHYSICAL EXAM:  Vitals:    03/27/25 1650 03/27/25 1651 03/27/25 1711 03/27/25 1712   BP: (!) 186/150 (!) 158/102 (!) 187/128 (!) 193/127   Pulse: (!) 136 (!) 133 (!) 115 (!) 133   Resp:   20 20   Temp:       TempSrc:       SpO2: 99% 99% 98% 99%   Weight:   95.3 kg (210 lb 1.6 oz)    Height:        Body mass index is 27.73 kg/m².    O2 therapy: Pulse Oximetry: 99 %, O2 (LPM): 0, O2 Delivery Device: Ventilator    Date 03/27/25 0700 - 03/28/25 0659   Shift [id number] 7342-1957 2296-6621 24 Hour Total   INTAKE   Shift Total       OUTPUT   Urine 100   100     Urine Void (mL) 100   100   Shift Total 100   100    NET -100   -100        Physical Exam  Constitutional:       General: He is in acute distress.   HENT:      Head: Atraumatic.   Eyes:      General: No scleral icterus.  Cardiovascular:      Rate and Rhythm: Normal rate and regular rhythm.      Pulses: Normal pulses.   Pulmonary:      Effort: Respiratory distress present.      Breath sounds: Stridor present.   Musculoskeletal:      Cervical back: Normal range of motion.      Right lower leg: No edema.      Left lower leg: No edema.   Skin:     General: Skin is warm and dry.   Neurological:      Mental Status: He is lethargic.      Comments: R-sided weakness 0/5 in upper and lower extremities. RUE with noted spasticity.         Recent Labs     03/27/25  1324   [id number] 7.42   [id number] 35.9   [id number] 82.0*   LMZF9DWO 95.8   ARTHCO3 23   ARTBE -1     Recent Labs     03/25/25  1244 03/25/25  1418 03/26/25  0303   SODIUM 137  --  137   POTASSIUM 3.1*  --  3.6   CHLORIDE 101  --  102   CO2 18*  --  22   BUN 13  --  12   CREATININE 0.91  --  0.90   MAGNESIUM  --  2.0  --    CALCIUM 9.6  --  9.7     Recent Labs     03/25/25  1244 03/26/25  0303   ALTSGPT 25 22   ASTSGOT 25 20   ALKPHOSPHAT 67 68   TBILIRUBIN 0.7 0.5   GLUCOSE 186* 152*     Recent Labs     03/25/25  1244 03/26/25  0303   RBC 5.56 5.73   HEMOGLOBIN 15.2 15.7   HEMATOCRIT 45.0 46.3   PLATELETCT 287 285   PROTHROMBTM 13.3  --    APTT 28.4  --    INR 1.01  --      Recent Labs     03/25/25  1244 03/26/25  0303   WBC 7.7 6.4   NEUTSPOLYS 74.50* 64.30   LYMPHOCYTES 17.60* 24.70   MONOCYTES 7.10 9.80   EOSINOPHILS 0.10 0.60   BASOPHILS 0.40 0.30   ASTSGOT 25 20   ALTSGPT 25 22   ALKPHOSPHAT 67 68   TBILIRUBIN 0.7 0.5       Meds:   NS   Stopped (03/27/25 1742)    atorvastatin  80 mg      Respiratory Therapy Consult        famotidine  20 mg      Or    famotidine  20 mg      senna-docusate  2 Tablet      And    polyethylene glycol/lytes  1 Packet      And    magnesium hydroxide  30 mL      And    bisacodyl  10 mg       MD Alert...Adult ICU Electrolyte Replacement per Pharmacy        Pharmacy  1 Each      fentaNYL  50 mcg      And    fentaNYL  100 mcg      And    fentaNYL        And    propofol  0-40 mcg/kg/min (Ideal) 30 mcg/kg/min (03/27/25 1800)    acetaminophen  650 mg      methocarbamol  500 mg      hydrOXYzine HCl  25 mg      [START ON 3/28/2025] aspirin  81 mg      Or    [START ON 3/28/2025] aspirin  300 mg      cyclobenzaprine  10 mg      hydrALAZINE  25 mg      ibuprofen  600 mg      melatonin  5 mg      [START ON 3/28/2025] amLODIPine  10 mg      [START ON 3/28/2025] hydroCHLOROthiazide  25 mg      promethazine  12.5-25 mg      ondansetron  4 mg      enoxaparin (LOVENOX) injection  40 mg      labetalol  10 mg      ondansetron  4 mg      promethazine  12.5-25 mg      prochlorperazine  5-10 mg      insulin lispro  1-6 Units      And    dextrose bolus  25 g          Imaging:  CT-HEAD W/O   Final Result      1.  No acute intracranial abnormality.   2.  Remote right cerebellar infarct.               DX-CHEST-PORTABLE (1 VIEW)   Final Result      No acute cardiopulmonary disease evident.      DX-CHEST-PORTABLE (1 VIEW)   Final Result      No acute cardiopulmonary disease evident.      CT-CEREBRAL PERFUSION ANALYSIS   Final Result      1. Cerebral blood flow less than 30% possibly representing completed infarct = 0 mL. Based on distribution of this finding, this is unlikely to represent artifact.      2. T Max more than 6 seconds possibly representing combination of completed infarct and ischemia = 7 mL. Based on the distribution of this finding, this is possibly artifact.      3. Mismatched volume possibly representing ischemic brain/penumbra= 0 mL      4.  Please note that this cerebral perfusion study and report is Quantitative and targets supratentorial (cerebral) perfusion for evaluation of large vessel territory acute ischemia/infarction. For example, lacunar infarcts, and brainstem/posterior fossa     "ischemia/infarction are not evaluated on this study.  Data acquisition is subject to artifacts which can yield non-anatomically plausible perfusion maps which may be due to motion, bolus timing, signal to noise ratio, or other technical factors.    Perfusion map abnormalities which show non-anatomic distributions are likely artifact.   This study is not \"stand-alone\" and should only be utilized for diagnosis, management/treatment in correlation with CT, CTA, and/or MRI and clinical factors.         CT-CTA NECK WITH & W/O-POST PROCESSING   Final Result      CT angiogram of the neck within normal limits.      CT-CTA HEAD WITH & W/O-POST PROCESS   Final Result      CT angiogram of the [place] within normal limits.      CT-HEAD W/O   Final Result      Head CT without contrast within normal limits. No evidence of acute cerebral infarction, hemorrhage or mass lesion.               MR-LUMBAR SPINE-WITH & W/O    (Results Pending)   MR-THORACIC SPINE-WITH & W/O    (Results Pending)   MR-BRAIN-WITH & W/O    (Results Pending)   MR-CERVICAL SPINE-WITH & W/O    (Results Pending)   DX-CHEST-PORTABLE (1 VIEW)    (Results Pending)   DX-ABDOMEN FOR TUBE PLACEMENT    (Results Pending)       ASSESSEMENT and PLAN:    Acute neuromuscular respiratory failure (HCC)  Assessment & Plan  Stridor with impaired clearance of sputum   Neurovascular providers aware  Pending MRI w/ and w/o   Intubated for airway protection  Transfer to RICU  ABCDEF bundle  Repeat ABG 1-2hrs after initiating vent  Daily SAT        Acute ischemic stroke (HCC)- (present on admission)  Assessment & Plan  Neurology consulted, high suspicion for midbrain or medullary stroke  80 mg atorvastatin daily   Neurochecks q4hrs  STAT MRI brain and C spine w/ and w/o contrast    Type 2 diabetes mellitus (HCC)- (present on admission)  Assessment & Plan  FSBG goal ; q6hr accu checks with SSI  A1c 6.3    Hypertension- (present on admission)  Assessment & Plan  BP " parameters -130/70-90  Amlodipine 10mg daily   HCTZ 25mg daily  Hydralazine 25mg ET tube q8hrs  PRN IV labetalol or hydralazine q4hrs      CODE STATUS: full code

## 2025-03-28 NOTE — ASSESSMENT & PLAN NOTE
BP parameters -130/70-90  Amlodipine 10mg daily   HCTZ 25mg daily  Hydralazine 25mg ET tube q8hrs  PRN IV labetalol or hydralazine q4hrs

## 2025-03-28 NOTE — PROGRESS NOTES
"Critical Care Progress Note    Date of admission  3/25/2025    Chief Complaint  49 y.o. male who presented 3/25/2025 with Hx of HTN, anxiety, DM2, htn that presented 3/25 for dizziness, weakness and speech difficulty. He was evaluated by neuro vascular and had a stroke alert and workup preformed and no LVO was seen and recommend metabolic workup. He since has had progressive right side weakness, bulbar symptoms and difficulty with speech. I saw him earlier for concerns of GBS and elevated protein in his CNS fluid for concerns but his neuro exam didn't fit and recommend repeat CT head which did not show any acute changes. Neurology and Neurovascular evaluated him today and recommend ICU level care and concerns for brainstem stroke.      Hospital Course  3/27-Shortly after arrival in the ICU patient developed stridor and required emergent intubation.    Interval Problem Update  Reviewed last 24 hour events:              - Tm: Afebrile              - HR: 50-80s              - SBP: 100-1 40s              - Neuro: RASS -3, blinking once for \"yes\", twice for 'no\" appropriately.              - GI: Start EN.              - UOP: 565 mL              - Argueta: Yes              - Lines: 2X PIV              - PPx: H2, Lovenox, aspirin              - CXR (personally reviewed): Tubes and lines in good position.  No acute cardiopulmonary processes noted              - SAT/SBT VD#2, PEEP-8, FiO2- 40%,    - Mobility level 1    Infusion:  Norepinephrine    Review of Systems  Review of Systems   Unable to perform ROS: Intubated        Vital Signs for last 24 hours   Pulse:  [] 62  Resp:  [12-21] 16  BP: ()/() 130/66  SpO2:  [93 %-100 %] 99 %    Hemodynamic parameters for last 24 hours       Respiratory Information for the last 24 hours  Vent Mode: APVCMV  Rate (breaths/min): 16  Vt Target (mL): 480  PEEP/CPAP: 8  MAP: 11  Control VTE (exp VT): 478    Physical Exam  Constitutional:       Comments: Intubated   HENT:      " "Head: Normocephalic and atraumatic.      Mouth/Throat:      Mouth: Mucous membranes are moist.   Eyes:      Pupils: Pupils are equal, round, and reactive to light.   Cardiovascular:      Rate and Rhythm: Normal rate and regular rhythm.      Heart sounds: No murmur heard.     No friction rub. No gallop.   Pulmonary:      Effort: No respiratory distress.      Breath sounds: No wheezing or rales.   Abdominal:      General: There is no distension.      Palpations: Abdomen is soft.   Musculoskeletal:      Cervical back: Neck supple.      Right lower leg: No edema.      Left lower leg: No edema.   Skin:     General: Skin is warm and dry.      Capillary Refill: Capillary refill takes less than 2 seconds.   Neurological:      Mental Status: He is alert.      Comments: Eyes open spontaneously, he blinking \"yes\", twice for 'no\" appropriately.   Psychiatric:      Comments: JAMARI         Medications  Current Facility-Administered Medications   Medication Dose Route Frequency Provider Last Rate Last Admin    atorvastatin (Lipitor) tablet 80 mg  80 mg Enteral Tube Q EVENING Thony Whelan, A.P.R.N.   80 mg at 03/27/25 1826    Respiratory Therapy Consult   Nebulization Continuous RT Maddy Torres M.D.        famotidine (Pepcid) tablet 20 mg  20 mg Enteral Tube Q12HRS Maddy Torres M.D.   20 mg at 03/28/25 0557    Or    famotidine (Pepcid) injection 20 mg  20 mg Intravenous Q12HRS Maddy Torres M.D.        senna-docusate (Pericolace Or Senokot S) 8.6-50 MG per tablet 2 Tablet  2 Tablet Enteral Tube BID Maddy Torres M.D.   2 Tablet at 03/28/25 0557    And    polyethylene glycol/lytes (Miralax) Packet 1 Packet  1 Packet Enteral Tube QDAY PRN Maddy Torres M.D.        And    magnesium hydroxide (Milk Of Magnesia) suspension 30 mL  30 mL Enteral Tube QDAY PRN Maddy Torres M.D.        And    bisacodyl (Dulcolax) suppository 10 mg  10 mg Rectal QDAY PRN Maddy Torres M.D. MD Alert...ICU Electrolyte Replacement per " Pharmacy   Other PHARMACY TO DOSE Maddy Torres M.D.        Pharmacy Consult: Enteral tube insertion - review meds/change route/product selection  1 Each Other PHARMACY TO DOSE Maddy Torres M.D.        acetaminophen (Tylenol) tablet 650 mg  650 mg Enteral Tube Q6HRS PRN Maddy Torres M.D.        aspirin (Asa) chewable tab 81 mg  81 mg Enteral Tube DAILY Maddy Torres M.D.   81 mg at 03/28/25 0557    Or    aspirin (Asa) suppository 300 mg  300 mg Rectal DAILY Maddy Torres M.D.        [Held by provider] hydrALAZINE (Apresoline) tablet 25 mg  25 mg Enteral Tube Q8HRS Maddy Torres M.D.        ibuprofen (Motrin) tablet 600 mg  600 mg Enteral Tube Q6HRS PRN Maddy Torres M.D.        melatonin tablet 5 mg  5 mg Enteral Tube Nightly Maddy Torres M.D.        [Held by provider] amLODIPine (Norvasc) tablet 10 mg  10 mg Enteral Tube DAILY Maddy Torres M.D.        [Held by provider] hydroCHLOROthiazide tablet 25 mg  25 mg Enteral Tube DAILY Maddy Torres M.D.        promethazine (Phenergan) tablet 12.5-25 mg  12.5-25 mg Enteral Tube Q4HRS PRN Maddy Torres M.D.        ondansetron (Zofran ODT) dispertab 4 mg  4 mg Enteral Tube Q4HRS PRN Maddy Torres M.D.        fentaNYL (Sublimaze) injection 50 mcg  50 mcg Intravenous Q15 MIN PRN Maddy Torres M.D.   50 mcg at 03/28/25 1044    And    fentaNYL (Sublimaze) injection 100 mcg  100 mcg Intravenous Q15 MIN PRN Maddy Torres M.D.   100 mcg at 03/27/25 1833    And    fentaNYL (SUBLIMAZE) 50 mcg/mL in 50mL (Continuous Infusion)   Intravenous Continuous Maddy Torres M.D.   Dose not Required at 03/27/25 1845    And    propofol (DIPRIVAN) injection  0-80 mcg/kg/min (Ideal) Intravenous Continuous Maddy Torres M.D.   Paused at 03/28/25 0634    labetalol (Normodyne/Trandate) injection 10 mg  10 mg Intravenous Q4HRS PRN Maddy Torres M.D.        hydrALAZINE (Apresoline) injection 20 mg  20 mg Intravenous Q6HRS PRN Maddy Torres M.D.         norepinephrine (Levophed) 8 mg in 250 mL NS infusion (premix)  0-1 mcg/kg/min (Ideal) Intravenous Continuous Maddyjoão Torres M.D. 12 mL/hr at 03/28/25 1053 0.08 mcg/kg/min at 03/28/25 1053    lactated ringers infusion   Intravenous Continuous Marion L. Latona 75 mL/hr at 03/28/25 0106 New Bag at 03/28/25 0106    enoxaparin (Lovenox) inj 40 mg  40 mg Subcutaneous DAILY AT 1800 Darrel Polo M.D.   40 mg at 03/27/25 1826    ondansetron (Zofran) syringe/vial injection 4 mg  4 mg Intravenous Q4HRS PRN Darrel Polo M.D.        promethazine (Phenergan) suppository 12.5-25 mg  12.5-25 mg Rectal Q4HRS PRN Darrel Polo M.D.        prochlorperazine (Compazine) injection 5-10 mg  5-10 mg Intravenous Q4HRS PRN Darrel Polo M.D.   10 mg at 03/26/25 0550    insulin lispro (HumaLOG,AdmeLOG) subcutaneous injection  1-6 Units Subcutaneous 4X/DAY [facility] Pol[facility] M.D.        And    dextrose 50 % (D50W) injection 25 g  25 g Intravenous Q15 MIN PRN Darrel Polo M.D.           Fluids    Intake/Output Summary (Last 24 hours) at 3/28/2025 1328  Last data filed at 3/28/2025 1000  Gross per 24 hour   Intake 1088.02 ml   Output 521 ml   Net 567.02 ml       Laboratory  Recent Labs     03/27/25  1324 03/27/25  1848 03/28/25  0334   ODZSU20Y 7.42  --   --    [id number] 35.9  --   --    [id number] 82.0*  --   --    IAYV5HYQ 95.8  --   --    ARTHCO3 23  --   --    ARTBE -1  --   --    ISTATAPH  --  7.459* 7.402   ISTATAPCO2  --  31.8* 38.1   ISTATAPO2  --  264* 140*   ISTATATCO2  --  24* 25*   [id number]  --  100* 99   ISTATARTHCO3  --  22.6 23.7   ISTATARTBE  --  0 -1   ISTATTEMP  --  36.5 C 36.7 C   ISTATFIO2  --  90 40   ISTATSPEC  --  Arterial Arterial   ISTATAPHTC  --  7.467* 7.406   [id number]  --  262* 138*         Recent Labs     03/25/25  1418 03/26/25  0303 03/28/25  0405   SODIUM  --  137 136   POTASSIUM  --  3.6 4.1   CHLORIDE  --  102 103   CO2  --  22 20   BUN  --  12 23*   CREATININE   --  0.90 1.13   MAGNESIUM 2.0  --  2.0   PHOSPHORUS  --   --  4.8*   CALCIUM  --  9.7 9.5     Recent Labs     03/26/25  0303 03/28/25  0405   ALTSGPT 22 18   ASTSGOT 20 21   ALKPHOSPHAT 68 66   TBILIRUBIN 0.5 0.6   GLUCOSE 152* 132*     Recent Labs     03/26/25  0303 03/28/25  0405   WBC 6.4 10.7   NEUTSPOLYS 64.30 82.10*   LYMPHOCYTES 24.70 9.30*   MONOCYTES 9.80 7.70   EOSINOPHILS 0.60 0.30   BASOPHILS 0.30 0.30   ASTSGOT 20 21   ALTSGPT 22 18   ALKPHOSPHAT 68 66   TBILIRUBIN 0.5 0.6     Recent Labs     03/26/25  0303 03/28/25  0405   RBC 5.73 5.47   HEMOGLOBIN 15.7 14.8   HEMATOCRIT 46.3 45.4   PLATELETCT 285 259       Imaging  IMPRESSION:     1.  Small areas of acute infarcts in the lower jero and upper medulla involving both sides of the midline.  2.  There are chronic infarcts in the jero and right inferior cerebellum. . There are areas of chronic lacunae in the bilateral basal ganglia, right thalamus and right periventricular white matter.  3.  There are nonspecific T2 hyperintensities in the periventricular white matter likely representing chronic small vessel disease.  4.  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            Assessment/Plan  Acute neuromuscular respiratory failure (HCC)  Assessment & Plan  Intubated for airway protection  Lung protective ventilation strategies  Titrate ventilator prescription to optimize oxygenation, ventilation, and acid base balance.  Daily ABC trials   Anticipate long term ventilator support  Will discuss early tracheostomy and PEG placement.    Hypertension- (present on admission)  Assessment & Plan  Goal at this time -140   Norepinepherine required to meet minimal goals    Acute ischemic stroke (HCC)- (present on admission)  Assessment & Plan  Acute infarcts in the lower jero and upper medulla c/w presentation.  Chronic infarcts I the jero and right inferior cerebellum which explain right riddhi  "deficits  He is functionally \"locked in\" at this time  ASA 81 and Plavix 75 daily  Statin therapy for secondary prevention  Anticipate long term acute care  Recommend early tracheostomy and PEG tube placement      Type 2 diabetes mellitus (HCC)- (present on admission)  Assessment & Plan  Hg A1 C 6.2  Moderate glycemic control with Insulin therapy.           VTE:  Lovenox  Ulcer: H2 Antagonist  Lines: None    I have performed a physical exam and reviewed and updated ROS and Plan today (3/28/2025). In review of yesterday's note (3/27/2025), there are no changes except as documented above.     Discussed patient condition and risk of morbidity and/or mortality with RN, RT, Pharmacy, Patient, and neurology      The patient remains critically ill.  I have assessed and reassessed the respiratory status and made ventilator adjustments based upon arterial blood gas analysis, ventilator waveforms and airway mechanics.  I have assessed and reassessed the blood pressure, hemodynamics, cardiovascular and neurologic status. This patient remains at high risk for worsening cardiopulmonary dysfunction and death without the above critical care interventions.    Critical care time = 60 minutes in directly providing and coordinating critical care and extensive data review.  No time overlap and excludes procedures.  "

## 2025-03-28 NOTE — THERAPY
Speech Language Therapy Contact Note    Patient Name: Jonna Brian  Age:  49 y.o., Sex:  male  Medical Record #: [id number]  Today's Date: 3/28/2025     03/28/25 0719   Treatment Variance   Reason For Missed Therapy Medical - Patient Is Not Medically Stable   Interdisciplinary Plan of Care Collaboration   Collaboration Comments Per chart review pt intubated 3/27. SLP to hold and follow up as medically appropriate.

## 2025-03-28 NOTE — ASSESSMENT & PLAN NOTE
Neurology consulted, high suspicion for midbrain or medullary stroke  80 mg atorvastatin daily   Neurochecks q4hrs  STAT MRI brain and C spine w/ and w/o contrast

## 2025-03-28 NOTE — CARE PLAN
The patient is Watcher - Medium risk of patient condition declining or worsening    Shift Goals  Clinical Goals: MRI, stable hemodynamics, q4 neuro  Patient Goals: jennifer  Family Goals: jennifer    Progress made toward(s) clinical / shift goals:    Problem: Knowledge Deficit - Standard  Goal: Patient and family/care givers will demonstrate understanding of plan of care, disease process/condition, diagnostic tests and medications  Outcome: Progressing     Problem: Pain - Standard  Goal: Alleviation of pain or a reduction in pain to the patient’s comfort goal  Outcome: Progressing     Problem: Optimal Care of the Stroke Patient  Goal: Optimal acute care for the stroke patient  Outcome: Progressing     Problem: Psychosocial - Patient Condition  Goal: Patient's ability to verbalize feelings about condition will improve  Outcome: Progressing     Problem: Neuro Status  Goal: Neuro status will remain stable or improve  Outcome: Progressing     Problem: Mobility - Stroke  Goal: Patient's capacity to carry out activities will improve  Outcome: Progressing     Problem: Fall Risk  Goal: Patient will remain free from falls  Outcome: Progressing     Problem: Skin Integrity  Goal: Skin integrity is maintained or improved  Outcome: Progressing     Problem: Safety - Medical Restraint  Goal: Remains free of injury from restraints (Restraint for Interference with Medical Device)  Outcome: Progressing       Patient is not progressing towards the following goals:

## 2025-03-29 ENCOUNTER — APPOINTMENT (OUTPATIENT)
Dept: RADIOLOGY | Facility: MEDICAL CENTER | Age: 50
End: 2025-03-29
Attending: INTERNAL MEDICINE
Payer: COMMERCIAL

## 2025-03-29 LAB
ALBUMIN SERPL BCP-MCNC: 3.5 G/DL (ref 3.2–4.9)
ALBUMIN/GLOB SERPL: 1.1 G/DL
ALP SERPL-CCNC: 63 U/L (ref 30–99)
ALT SERPL-CCNC: 17 U/L (ref 2–50)
ANION GAP SERPL CALC-SCNC: 8 MMOL/L (ref 7–16)
AST SERPL-CCNC: 22 U/L (ref 12–45)
BASE EXCESS BLDA CALC-SCNC: 4 MMOL/L (ref -4–3)
BASOPHILS # BLD AUTO: 0.2 % (ref 0–1.8)
BASOPHILS # BLD: 0.02 K/UL (ref 0–0.12)
BILIRUB SERPL-MCNC: 0.3 MG/DL (ref 0.1–1.5)
BODY TEMPERATURE: ABNORMAL DEGREES
BREATHS SETTING VENT: 16
BUN SERPL-MCNC: 26 MG/DL (ref 8–22)
CALCIUM ALBUM COR SERPL-MCNC: 9.5 MG/DL (ref 8.5–10.5)
CALCIUM SERPL-MCNC: 9.1 MG/DL (ref 8.5–10.5)
CHLORIDE SERPL-SCNC: 104 MMOL/L (ref 96–112)
CO2 BLDA-SCNC: 30 MMOL/L (ref 32–48)
CO2 SERPL-SCNC: 25 MMOL/L (ref 20–33)
COMPONENT CELLULAR 8504CLL: NORMAL
CREAT SERPL-MCNC: 0.92 MG/DL (ref 0.5–1.4)
DELSYS IDSYS: ABNORMAL
END TIDAL CARBON DIOXIDE IECO2: 40 MMHG
EOSINOPHIL # BLD AUTO: 0.1 K/UL (ref 0–0.51)
EOSINOPHIL NFR BLD: 1.2 % (ref 0–6.9)
ERYTHROCYTE [DISTWIDTH] IN BLOOD BY AUTOMATED COUNT: 41.1 FL (ref 35.9–50)
GFR SERPLBLD CREATININE-BSD FMLA CKD-EPI: 103 ML/MIN/1.73 M 2
GLOBULIN SER CALC-MCNC: 3.1 G/DL (ref 1.9–3.5)
GLUCOSE BLD STRIP.AUTO-MCNC: 137 MG/DL (ref 65–99)
GLUCOSE BLD STRIP.AUTO-MCNC: 144 MG/DL (ref 65–99)
GLUCOSE BLD STRIP.AUTO-MCNC: 144 MG/DL (ref 65–99)
GLUCOSE SERPL-MCNC: 155 MG/DL (ref 65–99)
HCO3 BLDA-SCNC: 28.3 MMOL/L (ref 21–28)
HCT VFR BLD AUTO: 40.4 % (ref 42–52)
HGB BLD-MCNC: 13.3 G/DL (ref 14–18)
HOROWITZ INDEX BLDA+IHG-RTO: 393 MM[HG]
IMM GRANULOCYTES # BLD AUTO: 0.03 K/UL (ref 0–0.11)
IMM GRANULOCYTES NFR BLD AUTO: 0.4 % (ref 0–0.9)
LACTATE BLD-SCNC: 0.8 MMOL/L (ref 0.5–2)
LYMPHOCYTES # BLD AUTO: 1.34 K/UL (ref 1–4.8)
LYMPHOCYTES NFR BLD: 16.2 % (ref 22–41)
MAGNESIUM SERPL-MCNC: 2.1 MG/DL (ref 1.5–2.5)
MCH RBC QN AUTO: 27.4 PG (ref 27–33)
MCHC RBC AUTO-ENTMCNC: 32.9 G/DL (ref 32.3–36.5)
MCV RBC AUTO: 83.3 FL (ref 81.4–97.8)
MODE IMODE: ABNORMAL
MONOCYTES # BLD AUTO: 0.82 K/UL (ref 0–0.85)
MONOCYTES NFR BLD AUTO: 9.9 % (ref 0–13.4)
NEUTROPHILS # BLD AUTO: 5.97 K/UL (ref 1.82–7.42)
NEUTROPHILS NFR BLD: 72.1 % (ref 44–72)
NRBC # BLD AUTO: 0 K/UL
NRBC BLD-RTO: 0 /100 WBC (ref 0–0.2)
O2/TOTAL GAS SETTING VFR VENT: 30 %
PCO2 BLDA: 41.2 MMHG (ref 32–48)
PCO2 TEMP ADJ BLDA: 41.2 MMHG (ref 32–48)
PEEP END EXPIRATORY PRESSURE IPEEP: 8 CMH20
PH BLDA: 7.44 [PH] (ref 7.35–7.45)
PH TEMP ADJ BLDA: 7.44 [PH] (ref 7.35–7.45)
PHOSPHATE SERPL-MCNC: 3.1 MG/DL (ref 2.5–4.5)
PLATELET # BLD AUTO: 261 K/UL (ref 164–446)
PMV BLD AUTO: 9.1 FL (ref 9–12.9)
PO2 BLDA: 118 MMHG (ref 83–108)
PO2 TEMP ADJ BLDA: 118 MMHG (ref 83–108)
POTASSIUM SERPL-SCNC: 4.2 MMOL/L (ref 3.6–5.5)
PROT SERPL-MCNC: 6.6 G/DL (ref 6–8.2)
RBC # BLD AUTO: 4.85 M/UL (ref 4.7–6.1)
SAO2 % BLDA: 99 % (ref 93–99)
SODIUM SERPL-SCNC: 137 MMOL/L (ref 135–145)
SPECIMEN DRAWN FROM PATIENT: ABNORMAL
TIDAL VOLUME IVT: 480 ML
WBC # BLD AUTO: 8.3 K/UL (ref 4.8–10.8)

## 2025-03-29 PROCEDURE — 770022 HCHG ROOM/CARE - ICU (200)

## 2025-03-29 PROCEDURE — 700105 HCHG RX REV CODE 258: Performed by: NURSE PRACTITIONER

## 2025-03-29 PROCEDURE — 80053 COMPREHEN METABOLIC PANEL: CPT

## 2025-03-29 PROCEDURE — 700102 HCHG RX REV CODE 250 W/ 637 OVERRIDE(OP)

## 2025-03-29 PROCEDURE — 71045 X-RAY EXAM CHEST 1 VIEW: CPT

## 2025-03-29 PROCEDURE — 83735 ASSAY OF MAGNESIUM: CPT

## 2025-03-29 PROCEDURE — 94150 VITAL CAPACITY TEST: CPT

## 2025-03-29 PROCEDURE — 99291 CRITICAL CARE FIRST HOUR: CPT | Performed by: INTERNAL MEDICINE

## 2025-03-29 PROCEDURE — A9270 NON-COVERED ITEM OR SERVICE: HCPCS | Performed by: INTERNAL MEDICINE

## 2025-03-29 PROCEDURE — 700102 HCHG RX REV CODE 250 W/ 637 OVERRIDE(OP): Performed by: INTERNAL MEDICINE

## 2025-03-29 PROCEDURE — 94799 UNLISTED PULMONARY SVC/PX: CPT

## 2025-03-29 PROCEDURE — 83605 ASSAY OF LACTIC ACID: CPT

## 2025-03-29 PROCEDURE — 36600 WITHDRAWAL OF ARTERIAL BLOOD: CPT

## 2025-03-29 PROCEDURE — 700111 HCHG RX REV CODE 636 W/ 250 OVERRIDE (IP): Performed by: INTERNAL MEDICINE

## 2025-03-29 PROCEDURE — 82962 GLUCOSE BLOOD TEST: CPT

## 2025-03-29 PROCEDURE — A9270 NON-COVERED ITEM OR SERVICE: HCPCS

## 2025-03-29 PROCEDURE — 94003 VENT MGMT INPAT SUBQ DAY: CPT

## 2025-03-29 PROCEDURE — 700111 HCHG RX REV CODE 636 W/ 250 OVERRIDE (IP): Mod: JZ

## 2025-03-29 PROCEDURE — 99223 1ST HOSP IP/OBS HIGH 75: CPT | Performed by: PHYSICAL MEDICINE & REHABILITATION

## 2025-03-29 PROCEDURE — 99222 1ST HOSP IP/OBS MODERATE 55: CPT | Performed by: SPECIALIST

## 2025-03-29 PROCEDURE — 84100 ASSAY OF PHOSPHORUS: CPT

## 2025-03-29 PROCEDURE — 82803 BLOOD GASES ANY COMBINATION: CPT

## 2025-03-29 PROCEDURE — 97602 WOUND(S) CARE NON-SELECTIVE: CPT

## 2025-03-29 PROCEDURE — 85025 COMPLETE CBC W/AUTO DIFF WBC: CPT

## 2025-03-29 RX ORDER — AMLODIPINE BESYLATE 10 MG/1
10 TABLET ORAL ONCE
Status: COMPLETED | OUTPATIENT
Start: 2025-03-29 | End: 2025-03-29

## 2025-03-29 RX ADMIN — MAGNESIUM HYDROXIDE 30 ML: 2400 SUSPENSION ORAL at 18:02

## 2025-03-29 RX ADMIN — ATORVASTATIN CALCIUM 80 MG: 40 TABLET, FILM COATED ORAL at 18:02

## 2025-03-29 RX ADMIN — ACETAMINOPHEN 650 MG: 325 TABLET ORAL at 12:27

## 2025-03-29 RX ADMIN — FAMOTIDINE 20 MG: 20 TABLET, FILM COATED ORAL at 05:18

## 2025-03-29 RX ADMIN — SODIUM CHLORIDE, POTASSIUM CHLORIDE, SODIUM LACTATE AND CALCIUM CHLORIDE: 600; 310; 30; 20 INJECTION, SOLUTION INTRAVENOUS at 15:36

## 2025-03-29 RX ADMIN — Medication 5 MG: at 21:21

## 2025-03-29 RX ADMIN — SENNOSIDES AND DOCUSATE SODIUM 2 TABLET: 50; 8.6 TABLET ORAL at 18:02

## 2025-03-29 RX ADMIN — SENNOSIDES AND DOCUSATE SODIUM 2 TABLET: 50; 8.6 TABLET ORAL at 05:18

## 2025-03-29 RX ADMIN — ASPIRIN 81 MG: 81 TABLET, CHEWABLE ORAL at 05:18

## 2025-03-29 RX ADMIN — LABETALOL HYDROCHLORIDE 10 MG: 5 INJECTION, SOLUTION INTRAVENOUS at 12:04

## 2025-03-29 RX ADMIN — LABETALOL HYDROCHLORIDE 10 MG: 5 INJECTION, SOLUTION INTRAVENOUS at 04:05

## 2025-03-29 RX ADMIN — SODIUM CHLORIDE, POTASSIUM CHLORIDE, SODIUM LACTATE AND CALCIUM CHLORIDE: 600; 310; 30; 20 INJECTION, SOLUTION INTRAVENOUS at 03:08

## 2025-03-29 RX ADMIN — AMLODIPINE BESYLATE 10 MG: 10 TABLET ORAL at 09:48

## 2025-03-29 RX ADMIN — ENOXAPARIN SODIUM 40 MG: 100 INJECTION SUBCUTANEOUS at 18:02

## 2025-03-29 RX ADMIN — HYDRALAZINE HYDROCHLORIDE 20 MG: 20 INJECTION, SOLUTION INTRAMUSCULAR; INTRAVENOUS at 07:38

## 2025-03-29 RX ADMIN — FAMOTIDINE 20 MG: 20 TABLET, FILM COATED ORAL at 18:02

## 2025-03-29 ASSESSMENT — PULMONARY FUNCTION TESTS: FVC: 0.3

## 2025-03-29 ASSESSMENT — ENCOUNTER SYMPTOMS: SENSORY CHANGE: 1

## 2025-03-29 ASSESSMENT — PAIN DESCRIPTION - PAIN TYPE: TYPE: ACUTE PAIN

## 2025-03-29 NOTE — CARE PLAN
The patient is Watcher - Medium risk of patient condition declining or worsening    Shift Goals  Clinical Goals: Q4 neuros, -130, q4 neuro  Patient Goals: jennifer  Family Goals: jennifer- no family at bedside    Progress made toward(s) clinical / shift goals:    Problem: Pain - Standard  Goal: Alleviation of pain or a reduction in pain to the patient’s comfort goal  Outcome: Progressing     Problem: Knowledge Deficit - Stroke Education  Goal: Patient's knowledge of stroke and risk factors will improve  Outcome: Progressing     Problem: Discharge Planning - Stroke  Goal: Ensure Stroke Core Measures are met prior to discharge  Outcome: Progressing     Problem: Neuro Status  Goal: Neuro status will remain stable or improve  Outcome: Progressing     Problem: Hemodynamic Monitoring  Goal: Patient's hemodynamics, fluid balance and neurologic status will be stable or improve  Outcome: Progressing       Patient is not progressing towards the following goals:

## 2025-03-29 NOTE — CONSULTS
Physical Medicine and Rehabilitation Consultation          Date of initial consultation: 3/29/2025  Consulting provider: Grant Gabriel M.D.   Reason for consultation: assess for acute inpatient rehab appropriateness  LOS: 4 Day(s)    Chief complaint: [place] stroke    HPI: The patient is a 49 y.o. male with a past medical history of hypertension, diabetes, anxiety;  who presented on 3/25/2025 12:42 PM with dizziness, weakness, speech difficulty.  Seen by neurology and found to have a NIH score of 17.  Initial CT head on 3/25 was WNL.  CTA head and neck was negative for high-grade stenosis or occlusion.  Patient was deemed not a candidate for acute stroke interventions due to lack of lateralizing symptoms and normal imaging.  Patient went on to develop poor management of secretions, right-sided hemiplegia, left-sided facial droop.  Patient was intubated and stat MRI found acute infarction in the lower jero/upper medulla that crosses midline.    The patient currently is intubated but awake.  He is only able to blink or roll his eyes.  He does endorse sensation, has no movement.    ROS  Pertinent positives are mentioned in the HPI, all others reviewed and are negative.    Social Hx:  2 SH  2 KAYDEN, 1 FOS inside  With: Alone.  Patient's girlfriend is retired and can provide daily support if needed    THERAPY:  Restrictions: Fall risk, swallow precautions  PT: Functional mobility   3/27: Max assist bed mobility    OT: ADLs  3/27: Max assist ADLs    SLP:   3/27: Poor management of secretion    IMAGING:  MR brain 3/28/2025    1.  Small areas of acute infarcts in the lower jero and upper medulla involving both sides of the midline.  2.  There are chronic infarcts in the jero and right inferior cerebellum. . There are areas of chronic lacunae in the bilateral basal ganglia, right thalamus and right periventricular white matter.  3.  There are nonspecific T2 hyperintensities in the periventricular white matter likely  representing chronic small vessel disease.  4.  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    PROCEDURES:  Intubated 3/27 5:13 PM    PMH:  Past Medical History:   Diagnosis Date    Cold       1/15/17 - no symptoms at this time.    Depression     Headache(784.0)     Hypertension     Influenza A     positive influenza A RNA test 1/2/2018- no symptoms today       PSH:  Past Surgical History:   Procedure Laterality Date    PB TARSAL TUNNEL RELEASE Left 4/20/2022    Procedure: LEFT FOOT TARSAL TUNNEL RELEASE,;  Surgeon: Abhilash Kohler M.D.;  Location: [facility];  Service: Orthopedics    ANKLE ARTHROSCOPY Right 10/8/2018    Procedure: ANKLE ARTHROSCOPY;  Surgeon: Abhilash Kohler M.D.;  Location: SURGERY [facility];  Service: Orthopedics    HARDWARE REMOVAL ORTHO Right 10/8/2018    Procedure: HARDWARE REMOVAL ORTHO- OF SUBTALAR SCREW;  Surgeon: Abhilash Kohler M.D.;  Location: [facility];  Service: Orthopedics    ORTHOPEDIC OSTEOTOMY Right 10/8/2018    Procedure: ORTHOPEDIC OSTEOTOMY- COTTON WITH BONE GRAFT;  Surgeon: Abhilash Kohler M.D.;  Location: SURGERY [facility];  Service: Orthopedics    SUBTALOR TRIPLE FUSION Left 1/23/2018    Procedure: SUBTALOR TRIPLE FUSION- W/TALO-CALCANEAL COALITION EXCISION & GASTROC SOLEUS RECESSION;  Surgeon: Abhilash Kohler M.D.;  Location: [facility];  Service: Orthopedics       FHX:  Family History   Family history unknown: Yes       Medications:  Current Facility-Administered Medications   Medication Dose    insulin lispro (HumaLOG,AdmeLOG) subcutaneous injection  1-6 Units    And    dextrose 50 % (D50W) injection 25 g  25 g    atorvastatin (Lipitor) tablet 80 mg  80 mg    Respiratory Therapy Consult      famotidine (Pepcid) tablet 20 mg  20 mg    senna-docusate (Pericolace Or Senokot S) 8.6-50 MG per tablet 2 Tablet  2 Tablet    And    polyethylene glycol/lytes  "(Miralax) Packet 1 Packet  1 Packet    And    magnesium hydroxide (Milk Of Magnesia) suspension 30 mL  30 mL    And    bisacodyl (Dulcolax) suppository 10 mg  10 mg    MD Alert...ICU Electrolyte Replacement per Pharmacy      Pharmacy Consult: Enteral tube insertion - review meds/change route/product selection  1 Each    acetaminophen (Tylenol) tablet 650 mg  650 mg    aspirin (Asa) chewable tab 81 mg  81 mg    Or    aspirin (Asa) suppository 300 mg  300 mg    [Held by provider] hydrALAZINE (Apresoline) tablet 25 mg  25 mg    ibuprofen (Motrin) tablet 600 mg  600 mg    melatonin tablet 5 mg  5 mg    amLODIPine (Norvasc) tablet 10 mg  10 mg    [Held by provider] hydroCHLOROthiazide tablet 25 mg  25 mg    promethazine (Phenergan) tablet 12.5-25 mg  12.5-25 mg    ondansetron (Zofran ODT) dispertab 4 mg  4 mg    fentaNYL (Sublimaze) injection 50 mcg  50 mcg    And    fentaNYL (Sublimaze) injection 100 mcg  100 mcg    And    fentaNYL (SUBLIMAZE) 50 mcg/mL in 50mL (Continuous Infusion)      And    propofol (DIPRIVAN) injection  0-80 mcg/kg/min (Ideal)    labetalol (Normodyne/Trandate) injection 10 mg  10 mg    hydrALAZINE (Apresoline) injection 20 mg  20 mg    norepinephrine (Levophed) 8 mg in 250 mL NS infusion (premix)  0-1 mcg/kg/min (Ideal)    lactated ringers infusion      enoxaparin (Lovenox) inj 40 mg  40 mg    ondansetron (Zofran) syringe/vial injection 4 mg  4 mg    promethazine (Phenergan) suppository 12.5-25 mg  12.5-25 mg    prochlorperazine (Compazine) injection 5-10 mg  5-10 mg       Allergies:  Allergies   Allergen Reactions    Lisinopril Shortness of Breath     Closes throat         Physical Exam:  Vitals: /66   Pulse 69   Temp 36.5 °C (97.7 °F) (Temporal)   Resp 17   Ht 1.854 m (6' 0.99\")   Wt 100 kg (220 lb 7.4 oz)   SpO2 98%   Gen: NAD  Head: NC/AT  Eyes/ Nose/ Mouth: PERRLA, moist mucous membranes  Cardio: RRR, good distal perfusion, warm extremities  Pulm: Intubated   Abd: Soft NTND, " negative borborygmi   Ext: No peripheral edema. No calf tenderness. No clubbing.    Mental status: Able to blink for responses  Speech: None    Motor:      Upper Extremity  Myotome R L   Shoulder flexion C5 0/5 0/5   Elbow flexion C5 0/5 0/5   Wrist extension C6 0/5 0/5   Elbow extension C7 0/5 0/5   Finger flexion C8 0/5 0/5   Finger abduction T1 0/5 0/5     Lower Extremity Myotome R L   Hip flexion L2 0/5 0/5   Knee extension L3 0/5 0/5   Ankle dorsiflexion L4 0/5 0/5   Toe extension L5 0/5 0/5   Ankle plantarflexion S1 0/5 0/5       Labs: Reviewed and significant for   Recent Labs     03/28/25  0405 03/29/25  0430   RBC 5.47 4.85   HEMOGLOBIN 14.8 13.3*   HEMATOCRIT 45.4 40.4*   PLATELETCT 259 261     Recent Labs     03/28/25  0405 03/29/25  0430   SODIUM 136 137   POTASSIUM 4.1 4.2   CHLORIDE 103 104   CO2 20 25   GLUCOSE 132* 155*   BUN 23* 26*   CREATININE 1.13 0.92   CALCIUM 9.5 9.1     Recent Results (from the past 24 hours)   POCT glucose device results    Collection Time: 03/28/25 11:49 AM   Result Value Ref Range    POC Glucose, Blood 135 (H) 65 - 99 mg/dL   POCT glucose device results    Collection Time: 03/28/25  5:32 PM   Result Value Ref Range    POC Glucose, Blood 124 (H) 65 - 99 mg/dL   POCT glucose device results    Collection Time: 03/28/25  8:37 PM   Result Value Ref Range    POC Glucose, Blood 105 (H) 65 - 99 mg/dL   POCT arterial blood gas device results    Collection Time: 03/29/25  3:01 AM   Result Value Ref Range    Ph 7.444 7.350 - 7.450    Pco2 41.2 32.0 - 48.0 mmHg    Po2 118 (H) 83 - 108 mmHg    Tco2 30 (L) 32 - 48 mmol/L    S02 99 93 - 99 %    Hco3 28.3 (H) 21.0 - 28.0 mmol/L    BE 4 (H) -4 - 3 mmol/L    Body Temp 37.0 C degrees    O2 Therapy 30 %    iPF Ratio 393     Ph Temp Yvonne 7.444 7.350 - 7.450    Pco2 Temp Co 41.2 32.0 - 48.0 mmHg    Po2 Temp Cor 118 (H) 83 - 108 mmHg    Specimen Arterial     DelSys Vent     End Tidal Carbon Dioxide 40 mmhg    Tidal Volume 480 mL    Peep End  Expiratory Pressure 8 cmh20    Set Rate 16     Mode APV-CMV    POCT lactate device results    Collection Time: 03/29/25  3:01 AM   Result Value Ref Range    iStat Lactate 0.8 0.5 - 2.0 mmol/L   CBC with Differential    Collection Time: 03/29/25  4:30 AM   Result Value Ref Range    WBC 8.3 4.8 - 10.8 K/uL    RBC 4.85 4.70 - 6.10 M/uL    Hemoglobin 13.3 (L) 14.0 - 18.0 g/dL    Hematocrit 40.4 (L) 42.0 - 52.0 %    MCV 83.3 81.4 - 97.8 fL    MCH 27.4 27.0 - 33.0 pg    MCHC 32.9 32.3 - 36.5 g/dL    RDW 41.1 35.9 - 50.0 fL    Platelet Count 261 164 - 446 K/uL    MPV 9.1 9.0 - 12.9 fL    Neutrophils-Polys 72.10 (H) 44.00 - 72.00 %    Lymphocytes 16.20 (L) 22.00 - 41.00 %    Monocytes 9.90 0.00 - 13.40 %    Eosinophils 1.20 0.00 - 6.90 %    Basophils 0.20 0.00 - 1.80 %    Immature Granulocytes 0.40 0.00 - 0.90 %    Nucleated RBC 0.00 0.00 - 0.20 /100 WBC    Neutrophils (Absolute) 5.97 1.82 - 7.42 K/uL    Lymphs (Absolute) 1.34 1.00 - 4.80 K/uL    Monos (Absolute) 0.82 0.00 - 0.85 K/uL    Eos (Absolute) 0.10 0.00 - 0.51 K/uL    Baso (Absolute) 0.02 0.00 - 0.12 K/uL    Immature Granulocytes (abs) 0.03 0.00 - 0.11 K/uL    NRBC (Absolute) 0.00 K/uL   Magnesium    Collection Time: 03/29/25  4:30 AM   Result Value Ref Range    Magnesium 2.1 1.5 - 2.5 mg/dL   Phosphorus    Collection Time: 03/29/25  4:30 AM   Result Value Ref Range    Phosphorus 3.1 2.5 - 4.5 mg/dL   Comp Metabolic Panel    Collection Time: 03/29/25  4:30 AM   Result Value Ref Range    Sodium 137 135 - 145 mmol/L    Potassium 4.2 3.6 - 5.5 mmol/L    Chloride 104 96 - 112 mmol/L    Co2 25 20 - 33 mmol/L    Anion Gap 8.0 7.0 - 16.0    Glucose 155 (H) 65 - 99 mg/dL    Bun 26 (H) 8 - 22 mg/dL    Creatinine 0.92 0.50 - 1.40 mg/dL    Calcium 9.1 8.5 - 10.5 mg/dL    Correct Calcium 9.5 8.5 - 10.5 mg/dL    AST(SGOT) 22 12 - 45 U/L    ALT(SGPT) 17 2 - 50 U/L    Alkaline Phosphatase 63 30 - 99 U/L    Total Bilirubin 0.3 0.1 - 1.5 mg/dL    Albumin 3.5 3.2 - 4.9 g/dL    Total  Protein 6.6 6.0 - 8.2 g/dL    Globulin 3.1 1.9 - 3.5 g/dL    A-G Ratio 1.1 g/dL   ESTIMATED GFR    Collection Time: 03/29/25  4:30 AM   Result Value Ref Range    GFR (CKD-EPI) 103 >60 mL/min/1.73 m 2   POCT glucose device results    Collection Time: 03/29/25  5:18 AM   Result Value Ref Range    POC Glucose, Blood 144 (H) 65 - 99 mg/dL         ASSESSMENT:  Patient is a 49 y.o. male admitted with brainstem stroke     Rehabilitation: Impaired ADLs and mobility  Barriers to transfer include: Insurance authorization, TCCs to verify disposition, medical clearance and bed availability. All cases are subject to administrative review.     Disposition recommendations:  -Patient is locked in.  Reviewing his imaging, he has patchy involvement of predominantly left brainstem, but infarctions do cross midline.  Any insult in this area causes widespread effects.  He is currently within the window for post stroke edema, so I feel it is worth watching him for the next 2 to 4 days to see if he gets back any movement.  In the meantime, agree with recommendations to proceed with trach and PEG.   -Will need to have a goals of care conversation with patient and spouse in the coming days.  Even if we do see early return of function, I do not anticipate patient will be able to return to living independently, and I do not anticipate his spouse will be able to care for him as a single provider, therefore he will need placement, likely LTAC      Medical Complexity:    Brainstem stroke  -Locked-in  -MRI evidence of acute ischemic infarct in the lower jero/upper medulla, involving both sides of midline  -Started on DAPT with aspirin 81 mg daily, Plavix 75 mg daily 2 and 4/4  -Agree with trach and PEG  -Monitor progress for the next 2 to 4 days  -Goals of care conversation    Hypertension  -Goal is normotension, -130/70-90  -Primary team managing    Type 2 diabetes mellitus  -Sliding scale insulin    DVT PPX: SCDs      Thank you for  allowing us to participate in the care of this patient.     Total time: >80 minutes. This includes time spent reviewing patient's history, notes, labs, imaging, vitals, medications, examining patient, discussing care with patient and family including prognosis, risk reduction, benefits of treatment, and options for next stage of care, and communicating recommendations to primary team.     Dayne Paniagua, DO   Physical Medicine and Rehabilitation     Please note that this dictation was created using voice recognition software. I have made every reasonable attempt to correct obvious errors, but there may be errors of grammar and possibly content that I did not discover before finalizing the note.

## 2025-03-29 NOTE — CARE PLAN
Problem: Ventilation  Goal: Ability to achieve and maintain unassisted ventilation or tolerate decreased levels of ventilator support  Description: Target End Date:  4 days Document on Vent flowsheet1.  Support and monitor invasive and noninvasive mechanical ventilation2.  Monitor ventilator weaning response3.  Perform ventilator associated pneumonia prevention interventions4.  Manage ventilation therapy by monitoring diagnostic test results  Outcome: Not Met     Ventilator Daily Summary    Vent Day #3  Airway: 8.0@25    Ventilator settings: CM R [phone]    Weaning trials:   Respiratory Procedures:     Plan: Continue current ventilator settings and wean mechanical ventilation as tolerated per physician orders.

## 2025-03-29 NOTE — PROGRESS NOTES
"Critical Care Progress Note    Date of admission  3/25/2025    Chief Complaint  49 y.o. male who presented 3/25/2025 with Hx of HTN, anxiety, DM2, htn that presented 3/25 for dizziness, weakness and speech difficulty. He was evaluated by neuro vascular and had a stroke alert and workup preformed and no LVO was seen and recommend metabolic workup. He since has had progressive right side weakness, bulbar symptoms and difficulty with speech. I saw him earlier for concerns of GBS and elevated protein in his CNS fluid for concerns but his neuro exam didn't fit and recommend repeat CT head which did not show any acute changes. Neurology and Neurovascular evaluated him today and recommend ICU level care and concerns for brainstem stroke.      Hospital Course  3/27-Shortly after arrival in the ICU patient developed stridor and required emergent intubation.  3/28- Blinking once for \"yes\", twice for 'no\" appropriately.    Interval Problem Update  Reviewed last 24 hour events:              - Tm: Afebrile              - HR: 60-80s              - SBP: 130-140s              - Neuro: RASS -1 to 0, blinking once for \"yes\", twice for 'no\" appropriately.              - GI: Start EN.              - UOP: 795mL              - Argueta: Yes              - Lines: 2X PIV              - PPx: H2, Lovenox, aspirin              - CXR (personally reviewed): Tubes and lines in good position.  No acute cardiopulmonary processes noted              - SAT/SBT VD#3, PEEP-8, FiO2- 30%,    - Mobility level 1    Infusion:  Norepinephrine on pause    Review of Systems  Review of Systems   Unable to perform ROS: Intubated   Neurological:  Positive for sensory change.        Vital Signs for last 24 hours   Pulse:  [] 91  Resp:  [13-23] 17  BP: (117-159)/(55-98) 120/59  SpO2:  [97 %-100 %] 98 %    Hemodynamic parameters for last 24 hours       Respiratory Information for the last 24 hours  Vent Mode: APVCMV  Rate (breaths/min): 16  Vt Target (mL): " "480  PEEP/CPAP: 8  MAP: 10  Control VTE (exp VT): 670    Physical Exam  Constitutional:       Comments: Intubated   HENT:      Head: Normocephalic and atraumatic.      Mouth/Throat:      Mouth: Mucous membranes are moist.   Eyes:      Pupils: Pupils are equal, round, and reactive to light.   Cardiovascular:      Rate and Rhythm: Normal rate and regular rhythm.      Heart sounds: No murmur heard.     No friction rub. No gallop.   Pulmonary:      Effort: No respiratory distress.      Breath sounds: No wheezing or rales.   Abdominal:      General: There is no distension.      Palpations: Abdomen is soft.   Musculoskeletal:      Cervical back: Neck supple.      Right lower leg: No edema.      Left lower leg: No edema.   Skin:     General: Skin is warm and dry.      Capillary Refill: Capillary refill takes less than 2 seconds.   Neurological:      Mental Status: He is alert.      Sensory: Sensory deficit present.      Comments: Eyes open spontaneously, he blinking \"yes\", twice for 'no\" appropriately.  Sensation to light touch is absent   Psychiatric:      Comments: JAMARI         Medications  Current Facility-Administered Medications   Medication Dose Route Frequency Provider Last Rate Last Admin    insulin lispro (HumaLOG,AdmeLOG) subcutaneous injection  1-6 Units Subcutaneous Q6HRS Marion Chen        And    dextrose 50 % (D50W) injection 25 g  25 g Intravenous Q15 MIN PRN Marion Chen        atorvastatin (Lipitor) tablet 80 mg  80 mg Enteral Tube Q EVENING Alixamichelle Whelan, A.P.R.N.   80 mg at 03/28/25 1711    Respiratory Therapy Consult   Nebulization Continuous RT Maddy Torres M.D.        famotidine (Pepcid) tablet 20 mg  20 mg Enteral Tube Q12HRS Maddy Torres M.D.   20 mg at 03/29/25 0518    senna-docusate (Pericolace Or Senokot S) 8.6-50 MG per tablet 2 Tablet  2 Tablet Enteral Tube BID Maddy Torres M.D.   2 Tablet at 03/29/25 0518    And    polyethylene glycol/lytes (Miralax) Packet 1 Packet  1 " Packet Enteral Tube QDAY PRN Maddy Torres M.D.   1 Packet at 03/28/25 1712    And    magnesium hydroxide (Milk Of Magnesia) suspension 30 mL  30 mL Enteral Tube QDAY PRN Maddy Torres M.D.        And    bisacodyl (Dulcolax) suppository 10 mg  10 mg Rectal QDAY PRN Maddy Torres M.D. MD Alert...ICU Electrolyte Replacement per Pharmacy   Other PHARMACY TO DOSE Maddy Torres M.D.        Pharmacy Consult: Enteral tube insertion - review meds/change route/product selection  1 Each Other PHARMACY TO DOSE Maddy Torres M.D.        acetaminophen (Tylenol) tablet 650 mg  650 mg Enteral Tube Q6HRS PRN Maddy Torres M.D.        aspirin (Asa) chewable tab 81 mg  81 mg Enteral Tube DAILY Maddy Torres M.D.   81 mg at 03/29/25 0518    Or    aspirin (Asa) suppository 300 mg  300 mg Rectal DAILY Maddy Torres M.D.        [Held by provider] hydrALAZINE (Apresoline) tablet 25 mg  25 mg Enteral Tube Q8HRS Maddy Torres M.D.        ibuprofen (Motrin) tablet 600 mg  600 mg Enteral Tube Q6HRS PRN Maddy Torres M.D.        melatonin tablet 5 mg  5 mg Enteral Tube Nightly Maddy Torres M.D.   5 mg at 03/28/25 2038    amLODIPine (Norvasc) tablet 10 mg  10 mg Enteral Tube DAILY Grant Gabriel M.D.        [Held by provider] hydroCHLOROthiazide tablet 25 mg  25 mg Enteral Tube DAILY Maddy Torres M.D.        promethazine (Phenergan) tablet 12.5-25 mg  12.5-25 mg Enteral Tube Q4HRS PRN Maddy Torres M.D.        ondansetron (Zofran ODT) dispertab 4 mg  4 mg Enteral Tube Q4HRS PRN Maddy Torres M.D.        fentaNYL (Sublimaze) injection 50 mcg  50 mcg Intravenous Q15 MIN PRN Maddy Torres M.D.   50 mcg at 03/28/25 2112    And    fentaNYL (Sublimaze) injection 100 mcg  100 mcg Intravenous Q15 MIN PRN Maddy Torres M.D.   100 mcg at 03/27/25 1833    And    fentaNYL (SUBLIMAZE) 50 mcg/mL in 50mL (Continuous Infusion)   Intravenous Continuous Maddy Torres M.D.   Dose not Required at 03/27/25 1845    And     propofol (DIPRIVAN) injection  0-80 mcg/kg/min (Ideal) Intravenous Continuous Maddy Torres M.D.   Paused at 03/28/25 0634    labetalol (Normodyne/Trandate) injection 10 mg  10 mg Intravenous Q4HRS PRN Maddy Torres M.D.   10 mg at 03/29/25 0405    hydrALAZINE (Apresoline) injection 20 mg  20 mg Intravenous Q6HRS PRN Maddy Torres M.D.   20 mg at 03/29/25 0738    norepinephrine (Levophed) 8 mg in 250 mL NS infusion (premix)  0-1 mcg/kg/min (Ideal) Intravenous Continuous Maddy Torres M.D.   Paused at 03/28/25 1534    lactated ringers infusion   Intravenous Continuous Marion L. Latona 75 mL/hr at 03/29/25 0308 New Bag at 03/29/25 0308    enoxaparin (Lovenox) inj 40 mg  40 mg Subcutaneous DAILY AT 1800 Darrel Polo M.D.   40 mg at 03/28/25 1711    ondansetron (Zofran) syringe/vial injection 4 mg  4 mg Intravenous Q4HRS PRN Darrel Polo M.D.        promethazine (Phenergan) suppository 12.5-25 mg  12.5-25 mg Rectal Q4HRS PRN Darrel Polo M.D.        prochlorperazine (Compazine) injection 5-10 mg  5-10 mg Intravenous Q4HRS PRXAVIER Polo M.D.   10 mg at 03/26/25 0550       Fluids    Intake/Output Summary (Last 24 hours) at 3/29/2025 1130  Last data filed at 3/29/2025 1000  Gross per 24 hour   Intake 2949.49 ml   Output 929 ml   Net 2020.49 ml       Laboratory  Recent Labs     03/27/25  1324 03/27/25  1848 03/28/25  0334 03/29/25  0301   DZJVP43O 7.42  --   --   --    KHRKCH424G 35.9  --   --   --    XNHDO051U 82.0*  --   --   --    MHRH9DAY 95.8  --   --   --    ARTHCO3 23  --   --   --    ARTBE -1  --   --   --    ISTATAPH  --  7.459* 7.402 7.444   ISTATAPCO2  --  31.8* 38.1 41.2   ISTATAPO2  --  264* 140* 118*   ISTATATCO2  --  24* 25* 30*   GZPBSSQ5EAN  --  100* 99 99   ISTATARTHCO3  --  22.6 23.7 28.3*   ISTATARTBE  --  0 -1 4*   ISTATTEMP  --  36.5 C 36.7 C 37.0 C   ISTATFIO2  --  90 40 30   ISTATSPEC  --  Arterial Arterial Arterial   ISTATAPHTC  --  7.467* 7.406 7.444    XQDLJUOX8PT  --  262* 138* 118*         Recent Labs     03/28/25  0405 03/29/25  0430   SODIUM 136 137   POTASSIUM 4.1 4.2   CHLORIDE 103 104   CO2 20 25   BUN 23* 26*   CREATININE 1.13 0.92   MAGNESIUM 2.0 2.1   PHOSPHORUS 4.8* 3.1   CALCIUM 9.5 9.1     Recent Labs     03/28/25  0405 03/29/25  0430   ALTSGPT 18 17   ASTSGOT 21 22   ALKPHOSPHAT 66 63   TBILIRUBIN 0.6 0.3   GLUCOSE 132* 155*     Recent Labs     03/28/25  0405 03/29/25  0430   WBC 10.7 8.3   NEUTSPOLYS 82.10* 72.10*   LYMPHOCYTES 9.30* 16.20*   MONOCYTES 7.70 9.90   EOSINOPHILS 0.30 1.20   BASOPHILS 0.30 0.20   ASTSGOT 21 22   ALTSGPT 18 17   ALKPHOSPHAT 66 63   TBILIRUBIN 0.6 0.3     Recent Labs     03/28/25 0405 03/29/25  0430   RBC 5.47 4.85   HEMOGLOBIN 14.8 13.3*   HEMATOCRIT 45.4 40.4*   PLATELETCT 259 261       Imaging  IMPRESSION:     1.  Small areas of acute infarcts in the lower jero and upper medulla involving both sides of the midline.  2.  There are chronic infarcts in the jero and right inferior cerebellum. . There are areas of chronic lacunae in the bilateral basal ganglia, right thalamus and right periventricular white matter.  3.  There are nonspecific T2 hyperintensities in the periventricular white matter likely representing chronic small vessel disease.  4.  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            Assessment/Plan  Acute neuromuscular respiratory failure (HCC)  Assessment & Plan  Intubated for airway protection  Lung protective ventilation strategies  Titrate ventilator prescription to optimize oxygenation, ventilation, and acid base balance.  Daily ABC trials   Anticipate long term ventilator support  Will discuss early tracheostomy and PEG placement.    Hypertension- (present on admission)  Assessment & Plan  Goal at this time -140       Acute ischemic stroke (HCC)- (present on admission)  Assessment & Plan  Acute infarcts in the lower jero and  "upper medulla c/w presentation.  Chronic infarcts I the jero and right inferior cerebellum which explain right riddhi deficits  He is functionally \"locked in\" at this time  ASA 81 and Plavix 75 daily  Statin therapy for secondary prevention  Anticipate long term acute care  Recommend early tracheostomy and PEG tube placement  Mr. Brian has indicated that he would like us to proceed with Tracheostomy and TEG  placement  PMR consult requested.  GI consult for PEG placement      Type 2 diabetes mellitus (HCC)- (present on admission)  Assessment & Plan  Hg A1 C 6.2  Moderate glycemic control with Insulin therapy.           VTE:  Lovenox  Ulcer: H2 Antagonist  Lines: None    I have performed a physical exam and reviewed and updated ROS and Plan today (3/29/2025). In review of yesterday's note (3/28/2025), there are no changes except as documented above.     Discussed patient condition and risk of morbidity and/or mortality with RN, RT, Pharmacy, Patient, and neurology      The patient remains critically ill.  I have assessed and reassessed the respiratory status and made ventilator adjustments based upon arterial blood gas analysis, ventilator waveforms and airway mechanics.  I have assessed and reassessed the blood pressure, hemodynamics, cardiovascular and neurologic status. This patient remains at high risk for worsening cardiopulmonary dysfunction and death without the above critical care interventions.    Critical care time = 55 minutes in directly providing and coordinating critical care and extensive data review.  No time overlap and excludes procedures.  "

## 2025-03-29 NOTE — WOUND TEAM
[facility]  Inpatient Services  Wound and Skin Care Brief Evaluation    Admission Date: 3/25/2025     Last order of IP CONSULT TO WOUND CARE was found on 3/28/2025 from Hospital Encounter on 3/25/2025     HPI, PMH, SH: Reviewed    Chief Complaint   Patient presents with    Possible Stroke     Pt BIB EMS for possible stroke. Pt LKW 1630 3/24. At 0400 3/25 pt noted to have global weakness, aphasia and ataxia. NIH 17 on arrival.      Diagnosis: Acute weakness [R53.1]  Acute ischemic stroke (HCC) [I63.9]    Unit where seen by Wound Team: R101/00     Wound consult placed regarding L posterior FA. Chart and images reviewed. This discussed with bedside RN. This clinician in to assess patient.  Ecchymosis to L posterior FA, continue offloading.     No pressure injuries or advanced wound care needs identified. Wound consult completed. Wound team signing off, re-consult if patient has further advanced wound care needs.         PREVENTATIVE INTERVENTIONS:    Q shift Lj - performed per nursing policy  Q shift pressure point assessments - performed per nursing policy    Surface/Positioning  ICU Low Airloss - Currently in Place  Reposition q 2 hours - Currently in Place  TAPs Turning system - Currently in Place    Offloading/Redistribution  Sacral offloading dressing (Silicone dressing) - Currently in Place  Heel offloading dressing (Silicone dressing) - Currently in Place  Heel float boots (Prevalon boot) - Currently in Place    Respiratory  Anchorfast - Currently in Place    Containment/Moisture Prevention    Argueta Catheter - Currently in Place

## 2025-03-29 NOTE — CARE PLAN
The patient is Watcher - Medium risk of patient condition declining or worsening    Shift Goals  Clinical Goals: q4 neuro, sbp 100-130, stable hemodynamics  Patient Goals: jennifer  Family Goals: jennifer    Progress made toward(s) clinical / shift goals:    Problem: Knowledge Deficit - Standard  Goal: Patient and family/care givers will demonstrate understanding of plan of care, disease process/condition, diagnostic tests and medications  Outcome: Progressing     Problem: Pain - Standard  Goal: Alleviation of pain or a reduction in pain to the patient’s comfort goal  Outcome: Progressing     Problem: Optimal Care of the Stroke Patient  Goal: Optimal acute care for the stroke patient  Outcome: Progressing     Problem: Knowledge Deficit - Stroke Education  Goal: Patient's knowledge of stroke and risk factors will improve  Outcome: Progressing     Problem: Psychosocial - Patient Condition  Goal: Patient's ability to re-evaluate and adapt role responsibilities will improve  Outcome: Progressing     Problem: Neuro Status  Goal: Neuro status will remain stable or improve  Outcome: Progressing     Problem: Hemodynamic Monitoring  Goal: Patient's hemodynamics, fluid balance and neurologic status will be stable or improve  Outcome: Progressing     Problem: Respiratory - Stroke Patient  Goal: Patient will achieve/maintain optimum respiratory rate/effort  Outcome: Progressing     Problem: Fall Risk  Goal: Patient will remain free from falls  Outcome: Progressing     Problem: Skin Integrity  Goal: Skin integrity is maintained or improved  Outcome: Progressing     Problem: Safety - Medical Restraint  Goal: Remains free of injury from restraints (Restraint for Interference with Medical Device)  Outcome: Progressing       Patient is not progressing towards the following goals:      Problem: Psychosocial - Patient Condition  Goal: Patient's ability to verbalize feelings about condition will improve  Outcome: Not Progressing     Problem:  Mobility - Stroke  Goal: Patient's capacity to carry out activities will improve  Outcome: Not Progressing     Problem: Self Care  Goal: Patient will have the ability to perform ADLs independently or with assistance (bathe, groom, dress, toilet and feed)  Outcome: Not Progressing

## 2025-03-30 LAB
ALBUMIN SERPL BCP-MCNC: 3.3 G/DL (ref 3.2–4.9)
ALBUMIN/GLOB SERPL: 1.1 G/DL
ALP SERPL-CCNC: 70 U/L (ref 30–99)
ALT SERPL-CCNC: 44 U/L (ref 2–50)
ANION GAP SERPL CALC-SCNC: 10 MMOL/L (ref 7–16)
AST SERPL-CCNC: 38 U/L (ref 12–45)
BASOPHILS # BLD AUTO: 0.4 % (ref 0–1.8)
BASOPHILS # BLD: 0.03 K/UL (ref 0–0.12)
BILIRUB SERPL-MCNC: 0.2 MG/DL (ref 0.1–1.5)
BUN SERPL-MCNC: 21 MG/DL (ref 8–22)
CALCIUM ALBUM COR SERPL-MCNC: 9.6 MG/DL (ref 8.5–10.5)
CALCIUM SERPL-MCNC: 9 MG/DL (ref 8.5–10.5)
CHLORIDE SERPL-SCNC: 105 MMOL/L (ref 96–112)
CO2 SERPL-SCNC: 23 MMOL/L (ref 20–33)
CREAT SERPL-MCNC: 0.82 MG/DL (ref 0.5–1.4)
EOSINOPHIL # BLD AUTO: 0.05 K/UL (ref 0–0.51)
EOSINOPHIL NFR BLD: 0.6 % (ref 0–6.9)
ERYTHROCYTE [DISTWIDTH] IN BLOOD BY AUTOMATED COUNT: 42.1 FL (ref 35.9–50)
GFR SERPLBLD CREATININE-BSD FMLA CKD-EPI: 109 ML/MIN/1.73 M 2
GLOBULIN SER CALC-MCNC: 2.9 G/DL (ref 1.9–3.5)
GLUCOSE BLD STRIP.AUTO-MCNC: 111 MG/DL (ref 65–99)
GLUCOSE BLD STRIP.AUTO-MCNC: 112 MG/DL (ref 65–99)
GLUCOSE BLD STRIP.AUTO-MCNC: 115 MG/DL (ref 65–99)
GLUCOSE BLD STRIP.AUTO-MCNC: 130 MG/DL (ref 65–99)
GLUCOSE SERPL-MCNC: 122 MG/DL (ref 65–99)
HCT VFR BLD AUTO: 37.7 % (ref 42–52)
HGB BLD-MCNC: 12.1 G/DL (ref 14–18)
IMM GRANULOCYTES # BLD AUTO: 0.02 K/UL (ref 0–0.11)
IMM GRANULOCYTES NFR BLD AUTO: 0.2 % (ref 0–0.9)
LYMPHOCYTES # BLD AUTO: 1.1 K/UL (ref 1–4.8)
LYMPHOCYTES NFR BLD: 13.4 % (ref 22–41)
MAGNESIUM SERPL-MCNC: 2.3 MG/DL (ref 1.5–2.5)
MCH RBC QN AUTO: 27.2 PG (ref 27–33)
MCHC RBC AUTO-ENTMCNC: 32.1 G/DL (ref 32.3–36.5)
MCV RBC AUTO: 84.7 FL (ref 81.4–97.8)
MONOCYTES # BLD AUTO: 0.96 K/UL (ref 0–0.85)
MONOCYTES NFR BLD AUTO: 11.7 % (ref 0–13.4)
NEUTROPHILS # BLD AUTO: 6.02 K/UL (ref 1.82–7.42)
NEUTROPHILS NFR BLD: 73.7 % (ref 44–72)
NRBC # BLD AUTO: 0 K/UL
NRBC BLD-RTO: 0 /100 WBC (ref 0–0.2)
PHOSPHATE SERPL-MCNC: 3.5 MG/DL (ref 2.5–4.5)
PLATELET # BLD AUTO: 229 K/UL (ref 164–446)
PMV BLD AUTO: 9.1 FL (ref 9–12.9)
POTASSIUM SERPL-SCNC: 4 MMOL/L (ref 3.6–5.5)
PROT SERPL-MCNC: 6.2 G/DL (ref 6–8.2)
RBC # BLD AUTO: 4.45 M/UL (ref 4.7–6.1)
SODIUM SERPL-SCNC: 138 MMOL/L (ref 135–145)
TRIGL SERPL-MCNC: 93 MG/DL (ref 0–149)
WBC # BLD AUTO: 8.2 K/UL (ref 4.8–10.8)

## 2025-03-30 PROCEDURE — 31600 PLANNED TRACHEOSTOMY: CPT | Performed by: STUDENT IN AN ORGANIZED HEALTH CARE EDUCATION/TRAINING PROGRAM

## 2025-03-30 PROCEDURE — 99152 MOD SED SAME PHYS/QHP 5/>YRS: CPT

## 2025-03-30 PROCEDURE — 700111 HCHG RX REV CODE 636 W/ 250 OVERRIDE (IP): Performed by: STUDENT IN AN ORGANIZED HEALTH CARE EDUCATION/TRAINING PROGRAM

## 2025-03-30 PROCEDURE — 700101 HCHG RX REV CODE 250: Performed by: STUDENT IN AN ORGANIZED HEALTH CARE EDUCATION/TRAINING PROGRAM

## 2025-03-30 PROCEDURE — 99233 SBSQ HOSP IP/OBS HIGH 50: CPT | Mod: 25 | Performed by: STUDENT IN AN ORGANIZED HEALTH CARE EDUCATION/TRAINING PROGRAM

## 2025-03-30 PROCEDURE — 85025 COMPLETE CBC W/AUTO DIFF WBC: CPT

## 2025-03-30 PROCEDURE — 31600 PLANNED TRACHEOSTOMY: CPT

## 2025-03-30 PROCEDURE — 700102 HCHG RX REV CODE 250 W/ 637 OVERRIDE(OP)

## 2025-03-30 PROCEDURE — A9270 NON-COVERED ITEM OR SERVICE: HCPCS | Performed by: INTERNAL MEDICINE

## 2025-03-30 PROCEDURE — A9270 NON-COVERED ITEM OR SERVICE: HCPCS

## 2025-03-30 PROCEDURE — 94799 UNLISTED PULMONARY SVC/PX: CPT

## 2025-03-30 PROCEDURE — 700105 HCHG RX REV CODE 258: Performed by: NURSE PRACTITIONER

## 2025-03-30 PROCEDURE — 31624 DX BRONCHOSCOPE/LAVAGE: CPT | Performed by: INTERNAL MEDICINE

## 2025-03-30 PROCEDURE — 770022 HCHG ROOM/CARE - ICU (200)

## 2025-03-30 PROCEDURE — 94003 VENT MGMT INPAT SUBQ DAY: CPT

## 2025-03-30 PROCEDURE — 0BJ08ZZ INSPECTION OF TRACHEOBRONCHIAL TREE, VIA NATURAL OR ARTIFICIAL OPENING ENDOSCOPIC: ICD-10-PCS | Performed by: INTERNAL MEDICINE

## 2025-03-30 PROCEDURE — 700102 HCHG RX REV CODE 250 W/ 637 OVERRIDE(OP): Performed by: PHYSICAL MEDICINE & REHABILITATION

## 2025-03-30 PROCEDURE — 99232 SBSQ HOSP IP/OBS MODERATE 35: CPT | Performed by: NURSE PRACTITIONER

## 2025-03-30 PROCEDURE — 84478 ASSAY OF TRIGLYCERIDES: CPT

## 2025-03-30 PROCEDURE — 99291 CRITICAL CARE FIRST HOUR: CPT | Mod: 25 | Performed by: INTERNAL MEDICINE

## 2025-03-30 PROCEDURE — 84100 ASSAY OF PHOSPHORUS: CPT

## 2025-03-30 PROCEDURE — 700102 HCHG RX REV CODE 250 W/ 637 OVERRIDE(OP): Performed by: INTERNAL MEDICINE

## 2025-03-30 PROCEDURE — 80053 COMPREHEN METABOLIC PANEL: CPT

## 2025-03-30 PROCEDURE — 83735 ASSAY OF MAGNESIUM: CPT

## 2025-03-30 PROCEDURE — A9270 NON-COVERED ITEM OR SERVICE: HCPCS | Performed by: PHYSICAL MEDICINE & REHABILITATION

## 2025-03-30 PROCEDURE — 99232 SBSQ HOSP IP/OBS MODERATE 35: CPT | Performed by: PHYSICAL MEDICINE & REHABILITATION

## 2025-03-30 PROCEDURE — 0B113F4 BYPASS TRACHEA TO CUTANEOUS WITH TRACHEOSTOMY DEVICE, PERCUTANEOUS APPROACH: ICD-10-PCS | Performed by: STUDENT IN AN ORGANIZED HEALTH CARE EDUCATION/TRAINING PROGRAM

## 2025-03-30 PROCEDURE — 82962 GLUCOSE BLOOD TEST: CPT | Mod: 91

## 2025-03-30 PROCEDURE — 700111 HCHG RX REV CODE 636 W/ 250 OVERRIDE (IP): Mod: JZ | Performed by: INTERNAL MEDICINE

## 2025-03-30 RX ORDER — ROCURONIUM BROMIDE 10 MG/ML
100 INJECTION, SOLUTION INTRAVENOUS ONCE
Status: COMPLETED | OUTPATIENT
Start: 2025-03-30 | End: 2025-03-30

## 2025-03-30 RX ORDER — PROPOFOL 10 MG/ML
100 INJECTION, EMULSION INTRAVENOUS ONCE
Status: COMPLETED | OUTPATIENT
Start: 2025-03-30 | End: 2025-03-30

## 2025-03-30 RX ORDER — GABAPENTIN 400 MG/1
400 CAPSULE ORAL 3 TIMES DAILY
Status: DISCONTINUED | OUTPATIENT
Start: 2025-03-30 | End: 2025-03-31

## 2025-03-30 RX ADMIN — GABAPENTIN 400 MG: 400 CAPSULE ORAL at 12:46

## 2025-03-30 RX ADMIN — ASPIRIN 81 MG: 81 TABLET, CHEWABLE ORAL at 05:10

## 2025-03-30 RX ADMIN — PROPOFOL 60 MG: 10 INJECTION, EMULSION INTRAVENOUS at 14:11

## 2025-03-30 RX ADMIN — LABETALOL HYDROCHLORIDE 10 MG: 5 INJECTION, SOLUTION INTRAVENOUS at 02:04

## 2025-03-30 RX ADMIN — Medication 5 MG: at 21:39

## 2025-03-30 RX ADMIN — SODIUM CHLORIDE, POTASSIUM CHLORIDE, SODIUM LACTATE AND CALCIUM CHLORIDE: 600; 310; 30; 20 INJECTION, SOLUTION INTRAVENOUS at 04:01

## 2025-03-30 RX ADMIN — GABAPENTIN 400 MG: 400 CAPSULE ORAL at 17:52

## 2025-03-30 RX ADMIN — FENTANYL CITRATE 50 MCG: 50 INJECTION, SOLUTION INTRAMUSCULAR; INTRAVENOUS at 08:13

## 2025-03-30 RX ADMIN — LABETALOL HYDROCHLORIDE 10 MG: 5 INJECTION, SOLUTION INTRAVENOUS at 23:32

## 2025-03-30 RX ADMIN — FAMOTIDINE 20 MG: 20 TABLET, FILM COATED ORAL at 05:10

## 2025-03-30 RX ADMIN — FENTANYL CITRATE 100 MCG: 50 INJECTION, SOLUTION INTRAMUSCULAR; INTRAVENOUS at 14:15

## 2025-03-30 RX ADMIN — SODIUM CHLORIDE, POTASSIUM CHLORIDE, SODIUM LACTATE AND CALCIUM CHLORIDE: 600; 310; 30; 20 INJECTION, SOLUTION INTRAVENOUS at 17:02

## 2025-03-30 RX ADMIN — AMLODIPINE BESYLATE 10 MG: 10 TABLET ORAL at 05:11

## 2025-03-30 RX ADMIN — FAMOTIDINE 20 MG: 20 TABLET, FILM COATED ORAL at 17:52

## 2025-03-30 RX ADMIN — ROCURONIUM BROMIDE 100 MG: 10 INJECTION INTRAVENOUS at 14:09

## 2025-03-30 RX ADMIN — ATORVASTATIN CALCIUM 80 MG: 40 TABLET, FILM COATED ORAL at 17:52

## 2025-03-30 ASSESSMENT — PAIN DESCRIPTION - PAIN TYPE
TYPE: ACUTE PAIN

## 2025-03-30 ASSESSMENT — ENCOUNTER SYMPTOMS: SENSORY CHANGE: 1

## 2025-03-30 NOTE — FLOWSHEET NOTE
03/30/25 0616   Weaning Parameters   RR (bpm) 17   $ FVC / Vital Capacity (liters)    (Does not perform)   NIF (cm H2O)    (does not perform)   Rapid Shallow Breathing Index (RR/VT) 48   Spontaneous VE 5.7   Spontaneous

## 2025-03-30 NOTE — PROGRESS NOTES
..Gastroenterology Progress Note               Author:  Eryn Guillen, LOUISA,  APRN Date & Time Created: 3/30/2025 8:21 AM       Patient ID:  Name:             Jonna Brian    YOB: 1975  Age:                 49 y.o.  male  MRN:               [id number]    Medical Decision Making, by Problem:  Active Hospital Problems    Diagnosis     Acute ischemic stroke (HCC) [I63.9]     Acute neuromuscular respiratory failure (HCC) [J96.90, G70.9]     Anxiety [F41.9]     Type 2 diabetes mellitus (HCC) [E11.9]     Hypertension [I10]      Presenting Chief Complaint:  Pontine stroke, dysphagia     HISTORY OF PRESENT ILLNESS:  Jonna Brian is a 49 y.o. male with hypertension, DM2 who presented 3/25 for dizziness, weakness and speech difficulty. He was evaluated by neurology and had a stroke alert and workup performed and no large vessel occlusion was seen and metabolic workup recommended. He since has had progressive right side weakness, bulbar symptoms and difficulty with speech. Neurology evaluated him and recommend ICU level care due to concerns for brainstem stroke. Shortly after arrival in the ICU patient developed stridor and required emergent intubation. MRI revealed pontine/medullary infarct    Interval History:  3/30/2025: Patient seen. Hemodynamically stable. Labs unremarkable. Able to blink once for yes and twice for no. Discussed planned PEG placement tomorrow and he blinks once to consent to procedure. Possible trach placement today.     Hospital Medications:  Current Facility-Administered Medications   Medication Dose Frequency Provider Last Rate Last Admin    insulin lispro (HumaLOG,AdmeLOG) subcutaneous injection  1-6 Units Q6HRS Marion Chen        And    dextrose 50 % (D50W) injection 25 g  25 g Q15 MIN PRN Marion Chen        atorvastatin (Lipitor) tablet 80 mg  80 mg Q EVENING Alixandjessi Whelan, A.P.R.N.   80 mg at 03/29/25 1802    Respiratory Therapy Consult   Continuous RT  Maddy Torres M.D.        famotidine (Pepcid) tablet 20 mg  20 mg Q12HRS Maddy Torres M.D.   20 mg at 03/30/25 0510    senna-docusate (Pericolace Or Senokot S) 8.6-50 MG per tablet 2 Tablet  2 Tablet BID Maddy Torres M.D.   2 Tablet at 03/29/25 1802    And    polyethylene glycol/lytes (Miralax) Packet 1 Packet  1 Packet QDAY PRN Maddy Torres M.D.   1 Packet at 03/28/25 1712    And    magnesium hydroxide (Milk Of Magnesia) suspension 30 mL  30 mL QDAY PRN Maddy Torres M.D.   30 mL at 03/29/25 1802    And    bisacodyl (Dulcolax) suppository 10 mg  10 mg QDAY PRN Mdady Torres M.D. MD Alert...ICU Electrolyte Replacement per Pharmacy   PHARMACY TO DOSE Maddy Torres M.D.        Pharmacy Consult: Enteral tube insertion - review meds/change route/product selection  1 Each PHARMACY TO DOSE Maddy Torres M.D.        acetaminophen (Tylenol) tablet 650 mg  650 mg Q6HRS PRN Maddy Torres M.D.   650 mg at 03/29/25 1227    aspirin (Asa) chewable tab 81 mg  81 mg DAILY Maddy Torres M.D.   81 mg at 03/30/25 0510    Or    aspirin (Asa) suppository 300 mg  300 mg DAILY Maddy Torres M.D.        [Held by provider] hydrALAZINE (Apresoline) tablet 25 mg  25 mg Q8HRS Maddy Torres M.D.        ibuprofen (Motrin) tablet 600 mg  600 mg Q6HRS PRN Maddy Torres M.D.        melatonin tablet 5 mg  5 mg Nightly Maddy Torres M.D.   5 mg at 03/29/25 2121    amLODIPine (Norvasc) tablet 10 mg  10 mg DAILY Grant Gabriel M.D.   10 mg at 03/30/25 0511    [Held by provider] hydroCHLOROthiazide tablet 25 mg  25 mg DAILY Maddy Torres M.D.        promethazine (Phenergan) tablet 12.5-25 mg  12.5-25 mg Q4HRS PRN Maddy Torres M.D.        ondansetron (Zofran ODT) dispertab 4 mg  4 mg Q4HRS PRN Maddy Torres M.D.        fentaNYL (Sublimaze) injection 50 mcg  50 mcg Q15 MIN PRN Maddy Torres M.D.   50 mcg at 03/30/25 0813    And    fentaNYL (Sublimaze) injection 100 mcg  100 mcg Q15 MIN PRN Maddy Torres  "M.D.   100 mcg at 03/27/25 1833    labetalol (Normodyne/Trandate) injection 10 mg  10 mg Q4HRS PRN Maddy Torres M.D.   10 mg at 03/30/25 0204    hydrALAZINE (Apresoline) injection 20 mg  20 mg Q6HRS PRN Maddy Torres M.D.   20 mg at 03/29/25 0738    lactated ringers infusion   Continuous Marion L. Latona 75 mL/hr at 03/30/25 0401 New Bag at 03/30/25 0401    enoxaparin (Lovenox) inj 40 mg  40 mg DAILY AT 1800 Darrel Polo M.D.   40 mg at 03/29/25 1802    ondansetron (Zofran) syringe/vial injection 4 mg  4 mg Q4HRS PRN Darrel Polo M.D.        promethazine (Phenergan) suppository 12.5-25 mg  12.5-25 mg Q4HRS PRN Darrel Polo M.D.        prochlorperazine (Compazine) injection 5-10 mg  5-10 mg Q4HRS PRN Darrel Polo M.D.   10 mg at 03/26/25 0550   Last reviewed on 3/25/2025  7:26 PM by Sarahi Ramirez       Review of Systems:  Review of Systems   Unable to perform ROS: Intubated         Vital signs:  Weight/BMI: Body mass index is 29.09 kg/m².  /66   Pulse 77   Temp 36.5 °C (97.7 °F) (Temporal)   Resp 20   Ht 1.854 m (6' 0.99\")   Wt 100 kg (220 lb 7.4 oz)   SpO2 99%   Vitals:    03/30/25 0600 03/30/25 0611 03/30/25 0616 03/30/25 0700   BP: 128/65   132/66   Pulse: 68   77   Resp: 16   20   Temp:       TempSrc:       SpO2: 99%   99%   Weight:       Height:  1.854 m (6' 0.99\") 1.854 m (6' 0.99\")      Oxygen Therapy:  Pulse Oximetry: 99 %, FiO2%: 30 %, O2 Delivery Device: Ventilator    Intake/Output Summary (Last 24 hours) at 3/30/2025 0821  Last data filed at 3/30/2025 0600  Gross per 24 hour   Intake 2816.69 ml   Output 1005 ml   Net 1811.69 ml       Physical Exam  Constitutional:       General: He is not in acute distress.  HENT:      Head: Normocephalic and atraumatic.      Right Ear: External ear normal.      Left Ear: External ear normal.      Nose:      Comments: NGT     Mouth/Throat:      Comments: ET tube  Eyes:      Pupils: Pupils are equal, round, and reactive " to light.   Cardiovascular:      Rate and Rhythm: Normal rate.      Pulses: Normal pulses.      Heart sounds: Normal heart sounds.   Pulmonary:      Comments: Intubated with ventilated breath sounds  Abdominal:      General: Abdomen is flat. Bowel sounds are normal. There is no distension.      Palpations: Abdomen is soft.      Tenderness: There is no abdominal tenderness.   Musculoskeletal:      Right lower leg: No edema.      Left lower leg: No edema.   Skin:     General: Skin is warm and dry.   Neurological:      Mental Status: He is alert.      Comments: No purposeful movement  Able to blink yes or no to questions         Labs:  Recent Labs     03/28/25 0405 03/29/25 0430 03/30/25  0500   SODIUM 136 137 138   POTASSIUM 4.1 4.2 4.0   CHLORIDE 103 104 105   CO2 20 25 23   BUN 23* 26* 21   CREATININE 1.13 0.92 0.82   MAGNESIUM 2.0 2.1 2.3   PHOSPHORUS 4.8* 3.1 3.5   CALCIUM 9.5 9.1 9.0     Recent Labs     03/28/25 0405 03/29/25  0430 03/30/25  0500   ALTSGPT 18 17 44   ASTSGOT 21 22 38   ALKPHOSPHAT 66 63 70   TBILIRUBIN 0.6 0.3 0.2   GLUCOSE 132* 155* 122*     Recent Labs     03/28/25 0405 03/29/25  0430 03/30/25  0500   WBC 10.7 8.3 8.2   NEUTSPOLYS 82.10* 72.10* 73.70*   LYMPHOCYTES 9.30* 16.20* 13.40*   MONOCYTES 7.70 9.90 11.70   EOSINOPHILS 0.30 1.20 0.60   BASOPHILS 0.30 0.20 0.40   ASTSGOT 21 22 38   ALTSGPT 18 17 44   ALKPHOSPHAT 66 63 70   TBILIRUBIN 0.6 0.3 0.2     Recent Labs     03/28/25 0405 03/29/25  0430 03/30/25  0500   RBC 5.47 4.85 4.45*   HEMOGLOBIN 14.8 13.3* 12.1*   HEMATOCRIT 45.4 40.4* 37.7*   PLATELETCT 259 261 229     Recent Results (from the past 24 hours)   POCT glucose device results    Collection Time: 03/29/25 12:08 PM   Result Value Ref Range    POC Glucose, Blood 144 (H) 65 - 99 mg/dL   POCT glucose device results    Collection Time: 03/29/25  6:06 PM   Result Value Ref Range    POC Glucose, Blood 137 (H) 65 - 99 mg/dL   POCT glucose device results    Collection Time: 03/30/25   1:04 AM   Result Value Ref Range    POC Glucose, Blood 130 (H) 65 - 99 mg/dL   CBC with Differential    Collection Time: 03/30/25  5:00 AM   Result Value Ref Range    WBC 8.2 4.8 - 10.8 K/uL    RBC 4.45 (L) 4.70 - 6.10 M/uL    Hemoglobin 12.1 (L) 14.0 - 18.0 g/dL    Hematocrit 37.7 (L) 42.0 - 52.0 %    MCV 84.7 81.4 - 97.8 fL    MCH 27.2 27.0 - 33.0 pg    MCHC 32.1 (L) 32.3 - 36.5 g/dL    RDW 42.1 35.9 - 50.0 fL    Platelet Count 229 164 - 446 K/uL    MPV 9.1 9.0 - 12.9 fL    Neutrophils-Polys 73.70 (H) 44.00 - 72.00 %    Lymphocytes 13.40 (L) 22.00 - 41.00 %    Monocytes 11.70 0.00 - 13.40 %    Eosinophils 0.60 0.00 - 6.90 %    Basophils 0.40 0.00 - 1.80 %    Immature Granulocytes 0.20 0.00 - 0.90 %    Nucleated RBC 0.00 0.00 - 0.20 /100 WBC    Neutrophils (Absolute) 6.02 1.82 - 7.42 K/uL    Lymphs (Absolute) 1.10 1.00 - 4.80 K/uL    Monos (Absolute) 0.96 (H) 0.00 - 0.85 K/uL    Eos (Absolute) 0.05 0.00 - 0.51 K/uL    Baso (Absolute) 0.03 0.00 - 0.12 K/uL    Immature Granulocytes (abs) 0.02 0.00 - 0.11 K/uL    NRBC (Absolute) 0.00 K/uL   Magnesium    Collection Time: 03/30/25  5:00 AM   Result Value Ref Range    Magnesium 2.3 1.5 - 2.5 mg/dL   Phosphorus    Collection Time: 03/30/25  5:00 AM   Result Value Ref Range    Phosphorus 3.5 2.5 - 4.5 mg/dL   Comp Metabolic Panel    Collection Time: 03/30/25  5:00 AM   Result Value Ref Range    Sodium 138 135 - 145 mmol/L    Potassium 4.0 3.6 - 5.5 mmol/L    Chloride 105 96 - 112 mmol/L    Co2 23 20 - 33 mmol/L    Anion Gap 10.0 7.0 - 16.0    Glucose 122 (H) 65 - 99 mg/dL    Bun 21 8 - 22 mg/dL    Creatinine 0.82 0.50 - 1.40 mg/dL    Calcium 9.0 8.5 - 10.5 mg/dL    Correct Calcium 9.6 8.5 - 10.5 mg/dL    AST(SGOT) 38 12 - 45 U/L    ALT(SGPT) 44 2 - 50 U/L    Alkaline Phosphatase 70 30 - 99 U/L    Total Bilirubin 0.2 0.1 - 1.5 mg/dL    Albumin 3.3 3.2 - 4.9 g/dL    Total Protein 6.2 6.0 - 8.2 g/dL    Globulin 2.9 1.9 - 3.5 g/dL    A-G Ratio 1.1 g/dL   Triglyceride     Collection Time: 03/30/25  5:00 AM   Result Value Ref Range    Triglycerides 93 0 - 149 mg/dL   ESTIMATED GFR    Collection Time: 03/30/25  5:00 AM   Result Value Ref Range    GFR (CKD-EPI) 109 >60 mL/min/1.73 m 2   POCT glucose device results    Collection Time: 03/30/25  5:08 AM   Result Value Ref Range    POC Glucose, Blood 115 (H) 65 - 99 mg/dL       Radiology Review:  DX-CHEST-PORTABLE (1 VIEW)   Final Result      No evidence of acute cardiopulmonary process.      DX-ABDOMEN FOR TUBE PLACEMENT   Final Result      1.  Enteric tube extends in the fundus of stomach.      DX-ABDOMEN FOR TUBE PLACEMENT   Final Result      The gastric tube has been removed and replaced with a small bowel feeding tube which terminate in the proximal stomach.      DX-CHEST-PORTABLE (1 VIEW)   Final Result      Atelectasis within the left lung base.      MR-THORACIC SPINE-WITH & W/O   Final Result      1.  There is no abnormal intramedullary T2 signal intensity in the thoracic spinal cord.   2.  Mild degenerative disease at T8-9.   3.  Left lower segmental consolidation.      MR-CERVICAL SPINE-WITH & W/O   Final Result      1.  Small areas of acute infarcts in the lower jero and upper medulla involving both sides of the midline. There are chronic infarcts in the jero and right inferior cerebellum.   2.  There is no abnormal intramedullary T2 signal intensity in the cervical spinal cord to suggest demyelinating lesions. There is no MR evidence of compressive myelopathy.   3.  Mild degenerative disease.            MR-BRAIN-WITH & W/O   Final Result      1.  Small areas of acute infarcts in the lower jero and upper medulla involving both sides of the midline.   2.  There are chronic infarcts in the jero and right inferior cerebellum. . There are areas of chronic lacunae in the bilateral basal ganglia, right thalamus and right periventricular white matter.   3.  There are nonspecific T2 hyperintensities in the periventricular white matter  likely representing chronic small vessel disease.   4.  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      MR-LUMBAR SPINE-WITH & W/O   Final Result      1.  Unremarkable pre and postcontrast MR examination of the lumbar spine.   2.  Clinical suspicious for GBS: There is no abnormal enhancement of the lumbar nerve roots.      DX-ABDOMEN FOR TUBE PLACEMENT   Final Result      NG tube tip projects at the peripyloric region.      DX-CHEST-PORTABLE (1 VIEW)   Final Result      1.  Low lung volumes without definite acute cardiopulmonary abnormality.   2.  Support apparatus as above.      CT-HEAD W/O   Final Result      1.  No acute intracranial abnormality.   2.  Remote right cerebellar infarct.               DX-CHEST-PORTABLE (1 VIEW)   Final Result      No acute cardiopulmonary disease evident.      DX-CHEST-PORTABLE (1 VIEW)   Final Result      No acute cardiopulmonary disease evident.      CT-CEREBRAL PERFUSION ANALYSIS   Final Result      1. Cerebral blood flow less than 30% possibly representing completed infarct = 0 mL. Based on distribution of this finding, this is unlikely to represent artifact.      2. T Max more than 6 seconds possibly representing combination of completed infarct and ischemia = 7 mL. Based on the distribution of this finding, this is possibly artifact.      3. Mismatched volume possibly representing ischemic brain/penumbra= 0 mL      4.  Please note that this cerebral perfusion study and report is Quantitative and targets supratentorial (cerebral) perfusion for evaluation of large vessel territory acute ischemia/infarction. For example, lacunar infarcts, and brainstem/posterior fossa    ischemia/infarction are not evaluated on this study.  Data acquisition is subject to artifacts which can yield non-anatomically plausible perfusion maps which may be due to motion, bolus timing, signal to noise ratio, or other technical factors.   "  Perfusion map abnormalities which show non-anatomic distributions are likely artifact.   This study is not \"stand-alone\" and should only be utilized for diagnosis, management/treatment in correlation with CT, CTA, and/or MRI and clinical factors.         CT-CTA NECK WITH & W/O-POST PROCESSING   Final Result      CT angiogram of the neck within normal limits.      CT-CTA HEAD WITH & W/O-POST PROCESS   Final Result      CT angiogram of the [place] within normal limits.      CT-HEAD W/O   Final Result      Head CT without contrast within normal limits. No evidence of acute cerebral infarction, hemorrhage or mass lesion.                 MDM (Data Review):   -Records reviewed and summarized in current documentation  -I personally reviewed and interpreted the laboratory results  -I personally reviewed the radiology images    Assessment/Recommendations:  Pontine Stroke  Oropharyngeal dysphagia  Fever - yesterday, now afebrile  DM 2  Neuromuscular respiratory failure - pending possible tracheostomy    Recommendations:  Hold lovenox  OK to continue aspirin  Hold tube feedings at midnight  PEG tomorrow     Although I feel patient is consentable, I did call and discuss with his significant other Susan. She agrees with the plan and all her questions were answered.    ..Eryn Guillen, LOUISA,  APRN    Core Quality Measures   Reviewed items::  Labs, Medications and Radiology reports reviewed    "

## 2025-03-30 NOTE — CARE PLAN
The patient is Watcher - Medium risk of patient condition declining or worsening    Shift Goals  Clinical Goals: Q4 neuro, -130, Trach/PEG  Patient Goals: JAMARI  Family Goals: JAMARI    Progress made toward(s) clinical / shift goals:    Problem: Pain - Standard  Goal: Alleviation of pain or a reduction in pain to the patient’s comfort goal  Outcome: Progressing     Problem: Psychosocial - Patient Condition  Goal: Patient's ability to re-evaluate and adapt role responsibilities will improve  Outcome: Progressing     Problem: Safety - Medical Restraint  Goal: Remains free of injury from restraints (Restraint for Interference with Medical Device)  Outcome: Progressing

## 2025-03-30 NOTE — PROGRESS NOTES
"Critical Care Progress Note    Date of admission  3/25/2025    Chief Complaint  49 y.o. male who presented 3/25/2025 with Hx of HTN, anxiety, DM2, htn that presented 3/25 for dizziness, weakness and speech difficulty. He was evaluated by neuro vascular and had a stroke alert and workup preformed and no LVO was seen and recommend metabolic workup. He since has had progressive right side weakness, bulbar symptoms and difficulty with speech. I saw him earlier for concerns of GBS and elevated protein in his CNS fluid for concerns but his neuro exam didn't fit and recommend repeat CT head which did not show any acute changes. Neurology and Neurovascular evaluated him today and recommend ICU level care and concerns for brainstem stroke.      Hospital Course  3/27-Shortly after arrival in the ICU patient developed stridor and required emergent intubation.  3/28- Blinking once for \"yes\", twice for 'no\" appropriately.  3/29-no clinical change.  Consent given for tracheostomy.    Interval Problem Update  Reviewed last 24 hour events:              - Tm: Afebrile              - HR: 70s              - SBP: 130s              - Neuro: RASS -1 to 0, blinking once for \"yes\", twice for 'no\" appropriately. Occasional spontaneous twitching in all extremities.              - GI: Start EN.              - UOP: 1L              - Argueta: Yes              - Lines: 2X PIV              - PPx: H2, Lovenox, aspirin              - CXR (personally reviewed): Tubes and lines in good position.  No acute cardiopulmonary processes noted              - SAT/SBT VD#4, PEEP-8, FiO2- 30%,    - Mobility level 1        Review of Systems  Review of Systems   Unable to perform ROS: Intubated   Neurological:  Positive for sensory change.        Vital Signs for last 24 hours   Pulse:  [] 66  Resp:  [12-28] 15  BP: (117-155)/(57-82) 121/61  SpO2:  [97 %-100 %] 99 %    Hemodynamic parameters for last 24 hours       Respiratory Information for the last 24 " "hours  Vent Mode: APVCMV  Rate (breaths/min): 16  Vt Target (mL): 480  PEEP/CPAP: 8  P Support (PS + PEEP): 13  MAP: 11  Length of Weaning Trial (Hours): 1  Control VTE (exp VT): 476    Physical Exam  Constitutional:       Comments: Intubated   HENT:      Head: Normocephalic and atraumatic.      Mouth/Throat:      Mouth: Mucous membranes are moist.   Eyes:      Pupils: Pupils are equal, round, and reactive to light.   Cardiovascular:      Rate and Rhythm: Normal rate and regular rhythm.      Heart sounds: No murmur heard.     No friction rub. No gallop.   Pulmonary:      Effort: No respiratory distress.      Breath sounds: No wheezing or rales.   Abdominal:      General: There is no distension.      Palpations: Abdomen is soft.   Musculoskeletal:      Cervical back: Neck supple.      Right lower leg: No edema.      Left lower leg: No edema.   Skin:     General: Skin is warm and dry.      Capillary Refill: Capillary refill takes less than 2 seconds.   Neurological:      Mental Status: He is alert.      Sensory: Sensory deficit present.      Comments: Eyes open spontaneously, he blinking \"yes\", twice for 'no\" appropriately.  Sensation to light touch is absent   Psychiatric:      Comments: JAMARI         Medications  Current Facility-Administered Medications   Medication Dose Route Frequency Provider Last Rate Last Admin    insulin lispro (HumaLOG,AdmeLOG) subcutaneous injection  1-6 Units Subcutaneous Q6HRS Marion Chen        And    dextrose 50 % (D50W) injection 25 g  25 g Intravenous Q15 MIN PRN Marion Chen        atorvastatin (Lipitor) tablet 80 mg  80 mg Enteral Tube Q EVENING Thony Whelan, A.P.R.N.   80 mg at 03/29/25 1802    Respiratory Therapy Consult   Nebulization Continuous RT Maddy Torres M.D.        famotidine (Pepcid) tablet 20 mg  20 mg Enteral Tube Q12HRS Maddy Torres M.D.   20 mg at 03/30/25 0510    senna-docusate (Pericolace Or Senokot S) 8.6-50 MG per tablet 2 Tablet  2 Tablet Enteral " Tube BID Maddy Torres M.D.   2 Tablet at 03/29/25 1802    And    polyethylene glycol/lytes (Miralax) Packet 1 Packet  1 Packet Enteral Tube QDAY PRN Maddy Torres M.D.   1 Packet at 03/28/25 1712    And    magnesium hydroxide (Milk Of Magnesia) suspension 30 mL  30 mL Enteral Tube QDAY PRN Maddy Torres M.D.   30 mL at 03/29/25 1802    And    bisacodyl (Dulcolax) suppository 10 mg  10 mg Rectal QDAY PRN Maddy Torres M.D. MD Alert...ICU Electrolyte Replacement per Pharmacy   Other PHARMACY TO DOSE Maddy Torres M.D.        Pharmacy Consult: Enteral tube insertion - review meds/change route/product selection  1 Each Other PHARMACY TO DOSE Maddy Torres M.D.        acetaminophen (Tylenol) tablet 650 mg  650 mg Enteral Tube Q6HRS PRN Maddy Torres M.D.   650 mg at 03/29/25 1227    aspirin (Asa) chewable tab 81 mg  81 mg Enteral Tube DAILY Maddy Torres M.D.   81 mg at 03/30/25 0510    Or    aspirin (Asa) suppository 300 mg  300 mg Rectal DAILY Maddy Torres M.D.        [Held by provider] hydrALAZINE (Apresoline) tablet 25 mg  25 mg Enteral Tube Q8HRS Maddy Torres M.D.        ibuprofen (Motrin) tablet 600 mg  600 mg Enteral Tube Q6HRS PRN Maddy Torres M.D.        melatonin tablet 5 mg  5 mg Enteral Tube Nightly Maddy Torres M.D.   5 mg at 03/29/25 2121    amLODIPine (Norvasc) tablet 10 mg  10 mg Enteral Tube DAILY Grant Gabriel M.D.   10 mg at 03/30/25 0511    [Held by provider] hydroCHLOROthiazide tablet 25 mg  25 mg Enteral Tube DAILY Maddy Torres M.D.        promethazine (Phenergan) tablet 12.5-25 mg  12.5-25 mg Enteral Tube Q4HRS PRN Maddy Torres M.D.        ondansetron (Zofran ODT) dispertab 4 mg  4 mg Enteral Tube Q4HRS PRN Maddy Torres M.D.        fentaNYL (Sublimaze) injection 50 mcg  50 mcg Intravenous Q15 MIN PRN Maddy Torres M.D.   50 mcg at 03/30/25 0813    And    fentaNYL (Sublimaze) injection 100 mcg  100 mcg Intravenous Q15 MIN PRN Maddy Torres M.D.    100 mcg at 03/27/25 1833    labetalol (Normodyne/Trandate) injection 10 mg  10 mg Intravenous Q4HRS PRN Maddy Torres M.D.   10 mg at 03/30/25 0204    hydrALAZINE (Apresoline) injection 20 mg  20 mg Intravenous Q6HRS PRN Maddy Torres M.D.   20 mg at 03/29/25 0738    lactated ringers infusion   Intravenous Continuous Marion L. Latona 75 mL/hr at 03/30/25 0401 New Bag at 03/30/25 0401    [Held by provider] enoxaparin (Lovenox) inj 40 mg  40 mg Subcutaneous DAILY AT 1800 Darrel Polo M.D.   40 mg at 03/29/25 1802    ondansetron (Zofran) syringe/vial injection 4 mg  4 mg Intravenous Q4HRS PRN Darrel Polo M.D.        promethazine (Phenergan) suppository 12.5-25 mg  12.5-25 mg Rectal Q4HRS PRN Darrel Polo M.D.        prochlorperazine (Compazine) injection 5-10 mg  5-10 mg Intravenous Q4HRS PRN Darrel Polo M.D.   10 mg at 03/26/25 0550       Fluids    Intake/Output Summary (Last 24 hours) at 3/30/2025 1156  Last data filed at 3/30/2025 1000  Gross per 24 hour   Intake 2906.46 ml   Output 1075 ml   Net 1831.46 ml       Laboratory  Recent Labs     03/27/25  1324 03/27/25  1848 03/28/25  0334 03/29/25  0301   LEFHV72V 7.42  --   --   --    [id number] 35.9  --   --   --    [id number] 82.0*  --   --   --    UQHN4IPB 95.8  --   --   --    ARTHCO3 23  --   --   --    ARTBE -1  --   --   --    ISTATAPH  --  7.459* 7.402 7.444   ISTATAPCO2  --  31.8* 38.1 41.2   ISTATAPO2  --  264* 140* 118*   ISTATATCO2  --  24* 25* 30*   [id number]  --  100* 99 99   ISTATARTHCO3  --  22.6 23.7 28.3*   ISTATARTBE  --  0 -1 4*   ISTATTEMP  --  36.5 C 36.7 C 37.0 C   ISTATFIO2  --  90 40 30   ISTATSPEC  --  Arterial Arterial Arterial   ISTATAPHTC  --  7.467* 7.406 7.444   [id number]  --  262* 138* 118*         Recent Labs     03/28/25  0405 03/29/25  0430 03/30/25  0500   SODIUM 136 137 138   POTASSIUM 4.1 4.2 4.0   CHLORIDE 103 104 105   CO2 20 25 23   BUN 23* 26* 21   CREATININE 1.13 0.92 0.82   MAGNESIUM  2.0 2.1 2.3   PHOSPHORUS 4.8* 3.1 3.5   CALCIUM 9.5 9.1 9.0     Recent Labs     03/28/25  0405 03/29/25  0430 03/30/25  0500   ALTSGPT 18 17 44   ASTSGOT 21 22 38   ALKPHOSPHAT 66 63 70   TBILIRUBIN 0.6 0.3 0.2   GLUCOSE 132* 155* 122*     Recent Labs     03/28/25  0405 03/29/25  0430 03/30/25  0500   WBC 10.7 8.3 8.2   NEUTSPOLYS 82.10* 72.10* 73.70*   LYMPHOCYTES 9.30* 16.20* 13.40*   MONOCYTES 7.70 9.90 11.70   EOSINOPHILS 0.30 1.20 0.60   BASOPHILS 0.30 0.20 0.40   ASTSGOT 21 22 38   ALTSGPT 18 17 44   ALKPHOSPHAT 66 63 70   TBILIRUBIN 0.6 0.3 0.2     Recent Labs     03/28/25  0405 03/29/25  0430 03/30/25  0500   RBC 5.47 4.85 4.45*   HEMOGLOBIN 14.8 13.3* 12.1*   HEMATOCRIT 45.4 40.4* 37.7*   PLATELETCT 259 261 229       Imaging  IMPRESSION:     1.  Small areas of acute infarcts in the lower jero and upper medulla involving both sides of the midline.  2.  There are chronic infarcts in the jero and right inferior cerebellum. . There are areas of chronic lacunae in the bilateral basal ganglia, right thalamus and right periventricular white matter.  3.  There are nonspecific T2 hyperintensities in the periventricular white matter likely representing chronic small vessel disease.  4.  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            Assessment/Plan  Acute neuromuscular respiratory failure (HCC)  Assessment & Plan  Intubated for airway protection  Lung protective ventilation strategies  Titrate ventilator prescription to optimize oxygenation, ventilation, and acid base balance.  Anticipate long term ventilator support, trach and peg pending  PMR consulting      Hypertension- (present on admission)  Assessment & Plan  Goal at this time -140       Acute ischemic stroke (HCC)- (present on admission)  Assessment & Plan  Acute infarcts in the lower jero and upper medulla c/w presentation.  Chronic infarcts I the jero and right inferior cerebellum  "which explain right riddhi deficits  He is functionally \"locked in\" at this time  ASA 81 and Plavix 75 daily  Statin therapy for secondary prevention  Anticipate long term acute care  Recommend early tracheostomy and PEG tube placement  Mr. Brian has given consent for Tracheostomy and TEG  placement.  PMR consulting  GI consult for PEG placement, scheduled for 3/31  Percutaneous tracheostomy, scheduled for 3/30      Type 2 diabetes mellitus (HCC)- (present on admission)  Assessment & Plan  Hg A1 C 6.2  Moderate glycemic control with Insulin therapy.           VTE:  Lovenox held for procedures  Ulcer: H2 Antagonist  Lines: None    I have performed a physical exam and reviewed and updated ROS and Plan today (3/30/2025). In review of yesterday's note (3/29/2025), there are no changes except as documented above.     Discussed patient condition and risk of morbidity and/or mortality with RN, RT, Pharmacy, Patient, and neurology      The patient remains critically ill.  I have assessed and reassessed the respiratory status and made ventilator adjustments based upon arterial blood gas analysis, ventilator waveforms and airway mechanics.  I have assessed and reassessed the blood pressure, hemodynamics, cardiovascular and neurologic status. This patient remains at high risk for worsening cardiopulmonary dysfunction and death without the above critical care interventions.    Critical care time = 35 minutes in directly providing and coordinating critical care and extensive data review.  No time overlap and excludes procedures.  "

## 2025-03-30 NOTE — PROGRESS NOTES
Physical Medicine and Rehabilitation Consultation          Date of initial consultation: 3/29/2025  Consulting provider: Grant Gabriel M.D.   Reason for consultation: assess for acute inpatient rehab appropriateness  LOS: 5 Day(s)    Chief complaint: [place] stroke    HPI: The patient is a 49 y.o. male with a past medical history of hypertension, diabetes, anxiety;  who presented on 3/25/2025 12:42 PM with dizziness, weakness, speech difficulty.  Seen by neurology and found to have a NIH score of 17.  Initial CT head on 3/25 was WNL.  CTA head and neck was negative for high-grade stenosis or occlusion.  Patient was deemed not a candidate for acute stroke interventions due to lack of lateralizing symptoms and normal imaging.  Patient went on to develop poor management of secretions, right-sided hemiplegia, left-sided facial droop.  Patient was intubated and stat MRI found acute infarction in the lower jero/upper medulla that crosses midline.    The patient currently is intubated but awake.  He is only able to blink or roll his eyes.  He does endorse sensation, has no movement.    INTERVAL UPDATE  3/30/2025: No change in strength, sensation. Trach and peg coming soon. Currently day 5, he is past the point of maximal swelling. If no return soon then condition is more likely to be permanent. Will need to have goals of care conversation sometime next week, hopefully with PMV he can participate. Has neuropathic pain.       ROS  Pertinent positives are mentioned in the HPI, all others reviewed and are negative.    Social Hx:  2 SH  2 KAYDEN, 1 FOS inside  With: Alone.  Patient's girlfriend is retired and can provide daily support if needed    THERAPY:  Restrictions: Fall risk, swallow precautions  PT: Functional mobility   3/27: Max assist bed mobility    OT: ADLs  3/27: Max assist ADLs    SLP:   3/27: Poor management of secretion    IMAGING:  MR brain 3/28/2025    1.  Small areas of acute infarcts in the lower jero and  upper medulla involving both sides of the midline.  2.  There are chronic infarcts in the jero and right inferior cerebellum. . There are areas of chronic lacunae in the bilateral basal ganglia, right thalamus and right periventricular white matter.  3.  There are nonspecific T2 hyperintensities in the periventricular white matter likely representing chronic small vessel disease.  4.  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    PROCEDURES:  Intubated 3/27 5:13 PM    PMH:  Past Medical History:   Diagnosis Date    Cold       1/15/17 - no symptoms at this time.    Depression     Headache(784.0)     Hypertension     Influenza A     positive influenza A RNA test 1/2/2018- no symptoms today       PSH:  Past Surgical History:   Procedure Laterality Date    PB TARSAL TUNNEL RELEASE Left 4/20/2022    Procedure: LEFT FOOT TARSAL TUNNEL RELEASE,;  Surgeon: Abhilash Kohler M.D.;  Location: [facility];  Service: Orthopedics    ANKLE ARTHROSCOPY Right 10/8/2018    Procedure: ANKLE ARTHROSCOPY;  Surgeon: Abhilash Kohler M.D.;  Location: [facility];  Service: Orthopedics    HARDWARE REMOVAL ORTHO Right 10/8/2018    Procedure: HARDWARE REMOVAL ORTHO- OF SUBTALAR SCREW;  Surgeon: Abhilash Kohler M.D.;  Location: [facility];  Service: Orthopedics    ORTHOPEDIC OSTEOTOMY Right 10/8/2018    Procedure: ORTHOPEDIC OSTEOTOMY- COTTON WITH BONE GRAFT;  Surgeon: Abhilash Kohler M.D.;  Location: [facility];  Service: Orthopedics    SUBTALOR TRIPLE FUSION Left 1/23/2018    Procedure: SUBTALOR TRIPLE FUSION- W/TALO-CALCANEAL COALITION EXCISION & GASTROC SOLEUS RECESSION;  Surgeon: Abhilash Kohler M.D.;  Location: [facility];  Service: Orthopedics       FHX:  Family History   Family history unknown: Yes       Medications:  Current Facility-Administered Medications   Medication Dose    insulin lispro  "(HumaLOG,AdmeLOG) subcutaneous injection  1-6 Units    And    dextrose 50 % (D50W) injection 25 g  25 g    atorvastatin (Lipitor) tablet 80 mg  80 mg    Respiratory Therapy Consult      famotidine (Pepcid) tablet 20 mg  20 mg    senna-docusate (Pericolace Or Senokot S) 8.6-50 MG per tablet 2 Tablet  2 Tablet    And    polyethylene glycol/lytes (Miralax) Packet 1 Packet  1 Packet    And    magnesium hydroxide (Milk Of Magnesia) suspension 30 mL  30 mL    And    bisacodyl (Dulcolax) suppository 10 mg  10 mg    MD Alert...ICU Electrolyte Replacement per Pharmacy      Pharmacy Consult: Enteral tube insertion - review meds/change route/product selection  1 Each    acetaminophen (Tylenol) tablet 650 mg  650 mg    aspirin (Asa) chewable tab 81 mg  81 mg    Or    aspirin (Asa) suppository 300 mg  300 mg    [Held by provider] hydrALAZINE (Apresoline) tablet 25 mg  25 mg    ibuprofen (Motrin) tablet 600 mg  600 mg    melatonin tablet 5 mg  5 mg    amLODIPine (Norvasc) tablet 10 mg  10 mg    [Held by provider] hydroCHLOROthiazide tablet 25 mg  25 mg    promethazine (Phenergan) tablet 12.5-25 mg  12.5-25 mg    ondansetron (Zofran ODT) dispertab 4 mg  4 mg    fentaNYL (Sublimaze) injection 50 mcg  50 mcg    And    fentaNYL (Sublimaze) injection 100 mcg  100 mcg    labetalol (Normodyne/Trandate) injection 10 mg  10 mg    hydrALAZINE (Apresoline) injection 20 mg  20 mg    lactated ringers infusion      [Held by provider] enoxaparin (Lovenox) inj 40 mg  40 mg    ondansetron (Zofran) syringe/vial injection 4 mg  4 mg    promethazine (Phenergan) suppository 12.5-25 mg  12.5-25 mg    prochlorperazine (Compazine) injection 5-10 mg  5-10 mg       Allergies:  Allergies   Allergen Reactions    Lisinopril Shortness of Breath     Closes throat         Physical Exam:  Vitals: /59   Pulse 70   Temp 36.5 °C (97.7 °F) (Temporal)   Resp 16   Ht 1.854 m (6' 0.99\")   Wt 100 kg (220 lb 7.4 oz)   SpO2 98%   Gen: NAD  Head: NC/AT  Eyes/ " Nose/ Mouth: PERRLA, moist mucous membranes  Cardio: RRR, good distal perfusion, warm extremities  Pulm: Intubated   Abd: Soft NTND, negative borborygmi   Ext: No peripheral edema. No calf tenderness. No clubbing.    Mental status: Able to blink for responses  Speech: None    Motor:      Upper Extremity  Myotome R L   Shoulder flexion C5 0/5 0/5   Elbow flexion C5 0/5 0/5   Wrist extension C6 0/5 0/5   Elbow extension C7 0/5 0/5   Finger flexion C8 0/5 0/5   Finger abduction T1 0/5 0/5     Lower Extremity Myotome R L   Hip flexion L2 0/5 0/5   Knee extension L3 0/5 0/5   Ankle dorsiflexion L4 0/5 0/5   Toe extension L5 0/5 0/5   Ankle plantarflexion S1 0/5 0/5       Labs: Reviewed and significant for   Recent Labs     03/28/25  0405 03/29/25  0430 03/30/25  0500   RBC 5.47 4.85 4.45*   HEMOGLOBIN 14.8 13.3* 12.1*   HEMATOCRIT 45.4 40.4* 37.7*   PLATELETCT 259 261 229     Recent Labs     03/28/25  0405 03/29/25  0430 03/30/25  0500   SODIUM 136 137 138   POTASSIUM 4.1 4.2 4.0   CHLORIDE 103 104 105   CO2 20 25 23   GLUCOSE 132* 155* 122*   BUN 23* 26* 21   CREATININE 1.13 0.92 0.82   CALCIUM 9.5 9.1 9.0     Recent Results (from the past 24 hours)   POCT glucose device results    Collection Time: 03/29/25 12:08 PM   Result Value Ref Range    POC Glucose, Blood 144 (H) 65 - 99 mg/dL   POCT glucose device results    Collection Time: 03/29/25  6:06 PM   Result Value Ref Range    POC Glucose, Blood 137 (H) 65 - 99 mg/dL   POCT glucose device results    Collection Time: 03/30/25  1:04 AM   Result Value Ref Range    POC Glucose, Blood 130 (H) 65 - 99 mg/dL   CBC with Differential    Collection Time: 03/30/25  5:00 AM   Result Value Ref Range    WBC 8.2 4.8 - 10.8 K/uL    RBC 4.45 (L) 4.70 - 6.10 M/uL    Hemoglobin 12.1 (L) 14.0 - 18.0 g/dL    Hematocrit 37.7 (L) 42.0 - 52.0 %    MCV 84.7 81.4 - 97.8 fL    MCH 27.2 27.0 - 33.0 pg    MCHC 32.1 (L) 32.3 - 36.5 g/dL    RDW 42.1 35.9 - 50.0 fL    Platelet Count 229 164 - 446  K/uL    MPV 9.1 9.0 - 12.9 fL    Neutrophils-Polys 73.70 (H) 44.00 - 72.00 %    Lymphocytes 13.40 (L) 22.00 - 41.00 %    Monocytes 11.70 0.00 - 13.40 %    Eosinophils 0.60 0.00 - 6.90 %    Basophils 0.40 0.00 - 1.80 %    Immature Granulocytes 0.20 0.00 - 0.90 %    Nucleated RBC 0.00 0.00 - 0.20 /100 WBC    Neutrophils (Absolute) 6.02 1.82 - 7.42 K/uL    Lymphs (Absolute) 1.10 1.00 - 4.80 K/uL    Monos (Absolute) 0.96 (H) 0.00 - 0.85 K/uL    Eos (Absolute) 0.05 0.00 - 0.51 K/uL    Baso (Absolute) 0.03 0.00 - 0.12 K/uL    Immature Granulocytes (abs) 0.02 0.00 - 0.11 K/uL    NRBC (Absolute) 0.00 K/uL   Magnesium    Collection Time: 03/30/25  5:00 AM   Result Value Ref Range    Magnesium 2.3 1.5 - 2.5 mg/dL   Phosphorus    Collection Time: 03/30/25  5:00 AM   Result Value Ref Range    Phosphorus 3.5 2.5 - 4.5 mg/dL   Comp Metabolic Panel    Collection Time: 03/30/25  5:00 AM   Result Value Ref Range    Sodium 138 135 - 145 mmol/L    Potassium 4.0 3.6 - 5.5 mmol/L    Chloride 105 96 - 112 mmol/L    Co2 23 20 - 33 mmol/L    Anion Gap 10.0 7.0 - 16.0    Glucose 122 (H) 65 - 99 mg/dL    Bun 21 8 - 22 mg/dL    Creatinine 0.82 0.50 - 1.40 mg/dL    Calcium 9.0 8.5 - 10.5 mg/dL    Correct Calcium 9.6 8.5 - 10.5 mg/dL    AST(SGOT) 38 12 - 45 U/L    ALT(SGPT) 44 2 - 50 U/L    Alkaline Phosphatase 70 30 - 99 U/L    Total Bilirubin 0.2 0.1 - 1.5 mg/dL    Albumin 3.3 3.2 - 4.9 g/dL    Total Protein 6.2 6.0 - 8.2 g/dL    Globulin 2.9 1.9 - 3.5 g/dL    A-G Ratio 1.1 g/dL   Triglyceride    Collection Time: 03/30/25  5:00 AM   Result Value Ref Range    Triglycerides 93 0 - 149 mg/dL   ESTIMATED GFR    Collection Time: 03/30/25  5:00 AM   Result Value Ref Range    GFR (CKD-EPI) 109 >60 mL/min/1.73 m 2   POCT glucose device results    Collection Time: 03/30/25  5:08 AM   Result Value Ref Range    POC Glucose, Blood 115 (H) 65 - 99 mg/dL         ASSESSMENT:  Patient is a 49 y.o. male admitted with brainstem stroke     Rehabilitation:  Impaired ADLs and mobility  Barriers to transfer include: Insurance authorization, TCCs to verify disposition, medical clearance and bed availability. All cases are subject to administrative review.     Disposition recommendations:  -Patient is locked in.  Reviewing his imaging, he has patchy involvement of predominantly left brainstem, but infarctions do cross midline.  Any insult in this area causes widespread effects.  He is currently within the window for post stroke edema, so I feel it is worth watching him for the next 2 to 4 days to see if he gets back any movement.  In the meantime, agree with recommendations to proceed with trach and PEG.   -Will need to have a goals of care conversation with patient and spouse in the coming days.  Even if we do see early return of function, I do not anticipate patient will be able to return to living independently, and I do not anticipate his spouse will be able to care for him as a single provider, therefore he will need placement, likely LTAC. Alternatively, comfort care measures would be appropriate if in line with patients wishes.       Medical Complexity:    Brainstem stroke  -Locked-in  -MRI evidence of acute ischemic infarct in the lower jero/upper medulla, involving both sides of midline  -Started on DAPT with aspirin 81 mg daily, Plavix 75 mg daily 2 and 4/4  -Agree with trach and PEG  -Monitor progress for the next 2 to 4 days  -Goals of care conversation pending trach and peg, and PMV training.     Neuropathic pain   - Starting Gabapentin 400 mg TID    Hypertension  -Goal is normotension, -130/70-90  -Primary team managing    Type 2 diabetes mellitus  -Sliding scale insulin    DVT PPX: SCDs      Thank you for allowing us to participate in the care of this patient.     Total time: >35 minutes. This includes time spent reviewing patient's history, notes, labs, imaging, vitals, medications, examining patient, discussing care with patient and family including  prognosis, risk reduction, benefits of treatment, and options for next stage of care, and communicating recommendations to primary team.     Dayne Paniagua, DO   Physical Medicine and Rehabilitation     Please note that this dictation was created using voice recognition software. I have made every reasonable attempt to correct obvious errors, but there may be errors of grammar and possibly content that I did not discover before finalizing the note.

## 2025-03-30 NOTE — PROCEDURES
Procedure Note    Date: 3/30/2025  Time: 1400    Procedure: Bronchoscopy with bronchoalveolar lavage and therapeutic aspiration of secretions    Indication: Bronchoscopy for percutaneous tracheostomy  Consent: Informed consent obtained from patient or designated decision maker after explaining the benefits/risks of the procedure including but not limited to bleeding, infection, airway trauma or loss therof, pneumothorax/hemothorax, arrythmia, or death. Patient or surrogate expressed understanding and agreement and signed consent which can be found in the patient's chart.    Procedure: After obtaining consent, a time-out was performed. Respiratory therapy and nursing at bedside throughout procedure. Patient provided sedation and analgesia throughout the procedure. Placed on full ventilator support with an FiO2 of 100% throughout the procedure. Using a fiberoptic bronchoscope, trachea entered via ETT.  The trachea, venus, and main bronchi were visualized..  The endotracheal tube was pulled back to a depth of 20 cm.  The finder needle was identified in the anterior portion of the trachea.  This was followed by the guidewire and dilator.  Finally the tracheostomy tube was inserted into the trachea and the cuff was inflated.  The bronchoscope was then removed from the endotracheal tube and inserted into the new tracheostomy tube which confirmed placement in the trachea.  At the conclusion of the procedure the trachea and upper airways were suctioned to clear.  Complications: None

## 2025-03-30 NOTE — PROCEDURES
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     PROCEDURE PERFORMED: Percutaneous tracheostomy under bronchoscopic guidance.    SURGEON:     Elizabeth Briceno M.D.    ASSISTANT/ENDOSCOPIST: Grant Gabriel M.D.    ANESTHESIA:    Anesthesia consisting of intravenous sedation, parenteral analgesia, and pharmacologic restraint was administered.    INDICATIONS: The patient is a 49 year-old man with acute respiratory failure. Percutaneous tracheostomy is carried out at the bedside under bronchoscopic guidance.     PROCEDURE: Following informed consent, the patient was properly identified and optimally positioned in bed. He was preoxygenated with 100% oxygen and placed on a regular ventilatory rate. A combination of intravenous sedation, parenteral analgesia, and pharmacologic restraint was administered. A timeout was conducted with the full attention and participation of all procedure personnel.    The fiberoptic bronchoscope was advance through the indwelling endotracheal tube. The endotracheal tube was repositioned just above the vocal cords under direct vision. Satisfactory ventilation and delivered tidal volumes were confirmed. The anterior neck was palpated and the surface landmark site for the tracheostomy located. Please see Dr Gabriel's dictation for full details of the bronchoscopy.    The anterior neck was prepped. The bronchoscope was withdrawn well back into the endotracheal tube. A Portex® ULTRAperc® Single Stage Dilator Technique Kit was employed. The trachea was cannulated in the midline with a 14 gauge angiocatheter midway between the thyroid cartilage and suprasternal notch. A flexible guidewire was passed without resistance. The scope was advanced distally and satisfactory cannulation and wire placement was confirmed.  Initial attempts to place a dilator and  tracheostomy tube were unsuccessful and a false tract was created.  The wire was withdrawn and the needle was used to gain entry into the trachea again.  The wire was advanced successfully down the airway.  A #8 Blue Line Ultra® Suctionaid tracheostomy tube was then successfully passed using Selldinger technique and secured to the skin with a tracheostomy tape.     The patient tolerated the procedure well. There were no apparent complications.         ____________________________________     Elizabeth Briceno M.D.    DD: 3/30/2025  4:09 PM

## 2025-03-30 NOTE — CONSULTS
DATE OF CONSULTATION:  3/30/2025     REFERRING PHYSICIAN:   Grant Gabriel M.D.     CONSULTING PHYSICIAN:  Elizabeth Briceno M.D.     REASON FOR CONSULTATION:  I have been asked by Dr. Gabriel to see the patient in surgical consultation for evaluation of tracheostomy placement.    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 ACS has been consulted for tracheostomy placement. The patient has no history of prior neck radiation, surgery, tracheostomy. He is able to blink to answer yes and no questions.    PAST MEDICAL HISTORY:  has a past medical history of Cold, Depression, Headache(784.0), Hypertension, and Influenza A.    PAST SURGICAL HISTORY:  has a past surgical history that includes subtalor triple fusion (Left, 1/23/2018); ankle arthroscopy (Right, 10/8/2018); hardware removal ortho (Right, 10/8/2018); orthopedic osteotomy (Right, 10/8/2018); and pr tarsal tunnel release (Left, 4/20/2022).    ALLERGIES:   Allergies   Allergen Reactions    Lisinopril Shortness of Breath     Closes throat       CURRENT MEDICATIONS:    Home Medications       Reviewed by Sarahi Ramirez (Pharmacy Tech) on 03/25/25 at 1926  Med List Status: Complete     Medication Last Dose Status   amLODIPine (NORVASC) 10 MG Tab 3/25/2025 Active   hydroCHLOROthiazide 25 MG Tab 3/25/2025 Active   metFORMIN ER (GLUCOPHAGE XR) 500 MG TABLET SR 24 HR 3/24/2025 Active                  Audit from Redirected Encounters    **Home medications have not yet been reviewed for this encounter**         FAMILY HISTORY: Family history is unknown by patient.    SOCIAL HISTORY:  reports that he has never smoked. He has never used smokeless tobacco. He reports that he does not drink alcohol and does not use drugs.    REVIEW OF SYSTEMS: Comprehensive review of systems is not able to be elicited from the patient secondary to the acuity of the clinical  situation.    PHYSICAL EXAMINATION:    CONSTITUTIONAL: Alert, awake, blinking in response to questions.  HEENT: Normocephalic, atraumatic. PERRL. Conjunctivae normal. Intubated.  NECK: Supple. No surgical scars.  CARDIOVASCULAR: Normal rate, regular rhythm.  PULMONARY/CHEST: No respiratory distress. Chest wall stable, non-tender, normal excursion.  ABDOMEN: Soft, non-distended.  MUSCULOSKELETAL: Unable to move extremities.  SKIN: Warm and dry. No abrasions, lacerations.    LABORATORY VALUES:   Recent Labs     03/28/25  0405 03/29/25  0430 03/30/25  0500   WBC 10.7 8.3 8.2   RBC 5.47 4.85 4.45*   HEMOGLOBIN 14.8 13.3* 12.1*   HEMATOCRIT 45.4 40.4* 37.7*   MCV 83.0 83.3 84.7   MCH 27.1 27.4 27.2   MCHC 32.6 32.9 32.1*   RDW 41.2 41.1 42.1   PLATELETCT 259 261 229   MPV 8.8* 9.1 9.1     Recent Labs     03/28/25  0405 03/29/25  0430 03/30/25  0500   SODIUM 136 137 138   POTASSIUM 4.1 4.2 4.0   CHLORIDE 103 104 105   CO2 20 25 23   GLUCOSE 132* 155* 122*   BUN 23* 26* 21   CREATININE 1.13 0.92 0.82   CALCIUM 9.5 9.1 9.0     Recent Labs     03/28/25  0405 03/29/25  0430 03/30/25  0500   ASTSGOT 21 22 38   ALTSGPT 18 17 44   TBILIRUBIN 0.6 0.3 0.2   ALKPHOSPHAT 66 63 70   GLOBULIN 3.0 3.1 2.9            IMAGING:   CTA neck reviewed. No prohibitive overtly abnormal neck anatomy in the area of prospective tracheostomy.    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    -Plan for percutaneous tracheostomy this afternoon.  -Risks, benefits, and alternatives discussed with the patient's significant other, Susan Singletary, to obtain consent. Procedure also discussed with the patient.  -Continue to hold tube feeds until procedure    DISPOSITION: Medical evaluation and admission. The patient was admitted to the Medical Service prior to surgical consultation. [facility] will follow.     ____________________________________      Elizabeth Briceno M.D.    DD: 3/30/2025  11:40 AM    AAST Grading System for EGS Conditions  ACS NSQIP Surgical Risk Calculator

## 2025-03-30 NOTE — CARE PLAN
The patient is Watcher - Medium risk of patient condition declining or worsening    Shift Goals  Clinical Goals: Q4 Neuro, -130  Patient Goals: JAMARI  Family Goals: updates    Progress made toward(s) clinical / shift goals:      Problem: Bowel Elimination  Goal: Establish and maintain regular bowel function  Outcome: Progressing     Problem: Fall Risk  Goal: Patient will remain free from falls  Outcome: Progressing     Problem: Skin Integrity  Goal: Skin integrity is maintained or improved  Outcome: Progressing     Problem: Safety - Medical Restraint  Goal: Remains free of injury from restraints (Restraint for Interference with Medical Device)  Outcome: Progressing       Patient is not progressing towards the following goals:

## 2025-03-30 NOTE — PROGRESS NOTES
MD Gabriel and MD Briceno bedside for Trach    1405 Time out, all agree    1410 100 HORTENCIA  1411 20 prop  1414 20 prop  1415 100 Fent  1428 20 Prop    1433 end of procedure

## 2025-03-30 NOTE — CARE PLAN
Problem: Ventilation  Goal: Ability to achieve and maintain unassisted ventilation or tolerate decreased levels of ventilator support  Description: Target End Date:  4 days Document on Vent flowsheet1.  Support and monitor invasive and noninvasive mechanical ventilation2.  Monitor ventilator weaning response3.  Perform ventilator associated pneumonia prevention interventions4.  Manage ventilation therapy by monitoring diagnostic test results  Outcome: Not Met

## 2025-03-30 NOTE — CARE PLAN
Problem: Ventilation  Goal: Ability to achieve and maintain unassisted ventilation or tolerate decreased levels of ventilator support  Description: Target End Date:  4 days Document on Vent flowsheet1.  Support and monitor invasive and noninvasive mechanical ventilation2.  Monitor ventilator weaning response3.  Perform ventilator associated pneumonia prevention interventions4.  Manage ventilation therapy by monitoring diagnostic test results  Outcome: Progressing   Vent Day 4  Trache Day 1  8.0 Portex  APVCMV: 16, 480, 8, 30%  Spont 1 hour  Niff : can't perform  VC: can't perform

## 2025-03-30 NOTE — CONSULTS
GASTROENTEROLOGY CONSULTATION    PATIENT NAME: Jonna Brian  : 1975  CSN: [id number]  MRN:  [id number]     CONSULTATION DATE:  3/29/2025    PRIMARY CARE PROVIDER:  Pcp Pt States None      REASON FOR CONSULT:  Pontine Stroke, Dysphagia  Consult requested by Dr. Grant Gabriel    HISTORY OF PRESENT ILLNESS:  Jonna Brian is a 49 y.o. male who presented 3/25/2025 with hypertension,  DM2 who presented 3/25 for dizziness, weakness and speech difficulty. He was evaluated by neuro vascular and had a stroke alert and workup performed and no large vessel occlusion was seen and metabolic workup recommended. He since has had progressive right side weakness, bulbar symptoms and difficulty with speech. I . Neurology and Neurovascular evaluated him today and recommend ICU level care and concerns for brainstem stroke. Shortly after arrival in the ICU patient developed stridor and required emergent intubation.      PAST MEDICAL HISTORY:  Past Medical History:   Diagnosis Date    Cold       1/15/17 - no symptoms at this time.    Depression     Headache(784.0)     Hypertension     Influenza A     positive influenza A RNA test 2018- no symptoms today       PAST SURGICAL HISTORY:  Past Surgical History:   Procedure Laterality Date    PB TARSAL TUNNEL RELEASE Left 2022    Procedure: LEFT FOOT TARSAL TUNNEL RELEASE,;  Surgeon: Abhilash Kohler M.D.;  Location: [facility] [place];  Service: Orthopedics    ANKLE ARTHROSCOPY Right 10/8/2018    Procedure: ANKLE ARTHROSCOPY;  Surgeon: Abhilash Kohler M.D.;  Location: [facility];  Service: Orthopedics    HARDWARE REMOVAL ORTHO Right 10/8/2018    Procedure: HARDWARE REMOVAL ORTHO- OF SUBTALAR SCREW;  Surgeon: Abhilash Kohler M.D.;  Location: [facility];  Service: Orthopedics    ORTHOPEDIC OSTEOTOMY Right 10/8/2018    Procedure: ORTHOPEDIC OSTEOTOMY- COTTON WITH BONE GRAFT;  Surgeon: Abhilash Kohler M.D.;  Location: [facility]  ORS;  Service: Orthopedics    SUBTALOR TRIPLE FUSION Left 1/23/2018    Procedure: SUBTALOR TRIPLE FUSION- W/TALO-CALCANEAL COALITION EXCISION & GASTROC SOLEUS RECESSION;  Surgeon: Abhilash Kohler M.D.;  Location: SURGERY [facility];  Service: Orthopedics        CURRENT MEDS:  Current Facility-Administered Medications   Medication Dose Route Frequency Provider Last Rate Last Admin    insulin lispro (HumaLOG,AdmeLOG) subcutaneous injection  1-6 Units Subcutaneous Q6HRS Marion Chen        And    dextrose 50 % (D50W) injection 25 g  25 g Intravenous Q15 MIN PRN Marion SMART Latnhung        atorvastatin (Lipitor) tablet 80 mg  80 mg Enteral Tube Q EVENING Alixandra SEN Whelan, A.P.R.N.   80 mg at 03/29/25 1802    Respiratory Therapy Consult   Nebulization Continuous RT Maddy Torres M.D.        famotidine (Pepcid) tablet 20 mg  20 mg Enteral Tube Q12HRS Maddy Torres M.D.   20 mg at 03/29/25 1802    senna-docusate (Pericolace Or Senokot S) 8.6-50 MG per tablet 2 Tablet  2 Tablet Enteral Tube BID Maddy Torres M.D.   2 Tablet at 03/29/25 1802    And    polyethylene glycol/lytes (Miralax) Packet 1 Packet  1 Packet Enteral Tube QDAY PRN Maddy Torres M.D.   1 Packet at 03/28/25 1712    And    magnesium hydroxide (Milk Of Magnesia) suspension 30 mL  30 mL Enteral Tube QDAY PRN Maddy Torres M.D.   30 mL at 03/29/25 1802    And    bisacodyl (Dulcolax) suppository 10 mg  10 mg Rectal QDAY PRN Maddy Torres M.D. MD Alert...ICU Electrolyte Replacement per Pharmacy   Other PHARMACY TO DOSE Maddy Torres M.D.        Pharmacy Consult: Enteral tube insertion - review meds/change route/product selection  1 Each Other PHARMACY TO DOSE Maddy Torres M.D.        acetaminophen (Tylenol) tablet 650 mg  650 mg Enteral Tube Q6HRS PRN Maddy Torres M.D.   650 mg at 03/29/25 1227    aspirin (Asa) chewable tab 81 mg  81 mg Enteral Tube DAILY Maddy Torres M.D.   81 mg at 03/29/25 0518    Or    aspirin (Asa)  suppository 300 mg  300 mg Rectal DAILY Maddy Torres M.D.        [Held by provider] hydrALAZINE (Apresoline) tablet 25 mg  25 mg Enteral Tube Q8HRS Maddy Torres M.D.        ibuprofen (Motrin) tablet 600 mg  600 mg Enteral Tube Q6HRS PRN Maddy Torres M.D.        melatonin tablet 5 mg  5 mg Enteral Tube Nightly Maddy Torres M.D.   5 mg at 03/28/25 2038    amLODIPine (Norvasc) tablet 10 mg  10 mg Enteral Tube DAILY Grant Gabriel M.D.        [Held by provider] hydroCHLOROthiazide tablet 25 mg  25 mg Enteral Tube DAILY Maddy Torres M.D.        promethazine (Phenergan) tablet 12.5-25 mg  12.5-25 mg Enteral Tube Q4HRS PRN Maddy Torres M.D.        ondansetron (Zofran ODT) dispertab 4 mg  4 mg Enteral Tube Q4HRS PRN Maddy Torres M.D.        fentaNYL (Sublimaze) injection 50 mcg  50 mcg Intravenous Q15 MIN PRN Maddy Torres M.D.   50 mcg at 03/28/25 2112    And    fentaNYL (Sublimaze) injection 100 mcg  100 mcg Intravenous Q15 MIN PRN Maddy Torres M.D.   100 mcg at 03/27/25 1833    And    fentaNYL (SUBLIMAZE) 50 mcg/mL in 50mL (Continuous Infusion)   Intravenous Continuous Maddy Torres M.D.   Dose not Required at 03/27/25 1845    And    propofol (DIPRIVAN) injection  0-80 mcg/kg/min (Ideal) Intravenous Continuous Maddy Torres M.D.   Paused at 03/28/25 0634    labetalol (Normodyne/Trandate) injection 10 mg  10 mg Intravenous Q4HRS PRN Maddy Torres M.D.   10 mg at 03/29/25 1204    hydrALAZINE (Apresoline) injection 20 mg  20 mg Intravenous Q6HRS PRN Maddy Torres M.D.   20 mg at 03/29/25 0738    norepinephrine (Levophed) 8 mg in 250 mL NS infusion (premix)  0-1 mcg/kg/min (Ideal) Intravenous Continuous Maddy E Brian, M.D.   Paused at 03/28/25 1534    lactated ringers infusion   Intravenous Continuous Marion Chen 75 mL/hr at 03/29/25 1536 New Bag at 03/29/25 1536    enoxaparin (Lovenox) inj 40 mg  40 mg Subcutaneous DAILY AT 1800 Darrelkath Polo M.D.   40 mg at 03/29/25 1802     ondansetron (Zofran) syringe/vial injection 4 mg  4 mg Intravenous Q4HRS PRN Darrel Polo M.D.        promethazine (Phenergan) suppository 12.5-25 mg  12.5-25 mg Rectal Q4HRS PRN Darrel Polo M.D.        prochlorperazine (Compazine) injection 5-10 mg  5-10 mg Intravenous Q4HRS PRN Darrel Polo M.D.   10 mg at 03/26/25 0550        ALLERGIES:  Allergies   Allergen Reactions    Lisinopril Shortness of Breath     Closes throat       SOCIAL HISTORY:  Social History     Socioeconomic History    Marital status: Single     Spouse name: Not on file    Number of children: Not on file    Years of education: Not on file    Highest education level: Not on file   Occupational History    Not on file   Tobacco Use    Smoking status: Never    Smokeless tobacco: Never   Vaping Use    Vaping status: Never Used   Substance and Sexual Activity    Alcohol use: Never    Drug use: Never    Sexual activity: Yes     Partners: Female   Other Topics Concern    Not on file   Social History Narrative    ** Merged History Encounter **          Social Drivers of Health     Financial Resource Strain: Not on file   Food Insecurity: Not on file   Transportation Needs: Not on file   Physical Activity: Not on file   Stress: Not on file   Social Connections: Not on file   Intimate Partner Violence: Not on file   Housing Stability: Not on file       FAMILY HISTORY:  Family History   Family history unknown: Yes        REVIEW OF SYSTEMS:  General ROS: Negative for - chills, fever, night sweats or weight loss.  HEENT ROS: Negative  Respiratory ROS: Negative for - cough or shortness of breath.  Cardiovascular ROS:  Negative for - chest pain or palpitations.  Gastrointestinal ROS: As per the history of present illness.  Genito-Urinary ROS: Negative  Musculoskeletal ROS: Negative.  Neurological ROS: Negative  Skin ROS: negative  Hematology ROS: negative  Endocrinology ROS: Negative        PHYSICAL EXAM:  VITALS: /57   Pulse 75    "Temp 36.5 °C (97.7 °F) (Temporal)   Resp 19   Ht 1.854 m (6' 0.99\")   Wt 100 kg (220 lb 7.4 oz)   SpO2 98%   BMI 29.09 kg/m²   GEN:  Jonna Brian is a 49 y.o. male intubated but able ti answer yes and no question with blinking his eyes  HEENT: Mucous membranes pink and moist.  Sclera anicteric.    NECK:    Neck supple without lymphadenopathy or thyromegaly.  LUNGS: Clear to auscultation posteriorly.  HEART: Regular rate and rhythm. S1 and S2 normal. No murmurs, gallops  ABD:  + Bd nt/nd -hsm  RECTAL: Not done at this time.  EXT:  Without cyanosis, deformity or pitting edema.  SKIN:  Pink, warm, dry.  NEURO: Grossly intact, A/OR.    LABS:  Recent Labs     03/28/25  0405 03/29/25  0430   WBC 10.7 8.3   MCV 83.0 83.3     Recent Labs     03/28/25  0405 03/29/25  0430   GLUCOSE 132* 155*   BUN 23* 26*   CO2 20 25     Lab Results   Component Value Date    INR 1.01 03/25/2025    INR 1.03 01/29/2018    INR 0.96 01/02/2018     No components found for: \"ALT\", \"AST\", \"GGT\", \"ALKPHOS\"  No results found for: \"BILINEO\"      @LASTIMGCAT(MD9614)@     @LASTIMGCAT(AV0035)@       IMPRESSION/PLAN:  Pontine Stroke  Oropharyngeal dysphagia  Fever - patient has mild fever - we will ascertain tomorrow  DM 2  Neuromuscular respiratory failure - pending possible tracheostomy    EGD with PEG placement on Monday  Stop tube feedings at midnight  Hold jonathon Fortune M.D.  Gastroenterology      "

## 2025-03-31 VITALS
WEIGHT: 220.46 LBS | RESPIRATION RATE: 20 BRPM | HEART RATE: 75 BPM | OXYGEN SATURATION: 98 % | TEMPERATURE: 97.7 F | DIASTOLIC BLOOD PRESSURE: 72 MMHG | SYSTOLIC BLOOD PRESSURE: 135 MMHG | BODY MASS INDEX: 29.22 KG/M2 | HEIGHT: 73 IN

## 2025-03-31 PROBLEM — E11.65 TYPE 2 DIABETES MELLITUS WITH HYPERGLYCEMIA, WITHOUT LONG-TERM CURRENT USE OF INSULIN (HCC): Status: ACTIVE | Noted: 2019-03-18

## 2025-03-31 LAB
ALBUMIN SERPL BCP-MCNC: 3.1 G/DL (ref 3.2–4.9)
ALBUMIN/GLOB SERPL: 1 G/DL
ALP SERPL-CCNC: 89 U/L (ref 30–99)
ALT SERPL-CCNC: 68 U/L (ref 2–50)
ANION GAP SERPL CALC-SCNC: 11 MMOL/L (ref 7–16)
AST SERPL-CCNC: 64 U/L (ref 12–45)
BASOPHILS # BLD AUTO: 0.2 % (ref 0–1.8)
BASOPHILS # BLD: 0.02 K/UL (ref 0–0.12)
BILIRUB SERPL-MCNC: 0.3 MG/DL (ref 0.1–1.5)
BUN SERPL-MCNC: 19 MG/DL (ref 8–22)
CALCIUM ALBUM COR SERPL-MCNC: 9.6 MG/DL (ref 8.5–10.5)
CALCIUM SERPL-MCNC: 8.9 MG/DL (ref 8.5–10.5)
CHLORIDE SERPL-SCNC: 106 MMOL/L (ref 96–112)
CO2 SERPL-SCNC: 20 MMOL/L (ref 20–33)
CREAT SERPL-MCNC: 0.84 MG/DL (ref 0.5–1.4)
CRP SERPL HS-MCNC: 11.9 MG/DL (ref 0–0.75)
EOSINOPHIL # BLD AUTO: 0.04 K/UL (ref 0–0.51)
EOSINOPHIL NFR BLD: 0.4 % (ref 0–6.9)
ERYTHROCYTE [DISTWIDTH] IN BLOOD BY AUTOMATED COUNT: 42.1 FL (ref 35.9–50)
GFR SERPLBLD CREATININE-BSD FMLA CKD-EPI: 108 ML/MIN/1.73 M 2
GLOBULIN SER CALC-MCNC: 3.1 G/DL (ref 1.9–3.5)
GLUCOSE BLD STRIP.AUTO-MCNC: 105 MG/DL (ref 65–99)
GLUCOSE BLD STRIP.AUTO-MCNC: 117 MG/DL (ref 65–99)
GLUCOSE BLD STRIP.AUTO-MCNC: 123 MG/DL (ref 65–99)
GLUCOSE BLD STRIP.AUTO-MCNC: 156 MG/DL (ref 65–99)
GLUCOSE SERPL-MCNC: 126 MG/DL (ref 65–99)
HCT VFR BLD AUTO: 36.8 % (ref 42–52)
HGB BLD-MCNC: 11.9 G/DL (ref 14–18)
IMM GRANULOCYTES # BLD AUTO: 0.03 K/UL (ref 0–0.11)
IMM GRANULOCYTES NFR BLD AUTO: 0.3 % (ref 0–0.9)
LYMPHOCYTES # BLD AUTO: 1.07 K/UL (ref 1–4.8)
LYMPHOCYTES NFR BLD: 11.2 % (ref 22–41)
MCH RBC QN AUTO: 27.5 PG (ref 27–33)
MCHC RBC AUTO-ENTMCNC: 32.3 G/DL (ref 32.3–36.5)
MCV RBC AUTO: 85 FL (ref 81.4–97.8)
MONOCYTES # BLD AUTO: 0.84 K/UL (ref 0–0.85)
MONOCYTES NFR BLD AUTO: 8.8 % (ref 0–13.4)
NEUTROPHILS # BLD AUTO: 7.54 K/UL (ref 1.82–7.42)
NEUTROPHILS NFR BLD: 79.1 % (ref 44–72)
NRBC # BLD AUTO: 0 K/UL
NRBC BLD-RTO: 0 /100 WBC (ref 0–0.2)
PLATELET # BLD AUTO: 239 K/UL (ref 164–446)
PMV BLD AUTO: 8.8 FL (ref 9–12.9)
POTASSIUM SERPL-SCNC: 4.3 MMOL/L (ref 3.6–5.5)
PREALB SERPL-MCNC: 18.6 MG/DL (ref 18–38)
PROT SERPL-MCNC: 6.2 G/DL (ref 6–8.2)
RBC # BLD AUTO: 4.33 M/UL (ref 4.7–6.1)
SODIUM SERPL-SCNC: 137 MMOL/L (ref 135–145)
WBC # BLD AUTO: 9.5 K/UL (ref 4.8–10.8)

## 2025-03-31 PROCEDURE — 700111 HCHG RX REV CODE 636 W/ 250 OVERRIDE (IP): Mod: JZ | Performed by: STUDENT IN AN ORGANIZED HEALTH CARE EDUCATION/TRAINING PROGRAM

## 2025-03-31 PROCEDURE — 94150 VITAL CAPACITY TEST: CPT

## 2025-03-31 PROCEDURE — A9270 NON-COVERED ITEM OR SERVICE: HCPCS | Performed by: INTERNAL MEDICINE

## 2025-03-31 PROCEDURE — 700105 HCHG RX REV CODE 258: Performed by: INTERNAL MEDICINE

## 2025-03-31 PROCEDURE — 160203 HCHG ENDO MINUTES - 1ST 30 MINS LEVEL 4: Performed by: INTERNAL MEDICINE

## 2025-03-31 PROCEDURE — 97530 THERAPEUTIC ACTIVITIES: CPT

## 2025-03-31 PROCEDURE — 82962 GLUCOSE BLOOD TEST: CPT | Mod: 91

## 2025-03-31 PROCEDURE — 700111 HCHG RX REV CODE 636 W/ 250 OVERRIDE (IP): Performed by: INTERNAL MEDICINE

## 2025-03-31 PROCEDURE — 700105 HCHG RX REV CODE 258: Performed by: NURSE PRACTITIONER

## 2025-03-31 PROCEDURE — 99291 CRITICAL CARE FIRST HOUR: CPT | Performed by: STUDENT IN AN ORGANIZED HEALTH CARE EDUCATION/TRAINING PROGRAM

## 2025-03-31 PROCEDURE — 700102 HCHG RX REV CODE 250 W/ 637 OVERRIDE(OP): Performed by: INTERNAL MEDICINE

## 2025-03-31 PROCEDURE — 85025 COMPLETE CBC W/AUTO DIFF WBC: CPT

## 2025-03-31 PROCEDURE — 700102 HCHG RX REV CODE 250 W/ 637 OVERRIDE(OP): Performed by: STUDENT IN AN ORGANIZED HEALTH CARE EDUCATION/TRAINING PROGRAM

## 2025-03-31 PROCEDURE — 94640 AIRWAY INHALATION TREATMENT: CPT

## 2025-03-31 PROCEDURE — 43246 EGD PLACE GASTROSTOMY TUBE: CPT | Performed by: INTERNAL MEDICINE

## 2025-03-31 PROCEDURE — 84134 ASSAY OF PREALBUMIN: CPT

## 2025-03-31 PROCEDURE — 0DH63UZ INSERTION OF FEEDING DEVICE INTO STOMACH, PERCUTANEOUS APPROACH: ICD-10-PCS | Performed by: INTERNAL MEDICINE

## 2025-03-31 PROCEDURE — 86140 C-REACTIVE PROTEIN: CPT

## 2025-03-31 PROCEDURE — A9270 NON-COVERED ITEM OR SERVICE: HCPCS | Performed by: STUDENT IN AN ORGANIZED HEALTH CARE EDUCATION/TRAINING PROGRAM

## 2025-03-31 PROCEDURE — 94003 VENT MGMT INPAT SUBQ DAY: CPT

## 2025-03-31 PROCEDURE — 700102 HCHG RX REV CODE 250 W/ 637 OVERRIDE(OP): Performed by: PHYSICAL MEDICINE & REHABILITATION

## 2025-03-31 PROCEDURE — 700101 HCHG RX REV CODE 250: Performed by: STUDENT IN AN ORGANIZED HEALTH CARE EDUCATION/TRAINING PROGRAM

## 2025-03-31 PROCEDURE — 700111 HCHG RX REV CODE 636 W/ 250 OVERRIDE (IP): Mod: JZ | Performed by: INTERNAL MEDICINE

## 2025-03-31 PROCEDURE — 160048 HCHG OR STATISTICAL LEVEL 1-5: Performed by: INTERNAL MEDICINE

## 2025-03-31 PROCEDURE — 770022 HCHG ROOM/CARE - ICU (200)

## 2025-03-31 PROCEDURE — 94799 UNLISTED PULMONARY SVC/PX: CPT

## 2025-03-31 PROCEDURE — 80053 COMPREHEN METABOLIC PANEL: CPT

## 2025-03-31 PROCEDURE — A9270 NON-COVERED ITEM OR SERVICE: HCPCS | Performed by: PHYSICAL MEDICINE & REHABILITATION

## 2025-03-31 DEVICE — KIT ENDOVIVE 24FR SAFETY PEG PULL WITH ENFIT (2EA/BX): Type: IMPLANTABLE DEVICE | Status: FUNCTIONAL

## 2025-03-31 RX ORDER — FAMOTIDINE 20 MG/1
20 TABLET, FILM COATED ORAL EVERY 12 HOURS
Status: DISCONTINUED | OUTPATIENT
Start: 2025-03-31 | End: 2025-04-03

## 2025-03-31 RX ORDER — LABETALOL HYDROCHLORIDE 5 MG/ML
10 INJECTION, SOLUTION INTRAVENOUS EVERY 4 HOURS PRN
Status: DISCONTINUED | OUTPATIENT
Start: 2025-03-31 | End: 2025-04-29

## 2025-03-31 RX ORDER — ATORVASTATIN CALCIUM 80 MG/1
80 TABLET, FILM COATED ORAL EVERY EVENING
Status: DISCONTINUED | OUTPATIENT
Start: 2025-03-31 | End: 2025-05-13 | Stop reason: HOSPADM

## 2025-03-31 RX ORDER — HYDRALAZINE HYDROCHLORIDE 20 MG/ML
20 INJECTION INTRAMUSCULAR; INTRAVENOUS EVERY 6 HOURS PRN
Status: DISCONTINUED | OUTPATIENT
Start: 2025-03-31 | End: 2025-04-29

## 2025-03-31 RX ORDER — GABAPENTIN 400 MG/1
400 CAPSULE ORAL 3 TIMES DAILY
Status: DISCONTINUED | OUTPATIENT
Start: 2025-03-31 | End: 2025-05-13 | Stop reason: HOSPADM

## 2025-03-31 RX ORDER — MIDAZOLAM HYDROCHLORIDE 1 MG/ML
5 INJECTION INTRAMUSCULAR; INTRAVENOUS
Status: DISCONTINUED | OUTPATIENT
Start: 2025-03-31 | End: 2025-04-01

## 2025-03-31 RX ORDER — ROCURONIUM BROMIDE 10 MG/ML
50 INJECTION, SOLUTION INTRAVENOUS ONCE
Status: COMPLETED | OUTPATIENT
Start: 2025-03-31 | End: 2025-03-31

## 2025-03-31 RX ADMIN — ENOXAPARIN SODIUM 40 MG: 100 INJECTION SUBCUTANEOUS at 18:00

## 2025-03-31 RX ADMIN — FAMOTIDINE 20 MG: 20 TABLET, FILM COATED ORAL at 21:17

## 2025-03-31 RX ADMIN — FENTANYL CITRATE 100 MCG: 50 INJECTION, SOLUTION INTRAMUSCULAR; INTRAVENOUS at 14:05

## 2025-03-31 RX ADMIN — AMLODIPINE BESYLATE 10 MG: 10 TABLET ORAL at 05:19

## 2025-03-31 RX ADMIN — MIDAZOLAM HYDROCHLORIDE 5 MG: 1 INJECTION, SOLUTION INTRAMUSCULAR; INTRAVENOUS at 13:38

## 2025-03-31 RX ADMIN — ACETAMINOPHEN 650 MG: 325 TABLET ORAL at 03:08

## 2025-03-31 RX ADMIN — SODIUM CHLORIDE, POTASSIUM CHLORIDE, SODIUM LACTATE AND CALCIUM CHLORIDE: 600; 310; 30; 20 INJECTION, SOLUTION INTRAVENOUS at 05:20

## 2025-03-31 RX ADMIN — FENTANYL CITRATE 100 MCG: 50 INJECTION, SOLUTION INTRAMUSCULAR; INTRAVENOUS at 13:40

## 2025-03-31 RX ADMIN — CEFAZOLIN 2 G: 2 INJECTION, POWDER, FOR SOLUTION INTRAMUSCULAR; INTRAVENOUS at 13:39

## 2025-03-31 RX ADMIN — GABAPENTIN 400 MG: 400 CAPSULE ORAL at 05:19

## 2025-03-31 RX ADMIN — ASPIRIN 81 MG: 81 TABLET, CHEWABLE ORAL at 05:19

## 2025-03-31 RX ADMIN — GABAPENTIN 400 MG: 400 CAPSULE ORAL at 21:18

## 2025-03-31 RX ADMIN — ATORVASTATIN CALCIUM 80 MG: 80 TABLET, FILM COATED ORAL at 21:17

## 2025-03-31 RX ADMIN — HYDRALAZINE HYDROCHLORIDE 20 MG: 20 INJECTION, SOLUTION INTRAMUSCULAR; INTRAVENOUS at 03:10

## 2025-03-31 RX ADMIN — Medication 5 MG: at 21:18

## 2025-03-31 RX ADMIN — LABETALOL HYDROCHLORIDE 10 MG: 5 INJECTION, SOLUTION INTRAVENOUS at 16:08

## 2025-03-31 RX ADMIN — FAMOTIDINE 20 MG: 20 TABLET, FILM COATED ORAL at 05:19

## 2025-03-31 RX ADMIN — ROCURONIUM BROMIDE 50 MG: 10 INJECTION INTRAVENOUS at 13:50

## 2025-03-31 ASSESSMENT — COGNITIVE AND FUNCTIONAL STATUS - GENERAL
MOVING FROM LYING ON BACK TO SITTING ON SIDE OF FLAT BED: TOTAL
PERSONAL GROOMING: TOTAL
TOILETING: TOTAL
MOBILITY SCORE: 6
SUGGESTED CMS G CODE MODIFIER DAILY ACTIVITY: CN
EATING MEALS: TOTAL
SUGGESTED CMS G CODE MODIFIER MOBILITY: CN
HELP NEEDED FOR BATHING: TOTAL
MOVING TO AND FROM BED TO CHAIR: TOTAL
DAILY ACTIVITIY SCORE: 6
CLIMB 3 TO 5 STEPS WITH RAILING: TOTAL
DRESSING REGULAR UPPER BODY CLOTHING: TOTAL
TURNING FROM BACK TO SIDE WHILE IN FLAT BAD: TOTAL
STANDING UP FROM CHAIR USING ARMS: TOTAL
WALKING IN HOSPITAL ROOM: TOTAL
DRESSING REGULAR LOWER BODY CLOTHING: TOTAL

## 2025-03-31 ASSESSMENT — PAIN DESCRIPTION - PAIN TYPE
TYPE: ACUTE PAIN

## 2025-03-31 ASSESSMENT — PULMONARY FUNCTION TESTS: FVC: 367

## 2025-03-31 ASSESSMENT — GAIT ASSESSMENTS: GAIT LEVEL OF ASSIST: UNABLE TO PARTICIPATE

## 2025-03-31 NOTE — OP REPORT
Preoperative diagnosis: Unable to eat, locked-in syndrome.    Postoperative diagnosis: Placement of percutaneous endoscopic gastrostomy tube    CONSENT: The risks, benefits and alternatives of the procedure were discussed in detail with the patient and family. The risks include bleeding, infection, perforation, missed lesions, and sedations risks (cardiopulmonary compromise and allergic reaction to medications).    DESCRIPTION:    The patient in the ICU.  Procedure performed at bedside.  A time out was performed prior to beginning the procedure.   The patient was placed in the supine position.   Patient was sedated ICU intensivist.    Medications given: Ancef 2 gram IVPB for prophylaxis    FINDINGS:    Esophagus: Normal  Stomach: Chronic patchy gastritis, mild  Duodenum: Normal to the second portion    PEG placement: An appropriate site was selected using trans-illumination and abdominal ballottement. The skin was cleansed in a sterile manner. The skin was infiltrated with local lidocaine. 3 T-fasteners were placed in standard fashion.  A small 1 cm incision was performed.  The trocar was inserted through the abdomen into the stomach under endoscopic visualization. A guidewire was inserted through the sheath and grasped within the stomach with a snare. Guidewire was removed per oral and the PEG tube was secured to the guidewire. The 24 PEG tube was pulled transabdominally from the mouth. The external bumper secured at 3 cm. There was no evidence of bleeding. Gastroscope was reintroduced to look for adequate positioning of the PEG and for complications. No complications or bleeding seen. The Bolster was placed on the PEG site. The incision had antibiotic ointment applied and site was bandaged.     Blood loss: Minimal    The patient tolerated the procedure well.      There were no immediate complications.    IMPRESSION:  24French PEG tube successfully placed.  3 T-fasteners placed.    RECOMMENDATIONS:  Please ensure  proper PEG care and tube feeding:  - keep head of bed elevated to at least 30 degrees during tube feeding. The largest factor in preventing aspiration during tube feeding is not the placement of the tube into the stomach or jejunum, but keeping the head elevated to at least 30 degrees during feeding  - Medications and water can be started 6 hours after placement. Tube feeds can be started tomorrow morning.   - PEG needs 0.5-1cm of laxity freely mobile in and out of the gastrostomy tract. Overtightening may impair tract maturation by inducing localized ischemia. Over tightening may also result in buried bumper syndrome.  -  The 3 buttons underneath the PEG tube's external bumper will spontaneously release after sutures dissolve.  Median time 4-8 weeks.

## 2025-03-31 NOTE — HOSPITAL COURSE
"3/27-Shortly after arrival in the ICU patient developed stridor and required emergent intubation.  3/28- Blinking once for \"yes\", twice for 'no\" appropriately.  3/29-no clinical change.  Consent given for tracheostomy.  3/30 - percutaneous tracheostomy    3/31 - [facility] referral sent, PEG planned for today, family updated at bedside.  Will try to T-Piece today  "

## 2025-03-31 NOTE — CARE PLAN
The patient is Watcher - Medium risk of patient condition declining or worsening    Shift Goals  Clinical Goals: Q4 Neuros, -130  Patient Goals: JAMARI  Family Goals: JAMARI    Progress made toward(s) clinical / shift goals:      Problem: Knowledge Deficit - Standard  Goal: Patient and family/care givers will demonstrate understanding of plan of care, disease process/condition, diagnostic tests and medications  Outcome: Progressing     Problem: Pain - Standard  Goal: Alleviation of pain or a reduction in pain to the patient’s comfort goal  Outcome: Progressing     Problem: Optimal Care of the Stroke Patient  Goal: Optimal emergency care for the stroke patient  Outcome: Progressing     Problem: Psychosocial - Patient Condition  Goal: Patient's ability to verbalize feelings about condition will improve  Outcome: Progressing     Problem: Hemodynamic Monitoring  Goal: Patient's hemodynamics, fluid balance and neurologic status will be stable or improve  Outcome: Progressing     Problem: Respiratory - Stroke Patient  Goal: Patient will achieve/maintain optimum respiratory rate/effort  Outcome: Progressing       Patient is not progressing towards the following goals:

## 2025-03-31 NOTE — DISCHARGE PLANNING
Vented day 5. Please review the consult from Dr. Paniagua regarding post acute recommendations.  [facility] will no longer follow.  Please reach out to myself with any questions.

## 2025-03-31 NOTE — DISCHARGE PLANNING
Case Management Discharge Planning    Admission Date: 3/25/2025  GMLOS: 4.5  ALOS: 6    6-Clicks ADL Score: 13  6-Clicks Mobility Score: 9  PT and/or OT Eval ordered: Yes  Post-acute Referrals Ordered: Yes  Post-acute Choice Obtained: No  Has referral(s) been sent to post-acute provider:  Yes    Anticipated Discharge Dispo: Discharge Disposition: Disch to a long term care facility (63)    DME Needed: No    Action(s) Taken:   Chart review was completed. Patient was discussed during IDT rounds.    Pt is vent day 5, trach day 2. Plan for PEG tube placement today and T-piece trials today. At this time, the anticipated discharge disposition is long term placement.     EJ RN met with pt's significant other Susan at the bedside re: discharge disposition. Sabi was informed of [facility]'s referral policy and verbalized agreement with LTACH placement. She is retired and plans on being pt's primary caregiver upon his return into the community. She stated pt has 3 adult children and his mother, Vy, who all live out of state. Pt's mother plans to fly into town and assist with pt's care and discharge planning next week. Susan requested pt be screened for [place] Medicaid; [facility] was notified.     LTACH referral sent per protocol.     1533:   PC was placed to pt's mother Vy re: anticipated discharge plan. Vy was informed of referral to [facility] and was agreeable with post-acute placement if accepted. She was provided with phone number to complete [place] medicaid over the phone screening.     Escalations Completed: None    Medically Clear: No    Next Steps:  EJ RN to follow up with medical team to discuss discharge barriers or needs.     Barriers to Discharge: Medical clearance, Pending Placement, and Pending Procedures

## 2025-03-31 NOTE — DIETARY
Nutrition Services Brief Update:    Problem: Nutritional:  Goal: Nutrition support tolerated and meeting greater than 85% of estimated needs  Outcome: MET    Pt is preceiving TF Novasource Renal at goal rate 45 ml/hr overnight now NPO for PEG tube placement today. Phosphorus wnl. Off Levophed. Remains intubated. Trach placed 3/30.     RD following.

## 2025-03-31 NOTE — PROGRESS NOTES
"Critical Care Progress Note    Date of admission  3/25/2025    Chief Complaint  49 y.o. male who presented 3/25/2025 with Hx of HTN, anxiety, DM2, htn that presented 3/25 for dizziness, weakness and speech difficulty. He was evaluated by neuro vascular and had a stroke alert and workup preformed and no LVO was seen and recommend metabolic workup. He since has had progressive right side weakness, bulbar symptoms and difficulty with speech. I saw him earlier for concerns of GBS and elevated protein in his CNS fluid for concerns but his neuro exam didn't fit and recommend repeat CT head which did not show any acute changes. Neurology and Neurovascular evaluated him today and recommend ICU level care and concerns for brainstem stroke.      Hospital Course  3/27-Shortly after arrival in the ICU patient developed stridor and required emergent intubation.  3/28- Blinking once for \"yes\", twice for 'no\" appropriately.  3/29-no clinical change.  Consent given for tracheostomy.  3/30 - percutaneous tracheostomy    3/31 - [facility] referral sent, PEG planned for today, family updated at bedside.  Will try to T-Piece today    Interval Problem Update  Tmax: 100.4 °F  Diet: Tube feeds  Vasopressors: None    Infusions: None    Antibx: None    Intake / Output  Urine Output past 24 hours: 1140 mL    Lab Trends  CBC WNL    AST 38 --> 24  ALT 44 --> 68     Review of Systems  Review of Systems   Unable to perform ROS: Critical illness        Vital Signs for last 24 hours   Pulse:  [] 73  Resp:  [9-29] 12  BP: (113-192)/() 123/60  SpO2:  [96 %-100 %] 96 %    Hemodynamic parameters for last 24 hours       Respiratory Information for the last 24 hours  Vent Mode: APVCMV  Rate (breaths/min): 20  Vt Target (mL): 480  PEEP/CPAP: 8  P Support (PS + PEEP): 13  MAP: 12  Control VTE (exp VT): 449    Physical Exam  Constitutional:       Appearance: He is ill-appearing.   HENT:      Head: Normocephalic and atraumatic.      Mouth/Throat:      " "Mouth: Mucous membranes are moist.   Eyes:      Pupils: Pupils are equal, round, and reactive to light.   Cardiovascular:      Rate and Rhythm: Normal rate and regular rhythm.      Heart sounds: No murmur heard.     No friction rub. No gallop.   Pulmonary:      Effort: No respiratory distress.      Breath sounds: No wheezing or rales.      Comments: Trach in place  Abdominal:      General: There is no distension.      Palpations: Abdomen is soft.   Musculoskeletal:      Cervical back: Neck supple.      Right lower leg: No edema.      Left lower leg: No edema.   Skin:     General: Skin is warm and dry.      Capillary Refill: Capillary refill takes less than 2 seconds.   Neurological:      Sensory: Sensory deficit present.      Comments:   Eyes open spontaneously, he blinks once for \"yes\", twice for 'no\" appropriately.  Sensation to light touch is absent    Essentially locked in         Medications  Current Facility-Administered Medications   Medication Dose Route Frequency Provider Last Rate Last Admin    hydrALAZINE (Apresoline) injection 20 mg  20 mg Intravenous Q6HRS PRN Bravo Lala M.D.        labetalol (Normodyne/Trandate) injection 10 mg  10 mg Intravenous Q4HRS PRN Bravo Lala M.D.        gabapentin (Neurontin) capsule 400 mg  400 mg Enteral Tube TID Dayne Paniagua D.ORob   400 mg at 03/31/25 0519    insulin lispro (HumaLOG,AdmeLOG) subcutaneous injection  1-6 Units Subcutaneous Q6HRS Marion Chen        And    dextrose 50 % (D50W) injection 25 g  25 g Intravenous Q15 MIN PRN Mairon Chen        atorvastatin (Lipitor) tablet 80 mg  80 mg Enteral Tube Q EVENING Thony Whelan, A.P.R.N.   80 mg at 03/30/25 1752    Respiratory Therapy Consult   Nebulization Continuous RT Maddy Torers M.D.        famotidine (Pepcid) tablet 20 mg  20 mg Enteral Tube Q12HRS Maddy Torres M.D.   20 mg at 03/31/25 0519    senna-docusate (Pericolace Or Senokot S) 8.6-50 MG per tablet 2 Tablet  2 Tablet Enteral Tube " BID Maddy Torres M.D.   2 Tablet at 03/29/25 1802    And    polyethylene glycol/lytes (Miralax) Packet 1 Packet  1 Packet Enteral Tube QDAY PRN Maddy Torres M.D.   1 Packet at 03/28/25 1712    And    magnesium hydroxide (Milk Of Magnesia) suspension 30 mL  30 mL Enteral Tube QDAY PRN Mdady Torres M.D.   30 mL at 03/29/25 1802    And    bisacodyl (Dulcolax) suppository 10 mg  10 mg Rectal QDAY PRN Maddy Torres M.D. MD Alert...ICU Electrolyte Replacement per Pharmacy   Other PHARMACY TO DOSE Maddy Torres M.D.        Pharmacy Consult: Enteral tube insertion - review meds/change route/product selection  1 Each Other PHARMACY TO DOSE Maddy Torres M.D.        acetaminophen (Tylenol) tablet 650 mg  650 mg Enteral Tube Q6HRS PRN Maddy Torres M.D.   650 mg at 03/31/25 0308    aspirin (Asa) chewable tab 81 mg  81 mg Enteral Tube DAILY Maddy Torres M.D.   81 mg at 03/31/25 0519    Or    aspirin (Asa) suppository 300 mg  300 mg Rectal DAILY Maddy Torres M.D.        ibuprofen (Motrin) tablet 600 mg  600 mg Enteral Tube Q6HRS PRN Maddy Torres M.D.        melatonin tablet 5 mg  5 mg Enteral Tube Nightly Maddy Torres M.D.   5 mg at 03/30/25 2139    amLODIPine (Norvasc) tablet 10 mg  10 mg Enteral Tube DAILY Grant Gabriel M.D.   10 mg at 03/31/25 0519    promethazine (Phenergan) tablet 12.5-25 mg  12.5-25 mg Enteral Tube Q4HRS PRN Maddy Torres M.D.        ondansetron (Zofran ODT) dispertab 4 mg  4 mg Enteral Tube Q4HRS PRN Maddy Torres M.D.        fentaNYL (Sublimaze) injection 50 mcg  50 mcg Intravenous Q15 MIN PRN Maddy Torres M.D.   50 mcg at 03/30/25 0813    And    fentaNYL (Sublimaze) injection 100 mcg  100 mcg Intravenous Q15 MIN PRN Maddy Torres M.D.   100 mcg at 03/30/25 1415    [Held by provider] enoxaparin (Lovenox) inj 40 mg  40 mg Subcutaneous DAILY AT 1800 Darrelkath Polo M.D.   40 mg at 03/29/25 1802    ondansetron (Zofran) syringe/vial injection 4 mg  4 mg  Intravenous Q4HRS PRN Darrel Polo M.D.        promethazine (Phenergan) suppository 12.5-25 mg  12.5-25 mg Rectal Q4HRS PRN Darrel Polo M.D.        prochlorperazine (Compazine) injection 5-10 mg  5-10 mg Intravenous Q4HRS PRN Darrel Polo M.D.   10 mg at 03/26/25 0550       Fluids    Intake/Output Summary (Last 24 hours) at 3/31/2025 1124  Last data filed at 3/31/2025 1000  Gross per 24 hour   Intake 2117.8 ml   Output 1425 ml   Net 692.8 ml       Laboratory  Recent Labs     03/29/25  0301   ISTATAPH 7.444   ISTATAPCO2 41.2   ISTATAPO2 118*   ISTATATCO2 30*   [id number] 99   ISTATARTHCO3 28.3*   ISTATARTBE 4*   ISTATTEMP 37.0 C   ISTATFIO2 30   ISTATSPEC Arterial   ISTATAPHTC 7.444   URDPKQBQ7VM 118*         Recent Labs     03/29/25  0430 03/30/25  0500 03/31/25  0418   SODIUM 137 138 137   POTASSIUM 4.2 4.0 4.3   CHLORIDE 104 105 106   CO2 25 23 20   BUN 26* 21 19   CREATININE 0.92 0.82 0.84   MAGNESIUM 2.1 2.3  --    PHOSPHORUS 3.1 3.5  --    CALCIUM 9.1 9.0 8.9     Recent Labs     03/29/25  0430 03/30/25  0500 03/31/25  0418   ALTSGPT 17 44 68*   ASTSGOT 22 38 64*   ALKPHOSPHAT 63 70 89   TBILIRUBIN 0.3 0.2 0.3   GLUCOSE 155* 122* 126*     Recent Labs     03/29/25  0430 03/30/25  0500 03/31/25  0418   WBC 8.3 8.2 9.5   NEUTSPOLYS 72.10* 73.70* 79.10*   LYMPHOCYTES 16.20* 13.40* 11.20*   MONOCYTES 9.90 11.70 8.80   EOSINOPHILS 1.20 0.60 0.40   BASOPHILS 0.20 0.40 0.20   ASTSGOT 22 38 64*   ALTSGPT 17 44 68*   ALKPHOSPHAT 63 70 89   TBILIRUBIN 0.3 0.2 0.3     Recent Labs     03/29/25  0430 03/30/25  0500 03/31/25  0418   RBC 4.85 4.45* 4.33*   HEMOGLOBIN 13.3* 12.1* 11.9*   HEMATOCRIT 40.4* 37.7* 36.8*   PLATELETCT 261 229 239       Imaging  No imaging this morning      Assessment/Plan  * Acute ischemic stroke (HCC)- (present on admission)  Assessment & Plan  Acute infarcts in the lower jero and upper medulla c/w presentation.  Chronic infarcts I the jero and right inferior cerebellum  "which explain right riddhi deficits  He is functionally \"locked in\" at this time  ASA 81 and Plavix 75 daily  Statin therapy for secondary prevention  Anticipate long term acute care  Trach placed 3/30, PEG planned 3/31    PMR following  [facility] referral sent    Acute neuromuscular respiratory failure (HCC)  Assessment & Plan  GI prophylaxis: h2 blocker  Monitor ventilator waveforms & blood gases, titrate flow/peep and volumes according.   Daily SAT/SBT  All ventilator bundles are in place     Wean to T-Piece as able, Passy lisandra as able    Type 2 diabetes mellitus with hyperglycemia, without long-term current use of insulin (Former[facility])- (present on admission)  Assessment & Plan  Hg A1 C 6.2  Moderate glycemic control with Insulin therapy.    Primary hypertension- (present on admission)  Assessment & Plan  Goal at this time -140   Amlodipine         VTE:  Lovenox held for procedures  Ulcer: H2 Antagonist  Lines: None    I have performed a physical exam and reviewed and updated ROS and Plan today (3/31/2025). In review of yesterday's note (3/30/2025), there are no changes except as documented above.     Discussed patient condition and risk of morbidity and/or mortality with RN, RT, Pharmacy, Patient, and neurology and physiatry    The patient remains critically ill.  He is on mechanical ventilation.  Critical care time = 47 minutes in directly providing and coordinating critical care and extensive data review.  No time overlap and excludes procedures.   "

## 2025-03-31 NOTE — THERAPY
Speech Language Therapy Contact Note    Patient Name: Jonna Brian  Age:  49 y.o., Sex:  male  Medical Record #: [id number]  Today's Date: 3/31/2025       03/31/25 0914   Interdisciplinary Plan of Care Collaboration   IDT Collaboration with  Respiratory Therapist   Collaboration Comments Patient transitioned to trach 3/30 @ 1438. Collaborated with RT re: speaking valve evaluation orders to be completed once deemed medically appropriate. Cannot attempt until 48 hours s/p trach per passy lisandra guidelines.

## 2025-03-31 NOTE — THERAPY
"Physical Therapy   Daily Treatment     Patient Name: Jonna Brian  Age:  49 y.o., Sex:  male  Medical Record #: [id number]  Today's Date: 3/31/2025     Precautions  Precautions: Fall Risk;Swallow Precautions    Assessment    Rec'd pt alert, in bed.  He is able to intermittently nod head to \"yes/no\" questions.  No active movement noted x4 extremities.  Total assist for all mobility.  Unable to hold head in upright, neutral position w/ HOB sitting.  When positioned, able to hold for only several seconds for one attempt.  Transferred from Neuro, PT will resume w/ goals set at initial eval, however may need to be modified at some point.    Plan    Treatment Plan Status: Continue Current Treatment Plan  Type of Treatment: Bed Mobility, Family / Caregiver Training, Gait Training, Neuro Re-Education / Balance, Self Care / Home Evaluation, Stair Training, Therapeutic Activities, Therapeutic Exercise  Treatment Frequency: 5 Times per Week  Treatment Duration: Until Therapy Goals Met    DC Equipment Recommendations: Unable to determine at this time  Discharge Recommendations: Recommend post-acute placement for additional physical therapy services prior to discharge home        Objective       03/31/25 1220   Balance   Sitting Balance (Static) Dependent   Sitting Balance (Dynamic) Dependent   Weight Shift Sitting Absent   Weight Shift Standing Poor   Skilled Intervention Verbal Cuing;Tactile Cuing;Sequencing;Postural Facilitation   Bed Mobility    Supine to Sit Total Assist   Sit to Supine Total Assist   Scooting Total Assist   Gait Analysis   Gait Level Of Assist Unable to Participate   Functional Mobility   Sit to Stand Unable to Participate   Bed, Chair, Wheelchair Transfer Unable to Participate   Short Term Goals    Short Term Goal # 1 pt will move supine<>eob with min a in 6 tx for bed mobility.   Goal Outcome # 1 goal not met   Short Term Goal # 2 pt will sit at eob for 5 min with fair- balance in 6 tx for oob tolerance. "   Goal Outcome # 2 Goal not met   Short Term Goal # 3 pt will complete sts with hemiwalker and min a in 6 tx for functional mobility.   Goal Outcome # 3 Goal not met   Physical Therapy Treatment Plan   Physical Therapy Treatment Plan Continue Current Treatment Plan   Anticipated Discharge Equipment and Recommendations   DC Equipment Recommendations Unable to determine at this time   Discharge Recommendations Recommend post-acute placement for additional physical therapy services prior to discharge home

## 2025-04-01 LAB
ALBUMIN SERPL BCP-MCNC: 3.3 G/DL (ref 3.2–4.9)
ALBUMIN/GLOB SERPL: 1 G/DL
ALP SERPL-CCNC: 99 U/L (ref 30–99)
ALT SERPL-CCNC: 66 U/L (ref 2–50)
ANION GAP SERPL CALC-SCNC: 10 MMOL/L (ref 7–16)
AST SERPL-CCNC: 35 U/L (ref 12–45)
BASOPHILS # BLD AUTO: 0.2 % (ref 0–1.8)
BASOPHILS # BLD: 0.02 K/UL (ref 0–0.12)
BILIRUB SERPL-MCNC: 0.6 MG/DL (ref 0.1–1.5)
BUN SERPL-MCNC: 17 MG/DL (ref 8–22)
CALCIUM ALBUM COR SERPL-MCNC: 9.6 MG/DL (ref 8.5–10.5)
CALCIUM SERPL-MCNC: 9 MG/DL (ref 8.5–10.5)
CHLORIDE SERPL-SCNC: 106 MMOL/L (ref 96–112)
CO2 SERPL-SCNC: 21 MMOL/L (ref 20–33)
CREAT SERPL-MCNC: 0.83 MG/DL (ref 0.5–1.4)
EOSINOPHIL # BLD AUTO: 0.04 K/UL (ref 0–0.51)
EOSINOPHIL NFR BLD: 0.4 % (ref 0–6.9)
ERYTHROCYTE [DISTWIDTH] IN BLOOD BY AUTOMATED COUNT: 40.7 FL (ref 35.9–50)
GFR SERPLBLD CREATININE-BSD FMLA CKD-EPI: 108 ML/MIN/1.73 M 2
GLOBULIN SER CALC-MCNC: 3.4 G/DL (ref 1.9–3.5)
GLUCOSE BLD STRIP.AUTO-MCNC: 104 MG/DL (ref 65–99)
GLUCOSE BLD STRIP.AUTO-MCNC: 109 MG/DL (ref 65–99)
GLUCOSE BLD STRIP.AUTO-MCNC: 128 MG/DL (ref 65–99)
GLUCOSE BLD STRIP.AUTO-MCNC: 139 MG/DL (ref 65–99)
GLUCOSE SERPL-MCNC: 119 MG/DL (ref 65–99)
HCT VFR BLD AUTO: 36.1 % (ref 42–52)
HGB BLD-MCNC: 11.6 G/DL (ref 14–18)
IMM GRANULOCYTES # BLD AUTO: 0.03 K/UL (ref 0–0.11)
IMM GRANULOCYTES NFR BLD AUTO: 0.3 % (ref 0–0.9)
LYMPHOCYTES # BLD AUTO: 0.89 K/UL (ref 1–4.8)
LYMPHOCYTES NFR BLD: 10 % (ref 22–41)
MCH RBC QN AUTO: 27 PG (ref 27–33)
MCHC RBC AUTO-ENTMCNC: 32.1 G/DL (ref 32.3–36.5)
MCV RBC AUTO: 84 FL (ref 81.4–97.8)
MONOCYTES # BLD AUTO: 0.94 K/UL (ref 0–0.85)
MONOCYTES NFR BLD AUTO: 10.5 % (ref 0–13.4)
NEUTROPHILS # BLD AUTO: 6.99 K/UL (ref 1.82–7.42)
NEUTROPHILS NFR BLD: 78.6 % (ref 44–72)
NRBC # BLD AUTO: 0 K/UL
NRBC BLD-RTO: 0 /100 WBC (ref 0–0.2)
PLATELET # BLD AUTO: 271 K/UL (ref 164–446)
PMV BLD AUTO: 8.9 FL (ref 9–12.9)
POTASSIUM SERPL-SCNC: 4.3 MMOL/L (ref 3.6–5.5)
PROT SERPL-MCNC: 6.7 G/DL (ref 6–8.2)
RBC # BLD AUTO: 4.3 M/UL (ref 4.7–6.1)
SODIUM SERPL-SCNC: 137 MMOL/L (ref 135–145)
WBC # BLD AUTO: 8.9 K/UL (ref 4.8–10.8)

## 2025-04-01 PROCEDURE — 700111 HCHG RX REV CODE 636 W/ 250 OVERRIDE (IP): Mod: JZ | Performed by: INTERNAL MEDICINE

## 2025-04-01 PROCEDURE — 97530 THERAPEUTIC ACTIVITIES: CPT

## 2025-04-01 PROCEDURE — 99291 CRITICAL CARE FIRST HOUR: CPT | Performed by: STUDENT IN AN ORGANIZED HEALTH CARE EDUCATION/TRAINING PROGRAM

## 2025-04-01 PROCEDURE — 700102 HCHG RX REV CODE 250 W/ 637 OVERRIDE(OP): Performed by: INTERNAL MEDICINE

## 2025-04-01 PROCEDURE — 770022 HCHG ROOM/CARE - ICU (200)

## 2025-04-01 PROCEDURE — 94640 AIRWAY INHALATION TREATMENT: CPT

## 2025-04-01 PROCEDURE — 80053 COMPREHEN METABOLIC PANEL: CPT

## 2025-04-01 PROCEDURE — 700111 HCHG RX REV CODE 636 W/ 250 OVERRIDE (IP): Mod: JZ | Performed by: STUDENT IN AN ORGANIZED HEALTH CARE EDUCATION/TRAINING PROGRAM

## 2025-04-01 PROCEDURE — A9270 NON-COVERED ITEM OR SERVICE: HCPCS | Performed by: INTERNAL MEDICINE

## 2025-04-01 PROCEDURE — 94799 UNLISTED PULMONARY SVC/PX: CPT

## 2025-04-01 PROCEDURE — 82962 GLUCOSE BLOOD TEST: CPT | Mod: 91

## 2025-04-01 PROCEDURE — 99232 SBSQ HOSP IP/OBS MODERATE 35: CPT | Performed by: NURSE PRACTITIONER

## 2025-04-01 PROCEDURE — 85025 COMPLETE CBC W/AUTO DIFF WBC: CPT

## 2025-04-01 PROCEDURE — A9270 NON-COVERED ITEM OR SERVICE: HCPCS | Performed by: STUDENT IN AN ORGANIZED HEALTH CARE EDUCATION/TRAINING PROGRAM

## 2025-04-01 PROCEDURE — 700102 HCHG RX REV CODE 250 W/ 637 OVERRIDE(OP): Performed by: STUDENT IN AN ORGANIZED HEALTH CARE EDUCATION/TRAINING PROGRAM

## 2025-04-01 PROCEDURE — 97112 NEUROMUSCULAR REEDUCATION: CPT

## 2025-04-01 RX ORDER — CLOPIDOGREL BISULFATE 75 MG/1
75 TABLET ORAL DAILY
Status: COMPLETED | OUTPATIENT
Start: 2025-04-01 | End: 2025-04-04

## 2025-04-01 RX ADMIN — SENNOSIDES AND DOCUSATE SODIUM 2 TABLET: 50; 8.6 TABLET ORAL at 05:17

## 2025-04-01 RX ADMIN — FENTANYL CITRATE 50 MCG: 50 INJECTION, SOLUTION INTRAMUSCULAR; INTRAVENOUS at 12:08

## 2025-04-01 RX ADMIN — CLOPIDOGREL BISULFATE 75 MG: 75 TABLET, FILM COATED ORAL at 10:07

## 2025-04-01 RX ADMIN — ASPIRIN 81 MG: 81 TABLET, CHEWABLE ORAL at 05:17

## 2025-04-01 RX ADMIN — GABAPENTIN 400 MG: 400 CAPSULE ORAL at 17:27

## 2025-04-01 RX ADMIN — GABAPENTIN 400 MG: 400 CAPSULE ORAL at 11:44

## 2025-04-01 RX ADMIN — AMLODIPINE BESYLATE 10 MG: 10 TABLET ORAL at 05:17

## 2025-04-01 RX ADMIN — FAMOTIDINE 20 MG: 20 TABLET, FILM COATED ORAL at 05:17

## 2025-04-01 RX ADMIN — SENNOSIDES AND DOCUSATE SODIUM 2 TABLET: 50; 8.6 TABLET ORAL at 17:26

## 2025-04-01 RX ADMIN — GABAPENTIN 400 MG: 400 CAPSULE ORAL at 05:17

## 2025-04-01 RX ADMIN — LABETALOL HYDROCHLORIDE 10 MG: 5 INJECTION, SOLUTION INTRAVENOUS at 11:10

## 2025-04-01 RX ADMIN — Medication 5 MG: at 20:18

## 2025-04-01 RX ADMIN — LABETALOL HYDROCHLORIDE 10 MG: 5 INJECTION, SOLUTION INTRAVENOUS at 18:27

## 2025-04-01 RX ADMIN — ATORVASTATIN CALCIUM 80 MG: 80 TABLET, FILM COATED ORAL at 17:26

## 2025-04-01 RX ADMIN — ENOXAPARIN SODIUM 40 MG: 100 INJECTION SUBCUTANEOUS at 17:26

## 2025-04-01 RX ADMIN — HYDRALAZINE HYDROCHLORIDE 20 MG: 20 INJECTION, SOLUTION INTRAMUSCULAR; INTRAVENOUS at 13:06

## 2025-04-01 RX ADMIN — FAMOTIDINE 20 MG: 20 TABLET, FILM COATED ORAL at 17:26

## 2025-04-01 RX ADMIN — LABETALOL HYDROCHLORIDE 10 MG: 5 INJECTION, SOLUTION INTRAVENOUS at 06:15

## 2025-04-01 ASSESSMENT — PAIN DESCRIPTION - PAIN TYPE
TYPE: ACUTE PAIN

## 2025-04-01 ASSESSMENT — COGNITIVE AND FUNCTIONAL STATUS - GENERAL
TOILETING: TOTAL
MOVING TO AND FROM BED TO CHAIR: TOTAL
WALKING IN HOSPITAL ROOM: TOTAL
TURNING FROM BACK TO SIDE WHILE IN FLAT BAD: TOTAL
MOBILITY SCORE: 6
MOVING FROM LYING ON BACK TO SITTING ON SIDE OF FLAT BED: TOTAL
SUGGESTED CMS G CODE MODIFIER MOBILITY: CN
HELP NEEDED FOR BATHING: TOTAL
CLIMB 3 TO 5 STEPS WITH RAILING: TOTAL
STANDING UP FROM CHAIR USING ARMS: TOTAL
DAILY ACTIVITIY SCORE: 6
EATING MEALS: TOTAL
SUGGESTED CMS G CODE MODIFIER DAILY ACTIVITY: CN
PERSONAL GROOMING: TOTAL
DRESSING REGULAR LOWER BODY CLOTHING: TOTAL
DRESSING REGULAR UPPER BODY CLOTHING: TOTAL

## 2025-04-01 ASSESSMENT — GAIT ASSESSMENTS: GAIT LEVEL OF ASSIST: UNABLE TO PARTICIPATE

## 2025-04-01 NOTE — CARE PLAN
The patient is Watcher - Medium risk of patient condition declining or worsening    Shift Goals  Clinical Goals: Q4 Neuro, --140  Patient Goals: JAMARI  Family Goals: JAMARI    Progress made toward(s) clinical / shift goals:      Problem: Knowledge Deficit - Standard  Goal: Patient and family/care givers will demonstrate understanding of plan of care, disease process/condition, diagnostic tests and medications  Outcome: Progressing     Problem: Pain - Standard  Goal: Alleviation of pain or a reduction in pain to the patient’s comfort goal  Outcome: Progressing     Problem: Fall Risk  Goal: Patient will remain free from falls  Outcome: Progressing     Problem: Skin Integrity  Goal: Skin integrity is maintained or improved  Outcome: Progressing       Patient is not progressing towards the following goals:

## 2025-04-01 NOTE — DISCHARGE PLANNING
Case Management Discharge Planning    Admission Date: 3/25/2025  GMLOS: 23.1  ALOS: 7    6-Clicks ADL Score: 6  6-Clicks Mobility Score: 6  PT and/or OT Eval ordered: Yes  Post-acute Referrals Ordered: Yes  Post-acute Choice Obtained: Yes  Has referral(s) been sent to post-acute provider:  Yes    Anticipated Discharge Dispo: Discharge Disposition: Disch to a long term care facility (63)    DME Needed: No    Action(s) Taken:   Chart review was completed. Patient was discussed during IDT rounds.    Per MD, pt is now medically cleared for LTACH.     [facility] has accepted pt's referral via Epic link.     PC was placed to [facility] liaison Tyrone re: insurance authorization and bed availability. Left a voicemail with my direct contact information requesting a call back.     1123:   PC received from Tyrone. Insurance authorization was started yesterday afternoon and predicted to take 1-2 days for processing.     Escalations Completed: None    Medically Clear: No    Next Steps:  CM RN to follow up with medical team to discuss discharge barriers or needs.     Barriers to Discharge: Pending Insurance Authorization

## 2025-04-01 NOTE — CARE PLAN
The patient is Watcher - Medium risk of patient condition declining or worsening    Shift Goals  Clinical Goals: Q4 Neuro, -140, T-piece  Patient Goals: JAMARI  Family Goals: JAMARI    Progress made toward(s) clinical / shift goals:    Problem: Knowledge Deficit - Standard  Goal: Patient and family/care givers will demonstrate understanding of plan of care, disease process/condition, diagnostic tests and medications  Outcome: Progressing     Problem: Pain - Standard  Goal: Alleviation of pain or a reduction in pain to the patient’s comfort goal  Outcome: Progressing  Note: -patient's pain relieved with prn pain medication     Problem: Neuro Status  Goal: Neuro status will remain stable or improve  Outcome: Progressing  Note: -Q4 neuro assessments; patient using blinking/nodding to communicate/answer questions     Problem: Hemodynamic Monitoring  Goal: Patient's hemodynamics, fluid balance and neurologic status will be stable or improve  Outcome: Progressing  Note: -SBP remains in goal, 100-140; prns given to remain within goal       Patient is not progressing towards the following goals: N/A

## 2025-04-01 NOTE — THERAPY
Occupational Therapy  Daily Treatment     Patient Name: Jnona Brian  Age:  49 y.o., Sex:  male  Medical Record #: [id number]  Today's Date: 4/1/2025     Precautions  Precautions: Fall Risk, Swallow Precautions  Comments: difficulty managing secretions    Assessment    Pt currently limited by decreased functional mobility, activity tolerance, sensation, strength, AROM, coordination, balance, and pain which are affecting pt's ability to complete ADLs/IADLs at baseline. Pt would benefit from OT services in the acute care setting to maximize functional recovery.      Plan    Treatment Plan Status: (P) Continue Current Treatment Plan  Type of Treatment: Self Care / Activities of Daily Living, Adaptive Equipment, Cognitive Skill Development, Neuro Re-Education / Balance, Therapeutic Exercises, Therapeutic Activity, Family / Caregiver Training  Treatment Frequency: 4 Times per Week  Treatment Duration: Until Therapy Goals Met    DC Equipment Recommendations: Unable to determine at this time  Discharge Recommendations: (P) Recommend post-acute placement for additional occupational therapy services prior to discharge home       04/01/25 1055   Active ROM Upper Body   Active ROM Upper Body  X   Comments no active movement noted   Upper Body Muscle Tone   Upper Body Muscle Tone  X   Rt Upper Extremity Muscle Tone Non Functional   Lt Upper Extremity Muscle Tone Non Functional   Balance   Sitting Balance (Static) Dependent   Sitting Balance (Dynamic) Dependent   Weight Shift Sitting Absent   Weight Shift Standing Absent   Activities of Daily Living   Grooming Total Assist   Upper Body Dressing Total Assist   Lower Body Dressing Total Assist   Toileting Total Assist   Functional Mobility   Sit to Stand Unable to Participate   Bed, Chair, Wheelchair Transfer Unable to Participate   Short Term Goals   Short Term Goal # 1 unsupported seated g/h with SPV   Goal Outcome # 1 Progressing slower than expected   Short Term Goal # 2 UB  dressing with min A using riddhi-technique   Goal Outcome # 2 Progressing slower than expected   Short Term Goal # 3 BSC txf with mod A   Goal Outcome # 3 Progressing slower than expected   Short Term Goal # 4 sitting EOB unsupported for >2min in prep for LB dressing   Goal Outcome # 4 Progressing slower than expected   Occupational Therapy Treatment Plan    O.T. Treatment Plan Continue Current Treatment Plan   Anticipated Discharge Equipment and Recommendations   Discharge Recommendations Recommend post-acute placement for additional occupational therapy services prior to discharge home

## 2025-04-01 NOTE — THERAPY
Occupational Therapy  Daily Treatment     Patient Name: Jonna Brian  Age:  49 y.o., Sex:  male  Medical Record #: [id number]  Today's Date: 4/1/2025     Precautions  Precautions: Fall Risk, Swallow Precautions  Comments: difficulty managing secretions    Assessment    Pt currently limited by decreased functional mobility, activity tolerance, cognition, sensation, strength, AROM, coordination, balance, and pain which are affecting pt's ability to complete ADLs/IADLs at baseline. Pt would benefit from OT services in the acute care setting to maximize functional recovery.      Plan    Treatment Plan Status: Continue Current Treatment Plan  Type of Treatment: Self Care / Activities of Daily Living, Adaptive Equipment, Cognitive Skill Development, Neuro Re-Education / Balance, Therapeutic Exercises, Therapeutic Activity, Family / Caregiver Training  Treatment Frequency: 4 Times per Week  Treatment Duration: Until Therapy Goals Met    DC Equipment Recommendations: Unable to determine at this time  Discharge Recommendations: Recommend post-acute placement for additional occupational therapy services prior to discharge home       03/31/25 0939   Strength Upper Body   Upper Body Strength  X   Comments no UE movement noted   Balance   Sitting Balance (Static) Dependent   Sitting Balance (Dynamic) Dependent   Weight Shift Sitting Absent   Activities of Daily Living   Grooming Total Assist   Upper Body Dressing Total Assist   Lower Body Dressing Total Assist   Toileting Total Assist   Functional Mobility   Sit to Stand Unable to Participate   Bed, Chair, Wheelchair Transfer Unable to Participate   Short Term Goals   Short Term Goal # 1 unsupported seated g/h with SPV   Goal Outcome # 1 Progressing slower than expected   Short Term Goal # 2 UB dressing with min A using riddhi-technique   Goal Outcome # 2 Progressing slower than expected   Short Term Goal # 3 BSC txf with mod A   Goal Outcome # 3 Progressing slower than expected    Short Term Goal # 4 sitting EOB unsupported for >2min in prep for LB dressing   Goal Outcome # 4 Progressing slower than expected   Anticipated Discharge Equipment and Recommendations   Discharge Recommendations Recommend post-acute placement for additional occupational therapy services prior to discharge home

## 2025-04-01 NOTE — PROGRESS NOTES
..Gastroenterology Progress Note               Author:  ZANDER Mckeon   Date & Time Created: 4/1/2025 2:50 PM       Patient ID:  Name:             Jonna Brian    YOB: 1975  Age:                 49 y.o.  male  MRN:               [id number]    Medical Decision Making, by Problem:  Active Hospital Problems    Diagnosis     Acute ischemic stroke (HCC) [I63.9]     Acute neuromuscular respiratory failure (HCC) [J96.00, G70.9]     Type 2 diabetes mellitus with hyperglycemia, without long-term current use of insulin (HCC) [E11.65]     Primary hypertension [I10]      Presenting Chief Complaint:  Pontine stroke, dysphagia     HISTORY OF PRESENT ILLNESS:  Jonna Brian is a 49 y.o. male with hypertension, DM2 who presented 3/25 for dizziness, weakness and speech difficulty. He was evaluated by neurology and had a stroke alert and workup performed and no large vessel occlusion was seen and metabolic workup recommended. He since has had progressive right side weakness, bulbar symptoms and difficulty with speech. Neurology evaluated him and recommend ICU level care due to concerns for brainstem stroke. Shortly after arrival in the ICU patient developed stridor and required emergent intubation. MRI revealed pontine/medullary infarct    Interval History:  3/30/2025: Patient seen. Hemodynamically stable. Labs unremarkable. Able to blink once for yes and twice for no. Discussed planned PEG placement tomorrow and he blinks once to consent to procedure. Possible trach placement today.     4/1/2025: Postprocedure day #1 s/p PEG tube placement.  Patient seen at bedside with friend, Queta.  Patient awake, alert.  Blinks to communicate yes/no answers.  PEG tube in place.  Tube feeds in process without difficulty.  Site assessed.  Small amount of dried blood surrounding T-fastener sites.  No active bleeding, oozing, erythema, edema, drainage.    Hospital Medications:  Current Facility-Administered  Medications   Medication Dose Frequency Provider Last Rate Last Admin    clopidogrel (Plavix) tablet 75 mg  75 mg DAILY Bravo Lala M.D.   75 mg at 04/01/25 1007    hydrALAZINE (Apresoline) injection 20 mg  20 mg Q6HRS PRN Bravo Lala M.D.   20 mg at 04/01/25 1306    labetalol (Normodyne/Trandate) injection 10 mg  10 mg Q4HRS PRN Bravo Lala M.D.   10 mg at 04/01/25 1110    atorvastatin (Lipitor) tablet 80 mg  80 mg Q EVENING Bravo Lala M.D.   80 mg at 03/31/25 2117    famotidine (Pepcid) tablet 20 mg  20 mg Q12HRS Bravo Lala M.D.   20 mg at 04/01/25 0517    gabapentin (Neurontin) capsule 400 mg  400 mg TID Bravo Lala M.D.   400 mg at 04/01/25 1144    insulin lispro (HumaLOG,AdmeLOG) subcutaneous injection  1-6 Units Q6HRS Marion Chen        And    dextrose 50 % (D50W) injection 25 g  25 g Q15 MIN PRN Marion Chen        Respiratory Therapy Consult   Continuous RT Maddy Torres M.D.        senna-docusate (Pericolace Or Senokot S) 8.6-50 MG per tablet 2 Tablet  2 Tablet BID Maddy Torres M.D.   2 Tablet at 04/01/25 0517    And    polyethylene glycol/lytes (Miralax) Packet 1 Packet  1 Packet QDAY PRN Maddy Torres M.D.   1 Packet at 03/28/25 1712    And    magnesium hydroxide (Milk Of Magnesia) suspension 30 mL  30 mL QDAY PRN Maddy Torres M.D.   30 mL at 03/29/25 1802    And    bisacodyl (Dulcolax) suppository 10 mg  10 mg QDAY PRN Maddy Torres M.D. MD Alert...ICU Electrolyte Replacement per Pharmacy   PHARMACY TO DOSE Maddy Torres M.D.        Pharmacy Consult: Enteral tube insertion - review meds/change route/product selection  1 Each PHARMACY TO DOSE Maddy Torres M.D.        acetaminophen (Tylenol) tablet 650 mg  650 mg Q6HRS PRN Maddy Torres M.D.   650 mg at 03/31/25 0308    aspirin (Asa) chewable tab 81 mg  81 mg DAILY Maddy Torres M.D.   81 mg at 04/01/25 0517    ibuprofen (Motrin) tablet 600 mg  600 mg Q6HRS PRN Maddy Torres M.D.        melatonin  "tablet 5 mg  5 mg Nightly Maddy Torres M.D.   5 mg at 03/31/25 2118    amLODIPine (Norvasc) tablet 10 mg  10 mg DAILY Grant Gabriel M.D.   10 mg at 04/01/25 0517    promethazine (Phenergan) tablet 12.5-25 mg  12.5-25 mg Q4HRS PRN Maddy Torres M.D.        ondansetron (Zofran ODT) dispertab 4 mg  4 mg Q4HRS PRN Maddy Torres M.D.        fentaNYL (Sublimaze) injection 50 mcg  50 mcg Q15 MIN PRN Maddy Torres M.D.   50 mcg at 04/01/25 1208    And    fentaNYL (Sublimaze) injection 100 mcg  100 mcg Q15 MIN PRN Maddy Torres M.D.   100 mcg at 03/30/25 1415    enoxaparin (Lovenox) inj 40 mg  40 mg DAILY AT 1800 Bravo Lala M.D.   40 mg at 03/31/25 1800    ondansetron (Zofran) syringe/vial injection 4 mg  4 mg Q4HRS PRN Darrel Polo M.D.        promethazine (Phenergan) suppository 12.5-25 mg  12.5-25 mg Q4HRS PRN Darrel Polo M.D.        prochlorperazine (Compazine) injection 5-10 mg  5-10 mg Q4HRS PRN Darrel Polo M.D.   10 mg at 03/26/25 0550   Last reviewed on 3/25/2025  7:26 PM by Sarahi Ramirez       Review of Systems:  Review of Systems   Unable to perform ROS: Medical condition         Vital signs:  Weight/BMI: Body mass index is 29.09 kg/m².  /63   Pulse 83   Temp 36.5 °C (97.7 °F) (Temporal)   Resp (!) 40   Ht 1.854 m (6' 0.99\")   Wt 100 kg (220 lb 7.4 oz)   SpO2 92%   Vitals:    04/01/25 1208 04/01/25 1300 04/01/25 1400 04/01/25 1417   BP:  (!) 152/76 138/63    Pulse:  91 85 83   Resp: 18 (!) 35 (!) 0 (!) 40   Temp:       TempSrc:       SpO2:  96% 97% 92%   Weight:       Height:         Oxygen Therapy:  Pulse Oximetry: 92 %, FiO2%: 30 %, O2 Delivery Device: T-Piece    Intake/Output Summary (Last 24 hours) at 4/1/2025 1450  Last data filed at 4/1/2025 1400  Gross per 24 hour   Intake 125 ml   Output 1200 ml   Net -1075 ml       Physical Exam  Vitals and nursing note reviewed.   Constitutional:       General: He is awake. He is not in acute distress.  HENT: "      Head: Normocephalic and atraumatic.      Right Ear: External ear normal.      Left Ear: External ear normal.      Nose:      Comments: NGT     Mouth/Throat:      Mouth: Mucous membranes are moist.      Pharynx: Oropharynx is clear. No oropharyngeal exudate.      Comments: ET tube  Eyes:      General: No scleral icterus.     Conjunctiva/sclera: Conjunctivae normal.   Cardiovascular:      Rate and Rhythm: Normal rate and regular rhythm.      Pulses: Normal pulses.      Heart sounds: Normal heart sounds.   Pulmonary:      Effort: Pulmonary effort is normal.      Comments: Intubated with ventilated breath sounds  Abdominal:      General: Abdomen is flat. Bowel sounds are normal. There is no distension.      Palpations: Abdomen is soft.      Tenderness: There is no abdominal tenderness.      Comments: PEG tube to left upper quadrant.  T-fastener x 3 in place.  Small amount of dried blood at insertion sites but otherwise no surrounding erythema, edema, drainage, active bleeding.   Musculoskeletal:      Right lower leg: No edema.      Left lower leg: No edema.   Skin:     General: Skin is warm and dry.      Capillary Refill: Capillary refill takes less than 2 seconds.   Neurological:      Mental Status: He is alert.      Comments: No purposeful movement  Able to blink yes or no to questions         Labs:  Recent Labs     03/30/25  0500 03/31/25 0418 04/01/25  0330   SODIUM 138 137 137   POTASSIUM 4.0 4.3 4.3   CHLORIDE 105 106 106   CO2 23 20 21   BUN 21 19 17   CREATININE 0.82 0.84 0.83   MAGNESIUM 2.3  --   --    PHOSPHORUS 3.5  --   --    CALCIUM 9.0 8.9 9.0     Recent Labs     03/30/25  0500 03/31/25 0418 03/31/25  1840 04/01/25  0330   ALTSGPT 44 68*  --  66*   ASTSGOT 38 64*  --  35   ALKPHOSPHAT 70 89  --  99   TBILIRUBIN 0.2 0.3  --  0.6   PREALBUMIN  --   --  18.6  --    GLUCOSE 122* 126*  --  119*     Recent Labs     03/30/25  0500 03/31/25  0418 04/01/25  0330   WBC 8.2 9.5 8.9   NEUTSPOLYS 73.70* 79.10*  78.60*   LYMPHOCYTES 13.40* 11.20* 10.00*   MONOCYTES 11.70 8.80 10.50   EOSINOPHILS 0.60 0.40 0.40   BASOPHILS 0.40 0.20 0.20   ASTSGOT 38 64* 35   ALTSGPT 44 68* 66*   ALKPHOSPHAT 70 89 99   TBILIRUBIN 0.2 0.3 0.6     Recent Labs     03/30/25  0500 03/31/25  0418 04/01/25  0330   RBC 4.45* 4.33* 4.30*   HEMOGLOBIN 12.1* 11.9* 11.6*   HEMATOCRIT 37.7* 36.8* 36.1*   PLATELETCT 229 239 271     Recent Results (from the past 24 hours)   POCT glucose device results    Collection Time: 03/31/25  6:11 PM   Result Value Ref Range    POC Glucose, Blood 105 (H) 65 - 99 mg/dL   CRP Quantitive (Non-Cardiac)    Collection Time: 03/31/25  6:40 PM   Result Value Ref Range    Stat C-Reactive Protein 11.90 (H) 0.00 - 0.75 mg/dL   Prealbumin    Collection Time: 03/31/25  6:40 PM   Result Value Ref Range    Pre-Albumin 18.6 18.0 - 38.0 mg/dL   POCT glucose device results    Collection Time: 04/01/25  1:18 AM   Result Value Ref Range    POC Glucose, Blood 109 (H) 65 - 99 mg/dL   CBC with Differential    Collection Time: 04/01/25  3:30 AM   Result Value Ref Range    WBC 8.9 4.8 - 10.8 K/uL    RBC 4.30 (L) 4.70 - 6.10 M/uL    Hemoglobin 11.6 (L) 14.0 - 18.0 g/dL    Hematocrit 36.1 (L) 42.0 - 52.0 %    MCV 84.0 81.4 - 97.8 fL    MCH 27.0 27.0 - 33.0 pg    MCHC 32.1 (L) 32.3 - 36.5 g/dL    RDW 40.7 35.9 - 50.0 fL    Platelet Count 271 164 - 446 K/uL    MPV 8.9 (L) 9.0 - 12.9 fL    Neutrophils-Polys 78.60 (H) 44.00 - 72.00 %    Lymphocytes 10.00 (L) 22.00 - 41.00 %    Monocytes 10.50 0.00 - 13.40 %    Eosinophils 0.40 0.00 - 6.90 %    Basophils 0.20 0.00 - 1.80 %    Immature Granulocytes 0.30 0.00 - 0.90 %    Nucleated RBC 0.00 0.00 - 0.20 /100 WBC    Neutrophils (Absolute) 6.99 1.82 - 7.42 K/uL    Lymphs (Absolute) 0.89 (L) 1.00 - 4.80 K/uL    Monos (Absolute) 0.94 (H) 0.00 - 0.85 K/uL    Eos (Absolute) 0.04 0.00 - 0.51 K/uL    Baso (Absolute) 0.02 0.00 - 0.12 K/uL    Immature Granulocytes (abs) 0.03 0.00 - 0.11 K/uL    NRBC (Absolute)  0.00 K/uL   Comp Metabolic Panel    Collection Time: 04/01/25  3:30 AM   Result Value Ref Range    Sodium 137 135 - 145 mmol/L    Potassium 4.3 3.6 - 5.5 mmol/L    Chloride 106 96 - 112 mmol/L    Co2 21 20 - 33 mmol/L    Anion Gap 10.0 7.0 - 16.0    Glucose 119 (H) 65 - 99 mg/dL    Bun 17 8 - 22 mg/dL    Creatinine 0.83 0.50 - 1.40 mg/dL    Calcium 9.0 8.5 - 10.5 mg/dL    Correct Calcium 9.6 8.5 - 10.5 mg/dL    AST(SGOT) 35 12 - 45 U/L    ALT(SGPT) 66 (H) 2 - 50 U/L    Alkaline Phosphatase 99 30 - 99 U/L    Total Bilirubin 0.6 0.1 - 1.5 mg/dL    Albumin 3.3 3.2 - 4.9 g/dL    Total Protein 6.7 6.0 - 8.2 g/dL    Globulin 3.4 1.9 - 3.5 g/dL    A-G Ratio 1.0 g/dL   ESTIMATED GFR    Collection Time: 04/01/25  3:30 AM   Result Value Ref Range    GFR (CKD-EPI) 108 >60 mL/min/1.73 m 2   POCT glucose device results    Collection Time: 04/01/25  5:31 AM   Result Value Ref Range    POC Glucose, Blood 104 (H) 65 - 99 mg/dL   POCT glucose device results    Collection Time: 04/01/25 11:50 AM   Result Value Ref Range    POC Glucose, Blood 128 (H) 65 - 99 mg/dL       Radiology Review:  DX-CHEST-PORTABLE (1 VIEW)   Final Result      No evidence of acute cardiopulmonary process.      DX-ABDOMEN FOR TUBE PLACEMENT   Final Result      1.  Enteric tube extends in the fundus of stomach.      DX-ABDOMEN FOR TUBE PLACEMENT   Final Result      The gastric tube has been removed and replaced with a small bowel feeding tube which terminate in the proximal stomach.      DX-CHEST-PORTABLE (1 VIEW)   Final Result      Atelectasis within the left lung base.      MR-THORACIC SPINE-WITH & W/O   Final Result      1.  There is no abnormal intramedullary T2 signal intensity in the thoracic spinal cord.   2.  Mild degenerative disease at T8-9.   3.  Left lower segmental consolidation.      MR-CERVICAL SPINE-WITH & W/O   Final Result      1.  Small areas of acute infarcts in the lower jero and upper medulla involving both sides of the midline. There are  chronic infarcts in the jero and right inferior cerebellum.   2.  There is no abnormal intramedullary T2 signal intensity in the cervical spinal cord to suggest demyelinating lesions. There is no MR evidence of compressive myelopathy.   3.  Mild degenerative disease.            MR-BRAIN-WITH & W/O   Final Result      1.  Small areas of acute infarcts in the lower jero and upper medulla involving both sides of the midline.   2.  There are chronic infarcts in the jero and right inferior cerebellum. . There are areas of chronic lacunae in the bilateral basal ganglia, right thalamus and right periventricular white matter.   3.  There are nonspecific T2 hyperintensities in the periventricular white matter likely representing chronic small vessel disease.   4.  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      MR-LUMBAR SPINE-WITH & W/O   Final Result      1.  Unremarkable pre and postcontrast MR examination of the lumbar spine.   2.  Clinical suspicious for GBS: There is no abnormal enhancement of the lumbar nerve roots.      DX-ABDOMEN FOR TUBE PLACEMENT   Final Result      NG tube tip projects at the peripyloric region.      DX-CHEST-PORTABLE (1 VIEW)   Final Result      1.  Low lung volumes without definite acute cardiopulmonary abnormality.   2.  Support apparatus as above.      CT-HEAD W/O   Final Result      1.  No acute intracranial abnormality.   2.  Remote right cerebellar infarct.               DX-CHEST-PORTABLE (1 VIEW)   Final Result      No acute cardiopulmonary disease evident.      DX-CHEST-PORTABLE (1 VIEW)   Final Result      No acute cardiopulmonary disease evident.      CT-CEREBRAL PERFUSION ANALYSIS   Final Result      1. Cerebral blood flow less than 30% possibly representing completed infarct = 0 mL. Based on distribution of this finding, this is unlikely to represent artifact.      2. T Max more than 6 seconds possibly representing  "combination of completed infarct and ischemia = 7 mL. Based on the distribution of this finding, this is possibly artifact.      3. Mismatched volume possibly representing ischemic brain/penumbra= 0 mL      4.  Please note that this cerebral perfusion study and report is Quantitative and targets supratentorial (cerebral) perfusion for evaluation of large vessel territory acute ischemia/infarction. For example, lacunar infarcts, and brainstem/posterior fossa    ischemia/infarction are not evaluated on this study.  Data acquisition is subject to artifacts which can yield non-anatomically plausible perfusion maps which may be due to motion, bolus timing, signal to noise ratio, or other technical factors.    Perfusion map abnormalities which show non-anatomic distributions are likely artifact.   This study is not \"stand-alone\" and should only be utilized for diagnosis, management/treatment in correlation with CT, CTA, and/or MRI and clinical factors.         CT-CTA NECK WITH & W/O-POST PROCESSING   Final Result      CT angiogram of the neck within normal limits.      CT-CTA HEAD WITH & W/O-POST PROCESS   Final Result      CT angiogram of the [place] within normal limits.      CT-HEAD W/O   Final Result      Head CT without contrast within normal limits. No evidence of acute cerebral infarction, hemorrhage or mass lesion.                 MDM (Data Review):   -Records reviewed and summarized in current documentation  -I personally reviewed and interpreted the laboratory results  -I personally reviewed the radiology images    Assessment/Recommendations:  Pontine Stroke  Oropharyngeal dysphagia  Fever - yesterday, now afebrile  DM 2  Neuromuscular respiratory failure - pending possible tracheostomy    Recommendations:  Please ensure proper PEG care and tube feeding:  - keep head of bed elevated to at least 30 degrees during tube feeding. The largest factor in preventing aspiration during tube feeding is not the " placement of the tube into the stomach or jejunum, but keeping the head elevated to at least 30 degrees during feeding  -Okay to use PEG tube  - PEG needs 0.5-1cm of laxity freely mobile in and out of the gastrostomy tract. Overtightening may impair tract maturation by inducing localized ischemia. Over tightening may also result in buried bumper syndrome.  -  The 3 buttons underneath the PEG tube's external bumper will spontaneously release after sutures dissolve.  Median time 4-8 weeks.       No further interventions from acute GI service. GI to sign off. Please reconsult for any further questions or concerns.    Discussed with patient, friend at bedside, nursing, Dr. Montoya.      ..Karen Johnson, A.P.R.N.    Core Quality Measures   Reviewed items::  Labs, Medications and Radiology reports reviewed

## 2025-04-01 NOTE — PROGRESS NOTES
"Critical Care Progress Note    Date of admission  3/25/2025    Chief Complaint  49 y.o. male who presented 3/25/2025 with Hx of HTN, anxiety, DM2, htn that presented 3/25 for dizziness, weakness and speech difficulty. He was evaluated by neuro vascular and had a stroke alert and workup preformed and no LVO was seen and recommend metabolic workup. He since has had progressive right side weakness, bulbar symptoms and difficulty with speech. I saw him earlier for concerns of GBS and elevated protein in his CNS fluid for concerns but his neuro exam didn't fit and recommend repeat CT head which did not show any acute changes. Neurology and Neurovascular evaluated him today and recommend ICU level care and concerns for brainstem stroke.      Hospital Course  3/27-Shortly after arrival in the ICU patient developed stridor and required emergent intubation.    3/28- Blinking once for \"yes\", twice for 'no\" appropriately.    3/29-no clinical change.  Consent given for tracheostomy.    3/30 - percutaneous tracheostomy    3/31 - [facility] referral sent, PEG planned for today, family updated at bedside.  Will try to T-Piece today    4/1 - We will T-Piece as long as possible today, he does not need to go back on vent.  Medically cleared for GAUIR    Interval Problem Update  Tmax: Afebrile  Diet: Tube feeds  Vasopressors: None    Infusions: None    Antibx: None    Intake / Output  Urine Output past 24 hours: 1500 mL    Lab Trends  CBC WNL    CMP WNL    Review of Systems  Review of Systems   Unable to perform ROS: Critical illness        Vital Signs for last 24 hours   Pulse:  [] 81  Resp:  [0-28] 17  BP: (118-159)/(60-95) 130/63  SpO2:  [96 %-100 %] 97 %    Hemodynamic parameters for last 24 hours       Respiratory Information for the last 24 hours  Vent Mode: APVCMV  Rate (breaths/min): 20  Vt Target (mL): 480  PEEP/CPAP: 8  MAP: 14  Control VTE (exp VT): 532    Physical Exam  Constitutional:       Appearance: He is ill-appearing. " "  HENT:      Head: Normocephalic and atraumatic.      Mouth/Throat:      Mouth: Mucous membranes are moist.   Eyes:      Pupils: Pupils are equal, round, and reactive to light.   Cardiovascular:      Rate and Rhythm: Normal rate and regular rhythm.      Heart sounds: No murmur heard.     No friction rub. No gallop.   Pulmonary:      Effort: No respiratory distress.      Breath sounds: No wheezing or rales.      Comments: Trach in place  Abdominal:      General: There is no distension.      Palpations: Abdomen is soft.      Comments: PEG in place   Musculoskeletal:      Cervical back: Neck supple.      Right lower leg: No edema.      Left lower leg: No edema.   Skin:     General: Skin is warm and dry.      Capillary Refill: Capillary refill takes less than 2 seconds.   Neurological:      Sensory: Sensory deficit present.      Comments:   Eyes open spontaneously, he blinks once for \"yes\", twice for 'no\" appropriately.  Sensation to light touch is absent    Essentially locked in         Medications  Current Facility-Administered Medications   Medication Dose Route Frequency Provider Last Rate Last Admin    clopidogrel (Plavix) tablet 75 mg  75 mg Enteral Tube DAILY Bravo Lala M.D.   75 mg at 04/01/25 1007    hydrALAZINE (Apresoline) injection 20 mg  20 mg Intravenous Q6HRS PRN Bravo Lala M.D.        labetalol (Normodyne/Trandate) injection 10 mg  10 mg Intravenous Q4HRS PRN Bravo Lala M.D.   10 mg at 04/01/25 0615    atorvastatin (Lipitor) tablet 80 mg  80 mg Enteral Tube Q EVENING Bravo Lala M.D.   80 mg at 03/31/25 2117    famotidine (Pepcid) tablet 20 mg  20 mg Enteral Tube Q12HRS Bravo Lala M.D.   20 mg at 04/01/25 0517    gabapentin (Neurontin) capsule 400 mg  400 mg Enteral Tube TID Bravo Lala M.D.   400 mg at 04/01/25 0517    insulin lispro (HumaLOG,AdmeLOG) subcutaneous injection  1-6 Units Subcutaneous Q6HRS Marion Chen        And    dextrose 50 % (D50W) injection 25 g  25 g " Intravenous Q15 MIN PRN Marion Chen        Respiratory Therapy Consult   Nebulization Continuous RT Maddy Torres M.D.        senna-docusate (Pericolace Or Senokot S) 8.6-50 MG per tablet 2 Tablet  2 Tablet Enteral Tube BID Maddy Torres M.D.   2 Tablet at 04/01/25 0517    And    polyethylene glycol/lytes (Miralax) Packet 1 Packet  1 Packet Enteral Tube QDAY PRN Maddy Torres M.D.   1 Packet at 03/28/25 1712    And    magnesium hydroxide (Milk Of Magnesia) suspension 30 mL  30 mL Enteral Tube QDAY PRN Maddy Torres M.D.   30 mL at 03/29/25 1802    And    bisacodyl (Dulcolax) suppository 10 mg  10 mg Rectal QDAY PRN Maddy Torres M.D. MD Alert...ICU Electrolyte Replacement per Pharmacy   Other PHARMACY TO DOSE Maddy Torres M.D.        Pharmacy Consult: Enteral tube insertion - review meds/change route/product selection  1 Each Other PHARMACY TO DOSE Maddy Torres M.D.        acetaminophen (Tylenol) tablet 650 mg  650 mg Enteral Tube Q6HRS PRN Maddy Torres M.D.   650 mg at 03/31/25 0308    aspirin (Asa) chewable tab 81 mg  81 mg Enteral Tube DAILY Maddy Torres M.D.   81 mg at 04/01/25 0517    ibuprofen (Motrin) tablet 600 mg  600 mg Enteral Tube Q6HRS PRN Maddy Torres M.D.        melatonin tablet 5 mg  5 mg Enteral Tube Nightly Maddy Torres M.D.   5 mg at 03/31/25 2118    amLODIPine (Norvasc) tablet 10 mg  10 mg Enteral Tube DAILY Grant Gabriel M.D.   10 mg at 04/01/25 0517    promethazine (Phenergan) tablet 12.5-25 mg  12.5-25 mg Enteral Tube Q4HRS PRN Maddy Torres M.D.        ondansetron (Zofran ODT) dispertab 4 mg  4 mg Enteral Tube Q4HRS PRN Maddy Torres M.D.        fentaNYL (Sublimaze) injection 50 mcg  50 mcg Intravenous Q15 MIN PRN Maddy Torres M.D.   50 mcg at 03/30/25 0813    And    fentaNYL (Sublimaze) injection 100 mcg  100 mcg Intravenous Q15 MIN PRN Maddy Torres M.D.   100 mcg at 03/30/25 1415    enoxaparin (Lovenox) inj 40 mg  40 mg Subcutaneous DAILY  "AT 1800 Bravo Lala M.D.   40 mg at 03/31/25 1800    ondansetron (Zofran) syringe/vial injection 4 mg  4 mg Intravenous Q4HRS PRN Darrel Polo M.D.        promethazine (Phenergan) suppository 12.5-25 mg  12.5-25 mg Rectal Q4HRS PRN Darrel Polo M.D.        prochlorperazine (Compazine) injection 5-10 mg  5-10 mg Intravenous Q4HRS PRN Darrel Polo M.D.   10 mg at 03/26/25 0550       Fluids    Intake/Output Summary (Last 24 hours) at 4/1/2025 1021  Last data filed at 4/1/2025 0900  Gross per 24 hour   Intake 100 ml   Output 1525 ml   Net -1425 ml       Laboratory            Recent Labs     03/30/25  0500 03/31/25  0418 04/01/25  0330   SODIUM 138 137 137   POTASSIUM 4.0 4.3 4.3   CHLORIDE 105 106 106   CO2 23 20 21   BUN 21 19 17   CREATININE 0.82 0.84 0.83   MAGNESIUM 2.3  --   --    PHOSPHORUS 3.5  --   --    CALCIUM 9.0 8.9 9.0     Recent Labs     03/30/25  0500 03/31/25  0418 03/31/25  1840 04/01/25  0330   ALTSGPT 44 68*  --  66*   ASTSGOT 38 64*  --  35   ALKPHOSPHAT 70 89  --  99   TBILIRUBIN 0.2 0.3  --  0.6   PREALBUMIN  --   --  18.6  --    GLUCOSE 122* 126*  --  119*     Recent Labs     03/30/25  0500 03/31/25  0418 04/01/25  0330   WBC 8.2 9.5 8.9   NEUTSPOLYS 73.70* 79.10* 78.60*   LYMPHOCYTES 13.40* 11.20* 10.00*   MONOCYTES 11.70 8.80 10.50   EOSINOPHILS 0.60 0.40 0.40   BASOPHILS 0.40 0.20 0.20   ASTSGOT 38 64* 35   ALTSGPT 44 68* 66*   ALKPHOSPHAT 70 89 99   TBILIRUBIN 0.2 0.3 0.6     Recent Labs     03/30/25  0500 03/31/25  0418 04/01/25  0330   RBC 4.45* 4.33* 4.30*   HEMOGLOBIN 12.1* 11.9* 11.6*   HEMATOCRIT 37.7* 36.8* 36.1*   PLATELETCT 229 239 271       Imaging  No imaging this morning      Assessment/Plan  * Acute ischemic stroke (HCC)- (present on admission)  Assessment & Plan  Acute infarcts in the lower jero and upper medulla c/w presentation.  Chronic infarcts I the jero and right inferior cerebellum which explain right riddhi deficits  He is functionally \"locked in\" " at this time  ASA 81 and Plavix 75 daily  Statin therapy for secondary prevention  Anticipate long term acute care  Trach placed 3/30, PEG planned 3/31    PMR following  [facility] referral sent    He is medically cleared for [facility]    Acute neuromuscular respiratory failure (HCC)  Assessment & Plan  GI prophylaxis: h2 blocker  Monitor ventilator waveforms & blood gases, titrate flow/peep and volumes according.   Daily SAT/SBT  All ventilator bundles are in place     Wean to T-Piece as able, Passy lisandra as able    Type 2 diabetes mellitus with hyperglycemia, without long-term current use of insulin (HCC)- (present on admission)  Assessment & Plan  Hg A1 C 6.2  Moderate glycemic control with Insulin therapy.    Primary hypertension- (present on admission)  Assessment & Plan  Goal at this time -140   Amlodipine         VTE:  Lovenox  Ulcer: H2 Antagonist  Lines: None    I have performed a physical exam and reviewed and updated ROS and Plan today (4/1/2025). In review of yesterday's note (3/31/2025), there are no changes except as documented above.     Discussed patient condition and risk of morbidity and/or mortality with RN, RT, Pharmacy, Patient, and neurology and physiatry    The patient remains critically ill.  He is on mechanical ventilation.  Critical care time = 39 minutes in directly providing and coordinating critical care and extensive data review.  No time overlap and excludes procedures.

## 2025-04-01 NOTE — THERAPY
Physical Therapy   Daily Treatment     Patient Name: Jonna Brian  Age:  49 y.o., Sex:  male  Medical Record #: [id number]  Today's Date: 4/1/2025     Precautions  Precautions: Fall Risk;Swallow Precautions    Assessment    Pt able to wiggle L toes today, able to do min knee extension when L LE unweighted while at EOB. Worked on weight shifting, rocking, lateral on elbow lean, anterior weight shift to accept weight through Lower extremities while sitting at EOB. Pt required frequent suctioning at mouth due to secretions. Will continue to follow.     Plan    Treatment Plan Status: Continue Current Treatment Plan  Type of Treatment: Bed Mobility, Family / Caregiver Training, Gait Training, Neuro Re-Education / Balance, Self Care / Home Evaluation, Stair Training, Therapeutic Activities, Therapeutic Exercise  Treatment Frequency: 5 Times per Week  Treatment Duration: Until Therapy Goals Met    DC Equipment Recommendations: Unable to determine at this time  Discharge Recommendations: Recommend post-acute placement for additional physical therapy services prior to discharge home      Subjective    Pt blinked yes to therapy.      Objective       04/01/25 1139   Time In/Time Out   Therapy Start Time 1107   Therapy End Time 1139   Total Therapy Time 32   Precautions   Precautions Fall Risk;Swallow Precautions   Vitals   O2 Delivery Device T-Piece   Vitals Comments RN provided pt with BP meds during session   Cognition    Cognition / Consciousness X   Speech/ Communication   (blinks once for yes, 2 for no, able to smile and nod yes while in bed)   Level of Consciousness Alert   Initiation Impaired   Strength Lower Body   Comments L LE: pt able to wiggle toes, 2-/5 knee extension   Neuro-Muscular Treatments   Neuro-Muscular Treatments Anterior weight shift;Facilitation;Joint Approximation;Postural Facilitation;Tapping;Tactile Cuing;Sequencing;Verbal Cuing;Weight Shift Right;Weight Shift Left   Comments sitting: lateral weight  shift onto elbows, anterior weight shift to bear weight through LEs. assistance with head position and maintaining neck extension   Neurological Concerns   Neurological Concerns Yes   Equilibrium Reaction Impaired   Comments no righting reactions with sitting   Balance   Sitting Balance (Static) Dependent   Sitting Balance (Dynamic) Dependent   Weight Shift Sitting Absent   Weight Shift Standing Absent   Skilled Intervention Facilitation;Tactile Cuing;Verbal Cuing   Bed Mobility    Supine to Sit Total Assist   Sit to Supine Total Assist   Scooting Total Assist   Rolling Total Assist to Rt.;Total Assist to Lt.   Skilled Intervention Facilitation;Tactile Cuing;Verbal Cuing   Comments 2 person assistance   Gait Analysis   Gait Level Of Assist Unable to Participate   Functional Mobility   Sit to Stand Unable to Participate   6 Clicks Assessment - How much HELP from from another person do you currently need... (If the patient hasn't done an activity recently, how much help from another person do you think he/she would need if he/she tried?)   Turning from your back to your side while in a flat bed without using bedrails? 1   Moving from lying on your back to sitting on the side of a flat bed without using bedrails? 1   Moving to and from a bed to a chair (including a wheelchair)? 1   Standing up from a chair using your arms (e.g., wheelchair, or bedside chair)? 1   Walking in hospital room? 1   Climbing 3-5 steps with a railing? 1   6 clicks Mobility Score 6   Activity Tolerance   Sitting Edge of Bed 17 minutes   Comments limited by fatigue   Short Term Goals    Short Term Goal # 1 pt will move supine<>eob with min a in 6 tx for bed mobility.   Goal Outcome # 1 goal not met   Short Term Goal # 2 pt will sit at eob for 5 min with fair- balance in 6 tx for oob tolerance.   Goal Outcome # 2 Goal not met   Short Term Goal # 3 pt will complete sts with hemiwalker and min a in 6 tx for functional mobility.   Goal Outcome # 3  Goal not met   Education Group   Education Provided Role of Physical Therapist;Cerebral Vascular Accident   Role of Physical Therapist Patient Response Patient;Explanation;Acceptance   CVA Patient Response Patient;Acceptance;Explanation   Physical Therapy Treatment Plan   Physical Therapy Treatment Plan Continue Current Treatment Plan   Treatment Plan  Bed Mobility;Family / Caregiver Training;Gait Training;Neuro Re-Education / Balance;Self Care / Home Evaluation;Stair Training;Therapeutic Activities;Therapeutic Exercise   Treatment Frequency 5 Times per Week   Duration Until Therapy Goals Met   Anticipated Discharge Equipment and Recommendations   DC Equipment Recommendations Unable to determine at this time   Discharge Recommendations Recommend post-acute placement for additional physical therapy services prior to discharge home   Interdisciplinary Plan of Care Collaboration   IDT Collaboration with  Nursing;Occupational Therapist   Patient Position at End of Therapy In Bed   Collaboration Comments Rn updated

## 2025-04-01 NOTE — CARE PLAN
Problem: Ventilation  Goal: Ability to achieve and maintain unassisted ventilation or tolerate decreased levels of ventilator support  Description: Target End Date:  4 days Document on Vent flowsheet1.  Support and monitor invasive and noninvasive mechanical ventilation2.  Monitor ventilator weaning response3.  Perform ventilator associated pneumonia prevention interventions4.  Manage ventilation therapy by monitoring diagnostic test results  Outcome: Progressing     Ventilator Daily Summary    Vent Day #6  Airway: 8.0 Portex, trach day 3    Ventilator settings: 20/480/+8/30%  Weaning trials:   Respiratory Procedures:     Plan: Continue current ventilator settings and wean mechanical ventilation as tolerated per physician orders.

## 2025-04-01 NOTE — CARE PLAN
The patient is Watcher - Medium risk of patient condition declining or worsening    Shift Goals  Clinical Goals: Q4 neuro, -130, PEG tube placement  Patient Goals: JAMARI  Family Goals: JAMARI- no family present    Progress made toward(s) clinical / shift goals:    Problem: Knowledge Deficit - Standard  Goal: Patient and family/care givers will demonstrate understanding of plan of care, disease process/condition, diagnostic tests and medications  Outcome: Progressing     Problem: Pain - Standard  Goal: Alleviation of pain or a reduction in pain to the patient’s comfort goal  Outcome: Progressing     Problem: Neuro Status  Goal: Neuro status will remain stable or improve  Outcome: Progressing     Problem: Hemodynamic Monitoring  Goal: Patient's hemodynamics, fluid balance and neurologic status will be stable or improve  Outcome: Progressing       Patient is not progressing towards the following goals:

## 2025-04-02 ENCOUNTER — APPOINTMENT (OUTPATIENT)
Dept: RADIOLOGY | Facility: MEDICAL CENTER | Age: 50
DRG: 004 | End: 2025-04-02
Attending: STUDENT IN AN ORGANIZED HEALTH CARE EDUCATION/TRAINING PROGRAM
Payer: COMMERCIAL

## 2025-04-02 ENCOUNTER — APPOINTMENT (OUTPATIENT)
Dept: RADIOLOGY | Facility: MEDICAL CENTER | Age: 50
End: 2025-04-02
Attending: STUDENT IN AN ORGANIZED HEALTH CARE EDUCATION/TRAINING PROGRAM
Payer: COMMERCIAL

## 2025-04-02 PROBLEM — K66.8 FREE INTRAPERITONEAL AIR: Status: ACTIVE | Noted: 2025-04-02

## 2025-04-02 LAB
ALBUMIN SERPL BCP-MCNC: 3.3 G/DL (ref 3.2–4.9)
ALBUMIN/GLOB SERPL: 0.9 G/DL
ALP SERPL-CCNC: 185 U/L (ref 30–99)
ALT SERPL-CCNC: 131 U/L (ref 2–50)
ANION GAP SERPL CALC-SCNC: 13 MMOL/L (ref 7–16)
AST SERPL-CCNC: 94 U/L (ref 12–45)
BASOPHILS # BLD AUTO: 0.3 % (ref 0–1.8)
BASOPHILS # BLD: 0.03 K/UL (ref 0–0.12)
BILIRUB SERPL-MCNC: 0.3 MG/DL (ref 0.1–1.5)
BUN SERPL-MCNC: 21 MG/DL (ref 8–22)
CALCIUM ALBUM COR SERPL-MCNC: 9.9 MG/DL (ref 8.5–10.5)
CALCIUM SERPL-MCNC: 9.3 MG/DL (ref 8.5–10.5)
CHLORIDE SERPL-SCNC: 105 MMOL/L (ref 96–112)
CO2 SERPL-SCNC: 20 MMOL/L (ref 20–33)
CREAT SERPL-MCNC: 0.85 MG/DL (ref 0.5–1.4)
EOSINOPHIL # BLD AUTO: 0.02 K/UL (ref 0–0.51)
EOSINOPHIL NFR BLD: 0.2 % (ref 0–6.9)
ERYTHROCYTE [DISTWIDTH] IN BLOOD BY AUTOMATED COUNT: 41.7 FL (ref 35.9–50)
GFR SERPLBLD CREATININE-BSD FMLA CKD-EPI: 107 ML/MIN/1.73 M 2
GLOBULIN SER CALC-MCNC: 3.7 G/DL (ref 1.9–3.5)
GLUCOSE BLD STRIP.AUTO-MCNC: 124 MG/DL (ref 65–99)
GLUCOSE BLD STRIP.AUTO-MCNC: 136 MG/DL (ref 65–99)
GLUCOSE BLD STRIP.AUTO-MCNC: 148 MG/DL (ref 65–99)
GLUCOSE BLD STRIP.AUTO-MCNC: 156 MG/DL (ref 65–99)
GLUCOSE SERPL-MCNC: 156 MG/DL (ref 65–99)
HCT VFR BLD AUTO: 40 % (ref 42–52)
HGB BLD-MCNC: 13 G/DL (ref 14–18)
IMM GRANULOCYTES # BLD AUTO: 0.06 K/UL (ref 0–0.11)
IMM GRANULOCYTES NFR BLD AUTO: 0.6 % (ref 0–0.9)
LACTATE SERPL-SCNC: 0.9 MMOL/L (ref 0.5–2)
LYMPHOCYTES # BLD AUTO: 0.7 K/UL (ref 1–4.8)
LYMPHOCYTES NFR BLD: 6.9 % (ref 22–41)
MCH RBC QN AUTO: 27.7 PG (ref 27–33)
MCHC RBC AUTO-ENTMCNC: 32.5 G/DL (ref 32.3–36.5)
MCV RBC AUTO: 85.1 FL (ref 81.4–97.8)
MONOCYTES # BLD AUTO: 1 K/UL (ref 0–0.85)
MONOCYTES NFR BLD AUTO: 9.8 % (ref 0–13.4)
NEUTROPHILS # BLD AUTO: 8.35 K/UL (ref 1.82–7.42)
NEUTROPHILS NFR BLD: 82.2 % (ref 44–72)
NRBC # BLD AUTO: 0 K/UL
NRBC BLD-RTO: 0 /100 WBC (ref 0–0.2)
PLATELET # BLD AUTO: 293 K/UL (ref 164–446)
PMV BLD AUTO: 9.2 FL (ref 9–12.9)
POTASSIUM SERPL-SCNC: 4.6 MMOL/L (ref 3.6–5.5)
PROT SERPL-MCNC: 7 G/DL (ref 6–8.2)
RBC # BLD AUTO: 4.7 M/UL (ref 4.7–6.1)
SODIUM SERPL-SCNC: 138 MMOL/L (ref 135–145)
WBC # BLD AUTO: 10.2 K/UL (ref 4.8–10.8)

## 2025-04-02 PROCEDURE — 700102 HCHG RX REV CODE 250 W/ 637 OVERRIDE(OP): Performed by: INTERNAL MEDICINE

## 2025-04-02 PROCEDURE — 87077 CULTURE AEROBIC IDENTIFY: CPT

## 2025-04-02 PROCEDURE — 99231 SBSQ HOSP IP/OBS SF/LOW 25: CPT | Performed by: SURGERY

## 2025-04-02 PROCEDURE — 700111 HCHG RX REV CODE 636 W/ 250 OVERRIDE (IP): Performed by: STUDENT IN AN ORGANIZED HEALTH CARE EDUCATION/TRAINING PROGRAM

## 2025-04-02 PROCEDURE — 770022 HCHG ROOM/CARE - ICU (200)

## 2025-04-02 PROCEDURE — 85025 COMPLETE CBC W/AUTO DIFF WBC: CPT

## 2025-04-02 PROCEDURE — 700117 HCHG RX CONTRAST REV CODE 255: Performed by: STUDENT IN AN ORGANIZED HEALTH CARE EDUCATION/TRAINING PROGRAM

## 2025-04-02 PROCEDURE — 700111 HCHG RX REV CODE 636 W/ 250 OVERRIDE (IP): Mod: JZ | Performed by: STUDENT IN AN ORGANIZED HEALTH CARE EDUCATION/TRAINING PROGRAM

## 2025-04-02 PROCEDURE — 87040 BLOOD CULTURE FOR BACTERIA: CPT | Mod: 91

## 2025-04-02 PROCEDURE — 87205 SMEAR GRAM STAIN: CPT

## 2025-04-02 PROCEDURE — 82962 GLUCOSE BLOOD TEST: CPT

## 2025-04-02 PROCEDURE — 700102 HCHG RX REV CODE 250 W/ 637 OVERRIDE(OP): Performed by: NURSE PRACTITIONER

## 2025-04-02 PROCEDURE — 71260 CT THORAX DX C+: CPT

## 2025-04-02 PROCEDURE — 80053 COMPREHEN METABOLIC PANEL: CPT

## 2025-04-02 PROCEDURE — 87070 CULTURE OTHR SPECIMN AEROBIC: CPT

## 2025-04-02 PROCEDURE — 49465 FLUORO EXAM OF G/COLON TUBE: CPT

## 2025-04-02 PROCEDURE — A9270 NON-COVERED ITEM OR SERVICE: HCPCS | Performed by: INTERNAL MEDICINE

## 2025-04-02 PROCEDURE — 83605 ASSAY OF LACTIC ACID: CPT

## 2025-04-02 PROCEDURE — 99232 SBSQ HOSP IP/OBS MODERATE 35: CPT | Performed by: NURSE PRACTITIONER

## 2025-04-02 PROCEDURE — 99291 CRITICAL CARE FIRST HOUR: CPT | Performed by: STUDENT IN AN ORGANIZED HEALTH CARE EDUCATION/TRAINING PROGRAM

## 2025-04-02 PROCEDURE — 700105 HCHG RX REV CODE 258: Performed by: STUDENT IN AN ORGANIZED HEALTH CARE EDUCATION/TRAINING PROGRAM

## 2025-04-02 PROCEDURE — 700102 HCHG RX REV CODE 250 W/ 637 OVERRIDE(OP): Performed by: STUDENT IN AN ORGANIZED HEALTH CARE EDUCATION/TRAINING PROGRAM

## 2025-04-02 PROCEDURE — 71045 X-RAY EXAM CHEST 1 VIEW: CPT

## 2025-04-02 PROCEDURE — 94640 AIRWAY INHALATION TREATMENT: CPT

## 2025-04-02 PROCEDURE — 87186 SC STD MICRODIL/AGAR DIL: CPT

## 2025-04-02 PROCEDURE — A9270 NON-COVERED ITEM OR SERVICE: HCPCS | Performed by: STUDENT IN AN ORGANIZED HEALTH CARE EDUCATION/TRAINING PROGRAM

## 2025-04-02 RX ORDER — ACETAMINOPHEN 650 MG/1
650 SUPPOSITORY RECTAL EVERY 6 HOURS PRN
Status: DISCONTINUED | OUTPATIENT
Start: 2025-04-02 | End: 2025-04-27

## 2025-04-02 RX ADMIN — ACETAMINOPHEN 650 MG: 325 TABLET ORAL at 22:10

## 2025-04-02 RX ADMIN — FAMOTIDINE 20 MG: 20 TABLET, FILM COATED ORAL at 17:25

## 2025-04-02 RX ADMIN — IBUPROFEN 600 MG: 400 TABLET, FILM COATED ORAL at 23:48

## 2025-04-02 RX ADMIN — LABETALOL HYDROCHLORIDE 10 MG: 5 INJECTION, SOLUTION INTRAVENOUS at 15:19

## 2025-04-02 RX ADMIN — ASPIRIN 81 MG: 81 TABLET, CHEWABLE ORAL at 05:24

## 2025-04-02 RX ADMIN — PIPERACILLIN AND TAZOBACTAM 4.5 G: 4; .5 INJECTION, POWDER, FOR SOLUTION INTRAVENOUS at 20:31

## 2025-04-02 RX ADMIN — HYDRALAZINE HYDROCHLORIDE 20 MG: 20 INJECTION, SOLUTION INTRAMUSCULAR; INTRAVENOUS at 03:05

## 2025-04-02 RX ADMIN — PIPERACILLIN AND TAZOBACTAM 4.5 G: 4; .5 INJECTION, POWDER, FOR SOLUTION INTRAVENOUS at 09:41

## 2025-04-02 RX ADMIN — ENOXAPARIN SODIUM 40 MG: 100 INJECTION SUBCUTANEOUS at 17:24

## 2025-04-02 RX ADMIN — HYDRALAZINE HYDROCHLORIDE 20 MG: 20 INJECTION, SOLUTION INTRAMUSCULAR; INTRAVENOUS at 09:57

## 2025-04-02 RX ADMIN — ATORVASTATIN CALCIUM 80 MG: 80 TABLET, FILM COATED ORAL at 17:25

## 2025-04-02 RX ADMIN — FAMOTIDINE 20 MG: 20 TABLET, FILM COATED ORAL at 05:24

## 2025-04-02 RX ADMIN — LABETALOL HYDROCHLORIDE 10 MG: 5 INJECTION, SOLUTION INTRAVENOUS at 09:36

## 2025-04-02 RX ADMIN — PIPERACILLIN AND TAZOBACTAM 4.5 G: 4; .5 INJECTION, POWDER, FOR SOLUTION INTRAVENOUS at 12:02

## 2025-04-02 RX ADMIN — IOHEXOL 95 ML: 350 INJECTION, SOLUTION INTRAVENOUS at 09:15

## 2025-04-02 RX ADMIN — INSULIN LISPRO 1 UNITS: 100 INJECTION, SOLUTION INTRAVENOUS; SUBCUTANEOUS at 12:43

## 2025-04-02 RX ADMIN — ACETAMINOPHEN 650 MG: 650 SUPPOSITORY RECTAL at 10:02

## 2025-04-02 RX ADMIN — ACETAMINOPHEN 650 MG: 325 TABLET ORAL at 17:25

## 2025-04-02 RX ADMIN — GABAPENTIN 400 MG: 400 CAPSULE ORAL at 17:25

## 2025-04-02 RX ADMIN — GABAPENTIN 400 MG: 400 CAPSULE ORAL at 05:24

## 2025-04-02 RX ADMIN — AMLODIPINE BESYLATE 10 MG: 10 TABLET ORAL at 05:24

## 2025-04-02 RX ADMIN — CLOPIDOGREL BISULFATE 75 MG: 75 TABLET, FILM COATED ORAL at 05:23

## 2025-04-02 RX ADMIN — SENNOSIDES AND DOCUSATE SODIUM 2 TABLET: 50; 8.6 TABLET ORAL at 05:24

## 2025-04-02 RX ADMIN — LABETALOL HYDROCHLORIDE 10 MG: 5 INJECTION, SOLUTION INTRAVENOUS at 04:09

## 2025-04-02 RX ADMIN — Medication 5 MG: at 20:31

## 2025-04-02 RX ADMIN — SENNOSIDES AND DOCUSATE SODIUM 2 TABLET: 50; 8.6 TABLET ORAL at 17:25

## 2025-04-02 RX ADMIN — LABETALOL HYDROCHLORIDE 10 MG: 5 INJECTION, SOLUTION INTRAVENOUS at 22:34

## 2025-04-02 RX ADMIN — IOHEXOL 30 ML: 350 INJECTION, SOLUTION INTRAVENOUS at 13:15

## 2025-04-02 ASSESSMENT — PAIN DESCRIPTION - PAIN TYPE
TYPE: ACUTE PAIN

## 2025-04-02 NOTE — CONSULTS
Surgical History and Physical    Date of Service: 4/2/2025    Requesting Physician: Bravo Lala MD - ICU    Reason for Consultation: Free Air    HPI: This is a 49 y.o. male with a recent brainstem/pontine hemorrhage whom underwent a PEG tube placement by GI on 3/31/2025.  This sounded uneventful.  Tube feeds were started on 4/1/2025.  A morning chest x-ray was performed and showed free air under the diaphragm.  A CT abd/pel with IV contrast was obtained and showed free air through out the upper abdomen, but mostly in the upper quadrants.  There was no free fluid.  The colon appeared wall away/inferior to the stomach.  A contrast tube study was ordered and did not show a leak.  Intraluminal location of the g-tube was further confirmed with this study.      The patient was seen at bedside.  He can only communicate by blinking or nodding.  He is trached and on T-piece.  He cannot report whether he is having any abdominal pain.  RN reports one of the three T-fastener buttons fell off this morning.    Some of the following history was obtained via EPIC chart review.    PAST MEDICAL HISTORY:   Past Medical History:   Diagnosis Date    Cold       1/15/17 - no symptoms at this time.    Depression     Headache(784.0)     Hypertension     Influenza A     positive influenza A RNA test 1/2/2018- no symptoms today       PAST SURGICAL HISTORY:   Past Surgical History:   Procedure Laterality Date    TN PLACE PERCUT GASTROSTOMY TUBE  3/31/2025    Procedure: GASTROSCOPY, WITH FEEDING TUBE INSERTION;  Surgeon: Yg Montoya M.D.;  Location: SURGERY SAME DAY [facility];  Service: Gastroenterology    PB TARSAL TUNNEL RELEASE Left 4/20/2022    Procedure: LEFT FOOT TARSAL TUNNEL RELEASE,;  Surgeon: Abhilash Kohler M.D.;  Location: [facility];  Service: Orthopedics    ANKLE ARTHROSCOPY Right 10/8/2018    Procedure: ANKLE ARTHROSCOPY;  Surgeon: Abhilash Kohler M.D.;  Location: [facility];  Service:  "Orthopedics    HARDWARE REMOVAL ORTHO Right 10/8/2018    Procedure: HARDWARE REMOVAL ORTHO- OF SUBTALAR SCREW;  Surgeon: Abhilash Kohler M.D.;  Location: SURGERY [facility];  Service: Orthopedics    ORTHOPEDIC OSTEOTOMY Right 10/8/2018    Procedure: ORTHOPEDIC OSTEOTOMY- COTTON WITH BONE GRAFT;  Surgeon: Abhilash Kohler M.D.;  Location: SURGERY [facility];  Service: Orthopedics    SUBTALOR TRIPLE FUSION Left 1/23/2018    Procedure: SUBTALOR TRIPLE FUSION- W/TALO-CALCANEAL COALITION EXCISION & GASTROC SOLEUS RECESSION;  Surgeon: Abhilash Kohler M.D.;  Location: SURGERY [facility];  Service: Orthopedics       ALLERGIES: Lisinopril       CURRENT MEDICATIONS:   Home Medications       Reviewed by Sarahi Ramirez (Pharmacy Tech) on 03/25/25 at 1926  Med List Status: Complete     Medication Last Dose Status   amLODIPine (NORVASC) 10 MG Tab 3/25/2025 Active   hydroCHLOROthiazide 25 MG Tab 3/25/2025 Active   metFORMIN ER (GLUCOPHAGE XR) 500 MG TABLET SR 24 HR 3/24/2025 Active                  Audit from Redirected Encounters    **Home medications have not yet been reviewed for this encounter**          FAMILY HISTORY:   Reviewed and found to be non-contributory in regards to the above presentation    SOCIAL HISTORY: Unable to obtain.    Review of Systems:  Unable to obtain.    Physical Exam:  /56   Pulse 84   Temp (!) 38.4 °C (101.2 °F) (Axillary)   Resp (!) 38   Ht 1.854 m (6' 0.99\")   Wt 100 kg (220 lb 7.4 oz)   SpO2 95%   Vitals:    04/02/25 1200   BP: 134/56   Pulse: 84   Resp: (!) 38   Temp: (!) 38.4 °C (101.2 °F)   SpO2: 95%     GENERAL:  Ill appearing and in no acute distress  HEENT:  Atraumatic, normocephalic.    NECK:  Trach present  CHEST:  Lungs are clear to auscultation bilaterally.  No masses, lesions, or signs of trauma were noted.     CARDIOVASCULAR:  Regular rate and rhythm.  No murmurs appreciated.  No JVD.  Palpable pulses present in all four extremities.    ABDOMEN:  " Soft, non-distended.  Non-tympanitic.  G-tube over the RUQ.  2 buttons present.  Appropriate tension on the tube with bumper in place - 5cm.  MUSCULOSKELETAL: Normal range of motion x4 extremities.    SKIN:  Warm and well perfused. No rashes.  NEUROLOGIC:  Alert and oriented. Cranial nerves II through XII are grossly intact. Motor and sensory exams are normal in all four extremities. Motor and sensory reflexes are 2+ and symmetric with bilateral plantar responses.  PSYCHIATRIC: Affect and mood is appropriate for age and condition.    Labs:  Recent Labs     03/31/25 0418 04/01/25  0330 04/02/25  0530   WBC 9.5 8.9 10.2   RBC 4.33* 4.30* 4.70   HEMOGLOBIN 11.9* 11.6* 13.0*   HEMATOCRIT 36.8* 36.1* 40.0*   MCV 85.0 84.0 85.1   MCH 27.5 27.0 27.7   MCHC 32.3 32.1* 32.5   RDW 42.1 40.7 41.7   PLATELETCT 239 271 293   MPV 8.8* 8.9* 9.2     Recent Labs     03/31/25 0418 04/01/25  0330 04/02/25  0530   SODIUM 137 137 138   POTASSIUM 4.3 4.3 4.6   CHLORIDE 106 106 105   CO2 20 21 20   GLUCOSE 126* 119* 156*   BUN 19 17 21   CREATININE 0.84 0.83 0.85   CALCIUM 8.9 9.0 9.3         Recent Labs     03/31/25 0418 04/01/25  0330 04/02/25  0530   ASTSGOT 64* 35 94*   ALTSGPT 68* 66* 131*   TBILIRUBIN 0.3 0.6 0.3   ALKPHOSPHAT 89 99 185*   GLOBULIN 3.1 3.4 3.7*       Radiology:  DX-G.I. TUBE INJECTION, ANY TYPE   Final Result      Contrast from a PEG tube injection extends into the stomach without evidence of contrast leak.      CT-CHEST,ABDOMEN,PELVIS WITH   Final Result      1.  Large amount of free intraperitoneal air within the abdomen again seen as was previously identified on chest x-ray by review of the patient's chart, a percutaneous gastrostomy tube was placed on 3/31/2025 and is free intraperitoneal air is possibly    secondary to that recent procedure.      2.  Bibasal atelectasis and small bilateral pleural effusions.      3.  Tracheostomy tube present.      4.  No evidence of bowel obstruction or free fluid within  the abdomen or pelvis.      DX-CHEST-PORTABLE (1 VIEW)   Final Result         1.  Bibasilar atelectasis   2.  Intra-abdominal free air, can be associated with history of percutaneous gastrostomy, consider other viscus perforation as warranted. Could be further evaluated with CT of the abdomen with contrast as clinically appropriate.      These findings were discussed with the patient's clinician, Bravo Lala Iv, on 4/2/2025 7:57 AM.      DX-CHEST-PORTABLE (1 VIEW)   Final Result      No evidence of acute cardiopulmonary process.      DX-ABDOMEN FOR TUBE PLACEMENT   Final Result      1.  Enteric tube extends in the fundus of stomach.      DX-ABDOMEN FOR TUBE PLACEMENT   Final Result      The gastric tube has been removed and replaced with a small bowel feeding tube which terminate in the proximal stomach.      DX-CHEST-PORTABLE (1 VIEW)   Final Result      Atelectasis within the left lung base.      MR-THORACIC SPINE-WITH & W/O   Final Result      1.  There is no abnormal intramedullary T2 signal intensity in the thoracic spinal cord.   2.  Mild degenerative disease at T8-9.   3.  Left lower segmental consolidation.      MR-CERVICAL SPINE-WITH & W/O   Final Result      1.  Small areas of acute infarcts in the lower jero and upper medulla involving both sides of the midline. There are chronic infarcts in the jero and right inferior cerebellum.   2.  There is no abnormal intramedullary T2 signal intensity in the cervical spinal cord to suggest demyelinating lesions. There is no MR evidence of compressive myelopathy.   3.  Mild degenerative disease.            MR-BRAIN-WITH & W/O   Final Result      1.  Small areas of acute infarcts in the lower jero and upper medulla involving both sides of the midline.   2.  There are chronic infarcts in the jero and right inferior cerebellum. . There are areas of chronic lacunae in the bilateral basal ganglia, right thalamus and right periventricular white matter.   3.  There are  nonspecific T2 hyperintensities in the periventricular white matter likely representing chronic small vessel disease.   4.  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      MR-LUMBAR SPINE-WITH & W/O   Final Result      1.  Unremarkable pre and postcontrast MR examination of the lumbar spine.   2.  Clinical suspicious for GBS: There is no abnormal enhancement of the lumbar nerve roots.      DX-ABDOMEN FOR TUBE PLACEMENT   Final Result      NG tube tip projects at the peripyloric region.      DX-CHEST-PORTABLE (1 VIEW)   Final Result      1.  Low lung volumes without definite acute cardiopulmonary abnormality.   2.  Support apparatus as above.      CT-HEAD W/O   Final Result      1.  No acute intracranial abnormality.   2.  Remote right cerebellar infarct.               DX-CHEST-PORTABLE (1 VIEW)   Final Result      No acute cardiopulmonary disease evident.      DX-CHEST-PORTABLE (1 VIEW)   Final Result      No acute cardiopulmonary disease evident.      CT-CEREBRAL PERFUSION ANALYSIS   Final Result      1. Cerebral blood flow less than 30% possibly representing completed infarct = 0 mL. Based on distribution of this finding, this is unlikely to represent artifact.      2. T Max more than 6 seconds possibly representing combination of completed infarct and ischemia = 7 mL. Based on the distribution of this finding, this is possibly artifact.      3. Mismatched volume possibly representing ischemic brain/penumbra= 0 mL      4.  Please note that this cerebral perfusion study and report is Quantitative and targets supratentorial (cerebral) perfusion for evaluation of large vessel territory acute ischemia/infarction. For example, lacunar infarcts, and brainstem/posterior fossa    ischemia/infarction are not evaluated on this study.  Data acquisition is subject to artifacts which can yield non-anatomically plausible perfusion maps which may be due to motion, bolus  "timing, signal to noise ratio, or other technical factors.    Perfusion map abnormalities which show non-anatomic distributions are likely artifact.   This study is not \"stand-alone\" and should only be utilized for diagnosis, management/treatment in correlation with CT, CTA, and/or MRI and clinical factors.         CT-CTA NECK WITH & W/O-POST PROCESSING   Final Result      CT angiogram of the neck within normal limits.      CT-CTA HEAD WITH & W/O-POST PROCESS   Final Result      CT angiogram of the [place] within normal limits.      CT-HEAD W/O   Final Result      Head CT without contrast within normal limits. No evidence of acute cerebral infarction, hemorrhage or mass lesion.                   Assessment/Plan:   1) Free Air:    Likely post-procedural.  The colon does not appear to have been injured.  He was on tube feeds for 24 hours prior to finding this free air, so I would expect there to have been free fluid on the CT somewhere if the G-tube was malpositioned.  The contrast g-tube study further confirmed location of the g-tube within the stomach lumen.  WBC has trended up slightly from 9 to 10 and he recently spiked a fever this afternoon.  He does have bibasilar consolidation with effusions seen on the CT that may be the source.  The abdomen seems less likely so.    OK to resume tube feeds and follow clinical exam.  Discussed with Dr. Lala.    Aggregated care time spent evaluating, reassessing, reviewing documentation, providing care, and managing this patient exclusive of procedures: 45 minutes  ____________________________________   Louis Hernandez MD, FACS   JRU / NTS     DD: 4/2/2025   DT: 2:10 PM    "

## 2025-04-02 NOTE — THERAPY
Speech Language Therapy Contact Note    Patient Name: Jonna Brian  Age:  49 y.o., Sex:  male  Medical Record #: [id number]  Today's Date: 4/2/2025 04/02/25 1010   Treatment Variance   Reason For Missed Therapy Medical - Other (Please Comment)   Interdisciplinary Plan of Care Collaboration   IDT Collaboration with  Respiratory Therapist   Collaboration Comments Hold SLP today, discussed speaking valve evaluation with RT. RT reports possible sx today and patient with significant false track on trach placement 3/30 resulting in continued swelling and blood clots at site. Recommends holding evaluation today. Will re-attempt in coming days pending medical readiness.     Piedad Clarke, SLP

## 2025-04-02 NOTE — CARE PLAN
Problem: Knowledge Deficit - Standard  Goal: Patient and family/care givers will demonstrate understanding of plan of care, disease process/condition, diagnostic tests and medications  Outcome: Progressing     Problem: Pain - Standard  Goal: Alleviation of pain or a reduction in pain to the patient’s comfort goal  Outcome: Progressing     Problem: Knowledge Deficit - Stroke Education  Goal: Patient's knowledge of stroke and risk factors will improve  Outcome: Progressing   The patient is Watcher - Medium risk of patient condition declining or worsening    Shift Goals  Clinical Goals: Maintian BP goals  Patient Goals: JAMARI  Family Goals: JAMARI    Progress made toward(s) clinical / shift goals:  yes      Patient is not progressing towards the following goals:

## 2025-04-02 NOTE — CARE PLAN
Problem: Ventilation  Goal: Ability to achieve and maintain unassisted ventilation or tolerate decreased levels of ventilator support  Description: Target End Date:  4 days Document on Vent flowsheet1.  Support and monitor invasive and noninvasive mechanical ventilation2.  Monitor ventilator weaning response3.  Perform ventilator associated pneumonia prevention interventions4.  Manage ventilation therapy by monitoring diagnostic test results  Outcome: Progressing   8.0 Portex, 5L/30% heated T-piece

## 2025-04-02 NOTE — PROGRESS NOTES
Discussed results of CT and contrast study of his PEG with GI and general surgery.  At this point seems most likely that intraperitoneal air came from the procedure itself and there is no compromise of the bowel, additionally his PEG is in the appropriate position.    Okay to resume tube feeds and medications per his PEG.    He has spiked a new fever and was on the ventilator for quite a while so I will start empiric antibiotics for Pseudomonas coverage.    Check blood cultures    Bravo Lala M.D.

## 2025-04-02 NOTE — PROGRESS NOTES
"Critical Care Progress Note    Date of admission  3/25/2025    Chief Complaint  49 y.o. male who presented 3/25/2025 with Hx of HTN, anxiety, DM2, htn that presented 3/25 for dizziness, weakness and speech difficulty. He was evaluated by neuro vascular and had a stroke alert and workup preformed and no LVO was seen and recommend metabolic workup. He since has had progressive right side weakness, bulbar symptoms and difficulty with speech. I saw him earlier for concerns of GBS and elevated protein in his CNS fluid for concerns but his neuro exam didn't fit and recommend repeat CT head which did not show any acute changes. Neurology and Neurovascular evaluated him today and recommend ICU level care and concerns for brainstem stroke.      Hospital Course  3/27-Shortly after arrival in the ICU patient developed stridor and required emergent intubation.    3/28- Blinking once for \"yes\", twice for 'no\" appropriately.    3/29-no clinical change.  Consent given for tracheostomy.    3/30 - percutaneous tracheostomy    3/31 - GAURI referral sent, PEG planned for today, family updated at bedside.  Will try to T-Piece today    4/1 - We will T-Piece as long as possible today, he does not need to go back on vent.  Medically cleared for GAURI    4/2 - Continues to T-Piece though does have some tachypnea.  Some mild bleeding around trach site is controlled.  Moderate amount of free intraperitoneal air for which CT abdomen pelvis and surgery consult were obtained.    Interval Problem Update  Tmax: Afebrile  Diet: Tube feeds  Vasopressors: None    Infusions: None    Antibx: None    Intake / Output  Urine Output past 24 hours: 1500 mL    Lab Trends  CBC WNL    CMP WNL    Review of Systems  Review of Systems   Unable to perform ROS: Critical illness        Vital Signs for last 24 hours   Temp:  [36.1 °C (97 °F)-38.4 °C (101.2 °F)] 38.4 °C (101.2 °F)  Pulse:  [] 84  Resp:  [9-42] 38  BP: (119-155)/(54-78) 134/56  SpO2:  [92 %-100 %] " "95 %    Hemodynamic parameters for last 24 hours       Respiratory Information for the last 24 hours       Physical Exam  Constitutional:       Appearance: He is ill-appearing.   HENT:      Head: Normocephalic and atraumatic.      Mouth/Throat:      Mouth: Mucous membranes are moist.   Eyes:      Pupils: Pupils are equal, round, and reactive to light.   Cardiovascular:      Rate and Rhythm: Regular rhythm. Tachycardia present.      Heart sounds: No murmur heard.     No friction rub. No gallop.   Pulmonary:      Effort: No respiratory distress.      Breath sounds: No wheezing or rales.      Comments: Trach in place  Abdominal:      General: There is no distension.      Palpations: Abdomen is soft.      Comments: PEG in place   Musculoskeletal:      Cervical back: Neck supple.      Right lower leg: No edema.      Left lower leg: No edema.   Skin:     General: Skin is warm and dry.      Capillary Refill: Capillary refill takes less than 2 seconds.   Neurological:      Sensory: Sensory deficit present.      Comments:   Eyes open spontaneously, he blinks once for \"yes\", twice for 'no\" appropriately.  Sensation to light touch is absent    Essentially locked in         Medications  Current Facility-Administered Medications   Medication Dose Route Frequency Provider Last Rate Last Admin    piperacillin-tazobactam (Zosyn) 4.5 g in  mL IVPB  4.5 g Intravenous Q8HRS Bravo Lala M.D. 25 mL/hr at 04/02/25 1202 4.5 g at 04/02/25 1202    acetaminophen (Tylenol) suppository 650 mg  650 mg Rectal Q6HRS PRN Bravo Lala M.D.   650 mg at 04/02/25 1002    clopidogrel (Plavix) tablet 75 mg  75 mg Enteral Tube DAILY Bravo Lala M.D.   75 mg at 04/02/25 0523    hydrALAZINE (Apresoline) injection 20 mg  20 mg Intravenous Q6HRS PRN Bravo Lala M.D.   20 mg at 04/02/25 0957    labetalol (Normodyne/Trandate) injection 10 mg  10 mg Intravenous Q4HRS PRN Bravo Lala M.D.   10 mg at 04/02/25 0936    atorvastatin (Lipitor) " tablet 80 mg  80 mg Enteral Tube Q EVENING Bravo Lala M.D.   80 mg at 04/01/25 1726    famotidine (Pepcid) tablet 20 mg  20 mg Enteral Tube Q12HRS Bravo Lala M.D.   20 mg at 04/02/25 0524    gabapentin (Neurontin) capsule 400 mg  400 mg Enteral Tube TID Bravo Lala M.D.   400 mg at 04/02/25 0524    insulin lispro (HumaLOG,AdmeLOG) subcutaneous injection  1-6 Units Subcutaneous Q6HRS Marion Chne   1 Units at 04/02/25 1243    And    dextrose 50 % (D50W) injection 25 g  25 g Intravenous Q15 MIN PRN Marion Chen        Respiratory Therapy Consult   Nebulization Continuous RT Maddy Torres M.D.        senna-docusate (Pericolace Or Senokot S) 8.6-50 MG per tablet 2 Tablet  2 Tablet Enteral Tube BID Maddy Torres M.D.   2 Tablet at 04/02/25 0524    And    polyethylene glycol/lytes (Miralax) Packet 1 Packet  1 Packet Enteral Tube QDAY PRN Maddy Torres M.D.   1 Packet at 03/28/25 1712    And    magnesium hydroxide (Milk Of Magnesia) suspension 30 mL  30 mL Enteral Tube QDAY PRN Maddy Torres M.D.   30 mL at 03/29/25 1802    And    bisacodyl (Dulcolax) suppository 10 mg  10 mg Rectal QDAY PRN Maddy Torres M.D. MD Alert...ICU Electrolyte Replacement per Pharmacy   Other PHARMACY TO DOSE Maddy Torres M.D.        Pharmacy Consult: Enteral tube insertion - review meds/change route/product selection  1 Each Other PHARMACY TO DOSE Maddy Torres M.D.        acetaminophen (Tylenol) tablet 650 mg  650 mg Enteral Tube Q6HRS PRN Maddy Torres M.D.   650 mg at 03/31/25 0308    aspirin (Asa) chewable tab 81 mg  81 mg Enteral Tube DAILY Maddy Torres M.D.   81 mg at 04/02/25 0524    ibuprofen (Motrin) tablet 600 mg  600 mg Enteral Tube Q6HRS PRN Maddy E Brian, M.D.        melatonin tablet 5 mg  5 mg Enteral Tube Nightly Maddy Torres M.D.   5 mg at 04/01/25 2018    amLODIPine (Norvasc) tablet 10 mg  10 mg Enteral Tube DAILY Grant Gabriel M.D.   10 mg at 04/02/25 0524    promethazine  (Phenergan) tablet 12.5-25 mg  12.5-25 mg Enteral Tube Q4HRS PRN Maddy Torres M.D.        ondansetron (Zofran ODT) dispertab 4 mg  4 mg Enteral Tube Q4HRS PRN Maddy Torres M.D.        fentaNYL (Sublimaze) injection 50 mcg  50 mcg Intravenous Q15 MIN PRN Maddy Torres M.D.   50 mcg at 04/01/25 1208    And    fentaNYL (Sublimaze) injection 100 mcg  100 mcg Intravenous Q15 MIN PRN Maddy Torres M.D.   100 mcg at 03/30/25 1415    enoxaparin (Lovenox) inj 40 mg  40 mg Subcutaneous DAILY AT 1800 Bravo Lala M.D.   40 mg at 04/01/25 1726    ondansetron (Zofran) syringe/vial injection 4 mg  4 mg Intravenous Q4HRS PRN Darrel Polo M.D.        promethazine (Phenergan) suppository 12.5-25 mg  12.5-25 mg Rectal Q4HRS PRN Darrel Polo M.D.        prochlorperazine (Compazine) injection 5-10 mg  5-10 mg Intravenous Q4HRS PRN Darrel Polo M.D.   10 mg at 03/26/25 0550       Fluids    Intake/Output Summary (Last 24 hours) at 4/2/2025 1328  Last data filed at 4/2/2025 1200  Gross per 24 hour   Intake 1150.01 ml   Output 1200 ml   Net -49.99 ml       Laboratory            Recent Labs     03/31/25  0418 04/01/25  0330 04/02/25  0530   SODIUM 137 137 138   POTASSIUM 4.3 4.3 4.6   CHLORIDE 106 106 105   CO2 20 21 20   BUN 19 17 21   CREATININE 0.84 0.83 0.85   CALCIUM 8.9 9.0 9.3     Recent Labs     03/31/25  0418 03/31/25  1840 04/01/25  0330 04/02/25  0530   ALTSGPT 68*  --  66* 131*   ASTSGOT 64*  --  35 94*   ALKPHOSPHAT 89  --  99 185*   TBILIRUBIN 0.3  --  0.6 0.3   PREALBUMIN  --  18.6  --   --    GLUCOSE 126*  --  119* 156*     Recent Labs     03/31/25  0418 04/01/25  0330 04/02/25  0530   WBC 9.5 8.9 10.2   NEUTSPOLYS 79.10* 78.60* 82.20*   LYMPHOCYTES 11.20* 10.00* 6.90*   MONOCYTES 8.80 10.50 9.80   EOSINOPHILS 0.40 0.40 0.20   BASOPHILS 0.20 0.20 0.30   ASTSGOT 64* 35 94*   ALTSGPT 68* 66* 131*   ALKPHOSPHAT 89 99 185*   TBILIRUBIN 0.3 0.6 0.3     Recent Labs     03/31/25  0418  "04/01/25  0330 04/02/25  0530   RBC 4.33* 4.30* 4.70   HEMOGLOBIN 11.9* 11.6* 13.0*   HEMATOCRIT 36.8* 36.1* 40.0*   PLATELETCT 239 271 293       Imaging  No imaging this morning      Assessment/Plan  * Acute ischemic stroke (HCC)- (present on admission)  Assessment & Plan  Acute infarcts in the lower jero and upper medulla c/w presentation.  Chronic infarcts I the jero and right inferior cerebellum which explain right riddhi deficits  He is functionally \"locked in\" at this time  ASA 81 and Plavix 75 daily  Statin therapy for secondary prevention    Trach placed 3/30, PEG 4/1  - T-piece trials    PMR following  GAURI referral sent    Medical clearance for GAURI on hold until free intraperitoneal air is evaluated    Free intraperitoneal air  Assessment & Plan  Distention and air under diaphragm post PEG    CT abdomen pelvis with moderate amount of free air, that being said there is no intraperitoneal fluid and his PEG tube appears to be in appropriate position in the stomach  -Contrast study of the PEG confirms appropriate placement of the stomach    I did discuss with surgery who will evaluate the patient    Start empiric zosyn  -Low threshold to discontinue as all studies have confirmed intragastric placement of PEG, lack of free intraperitoneal fluid and lower suspicion for colonic perforation.  And lower suspicion for colon perforation.    Acute neuromuscular respiratory failure (HCC)  Assessment & Plan  GI prophylaxis: h2 blocker  Monitor ventilator waveforms & blood gases, titrate flow/peep and volumes according.   Daily SAT/SBT  All ventilator bundles are in place     Continue T-piece trials    Type 2 diabetes mellitus with hyperglycemia, without long-term current use of insulin (HCC)- (present on admission)  Assessment & Plan  Hg A1 C 6.2  Moderate glycemic control with Insulin therapy.    Primary hypertension- (present on admission)  Assessment & Plan  Goal at this time -140   Amlodipine         VTE:  " Lovenox  Ulcer: H2 Antagonist  Lines: None    I have performed a physical exam and reviewed and updated ROS and Plan today (4/2/2025). In review of yesterday's note (4/1/2025), there are no changes except as documented above.     Discussed patient condition and risk of morbidity and/or mortality with RN, RT, Pharmacy, Patient, and neurology and physiatry    The patient remains critically ill.  He is on mechanical ventilation.  Critical care time = 72 minutes in directly providing and coordinating critical care and extensive data review.  No time overlap and excludes procedures.

## 2025-04-02 NOTE — ASSESSMENT & PLAN NOTE
Ultimately determined to be secondary to PEG procedure    CT without free intraperitoneal fluid, PEG in appropriate position  Tube study shows PEG in the stomach appropriately  Air has resolved on chest x-ray the following day    Resolved

## 2025-04-02 NOTE — THERAPY
04/02/25 1108   Interdisciplinary Plan of Care Collaboration   Collaboration Comments Hold PT today, possible sx per nsg

## 2025-04-02 NOTE — PROGRESS NOTES
..Gastroenterology Progress Note               Author:  ZANDER Mckeon   Date & Time Created: 4/2/2025 11:46 AM       Patient ID:  Name:             Jonna Brian    YOB: 1975  Age:                 49 y.o.  male  MRN:               [id number]    Medical Decision Making, by Problem:  Active Hospital Problems    Diagnosis     Free intraperitoneal air [K66.8]     Acute ischemic stroke (HCC) [I63.9]     Acute neuromuscular respiratory failure (HCC) [J96.00, G70.9]     Type 2 diabetes mellitus with hyperglycemia, without long-term current use of insulin (HCC) [E11.65]     Primary hypertension [I10]      Presenting Chief Complaint:  Pontine stroke, dysphagia     HISTORY OF PRESENT ILLNESS:  Jonna Brian is a 49 y.o. male with hypertension, DM2 who presented 3/25 for dizziness, weakness and speech difficulty. He was evaluated by neurology and had a stroke alert and workup performed and no large vessel occlusion was seen and metabolic workup recommended. He since has had progressive right side weakness, bulbar symptoms and difficulty with speech. Neurology evaluated him and recommend ICU level care due to concerns for brainstem stroke. Shortly after arrival in the ICU patient developed stridor and required emergent intubation. MRI revealed pontine/medullary infarct    Interval History:  3/30/2025: Patient seen. Hemodynamically stable. Labs unremarkable. Able to blink once for yes and twice for no. Discussed planned PEG placement tomorrow and he blinks once to consent to procedure. Possible trach placement today.     4/1/2025: Postprocedure day #1 s/p PEG tube placement.  Patient seen at bedside with friend, Queta.  Patient awake, alert.  Blinks to communicate yes/no answers.  PEG tube in place.  Tube feeds in process without difficulty.  Site assessed.  Small amount of dried blood surrounding T-fastener sites.  No active bleeding, oozing, erythema, edema, drainage.    4/2/2025: called back  by primary team due to large amount of free intraperitoneal air seen on xray and ct abd/pelvis. Imaging reviewed with GI physicians. Peg tube placement in stomach. No intraperitoneal fluid seen on ct. No stools overnight. WBC 10.2, hgb 13. Lactic acid 0.9.     Hospital Medications:  Current Facility-Administered Medications   Medication Dose Frequency Provider Last Rate Last Admin    piperacillin-tazobactam (Zosyn) 4.5 g in  mL IVPB  4.5 g Q8HRS Bravo Lala M.D.        acetaminophen (Tylenol) suppository 650 mg  650 mg Q6HRS PRN Bravo Lala M.D.   650 mg at 04/02/25 1002    clopidogrel (Plavix) tablet 75 mg  75 mg DAILY Bravo Lala M.D.   75 mg at 04/02/25 0523    hydrALAZINE (Apresoline) injection 20 mg  20 mg Q6HRS PRN Bravo Lala M.D.   20 mg at 04/02/25 0957    labetalol (Normodyne/Trandate) injection 10 mg  10 mg Q4HRS PRN Bravo Lala M.D.   10 mg at 04/02/25 0936    atorvastatin (Lipitor) tablet 80 mg  80 mg Q EVENING Bravo Lala M.D.   80 mg at 04/01/25 1726    famotidine (Pepcid) tablet 20 mg  20 mg Q12HRS Bravo Lala M.D.   20 mg at 04/02/25 0524    gabapentin (Neurontin) capsule 400 mg  400 mg TID Bravo Lala M.D.   400 mg at 04/02/25 0524    insulin lispro (HumaLOG,AdmeLOG) subcutaneous injection  1-6 Units Q6HRS Marion Chen        And    dextrose 50 % (D50W) injection 25 g  25 g Q15 MIN PRN Marion Chen        Respiratory Therapy Consult   Continuous RT Maddy Torres M.D.        senna-docusate (Pericolace Or Senokot S) 8.6-50 MG per tablet 2 Tablet  2 Tablet BID Maddy Torres M.D.   2 Tablet at 04/02/25 0524    And    polyethylene glycol/lytes (Miralax) Packet 1 Packet  1 Packet QDAY PRN Maddy Torres M.D.   1 Packet at 03/28/25 1712    And    magnesium hydroxide (Milk Of Magnesia) suspension 30 mL  30 mL QDAY PRN Maddy Torres M.D.   30 mL at 03/29/25 1802    And    bisacodyl (Dulcolax) suppository 10 mg  10 mg QDAY PRN Maddy Torres M.D.        MD  "Alert...ICU Electrolyte Replacement per Pharmacy   PHARMACY TO DOSE Maddy Torres M.D.        Pharmacy Consult: Enteral tube insertion - review meds/change route/product selection  1 Each PHARMACY TO DOSE Maddy Torres M.D.        acetaminophen (Tylenol) tablet 650 mg  650 mg Q6HRS PRN Maddy Torres M.D.   650 mg at 03/31/25 0308    aspirin (Asa) chewable tab 81 mg  81 mg DAILY Maddy Torres M.D.   81 mg at 04/02/25 0524    ibuprofen (Motrin) tablet 600 mg  600 mg Q6HRS PRN Maddy Torres M.D.        melatonin tablet 5 mg  5 mg Nightly Maddy Torres M.D.   5 mg at 04/01/25 2018    amLODIPine (Norvasc) tablet 10 mg  10 mg DAILY Grant Gabriel M.D.   10 mg at 04/02/25 0524    promethazine (Phenergan) tablet 12.5-25 mg  12.5-25 mg Q4HRS PRN Maddy Torres M.D.        ondansetron (Zofran ODT) dispertab 4 mg  4 mg Q4HRS PRN Maddy Torres M.D.        fentaNYL (Sublimaze) injection 50 mcg  50 mcg Q15 MIN PRN Maddy Torres M.D.   50 mcg at 04/01/25 1208    And    fentaNYL (Sublimaze) injection 100 mcg  100 mcg Q15 MIN PRN Maddy Torres M.D.   100 mcg at 03/30/25 1415    enoxaparin (Lovenox) inj 40 mg  40 mg DAILY AT 1800 Bravo Lala M.D.   40 mg at 04/01/25 1726    ondansetron (Zofran) syringe/vial injection 4 mg  4 mg Q4HRS PRN Darrel Polo M.D.        promethazine (Phenergan) suppository 12.5-25 mg  12.5-25 mg Q4HRS PRN Darrel Polo M.D.        prochlorperazine (Compazine) injection 5-10 mg  5-10 mg Q4HRS PRN Darrel Polo M.D.   10 mg at 03/26/25 0550   Last reviewed on 3/25/2025  7:26 PM by Sarahi Ramirez       Review of Systems:  Review of Systems   Unable to perform ROS: Medical condition         Vital signs:  Weight/BMI: Body mass index is 29.09 kg/m².  /54   Pulse 87   Temp (!) 38.2 °C (100.8 °F) (Axillary)   Resp (!) 21   Ht 1.854 m (6' 0.99\")   Wt 100 kg (220 lb 7.4 oz)   SpO2 95%   Vitals:    04/02/25 0900 04/02/25 0945 04/02/25 1000 04/02/25 1100 "   BP: 138/70  (!) 146/71 121/54   Pulse: 95 87 92 87   Resp: (!) 35 (!) 33 (!) 37 (!) 21   Temp:   (!) 38.2 °C (100.8 °F)    TempSrc:   Axillary    SpO2: 100% 93% 97% 95%   Weight:       Height:         Oxygen Therapy:  Pulse Oximetry: 95 %, O2 (LPM): 4, FiO2%: 28 %, O2 Delivery Device: T-Piece    Intake/Output Summary (Last 24 hours) at 4/2/2025 1146  Last data filed at 4/2/2025 0600  Gross per 24 hour   Intake 1130 ml   Output 1150 ml   Net -20 ml       Physical Exam  Vitals and nursing note reviewed.   Constitutional:       General: He is awake. He is not in acute distress.  HENT:      Head: Normocephalic and atraumatic.      Right Ear: External ear normal.      Left Ear: External ear normal.      Mouth/Throat:      Mouth: Mucous membranes are moist.      Pharynx: Oropharynx is clear. No oropharyngeal exudate.   Eyes:      General: No scleral icterus.     Conjunctiva/sclera: Conjunctivae normal.   Cardiovascular:      Rate and Rhythm: Normal rate and regular rhythm.      Pulses: Normal pulses.      Heart sounds: Normal heart sounds.   Pulmonary:      Effort: Pulmonary effort is normal.      Comments: Intubated with ventilated breath sounds  Abdominal:      General: Abdomen is flat. Bowel sounds are normal. There is no distension.      Palpations: Abdomen is soft.      Tenderness: There is no abdominal tenderness.      Comments: PEG tube to left upper quadrant.  T-fastener x 3 in place.  Small amount of dried blood at insertion sites but otherwise no surrounding erythema, edema, drainage, active bleeding.  Mild tympany to bilateral upper quadrants. Abdomen soft. No rigidity.    Musculoskeletal:      Right lower leg: No edema.      Left lower leg: No edema.   Skin:     General: Skin is warm and dry.      Capillary Refill: Capillary refill takes less than 2 seconds.   Neurological:      Mental Status: He is alert.      Comments: No purposeful movement  Able to blink yes or no to questions         Labs:  Recent Labs      03/31/25  0418 04/01/25  0330 04/02/25  0530   SODIUM 137 137 138   POTASSIUM 4.3 4.3 4.6   CHLORIDE 106 106 105   CO2 20 21 20   BUN 19 17 21   CREATININE 0.84 0.83 0.85   CALCIUM 8.9 9.0 9.3     Recent Labs     03/31/25  0418 03/31/25  1840 04/01/25  0330 04/02/25  0530   ALTSGPT 68*  --  66* 131*   ASTSGOT 64*  --  35 94*   ALKPHOSPHAT 89  --  99 185*   TBILIRUBIN 0.3  --  0.6 0.3   PREALBUMIN  --  18.6  --   --    GLUCOSE 126*  --  119* 156*     Recent Labs     03/31/25 0418 04/01/25  0330 04/02/25  0530   WBC 9.5 8.9 10.2   NEUTSPOLYS 79.10* 78.60* 82.20*   LYMPHOCYTES 11.20* 10.00* 6.90*   MONOCYTES 8.80 10.50 9.80   EOSINOPHILS 0.40 0.40 0.20   BASOPHILS 0.20 0.20 0.30   ASTSGOT 64* 35 94*   ALTSGPT 68* 66* 131*   ALKPHOSPHAT 89 99 185*   TBILIRUBIN 0.3 0.6 0.3     Recent Labs     03/31/25  0418 04/01/25  0330 04/02/25  0530   RBC 4.33* 4.30* 4.70   HEMOGLOBIN 11.9* 11.6* 13.0*   HEMATOCRIT 36.8* 36.1* 40.0*   PLATELETCT 239 271 293     Recent Results (from the past 24 hours)   POCT glucose device results    Collection Time: 04/01/25 11:50 AM   Result Value Ref Range    POC Glucose, Blood 128 (H) 65 - 99 mg/dL   POCT glucose device results    Collection Time: 04/01/25  6:17 PM   Result Value Ref Range    POC Glucose, Blood 139 (H) 65 - 99 mg/dL   POCT glucose device results    Collection Time: 04/02/25 12:08 AM   Result Value Ref Range    POC Glucose, Blood 124 (H) 65 - 99 mg/dL   POCT glucose device results    Collection Time: 04/02/25  5:28 AM   Result Value Ref Range    POC Glucose, Blood 148 (H) 65 - 99 mg/dL   CBC with Differential    Collection Time: 04/02/25  5:30 AM   Result Value Ref Range    WBC 10.2 4.8 - 10.8 K/uL    RBC 4.70 4.70 - 6.10 M/uL    Hemoglobin 13.0 (L) 14.0 - 18.0 g/dL    Hematocrit 40.0 (L) 42.0 - 52.0 %    MCV 85.1 81.4 - 97.8 fL    MCH 27.7 27.0 - 33.0 pg    MCHC 32.5 32.3 - 36.5 g/dL    RDW 41.7 35.9 - 50.0 fL    Platelet Count 293 164 - 446 K/uL    MPV 9.2 9.0 - 12.9 fL     Neutrophils-Polys 82.20 (H) 44.00 - 72.00 %    Lymphocytes 6.90 (L) 22.00 - 41.00 %    Monocytes 9.80 0.00 - 13.40 %    Eosinophils 0.20 0.00 - 6.90 %    Basophils 0.30 0.00 - 1.80 %    Immature Granulocytes 0.60 0.00 - 0.90 %    Nucleated RBC 0.00 0.00 - 0.20 /100 WBC    Neutrophils (Absolute) 8.35 (H) 1.82 - 7.42 K/uL    Lymphs (Absolute) 0.70 (L) 1.00 - 4.80 K/uL    Monos (Absolute) 1.00 (H) 0.00 - 0.85 K/uL    Eos (Absolute) 0.02 0.00 - 0.51 K/uL    Baso (Absolute) 0.03 0.00 - 0.12 K/uL    Immature Granulocytes (abs) 0.06 0.00 - 0.11 K/uL    NRBC (Absolute) 0.00 K/uL   Comp Metabolic Panel    Collection Time: 04/02/25  5:30 AM   Result Value Ref Range    Sodium 138 135 - 145 mmol/L    Potassium 4.6 3.6 - 5.5 mmol/L    Chloride 105 96 - 112 mmol/L    Co2 20 20 - 33 mmol/L    Anion Gap 13.0 7.0 - 16.0    Glucose 156 (H) 65 - 99 mg/dL    Bun 21 8 - 22 mg/dL    Creatinine 0.85 0.50 - 1.40 mg/dL    Calcium 9.3 8.5 - 10.5 mg/dL    Correct Calcium 9.9 8.5 - 10.5 mg/dL    AST(SGOT) 94 (H) 12 - 45 U/L    ALT(SGPT) 131 (H) 2 - 50 U/L    Alkaline Phosphatase 185 (H) 30 - 99 U/L    Total Bilirubin 0.3 0.1 - 1.5 mg/dL    Albumin 3.3 3.2 - 4.9 g/dL    Total Protein 7.0 6.0 - 8.2 g/dL    Globulin 3.7 (H) 1.9 - 3.5 g/dL    A-G Ratio 0.9 g/dL   ESTIMATED GFR    Collection Time: 04/02/25  5:30 AM   Result Value Ref Range    GFR (CKD-EPI) 107 >60 mL/min/1.73 m 2   LACTIC ACID    Collection Time: 04/02/25 10:44 AM   Result Value Ref Range    Lactic Acid 0.9 0.5 - 2.0 mmol/L       Radiology Review:  CT-CHEST,ABDOMEN,PELVIS WITH   Final Result      1.  Large amount of free intraperitoneal air within the abdomen again seen as was previously identified on chest x-ray by review of the patient's chart, a percutaneous gastrostomy tube was placed on 3/31/2025 and is free intraperitoneal air is possibly    secondary to that recent procedure.      2.  Bibasal atelectasis and small bilateral pleural effusions.      3.  Tracheostomy tube  present.      4.  No evidence of bowel obstruction or free fluid within the abdomen or pelvis.      DX-CHEST-PORTABLE (1 VIEW)   Final Result         1.  Bibasilar atelectasis   2.  Intra-abdominal free air, can be associated with history of percutaneous gastrostomy, consider other viscus perforation as warranted. Could be further evaluated with CT of the abdomen with contrast as clinically appropriate.      These findings were discussed with the patient's clinician, Bravo Lala Iv, on 4/2/2025 7:57 AM.      DX-CHEST-PORTABLE (1 VIEW)   Final Result      No evidence of acute cardiopulmonary process.      DX-ABDOMEN FOR TUBE PLACEMENT   Final Result      1.  Enteric tube extends in the fundus of stomach.      DX-ABDOMEN FOR TUBE PLACEMENT   Final Result      The gastric tube has been removed and replaced with a small bowel feeding tube which terminate in the proximal stomach.      DX-CHEST-PORTABLE (1 VIEW)   Final Result      Atelectasis within the left lung base.      MR-THORACIC SPINE-WITH & W/O   Final Result      1.  There is no abnormal intramedullary T2 signal intensity in the thoracic spinal cord.   2.  Mild degenerative disease at T8-9.   3.  Left lower segmental consolidation.      MR-CERVICAL SPINE-WITH & W/O   Final Result      1.  Small areas of acute infarcts in the lower jero and upper medulla involving both sides of the midline. There are chronic infarcts in the jero and right inferior cerebellum.   2.  There is no abnormal intramedullary T2 signal intensity in the cervical spinal cord to suggest demyelinating lesions. There is no MR evidence of compressive myelopathy.   3.  Mild degenerative disease.            MR-BRAIN-WITH & W/O   Final Result      1.  Small areas of acute infarcts in the lower jero and upper medulla involving both sides of the midline.   2.  There are chronic infarcts in the jero and right inferior cerebellum. . There are areas of chronic lacunae in the bilateral basal  ganglia, right thalamus and right periventricular white matter.   3.  There are nonspecific T2 hyperintensities in the periventricular white matter likely representing chronic small vessel disease.   4.  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      MR-LUMBAR SPINE-WITH & W/O   Final Result      1.  Unremarkable pre and postcontrast MR examination of the lumbar spine.   2.  Clinical suspicious for GBS: There is no abnormal enhancement of the lumbar nerve roots.      DX-ABDOMEN FOR TUBE PLACEMENT   Final Result      NG tube tip projects at the peripyloric region.      DX-CHEST-PORTABLE (1 VIEW)   Final Result      1.  Low lung volumes without definite acute cardiopulmonary abnormality.   2.  Support apparatus as above.      CT-HEAD W/O   Final Result      1.  No acute intracranial abnormality.   2.  Remote right cerebellar infarct.               DX-CHEST-PORTABLE (1 VIEW)   Final Result      No acute cardiopulmonary disease evident.      DX-CHEST-PORTABLE (1 VIEW)   Final Result      No acute cardiopulmonary disease evident.      CT-CEREBRAL PERFUSION ANALYSIS   Final Result      1. Cerebral blood flow less than 30% possibly representing completed infarct = 0 mL. Based on distribution of this finding, this is unlikely to represent artifact.      2. T Max more than 6 seconds possibly representing combination of completed infarct and ischemia = 7 mL. Based on the distribution of this finding, this is possibly artifact.      3. Mismatched volume possibly representing ischemic brain/penumbra= 0 mL      4.  Please note that this cerebral perfusion study and report is Quantitative and targets supratentorial (cerebral) perfusion for evaluation of large vessel territory acute ischemia/infarction. For example, lacunar infarcts, and brainstem/posterior fossa    ischemia/infarction are not evaluated on this study.  Data acquisition is subject to artifacts which can  "yield non-anatomically plausible perfusion maps which may be due to motion, bolus timing, signal to noise ratio, or other technical factors.    Perfusion map abnormalities which show non-anatomic distributions are likely artifact.   This study is not \"stand-alone\" and should only be utilized for diagnosis, management/treatment in correlation with CT, CTA, and/or MRI and clinical factors.         CT-CTA NECK WITH & W/O-POST PROCESSING   Final Result      CT angiogram of the neck within normal limits.      CT-CTA HEAD WITH & W/O-POST PROCESS   Final Result      CT angiogram of the [place] within normal limits.      CT-HEAD W/O   Final Result      Head CT without contrast within normal limits. No evidence of acute cerebral infarction, hemorrhage or mass lesion.                 MDM (Data Review):   -Records reviewed and summarized in current documentation  -I personally reviewed and interpreted the laboratory results  -I personally reviewed the radiology images    Assessment/Recommendations:  Pontine Stroke  Intraperitoneal air- seen on imaging s/p PEG tube placement. Peg tube in stomach. No fluid in intraperitoneal area. ?air tracked in through stomach during placement.    Oropharyngeal dysphagia  Fever - yesterday, now afebrile  DM 2  Neuromuscular respiratory failure - pending possible tracheostomy    Recommendations:  Ok to continue tube feeds  Continue peg care as listed below.   Serial abdominal exams  If concern grows for peritonitis, recommend surgical consult.     Please ensure proper PEG care and tube feeding:  - keep head of bed elevated to at least 30 degrees during tube feeding. The largest factor in preventing aspiration during tube feeding is not the placement of the tube into the stomach or jejunum, but keeping the head elevated to at least 30 degrees during feeding  -Okay to use PEG tube  - PEG needs 0.5-1cm of laxity freely mobile in and out of the gastrostomy tract. Overtightening may impair " tract maturation by inducing localized ischemia. Over tightening may also result in buried bumper syndrome.  -  The 3 buttons underneath the PEG tube's external bumper will spontaneously release after sutures dissolve.  Median time 4-8 weeks.     Discussed with patient, nursing, Dr. Montoya, Dr. Lala      ..ZANDER Mckeon    Core Quality Measures   Reviewed items::  Labs, Medications and Radiology reports reviewed

## 2025-04-02 NOTE — DISCHARGE PLANNING
Case Management Discharge Planning    Admission Date: 3/25/2025  GMLOS: 23.1  ALOS: 8    6-Clicks ADL Score: 6  6-Clicks Mobility Score: 6  PT and/or OT Eval ordered: Yes  Post-acute Referrals Ordered: Yes  Post-acute Choice Obtained: Yes  Has referral(s) been sent to post-acute provider:  Yes    Anticipated Discharge Dispo: Discharge Disposition: Disch to a long term care facility (63)    DME Needed: No    Action(s) Taken:   Chart review was completed. Patient was discussed during IDT rounds.    Per MD, pt is no longer medically cleared as of this morning.     PC was placed to [facility] [address] [facility] with [facility] to provide update and follow up on pending insurance authorization.     PC was received form Magali PAGAN RN with [facility] ([phone]) re: assistance with discharge needs and planning. Updates were provided.     Escalations Completed: None    Medically Clear: No    Next Steps:  EJ RN to follow up with medical team to discuss discharge barriers or needs.     Barriers to Discharge: Medical clearance and Pending Insurance Authorization

## 2025-04-03 ENCOUNTER — APPOINTMENT (OUTPATIENT)
Dept: RADIOLOGY | Facility: MEDICAL CENTER | Age: 50
End: 2025-04-03
Attending: STUDENT IN AN ORGANIZED HEALTH CARE EDUCATION/TRAINING PROGRAM
Payer: COMMERCIAL

## 2025-04-03 LAB
ALBUMIN SERPL BCP-MCNC: 3.4 G/DL (ref 3.2–4.9)
ALBUMIN/GLOB SERPL: 0.9 G/DL
ALP SERPL-CCNC: 175 U/L (ref 30–99)
ALT SERPL-CCNC: 144 U/L (ref 2–50)
ANION GAP SERPL CALC-SCNC: 11 MMOL/L (ref 7–16)
AST SERPL-CCNC: 61 U/L (ref 12–45)
BASOPHILS # BLD AUTO: 0.2 % (ref 0–1.8)
BASOPHILS # BLD: 0.02 K/UL (ref 0–0.12)
BILIRUB SERPL-MCNC: 0.3 MG/DL (ref 0.1–1.5)
BUN SERPL-MCNC: 25 MG/DL (ref 8–22)
CALCIUM ALBUM COR SERPL-MCNC: 10 MG/DL (ref 8.5–10.5)
CALCIUM SERPL-MCNC: 9.5 MG/DL (ref 8.5–10.5)
CHLORIDE SERPL-SCNC: 104 MMOL/L (ref 96–112)
CO2 SERPL-SCNC: 24 MMOL/L (ref 20–33)
CREAT SERPL-MCNC: 0.92 MG/DL (ref 0.5–1.4)
EOSINOPHIL # BLD AUTO: 0.05 K/UL (ref 0–0.51)
EOSINOPHIL NFR BLD: 0.5 % (ref 0–6.9)
ERYTHROCYTE [DISTWIDTH] IN BLOOD BY AUTOMATED COUNT: 41.2 FL (ref 35.9–50)
FLUAV RNA SPEC QL NAA+PROBE: NEGATIVE
FLUBV RNA SPEC QL NAA+PROBE: NEGATIVE
GFR SERPLBLD CREATININE-BSD FMLA CKD-EPI: 103 ML/MIN/1.73 M 2
GLOBULIN SER CALC-MCNC: 3.7 G/DL (ref 1.9–3.5)
GLUCOSE BLD STRIP.AUTO-MCNC: 117 MG/DL (ref 65–99)
GLUCOSE BLD STRIP.AUTO-MCNC: 122 MG/DL (ref 65–99)
GLUCOSE BLD STRIP.AUTO-MCNC: 136 MG/DL (ref 65–99)
GLUCOSE BLD STRIP.AUTO-MCNC: 156 MG/DL (ref 65–99)
GLUCOSE SERPL-MCNC: 138 MG/DL (ref 65–99)
GRAM STN SPEC: NORMAL
HCT VFR BLD AUTO: 38.6 % (ref 42–52)
HGB BLD-MCNC: 12.2 G/DL (ref 14–18)
IMM GRANULOCYTES # BLD AUTO: 0.03 K/UL (ref 0–0.11)
IMM GRANULOCYTES NFR BLD AUTO: 0.3 % (ref 0–0.9)
LYMPHOCYTES # BLD AUTO: 1.36 K/UL (ref 1–4.8)
LYMPHOCYTES NFR BLD: 12.7 % (ref 22–41)
MCH RBC QN AUTO: 27.1 PG (ref 27–33)
MCHC RBC AUTO-ENTMCNC: 31.6 G/DL (ref 32.3–36.5)
MCV RBC AUTO: 85.8 FL (ref 81.4–97.8)
MONOCYTES # BLD AUTO: 1.33 K/UL (ref 0–0.85)
MONOCYTES NFR BLD AUTO: 12.4 % (ref 0–13.4)
NEUTROPHILS # BLD AUTO: 7.9 K/UL (ref 1.82–7.42)
NEUTROPHILS NFR BLD: 73.9 % (ref 44–72)
NRBC # BLD AUTO: 0 K/UL
NRBC BLD-RTO: 0 /100 WBC (ref 0–0.2)
PLATELET # BLD AUTO: 308 K/UL (ref 164–446)
PMV BLD AUTO: 8.7 FL (ref 9–12.9)
POTASSIUM SERPL-SCNC: 4.1 MMOL/L (ref 3.6–5.5)
PROT SERPL-MCNC: 7.1 G/DL (ref 6–8.2)
RBC # BLD AUTO: 4.5 M/UL (ref 4.7–6.1)
RSV RNA SPEC QL NAA+PROBE: NEGATIVE
SARS-COV-2 RNA RESP QL NAA+PROBE: NOTDETECTED
SIGNIFICANT IND 70042: NORMAL
SITE SITE: NORMAL
SODIUM SERPL-SCNC: 139 MMOL/L (ref 135–145)
SOURCE SOURCE: NORMAL
SPECIMEN SOURCE: NORMAL
WBC # BLD AUTO: 10.7 K/UL (ref 4.8–10.8)

## 2025-04-03 PROCEDURE — L8501 TRACHEOSTOMY SPEAKING VALVE: HCPCS

## 2025-04-03 PROCEDURE — 71045 X-RAY EXAM CHEST 1 VIEW: CPT

## 2025-04-03 PROCEDURE — 770022 HCHG ROOM/CARE - ICU (200)

## 2025-04-03 PROCEDURE — 99291 CRITICAL CARE FIRST HOUR: CPT | Performed by: STUDENT IN AN ORGANIZED HEALTH CARE EDUCATION/TRAINING PROGRAM

## 2025-04-03 PROCEDURE — 99231 SBSQ HOSP IP/OBS SF/LOW 25: CPT | Performed by: SURGERY

## 2025-04-03 PROCEDURE — 0241U HCHG SARS-COV-2 COVID-19 NFCT DS RESP RNA 4 TRGT MIC: CPT

## 2025-04-03 PROCEDURE — 85025 COMPLETE CBC W/AUTO DIFF WBC: CPT

## 2025-04-03 PROCEDURE — A9270 NON-COVERED ITEM OR SERVICE: HCPCS | Performed by: STUDENT IN AN ORGANIZED HEALTH CARE EDUCATION/TRAINING PROGRAM

## 2025-04-03 PROCEDURE — A9270 NON-COVERED ITEM OR SERVICE: HCPCS | Performed by: INTERNAL MEDICINE

## 2025-04-03 PROCEDURE — 700111 HCHG RX REV CODE 636 W/ 250 OVERRIDE (IP): Performed by: STUDENT IN AN ORGANIZED HEALTH CARE EDUCATION/TRAINING PROGRAM

## 2025-04-03 PROCEDURE — 305246 HCHG SPEAKING VALVE ADAPTER

## 2025-04-03 PROCEDURE — 700102 HCHG RX REV CODE 250 W/ 637 OVERRIDE(OP): Performed by: STUDENT IN AN ORGANIZED HEALTH CARE EDUCATION/TRAINING PROGRAM

## 2025-04-03 PROCEDURE — 700102 HCHG RX REV CODE 250 W/ 637 OVERRIDE(OP): Performed by: INTERNAL MEDICINE

## 2025-04-03 PROCEDURE — 700105 HCHG RX REV CODE 258: Performed by: STUDENT IN AN ORGANIZED HEALTH CARE EDUCATION/TRAINING PROGRAM

## 2025-04-03 PROCEDURE — 80053 COMPREHEN METABOLIC PANEL: CPT

## 2025-04-03 PROCEDURE — 82962 GLUCOSE BLOOD TEST: CPT | Mod: 91

## 2025-04-03 PROCEDURE — 94640 AIRWAY INHALATION TREATMENT: CPT

## 2025-04-03 PROCEDURE — 99232 SBSQ HOSP IP/OBS MODERATE 35: CPT | Performed by: NURSE PRACTITIONER

## 2025-04-03 PROCEDURE — 92597 ORAL SPEECH DEVICE EVAL: CPT

## 2025-04-03 RX ORDER — OXYCODONE HYDROCHLORIDE 5 MG/1
5 TABLET ORAL EVERY 4 HOURS PRN
Refills: 0 | Status: DISCONTINUED | OUTPATIENT
Start: 2025-04-03 | End: 2025-04-29

## 2025-04-03 RX ORDER — ACETAMINOPHEN 325 MG/1
650 TABLET ORAL EVERY 6 HOURS PRN
Status: DISCONTINUED | OUTPATIENT
Start: 2025-04-03 | End: 2025-04-29

## 2025-04-03 RX ADMIN — GABAPENTIN 400 MG: 400 CAPSULE ORAL at 05:31

## 2025-04-03 RX ADMIN — GABAPENTIN 400 MG: 400 CAPSULE ORAL at 17:23

## 2025-04-03 RX ADMIN — INSULIN LISPRO 1 UNITS: 100 INJECTION, SOLUTION INTRAVENOUS; SUBCUTANEOUS at 12:18

## 2025-04-03 RX ADMIN — LABETALOL HYDROCHLORIDE 10 MG: 5 INJECTION, SOLUTION INTRAVENOUS at 15:07

## 2025-04-03 RX ADMIN — Medication 5 MG: at 21:56

## 2025-04-03 RX ADMIN — CLOPIDOGREL BISULFATE 75 MG: 75 TABLET, FILM COATED ORAL at 05:31

## 2025-04-03 RX ADMIN — ACETAMINOPHEN 650 MG: 325 TABLET, FILM COATED ORAL at 16:51

## 2025-04-03 RX ADMIN — POLYETHYLENE GLYCOL 3350 1 PACKET: 17 POWDER, FOR SOLUTION ORAL at 05:31

## 2025-04-03 RX ADMIN — RIVAROXABAN 10 MG: 10 TABLET, FILM COATED ORAL at 17:23

## 2025-04-03 RX ADMIN — PIPERACILLIN AND TAZOBACTAM 4.5 G: 4; .5 INJECTION, POWDER, FOR SOLUTION INTRAVENOUS at 05:34

## 2025-04-03 RX ADMIN — FAMOTIDINE 20 MG: 20 TABLET, FILM COATED ORAL at 05:31

## 2025-04-03 RX ADMIN — PIPERACILLIN AND TAZOBACTAM 4.5 G: 4; .5 INJECTION, POWDER, FOR SOLUTION INTRAVENOUS at 21:58

## 2025-04-03 RX ADMIN — IBUPROFEN 600 MG: 400 TABLET, FILM COATED ORAL at 16:44

## 2025-04-03 RX ADMIN — AMLODIPINE BESYLATE 10 MG: 10 TABLET ORAL at 05:31

## 2025-04-03 RX ADMIN — LABETALOL HYDROCHLORIDE 10 MG: 5 INJECTION, SOLUTION INTRAVENOUS at 03:29

## 2025-04-03 RX ADMIN — PIPERACILLIN AND TAZOBACTAM 4.5 G: 4; .5 INJECTION, POWDER, FOR SOLUTION INTRAVENOUS at 12:27

## 2025-04-03 RX ADMIN — ASPIRIN 81 MG: 81 TABLET, CHEWABLE ORAL at 05:31

## 2025-04-03 RX ADMIN — GABAPENTIN 400 MG: 400 CAPSULE ORAL at 12:18

## 2025-04-03 RX ADMIN — SENNOSIDES AND DOCUSATE SODIUM 2 TABLET: 50; 8.6 TABLET ORAL at 05:31

## 2025-04-03 RX ADMIN — ATORVASTATIN CALCIUM 80 MG: 80 TABLET, FILM COATED ORAL at 17:23

## 2025-04-03 ASSESSMENT — PAIN DESCRIPTION - PAIN TYPE
TYPE: ACUTE PAIN

## 2025-04-03 ASSESSMENT — FIBROSIS 4 INDEX: FIB4 SCORE: 0.78

## 2025-04-03 NOTE — PROGRESS NOTES
"Critical Care Progress Note    Date of admission  3/25/2025    Chief Complaint  49 y.o. male who presented 3/25/2025 with Hx of HTN, anxiety, DM2, htn that presented 3/25 for dizziness, weakness and speech difficulty. He was evaluated by neuro vascular and had a stroke alert and workup preformed and no LVO was seen and recommend metabolic workup. He since has had progressive right side weakness, bulbar symptoms and difficulty with speech. I saw him earlier for concerns of GBS and elevated protein in his CNS fluid for concerns but his neuro exam didn't fit and recommend repeat CT head which did not show any acute changes. Neurology and Neurovascular evaluated him today and recommend ICU level care and concerns for brainstem stroke.      Hospital Course  3/27-Shortly after arrival in the ICU patient developed stridor and required emergent intubation.    3/28- Blinking once for \"yes\", twice for 'no\" appropriately.    3/29-no clinical change.  Consent given for tracheostomy.    3/30 - percutaneous tracheostomy    3/31 - [facility] referral sent, PEG planned for today, family updated at bedside.  Will try to T-Piece today    4/1 - We will T-Piece as long as possible today, he does not need to go back on vent.  Medically cleared for [facility]    4/2 - Continues to T-Piece though does have some tachypnea.  Some mild bleeding around trach site is controlled.  Moderate amount of free intraperitoneal air for which CT abdomen pelvis and surgery consult were obtained.    4/3 - fever yesterday afternoon, started on Zosyn.  Blood cultures and sputum cultures sent.    Interval Problem Update  Tmax: 101.2 °F  Diet: Tube feeds  Vasopressors: None    Infusions: None    Antibx:   Zosyn 4/2 - present    Blood Cultures 4/2 - Pending (NGTD)  Sputum culture 4/2 - many WBC, many mixed bacteria,  No predominant organism  Covid/Rsv/Influenza 4/2 - negative    Intake / Output  Urine Output past 24 hours: 1965 mL    Lab Trends  CBC WNL    AST 94 --> " "61   --> 144    Bili 0.3    Review of Systems  Review of Systems   Unable to perform ROS: Critical illness        Vital Signs for last 24 hours   Temp:  [37 °C (98.6 °F)-38.4 °C (101.2 °F)] 37 °C (98.6 °F)  Pulse:  [59-92] 64  Resp:  [13-39] 27  BP: (114-153)/() 139/75  SpO2:  [92 %-100 %] 96 %    Hemodynamic parameters for last 24 hours       Respiratory Information for the last 24 hours       Physical Exam  Constitutional:       Appearance: He is ill-appearing.   HENT:      Head: Normocephalic and atraumatic.      Mouth/Throat:      Mouth: Mucous membranes are moist.   Eyes:      Pupils: Pupils are equal, round, and reactive to light.   Cardiovascular:      Rate and Rhythm: Regular rhythm. Tachycardia present.      Heart sounds: No murmur heard.     No friction rub. No gallop.   Pulmonary:      Effort: No respiratory distress.      Breath sounds: No wheezing or rales.      Comments: Trach in place  Abdominal:      General: There is no distension.      Palpations: Abdomen is soft.      Comments: PEG in place   Musculoskeletal:      Cervical back: Neck supple.      Right lower leg: No edema.      Left lower leg: No edema.   Skin:     General: Skin is warm and dry.      Capillary Refill: Capillary refill takes less than 2 seconds.   Neurological:      Sensory: Sensory deficit present.      Comments:   Eyes open spontaneously, he blinks once for \"yes\", twice for 'no\" appropriately.  Sensation to light touch is absent    Essentially locked in         Medications  Current Facility-Administered Medications   Medication Dose Route Frequency Provider Last Rate Last Admin    acetaminophen (Tylenol) tablet 650 mg  650 mg Enteral Tube Q6HRS PRN Bravo Lala M.D.        oxyCODONE immediate-release (Roxicodone) tablet 5 mg  5 mg Enteral Tube Q4HRS PRN Bravo Lala M.D.        rivaroxaban (Xarelto) tablet 10 mg  10 mg Enteral Tube DAILY AT 1800 Bravo Lala M.D.        piperacillin-tazobactam (Zosyn) 4.5 g in "  mL IVPB  4.5 g Intravenous Q8HRS Bravo Lala M.D. 25 mL/hr at 04/03/25 0534 4.5 g at 04/03/25 0534    acetaminophen (Tylenol) suppository 650 mg  650 mg Rectal Q6HRS PRN Bravo Lala M.D.   650 mg at 04/02/25 1002    clopidogrel (Plavix) tablet 75 mg  75 mg Enteral Tube DAILY Bravo Lala M.D.   75 mg at 04/03/25 0531    hydrALAZINE (Apresoline) injection 20 mg  20 mg Intravenous Q6HRS PRN Bravo Lala M.D.   20 mg at 04/02/25 0957    labetalol (Normodyne/Trandate) injection 10 mg  10 mg Intravenous Q4HRS PRN Bravo Lala M.D.   10 mg at 04/03/25 0329    atorvastatin (Lipitor) tablet 80 mg  80 mg Enteral Tube Q EVENING Bravo Lala M.D.   80 mg at 04/02/25 1725    gabapentin (Neurontin) capsule 400 mg  400 mg Enteral Tube TID Bravo Lala M.D.   400 mg at 04/03/25 0531    insulin lispro (HumaLOG,AdmeLOG) subcutaneous injection  1-6 Units Subcutaneous Q6HRS Marion Chen   1 Units at 04/02/25 1243    And    dextrose 50 % (D50W) injection 25 g  25 g Intravenous Q15 MIN PRN Marion Chen        Respiratory Therapy Consult   Nebulization Continuous RT Maddy Torres M.D.        senna-docusate (Pericolace Or Senokot S) 8.6-50 MG per tablet 2 Tablet  2 Tablet Enteral Tube BID Maddy Torres M.D.   2 Tablet at 04/03/25 0531    And    polyethylene glycol/lytes (Miralax) Packet 1 Packet  1 Packet Enteral Tube QDAY PRN Maddy Torres M.D.   1 Packet at 04/03/25 0531    And    magnesium hydroxide (Milk Of Magnesia) suspension 30 mL  30 mL Enteral Tube QDAY PRN Maddy Torres M.D.   30 mL at 03/29/25 1802    And    bisacodyl (Dulcolax) suppository 10 mg  10 mg Rectal QDAY PRN Maddy Torres M.D. MD Alert...ICU Electrolyte Replacement per Pharmacy   Other PHARMACY TO DOSE Maddy Torres M.D.        Pharmacy Consult: Enteral tube insertion - review meds/change route/product selection  1 Each Other PHARMACY TO DOSE Maddy Torres M.D.        aspirin (Asa) chewable tab 81 mg  81 mg Enteral  Tube DAILY Maddy Torres M.D.   81 mg at 04/03/25 0531    ibuprofen (Motrin) tablet 600 mg  600 mg Enteral Tube Q6HRS PRN Maddy Torres M.D.   600 mg at 04/02/25 2348    melatonin tablet 5 mg  5 mg Enteral Tube Nightly Maddy Torres M.D.   5 mg at 04/02/25 2031    amLODIPine (Norvasc) tablet 10 mg  10 mg Enteral Tube DAILY Grant Gabriel M.D.   10 mg at 04/03/25 0531    ondansetron (Zofran ODT) dispertab 4 mg  4 mg Enteral Tube Q4HRS PRN Maddy Torres M.D.        ondansetron (Zofran) syringe/vial injection 4 mg  4 mg Intravenous Q4HRS PRN Darrel Polo M.D.           Fluids    Intake/Output Summary (Last 24 hours) at 4/3/2025 0949  Last data filed at 4/3/2025 0100  Gross per 24 hour   Intake 629.94 ml   Output 1015 ml   Net -385.06 ml       Laboratory            Recent Labs     04/01/25  0330 04/02/25  0530 04/03/25  0550   SODIUM 137 138 139   POTASSIUM 4.3 4.6 4.1   CHLORIDE 106 105 104   CO2 21 20 24   BUN 17 21 25*   CREATININE 0.83 0.85 0.92   CALCIUM 9.0 9.3 9.5     Recent Labs     03/31/25  1840 04/01/25  0330 04/02/25  0530 04/03/25  0550   ALTSGPT  --  66* 131* 144*   ASTSGOT  --  35 94* 61*   ALKPHOSPHAT  --  99 185* 175*   TBILIRUBIN  --  0.6 0.3 0.3   PREALBUMIN 18.6  --   --   --    GLUCOSE  --  119* 156* 138*     Recent Labs     04/01/25  0330 04/02/25  0530 04/03/25  0550   WBC 8.9 10.2 10.7   NEUTSPOLYS 78.60* 82.20* 73.90*   LYMPHOCYTES 10.00* 6.90* 12.70*   MONOCYTES 10.50 9.80 12.40   EOSINOPHILS 0.40 0.20 0.50   BASOPHILS 0.20 0.30 0.20   ASTSGOT 35 94* 61*   ALTSGPT 66* 131* 144*   ALKPHOSPHAT 99 185* 175*   TBILIRUBIN 0.6 0.3 0.3     Recent Labs     04/01/25  0330 04/02/25  0530 04/03/25  0550   RBC 4.30* 4.70 4.50*   HEMOGLOBIN 11.6* 13.0* 12.2*   HEMATOCRIT 36.1* 40.0* 38.6*   PLATELETCT 271 293 308       Imaging  X-Ray:  I have personally reviewed the images and compared with prior images. and My impression is: Improvement in intra-abdominal free air, no focal  "consolidations    Assessment/Plan  * Acute ischemic stroke (HCC)- (present on admission)  Assessment & Plan  Acute infarcts in the lower jero and upper medulla c/w presentation.  Chronic infarcts I the jero and right inferior cerebellum which explain right riddhi deficits  He is functionally \"locked in\" at this time  ASA 81 and Plavix 75 daily  Statin therapy for secondary prevention    Trach placed 3/30, PEG 4/1  - T-piece trials    PMR following  [facility] referral sent    Likely will be medically cleared in next 24-48 hours    Free intraperitoneal air  Assessment & Plan  Distention and air under diaphragm post PEG    CT abdomen pelvis with moderate amount of free air, that being said there is no intraperitoneal fluid and his PEG tube appears to be in appropriate position in the stomach  -Contrast study of the PEG confirms appropriate placement of the stomach    Discussed with surgery and GI-all services agree that this is likely procedural air and not related to perforation.  The absence of intraperitoneal fluid is reassuring, we have resumed tube feeds and medications through PEG.  Anticipate this free air will reabsorb naturally as the days go by.    Zosyn was started empirically, will continue this as fever is likely respiratory in nature    Acute neuromuscular respiratory failure (HCC)  Assessment & Plan  GI prophylaxis: h2 blocker    Continue T-Piece  RT protocols  PEP if needed    He requires monitoring in the ICU today to ensure he does not need to go back on mechanical ventilation    Elevated LFTs  Assessment & Plan  Mild elevation  Likely medication related  Trend    Type 2 diabetes mellitus with hyperglycemia, without long-term current use of insulin (HCC)- (present on admission)  Assessment & Plan  Hg A1 C 6.2  Moderate glycemic control with Insulin therapy.    Primary hypertension- (present on admission)  Assessment & Plan  Goal at this time -140   Amlodipine         VTE:  Lovenox  Ulcer: Not " Indicated  Lines: Argueta Catheter  Ongoing indication addressed and PEG    I have performed a physical exam and reviewed and updated ROS and Plan today (4/3/2025). In review of yesterday's note (4/2/2025), there are no changes except as documented above.     Discussed patient condition and risk of morbidity and/or mortality with RN, RT, Pharmacy, Patient, and neurology and physiatry    The patient remains critically ill.  His respiratory status is tenuous and requires careful monitoring in the ICU should he need to go back on mechanical ventilation.  Critical care time = 47 minutes in directly providing and coordinating critical care and extensive data review.  No time overlap and excludes procedures.

## 2025-04-03 NOTE — CARE PLAN
Problem: Aerosol Therapy  Goal: Improved hydration/ability to mobilize secretions and/or decreased airway edema  Description: Target End Date:  resolve prior to discharge or when underlying condition is resolved/stabilized1.  Implement heated or cool aerosol therapy2.  Assessed for optimal hydration, decreased edema and/or improved ability to mobilize secretions  Outcome: Progressing     Problem: Ventilation  Goal: Ability to achieve and maintain unassisted ventilation or tolerate decreased levels of ventilator support  Description: Target End Date:  4 days Document on Vent flowsheet1.  Support and monitor invasive and noninvasive mechanical ventilation2.  Monitor ventilator weaning response3.  Perform ventilator associated pneumonia prevention interventions4.  Manage ventilation therapy by monitoring diagnostic test results  Outcome: Met

## 2025-04-03 NOTE — THERAPY
Speech Language Pathology   Speaking Valve Evaluation     Patient Name: Jonna Brian  AGE:  49 y.o., SEX:  male  Medical Record #: [id number]  Date of Service: 4/3/2025      History of Present Illness  Patient is a 49 y.o. male admitted 3/25 with global weakness, aphasia, and ataxia. Hospital course complicated by progressive right side weakness, bulbar symptoms and difficulty with speech. Pt found to have cerebellar and brain stem stroke. Intubated 3/27-3/30. Trach placed 3/30.    PMHx: HTN, anxiety, DM2    SLP Hx:  CSE: 3/26/25-Recommended NPO   Cse 07/2017: [facility] rec [id number]    Imaging:   MRI-Brain-3/28/25  1.  Small areas of acute infarcts in the lower jero and upper medulla involving both sides of the midline.  2.  There are chronic infarcts in the jero and right inferior cerebellum. . There are areas of chronic lacunae in the bilateral basal ganglia, right thalamus and right periventricular white matter.  3.  There are nonspecific T2 hyperintensities in the periventricular white matter likely representing chronic small vessel disease.  4.  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    General Information  Vitals  O2 (LPM): 4  O2 Delivery Device: T-Piece  Level of Consciousness: Awake  Patient Behaviors:  (Cooperative)  Orientation: nonverbal   Follows Directives: Yes (Mobility impedes)      Prior Living Situation & Level of Function  Prior Services: Home-Independent  Housing / Facility: 2 Story Apartment / Condo  Lives with - Patient's Self Care Capacity: Alone and Able to Care For Self  Communication: WFL  Swallowing: WFL       Subjective  Pt seen this date for a speaking valve evaluation.Pt in bed, awake and responding to yes/no questions by blinking, smiling and nodding, or shaking head.        Assessment  Speaking Valve Assessment   Tracheostomy: Portex  Size: 8.0  Cuff Position: Inflated  Air in Cuff (cc): 5  Oxygen Requirements: 4 L 28 FiO2  (T-piece)  Oral Suctioning Provided: Yes  Tracheal Suctioning Provided: Pre cuff deflation, Subglottic suction, Prior to speaking valve placement  Upper Airway Patency with Speaking Valve: Supraglottic airflow adequacy        Breath Stacking Present: No  Behavior During Speaking Valve Trial: Relaxed  Duration of Speaking Valve Trial: 5 minutes;11 minutes  Speaking Valve Left On: No                  First trial:  Initial: 99% SpO2, 66 HR  End: 97% SpO2, 64 HR  Time: 5 min    Second trial (0 min later):  Initial: 97% SpO2, 64 HR  End: 99% SpO2, 65 HR  Time: 11 min    Comments: SLP supervisor completed suctioning. Mild secretions removed via tracheal suctioning; Minimal secretions removed via subglottic suctioning. SLP supervisor deflated cuff with stable cardiopulmonary vitals, PMV placed- No cough. Notable wet breath and vocal sounds. Deep suctioning through oral cavity with some improvement. Gag reflex with reflexive cough present. Of note, significant nasal secretions emitted-? Velar flaccidity. PMV removed at five minute wear time to assess for back pressure, none appreciated. Assessed again at 11 minute wear time, no back pressure appreciated. Pt appeared to request suctioning with yes/no head nod during PMV-appeared uncomfortable with pharyngeal secretions. Intonational, soft voicing to command.     Patient did not elicit a palpable swallow to command. Cuff re-inflated. RT updated      Benefits of a speaking valve include (1) improved oropharyngeal sensation by restoring airflow to the oropharynx, (2) improved cough effectiveness by restoring subglottic air pressure, (3) improved secretion management through improved ability to cough and orally expectorate, as well as increase evaporation of secretions during exhalation through the upper airway and (4) improved oxygenation by re-establishing physiologic positive end expiratory pressure (PEEP).       Clinical Impressions  Patient presents with adequate upper  airway patency with placement of speaking valve s/p tracheostomy. Reduced phonatory volume; apparent attempts to voice to command. 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 Significant and unmanaged oral secretions. Recommend placement by trained staff with direct supervision for secretion management; remove with sustained negative change to cardiopulmonary vitals or perceptual difficulty breathing.       Recommendations  1. Patient to use speaking valve under the following conditions:   Placement: Trained staff only   Duration: 15 minutes with direct supervision for secretion management   Cuff: MUST be deflated prior to speaking valve placement  2. Remove speaking valve with any signs of respiratory distress.      SLP Treatment Plan  Treatment Plan: Voice Prosthesis Training  SLP Frequency: 4x Per Week  Estimated Duration: Until Therapy Goals Met      Anticipated Discharge Needs  Discharge Recommendations: Recommend post-acute placement for additional speech therapy services prior to discharge home  Therapy Recommendations Upon DC: Dysphagia Training, Cognitive-Linguistic Training, AAC Training / Development, Community Re-Integration, Patient / Family / Caregiver Education, Tracheostomy Training       Patient / Family Goals  Patient / Family Goal #1: Smiles to continue PMV treatment  Short Term Goals  Short Term Goal # 1: Pt will tolerate PMV trials without negitive change to cardiopulmonary vitals for 20 minutes.  Goal Outcome # 1:  (New goal 4/3)      Millicent Casas, Student  Supervised by Karen Anderson MS CCC-SLP

## 2025-04-03 NOTE — DISCHARGE PLANNING
Case Management Discharge Planning    Admission Date: 3/25/2025  GMLOS: 23.1  ALOS: 9    6-Clicks ADL Score: 6  6-Clicks Mobility Score: 6  PT and/or OT Eval ordered: Yes  Post-acute Referrals Ordered: Yes  Post-acute Choice Obtained: Yes  Has referral(s) been sent to post-acute provider:  Yes      Anticipated Discharge Dispo: Discharge Disposition: Disch to a long term care facility (63)    DME Needed: No    Action(s) Taken: Chart reviewed, pt discussed in IDT rounds. He is possibly medically cleared for [facility] tomorrow.    Escalations Completed: None    Medically Clear: No    Next Steps: F/U with [facility] for bed availability and insurance auth    Barriers to Discharge: Medical clearance and Pending Insurance Authorization    Is the patient up for discharge tomorrow: TBD    4207- Updated Tyrone at [facility] that pt is possibly medically cleared tomorrow. She says that they have received ins auth. Will f/u tomorrow for bed if medically cleared.

## 2025-04-03 NOTE — CARE PLAN
The patient is Watcher - Medium risk of patient condition declining or worsening    Shift Goals  Clinical Goals: Maintain oxygen, suction as needed and manage secretions,  Patient Goals: comfort  Family Goals: updates    Progress made toward(s) clinical / shift goals:    Problem: Pain - Standard  Goal: Alleviation of pain or a reduction in pain to the patient’s comfort goal  Description: Target End Date:  Prior to discharge or change in level of careDocument on Vitals flowsheet1.  Document pain using the appropriate pain scale per order or unit policy2.  Educate and implement non-pharmacologic comfort measures (i.e. relaxation, distraction, massage, cold/heat therapy, etc.)3.  Pain management medications as ordered4.  Reassess pain after pain med administration per policy5.  If opiods administered assess patient's response to pain medication is appropriate per POSS sedation scale6.  Follow pain management plan developed in collaboration with patient and interdisciplinary team (including palliative care or pain specialists if applicable)  Outcome: Progressing     Problem: Optimal Care of the Stroke Patient  Goal: Optimal acute care for the stroke patient  Description: Target End Date:  1 to 3 days- Vital signs and neuro checks performed and documented per order- NIHSS completed and documented per order- Continuous telemetry monitoring for 72 hours or until discontinued by provider- Head CT without contrast obtained- Consideration of MRI/MRA- MRI screening form completed in worklist if MRI ordered- Echocardiogram with Bubble Study ordered/completed with consideration of SHAYAN- Carotid Doppler ordered/completed (Not required if CTA of neck completed in ED)- Lipid Panel obtained within 48 hours of admission- PT, PTT, INR obtained per Anticoagulation orders (if applicable)- Antithrombotic therapy by end of hospital day 2 for ischemic stroke. Provider must document reason if contraindicated.- Venous Thromboembolism (VTE)  Prophylaxis by end of hospital day 2 for ischemic and hemorrhagic stroke. Provider must document reason if contraindicated- Dysphagia screen completed and documented prior to any PO intake. Patient to remain NPO until Speech Therapy evaluation if thrombolytic or thrombectomy performed- Rehabilitation assessment including PT/OT/SLP evaluations for referral to Physical Medicine and Rehabilitation services. If none needed, provider needs to document reason- Neurology consult placed- Consideration of cardiology consult for cryptogenic strokes  Outcome: Progressing     Problem: Knowledge Deficit - Stroke Education  Goal: Patient's knowledge of stroke and risk factors will improve  Description: Target End Date:  1-3 days or as soon as patient condition allowsDocument in Patient Education1.  Stroke education booklet provided2.  Education regarding EMS activation, need for follow up, medication prescribed at discharge, risk factors for stroke/lifestyle modifications, warning signs and symptoms of stroke provided  Outcome: Progressing     Problem: Neuro Status  Goal: Neuro status will remain stable or improve  Description: Target End Date:  Prior to discharge or change in level of careDocument on Neuro assessment in the Assessment flowsheet1.  Assess and monitor neurologic status per provider order/protocol/unit policy2.  Assess level of consciousness and orientation3.  Assess for speech, dysarthria, dysphagia, facial symmetry4.  Assess visual field, eye movements, gaze preference, pupil reaction and size5.  Assess muscle strength and motor response in all four extremities6.  Assess for sensation (numbness and tingling)7.  Assess basic neuro reflexes (cough, gag, corneal)8.  Identify changes in neuro status and report to provider for testing/treatment orders  Outcome: Progressing     Problem: Hemodynamic Monitoring  Goal: Patient's hemodynamics, fluid balance and neurologic status will be stable or improve  Description:  Target End Date:  Prior to discharge or change in level of care1.  Vital signs, pulse oximetry and cardiac monitor per provider order and/or policy2.  Frequent pulse checks performed post thrombectomy3.  Frequent monitoring for signs of bleeding post TPA administration4.  Proper management of IV infusions5.  Intake and output monitored per provider order6.  Daily weight obtained per unit policy or provider order7.  Peripheral pulses and capillary refill assessed as needed8.  Monitor for signs/symptoms of excessive bleeding9.  Body temperature assessed and fevers [id number]. Patient positioned for maximum circulation/cardiac output  Outcome: Progressing       Patient is not progressing towards the following goals:

## 2025-04-03 NOTE — PROGRESS NOTES
"  DATE: 4/3/2025    HD7 brainstem/pontine hemorrhage    INTERVAL EVENTS:  No feeding issues overnight.  Having BM  TF at goal    PHYSICAL EXAMINATION:  Vital Signs: /69   Pulse 67   Temp 37.6 °C (99.6 °F) (Axillary)   Resp (!) 25   Ht 1.854 m (6' 0.99\")   Wt 100 kg (220 lb 7.4 oz)   SpO2 97%   Abdomen soft, nondistended  2 buttons remain in place under the bumper    LABORATORY VALUES:  Recent Labs     04/01/25  0330 04/02/25  0530 04/03/25  0550   WBC 8.9 10.2 10.7   RBC 4.30* 4.70 4.50*   HEMOGLOBIN 11.6* 13.0* 12.2*   HEMATOCRIT 36.1* 40.0* 38.6*   MCV 84.0 85.1 85.8   MCH 27.0 27.7 27.1   MCHC 32.1* 32.5 31.6*   RDW 40.7 41.7 41.2   PLATELETCT 271 293 308   MPV 8.9* 9.2 8.7*     Recent Labs     04/01/25  0330 04/02/25  0530 04/03/25  0550   SODIUM 137 138 139   POTASSIUM 4.3 4.6 4.1   CHLORIDE 106 105 104   CO2 21 20 24   GLUCOSE 119* 156* 138*   BUN 17 21 25*   CREATININE 0.83 0.85 0.92   CALCIUM 9.0 9.3 9.5     Recent Labs     04/01/25  0330 04/02/25  0530 04/03/25  0550   ASTSGOT 35 94* 61*   ALTSGPT 66* 131* 144*   TBILIRUBIN 0.6 0.3 0.3   ALKPHOSPHAT 99 185* 175*   GLOBULIN 3.4 3.7* 3.7*           IMAGING:  DX-CHEST-PORTABLE (1 VIEW)   Final Result         1.  Left basilar atelectasis, no focal infiltrate   2.  Cardiomegaly      DX-G.I. TUBE INJECTION, ANY TYPE   Final Result      Contrast from a PEG tube injection extends into the stomach without evidence of contrast leak.      CT-CHEST,ABDOMEN,PELVIS WITH   Final Result      1.  Large amount of free intraperitoneal air within the abdomen again seen as was previously identified on chest x-ray by review of the patient's chart, a percutaneous gastrostomy tube was placed on 3/31/2025 and is free intraperitoneal air is possibly    secondary to that recent procedure.      2.  Bibasal atelectasis and small bilateral pleural effusions.      3.  Tracheostomy tube present.      4.  No evidence of bowel obstruction or free fluid within the abdomen or " pelvis.      DX-CHEST-PORTABLE (1 VIEW)   Final Result         1.  Bibasilar atelectasis   2.  Intra-abdominal free air, can be associated with history of percutaneous gastrostomy, consider other viscus perforation as warranted. Could be further evaluated with CT of the abdomen with contrast as clinically appropriate.      These findings were discussed with the patient's clinician, Bravo Lala Iv, on 4/2/2025 7:57 AM.      DX-CHEST-PORTABLE (1 VIEW)   Final Result      No evidence of acute cardiopulmonary process.      DX-ABDOMEN FOR TUBE PLACEMENT   Final Result      1.  Enteric tube extends in the fundus of stomach.      DX-ABDOMEN FOR TUBE PLACEMENT   Final Result      The gastric tube has been removed and replaced with a small bowel feeding tube which terminate in the proximal stomach.      DX-CHEST-PORTABLE (1 VIEW)   Final Result      Atelectasis within the left lung base.      MR-THORACIC SPINE-WITH & W/O   Final Result      1.  There is no abnormal intramedullary T2 signal intensity in the thoracic spinal cord.   2.  Mild degenerative disease at T8-9.   3.  Left lower segmental consolidation.      MR-CERVICAL SPINE-WITH & W/O   Final Result      1.  Small areas of acute infarcts in the lower jero and upper medulla involving both sides of the midline. There are chronic infarcts in the jero and right inferior cerebellum.   2.  There is no abnormal intramedullary T2 signal intensity in the cervical spinal cord to suggest demyelinating lesions. There is no MR evidence of compressive myelopathy.   3.  Mild degenerative disease.            MR-BRAIN-WITH & W/O   Final Result      1.  Small areas of acute infarcts in the lower jero and upper medulla involving both sides of the midline.   2.  There are chronic infarcts in the jero and right inferior cerebellum. . There are areas of chronic lacunae in the bilateral basal ganglia, right thalamus and right periventricular white matter.   3.  There are nonspecific T2  hyperintensities in the periventricular white matter likely representing chronic small vessel disease.   4.  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      MR-LUMBAR SPINE-WITH & W/O   Final Result      1.  Unremarkable pre and postcontrast MR examination of the lumbar spine.   2.  Clinical suspicious for GBS: There is no abnormal enhancement of the lumbar nerve roots.      DX-ABDOMEN FOR TUBE PLACEMENT   Final Result      NG tube tip projects at the peripyloric region.      DX-CHEST-PORTABLE (1 VIEW)   Final Result      1.  Low lung volumes without definite acute cardiopulmonary abnormality.   2.  Support apparatus as above.      CT-HEAD W/O   Final Result      1.  No acute intracranial abnormality.   2.  Remote right cerebellar infarct.               DX-CHEST-PORTABLE (1 VIEW)   Final Result      No acute cardiopulmonary disease evident.      DX-CHEST-PORTABLE (1 VIEW)   Final Result      No acute cardiopulmonary disease evident.      CT-CEREBRAL PERFUSION ANALYSIS   Final Result      1. Cerebral blood flow less than 30% possibly representing completed infarct = 0 mL. Based on distribution of this finding, this is unlikely to represent artifact.      2. T Max more than 6 seconds possibly representing combination of completed infarct and ischemia = 7 mL. Based on the distribution of this finding, this is possibly artifact.      3. Mismatched volume possibly representing ischemic brain/penumbra= 0 mL      4.  Please note that this cerebral perfusion study and report is Quantitative and targets supratentorial (cerebral) perfusion for evaluation of large vessel territory acute ischemia/infarction. For example, lacunar infarcts, and brainstem/posterior fossa    ischemia/infarction are not evaluated on this study.  Data acquisition is subject to artifacts which can yield non-anatomically plausible perfusion maps which may be due to motion, bolus timing, signal  "to noise ratio, or other technical factors.    Perfusion map abnormalities which show non-anatomic distributions are likely artifact.   This study is not \"stand-alone\" and should only be utilized for diagnosis, management/treatment in correlation with CT, CTA, and/or MRI and clinical factors.         CT-CTA NECK WITH & W/O-POST PROCESSING   Final Result      CT angiogram of the neck within normal limits.      CT-CTA HEAD WITH & W/O-POST PROCESS   Final Result      CT angiogram of the [place] within normal limits.      CT-HEAD W/O   Final Result      Head CT without contrast within normal limits. No evidence of acute cerebral infarction, hemorrhage or mass lesion.                   ASSESSMENT AND PLAN:  1) Free Air:    Likely relate to procedure.  No need for surgery.    Signing off.       ____________________________________     Pedro Hernandez M.D.      "

## 2025-04-03 NOTE — CARE PLAN
The patient is Watcher - Medium risk of patient condition declining or worsening    Shift Goals  Clinical Goals: t-piece, maintain bp 100-140  Patient Goals: jennifer  Family Goals: jennifer    Progress made toward(s) clinical / shift goals:    Problem: Knowledge Deficit - Standard  Goal: Patient and family/care givers will demonstrate understanding of plan of care, disease process/condition, diagnostic tests and medications  Outcome: Progressing     Problem: Knowledge Deficit - Stroke Education  Goal: Patient's knowledge of stroke and risk factors will improve  Outcome: Progressing     Problem: Neuro Status  Goal: Neuro status will remain stable or improve  Outcome: Progressing     Problem: Hemodynamic Monitoring  Goal: Patient's hemodynamics, fluid balance and neurologic status will be stable or improve  Outcome: Progressing     Problem: Urinary Elimination  Goal: Establish and maintain regular urinary output  Outcome: Progressing       Patient is not progressing towards the following goals:

## 2025-04-04 PROBLEM — J15.69 PROTEUS MIRABILIS PNEUMONIA (HCC): Status: ACTIVE | Noted: 2025-04-04

## 2025-04-04 LAB
ALBUMIN SERPL BCP-MCNC: 3.4 G/DL (ref 3.2–4.9)
ALP SERPL-CCNC: 171 U/L (ref 30–99)
ALT SERPL-CCNC: 130 U/L (ref 2–50)
ANION GAP SERPL CALC-SCNC: 10 MMOL/L (ref 7–16)
AST SERPL-CCNC: 49 U/L (ref 12–45)
BACTERIA SPEC RESP CULT: ABNORMAL
BACTERIA SPEC RESP CULT: ABNORMAL
BILIRUB CONJ SERPL-MCNC: <0.2 MG/DL (ref 0.1–0.5)
BILIRUB INDIRECT SERPL-MCNC: ABNORMAL MG/DL (ref 0–1)
BILIRUB SERPL-MCNC: 0.2 MG/DL (ref 0.1–1.5)
BUN SERPL-MCNC: 23 MG/DL (ref 8–22)
CALCIUM SERPL-MCNC: 9.3 MG/DL (ref 8.5–10.5)
CHLORIDE SERPL-SCNC: 105 MMOL/L (ref 96–112)
CO2 SERPL-SCNC: 24 MMOL/L (ref 20–33)
CREAT SERPL-MCNC: 0.72 MG/DL (ref 0.5–1.4)
ERYTHROCYTE [DISTWIDTH] IN BLOOD BY AUTOMATED COUNT: 41.5 FL (ref 35.9–50)
GFR SERPLBLD CREATININE-BSD FMLA CKD-EPI: 113 ML/MIN/1.73 M 2
GLUCOSE BLD STRIP.AUTO-MCNC: 120 MG/DL (ref 65–99)
GLUCOSE BLD STRIP.AUTO-MCNC: 133 MG/DL (ref 65–99)
GLUCOSE BLD STRIP.AUTO-MCNC: 150 MG/DL (ref 65–99)
GLUCOSE BLD STRIP.AUTO-MCNC: 160 MG/DL (ref 65–99)
GLUCOSE SERPL-MCNC: 186 MG/DL (ref 65–99)
GRAM STN SPEC: ABNORMAL
HCT VFR BLD AUTO: 39.8 % (ref 42–52)
HGB BLD-MCNC: 12.6 G/DL (ref 14–18)
MCH RBC QN AUTO: 27.2 PG (ref 27–33)
MCHC RBC AUTO-ENTMCNC: 31.7 G/DL (ref 32.3–36.5)
MCV RBC AUTO: 85.8 FL (ref 81.4–97.8)
PLATELET # BLD AUTO: 359 K/UL (ref 164–446)
PMV BLD AUTO: 8.7 FL (ref 9–12.9)
POTASSIUM SERPL-SCNC: 3.9 MMOL/L (ref 3.6–5.5)
PROT SERPL-MCNC: 6.9 G/DL (ref 6–8.2)
RBC # BLD AUTO: 4.64 M/UL (ref 4.7–6.1)
SIGNIFICANT IND 70042: ABNORMAL
SITE SITE: ABNORMAL
SODIUM SERPL-SCNC: 139 MMOL/L (ref 135–145)
SOURCE SOURCE: ABNORMAL
WBC # BLD AUTO: 8.9 K/UL (ref 4.8–10.8)

## 2025-04-04 PROCEDURE — 770022 HCHG ROOM/CARE - ICU (200)

## 2025-04-04 PROCEDURE — 700105 HCHG RX REV CODE 258: Performed by: STUDENT IN AN ORGANIZED HEALTH CARE EDUCATION/TRAINING PROGRAM

## 2025-04-04 PROCEDURE — 700102 HCHG RX REV CODE 250 W/ 637 OVERRIDE(OP): Performed by: STUDENT IN AN ORGANIZED HEALTH CARE EDUCATION/TRAINING PROGRAM

## 2025-04-04 PROCEDURE — 99233 SBSQ HOSP IP/OBS HIGH 50: CPT | Performed by: STUDENT IN AN ORGANIZED HEALTH CARE EDUCATION/TRAINING PROGRAM

## 2025-04-04 PROCEDURE — 85027 COMPLETE CBC AUTOMATED: CPT

## 2025-04-04 PROCEDURE — 80076 HEPATIC FUNCTION PANEL: CPT

## 2025-04-04 PROCEDURE — 80048 BASIC METABOLIC PNL TOTAL CA: CPT

## 2025-04-04 PROCEDURE — 700111 HCHG RX REV CODE 636 W/ 250 OVERRIDE (IP): Mod: JZ | Performed by: STUDENT IN AN ORGANIZED HEALTH CARE EDUCATION/TRAINING PROGRAM

## 2025-04-04 PROCEDURE — 700102 HCHG RX REV CODE 250 W/ 637 OVERRIDE(OP): Performed by: INTERNAL MEDICINE

## 2025-04-04 PROCEDURE — 94640 AIRWAY INHALATION TREATMENT: CPT

## 2025-04-04 PROCEDURE — A9270 NON-COVERED ITEM OR SERVICE: HCPCS | Performed by: INTERNAL MEDICINE

## 2025-04-04 PROCEDURE — 82962 GLUCOSE BLOOD TEST: CPT | Mod: 91

## 2025-04-04 PROCEDURE — A9270 NON-COVERED ITEM OR SERVICE: HCPCS | Performed by: STUDENT IN AN ORGANIZED HEALTH CARE EDUCATION/TRAINING PROGRAM

## 2025-04-04 RX ORDER — HYDRALAZINE HYDROCHLORIDE 10 MG/1
10 TABLET, FILM COATED ORAL ONCE
Status: ACTIVE | OUTPATIENT
Start: 2025-04-04 | End: 2025-04-05

## 2025-04-04 RX ADMIN — POTASSIUM BICARBONATE 25 MEQ: 978 TABLET, EFFERVESCENT ORAL at 09:07

## 2025-04-04 RX ADMIN — HYDRALAZINE HYDROCHLORIDE 20 MG: 20 INJECTION, SOLUTION INTRAMUSCULAR; INTRAVENOUS at 11:11

## 2025-04-04 RX ADMIN — IBUPROFEN 600 MG: 400 TABLET, FILM COATED ORAL at 04:08

## 2025-04-04 RX ADMIN — LABETALOL HYDROCHLORIDE 10 MG: 5 INJECTION, SOLUTION INTRAVENOUS at 16:21

## 2025-04-04 RX ADMIN — LABETALOL HYDROCHLORIDE 10 MG: 5 INJECTION, SOLUTION INTRAVENOUS at 20:29

## 2025-04-04 RX ADMIN — Medication 5 MG: at 20:07

## 2025-04-04 RX ADMIN — PIPERACILLIN AND TAZOBACTAM 4.5 G: 4; .5 INJECTION, POWDER, FOR SOLUTION INTRAVENOUS at 06:06

## 2025-04-04 RX ADMIN — AMOXICILLIN AND CLAVULANATE POTASSIUM 1 TABLET: 875; 125 TABLET, FILM COATED ORAL at 17:09

## 2025-04-04 RX ADMIN — SENNOSIDES AND DOCUSATE SODIUM 2 TABLET: 50; 8.6 TABLET ORAL at 17:09

## 2025-04-04 RX ADMIN — CLOPIDOGREL BISULFATE 75 MG: 75 TABLET, FILM COATED ORAL at 06:04

## 2025-04-04 RX ADMIN — AMOXICILLIN AND CLAVULANATE POTASSIUM 1 TABLET: 875; 125 TABLET, FILM COATED ORAL at 11:10

## 2025-04-04 RX ADMIN — GABAPENTIN 400 MG: 400 CAPSULE ORAL at 06:04

## 2025-04-04 RX ADMIN — ASPIRIN 81 MG: 81 TABLET, CHEWABLE ORAL at 06:04

## 2025-04-04 RX ADMIN — RIVAROXABAN 10 MG: 10 TABLET, FILM COATED ORAL at 17:09

## 2025-04-04 RX ADMIN — GABAPENTIN 400 MG: 400 CAPSULE ORAL at 17:09

## 2025-04-04 RX ADMIN — ACETAMINOPHEN 650 MG: 325 TABLET, FILM COATED ORAL at 04:09

## 2025-04-04 RX ADMIN — GABAPENTIN 400 MG: 400 CAPSULE ORAL at 11:11

## 2025-04-04 RX ADMIN — HYDRALAZINE HYDROCHLORIDE 20 MG: 20 INJECTION, SOLUTION INTRAMUSCULAR; INTRAVENOUS at 03:04

## 2025-04-04 RX ADMIN — AMLODIPINE BESYLATE 10 MG: 10 TABLET ORAL at 06:05

## 2025-04-04 RX ADMIN — LABETALOL HYDROCHLORIDE 10 MG: 5 INJECTION, SOLUTION INTRAVENOUS at 08:59

## 2025-04-04 RX ADMIN — ATORVASTATIN CALCIUM 80 MG: 80 TABLET, FILM COATED ORAL at 17:09

## 2025-04-04 RX ADMIN — INSULIN LISPRO 1 UNITS: 100 INJECTION, SOLUTION INTRAVENOUS; SUBCUTANEOUS at 12:23

## 2025-04-04 ASSESSMENT — FIBROSIS 4 INDEX: FIB4 SCORE: 0.56

## 2025-04-04 ASSESSMENT — PAIN DESCRIPTION - PAIN TYPE
TYPE: ACUTE PAIN;CHRONIC PAIN
TYPE: ACUTE PAIN
TYPE: ACUTE PAIN;CHRONIC PAIN

## 2025-04-04 NOTE — PROGRESS NOTES
..Gastroenterology Progress Note               Author:  ZANDER Mckeon   Date & Time Created: 4/3/2025 5:11 PM       Patient ID:  Name:             Jonna Brian    YOB: 1975  Age:                 49 y.o.  male  MRN:               [id number]    Medical Decision Making, by Problem:  Active Hospital Problems    Diagnosis     Free intraperitoneal air [K66.8]     Acute ischemic stroke (HCC) [I63.9]     Acute neuromuscular respiratory failure (HCC) [J96.00, G70.9]     Type 2 diabetes mellitus with hyperglycemia, without long-term current use of insulin (HCC) [E11.65]     Elevated LFTs [R79.89]     Primary hypertension [I10]      Presenting Chief Complaint:  Pontine stroke, dysphagia     HISTORY OF PRESENT ILLNESS:  Jonna Brian is a 49 y.o. male with hypertension, DM2 who presented 3/25 for dizziness, weakness and speech difficulty. He was evaluated by neurology and had a stroke alert and workup performed and no large vessel occlusion was seen and metabolic workup recommended. He since has had progressive right side weakness, bulbar symptoms and difficulty with speech. Neurology evaluated him and recommend ICU level care due to concerns for brainstem stroke. Shortly after arrival in the ICU patient developed stridor and required emergent intubation. MRI revealed pontine/medullary infarct    Interval History:  3/30/2025: Patient seen. Hemodynamically stable. Labs unremarkable. Able to blink once for yes and twice for no. Discussed planned PEG placement tomorrow and he blinks once to consent to procedure. Possible trach placement today.     4/1/2025: Postprocedure day #1 s/p PEG tube placement.  Patient seen at bedside with friend, Queta.  Patient awake, alert.  Blinks to communicate yes/no answers.  PEG tube in place.  Tube feeds in process without difficulty.  Site assessed.  Small amount of dried blood surrounding T-fastener sites.  No active bleeding, oozing, erythema, edema,  drainage.    4/2/2025: called back by primary team due to large amount of free intraperitoneal air seen on xray and ct abd/pelvis. Imaging reviewed with GI physicians. Peg tube placement in stomach. No intraperitoneal fluid seen on ct. No stools overnight. WBC 10.2, hgb 13. Lactic acid 0.9.     4/3/2025: Patient seen at bedside.  Abdomen soft, tube feed infusing without issue.  Tube T-fasteners in place.  There is small amount of previous oozing.  No active bleeding/drainage.  G-tube injection study yesterday showed contrast extending into stomach without evidence of leak.      Hospital Medications:  Current Facility-Administered Medications   Medication Dose Frequency Provider Last Rate Last Admin    acetaminophen (Tylenol) tablet 650 mg  650 mg Q6HRS PRN Bravo Lala M.D.   650 mg at 04/03/25 1651    oxyCODONE immediate-release (Roxicodone) tablet 5 mg  5 mg Q4HRS PRN Bravo Lala M.D.        rivaroxaban (Xarelto) tablet 10 mg  10 mg DAILY AT 1800 Bravo Lala M.D.        piperacillin-tazobactam (Zosyn) 4.5 g in  mL IVPB  4.5 g Q8HRS Bravo Lala M.D.   Stopped at 04/03/25 1627    acetaminophen (Tylenol) suppository 650 mg  650 mg Q6HRS PRN Bravo Lala M.D.   650 mg at 04/02/25 1002    clopidogrel (Plavix) tablet 75 mg  75 mg DAILY Bravo Lala M.D.   75 mg at 04/03/25 0531    hydrALAZINE (Apresoline) injection 20 mg  20 mg Q6HRS PRN Bravo Lala M.D.   20 mg at 04/02/25 0957    labetalol (Normodyne/Trandate) injection 10 mg  10 mg Q4HRS PRN Bravo Lala M.D.   10 mg at 04/03/25 1507    atorvastatin (Lipitor) tablet 80 mg  80 mg Q EVENING Bravo Lala M.D.   80 mg at 04/02/25 1725    gabapentin (Neurontin) capsule 400 mg  400 mg TID Bravo Lala M.D.   400 mg at 04/03/25 1218    insulin lispro (HumaLOG,AdmeLOG) subcutaneous injection  1-6 Units Q6HRS Marion Chen   1 Units at 04/03/25 1218    And    dextrose 50 % (D50W) injection 25 g  25 g Q15 MIN PRN Marion Chen         "Respiratory Therapy Consult   Continuous RT Maddy Torres M.D.        senna-docusate (Pericolace Or Senokot S) 8.6-50 MG per tablet 2 Tablet  2 Tablet BID Maddy Torres M.D.   2 Tablet at 04/03/25 0531    And    polyethylene glycol/lytes (Miralax) Packet 1 Packet  1 Packet QDAY PRN Maddy Torres M.D.   1 Packet at 04/03/25 0531    And    magnesium hydroxide (Milk Of Magnesia) suspension 30 mL  30 mL QDAY PRN Maddy Torres M.D.   30 mL at 03/29/25 1802    And    bisacodyl (Dulcolax) suppository 10 mg  10 mg QDAY PRN Maddy Torres M.D. MD Alert...ICU Electrolyte Replacement per Pharmacy   PHARMACY TO DOSE Maddy Torres M.D.        Pharmacy Consult: Enteral tube insertion - review meds/change route/product selection  1 Each PHARMACY TO DOSE Maddy Torres M.D.        aspirin (Asa) chewable tab 81 mg  81 mg DAILY Maddy Torres M.D.   81 mg at 04/03/25 0531    ibuprofen (Motrin) tablet 600 mg  600 mg Q6HRS PRN Maddy Torres M.D.   600 mg at 04/03/25 1644    melatonin tablet 5 mg  5 mg Nightly Maddy Torres M.D.   5 mg at 04/02/25 2031    amLODIPine (Norvasc) tablet 10 mg  10 mg DAILY Grant Gabriel M.D.   10 mg at 04/03/25 0531    ondansetron (Zofran ODT) dispertab 4 mg  4 mg Q4HRS PRN Maddy Torres M.D.        ondansetron (Zofran) syringe/vial injection 4 mg  4 mg Q4HRS PRN Darrel Polo M.D.       Last reviewed on 3/25/2025  7:26 PM by Sarahi Ramirez       Review of Systems:  Review of Systems   Unable to perform ROS: Medical condition         Vital signs:  Weight/BMI: Body mass index is 26.3 kg/m².  /77   Pulse 69   Temp 37.6 °C (99.6 °F) (Axillary)   Resp (!) 26   Ht 1.854 m (6' 0.99\")   Wt 90.4 kg (199 lb 4.7 oz)   SpO2 97%   Vitals:    04/03/25 1421 04/03/25 1500 04/03/25 1507 04/03/25 1600   BP:  (!) 151/81 (!) 144/80 131/77   Pulse: 63 63  69   Resp: (!) 29 (!) 31  (!) 26   Temp:    37.6 °C (99.6 °F)   TempSrc:    Axillary   SpO2: 100% 98%  97%   Weight:     "   Height:         Oxygen Therapy:  Pulse Oximetry: 97 %, O2 (LPM): 4, FiO2%: 28 %, O2 Delivery Device: T-Piece    Intake/Output Summary (Last 24 hours) at 4/3/2025 1711  Last data filed at 4/3/2025 1655  Gross per 24 hour   Intake 671.18 ml   Output 890 ml   Net -218.82 ml       Physical Exam  Vitals and nursing note reviewed.   Constitutional:       General: He is awake. He is not in acute distress.  HENT:      Head: Normocephalic and atraumatic.      Right Ear: External ear normal.      Left Ear: External ear normal.      Mouth/Throat:      Mouth: Mucous membranes are moist.      Pharynx: Oropharynx is clear. No oropharyngeal exudate.   Eyes:      General: No scleral icterus.     Conjunctiva/sclera: Conjunctivae normal.   Cardiovascular:      Rate and Rhythm: Normal rate and regular rhythm.      Pulses: Normal pulses.      Heart sounds: Normal heart sounds.   Pulmonary:      Effort: Pulmonary effort is normal.      Comments: Intubated with ventilated breath sounds  Abdominal:      General: Abdomen is flat. Bowel sounds are normal. There is no distension.      Palpations: Abdomen is soft.      Tenderness: There is no abdominal tenderness.      Comments: PEG tube to left upper quadrant.  T-fastener x 2 in place.  Small amount of dried blood at insertion sites but otherwise no surrounding erythema, edema, drainage, active bleeding.   Musculoskeletal:      Right lower leg: No edema.      Left lower leg: No edema.   Skin:     General: Skin is warm and dry.      Capillary Refill: Capillary refill takes less than 2 seconds.   Neurological:      Mental Status: He is alert.      Comments: No purposeful movement  Able to blink yes or no to questions         Labs:  Recent Labs     04/01/25  0330 04/02/25  0530 04/03/25  0550   SODIUM 137 138 139   POTASSIUM 4.3 4.6 4.1   CHLORIDE 106 105 104   CO2 21 20 24   BUN 17 21 25*   CREATININE 0.83 0.85 0.92   CALCIUM 9.0 9.3 9.5     Recent Labs     03/31/25  1840 04/01/25  0330  04/02/25  0530 04/03/25  0550   ALTSGPT  --  66* 131* 144*   ASTSGOT  --  35 94* 61*   ALKPHOSPHAT  --  99 185* 175*   TBILIRUBIN  --  0.6 0.3 0.3   PREALBUMIN 18.6  --   --   --    GLUCOSE  --  119* 156* 138*     Recent Labs     04/01/25  0330 04/02/25  0530 04/03/25  0550   WBC 8.9 10.2 10.7   NEUTSPOLYS 78.60* 82.20* 73.90*   LYMPHOCYTES 10.00* 6.90* 12.70*   MONOCYTES 10.50 9.80 12.40   EOSINOPHILS 0.40 0.20 0.50   BASOPHILS 0.20 0.30 0.20   ASTSGOT 35 94* 61*   ALTSGPT 66* 131* 144*   ALKPHOSPHAT 99 185* 175*   TBILIRUBIN 0.6 0.3 0.3     Recent Labs     04/01/25  0330 04/02/25  0530 04/03/25  0550   RBC 4.30* 4.70 4.50*   HEMOGLOBIN 11.6* 13.0* 12.2*   HEMATOCRIT 36.1* 40.0* 38.6*   PLATELETCT 271 293 308     Recent Results (from the past 24 hours)   POCT glucose device results    Collection Time: 04/02/25  5:18 PM   Result Value Ref Range    POC Glucose, Blood 136 (H) 65 - 99 mg/dL   POCT glucose device results    Collection Time: 04/03/25 12:42 AM   Result Value Ref Range    POC Glucose, Blood 117 (H) 65 - 99 mg/dL   POCT glucose device results    Collection Time: 04/03/25  5:48 AM   Result Value Ref Range    POC Glucose, Blood 122 (H) 65 - 99 mg/dL   CBC with Differential    Collection Time: 04/03/25  5:50 AM   Result Value Ref Range    WBC 10.7 4.8 - 10.8 K/uL    RBC 4.50 (L) 4.70 - 6.10 M/uL    Hemoglobin 12.2 (L) 14.0 - 18.0 g/dL    Hematocrit 38.6 (L) 42.0 - 52.0 %    MCV 85.8 81.4 - 97.8 fL    MCH 27.1 27.0 - 33.0 pg    MCHC 31.6 (L) 32.3 - 36.5 g/dL    RDW 41.2 35.9 - 50.0 fL    Platelet Count 308 164 - 446 K/uL    MPV 8.7 (L) 9.0 - 12.9 fL    Neutrophils-Polys 73.90 (H) 44.00 - 72.00 %    Lymphocytes 12.70 (L) 22.00 - 41.00 %    Monocytes 12.40 0.00 - 13.40 %    Eosinophils 0.50 0.00 - 6.90 %    Basophils 0.20 0.00 - 1.80 %    Immature Granulocytes 0.30 0.00 - 0.90 %    Nucleated RBC 0.00 0.00 - 0.20 /100 WBC    Neutrophils (Absolute) 7.90 (H) 1.82 - 7.42 K/uL    Lymphs (Absolute) 1.36 1.00 - 4.80  K/uL    Monos (Absolute) 1.33 (H) 0.00 - 0.85 K/uL    Eos (Absolute) 0.05 0.00 - 0.51 K/uL    Baso (Absolute) 0.02 0.00 - 0.12 K/uL    Immature Granulocytes (abs) 0.03 0.00 - 0.11 K/uL    NRBC (Absolute) 0.00 K/uL   Comp Metabolic Panel    Collection Time: 04/03/25  5:50 AM   Result Value Ref Range    Sodium 139 135 - 145 mmol/L    Potassium 4.1 3.6 - 5.5 mmol/L    Chloride 104 96 - 112 mmol/L    Co2 24 20 - 33 mmol/L    Anion Gap 11.0 7.0 - 16.0    Glucose 138 (H) 65 - 99 mg/dL    Bun 25 (H) 8 - 22 mg/dL    Creatinine 0.92 0.50 - 1.40 mg/dL    Calcium 9.5 8.5 - 10.5 mg/dL    Correct Calcium 10.0 8.5 - 10.5 mg/dL    AST(SGOT) 61 (H) 12 - 45 U/L    ALT(SGPT) 144 (H) 2 - 50 U/L    Alkaline Phosphatase 175 (H) 30 - 99 U/L    Total Bilirubin 0.3 0.1 - 1.5 mg/dL    Albumin 3.4 3.2 - 4.9 g/dL    Total Protein 7.1 6.0 - 8.2 g/dL    Globulin 3.7 (H) 1.9 - 3.5 g/dL    A-G Ratio 0.9 g/dL   ESTIMATED GFR    Collection Time: 04/03/25  5:50 AM   Result Value Ref Range    GFR (CKD-EPI) 103 >60 mL/min/1.73 m 2   CoV-2, Flu A/B, And RSV by PCR ([facility])    Collection Time: 04/03/25  8:00 AM    Specimen: Nasal; Respirate   Result Value Ref Range    Influenza virus A RNA Negative Negative    Influenza virus B, PCR Negative Negative    RSV, PCR Negative Negative    SARS-CoV-2 by PCR NotDetected     SARS-CoV-2 Source NP Swab    POCT glucose device results    Collection Time: 04/03/25 11:32 AM   Result Value Ref Range    POC Glucose, Blood 156 (H) 65 - 99 mg/dL       Radiology Review:  DX-CHEST-PORTABLE (1 VIEW)   Final Result         1.  Left basilar atelectasis, no focal infiltrate   2.  Cardiomegaly      DX-G.I. TUBE INJECTION, ANY TYPE   Final Result      Contrast from a PEG tube injection extends into the stomach without evidence of contrast leak.      CT-CHEST,ABDOMEN,PELVIS WITH   Final Result      1.  Large amount of free intraperitoneal air within the abdomen again seen as was previously identified on chest x-ray by review of  the patient's chart, a percutaneous gastrostomy tube was placed on 3/31/2025 and is free intraperitoneal air is possibly    secondary to that recent procedure.      2.  Bibasal atelectasis and small bilateral pleural effusions.      3.  Tracheostomy tube present.      4.  No evidence of bowel obstruction or free fluid within the abdomen or pelvis.      DX-CHEST-PORTABLE (1 VIEW)   Final Result         1.  Bibasilar atelectasis   2.  Intra-abdominal free air, can be associated with history of percutaneous gastrostomy, consider other viscus perforation as warranted. Could be further evaluated with CT of the abdomen with contrast as clinically appropriate.      These findings were discussed with the patient's clinician, Bravo Lala Iv, on 4/2/2025 7:57 AM.      DX-CHEST-PORTABLE (1 VIEW)   Final Result      No evidence of acute cardiopulmonary process.      DX-ABDOMEN FOR TUBE PLACEMENT   Final Result      1.  Enteric tube extends in the fundus of stomach.      DX-ABDOMEN FOR TUBE PLACEMENT   Final Result      The gastric tube has been removed and replaced with a small bowel feeding tube which terminate in the proximal stomach.      DX-CHEST-PORTABLE (1 VIEW)   Final Result      Atelectasis within the left lung base.      MR-THORACIC SPINE-WITH & W/O   Final Result      1.  There is no abnormal intramedullary T2 signal intensity in the thoracic spinal cord.   2.  Mild degenerative disease at T8-9.   3.  Left lower segmental consolidation.      MR-CERVICAL SPINE-WITH & W/O   Final Result      1.  Small areas of acute infarcts in the lower jero and upper medulla involving both sides of the midline. There are chronic infarcts in the jero and right inferior cerebellum.   2.  There is no abnormal intramedullary T2 signal intensity in the cervical spinal cord to suggest demyelinating lesions. There is no MR evidence of compressive myelopathy.   3.  Mild degenerative disease.            MR-BRAIN-WITH & W/O   Final Result       1.  Small areas of acute infarcts in the lower jero and upper medulla involving both sides of the midline.   2.  There are chronic infarcts in the jero and right inferior cerebellum. . There are areas of chronic lacunae in the bilateral basal ganglia, right thalamus and right periventricular white matter.   3.  There are nonspecific T2 hyperintensities in the periventricular white matter likely representing chronic small vessel disease.   4.  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      MR-LUMBAR SPINE-WITH & W/O   Final Result      1.  Unremarkable pre and postcontrast MR examination of the lumbar spine.   2.  Clinical suspicious for GBS: There is no abnormal enhancement of the lumbar nerve roots.      DX-ABDOMEN FOR TUBE PLACEMENT   Final Result      NG tube tip projects at the peripyloric region.      DX-CHEST-PORTABLE (1 VIEW)   Final Result      1.  Low lung volumes without definite acute cardiopulmonary abnormality.   2.  Support apparatus as above.      CT-HEAD W/O   Final Result      1.  No acute intracranial abnormality.   2.  Remote right cerebellar infarct.               DX-CHEST-PORTABLE (1 VIEW)   Final Result      No acute cardiopulmonary disease evident.      DX-CHEST-PORTABLE (1 VIEW)   Final Result      No acute cardiopulmonary disease evident.      CT-CEREBRAL PERFUSION ANALYSIS   Final Result      1. Cerebral blood flow less than 30% possibly representing completed infarct = 0 mL. Based on distribution of this finding, this is unlikely to represent artifact.      2. T Max more than 6 seconds possibly representing combination of completed infarct and ischemia = 7 mL. Based on the distribution of this finding, this is possibly artifact.      3. Mismatched volume possibly representing ischemic brain/penumbra= 0 mL      4.  Please note that this cerebral perfusion study and report is Quantitative and targets supratentorial (cerebral)  "perfusion for evaluation of large vessel territory acute ischemia/infarction. For example, lacunar infarcts, and brainstem/posterior fossa    ischemia/infarction are not evaluated on this study.  Data acquisition is subject to artifacts which can yield non-anatomically plausible perfusion maps which may be due to motion, bolus timing, signal to noise ratio, or other technical factors.    Perfusion map abnormalities which show non-anatomic distributions are likely artifact.   This study is not \"stand-alone\" and should only be utilized for diagnosis, management/treatment in correlation with CT, CTA, and/or MRI and clinical factors.         CT-CTA NECK WITH & W/O-POST PROCESSING   Final Result      CT angiogram of the neck within normal limits.      CT-CTA HEAD WITH & W/O-POST PROCESS   Final Result      CT angiogram of the [place] within normal limits.      CT-HEAD W/O   Final Result      Head CT without contrast within normal limits. No evidence of acute cerebral infarction, hemorrhage or mass lesion.                 MDM (Data Review):   -Records reviewed and summarized in current documentation  -I personally reviewed and interpreted the laboratory results  -I personally reviewed the radiology images    Assessment/Recommendations:  Pontine Stroke  Intraperitoneal air- seen on imaging s/p PEG tube placement. Peg tube in stomach. No fluid in intraperitoneal area. ?air tracked in through stomach during placement.    Oropharyngeal dysphagia  Fever - yesterday, now afebrile  DM 2  Neuromuscular respiratory failure - pending possible tracheostomy    Recommendations:  Ok to continue tube feeds  Continue peg care as listed below.     Please ensure proper PEG care and tube feeding:  - keep head of bed elevated to at least 30 degrees during tube feeding. The largest factor in preventing aspiration during tube feeding is not the placement of the tube into the stomach or jejunum, but keeping the head elevated to at " least 30 degrees during feeding  -Okay to use PEG tube  - PEG needs 0.5-1cm of laxity freely mobile in and out of the gastrostomy tract. Overtightening may impair tract maturation by inducing localized ischemia. Over tightening may also result in buried bumper syndrome.  -  The 2 buttons underneath the PEG tube's external bumper will spontaneously release after sutures dissolve.  Median time 4-8 weeks.     No further interventions from acute GI service. GI to sign off. Please reconsult for any further questions or concerns.    Discussed with patient, nursing, Dr. Allen      ..ZANDER Mckeon    Core Quality Measures   Reviewed items::  Labs, Medications and Radiology reports reviewed     No

## 2025-04-04 NOTE — PROGRESS NOTES
"Critical Care Progress Note    Date of admission  3/25/2025    Chief Complaint  49 y.o. male who presented 3/25/2025 with Hx of HTN, anxiety, DM2, htn that presented 3/25 for dizziness, weakness and speech difficulty. He was evaluated by neuro vascular and had a stroke alert and workup preformed and no LVO was seen and recommend metabolic workup. He since has had progressive right side weakness, bulbar symptoms and difficulty with speech. I saw him earlier for concerns of GBS and elevated protein in his CNS fluid for concerns but his neuro exam didn't fit and recommend repeat CT head which did not show any acute changes. Neurology and Neurovascular evaluated him today and recommend ICU level care and concerns for brainstem stroke.      Hospital Course  3/27-Shortly after arrival in the ICU patient developed stridor and required emergent intubation.    3/28- Blinking once for \"yes\", twice for 'no\" appropriately.    3/29-no clinical change.  Consent given for tracheostomy.    3/30 - percutaneous tracheostomy    3/31 - GAURI referral sent, PEG planned for today, family updated at bedside.  Will try to T-Piece today    4/1 - We will T-Piece as long as possible today, he does not need to go back on vent.  Medically cleared for [facility]    4/2 - Continues to T-Piece though does have some tachypnea.  Some mild bleeding around trach site is controlled.  Moderate amount of free intraperitoneal air for which CT abdomen pelvis and surgery consult were obtained.    4/3 - fever yesterday afternoon, started on Zosyn.  Blood cultures and sputum cultures sent.    4/4 - Proteus mirabilis in the sputum, changed to Augmentin per sensitivities.    Interval Problem Update  Tmax: Afebrile  Diet: Tube feeds  Vasopressors: None    Infusions: None    Antibx:   Augmentin 4/4 - 4/6 (anticipated)    Zosyn 4/2 - 4/4    Sputum culture 4/2 - Proteus mirabilis (pan sensitive)    Blood Cultures 4/2 - Pending (NGTD)  Covid/Rsv/Influenza 4/2 - " "negative    Intake / Output  Urine Output past 24 hours: 550 mL    Lab Trends  CBC WNL    AST 94 --> 61 --> 49   --> 144 --> 130    Bili 0.3 --> 0.2    Review of Systems  Review of Systems   Unable to perform ROS: Critical illness        Vital Signs for last 24 hours   Temp:  [36.8 °C (98.3 °F)-38 °C (100.4 °F)] 38 °C (100.4 °F)  Pulse:  [53-89] 64  Resp:  [16-43] 43  BP: (122-174)/() 135/76  SpO2:  [95 %-100 %] 95 %    Hemodynamic parameters for last 24 hours       Respiratory Information for the last 24 hours       Physical Exam  Constitutional:       Appearance: He is ill-appearing.   HENT:      Head: Normocephalic and atraumatic.      Mouth/Throat:      Mouth: Mucous membranes are moist.   Eyes:      Pupils: Pupils are equal, round, and reactive to light.   Cardiovascular:      Rate and Rhythm: Regular rhythm. Tachycardia present.      Heart sounds: No murmur heard.     No friction rub. No gallop.   Pulmonary:      Effort: No respiratory distress.      Breath sounds: No wheezing or rales.      Comments: Trach in place  Abdominal:      General: There is no distension.      Palpations: Abdomen is soft.      Comments: PEG in place   Musculoskeletal:      Cervical back: Neck supple.      Right lower leg: No edema.      Left lower leg: No edema.   Skin:     General: Skin is warm and dry.      Capillary Refill: Capillary refill takes less than 2 seconds.   Neurological:      Sensory: Sensory deficit present.      Comments:   Eyes open spontaneously, he blinks once for \"yes\", twice for 'no\" appropriately.  Sensation to light touch is absent    Essentially locked in         Medications  Current Facility-Administered Medications   Medication Dose Route Frequency Provider Last Rate Last Admin    amoxicillin-clavulanate (Augmentin) 875-125 MG per tablet 1 Tablet  1 Tablet Enteral Tube Q12HRS Bravo Lala M.D.        acetaminophen (Tylenol) tablet 650 mg  650 mg Enteral Tube Q6HRS PRN Bravo Lala M.D.   " 650 mg at 04/04/25 0409    oxyCODONE immediate-release (Roxicodone) tablet 5 mg  5 mg Enteral Tube Q4HRS PRN Bravo Lala M.D.        rivaroxaban (Xarelto) tablet 10 mg  10 mg Enteral Tube DAILY AT 1800 Bravo Lala M.D.   10 mg at 04/03/25 1723    acetaminophen (Tylenol) suppository 650 mg  650 mg Rectal Q6HRS PRN Bravo Lala M.D.   650 mg at 04/02/25 1002    hydrALAZINE (Apresoline) injection 20 mg  20 mg Intravenous Q6HRS PRN Bravo Lala M.D.   20 mg at 04/04/25 0304    labetalol (Normodyne/Trandate) injection 10 mg  10 mg Intravenous Q4HRS PRN Bravo Lala M.D.   10 mg at 04/04/25 0859    atorvastatin (Lipitor) tablet 80 mg  80 mg Enteral Tube Q EVENING Bravo Lala M.D.   80 mg at 04/03/25 1723    gabapentin (Neurontin) capsule 400 mg  400 mg Enteral Tube TID Bravo Lala M.D.   400 mg at 04/04/25 0604    insulin lispro (HumaLOG,AdmeLOG) subcutaneous injection  1-6 Units Subcutaneous Q6HRS Marion Chen   1 Units at 04/03/25 1218    And    dextrose 50 % (D50W) injection 25 g  25 g Intravenous Q15 MIN PRN Marion Chen        Respiratory Therapy Consult   Nebulization Continuous RT Maddy Torres M.D.        senna-docusate (Pericolace Or Senokot S) 8.6-50 MG per tablet 2 Tablet  2 Tablet Enteral Tube BID Maddy Torres M.D.   2 Tablet at 04/03/25 0531    And    polyethylene glycol/lytes (Miralax) Packet 1 Packet  1 Packet Enteral Tube QDAY PRN Maddy Torres M.D.   1 Packet at 04/03/25 0531    And    magnesium hydroxide (Milk Of Magnesia) suspension 30 mL  30 mL Enteral Tube QDAY PRN Maddy Torres M.D.   30 mL at 03/29/25 1802    And    bisacodyl (Dulcolax) suppository 10 mg  10 mg Rectal QDAY PRN Maddy Torres M.D.        MD Alert...ICU Electrolyte Replacement per Pharmacy   Other PHARMACY TO DOSE Maddy Torres M.D.        Pharmacy Consult: Enteral tube insertion - review meds/change route/product selection  1 Each Other PHARMACY TO DOSE Maddy Torres M.D.        aspirin (Asa)  chewable tab 81 mg  81 mg Enteral Tube DAILY Maddy Torres M.D.   81 mg at 04/04/25 0604    ibuprofen (Motrin) tablet 600 mg  600 mg Enteral Tube Q6HRS PRN Maddy Torres M.D.   600 mg at 04/04/25 0408    melatonin tablet 5 mg  5 mg Enteral Tube Nightly Maddy Torres M.D.   5 mg at 04/03/25 2156    amLODIPine (Norvasc) tablet 10 mg  10 mg Enteral Tube DAILY Grant Gabriel M.D.   10 mg at 04/04/25 0605    ondansetron (Zofran ODT) dispertab 4 mg  4 mg Enteral Tube Q4HRS PRN Maddy Torres M.D.        ondansetron (Zofran) syringe/vial injection 4 mg  4 mg Intravenous Q4HRS PRN Darrel Polo M.D.           Fluids    Intake/Output Summary (Last 24 hours) at 4/4/2025 1014  Last data filed at 4/4/2025 0907  Gross per 24 hour   Intake 610 ml   Output 1150 ml   Net -540 ml       Laboratory            Recent Labs     04/02/25  0530 04/03/25  0550 04/04/25  0546   SODIUM 138 139 139   POTASSIUM 4.6 4.1 3.9   CHLORIDE 105 104 105   CO2 20 24 24   BUN 21 25* 23*   CREATININE 0.85 0.92 0.72   CALCIUM 9.3 9.5 9.3     Recent Labs     04/02/25  0530 04/03/25  0550 04/04/25  0546   ALTSGPT 131* 144* 130*   ASTSGOT 94* 61* 49*   ALKPHOSPHAT 185* 175* 171*   TBILIRUBIN 0.3 0.3 0.2   DBILIRUBIN  --   --  <0.2   GLUCOSE 156* 138* 186*     Recent Labs     04/02/25  0530 04/03/25  0550 04/04/25  0546   WBC 10.2 10.7 8.9   NEUTSPOLYS 82.20* 73.90*  --    LYMPHOCYTES 6.90* 12.70*  --    MONOCYTES 9.80 12.40  --    EOSINOPHILS 0.20 0.50  --    BASOPHILS 0.30 0.20  --    ASTSGOT 94* 61* 49*   ALTSGPT 131* 144* 130*   ALKPHOSPHAT 185* 175* 171*   TBILIRUBIN 0.3 0.3 0.2     Recent Labs     04/02/25  0530 04/03/25  0550 04/04/25  0546   RBC 4.70 4.50* 4.64*   HEMOGLOBIN 13.0* 12.2* 12.6*   HEMATOCRIT 40.0* 38.6* 39.8*   PLATELETCT 293 308 359       Imaging  X-Ray:  I have personally reviewed the images and compared with prior images. and My impression is: Improvement in intra-abdominal free air, no focal  "consolidations    Assessment/Plan  * Acute ischemic stroke (HCC)- (present on admission)  Assessment & Plan  Acute infarcts in the lower jero and upper medulla c/w presentation.  Chronic infarcts I the jero and right inferior cerebellum which explain right riddhi deficits  He is functionally \"locked in\" at this time  ASA 81 and Plavix 75 daily  Statin therapy for secondary prevention    Trach placed 3/30, PEG 4/1  Has been on T-piece for the past several days  Passy-[place] trials    PMR following  [facility] referral sent    He is medically cleared for [facility] (okay for Augmentin until 4/6)    Proteus mirabilis pneumonia (HCC)  Assessment & Plan  Sensitive to Augmentin  Last dose will be 4/6/2025    Acute neuromuscular respiratory failure (HCC)  Assessment & Plan  Continue T-Piece and Passy-[place] trials  RT protocols  PEP if needed    Elevated LFTs  Assessment & Plan  Mild elevation  Likely medication related  Trend    Free intraperitoneal air  Assessment & Plan  Ultimately determined to be secondary to PEG procedure    CT without free intraperitoneal fluid, PEG in appropriate position  Tube study shows PEG in the stomach appropriately  Air has resolved on chest x-ray the following day    Resolved    Type 2 diabetes mellitus with hyperglycemia, without long-term current use of insulin (HCC)- (present on admission)  Assessment & Plan  Hg A1 C 6.2  Moderate glycemic control with Insulin therapy.    Primary hypertension- (present on admission)  Assessment & Plan  Goal at this time -140   Amlodipine         VTE:  Lovenox  Ulcer: Not Indicated  Lines: Argueta Catheter  Ongoing indication addressed and PEG    I have performed a physical exam and reviewed and updated ROS and Plan today (4/4/2025). In review of yesterday's note (4/3/2025), there are no changes except as documented above.     Discussed patient condition and risk of morbidity and/or mortality with RN, RT, Pharmacy, Patient, and neurology and physiatry    He is medically " cleared for [facility]

## 2025-04-04 NOTE — CARE PLAN
The patient is Watcher - Medium risk of patient condition declining or worsening    Shift Goals  Clinical Goals: stable neuro and respiratory, manage secretions  Patient Goals: comfort  Family Goals: updates on POC    Progress made toward(s) clinical / shift goals:    Problem: Pain - Standard  Goal: Alleviation of pain or a reduction in pain to the patient’s comfort goal  Description: Target End Date:  Prior to discharge or change in level of careDocument on Vitals flowsheet1.  Document pain using the appropriate pain scale per order or unit policy2.  Educate and implement non-pharmacologic comfort measures (i.e. relaxation, distraction, massage, cold/heat therapy, etc.)3.  Pain management medications as ordered4.  Reassess pain after pain med administration per policy5.  If opiods administered assess patient's response to pain medication is appropriate per POSS sedation scale6.  Follow pain management plan developed in collaboration with patient and interdisciplinary team (including palliative care or pain specialists if applicable)  Outcome: Progressing     Problem: Optimal Care of the Stroke Patient  Goal: Optimal acute care for the stroke patient  Description: Target End Date:  1 to 3 days- Vital signs and neuro checks performed and documented per order- NIHSS completed and documented per order- Continuous telemetry monitoring for 72 hours or until discontinued by provider- Head CT without contrast obtained- Consideration of MRI/MRA- MRI screening form completed in worklist if MRI ordered- Echocardiogram with Bubble Study ordered/completed with consideration of SHAYAN- Carotid Doppler ordered/completed (Not required if CTA of neck completed in ED)- Lipid Panel obtained within 48 hours of admission- PT, PTT, INR obtained per Anticoagulation orders (if applicable)- Antithrombotic therapy by end of hospital day 2 for ischemic stroke. Provider must document reason if contraindicated.- Venous Thromboembolism (VTE)  Prophylaxis by end of hospital day 2 for ischemic and hemorrhagic stroke. Provider must document reason if contraindicated- Dysphagia screen completed and documented prior to any PO intake. Patient to remain NPO until Speech Therapy evaluation if thrombolytic or thrombectomy performed- Rehabilitation assessment including PT/OT/SLP evaluations for referral to Physical Medicine and Rehabilitation services. If none needed, provider needs to document reason- Neurology consult placed- Consideration of cardiology consult for cryptogenic strokes  Outcome: Progressing     Problem: Neuro Status  Goal: Neuro status will remain stable or improve  Description: Target End Date:  Prior to discharge or change in level of careDocument on Neuro assessment in the Assessment flowsheet1.  Assess and monitor neurologic status per provider order/protocol/unit policy2.  Assess level of consciousness and orientation3.  Assess for speech, dysarthria, dysphagia, facial symmetry4.  Assess visual field, eye movements, gaze preference, pupil reaction and size5.  Assess muscle strength and motor response in all four extremities6.  Assess for sensation (numbness and tingling)7.  Assess basic neuro reflexes (cough, gag, corneal)8.  Identify changes in neuro status and report to provider for testing/treatment orders  Outcome: Progressing     Problem: Hemodynamic Monitoring  Goal: Patient's hemodynamics, fluid balance and neurologic status will be stable or improve  Description: Target End Date:  Prior to discharge or change in level of care1.  Vital signs, pulse oximetry and cardiac monitor per provider order and/or policy2.  Frequent pulse checks performed post thrombectomy3.  Frequent monitoring for signs of bleeding post TPA administration4.  Proper management of IV infusions5.  Intake and output monitored per provider order6.  Daily weight obtained per unit policy or provider order7.  Peripheral pulses and capillary refill assessed as needed8.   Monitor for signs/symptoms of excessive bleeding9.  Body temperature assessed and fevers [id number]. Patient positioned for maximum circulation/cardiac output  Outcome: Progressing     Problem: Respiratory - Stroke Patient  Goal: Patient will achieve/maintain optimum respiratory rate/effort  Description: Target End Date:  Prior to discharge or change in level of careDocument on Assessment flowsheet1.  Assess and monitor respiratory rate, rhythm, depth and effort of respiration2.  Oxygenation assessed throughout shift (recommendation of >94% for new stroke patients)3.  Oxygen administered and/or titrated per order4.  Collaboration with RT to administer medication/treatments per order5.  Patient educated on importance of turning, coughing, and deep breathing6.  Patient positioned for maximum ventilatory efficiency7.  Airway suctioning provided as needed8.  Incentive spirometry encouraged 5-10 times every hour or while awake  Outcome: Progressing     Problem: Urinary Elimination  Goal: Establish and maintain regular urinary output  Description: Target End Date:  Prior to discharge or change in level of careDocument on I/O and Assessment flowsheets1.  Evaluate need to continue indwelling catheter every shift2.  Assess signs and symptoms of urinary retention3.  Assess post-void residual volumes4.  Implement bladder training program5.  Encourage scheduled voidings6.  Assist patient to sit on bedside commode or toilet for voiding7.  Educate patient and family/caregiver on use and purpose of urine collection devices (document in Patient Education)  Outcome: Progressing     Problem: Skin Integrity  Goal: Skin integrity is maintained or improved  Description: Target End Date:  Prior to discharge or change in level of careDocument interventions on Skin Risk/Lj flowsheet groups and corresponding LDA1.  Assess and monitor skin integrity, appearance and/or temperature2.  Assess risk factors for impaired skin integrity and/or  pressures ulcers3.  Implement precautions to protect skin integrity in collaboration with interdisciplinary team4.  Implement pressure ulcer prevention protocol if at risk for skin breakdown5.  Confirm wound care consult if at risk for skin breakdown6.  Ensure patient use of pressure relieving devices  (Low air loss bed, waffle overlay, heel protectors, ROHO cushion, etc)  Outcome: Progressing       Patient is not progressing towards the following goals:      Problem: Risk for Aspiration  Goal: Patient's risk for aspiration will be absent or decrease  Description: Target End Date:  Prior to discharge or change in level of care1.   Complete dysphagia screening on admission2.   NPO until dysphagia screening complete or medically cleared3.   Collaborate with Speech Therapy, Clinical Dietitian and interdisciplinary team4.   Implement aspiration precautions5.   Assist patient up to chair for meals6.   Elevate head of bed 90 degrees if patient is unable to get out of bed7.   Encourage small bites8.   Ensure foods/liquids are of appropriate consistency9.   Assess for any signs/symptoms of [id number]. Assess breath sounds and vital signs after oral intake  Outcome: Not Progressing

## 2025-04-04 NOTE — DIETARY
"Nutrition Services Weekly Nutrition Support Update     Day 10 of admit. Jonna Brian is a 49 y.o. male with admitting DX of Acute weakness  Acute ischemic stroke    Tube feeding initiated on 3/28. Current TF via PEG is Novasource Renal at a goal rate of 45 ml/hr, providing 2160 kcals, 98 gm protein, 907 mg phosphorus, and 778 ml free water daily.       Nutrition Assessment:      Height: 185.4 cm (6' 0.99\")  Weight: 90.4 kg (199 lb 4.7 oz)  Weight to Use in Calculations: 95.3 kg (210 lb 1.6 oz)  Weight taken via bed scale  Body mass index is 26.3 kg/m². BMI classification: Overweight.  Wt Readings from Last 6 Encounters:   25 90.4 kg (199 lb 4.7 oz)   25 96.6 kg (212 lb 15.4 oz)   25 95.3 kg  (210 lb 1.6 oz)       Weight trend: -4.9 kg (5%) x 1 week.     Re-estimation of nutrition needs as indicated: No longer on ventilator or pressors and recent weight loss.     Calculation/Equation: REE (1834) per MSJ x 1.2= 2201 kcals.   Total Calories / day: 2080 - 2350(Calories / k - 26)  Total Grams Protein / day: 91- 108  (Grams Protein / k - 1.2)     Objective:  TF at goal rate  Patient on T-piece  SLP conducted speaking evaluation yesterday.   Pertinent labs: Glucose 189, Bun 23, AST 49, , Alkaline Phosphatase 171  Pertinent meds: Gabapentin, Insulin, ICU electrolyte replacement per pharmacy, Zofran, BM protocol  Skin/wounds: No pressure injuries or edema documented   Last BM 4/3/25- Type 7.     Current diet order:   NPO and TF      Nutrition Diagnosis:      Inadequate oral intake r/t acute respiratory failure as evidenced by need for nutrition support.     Nutrition Dx Status: Ongoing     Nutrition Interventions:      To better meet estimated needs, change TF to Glucerna 1.2 and start at 25 ml/hr and advance per protocol to goal rate of  and rate of 75 ml/hr, providing 2160 kcal, 108 gm protein, and 1449 ml free water daily. Will discuss with RN.   Additional fluids per MD/DO  Patient " aware of active plan of care as appropriate.     Nutrition Monitoring and Evaluation:     Monitor nutrition POC  Monitor vital signs pertinent to nutrition       RD following and will provide updated recommendations as indicated.

## 2025-04-04 NOTE — CARE PLAN
The patient is Watcher - Medium risk of patient condition declining or worsening    Shift Goals  Clinical Goals: manage secretings, neuro checks  Patient Goals: JAMARI  Family Goals: updates on POC    Progress made toward(s) clinical / shift goals:    Problem: Pain - Standard  Goal: Alleviation of pain or a reduction in pain to the patient’s comfort goal  Outcome: Progressing     Problem: Optimal Care of the Stroke Patient  Goal: Optimal acute care for the stroke patient  Outcome: Progressing     Problem: Discharge Planning - Stroke  Goal: Patient’s continuum of care needs will be met  Outcome: Progressing     Problem: Neuro Status  Goal: Neuro status will remain stable or improve  Outcome: Progressing     Problem: Hemodynamic Monitoring  Goal: Patient's hemodynamics, fluid balance and neurologic status will be stable or improve  Outcome: Progressing

## 2025-04-04 NOTE — DISCHARGE PLANNING
Case Management Discharge Planning    Admission Date: 3/25/2025  GMLOS: 23.1  ALOS: 10    6-Clicks ADL Score: 6  6-Clicks Mobility Score: 6  PT and/or OT Eval ordered: Yes  Post-acute Referrals Ordered: Yes  Post-acute Choice Obtained: Yes  Has referral(s) been sent to post-acute provider:  Yes    Anticipated Discharge Dispo: Discharge Disposition: Disch to a long term care facility (63)    DME Needed: No    Action(s) Taken:   Chart review was completed. Patient was discussed during IDT rounds.    Per MD, pt is medically cleared for [facility].     PC was placed to [facility] with [facility] re: bed availability. [facility] to verify and return call.     1241:   Per [facility] with [facility], unable to take pt today d/t need for further review and bed availability. Plan for Monday 4/7. MD was notified.     PC was placed to pt's mother Natalie re: pending placement. She verbalized understanding and stated she plans to fly into [place] on Tuesday 4/8. She asked for information on discounted hotels in the area. [facility] list and a lodging letter was emails to her at [email or web address] as requested.     Escalations Completed: None    Medically Clear: Yes    Next Steps:  CM RN to follow up with medical team to discuss discharge barriers or needs.     Barriers to Discharge: Pending Placement

## 2025-04-04 NOTE — DISCHARGE SUMMARY
Discharge Summary    CHIEF COMPLAINT ON ADMISSION  Chief Complaint   Patient presents with    Possible Stroke     Pt BIB EMS for possible stroke. Pt LKW 1630 3/24. At 0400 3/25 pt noted to have global weakness, aphasia and ataxia. NIH 17 on arrival.        Reason for Admission  Brainstem Stroke    Admission Date  3/25/2025     CODE STATUS  Full Code    HPI & HOSPITAL COURSE  This is a 49 y.o. male who was initially admitted to the hospital on 3/25/2025 with global weakness, aphasia and ataxia.  His last known normal was 1630 on March 24.  He was initially admitted to the hospital floor, his CT head as well as CTA of the head and neck were within normal limits.  There was consideration for possible Guillain-Barré syndrome.    His condition continued to deteriorate and he underwent a lumbar puncture which revealed elevated protein but no evidence of infection.  He had progressive bulbar symptoms and weakness and was transferred to the ICU.  Unfortunately shortly after he arrived in the ICU he developed stridor and required emergent intubation on 3/27.      He then underwent an MRI of his brain and spine which unfortunately revealed small areas of acute infarcts in the lower jero and the upper medulla on both sides of the midline.  He also had chronic infarcts in his jero and right inferior cerebellum.  They were chronically Cuna in the bilateral basal ganglia, the right thalamus and the right periventricular white matter.  He had evidence of small vessel disease as well in the periventricular white matter.    Ultimately it appears he has been suffering multiple small strokes and his progressive decline was due to acute infarcts in the lower jero and upper medulla.  Since that time he has not recovered function, has required tracheostomy and PEG placement with the ultimate goal of transferring him to postacute medical to see what function may be regained in time.    I have had conversations with his family, they want  "to keep moving forward and with his Passy-Boogie trials for the foreseeable future.  If he is unable to regain function going forward, further goals of care discussions can be had in the upcoming months.      3/27-Shortly after arrival in the ICU patient developed stridor and required emergent intubation.    3/28- Blinking once for \"yes\", twice for 'no\" appropriately.    3/29-no clinical change.  Consent given for tracheostomy.    3/30 - percutaneous tracheostomy    3/31 - [facility] referral sent, PEG planned for today, family updated at bedside.  Will try to T-Piece today    4/1 - We will T-Piece as long as possible today, he does not need to go back on vent.  Medically cleared for [facility]    4/2 - Continues to T-Piece though does have some tachypnea.  Some mild bleeding around trach site is controlled.  Moderate amount of free intraperitoneal air for which CT abdomen pelvis and surgery consult were obtained.    4/3 - fever yesterday afternoon, started on Zosyn.  Blood cultures and sputum cultures sent.    4/4 - Proteus mirabilis in the sputum, changed to Augmentin per sensitivities.    Therefore, he is discharged in fair and stable condition to a long-term acute care hospital.    The patient met 2-midnight criteria for an inpatient stay at the time of discharge.      FOLLOW UP ITEMS POST DISCHARGE  Passy-Boogie trials    DISCHARGE DIAGNOSES  Principal Problem:    Acute ischemic stroke (HCC) (POA: Yes)  Active Problems:    Acute neuromuscular respiratory failure (HCC) (POA: Unknown)    Proteus mirabilis pneumonia (HCC) (POA: Unknown)    Elevated LFTs (POA: Unknown)    Primary hypertension (POA: Yes)    Type 2 diabetes mellitus with hyperglycemia, without long-term current use of insulin (HCC) (POA: Yes)    Free intraperitoneal air (POA: Unknown)  Resolved Problems:    * No resolved hospital problems. *      FOLLOW UP  No future appointments.  [facility]  [address]  [address] Floor  [facility] " [id number]  [phone]          MEDICATIONS ON DISCHARGE     Medication List        ASK your doctor about these medications        Instructions   amLODIPine 10 MG Tabs  Commonly known as: Norvasc  Ask about: Which instructions should I use?   Take 10 mg by mouth every day.  Dose: 10 mg     hydroCHLOROthiazide 25 MG Tabs  Ask about: Which instructions should I use?   Take 25 mg by mouth every day.  Dose: 25 mg     metFORMIN  MG Tb24  Commonly known as: Glucophage XR   Take 500 mg by mouth every 48 hours.  Dose: 500 mg              Allergies  Allergies   Allergen Reactions    Lisinopril Shortness of Breath     Closes throat       DIET  Orders Placed This Encounter   Procedures    Diet: Diet Tube Feed; Formula: RTH Novasource Renal; Goal Rate (mL/Hour): 45; Duration: 24 HR     Start at 25mL/hr and advance per protocol to goal rate     Standing Status:   Standing     Number of Occurrences:   1     Diet:   Diet Tube Feed [35]     Formula::   RTH Novasource Renal     Goal Rate (mL/Hour):   45     Route:   Enteral Tube     Duration:   24 HR       ACTIVITY  As tolerated.  Weight bearing as tolerated    LINES, DRAINS, AND WOUNDS  This is an automated list. Peripheral IVs will be removed prior to discharge.  Peripheral IV 03/27/25 20 G Anterior;Left Forearm (Active)   Site Assessment Clean;Dry;Intact 04/04/25 0800   Dressing Type Transparent 04/04/25 0800   Line Status Scrubbed the hub prior to access;Flushed;Infusing;Blood return noted 04/04/25 0800   Dressing Status Clean;Dry;Intact 04/04/25 0800   Dressing Intervention N/A 04/02/25 0800   Dressing Change Due 04/05/25 04/04/25 0400   Infiltration Grading ([facility], [facility]) 0 04/04/25 0800   Phlebitis Scale ([facility] Only) 0 04/04/25 0800       Peripheral IV 03/28/25 20 G Anterior;Right Forearm (Active)   Site Assessment Clean;Dry;Intact 04/04/25 0800   Dressing Type Transparent 04/04/25 0800   Line Status Flushed;Scrubbed the hub prior to access;Saline locked 04/04/25 0800    Dressing Status Clean;Dry;Intact 04/04/25 0800   Dressing Intervention N/A 04/02/25 0800   Dressing Change Due 04/05/25 04/04/25 0400   Date Primary Tubing Changed 03/29/25 03/29/25 1400   NEXT Primary Tubing Change  04/05/25 03/29/25 1400   Date IV Connector(s) Changed 03/29/25 03/29/25 1400   Infiltration Grading ([facility], [facility]) 0 04/04/25 0800   Phlebitis Scale ([facility] Only) 0 04/04/25 0800     External Urinary Catheter ([facility]) (Active)   Collection Container Standard drainage bag 04/04/25 0800   Output (mL) 850 mL 04/04/25 0600     Airway Trach Tracheostomy 8.0 (Active)   Site Assessment Clean;Dry 04/04/25 1008   Tube Repositioned (ICU Only) Center 04/01/25 1828   Airway Tube Secured Velcro attachment 04/04/25 1008   Securing Device Changed Yes 04/04/25 1008   Next Securing Device Change Due Date 04/11/25 04/04/25 1008   Date of Next Canister Change 04/05/25 04/01/25 0955   Cuffless No 04/04/25 1008   Cuff Pressure cmH2O (R.C.) 26 04/03/25 1845   Tracheostomy/Stoma Care/Inner Cannula Changed Yes 04/04/25 1008   Next Tracheostomy Due Date 04/30/25 04/04/25 1008   Extra Tracheostomy Tube at Bedside Yes 04/04/25 1008   Inline Suction Catheter Device Change Yes 04/04/25 1008   Next Closed Suction Device Change Due  04/07/25 04/04/25 1008     Wound 04/20/22 Incision Ankle Left adaptic, soft roll, 4x8, ace  (Active)       Peripheral IV 03/27/25 20 G Anterior;Left Forearm (Active)   Site Assessment Clean;Dry;Intact 04/04/25 0800   Dressing Type Transparent 04/04/25 0800   Line Status Scrubbed the hub prior to access;Flushed;Infusing;Blood return noted 04/04/25 0800   Dressing Status Clean;Dry;Intact 04/04/25 0800   Dressing Intervention N/A 04/02/25 0800   Dressing Change Due 04/05/25 04/04/25 0400   Infiltration Grading ([facility], [facility]) 0 04/04/25 0800   Phlebitis Scale ([facility] Only) 0 04/04/25 0800       Peripheral IV 03/28/25 20 G Anterior;Right Forearm (Active)   Site Assessment Clean;Dry;Intact 04/04/25 0800  "  Dressing Type Transparent 04/04/25 0800   Line Status Flushed;Scrubbed the hub prior to access;Saline locked 04/04/25 0800   Dressing Status Clean;Dry;Intact 04/04/25 0800   Dressing Intervention N/A 04/02/25 0800   Dressing Change Due 04/05/25 04/04/25 0400   Date Primary Tubing Changed 03/29/25 03/29/25 1400   NEXT Primary Tubing Change  04/05/25 03/29/25 1400   Date IV Connector(s) Changed 03/29/25 03/29/25 1400   Infiltration Grading ([facility], [facility]) 0 04/04/25 0800   Phlebitis Scale ([facility] Only) 0 04/04/25 0800               MENTAL STATUS ON TRANSFER  Awake and alert, blinks once for \"yes\" and twice for \"no\"     CONSULTATIONS  Critical care  Vascular neurology  PM&R  Gastroenterology  General surgery    PROCEDURES  Percutaneous tracheostomy 3/30/2025  PEG 3/31/2025    Intubation 3/27/2025    LABORATORY  Lab Results   Component Value Date    SODIUM 139 04/04/2025    POTASSIUM 3.9 04/04/2025    CHLORIDE 105 04/04/2025    CO2 24 04/04/2025    GLUCOSE 186 (H) 04/04/2025    BUN 23 (H) 04/04/2025    CREATININE 0.72 04/04/2025        Lab Results   Component Value Date    WBC 8.9 04/04/2025    HEMOGLOBIN 12.6 (L) 04/04/2025    HEMATOCRIT 39.8 (L) 04/04/2025    PLATELETCT 359 04/04/2025        Total time of the discharge process exceeds 45 minutes.  "

## 2025-04-04 NOTE — ACP (ADVANCE CARE PLANNING)
I called and spoke with Mr Brian's mom, Vy.  We discussed the difference between [facility] and [facility].  I explained that he is not able to attend the [facility] hospital at this time but he is a good candidate for [facility].    I gave her the address to [facility] and explained that he may be able to leave for there today.  She is in agreement with this plan and is open to him going to [facility] when he is able to go.    I also spoke with our  who will speak with her shortly.    Plan:  - family is agreeable to transfer to [facility]    Bravo Lala M.D.

## 2025-04-04 NOTE — CARE PLAN
Problem: Aerosol Therapy  Goal: Improved hydration/ability to mobilize secretions and/or decreased airway edema  Description: Target End Date:  resolve prior to discharge or when underlying condition is resolved/stabilized1.  Implement heated or cool aerosol therapy2.  Assessed for optimal hydration, decreased edema and/or improved ability to mobilize secretions  Outcome: Progressing   Tolerating aerosol therapy well. No weaning today.

## 2025-04-05 LAB
ANION GAP SERPL CALC-SCNC: 11 MMOL/L (ref 7–16)
BUN SERPL-MCNC: 21 MG/DL (ref 8–22)
CALCIUM SERPL-MCNC: 9.4 MG/DL (ref 8.5–10.5)
CHLORIDE SERPL-SCNC: 107 MMOL/L (ref 96–112)
CO2 SERPL-SCNC: 24 MMOL/L (ref 20–33)
CREAT SERPL-MCNC: 0.82 MG/DL (ref 0.5–1.4)
ERYTHROCYTE [DISTWIDTH] IN BLOOD BY AUTOMATED COUNT: 41.1 FL (ref 35.9–50)
GFR SERPLBLD CREATININE-BSD FMLA CKD-EPI: 109 ML/MIN/1.73 M 2
GLUCOSE BLD STRIP.AUTO-MCNC: 118 MG/DL (ref 65–99)
GLUCOSE BLD STRIP.AUTO-MCNC: 119 MG/DL (ref 65–99)
GLUCOSE BLD STRIP.AUTO-MCNC: 127 MG/DL (ref 65–99)
GLUCOSE BLD STRIP.AUTO-MCNC: 158 MG/DL (ref 65–99)
GLUCOSE SERPL-MCNC: 145 MG/DL (ref 65–99)
HCT VFR BLD AUTO: 40.1 % (ref 42–52)
HGB BLD-MCNC: 12.7 G/DL (ref 14–18)
MCH RBC QN AUTO: 27.3 PG (ref 27–33)
MCHC RBC AUTO-ENTMCNC: 31.7 G/DL (ref 32.3–36.5)
MCV RBC AUTO: 86.2 FL (ref 81.4–97.8)
PLATELET # BLD AUTO: 376 K/UL (ref 164–446)
PMV BLD AUTO: 8.8 FL (ref 9–12.9)
POTASSIUM SERPL-SCNC: 3.9 MMOL/L (ref 3.6–5.5)
RBC # BLD AUTO: 4.65 M/UL (ref 4.7–6.1)
SODIUM SERPL-SCNC: 142 MMOL/L (ref 135–145)
WBC # BLD AUTO: 9.2 K/UL (ref 4.8–10.8)

## 2025-04-05 PROCEDURE — 85027 COMPLETE CBC AUTOMATED: CPT

## 2025-04-05 PROCEDURE — 80048 BASIC METABOLIC PNL TOTAL CA: CPT

## 2025-04-05 PROCEDURE — 700102 HCHG RX REV CODE 250 W/ 637 OVERRIDE(OP): Performed by: INTERNAL MEDICINE

## 2025-04-05 PROCEDURE — 82962 GLUCOSE BLOOD TEST: CPT | Mod: 91

## 2025-04-05 PROCEDURE — 770022 HCHG ROOM/CARE - ICU (200)

## 2025-04-05 PROCEDURE — 700111 HCHG RX REV CODE 636 W/ 250 OVERRIDE (IP): Mod: JZ | Performed by: STUDENT IN AN ORGANIZED HEALTH CARE EDUCATION/TRAINING PROGRAM

## 2025-04-05 PROCEDURE — 700102 HCHG RX REV CODE 250 W/ 637 OVERRIDE(OP): Performed by: STUDENT IN AN ORGANIZED HEALTH CARE EDUCATION/TRAINING PROGRAM

## 2025-04-05 PROCEDURE — A9270 NON-COVERED ITEM OR SERVICE: HCPCS | Performed by: INTERNAL MEDICINE

## 2025-04-05 PROCEDURE — 700102 HCHG RX REV CODE 250 W/ 637 OVERRIDE(OP): Performed by: NURSE PRACTITIONER

## 2025-04-05 PROCEDURE — A9270 NON-COVERED ITEM OR SERVICE: HCPCS | Performed by: STUDENT IN AN ORGANIZED HEALTH CARE EDUCATION/TRAINING PROGRAM

## 2025-04-05 PROCEDURE — 94640 AIRWAY INHALATION TREATMENT: CPT

## 2025-04-05 PROCEDURE — 99233 SBSQ HOSP IP/OBS HIGH 50: CPT | Performed by: STUDENT IN AN ORGANIZED HEALTH CARE EDUCATION/TRAINING PROGRAM

## 2025-04-05 RX ORDER — HYDROCHLOROTHIAZIDE 25 MG/1
25 TABLET ORAL
Status: DISCONTINUED | OUTPATIENT
Start: 2025-04-05 | End: 2025-05-13 | Stop reason: HOSPADM

## 2025-04-05 RX ADMIN — LABETALOL HYDROCHLORIDE 10 MG: 5 INJECTION, SOLUTION INTRAVENOUS at 19:07

## 2025-04-05 RX ADMIN — HYDRALAZINE HYDROCHLORIDE 20 MG: 20 INJECTION, SOLUTION INTRAMUSCULAR; INTRAVENOUS at 15:03

## 2025-04-05 RX ADMIN — Medication 5 MG: at 21:36

## 2025-04-05 RX ADMIN — GABAPENTIN 400 MG: 400 CAPSULE ORAL at 17:47

## 2025-04-05 RX ADMIN — POTASSIUM BICARBONATE 25 MEQ: 978 TABLET, EFFERVESCENT ORAL at 08:07

## 2025-04-05 RX ADMIN — ATORVASTATIN CALCIUM 80 MG: 80 TABLET, FILM COATED ORAL at 17:47

## 2025-04-05 RX ADMIN — GABAPENTIN 400 MG: 400 CAPSULE ORAL at 11:42

## 2025-04-05 RX ADMIN — LABETALOL HYDROCHLORIDE 10 MG: 5 INJECTION, SOLUTION INTRAVENOUS at 14:04

## 2025-04-05 RX ADMIN — AMOXICILLIN AND CLAVULANATE POTASSIUM 1 TABLET: 875; 125 TABLET, FILM COATED ORAL at 05:23

## 2025-04-05 RX ADMIN — AMOXICILLIN AND CLAVULANATE POTASSIUM 1 TABLET: 875; 125 TABLET, FILM COATED ORAL at 17:47

## 2025-04-05 RX ADMIN — AMLODIPINE BESYLATE 10 MG: 10 TABLET ORAL at 05:23

## 2025-04-05 RX ADMIN — ASPIRIN 81 MG: 81 TABLET, CHEWABLE ORAL at 05:23

## 2025-04-05 RX ADMIN — GABAPENTIN 400 MG: 400 CAPSULE ORAL at 05:23

## 2025-04-05 RX ADMIN — HYDROCHLOROTHIAZIDE 25 MG: 25 TABLET ORAL at 09:37

## 2025-04-05 RX ADMIN — RIVAROXABAN 10 MG: 10 TABLET, FILM COATED ORAL at 17:47

## 2025-04-05 RX ADMIN — INSULIN LISPRO 1 UNITS: 100 INJECTION, SOLUTION INTRAVENOUS; SUBCUTANEOUS at 17:53

## 2025-04-05 RX ADMIN — SENNOSIDES AND DOCUSATE SODIUM 2 TABLET: 50; 8.6 TABLET ORAL at 17:55

## 2025-04-05 RX ADMIN — LABETALOL HYDROCHLORIDE 10 MG: 5 INJECTION, SOLUTION INTRAVENOUS at 08:07

## 2025-04-05 ASSESSMENT — PAIN DESCRIPTION - PAIN TYPE
TYPE: ACUTE PAIN
TYPE: ACUTE PAIN;CHRONIC PAIN
TYPE: ACUTE PAIN

## 2025-04-05 NOTE — CARE PLAN
The patient is Watcher - Medium risk of patient condition declining or worsening    Shift Goals  Clinical Goals: stable neuro  Patient Goals: Manage sectretions  Family Goals: Updates    Progress made toward(s) clinical / shift goals:   Problem: Discharge Planning - Stroke  Goal: Patient’s continuum of care needs will be met  Outcome: Progressing     Problem: Neuro Status  Goal: Neuro status will remain stable or improve  Outcome: Progressing     Problem: Hemodynamic Monitoring  Goal: Patient's hemodynamics, fluid balance and neurologic status will be stable or improve  Outcome: Progressing     Problem: Respiratory - Stroke Patient  Goal: Patient will achieve/maintain optimum respiratory rate/effort  Outcome: Progressing     Problem: Mobility - Stroke  Goal: Patient's capacity to carry out activities will improve  Outcome: Progressing       Patient is not progressing towards the following goals:

## 2025-04-05 NOTE — CARE PLAN
The patient is Watcher - Medium risk of patient condition declining or worsening    Shift Goals  Clinical Goals: stable neuro and respiratory, manage secretions  Patient Goals: rest, comfort  Family Goals: updates on POC    Progress made toward(s) clinical / shift goals:    Problem: Pain - Standard  Goal: Alleviation of pain or a reduction in pain to the patient’s comfort goal  Description: Target End Date:  Prior to discharge or change in level of careDocument on Vitals flowsheet1.  Document pain using the appropriate pain scale per order or unit policy2.  Educate and implement non-pharmacologic comfort measures (i.e. relaxation, distraction, massage, cold/heat therapy, etc.)3.  Pain management medications as ordered4.  Reassess pain after pain med administration per policy5.  If opiods administered assess patient's response to pain medication is appropriate per POSS sedation scale6.  Follow pain management plan developed in collaboration with patient and interdisciplinary team (including palliative care or pain specialists if applicable)  Outcome: Progressing     Problem: Knowledge Deficit - Stroke Education  Goal: Patient's knowledge of stroke and risk factors will improve  Description: Target End Date:  1-3 days or as soon as patient condition allowsDocument in Patient Education1.  Stroke education booklet provided2.  Education regarding EMS activation, need for follow up, medication prescribed at discharge, risk factors for stroke/lifestyle modifications, warning signs and symptoms of stroke provided  Outcome: Progressing     Problem: Neuro Status  Goal: Neuro status will remain stable or improve  Description: Target End Date:  Prior to discharge or change in level of careDocument on Neuro assessment in the Assessment flowsheet1.  Assess and monitor neurologic status per provider order/protocol/unit policy2.  Assess level of consciousness and orientation3.  Assess for speech, dysarthria, dysphagia, facial  symmetry4.  Assess visual field, eye movements, gaze preference, pupil reaction and size5.  Assess muscle strength and motor response in all four extremities6.  Assess for sensation (numbness and tingling)7.  Assess basic neuro reflexes (cough, gag, corneal)8.  Identify changes in neuro status and report to provider for testing/treatment orders  Outcome: Progressing     Problem: Hemodynamic Monitoring  Goal: Patient's hemodynamics, fluid balance and neurologic status will be stable or improve  Description: Target End Date:  Prior to discharge or change in level of care1.  Vital signs, pulse oximetry and cardiac monitor per provider order and/or policy2.  Frequent pulse checks performed post thrombectomy3.  Frequent monitoring for signs of bleeding post TPA administration4.  Proper management of IV infusions5.  Intake and output monitored per provider order6.  Daily weight obtained per unit policy or provider order7.  Peripheral pulses and capillary refill assessed as needed8.  Monitor for signs/symptoms of excessive bleeding9.  Body temperature assessed and fevers [id number]. Patient positioned for maximum circulation/cardiac output  Outcome: Progressing     Problem: Urinary Elimination  Goal: Establish and maintain regular urinary output  Description: Target End Date:  Prior to discharge or change in level of careDocument on I/O and Assessment flowsheets1.  Evaluate need to continue indwelling catheter every shift2.  Assess signs and symptoms of urinary retention3.  Assess post-void residual volumes4.  Implement bladder training program5.  Encourage scheduled voidings6.  Assist patient to sit on bedside commode or toilet for voiding7.  Educate patient and family/caregiver on use and purpose of urine collection devices (document in Patient Education)  Outcome: Progressing     Problem: Skin Integrity  Goal: Skin integrity is maintained or improved  Description: Target End Date:  Prior to discharge or change in level  of careDocument interventions on Skin Risk/Lj flowsheet groups and corresponding LDA1.  Assess and monitor skin integrity, appearance and/or temperature2.  Assess risk factors for impaired skin integrity and/or pressures ulcers3.  Implement precautions to protect skin integrity in collaboration with interdisciplinary team4.  Implement pressure ulcer prevention protocol if at risk for skin breakdown5.  Confirm wound care consult if at risk for skin breakdown6.  Ensure patient use of pressure relieving devices  (Low air loss bed, waffle overlay, heel protectors, ROHO cushion, etc)  Outcome: Progressing       Patient is not progressing towards the following goals:      Problem: Risk for Aspiration  Goal: Patient's risk for aspiration will be absent or decrease  Description: Target End Date:  Prior to discharge or change in level of care1.   Complete dysphagia screening on admission2.   NPO until dysphagia screening complete or medically cleared3.   Collaborate with Speech Therapy, Clinical Dietitian and interdisciplinary team4.   Implement aspiration precautions5.   Assist patient up to chair for meals6.   Elevate head of bed 90 degrees if patient is unable to get out of bed7.   Encourage small bites8.   Ensure foods/liquids are of appropriate consistency9.   Assess for any signs/symptoms of [id number]. Assess breath sounds and vital signs after oral intake  Outcome: Not Progressing     Problem: Mobility - Stroke  Goal: Patient's capacity to carry out activities will improve  Description: Target End Date:  Prior to discharge or change in level of care1.  Assess for barriers to mobility/activity2.  Implement activity per interdisciplinary team recommendations3.  Target activity level identified and patient/family/caregiver aware of goal4.  Provide assistive devices5.  Instruct patient/caregiver on proper use of assistive/adaptive devices6.  Schedule activities and rest periods to decrease effects of fatigue7.   Encourage mobilization to extent of ability8.  Maintain proper body alignment9.  Provide adequate pain management to allow progressive [id number]. Implement pace maker precautions as needed  Outcome: Not Progressing  Goal: Spasticity will be prevented or improved  Description: Target End Date:  Prior to discharge or change in level of care1.  Muscle relaxing agents considered or administered per order2.  Splints applied properly and used accordingly to therapist's recommendations3.  Assistance with stretching and range of motion exercises provided  Outcome: Not Progressing  Goal: Subluxation will be prevented or improved  Description: Target End Date:  Prior to discharge or change in level of care1.   Ensure proper handling during transfers, ambulation, and repositioning in bed2.   Reduce traction to affected limb and provide adequate support of weight3.   Perform passive range of motion4.  Assist with active range of motion  Outcome: Not Progressing     Problem: Self Care  Goal: Patient will have the ability to perform ADLs independently or with assistance (bathe, groom, dress, toilet and feed)  Description: Target End Date:  Prior to discharge or change in level of careDocument on ADL flowsheet1.  Assess the capability and level of deficiency to perform ADLs2.  Encourage family/care giver involvement3.  Provide assistive devices4.  Consider PT/OT evaluations5.  Maintain support, give positive feedback, encourage self-care allowing extra time and verbal cuing as needed6.  Avoid doing something for patients they can do themselves, but provide assistance as needed7.  Assist in anticipating/planning individual needs8.  Collaborate with Case Management and  to meet discharge needs  Outcome: Not Progressing     Problem: Fall Risk  Goal: Patient will remain free from falls  Description: Target End Date:  Prior to discharge or change in level of careDocument interventions on the [facility] Fall Risk  Assessment1.  Assess for fall risk factors2.  Implement fall precautions  Outcome: Not Progressing

## 2025-04-05 NOTE — PROGRESS NOTES
Critical Care Progress Note    Date of admission  3/25/2025    Chief Complaint  This is a 49 y.o. male who was initially admitted to the hospital on 3/25/2025 with global weakness, aphasia and ataxia.  His last known normal was 1630 on March 24.  He was initially admitted to the hospital floor, his CT head as well as CTA of the head and neck were within normal limits.  There was consideration for possible Guillain-Barré syndrome.     His condition continued to deteriorate and he underwent a lumbar puncture which revealed elevated protein but no evidence of infection.  He had progressive bulbar symptoms and weakness and was transferred to the ICU.  Unfortunately shortly after he arrived in the ICU he developed stridor and required emergent intubation on 3/27.       He then underwent an MRI of his brain and spine which unfortunately revealed small areas of acute infarcts in the lower jero and the upper medulla on both sides of the midline.  He also had chronic infarcts in his jero and right inferior cerebellum.  They were chronically Cuna in the bilateral basal ganglia, the right thalamus and the right periventricular white matter.  He had evidence of small vessel disease as well in the periventricular white matter.     Ultimately it appears he has been suffering multiple small strokes and his progressive decline was due to acute infarcts in the lower jero and upper medulla.  Since that time he has not recovered function, has required tracheostomy and PEG placement with the ultimate goal of transferring him to postacute medical to see what function may be regained in time.     I have had conversations with his family, they want to keep moving forward and with his Passy-[place] trials for the foreseeable future.  If he is unable to regain function going forward, further goals of care discussions can be had in the upcoming months.    Hospital Course  3/27-Shortly after arrival in the ICU patient developed stridor and  "required emergent intubation.    3/28- Blinking once for \"yes\", twice for 'no\" appropriately.    3/29-no clinical change.  Consent given for tracheostomy.    3/30 - percutaneous tracheostomy    3/31 - [facility] referral sent, PEG planned for today, family updated at bedside.  Will try to T-Piece today    4/1 - We will T-Piece as long as possible today, he does not need to go back on vent.  Medically cleared for [facility]    4/2 - Continues to T-Piece though does have some tachypnea.  Some mild bleeding around trach site is controlled.  Moderate amount of free intraperitoneal air for which CT abdomen pelvis and surgery consult were obtained.    4/3 - fever yesterday afternoon, started on Zosyn.  Blood cultures and sputum cultures sent.    4/4 - Proteus mirabilis in the sputum, changed to Augmentin per sensitivities.  Spoke with his mom at length about discharge plan to [facility] which she is in agreement with.    Interval Problem Update  Tmax: Afebrile  Diet: Tube feeds  Vasopressors: None    Infusions: None    Antibx:   Augmentin 4/4 - 4/6 (anticipated)    Zosyn 4/2 - 4/4    Sputum culture 4/2 - Proteus mirabilis (pan sensitive)    Blood Cultures 4/2 - NGTD  Covid/Rsv/Influenza 4/2 - negative    Intake / Output  Urine Output past 24 hours: 1600 mL    Lab Trends  CBC WNL    AST 94 --> 61 --> 49   --> 144 --> 130    Bili 0.3 --> 0.2    Review of Systems  Review of Systems   Unable to perform ROS: Critical illness        Vital Signs for last 24 hours   Temp:  [36.5 °C (97.7 °F)-37.1 °C (98.8 °F)] 37.1 °C (98.8 °F)  Pulse:  [60-92] 75  Resp:  [9-43] 29  BP: (121-196)/() 142/74  SpO2:  [92 %-100 %] 96 %    Hemodynamic parameters for last 24 hours       Respiratory Information for the last 24 hours       Physical Exam  Constitutional:       Appearance: He is ill-appearing.   HENT:      Head: Normocephalic and atraumatic.      Mouth/Throat:      Mouth: Mucous membranes are moist.   Eyes:      Pupils: Pupils are equal, round, " "and reactive to light.   Cardiovascular:      Rate and Rhythm: Regular rhythm. Tachycardia present.      Heart sounds: No murmur heard.     No friction rub. No gallop.   Pulmonary:      Effort: No respiratory distress.      Breath sounds: No wheezing or rales.      Comments: Trach in place  Abdominal:      General: There is no distension.      Palpations: Abdomen is soft.      Comments: PEG in place   Musculoskeletal:      Cervical back: Neck supple.      Right lower leg: No edema.      Left lower leg: No edema.   Skin:     General: Skin is warm and dry.      Capillary Refill: Capillary refill takes less than 2 seconds.   Neurological:      Sensory: Sensory deficit present.      Comments:   Eyes open spontaneously, he blinks once for \"yes\", twice for 'no\" appropriately.  Sensation to light touch is absent    Essentially locked in         Medications  Current Facility-Administered Medications   Medication Dose Route Frequency Provider Last Rate Last Admin    amoxicillin-clavulanate (Augmentin) 875-125 MG per tablet 1 Tablet  1 Tablet Enteral Tube Q12HRS Bravo Lala M.D.   1 Tablet at 04/05/25 0523    hydrALAZINE (Apresoline) tablet 10 mg  10 mg Enteral Tube Once Bravo Lala M.D.        acetaminophen (Tylenol) tablet 650 mg  650 mg Enteral Tube Q6HRS PRN Bravo Lala M.D.   650 mg at 04/04/25 0409    oxyCODONE immediate-release (Roxicodone) tablet 5 mg  5 mg Enteral Tube Q4HRS PRN Bravo Lala M.D.        rivaroxaban (Xarelto) tablet 10 mg  10 mg Enteral Tube DAILY AT 1800 Bravo Lala M.D.   10 mg at 04/04/25 1709    acetaminophen (Tylenol) suppository 650 mg  650 mg Rectal Q6HRS PRN Bravo Lala M.D.   650 mg at 04/02/25 1002    hydrALAZINE (Apresoline) injection 20 mg  20 mg Intravenous Q6HRS PRN Bravo Lala M.D.   20 mg at 04/04/25 1111    labetalol (Normodyne/Trandate) injection 10 mg  10 mg Intravenous Q4HRS PRN Bravo Lala M.D.   10 mg at 04/04/25 2029    atorvastatin (Lipitor) tablet 80 mg  " 80 mg Enteral Tube Q EVENING Bravo Lala M.D.   80 mg at 04/04/25 1709    gabapentin (Neurontin) capsule 400 mg  400 mg Enteral Tube TID Bravo Lala M.D.   400 mg at 04/05/25 0523    insulin lispro (HumaLOG,AdmeLOG) subcutaneous injection  1-6 Units Subcutaneous Q6HRS Marion Chen   1 Units at 04/04/25 1223    And    dextrose 50 % (D50W) injection 25 g  25 g Intravenous Q15 MIN PRN Marion Chen        Respiratory Therapy Consult   Nebulization Continuous RT Maddy Torres M.D.        senna-docusate (Pericolace Or Senokot S) 8.6-50 MG per tablet 2 Tablet  2 Tablet Enteral Tube BID Maddy Torres M.D.   2 Tablet at 04/04/25 1709    And    polyethylene glycol/lytes (Miralax) Packet 1 Packet  1 Packet Enteral Tube QDAY PRN Maddy Torres M.D.   1 Packet at 04/03/25 0531    And    magnesium hydroxide (Milk Of Magnesia) suspension 30 mL  30 mL Enteral Tube QDAY PRN Maddy Torres M.D.   30 mL at 03/29/25 1802    And    bisacodyl (Dulcolax) suppository 10 mg  10 mg Rectal QDAY PRN Maddy Torres M.D. MD Alert...ICU Electrolyte Replacement per Pharmacy   Other PHARMACY TO DOSE Maddy Torres M.D.        Pharmacy Consult: Enteral tube insertion - review meds/change route/product selection  1 Each Other PHARMACY TO DOSE Maddy Torres M.D.        aspirin (Asa) chewable tab 81 mg  81 mg Enteral Tube DAILY Maddy Torres M.D.   81 mg at 04/05/25 0523    ibuprofen (Motrin) tablet 600 mg  600 mg Enteral Tube Q6HRS PRN Maddy Torres M.D.   600 mg at 04/04/25 0408    melatonin tablet 5 mg  5 mg Enteral Tube Nightly Maddy Torres M.D.   5 mg at 04/04/25 2007    amLODIPine (Norvasc) tablet 10 mg  10 mg Enteral Tube DAILY Grant Gabriel M.D.   10 mg at 04/05/25 0523    ondansetron (Zofran ODT) dispertab 4 mg  4 mg Enteral Tube Q4HRS PRN Maddy Torres M.D.        ondansetron (Zofran) syringe/vial injection 4 mg  4 mg Intravenous Q4HRS PRN Darrel Polo M.D.           Fluids    Intake/Output  "Summary (Last 24 hours) at 4/5/2025 0633  Last data filed at 4/5/2025 0600  Gross per 24 hour   Intake 990 ml   Output 1750 ml   Net -760 ml       Laboratory            Recent Labs     04/03/25  0550 04/04/25  0546 04/05/25  0521   SODIUM 139 139 142   POTASSIUM 4.1 3.9 3.9   CHLORIDE 104 105 107   CO2 24 24 24   BUN 25* 23* 21   CREATININE 0.92 0.72 0.82   CALCIUM 9.5 9.3 9.4     Recent Labs     04/03/25  0550 04/04/25  0546 04/05/25  0521   ALTSGPT 144* 130*  --    ASTSGOT 61* 49*  --    ALKPHOSPHAT 175* 171*  --    TBILIRUBIN 0.3 0.2  --    DBILIRUBIN  --  <0.2  --    GLUCOSE 138* 186* 145*     Recent Labs     04/03/25  0550 04/04/25  0546 04/05/25  0521   WBC 10.7 8.9 9.2   NEUTSPOLYS 73.90*  --   --    LYMPHOCYTES 12.70*  --   --    MONOCYTES 12.40  --   --    EOSINOPHILS 0.50  --   --    BASOPHILS 0.20  --   --    ASTSGOT 61* 49*  --    ALTSGPT 144* 130*  --    ALKPHOSPHAT 175* 171*  --    TBILIRUBIN 0.3 0.2  --      Recent Labs     04/03/25  0550 04/04/25  0546 04/05/25  0521   RBC 4.50* 4.64* 4.65*   HEMOGLOBIN 12.2* 12.6* 12.7*   HEMATOCRIT 38.6* 39.8* 40.1*   PLATELETCT 308 359 376       Imaging  X-Ray:  I have personally reviewed the images and compared with prior images. and My impression is: Improvement in intra-abdominal free air, no focal consolidations    Assessment/Plan  * Acute ischemic stroke (HCC)- (present on admission)  Assessment & Plan  Acute infarcts in the lower jero and upper medulla c/w presentation.  Chronic infarcts I the jero and right inferior cerebellum which explain right riddhi deficits  He is functionally \"locked in\" at this time  ASA 81 and Plavix 75 daily  Statin therapy for secondary prevention    Trach placed 3/30, PEG 4/1  Has been on T-piece for the past several days  Passy-[place] trials    PMR following  [facility] referral sent    He is medically cleared for [facility] (okay for Augmentin until 4/6)    Proteus mirabilis pneumonia (HCC)  Assessment & Plan  Sensitive to Augmentin  Last dose " will be 4/6/2025    Acute neuromuscular respiratory failure (HCC)  Assessment & Plan  Continue T-Piece and Passy-[place] trials  RT protocols  PEP if needed    Elevated LFTs  Assessment & Plan  Mild elevation  Likely medication related  Trend    Free intraperitoneal air  Assessment & Plan  Ultimately determined to be secondary to PEG procedure    CT without free intraperitoneal fluid, PEG in appropriate position  Tube study shows PEG in the stomach appropriately  Air has resolved on chest x-ray the following day    Resolved    Type 2 diabetes mellitus with hyperglycemia, without long-term current use of insulin (HCC)- (present on admission)  Assessment & Plan  Hg A1 C 6.2  Moderate glycemic control with Insulin therapy.    Primary hypertension- (present on admission)  Assessment & Plan  Goal at this time -140   Amlodipine         VTE:  Lovenox  Ulcer: Not Indicated  Lines: Argueta Catheter  Ongoing indication addressed and PEG    I have performed a physical exam and reviewed and updated ROS and Plan today (4/5/2025). In review of yesterday's note (4/4/2025), there are no changes except as documented above.     Discussed patient condition and risk of morbidity and/or mortality with RN, RT, Pharmacy, Patient, and neurology and physiatry    He is medically cleared for [facility]

## 2025-04-06 ENCOUNTER — APPOINTMENT (OUTPATIENT)
Dept: RADIOLOGY | Facility: MEDICAL CENTER | Age: 50
DRG: 004 | End: 2025-04-06
Attending: STUDENT IN AN ORGANIZED HEALTH CARE EDUCATION/TRAINING PROGRAM
Payer: COMMERCIAL

## 2025-04-06 LAB
ANION GAP SERPL CALC-SCNC: 11 MMOL/L (ref 7–16)
BUN SERPL-MCNC: 23 MG/DL (ref 8–22)
CALCIUM SERPL-MCNC: 9.5 MG/DL (ref 8.5–10.5)
CHLORIDE SERPL-SCNC: 104 MMOL/L (ref 96–112)
CO2 SERPL-SCNC: 26 MMOL/L (ref 20–33)
CREAT SERPL-MCNC: 0.89 MG/DL (ref 0.5–1.4)
ERYTHROCYTE [DISTWIDTH] IN BLOOD BY AUTOMATED COUNT: 39.5 FL (ref 35.9–50)
GFR SERPLBLD CREATININE-BSD FMLA CKD-EPI: 106 ML/MIN/1.73 M 2
GLUCOSE BLD STRIP.AUTO-MCNC: 130 MG/DL (ref 65–99)
GLUCOSE BLD STRIP.AUTO-MCNC: 144 MG/DL (ref 65–99)
GLUCOSE SERPL-MCNC: 142 MG/DL (ref 65–99)
HCT VFR BLD AUTO: 40.4 % (ref 42–52)
HGB BLD-MCNC: 13.3 G/DL (ref 14–18)
MCH RBC QN AUTO: 27.5 PG (ref 27–33)
MCHC RBC AUTO-ENTMCNC: 32.9 G/DL (ref 32.3–36.5)
MCV RBC AUTO: 83.6 FL (ref 81.4–97.8)
PLATELET # BLD AUTO: 405 K/UL (ref 164–446)
PMV BLD AUTO: 8.4 FL (ref 9–12.9)
POTASSIUM SERPL-SCNC: 4.1 MMOL/L (ref 3.6–5.5)
RBC # BLD AUTO: 4.83 M/UL (ref 4.7–6.1)
SODIUM SERPL-SCNC: 141 MMOL/L (ref 135–145)
WBC # BLD AUTO: 10.3 K/UL (ref 4.8–10.8)

## 2025-04-06 PROCEDURE — 770022 HCHG ROOM/CARE - ICU (200)

## 2025-04-06 PROCEDURE — 700102 HCHG RX REV CODE 250 W/ 637 OVERRIDE(OP): Performed by: STUDENT IN AN ORGANIZED HEALTH CARE EDUCATION/TRAINING PROGRAM

## 2025-04-06 PROCEDURE — 700102 HCHG RX REV CODE 250 W/ 637 OVERRIDE(OP): Performed by: INTERNAL MEDICINE

## 2025-04-06 PROCEDURE — A9270 NON-COVERED ITEM OR SERVICE: HCPCS | Performed by: INTERNAL MEDICINE

## 2025-04-06 PROCEDURE — A9270 NON-COVERED ITEM OR SERVICE: HCPCS | Performed by: STUDENT IN AN ORGANIZED HEALTH CARE EDUCATION/TRAINING PROGRAM

## 2025-04-06 PROCEDURE — 94640 AIRWAY INHALATION TREATMENT: CPT

## 2025-04-06 PROCEDURE — 80048 BASIC METABOLIC PNL TOTAL CA: CPT

## 2025-04-06 PROCEDURE — 82962 GLUCOSE BLOOD TEST: CPT

## 2025-04-06 PROCEDURE — 99233 SBSQ HOSP IP/OBS HIGH 50: CPT | Performed by: STUDENT IN AN ORGANIZED HEALTH CARE EDUCATION/TRAINING PROGRAM

## 2025-04-06 PROCEDURE — 85027 COMPLETE CBC AUTOMATED: CPT

## 2025-04-06 RX ADMIN — OXYCODONE HYDROCHLORIDE 5 MG: 5 TABLET ORAL at 20:12

## 2025-04-06 RX ADMIN — LABETALOL HYDROCHLORIDE 10 MG: 5 INJECTION, SOLUTION INTRAVENOUS at 17:34

## 2025-04-06 RX ADMIN — AMOXICILLIN AND CLAVULANATE POTASSIUM 1 TABLET: 875; 125 TABLET, FILM COATED ORAL at 17:23

## 2025-04-06 RX ADMIN — HYDROCHLOROTHIAZIDE 25 MG: 25 TABLET ORAL at 05:37

## 2025-04-06 RX ADMIN — ASPIRIN 81 MG: 81 TABLET, CHEWABLE ORAL at 05:38

## 2025-04-06 RX ADMIN — ATORVASTATIN CALCIUM 80 MG: 80 TABLET, FILM COATED ORAL at 17:23

## 2025-04-06 RX ADMIN — GABAPENTIN 400 MG: 400 CAPSULE ORAL at 05:38

## 2025-04-06 RX ADMIN — LABETALOL HYDROCHLORIDE 10 MG: 5 INJECTION, SOLUTION INTRAVENOUS at 09:16

## 2025-04-06 RX ADMIN — AMOXICILLIN AND CLAVULANATE POTASSIUM 1 TABLET: 875; 125 TABLET, FILM COATED ORAL at 05:37

## 2025-04-06 RX ADMIN — Medication 5 MG: at 20:12

## 2025-04-06 RX ADMIN — RIVAROXABAN 10 MG: 10 TABLET, FILM COATED ORAL at 17:23

## 2025-04-06 RX ADMIN — AMLODIPINE BESYLATE 10 MG: 10 TABLET ORAL at 05:36

## 2025-04-06 RX ADMIN — GABAPENTIN 400 MG: 400 CAPSULE ORAL at 17:23

## 2025-04-06 RX ADMIN — GABAPENTIN 400 MG: 400 CAPSULE ORAL at 12:28

## 2025-04-06 RX ADMIN — SENNOSIDES AND DOCUSATE SODIUM 2 TABLET: 50; 8.6 TABLET ORAL at 05:36

## 2025-04-06 ASSESSMENT — PAIN DESCRIPTION - PAIN TYPE
TYPE: ACUTE PAIN

## 2025-04-06 ASSESSMENT — FIBROSIS 4 INDEX: FIB4 SCORE: 0.5

## 2025-04-06 NOTE — CARE PLAN
The patient is Watcher - Medium risk of patient condition declining or worsening    Shift Goals  Clinical Goals: [id number], sbp<140  Patient Goals: repositioning, watch tv  Family Goals: jennifer    Progress made toward(s) clinical / shift goals:    Problem: Knowledge Deficit - Standard  Goal: Patient and family/care givers will demonstrate understanding of plan of care, disease process/condition, diagnostic tests and medications  Outcome: Progressing     Problem: Pain - Standard  Goal: Alleviation of pain or a reduction in pain to the patient’s comfort goal  Outcome: Progressing     Problem: Neuro Status  Goal: Neuro status will remain stable or improve  Outcome: Progressing     Problem: Hemodynamic Monitoring  Goal: Patient's hemodynamics, fluid balance and neurologic status will be stable or improve  Outcome: Progressing     PRNs given - see MAR     Problem: Risk for Aspiration  Goal: Patient's risk for aspiration will be absent or decrease  Outcome: Progressing     Problem: Urinary Elimination  Goal: Establish and maintain regular urinary output  Outcome: Progressing     Problem: Fall Risk  Goal: Patient will remain free from falls  Outcome: Progressing     Problem: Skin Integrity  Goal: Skin integrity is maintained or improved  Outcome: Progressing       Patient is not progressing towards the following goals: n/a

## 2025-04-06 NOTE — PROGRESS NOTES
Critical Care Progress Note    Date of admission  3/25/2025    Chief Complaint  This is a 49 y.o. male who was initially admitted to the hospital on 3/25/2025 with global weakness, aphasia and ataxia.  His last known normal was 1630 on March 24.  He was initially admitted to the hospital floor, his CT head as well as CTA of the head and neck were within normal limits.  There was consideration for possible Guillain-Barré syndrome.     His condition continued to deteriorate and he underwent a lumbar puncture which revealed elevated protein but no evidence of infection.  He had progressive bulbar symptoms and weakness and was transferred to the ICU.  Unfortunately shortly after he arrived in the ICU he developed stridor and required emergent intubation on 3/27.       He then underwent an MRI of his brain and spine which unfortunately revealed small areas of acute infarcts in the lower jero and the upper medulla on both sides of the midline.  He also had chronic infarcts in his jero and right inferior cerebellum.  They were chronically Cuna in the bilateral basal ganglia, the right thalamus and the right periventricular white matter.  He had evidence of small vessel disease as well in the periventricular white matter.     Ultimately it appears he has been suffering multiple small strokes and his progressive decline was due to acute infarcts in the lower jero and upper medulla.  Since that time he has not recovered function, has required tracheostomy and PEG placement with the ultimate goal of transferring him to postacute medical to see what function may be regained in time.     I have had conversations with his family, they want to keep moving forward and with his Passy-[place] trials for the foreseeable future.  If he is unable to regain function going forward, further goals of care discussions can be had in the upcoming months.    Hospital Course  3/27-Shortly after arrival in the ICU patient developed stridor and  "required emergent intubation.    3/28- Blinking once for \"yes\", twice for 'no\" appropriately.    3/29-no clinical change.  Consent given for tracheostomy.    3/30 - percutaneous tracheostomy    3/31 - [facility] referral sent, PEG planned for today, family updated at bedside.  Will try to T-Piece today    4/1 - We will T-Piece as long as possible today, he does not need to go back on vent.  Medically cleared for [facility]    4/2 - Continues to T-Piece though does have some tachypnea.  Some mild bleeding around trach site is controlled.  Moderate amount of free intraperitoneal air for which CT abdomen pelvis and surgery consult were obtained.    4/3 - fever yesterday afternoon, started on Zosyn.  Blood cultures and sputum cultures sent.    4/4 - Proteus mirabilis in the sputum, changed to Augmentin per sensitivities.  Spoke with his mom at length about discharge plan to [facility] which she is in agreement with.    4/5 - stable, no clinical change, pending [facility] placement.    4/6 - no clinical change, still waiting for [facility] likely early next week.    Interval Problem Update  Tmax: Afebrile  Diet: Tube feeds  Vasopressors: None    Infusions: None    Antibx:   Augmentin 4/4 - 4/6   Zosyn 4/2 - 4/4    Sputum culture 4/2 - Proteus mirabilis (pan sensitive)    Blood Cultures 4/2 - NGTD  Covid/Rsv/Influenza 4/2 - negative    Intake / Output  Urine Output past 24 hours: 2250 mL    Lab Trends  CBC WNL    AST 94 --> 61 --> 49   --> 144 --> 130    Bili 0.3 --> 0.2    Review of Systems  Review of Systems   Unable to perform ROS: Critical illness        Vital Signs for last 24 hours   Temp:  [36.2 °C (97.2 °F)-36.4 °C (97.6 °F)] 36.3 °C (97.3 °F)  Pulse:  [] 79  Resp:  [0-40] 14  BP: (114-169)/(55-87) 138/78  SpO2:  [95 %-100 %] 96 %    Hemodynamic parameters for last 24 hours       Respiratory Information for the last 24 hours       Physical Exam  Constitutional:       Appearance: He is ill-appearing.   HENT:      Head: Normocephalic and " "atraumatic.      Mouth/Throat:      Mouth: Mucous membranes are moist.   Eyes:      Pupils: Pupils are equal, round, and reactive to light.   Cardiovascular:      Rate and Rhythm: Regular rhythm. Tachycardia present.      Heart sounds: No murmur heard.     No friction rub. No gallop.   Pulmonary:      Effort: No respiratory distress.      Breath sounds: No wheezing or rales.      Comments: Trach in place  Abdominal:      General: There is no distension.      Palpations: Abdomen is soft.      Comments: PEG in place   Musculoskeletal:      Cervical back: Neck supple.      Right lower leg: No edema.      Left lower leg: No edema.   Skin:     General: Skin is warm and dry.      Capillary Refill: Capillary refill takes less than 2 seconds.   Neurological:      Sensory: Sensory deficit present.      Comments:   Eyes open spontaneously, he blinks once for \"yes\", twice for 'no\" appropriately.  Sensation to light touch is absent    Essentially locked in         Medications  Current Facility-Administered Medications   Medication Dose Route Frequency Provider Last Rate Last Admin    hydroCHLOROthiazide tablet 25 mg  25 mg Enteral Tube Q DAY Bravo Lala M.D.   25 mg at 04/06/25 0537    amoxicillin-clavulanate (Augmentin) 875-125 MG per tablet 1 Tablet  1 Tablet Enteral Tube Q12HRS Bravo Lala M.D.   1 Tablet at 04/06/25 0537    acetaminophen (Tylenol) tablet 650 mg  650 mg Enteral Tube Q6HRS PRN Bravo Lala M.D.   650 mg at 04/04/25 0409    oxyCODONE immediate-release (Roxicodone) tablet 5 mg  5 mg Enteral Tube Q4HRS PRN Bravo Lala M.D.        rivaroxaban (Xarelto) tablet 10 mg  10 mg Enteral Tube DAILY AT 1800 Bravo Lala M.D.   10 mg at 04/05/25 1747    acetaminophen (Tylenol) suppository 650 mg  650 mg Rectal Q6HRS PRN Bravo Lala M.D.   650 mg at 04/02/25 1002    hydrALAZINE (Apresoline) injection 20 mg  20 mg Intravenous Q6HRS PRN Bravo Lala M.D.   20 mg at 04/05/25 1503    labetalol " (Normodyne/Trandate) injection 10 mg  10 mg Intravenous Q4HRS PRN Bravo Lala M.D.   10 mg at 04/05/25 1907    atorvastatin (Lipitor) tablet 80 mg  80 mg Enteral Tube Q EVENING Bravo Lala M.D.   80 mg at 04/05/25 1747    gabapentin (Neurontin) capsule 400 mg  400 mg Enteral Tube TID Bravo Lala M.D.   400 mg at 04/06/25 0538    insulin lispro (HumaLOG,AdmeLOG) subcutaneous injection  1-6 Units Subcutaneous Q6HRS Marion Chen   1 Units at 04/05/25 1753    And    dextrose 50 % (D50W) injection 25 g  25 g Intravenous Q15 MIN PRN Marion Chen        Respiratory Therapy Consult   Nebulization Continuous RT Maddy Torres M.D.        senna-docusate (Pericolace Or Senokot S) 8.6-50 MG per tablet 2 Tablet  2 Tablet Enteral Tube BID Maddy Torres M.D.   2 Tablet at 04/06/25 0536    And    polyethylene glycol/lytes (Miralax) Packet 1 Packet  1 Packet Enteral Tube QDAY PRN Maddy Torres M.D.   1 Packet at 04/03/25 0531    And    magnesium hydroxide (Milk Of Magnesia) suspension 30 mL  30 mL Enteral Tube QDAY PRN Maddy Torres M.D.   30 mL at 03/29/25 1802    And    bisacodyl (Dulcolax) suppository 10 mg  10 mg Rectal QDAY PRN Maddy Torres M.D. MD Alert...ICU Electrolyte Replacement per Pharmacy   Other PHARMACY TO DOSE Maddy Torres M.D.        Pharmacy Consult: Enteral tube insertion - review meds/change route/product selection  1 Each Other PHARMACY TO DOSE Maddy Torres M.D.        aspirin (Asa) chewable tab 81 mg  81 mg Enteral Tube DAILY Maddy Torres M.D.   81 mg at 04/06/25 0538    ibuprofen (Motrin) tablet 600 mg  600 mg Enteral Tube Q6HRS PRN Maddy Torres M.D.   600 mg at 04/04/25 0408    melatonin tablet 5 mg  5 mg Enteral Tube Nightly Maddy Torres M.D.   5 mg at 04/05/25 2136    amLODIPine (Norvasc) tablet 10 mg  10 mg Enteral Tube DAILY Grant Gabriel M.D.   10 mg at 04/06/25 0536    ondansetron (Zofran ODT) dispertab 4 mg  4 mg Enteral Tube Q4HRS PRN Maddy Torres,  "M.D.        ondansetron (Zofran) syringe/vial injection 4 mg  4 mg Intravenous Q4HRS PRN Darrel Polo M.D.           Fluids    Intake/Output Summary (Last 24 hours) at 4/6/2025 0621  Last data filed at 4/6/2025 0600  Gross per 24 hour   Intake 1855 ml   Output 2075 ml   Net -220 ml       Laboratory            Recent Labs     04/04/25 0546 04/05/25 0521 04/06/25  0335   SODIUM 139 142 141   POTASSIUM 3.9 3.9 4.1   CHLORIDE 105 107 104   CO2 24 24 26   BUN 23* 21 23*   CREATININE 0.72 0.82 0.89   CALCIUM 9.3 9.4 9.5     Recent Labs     04/04/25 0546 04/05/25 0521 04/06/25  0335   ALTSGPT 130*  --   --    ASTSGOT 49*  --   --    ALKPHOSPHAT 171*  --   --    TBILIRUBIN 0.2  --   --    DBILIRUBIN <0.2  --   --    GLUCOSE 186* 145* 142*     Recent Labs     04/04/25 0546 04/05/25 0521 04/06/25  0335   WBC 8.9 9.2 10.3   ASTSGOT 49*  --   --    ALTSGPT 130*  --   --    ALKPHOSPHAT 171*  --   --    TBILIRUBIN 0.2  --   --      Recent Labs     04/04/25 0546 04/05/25 0521 04/06/25  0335   RBC 4.64* 4.65* 4.83   HEMOGLOBIN 12.6* 12.7* 13.3*   HEMATOCRIT 39.8* 40.1* 40.4*   PLATELETCT 359 376 405       Imaging  X-Ray:  I have personally reviewed the images and compared with prior images. and My impression is: Improvement in intra-abdominal free air, no focal consolidations    Assessment/Plan  * Acute ischemic stroke (HCC)- (present on admission)  Assessment & Plan  Acute infarcts in the lower jero and upper medulla c/w presentation.  Chronic infarcts I the jero and right inferior cerebellum which explain right riddhi deficits  He is functionally \"locked in\" at this time  ASA 81 and Plavix 75 daily  Statin therapy for secondary prevention    Trach placed 3/30, PEG 4/1  Has been on T-piece for the past several days  Passy-[place] trials    PMR following  [facility] referral sent    He is medically cleared for [facility]    Proteus mirabilis pneumonia (HCC)  Assessment & Plan  Sensitive to Augmentin  Last dose will be " 4/6/2025    Acute neuromuscular respiratory failure (HCC)  Assessment & Plan  Continue T-Piece and Passy-[place] trials  RT protocols  PEP if needed    Elevated LFTs  Assessment & Plan  Mild elevation  Likely medication related  Trend    Free intraperitoneal air  Assessment & Plan  Ultimately determined to be secondary to PEG procedure    CT without free intraperitoneal fluid, PEG in appropriate position  Tube study shows PEG in the stomach appropriately  Air has resolved on chest x-ray the following day    Resolved    Type 2 diabetes mellitus with hyperglycemia, without long-term current use of insulin (HCC)- (present on admission)  Assessment & Plan  Hg A1 C 6.2  Moderate glycemic control with Insulin therapy.    Primary hypertension- (present on admission)  Assessment & Plan  Goal at this time -140   Amlodipine         VTE:  Lovenox  Ulcer: Not Indicated  Lines: Argueta Catheter  Ongoing indication addressed and PEG    I have performed a physical exam and reviewed and updated ROS and Plan today (4/6/2025). In review of yesterday's note (4/5/2025), there are no changes except as documented above.     Discussed patient condition and risk of morbidity and/or mortality with RN, RT, Pharmacy, Patient, and neurology and physiatry    He is medically cleared for [facility]

## 2025-04-06 NOTE — CARE PLAN
Problem: Aerosol Therapy  Goal: Improved hydration/ability to mobilize secretions and/or decreased airway edema  Description: Target End Date:  resolve prior to discharge or when underlying condition is resolved/stabilized1.  Implement heated or cool aerosol therapy2.  Assessed for optimal hydration, decreased edema and/or improved ability to mobilize secretions  Outcome: Progressing       Respiratory Update    Treatment modality: T-piece 4L/28%, Trach day 8, 8 portex  Frequency: q4    Pt tolerating current treatments well with no adverse reactions.

## 2025-04-06 NOTE — CARE PLAN
The patient is Stable - Low risk of patient condition declining or worsening    Shift Goals  Clinical Goals: stable neuro, keep SBP less than 140  Patient Goals: repositioning  Family Goals: JAMARI    Progress made toward(s) clinical / shift goals:    Problem: Knowledge Deficit - Standard  Goal: Patient and family/care givers will demonstrate understanding of plan of care, disease process/condition, diagnostic tests and medications  Outcome: Progressing     Problem: Pain - Standard  Goal: Alleviation of pain or a reduction in pain to the patient’s comfort goal  Outcome: Progressing     Problem: Optimal Care of the Stroke Patient  Goal: Optimal emergency care for the stroke patient  Outcome: Progressing  Goal: Optimal acute care for the stroke patient  Outcome: Progressing     Problem: Discharge Planning - Stroke  Goal: Ensure Stroke Core Measures are met prior to discharge  Outcome: Progressing  Goal: Patient’s continuum of care needs will be met  Outcome: Progressing     Problem: Neuro Status  Goal: Neuro status will remain stable or improve  Outcome: Progressing     Problem: Respiratory - Stroke Patient  Goal: Patient will achieve/maintain optimum respiratory rate/effort  Outcome: Progressing     Problem: Fall Risk  Goal: Patient will remain free from falls  Outcome: Progressing     Problem: Skin Integrity  Goal: Skin integrity is maintained or improved  Outcome: Progressing

## 2025-04-06 NOTE — CARE PLAN
Problem: Aerosol Therapy  Goal: Improved hydration/ability to mobilize secretions and/or decreased airway edema  Description: Target End Date:  resolve prior to discharge or when underlying condition is resolved/stabilized1.  Implement heated or cool aerosol therapy2.  Assessed for optimal hydration, decreased edema and/or improved ability to mobilize secretions  Outcome: Progressing  Trach day 8, 8.0 portex cuff up    Patient is stable on t-piece 4L/28% , copious secretions noted

## 2025-04-06 NOTE — CARE PLAN
Problem: Aerosol Therapy  Goal: Improved hydration/ability to mobilize secretions and/or decreased airway edema  Description: Target End Date:  resolve prior to discharge or when underlying condition is resolved/stabilized1.  Implement heated or cool aerosol therapy2.  Assessed for optimal hydration, decreased edema and/or improved ability to mobilize secretions  Outcome: Progressing   Tolerating aerosol therapy well.

## 2025-04-07 ENCOUNTER — APPOINTMENT (OUTPATIENT)
Dept: RADIOLOGY | Facility: MEDICAL CENTER | Age: 50
End: 2025-04-07
Attending: STUDENT IN AN ORGANIZED HEALTH CARE EDUCATION/TRAINING PROGRAM
Payer: COMMERCIAL

## 2025-04-07 LAB
ALBUMIN SERPL BCP-MCNC: 3.3 G/DL (ref 3.2–4.9)
ALBUMIN/GLOB SERPL: 0.9 G/DL
ALP SERPL-CCNC: 117 U/L (ref 30–99)
ALT SERPL-CCNC: 86 U/L (ref 2–50)
ANION GAP SERPL CALC-SCNC: 11 MMOL/L (ref 7–16)
AST SERPL-CCNC: 34 U/L (ref 12–45)
BACTERIA BLD CULT: NORMAL
BACTERIA BLD CULT: NORMAL
BILIRUB SERPL-MCNC: <0.2 MG/DL (ref 0.1–1.5)
BUN SERPL-MCNC: 24 MG/DL (ref 8–22)
CALCIUM ALBUM COR SERPL-MCNC: 10.2 MG/DL (ref 8.5–10.5)
CALCIUM SERPL-MCNC: 9.6 MG/DL (ref 8.5–10.5)
CHLORIDE SERPL-SCNC: 102 MMOL/L (ref 96–112)
CO2 SERPL-SCNC: 26 MMOL/L (ref 20–33)
CREAT SERPL-MCNC: 0.86 MG/DL (ref 0.5–1.4)
CRP SERPL HS-MCNC: 0.82 MG/DL (ref 0–0.75)
ERYTHROCYTE [DISTWIDTH] IN BLOOD BY AUTOMATED COUNT: 41 FL (ref 35.9–50)
GFR SERPLBLD CREATININE-BSD FMLA CKD-EPI: 107 ML/MIN/1.73 M 2
GLOBULIN SER CALC-MCNC: 3.6 G/DL (ref 1.9–3.5)
GLUCOSE SERPL-MCNC: 148 MG/DL (ref 65–99)
HAV IGM SERPL QL IA: NORMAL
HBV CORE IGM SER QL: NORMAL
HBV SURFACE AG SER QL: NORMAL
HCT VFR BLD AUTO: 41.2 % (ref 42–52)
HCV AB SER QL: NORMAL
HGB BLD-MCNC: 13.2 G/DL (ref 14–18)
MCH RBC QN AUTO: 27.9 PG (ref 27–33)
MCHC RBC AUTO-ENTMCNC: 32 G/DL (ref 32.3–36.5)
MCV RBC AUTO: 87.1 FL (ref 81.4–97.8)
PLATELET # BLD AUTO: 366 K/UL (ref 164–446)
PMV BLD AUTO: 8.4 FL (ref 9–12.9)
POTASSIUM SERPL-SCNC: 4.9 MMOL/L (ref 3.6–5.5)
PREALB SERPL-MCNC: 27.2 MG/DL (ref 18–38)
PROT SERPL-MCNC: 6.9 G/DL (ref 6–8.2)
RBC # BLD AUTO: 4.73 M/UL (ref 4.7–6.1)
SIGNIFICANT IND 70042: NORMAL
SIGNIFICANT IND 70042: NORMAL
SITE SITE: NORMAL
SITE SITE: NORMAL
SODIUM SERPL-SCNC: 139 MMOL/L (ref 135–145)
SOURCE SOURCE: NORMAL
SOURCE SOURCE: NORMAL
WBC # BLD AUTO: 10.5 K/UL (ref 4.8–10.8)

## 2025-04-07 PROCEDURE — 770000 HCHG ROOM/CARE - INTERMEDIATE ICU *

## 2025-04-07 PROCEDURE — 700102 HCHG RX REV CODE 250 W/ 637 OVERRIDE(OP): Performed by: INTERNAL MEDICINE

## 2025-04-07 PROCEDURE — A9270 NON-COVERED ITEM OR SERVICE: HCPCS | Performed by: INTERNAL MEDICINE

## 2025-04-07 PROCEDURE — 94799 UNLISTED PULMONARY SVC/PX: CPT

## 2025-04-07 PROCEDURE — 99233 SBSQ HOSP IP/OBS HIGH 50: CPT | Performed by: INTERNAL MEDICINE

## 2025-04-07 PROCEDURE — 71045 X-RAY EXAM CHEST 1 VIEW: CPT

## 2025-04-07 PROCEDURE — 99232 SBSQ HOSP IP/OBS MODERATE 35: CPT | Performed by: HOSPITALIST

## 2025-04-07 PROCEDURE — 80074 ACUTE HEPATITIS PANEL: CPT

## 2025-04-07 PROCEDURE — 80053 COMPREHEN METABOLIC PANEL: CPT

## 2025-04-07 PROCEDURE — 94640 AIRWAY INHALATION TREATMENT: CPT

## 2025-04-07 PROCEDURE — 85027 COMPLETE CBC AUTOMATED: CPT

## 2025-04-07 PROCEDURE — 84134 ASSAY OF PREALBUMIN: CPT

## 2025-04-07 PROCEDURE — 97530 THERAPEUTIC ACTIVITIES: CPT

## 2025-04-07 PROCEDURE — 86140 C-REACTIVE PROTEIN: CPT

## 2025-04-07 PROCEDURE — A9270 NON-COVERED ITEM OR SERVICE: HCPCS | Performed by: STUDENT IN AN ORGANIZED HEALTH CARE EDUCATION/TRAINING PROGRAM

## 2025-04-07 PROCEDURE — 700102 HCHG RX REV CODE 250 W/ 637 OVERRIDE(OP): Performed by: STUDENT IN AN ORGANIZED HEALTH CARE EDUCATION/TRAINING PROGRAM

## 2025-04-07 RX ADMIN — GABAPENTIN 400 MG: 400 CAPSULE ORAL at 05:11

## 2025-04-07 RX ADMIN — Medication 5 MG: at 22:12

## 2025-04-07 RX ADMIN — ASPIRIN 81 MG: 81 TABLET, CHEWABLE ORAL at 05:11

## 2025-04-07 RX ADMIN — ATORVASTATIN CALCIUM 80 MG: 80 TABLET, FILM COATED ORAL at 18:18

## 2025-04-07 RX ADMIN — GABAPENTIN 400 MG: 400 CAPSULE ORAL at 18:18

## 2025-04-07 RX ADMIN — RIVAROXABAN 10 MG: 10 TABLET, FILM COATED ORAL at 18:18

## 2025-04-07 RX ADMIN — GABAPENTIN 400 MG: 400 CAPSULE ORAL at 12:48

## 2025-04-07 RX ADMIN — HYDROCHLOROTHIAZIDE 25 MG: 25 TABLET ORAL at 05:11

## 2025-04-07 RX ADMIN — SENNOSIDES AND DOCUSATE SODIUM 2 TABLET: 50; 8.6 TABLET ORAL at 18:18

## 2025-04-07 RX ADMIN — AMLODIPINE BESYLATE 10 MG: 10 TABLET ORAL at 05:11

## 2025-04-07 ASSESSMENT — COGNITIVE AND FUNCTIONAL STATUS - GENERAL
EATING MEALS: TOTAL
DAILY ACTIVITIY SCORE: 6
SUGGESTED CMS G CODE MODIFIER MOBILITY: CN
SUGGESTED CMS G CODE MODIFIER DAILY ACTIVITY: CN
TURNING FROM BACK TO SIDE WHILE IN FLAT BAD: TOTAL
DRESSING REGULAR UPPER BODY CLOTHING: TOTAL
STANDING UP FROM CHAIR USING ARMS: TOTAL
WALKING IN HOSPITAL ROOM: TOTAL
PERSONAL GROOMING: TOTAL
MOVING TO AND FROM BED TO CHAIR: TOTAL
TOILETING: TOTAL
MOVING FROM LYING ON BACK TO SITTING ON SIDE OF FLAT BED: TOTAL
MOBILITY SCORE: 6
CLIMB 3 TO 5 STEPS WITH RAILING: TOTAL
DRESSING REGULAR LOWER BODY CLOTHING: TOTAL
HELP NEEDED FOR BATHING: TOTAL

## 2025-04-07 ASSESSMENT — PAIN DESCRIPTION - PAIN TYPE
TYPE: ACUTE PAIN

## 2025-04-07 ASSESSMENT — GAIT ASSESSMENTS: GAIT LEVEL OF ASSIST: UNABLE TO PARTICIPATE

## 2025-04-07 NOTE — THERAPY
Occupational Therapy  Daily Treatment     Patient Name: Jonna Brian  Age:  49 y.o., Sex:  male  Medical Record #: [id number]  Today's Date: 4/7/2025     Precautions  Precautions: Fall Risk, Swallow Precautions  Comments: difficulty managing secretions    Assessment     Pt currently limited by decreased functional mobility, activity tolerance, sensation, strength, AROM, coordination, balance, and pain which are affecting pt's ability to complete ADLs/IADLs at baseline. Pt would benefit from OT services in the acute care setting to maximize functional recovery.      Plan    Treatment Plan Status: (P) Continue Current Treatment Plan  Type of Treatment: Self Care / Activities of Daily Living, Adaptive Equipment, Cognitive Skill Development, Neuro Re-Education / Balance, Therapeutic Exercises, Therapeutic Activity, Family / Caregiver Training  Treatment Frequency: 4 Times per Week  Treatment Duration: Until Therapy Goals Met    DC Equipment Recommendations: Unable to determine at this time  Discharge Recommendations: (P) Recommend post-acute placement for additional occupational therapy services prior to discharge home       04/07/25 0859   Strength Upper Body   Upper Body Strength  X   Lt Shoulder Flexion Strength 2- (P-)   Lt Shoulder Abduction Strength 2- (P-)   Lt Shoulder Adduction Strength 2- (P-)   Lt Elbow Flexion Strength 1 (T)   Lt Elbow Extension Strength 1 (T)   Lt Wrist Flexion Strength 2- (P-)   Lt Wrist Extension Strength 2- (P-)   Comments also noted movement in L fingers and thumb.  no movement R UE   Balance   Sitting Balance (Static) Trace +   Sitting Balance (Dynamic) Trace   Weight Shift Sitting Absent   Activities of Daily Living   Grooming Total Assist   Upper Body Dressing Total Assist   Lower Body Dressing Total Assist   Toileting Total Assist   Functional Mobility   Sit to Stand Unable to Participate   Bed, Chair, Wheelchair Transfer Unable to Participate   Short Term Goals   Short Term Goal #  1 unsupported seated g/h with SPV   Goal Outcome # 1 Progressing slower than expected   Short Term Goal # 2 UB dressing with min A using riddhi-technique   Goal Outcome # 2 Progressing slower than expected   Short Term Goal # 3 BSC txf with mod A   Goal Outcome # 3 Progressing slower than expected   Short Term Goal # 4 sitting EOB unsupported for >2min in prep for LB dressing   Goal Outcome # 4 Progressing slower than expected   Occupational Therapy Treatment Plan    O.T. Treatment Plan Continue Current Treatment Plan   Anticipated Discharge Equipment and Recommendations   Discharge Recommendations Recommend post-acute placement for additional occupational therapy services prior to discharge home

## 2025-04-07 NOTE — PROGRESS NOTES
"Critical Care Progress Note    Date of admission  3/25/2025    Chief Complaint  49 y.o. male admitted 3/25/2025 with neurologic deficits, found to have pontine and medullary strokes requiring intubation and mechanical ventilation. He is s/p tracheostomy and PEG tube placement    Hospital Course  3/27-Shortly after arrival in the ICU patient developed stridor and required emergent intubation.    3/28- Blinking once for \"yes\", twice for 'no\" appropriately.    3/29-no clinical change.  Consent given for tracheostomy.    3/30 - percutaneous tracheostomy    3/31 - [facility] referral sent, PEG planned for today, family updated at bedside.  Will try to T-Piece today    4/1 - We will T-Piece as long as possible today, he does not need to go back on vent.  Medically cleared for [facility]    4/2 - Continues to T-Piece though does have some tachypnea.  Some mild bleeding around trach site is controlled.  Moderate amount of free intraperitoneal air for which CT abdomen pelvis and surgery consult were obtained.    4/3 - fever yesterday afternoon, started on Zosyn.  Blood cultures and sputum cultures sent.    4/4 - Proteus mirabilis in the sputum, changed to Augmentin per sensitivities.  Spoke with his mom at length about discharge plan to [facility] which she is in agreement with.    4/5 - stable, no clinical change, pending [facility] placement.    4/6 - no clinical change, still waiting for [facility] likely early next week.    Interval Problem Update  Reviewed last 24 hour events:  Neuro: RASS 0, follows LLE and LUE, nods appropriately  HR: SR, 60-90  SBP: 100-140  Tmax: AF  GI: TF at goal, PEG, BM 4/5  I/O: condom cath, 450 overnight  Lines: PIV  Mobility: level 2 with PT  Resp: t-piece, TD 9, 4L  Vte: xarelto  PPI/H2:na  Antibx: completed zosyn then augmentin course    Continuously t-piece  SBP mostly at goal  LFTs abnormal -transient increase, now improving.  Check hepatitis panel.  Liver normal on 4/2 CT scan  PMV trials with SLP, last 4/3  Mom coming " tomorrow  [facility] reviewing again today, hopefully discharge today--->no beds, will transfer to [facility]    Review of Systems  ROS     Vital Signs for last 24 hours   Temp:  [36.3 °C (97.3 °F)-37 °C (98.6 °F)] 36.3 °C (97.3 °F)  Pulse:  [66-91] 69  Resp:  [8-36] 11  BP: (119-170)/(57-85) 122/58  SpO2:  [94 %-100 %] 97 %    Hemodynamic parameters for last 24 hours       Respiratory Information for the last 24 hours       Physical Exam   Physical Exam  Constitutional:       General: He is not in acute distress.     Appearance: He is ill-appearing.   HENT:      Head: Normocephalic and atraumatic.      Mouth/Throat:      Comments: Lip edema  Eyes:      Pupils: Pupils are equal, round, and reactive to light.   Neck:      Comments: trach  Cardiovascular:      Rate and Rhythm: Normal rate and regular rhythm.   Pulmonary:      Effort: Pulmonary effort is normal.      Breath sounds: Normal breath sounds.   Abdominal:      General: Bowel sounds are normal.      Tenderness: There is no abdominal tenderness.   Musculoskeletal:      Right lower leg: No edema.      Left lower leg: No edema.   Skin:     General: Skin is warm and dry.   Neurological:      Mental Status: He is alert.      Comments: Smiling and nodding appropriately  Follows commands LUE and LLE   Psychiatric:         Mood and Affect: Mood normal.         Medications  Current Facility-Administered Medications   Medication Dose Route Frequency Provider Last Rate Last Admin    hydroCHLOROthiazide tablet 25 mg  25 mg Enteral Tube Q DAY Bravo Lala M.D.   25 mg at 04/07/25 0511    acetaminophen (Tylenol) tablet 650 mg  650 mg Enteral Tube Q6HRS PRN Bravo Lala M.D.   650 mg at 04/04/25 0409    oxyCODONE immediate-release (Roxicodone) tablet 5 mg  5 mg Enteral Tube Q4HRS PRN Bravo Lala M.D.   5 mg at 04/06/25 2012    rivaroxaban (Xarelto) tablet 10 mg  10 mg Enteral Tube DAILY AT 1800 Bravo Lala M.D.   10 mg at 04/06/25 1723    acetaminophen (Tylenol) suppository  650 mg  650 mg Rectal Q6HRS PRN Bravo Lala M.D.   650 mg at 04/02/25 1002    hydrALAZINE (Apresoline) injection 20 mg  20 mg Intravenous Q6HRS PRN Bravo Lala M.D.   20 mg at 04/05/25 1503    labetalol (Normodyne/Trandate) injection 10 mg  10 mg Intravenous Q4HRS PRN Bravo Lala M.D.   10 mg at 04/06/25 1734    atorvastatin (Lipitor) tablet 80 mg  80 mg Enteral Tube Q EVENING Bravo Lala M.D.   80 mg at 04/06/25 1723    gabapentin (Neurontin) capsule 400 mg  400 mg Enteral Tube TID Bravo Lala M.D.   400 mg at 04/07/25 0511    Respiratory Therapy Consult   Nebulization Continuous RT Maddy Torres M.D.        senna-docusate (Pericolace Or Senokot S) 8.6-50 MG per tablet 2 Tablet  2 Tablet Enteral Tube BID Maddy Torres M.D.   2 Tablet at 04/06/25 0536    And    polyethylene glycol/lytes (Miralax) Packet 1 Packet  1 Packet Enteral Tube QDAY PRN Maddy Torres M.D.   1 Packet at 04/03/25 0531    And    magnesium hydroxide (Milk Of Magnesia) suspension 30 mL  30 mL Enteral Tube QDAY PRN Maddy Torres M.D.   30 mL at 03/29/25 1802    And    bisacodyl (Dulcolax) suppository 10 mg  10 mg Rectal QDAY PRN Maddy Torres M.D. MD Alert...ICU Electrolyte Replacement per Pharmacy   Other PHARMACY TO DOSE Maddy Torres M.D.        Pharmacy Consult: Enteral tube insertion - review meds/change route/product selection  1 Each Other PHARMACY TO DOSE Maddy Torres M.D.        aspirin (Asa) chewable tab 81 mg  81 mg Enteral Tube DAILY Maddy Torres M.D.   81 mg at 04/07/25 0511    ibuprofen (Motrin) tablet 600 mg  600 mg Enteral Tube Q6HRS PRN Maddy Torres M.D.   600 mg at 04/04/25 0408    melatonin tablet 5 mg  5 mg Enteral Tube Nightly Maddy Torres M.D.   5 mg at 04/06/25 2012    amLODIPine (Norvasc) tablet 10 mg  10 mg Enteral Tube DAILY Grant Gabriel M.D.   10 mg at 04/07/25 0511    ondansetron (Zofran ODT) dispertab 4 mg  4 mg Enteral Tube Q4HRS PRN Maddy Torres M.D.         "ondansetron (Zofran) syringe/vial injection 4 mg  4 mg Intravenous Q4HRS PRN Darrel Polo M.D.           Fluids    Intake/Output Summary (Last 24 hours) at 4/7/2025 0619  Last data filed at 4/7/2025 0600  Gross per 24 hour   Intake 2150 ml   Output 1175 ml   Net 975 ml       Laboratory          Recent Labs     04/05/25 0521 04/06/25 0335 04/07/25  0448   SODIUM 142 141 139   POTASSIUM 3.9 4.1 4.9   CHLORIDE 107 104 102   CO2 24 26 26   BUN 21 23* 24*   CREATININE 0.82 0.89 0.86   CALCIUM 9.4 9.5 9.6     Recent Labs     04/05/25 0521 04/06/25 0335 04/07/25  0448   ALTSGPT  --   --  86*   ASTSGOT  --   --  34   ALKPHOSPHAT  --   --  117*   TBILIRUBIN  --   --  <0.2   GLUCOSE 145* 142* 148*     Recent Labs     04/05/25 0521 04/06/25 0335 04/07/25  0448   WBC 9.2 10.3 10.5   ASTSGOT  --   --  34   ALTSGPT  --   --  86*   ALKPHOSPHAT  --   --  117*   TBILIRUBIN  --   --  <0.2     Recent Labs     04/05/25 0521 04/06/25 0335 04/07/25  0448   RBC 4.65* 4.83 4.73   HEMOGLOBIN 12.7* 13.3* 13.2*   HEMATOCRIT 40.1* 40.4* 41.2*   PLATELETCT 376 405 366       Imaging  X-Ray:  I have personally reviewed the images and compared with prior images.    Assessment/Plan  * Acute ischemic stroke (HCC)- (present on admission)  Assessment & Plan  Acute infarcts in the lower jero and upper medulla c/w presentation.  Chronic infarcts I the jero and right inferior cerebellum which explain right riddhi deficits  Was \"locked in\" per prior notes, but now nodding, smiling and following commands on the left  ASA 81 and Plavix 75 daily  Statin therapy for secondary prevention  No indication for hypercoag workup per Dr. Ramu Logan placed 3/30, PEG 4/1  Tolerating t-piece for days  Passy-Boogie trials    PMR following  [facility] referral sent    He is medically cleared for [facility]    Proteus mirabilis pneumonia (HCC)  Assessment & Plan  Completed antibiotics    Free intraperitoneal air  Assessment & Plan  Ultimately determined to be secondary to " PEG procedure    CT without free intraperitoneal fluid, PEG in appropriate position  Tube study shows PEG in the stomach appropriately  Air has resolved on chest x-ray the following day    Resolved    Acute neuromuscular respiratory failure (HCC)  Assessment & Plan  Continue T-Piece and Passy-Boogie trials  RT protocols  PEP if needed    Type 2 diabetes mellitus with hyperglycemia, without long-term current use of insulin (HCC)- (present on admission)  Assessment & Plan  Hg A1 C 6.2  Moderate glycemic control with Insulin therapy.    Elevated LFTs  Assessment & Plan  Mild elevation  Likely medication related  Trend  Hep serology negative    Primary hypertension- (present on admission)  Assessment & Plan  Goal at this time -140   Amlodipine, HCTZ         VTE:  NOAC  Ulcer: Not Indicated  Lines: None    I have performed a physical exam and reviewed and updated ROS and Plan today (4/7/2025). In review of yesterday's note (4/6/2025), there are no changes except as documented above.     Discussed patient condition and risk of morbidity and/or mortality with Hospitalist, RN, RT, Pharmacy, Charge nurse / hot rounds, Patient, and neurology

## 2025-04-07 NOTE — CARE PLAN
Problem: Aerosol Therapy  Goal: Improved hydration/ability to mobilize secretions and/or decreased airway edema  Description: Target End Date:  resolve prior to discharge or when underlying condition is resolved/stabilized1.  Implement heated or cool aerosol therapy2.  Assessed for optimal hydration, decreased edema and/or improved ability to mobilize secretions  Outcome: Progressing   Respiratory Update     Treatment modality: T-piece 4L/28%, Trach Day 9, 8 portex  Frequency: q4     Pt tolerating current treatments well with no adverse reactions.

## 2025-04-07 NOTE — PROGRESS NOTES
4 Eyes Skin Assessment Completed by STEVEN Mejia and STEVEN Sweeney.    Head WDL  Ears WDL  Nose WDL  Mouth WDL  Neck WDL  Breast/Chest WDL  Shoulder Blades WDL  Spine WDL  (R) Arm/Elbow/Hand WDL  (L) Arm/Elbow/Hand WDL  Abdomen Incision  Groin WDL  Scrotum/Coccyx/Buttocks WDL  (R) Leg WDL  (L) Leg WDL  (R) Heel/Foot/Toe WDL  (L) Heel/Foot/Toe WDL          Devices In Places ECG, Blood Pressure Cuff, Pulse Ox, SCD's, ET Tube, Condom Cath, and G Tube      Interventions In Place Heel Mepilex, Sacral Mepilex, Pillows, Q2 Turns, Low Air Loss Mattress, and Heels Loaded W/Pillows    Possible Skin Injury No    Pictures Uploaded Into Epic N/A  Wound Consult Placed Yes  RN Wound Prevention Protocol Ordered No

## 2025-04-07 NOTE — THERAPY
"Physical Therapy   Daily Treatment     Patient Name: Jonna Brian  Age:  49 y.o., Sex:  male  Medical Record #: [id number]  Today's Date: 4/7/2025     Precautions  Precautions: Fall Risk;Swallow Precautions    Assessment    Pt rec'd alert, in bed, able to nod head to \"yes/no\" questions.  Remains total assist to sit eob and to maintain eob sitting balance, despite multi modal cues.  Worked quite a bit on attempting to elicit active movement, w/ noted improvement since being in the ICU, as noted below.  Unfortunately no right sided active movement noted despite multiple cues and attempts.    Plan    Treatment Plan Status: Continue Current Treatment Plan  Type of Treatment: Bed Mobility, Family / Caregiver Training, Gait Training, Neuro Re-Education / Balance, Self Care / Home Evaluation, Stair Training, Therapeutic Activities, Therapeutic Exercise  Treatment Frequency: 5 Times per Week  Treatment Duration: Until Therapy Goals Met    DC Equipment Recommendations: Unable to determine at this time  Discharge Recommendations: Recommend post-acute placement for additional physical therapy services prior to discharge home         Objective       04/07/25 1242   Strength Lower Body   Rt Hip Flexion Strength 0 (Zero)   Rt Hip Abduction Strength 0 (Zero)   Rt Knee Flexion Strength 0 (Zero)   Rt Knee Extension Strength 0 (Zero)   Rt Ankle Dorsiflexion Strength 0 (Zero)   Rt Ankle Plantar Flexion Strength 0 (Zero)   Lt Hip Flexion Strength 0 (Zero)   Lt Knee Extension Strength 3- (F-)   Lt Ankle Dorsiflexion Strength 2+ (P+)   Lt Ankle Plantar Flexion Strength 2+ (P+)   Neuro-Muscular Treatments   Neuro-Muscular Treatments Anterior weight shift;Facilitation;Tactile Cuing;Tapping   Comments   (forward head, unable to maintain in midline)   Vision   Vision Comments appears to have a field cut to his left   Balance   Sitting Balance (Static) Trace +   Sitting Balance (Dynamic) Trace +   Weight Shift Sitting Absent   Weight Shift " Standing Absent   Skilled Intervention Verbal Cuing;Tactile Cuing;Postural Facilitation;Facilitation   Bed Mobility    Supine to Sit Total Assist   Sit to Supine Total Assist   Scooting Total Assist   Skilled Intervention Verbal Cuing;Tactile Cuing;Sequencing;Facilitation   Gait Analysis   Gait Level Of Assist Unable to Participate   Functional Mobility   Sit to Stand Unable to Participate   Short Term Goals    Short Term Goal # 1 pt will move supine<>eob with min a in 6 tx for bed mobility.   Goal Outcome # 1 goal not met   Short Term Goal # 2 pt will sit at eob for 5 min with fair- balance in 6 tx for oob tolerance.   Goal Outcome # 2 Goal not met   Short Term Goal # 3 pt will complete sts with hemiwalker and min a in 6 tx for functional mobility.   Goal Outcome # 3 Goal not met   Physical Therapy Treatment Plan   Physical Therapy Treatment Plan Continue Current Treatment Plan   Anticipated Discharge Equipment and Recommendations   DC Equipment Recommendations Unable to determine at this time   Discharge Recommendations Recommend post-acute placement for additional physical therapy services prior to discharge home

## 2025-04-07 NOTE — DISCHARGE PLANNING
Case Management Discharge Planning    Admission Date: 3/25/2025  GMLOS: 23.1  ALOS: 13    6-Clicks ADL Score: 6  6-Clicks Mobility Score: 6  PT and/or OT Eval ordered: Yes  Post-acute Referrals Ordered: Yes  Post-acute Choice Obtained: Yes  Has referral(s) been sent to post-acute provider:  Yes    Anticipated Discharge Dispo: Discharge Disposition: Disch to a long term care facility (63)    DME Needed: No    Action(s) Taken:   Chart review was completed. Patient was discussed during IDT rounds.    Per IDT, pt is medically cleared pending placement at [facility].     PC was placed to [facility] with [facility] re: bed availability today. [facility] is to call this RNCM back after their morning rounds to confirm acceptance today. She indicates [facility] is reviewing pt's insurance to confirm long-term coverage at this time. MD was notified.     1202:   PC was received from [facility] with [facility] stating no bed availability today. MD and RN were notified via voatle.     Escalations Completed: None    Medically Clear: Yes    Next Steps:  CM RN to follow up with medical team to discuss discharge barriers or needs.     Barriers to Discharge: Pending Placement

## 2025-04-07 NOTE — CARE PLAN
The patient is Stable - Low risk of patient condition declining or worsening    Shift Goals  Clinical Goals: [id number], sbp<140  Patient Goals: repositioning, watch tv  Family Goals: jennifer    Progress made toward(s) clinical / shift goals:    Problem: Knowledge Deficit - Standard  Goal: Patient and family/care givers will demonstrate understanding of plan of care, disease process/condition, diagnostic tests and medications  Description: Target End Date:  1-3 days or as soon as patient condition allowsDocument in Patient Education1.  Patient and family/caregiver oriented to unit, equipment, visitation policy and means for communicating concern2.  Complete/review Learning Assessment3.  Assess knowledge level of disease process/condition, treatment plan, diagnostic tests and medications4.  Explain disease process/condition, treatment plan, diagnostic tests and medications  Outcome: Progressing     Problem: Pain - Standard  Goal: Alleviation of pain or a reduction in pain to the patient’s comfort goal  Description: Target End Date:  Prior to discharge or change in level of careDocument on Vitals flowsheet1.  Document pain using the appropriate pain scale per order or unit policy2.  Educate and implement non-pharmacologic comfort measures (i.e. relaxation, distraction, massage, cold/heat therapy, etc.)3.  Pain management medications as ordered4.  Reassess pain after pain med administration per policy5.  If opiods administered assess patient's response to pain medication is appropriate per POSS sedation scale6.  Follow pain management plan developed in collaboration with patient and interdisciplinary team (including palliative care or pain specialists if applicable)  Outcome: Progressing     Problem: Optimal Care of the Stroke Patient  Goal: Optimal emergency care for the stroke patient  Description: Target End Date:  End of day 1Time of Onset1.  Time of last known well obtained2.  Patient and family/caregiver verbalize  understanding of diagnosis, medications and testing3.  NIHSS performed and documented, including date and time, for ischemic stroke patients prior to any acute recanalization therapy (thrombolytics or mechanical) or within 12 hours of arrival if no intervention is warranted4.  Consults and referrals placed to appropriate departmentsMedications Administration as Ordered:1.  Implement appropriate reversal agents for INR greater than 1.52.  Pre-alteplase administration of antihypertensives for SBP >185 DBP >1103.  Post-alteplase administration of antihypertensives for SBP >185, DBP >1054.  Thrombolytic Therapy for qualifying ischemic stroke patients who arrive within 4.5 hours of time of Last Known Well. Thrombolytic therapy administered within 30 minutes or a documented reason for delay  Outcome: Progressing  Goal: Optimal acute care for the stroke patient  Description: Target End Date:  1 to 3 days- Vital signs and neuro checks performed and documented per order- NIHSS completed and documented per order- Continuous telemetry monitoring for 72 hours or until discontinued by provider- Head CT without contrast obtained- Consideration of MRI/MRA- MRI screening form completed in worklist if MRI ordered- Echocardiogram with Bubble Study ordered/completed with consideration of SHAYAN- Carotid Doppler ordered/completed (Not required if CTA of neck completed in ED)- Lipid Panel obtained within 48 hours of admission- PT, PTT, INR obtained per Anticoagulation orders (if applicable)- Antithrombotic therapy by end of hospital day 2 for ischemic stroke. Provider must document reason if contraindicated.- Venous Thromboembolism (VTE) Prophylaxis by end of hospital day 2 for ischemic and hemorrhagic stroke. Provider must document reason if contraindicated- Dysphagia screen completed and documented prior to any PO intake. Patient to remain NPO until Speech Therapy evaluation if thrombolytic or thrombectomy performed- Rehabilitation  assessment including PT/OT/SLP evaluations for referral to Physical Medicine and Rehabilitation services. If none needed, provider needs to document reason- Neurology consult placed- Consideration of cardiology consult for cryptogenic strokes  Outcome: Progressing     Problem: Knowledge Deficit - Stroke Education  Goal: Patient's knowledge of stroke and risk factors will improve  Description: Target End Date:  1-3 days or as soon as patient condition allowsDocument in Patient Education1.  Stroke education booklet provided2.  Education regarding EMS activation, need for follow up, medication prescribed at discharge, risk factors for stroke/lifestyle modifications, warning signs and symptoms of stroke provided  Outcome: Progressing     Problem: Psychosocial - Patient Condition  Goal: Patient's ability to verbalize feelings about condition will improve  Description: Target End Date:  Prior to discharge or change in level of care1.  Discuss coping with medical condition and its effects2.  Encourage patient participation in care3.  Encourage acknowledgement of body changes and accompanying emotions4.  Perform depression screening  Outcome: Progressing  Goal: Patient's ability to re-evaluate and adapt role responsibilities will improve  Description: Target End Date:  Prior to discharge or change in level of care1.  Assess family support2.  Encourage support system participation in care3.  Encouraged verbalization of feelings regarding caregiver responsibilities4.  Discuss changes in role and responsibilities caused by patient's condition  Outcome: Progressing     Problem: Discharge Planning - Stroke  Goal: Ensure Stroke Core Measures are met prior to discharge  Description: Target End Date:  Prior to discharge or change in level of care1. Patient discharged on antithrombotic therapy (Ischemic Stroke)2. Patient discharged on intensive statin if LDL is greater than or equal to 70 mg/dl (Ischemic Stroke)3. Patient discharged  on anticoagulation therapy for patients with atrial fibrillation/flutter (Ischemic Stroke)4. Smoking education/cessation provided if applicable  Outcome: Progressing  Goal: Patient’s continuum of care needs will be met  Description: Target End Date:  Prior to discharge or change in level of care1.  Potential discharge barriers identified upon admission and throughout hospital stay2.  Ensure appropriate referrals in place for follow up with specialists after discharge3.  Ensure appropriate referrals in place for DMEs if applicable4.  Collaboration with transitional care team and interdisciplinary team to meet discharge needs5.  Involve patient and family/caregiver in prioritizing goals for discharge planning6.  Educate patient and caregiver about discharge instructions, medications, and follow up appointments7.  Referral placed to [facility]8.  Assure orders and follow up appointments are made for outpatient extended cardiac monitoring if appropriate  Outcome: Progressing     Problem: Neuro Status  Goal: Neuro status will remain stable or improve  Description: Target End Date:  Prior to discharge or change in level of careDocument on Neuro assessment in the Assessment flowsheet1.  Assess and monitor neurologic status per provider order/protocol/unit policy2.  Assess level of consciousness and orientation3.  Assess for speech, dysarthria, dysphagia, facial symmetry4.  Assess visual field, eye movements, gaze preference, pupil reaction and size5.  Assess muscle strength and motor response in all four extremities6.  Assess for sensation (numbness and tingling)7.  Assess basic neuro reflexes (cough, gag, corneal)8.  Identify changes in neuro status and report to provider for testing/treatment orders  Outcome: Progressing     Problem: Hemodynamic Monitoring  Goal: Patient's hemodynamics, fluid balance and neurologic status will be stable or improve  Description: Target End Date:  Prior to discharge or change in  level of care1.  Vital signs, pulse oximetry and cardiac monitor per provider order and/or policy2.  Frequent pulse checks performed post thrombectomy3.  Frequent monitoring for signs of bleeding post TPA administration4.  Proper management of IV infusions5.  Intake and output monitored per provider order6.  Daily weight obtained per unit policy or provider order7.  Peripheral pulses and capillary refill assessed as needed8.  Monitor for signs/symptoms of excessive bleeding9.  Body temperature assessed and fevers [id number]. Patient positioned for maximum circulation/cardiac output  Outcome: Progressing     Problem: Respiratory - Stroke Patient  Goal: Patient will achieve/maintain optimum respiratory rate/effort  Description: Target End Date:  Prior to discharge or change in level of careDocument on Assessment flowsheet1.  Assess and monitor respiratory rate, rhythm, depth and effort of respiration2.  Oxygenation assessed throughout shift (recommendation of >94% for new stroke patients)3.  Oxygen administered and/or titrated per order4.  Collaboration with RT to administer medication/treatments per order5.  Patient educated on importance of turning, coughing, and deep breathing6.  Patient positioned for maximum ventilatory efficiency7.  Airway suctioning provided as needed8.  Incentive spirometry encouraged 5-10 times every hour or while awake  Outcome: Progressing     Problem: Dysphagia  Goal: Dysphagia will improve  Description: Target End Date:  Prior to discharge or change in level of care1.  Assess and monitor ability to swallow2.  Collaborate with Speech Therapy to determine appropriate adaptation for safe administration of medications and oral nutrition3.  Elevate head of bed to 90 degrees during feedings and for 30 minutes after each feeding4.  Encourage proper swallowing techniques5.  Screening on admission or as soon as possible  Outcome: Progressing     Problem: Risk for Aspiration  Goal: Patient's risk  for aspiration will be absent or decrease  Description: Target End Date:  Prior to discharge or change in level of care1.   Complete dysphagia screening on admission2.   NPO until dysphagia screening complete or medically cleared3.   Collaborate with Speech Therapy, Clinical Dietitian and interdisciplinary team4.   Implement aspiration precautions5.   Assist patient up to chair for meals6.   Elevate head of bed 90 degrees if patient is unable to get out of bed7.   Encourage small bites8.   Ensure foods/liquids are of appropriate consistency9.   Assess for any signs/symptoms of [id number]. Assess breath sounds and vital signs after oral intake  Outcome: Progressing     Problem: Urinary Elimination  Goal: Establish and maintain regular urinary output  Description: Target End Date:  Prior to discharge or change in level of careDocument on I/O and Assessment flowsheets1.  Evaluate need to continue indwelling catheter every shift2.  Assess signs and symptoms of urinary retention3.  Assess post-void residual volumes4.  Implement bladder training program5.  Encourage scheduled voidings6.  Assist patient to sit on bedside commode or toilet for voiding7.  Educate patient and family/caregiver on use and purpose of urine collection devices (document in Patient Education)  Outcome: Progressing     Problem: Bowel Elimination  Goal: Establish and maintain regular bowel function  Description: Target End Date:  Prior to discharge or change in level of care1.   Note date of last BM2.   Educate about diet, fluid intake, medication and activity to promote bowel function3.   Educate signs and symptoms of constipation and interventions to implement4.   Pharmacologic bowel management per provider order5.   Regular toileting schedule6.   Upright position for toileting7.   High fiber diet8.   Encourage hydration9.   Collaborate with Clinical [id number]. Care and maintenance of ostomy if applicable  Outcome: Progressing     Problem:  Mobility - Stroke  Goal: Patient's capacity to carry out activities will improve  Description: Target End Date:  Prior to discharge or change in level of care1.  Assess for barriers to mobility/activity2.  Implement activity per interdisciplinary team recommendations3.  Target activity level identified and patient/family/caregiver aware of goal4.  Provide assistive devices5.  Instruct patient/caregiver on proper use of assistive/adaptive devices6.  Schedule activities and rest periods to decrease effects of fatigue7.  Encourage mobilization to extent of ability8.  Maintain proper body alignment9.  Provide adequate pain management to allow progressive [id number]. Implement pace maker precautions as needed  Outcome: Progressing  Goal: Spasticity will be prevented or improved  Description: Target End Date:  Prior to discharge or change in level of care1.  Muscle relaxing agents considered or administered per order2.  Splints applied properly and used accordingly to therapist's recommendations3.  Assistance with stretching and range of motion exercises provided  Outcome: Progressing  Goal: Subluxation will be prevented or improved  Description: Target End Date:  Prior to discharge or change in level of care1.   Ensure proper handling during transfers, ambulation, and repositioning in bed2.   Reduce traction to affected limb and provide adequate support of weight3.   Perform passive range of motion4.  Assist with active range of motion  Outcome: Progressing     Problem: Self Care  Goal: Patient will have the ability to perform ADLs independently or with assistance (bathe, groom, dress, toilet and feed)  Description: Target End Date:  Prior to discharge or change in level of careDocument on ADL flowsheet1.  Assess the capability and level of deficiency to perform ADLs2.  Encourage family/care giver involvement3.  Provide assistive devices4.  Consider PT/OT evaluations5.  Maintain support, give positive feedback,  encourage self-care allowing extra time and verbal cuing as needed6.  Avoid doing something for patients they can do themselves, but provide assistance as needed7.  Assist in anticipating/planning individual needs8.  Collaborate with Case Management and  to meet discharge needs  Outcome: Progressing     Problem: Fall Risk  Goal: Patient will remain free from falls  Description: Target End Date:  Prior to discharge or change in level of careDocument interventions on the [facility] Fall Risk Assessment1.  Assess for fall risk factors2.  Implement fall precautions  Outcome: Progressing     Problem: Skin Integrity  Goal: Skin integrity is maintained or improved  Description: Target End Date:  Prior to discharge or change in level of careDocument interventions on Skin Risk/Lj flowsheet groups and corresponding LDA1.  Assess and monitor skin integrity, appearance and/or temperature2.  Assess risk factors for impaired skin integrity and/or pressures ulcers3.  Implement precautions to protect skin integrity in collaboration with interdisciplinary team4.  Implement pressure ulcer prevention protocol if at risk for skin breakdown5.  Confirm wound care consult if at risk for skin breakdown6.  Ensure patient use of pressure relieving devices  (Low air loss bed, waffle overlay, heel protectors, ROHO cushion, etc)  Outcome: Progressing     Problem: Safety - Medical Restraint  Goal: Remains free of injury from restraints (Restraint for Interference with Medical Device)  Description: INTERVENTIONS:1. Determine that other, less restrictive measures have been tried or would not be effective before applying the restraint2. Evaluate the patient's condition at the time of restraint application3. Educate patient/family regarding the reason for restraint4. Q2H: Monitor safety, psychosocial status, comfort, circulation, respiratory status, LOC, nutrition and hydration  Outcome: Progressing  Goal: Free from restraint(s)  (Restraint for Interference with Medical Device)  Description: INTERVENTIONS:1.  ONCE/SHIFT or MINIMUM Q12H: Assess and document the continuing need for restraints2.  Q24H: Continued use of restraint requires LIP to perform face to face examination and written order3.  Identify and implement measures to help patient regain control4.  Educate patient/family on discontinuation criteria 5.  Assess patient's understanding and retention of education provided6.  Assess readiness for release & initiate progressive release per protocol7.  Identify and document criteria for restraints  Outcome: Progressing       Patient is not progressing towards the following goals:

## 2025-04-07 NOTE — PROGRESS NOTES
The patient is Stable - Low risk of patient condition declining or worsening     Shift Goals  Clinical Goals: stable neuro, keep SBP less than 140  Patient Goals: repositioning  Family Goals: JAMARI     Progress made toward(s) clinical / shift goals:      Problem: Knowledge Deficit - Standard  Goal: Patient and family/care givers will demonstrate understanding of plan of care, disease process/condition, diagnostic tests and medications  Outcome: Progressing     Problem: Pain - Standard  Goal: Alleviation of pain or a reduction in pain to the patient’s comfort goal  Outcome: Progressing    Problem: Neuro Status  Goal: Neuro status will remain stable or improve  Outcome: Progressing     Problem: Respiratory - Stroke Patient  Goal: Patient will achieve/maintain optimum respiratory rate/effort  Outcome: Progressing    Problem: Optimal Care of the Stroke Patient  Goal: Optimal emergency care for the stroke patient  Outcome: Progressing  Goal: Optimal acute care for the stroke patient  Outcome: Progressing    Problem: Fall Risk  Goal: Patient will remain free from falls  Outcome: Progressing     Problem: Skin Integrity  Goal: Skin integrity is maintained or improved  Outcome: Progressing

## 2025-04-08 LAB
ANION GAP SERPL CALC-SCNC: 9 MMOL/L (ref 7–16)
BUN SERPL-MCNC: 28 MG/DL (ref 8–22)
CALCIUM SERPL-MCNC: 9.4 MG/DL (ref 8.5–10.5)
CHLORIDE SERPL-SCNC: 101 MMOL/L (ref 96–112)
CO2 SERPL-SCNC: 27 MMOL/L (ref 20–33)
CREAT SERPL-MCNC: 0.9 MG/DL (ref 0.5–1.4)
ERYTHROCYTE [DISTWIDTH] IN BLOOD BY AUTOMATED COUNT: 39.3 FL (ref 35.9–50)
GFR SERPLBLD CREATININE-BSD FMLA CKD-EPI: 106 ML/MIN/1.73 M 2
GLUCOSE SERPL-MCNC: 146 MG/DL (ref 65–99)
HCT VFR BLD AUTO: 41.1 % (ref 42–52)
HGB BLD-MCNC: 12.9 G/DL (ref 14–18)
MCH RBC QN AUTO: 26.7 PG (ref 27–33)
MCHC RBC AUTO-ENTMCNC: 31.4 G/DL (ref 32.3–36.5)
MCV RBC AUTO: 85.1 FL (ref 81.4–97.8)
PLATELET # BLD AUTO: 423 K/UL (ref 164–446)
PMV BLD AUTO: 8.5 FL (ref 9–12.9)
POTASSIUM SERPL-SCNC: 4.3 MMOL/L (ref 3.6–5.5)
RBC # BLD AUTO: 4.83 M/UL (ref 4.7–6.1)
SODIUM SERPL-SCNC: 137 MMOL/L (ref 135–145)
WBC # BLD AUTO: 10 K/UL (ref 4.8–10.8)

## 2025-04-08 PROCEDURE — 700102 HCHG RX REV CODE 250 W/ 637 OVERRIDE(OP): Performed by: INTERNAL MEDICINE

## 2025-04-08 PROCEDURE — A9270 NON-COVERED ITEM OR SERVICE: HCPCS | Performed by: INTERNAL MEDICINE

## 2025-04-08 PROCEDURE — 700111 HCHG RX REV CODE 636 W/ 250 OVERRIDE (IP): Mod: JZ | Performed by: INTERNAL MEDICINE

## 2025-04-08 PROCEDURE — 99232 SBSQ HOSP IP/OBS MODERATE 35: CPT | Performed by: INTERNAL MEDICINE

## 2025-04-08 PROCEDURE — 700102 HCHG RX REV CODE 250 W/ 637 OVERRIDE(OP): Performed by: STUDENT IN AN ORGANIZED HEALTH CARE EDUCATION/TRAINING PROGRAM

## 2025-04-08 PROCEDURE — 92526 ORAL FUNCTION THERAPY: CPT

## 2025-04-08 PROCEDURE — 770020 HCHG ROOM/CARE - TELE (206)

## 2025-04-08 PROCEDURE — 80048 BASIC METABOLIC PNL TOTAL CA: CPT

## 2025-04-08 PROCEDURE — 97602 WOUND(S) CARE NON-SELECTIVE: CPT

## 2025-04-08 PROCEDURE — 92507 TX SP LANG VOICE COMM INDIV: CPT

## 2025-04-08 PROCEDURE — 85027 COMPLETE CBC AUTOMATED: CPT

## 2025-04-08 PROCEDURE — 94640 AIRWAY INHALATION TREATMENT: CPT

## 2025-04-08 PROCEDURE — A9270 NON-COVERED ITEM OR SERVICE: HCPCS | Performed by: STUDENT IN AN ORGANIZED HEALTH CARE EDUCATION/TRAINING PROGRAM

## 2025-04-08 RX ORDER — ENOXAPARIN SODIUM 100 MG/ML
40 INJECTION SUBCUTANEOUS DAILY
Status: DISCONTINUED | OUTPATIENT
Start: 2025-04-08 | End: 2025-05-13 | Stop reason: HOSPADM

## 2025-04-08 RX ADMIN — SENNOSIDES AND DOCUSATE SODIUM 2 TABLET: 50; 8.6 TABLET ORAL at 05:15

## 2025-04-08 RX ADMIN — GABAPENTIN 400 MG: 400 CAPSULE ORAL at 05:17

## 2025-04-08 RX ADMIN — GABAPENTIN 400 MG: 400 CAPSULE ORAL at 19:24

## 2025-04-08 RX ADMIN — AMLODIPINE BESYLATE 10 MG: 10 TABLET ORAL at 05:16

## 2025-04-08 RX ADMIN — POLYETHYLENE GLYCOL 3350 1 PACKET: 17 POWDER, FOR SOLUTION ORAL at 13:05

## 2025-04-08 RX ADMIN — ENOXAPARIN SODIUM 40 MG: 100 INJECTION SUBCUTANEOUS at 19:24

## 2025-04-08 RX ADMIN — SENNOSIDES AND DOCUSATE SODIUM 2 TABLET: 50; 8.6 TABLET ORAL at 19:24

## 2025-04-08 RX ADMIN — GABAPENTIN 400 MG: 400 CAPSULE ORAL at 13:05

## 2025-04-08 RX ADMIN — ATORVASTATIN CALCIUM 80 MG: 80 TABLET, FILM COATED ORAL at 19:24

## 2025-04-08 RX ADMIN — ASPIRIN 81 MG: 81 TABLET, CHEWABLE ORAL at 05:17

## 2025-04-08 RX ADMIN — Medication 5 MG: at 20:59

## 2025-04-08 RX ADMIN — HYDROCHLOROTHIAZIDE 25 MG: 25 TABLET ORAL at 05:16

## 2025-04-08 ASSESSMENT — PAIN DESCRIPTION - PAIN TYPE
TYPE: ACUTE PAIN
TYPE: ACUTE PAIN

## 2025-04-08 ASSESSMENT — FIBROSIS 4 INDEX
FIB4 SCORE: 0.47
FIB4 SCORE: 0.41

## 2025-04-08 NOTE — PROGRESS NOTES
[facility] Medicine Daily Progress Note    Date of Service  4/7/2025    Chief Complaint  Jonna Brian is a 49 y.o. male admitted 3/25/2025 with headache and weakness.    Hospital Course  Mr. Brian is a 49 y.o. male who presented 3/25/2025 with global weakness, aphasia and ataxia.  Last known normal was at 4:30 PM on 24 March.  Patient tells me he had headaches and dizziness yesterday in the morning and through the day.  But after he went to bed he felt his right arm was numb at 11 PM yesterday like he was sleeping on it and he cannot move it.  But at 4 AM in the morning, patient noticed that he could not get out of bed.  Patient also reported dizziness when he tried to walk with support which is described as feeling of passing out.  He then called his friend who calls the paramedics.  Patient's friend who is at the bedside also tells me that he has been mumbling words.  Patient reports that he had jaw weakness but does not report any word finding difficulties patient and describes it as inability to get the words out.  Patient also reports pins and needle sensation on bilateral feet and spasms in the right arm.  Patient tells me there is no past history of similar symptoms or episodes.  During the entire episode patient never had any loss of consciousness or disorientation.  I noticed that the patient was able to move his right arm and bilateral legs, not against gravity but at least lateralized movements, but patient tells me that he was not able to do it up until 6:30 PM.   His symptoms worsened for which critical care was consulted as well as neurology.  He required intubation.  CTA of the head neck was negative for large vessel occlusion.  MRI of the spine was unremarkable.  MRI of the brain revealed acute infarcts of the lower jero and upper medulla.  He effectively is a quadriplegic and required tracheostomy and PEG tube.  He developed a fever and sputum cultures grew out Proteus mirabilis for which he was  treated with IV Zosyn then de-escalated to Augmentin.    Interval Problem Update  4/7/2025.  Mr. Brian was evaluated examined in the IMCU.  He is quadriplegic was able to communicate by nodding and nods no when asked about pain.  He is watching SpongeBob on TV and he nods yes that he wants to continue to watch SpongeBob.  He is tolerating his tube feeds.  Creatinine 0.86. WBC 10.5.    I have discussed this patient's plan of care and discharge plan at IDT rounds today with Case Management, Nursing, Nursing leadership, and other members of the IDT team.    Consultants/Specialty  critical care I discussed with Dr. Torres    Code Status  Full Code    Disposition  The patient is medically cleared for discharge to home or a post-acute facility.  Anticipate discharge to: a long-term acute care hospital    I have placed the appropriate orders for post-discharge needs.    Review of Systems  Review of Systems   Unable to perform ROS: Patient nonverbal        Physical Exam  Temp:  [36.3 °C (97.3 °F)-36.6 °C (97.9 °F)] 36.4 °C (97.6 °F)  Pulse:  [68-88] 75  Resp:  [8-36] 20  BP: (112-160)/(57-76) 113/61  SpO2:  [95 %-99 %] 95 %    Physical Exam  Constitutional:       Appearance: He is ill-appearing.   Neck:      Comments: tracheostomy  Cardiovascular:      Rate and Rhythm: Normal rate and regular rhythm.   Abdominal:      Tenderness: There is no abdominal tenderness.      Comments: PEG   Neurological:      Comments: He is able to make eye contact, nod yes and no slightly, nonverbal, quadriplegia         Fluids    Intake/Output Summary (Last 24 hours) at 4/7/2025 1745  Last data filed at 4/7/2025 1614  Gross per 24 hour   Intake 2030 ml   Output 1300 ml   Net 730 ml        Laboratory  Recent Labs     04/05/25  0521 04/06/25  0335 04/07/25  0448   WBC 9.2 10.3 10.5   RBC 4.65* 4.83 4.73   HEMOGLOBIN 12.7* 13.3* 13.2*   HEMATOCRIT 40.1* 40.4* 41.2*   MCV 86.2 83.6 87.1   MCH 27.3 27.5 27.9   MCHC 31.7* 32.9 32.0*   RDW 41.1 39.5  41.0   PLATELETCT 376 405 366   MPV 8.8* 8.4* 8.4*     Recent Labs     04/05/25  0521 04/06/25  0335 04/07/25  0448   SODIUM 142 141 139   POTASSIUM 3.9 4.1 4.9   CHLORIDE 107 104 102   CO2 24 26 26   GLUCOSE 145* 142* 148*   BUN 21 23* 24*   CREATININE 0.82 0.89 0.86   CALCIUM 9.4 9.5 9.6                   Imaging  DX-CHEST-PORTABLE (1 VIEW)   Final Result         1.  Left basilar atelectasis and/or subtle infiltrates, similar to prior study      DX-CHEST-PORTABLE (1 VIEW)   Final Result         1.  Left basilar atelectasis, no focal infiltrate   2.  Cardiomegaly      DX-G.I. TUBE INJECTION, ANY TYPE   Final Result      Contrast from a PEG tube injection extends into the stomach without evidence of contrast leak.      CT-CHEST,ABDOMEN,PELVIS WITH   Final Result      1.  Large amount of free intraperitoneal air within the abdomen again seen as was previously identified on chest x-ray by review of the patient's chart, a percutaneous gastrostomy tube was placed on 3/31/2025 and is free intraperitoneal air is possibly    secondary to that recent procedure.      2.  Bibasal atelectasis and small bilateral pleural effusions.      3.  Tracheostomy tube present.      4.  No evidence of bowel obstruction or free fluid within the abdomen or pelvis.      DX-CHEST-PORTABLE (1 VIEW)   Final Result         1.  Bibasilar atelectasis   2.  Intra-abdominal free air, can be associated with history of percutaneous gastrostomy, consider other viscus perforation as warranted. Could be further evaluated with CT of the abdomen with contrast as clinically appropriate.      These findings were discussed with the patient's clinician, Bravo Lala Iv, on 4/2/2025 7:57 AM.      DX-CHEST-PORTABLE (1 VIEW)   Final Result      No evidence of acute cardiopulmonary process.      DX-ABDOMEN FOR TUBE PLACEMENT   Final Result      1.  Enteric tube extends in the fundus of stomach.      DX-ABDOMEN FOR TUBE PLACEMENT   Final Result      The gastric  tube has been removed and replaced with a small bowel feeding tube which terminate in the proximal stomach.      DX-CHEST-PORTABLE (1 VIEW)   Final Result      Atelectasis within the left lung base.      MR-THORACIC SPINE-WITH & W/O   Final Result      1.  There is no abnormal intramedullary T2 signal intensity in the thoracic spinal cord.   2.  Mild degenerative disease at T8-9.   3.  Left lower segmental consolidation.      MR-CERVICAL SPINE-WITH & W/O   Final Result      1.  Small areas of acute infarcts in the lower jero and upper medulla involving both sides of the midline. There are chronic infarcts in the jero and right inferior cerebellum.   2.  There is no abnormal intramedullary T2 signal intensity in the cervical spinal cord to suggest demyelinating lesions. There is no MR evidence of compressive myelopathy.   3.  Mild degenerative disease.            MR-BRAIN-WITH & W/O   Final Result      1.  Small areas of acute infarcts in the lower jero and upper medulla involving both sides of the midline.   2.  There are chronic infarcts in the jero and right inferior cerebellum. . There are areas of chronic lacunae in the bilateral basal ganglia, right thalamus and right periventricular white matter.   3.  There are nonspecific T2 hyperintensities in the periventricular white matter likely representing chronic small vessel disease.   4.  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      MR-LUMBAR SPINE-WITH & W/O   Final Result      1.  Unremarkable pre and postcontrast MR examination of the lumbar spine.   2.  Clinical suspicious for GBS: There is no abnormal enhancement of the lumbar nerve roots.      DX-ABDOMEN FOR TUBE PLACEMENT   Final Result      NG tube tip projects at the peripyloric region.      DX-CHEST-PORTABLE (1 VIEW)   Final Result      1.  Low lung volumes without definite acute cardiopulmonary abnormality.   2.  Support apparatus as above.  "     CT-HEAD W/O   Final Result      1.  No acute intracranial abnormality.   2.  Remote right cerebellar infarct.               DX-CHEST-PORTABLE (1 VIEW)   Final Result      No acute cardiopulmonary disease evident.      DX-CHEST-PORTABLE (1 VIEW)   Final Result      No acute cardiopulmonary disease evident.      CT-CEREBRAL PERFUSION ANALYSIS   Final Result      1. Cerebral blood flow less than 30% possibly representing completed infarct = 0 mL. Based on distribution of this finding, this is unlikely to represent artifact.      2. T Max more than 6 seconds possibly representing combination of completed infarct and ischemia = 7 mL. Based on the distribution of this finding, this is possibly artifact.      3. Mismatched volume possibly representing ischemic brain/penumbra= 0 mL      4.  Please note that this cerebral perfusion study and report is Quantitative and targets supratentorial (cerebral) perfusion for evaluation of large vessel territory acute ischemia/infarction. For example, lacunar infarcts, and brainstem/posterior fossa    ischemia/infarction are not evaluated on this study.  Data acquisition is subject to artifacts which can yield non-anatomically plausible perfusion maps which may be due to motion, bolus timing, signal to noise ratio, or other technical factors.    Perfusion map abnormalities which show non-anatomic distributions are likely artifact.   This study is not \"stand-alone\" and should only be utilized for diagnosis, management/treatment in correlation with CT, CTA, and/or MRI and clinical factors.         CT-CTA NECK WITH & W/O-POST PROCESSING   Final Result      CT angiogram of the neck within normal limits.      CT-CTA HEAD WITH & W/O-POST PROCESS   Final Result      CT angiogram of the [place] within normal limits.      CT-HEAD W/O   Final Result      Head CT without contrast within normal limits. No evidence of acute cerebral infarction, hemorrhage or mass lesion.             "        Assessment/Plan  * Acute ischemic stroke (HCC)- (present on admission)  Assessment & Plan  Acute ischemic stroke  MRI of the brain revealed acute infarcts of the lower jero and upper medulla.  He effectively is a quadriplegic and required tracheostomy and PEG tube.  Aspirin and statin  Blood pressure control  Overall prognosis is guarded    Proteus mirabilis pneumonia (HCC)- (present on admission)  Assessment & Plan  Treated with Zosyn de-escalated to Augmentin based on cultures from sputum    Acute neuromuscular respiratory failure (HCC)- (present on admission)  Assessment & Plan  Requiring tracheostomy and mechanical ventilation    Anxiety  Assessment & Plan  Ativan for anxiety.     Type 2 diabetes mellitus with hyperglycemia, without long-term current use of insulin (HCC)- (present on admission)  Assessment & Plan  Details are unclear  Reportedly he is on metformin as an outpatient  Hemoglobin A1c 6.3  He does not require insulin    Primary hypertension- (present on admission)  Assessment & Plan  Blood pressure is appropriate with hydrochlorothiazide 25 mg daily and Norvasc 10 mg daily         VTE prophylaxis:    Xarelto 10mg daily as prophylaxis      I have performed a physical exam and reviewed and updated ROS and Plan today (4/7/2025). In review of yesterday's note (4/6/2025), there are no changes except as documented above.

## 2025-04-08 NOTE — CARE PLAN
Problem: Aerosol Therapy  Goal: Improved hydration/ability to mobilize secretions and/or decreased airway edema  Description: Target End Date:  resolve prior to discharge or when underlying condition is resolved/stabilized1.  Implement heated or cool aerosol therapy2.  Assessed for optimal hydration, decreased edema and/or improved ability to mobilize secretions  Outcome: Progressing   Trach: 8.0 Portex  T-Piece 4L/28%    Large to copious secretions

## 2025-04-08 NOTE — DISCHARGE PLANNING
Case Management Discharge Planning    Admission Date: 3/25/2025  GMLOS: 23.1  ALOS: 14      Anticipated Discharge Dispo: Discharge Disposition: Disch to a long term care facility (63)    DME Needed: No    Action(s) Taken: Chart reviewed, pt discussed in IDT rounds. He is medically cleared for [facility]. Per Tyrone, they do not have a bed available and she is still trying to figure out some things with his insurance. Treatment team updated.    Escalations Completed: None    Medically Clear: Yes    Next Steps: F/U with [facility] for bed availability, ins auth    Barriers to Discharge: Pending Placement    Is the patient up for discharge tomorrow: No

## 2025-04-08 NOTE — PROGRESS NOTES
Received report, assumed pt care. Pt without any signs of distress. Resting comfortably in bed. No c/o at this time. Side rails up 3. Bed alarm on, bed in low position. Will continue to monitor. Pt suctioned prior to exiting the room.

## 2025-04-08 NOTE — PROGRESS NOTES
"Pt a&o JAMARI, however pt nods appropriately, VSS, assessment completed. Resting comfortably in bed. Pt shakes his his \"no\" when asked if in pain. Side rails up 3. Bed alarm on, bed in low position. Pt repositioned prior to exiting the room. Will continue to monitor.     "

## 2025-04-08 NOTE — CARE PLAN
Problem: Pain - Standard  Goal: Alleviation of pain or a reduction in pain to the patient’s comfort goal  Outcome: Progressing     Problem: Neuro Status  Goal: Neuro status will remain stable or improve  Outcome: Progressing     Problem: Respiratory - Stroke Patient  Goal: Patient will achieve/maintain optimum respiratory rate/effort  Outcome: Progressing     Problem: Risk for Aspiration  Goal: Patient's risk for aspiration will be absent or decrease  Outcome: Progressing     Problem: Fall Risk  Goal: Patient will remain free from falls  Outcome: Progressing     Problem: Skin Integrity  Goal: Skin integrity is maintained or improved  Outcome: Progressing   The patient is Stable - Low risk of patient condition declining or worsening    Shift Goals  Clinical Goals: Q4 meuro, monitor saturation and suction secretion  Patient Goals: JAMARI  Family Goals: updates on POC    Progress made toward(s) clinical / shift goals:  Pt is non verbal but able to respond to discomfort. Saturating well on 4L/T-piece at 28%. Frequent suctioning noted. No s/s that pt is in pain.     Patient is not progressing towards the following goals:

## 2025-04-08 NOTE — CARE PLAN
The patient is Stable - Low risk of patient condition declining or worsening    Shift Goals  Clinical Goals: Q4 neuro, DC to [facility], PT/OT/SLP  Patient Goals: JAMARI  Family Goals: updates on POC    Progress made toward(s) clinical / shift goals:      Problem: Knowledge Deficit - Standard  Goal: Patient and family/care givers will demonstrate understanding of plan of care, disease process/condition, diagnostic tests and medications  Outcome: Progressing  Note: Pt aware of POC: Q4 neuro, DC to [facility], PT/OT/SLP. Goal is for pt to be updated in the moment.        Problem: Respiratory - Stroke Patient  Goal: Patient will achieve/maintain optimum respiratory rate/effort  Outcome: Progressing  Note: Trach T-piece w/in line suction. Pt requires frequent suctioning.        Patient is not progressing towards the following goals:

## 2025-04-08 NOTE — PROGRESS NOTES
[facility] Medicine Daily Progress Note    Date of Service  4/8/2025    Chief Complaint  Jonna Brian is a 49 y.o. male admitted 3/25/2025 with headache and weakness.    Hospital Course  Mr. Brian is a 49 y.o. male who presented 3/25/2025 with global weakness, aphasia and ataxia.  Last known normal was at 4:30 PM on 24 March.  Patient tells me he had headaches and dizziness yesterday in the morning and through the day.  But after he went to bed he felt his right arm was numb at 11 PM yesterday like he was sleeping on it and he cannot move it.  But at 4 AM in the morning, patient noticed that he could not get out of bed.  Patient also reported dizziness when he tried to walk with support which is described as feeling of passing out.  He then called his friend who calls the paramedics.  Patient's friend who is at the bedside also tells me that he has been mumbling words.  Patient reports that he had jaw weakness but does not report any word finding difficulties patient and describes it as inability to get the words out.  Patient also reports pins and needle sensation on bilateral feet and spasms in the right arm.  Patient tells me there is no past history of similar symptoms or episodes.  During the entire episode patient never had any loss of consciousness or disorientation.  I noticed that the patient was able to move his right arm and bilateral legs, not against gravity but at least lateralized movements, but patient tells me that he was not able to do it up until 6:30 PM.   His symptoms worsened for which critical care was consulted as well as neurology.  He required intubation.  CTA of the head neck was negative for large vessel occlusion.  MRI of the spine was unremarkable.  MRI of the brain revealed acute infarcts of the lower jero and upper medulla.  He effectively is a quadriplegic and required tracheostomy and PEG tube.  He developed a fever and sputum cultures grew out Proteus mirabilis for which he was  treated with IV Zosyn then de-escalated to Augmentin.    Interval Problem Update    04/08/25    I evaluated and examined him at the bedside.  He was able to tell me that he is not in pain.  He is moving his left upper and lower extremity.  Currently is requiring 4 L of oxygen via T-piece to maintain oxygen saturation.    I have discussed this patient's plan of care and discharge plan at IDT rounds today with Case Management, Nursing, Nursing leadership, and other members of the IDT team.    Consultants/Specialty  critical care I discussed with Dr. Torres    Code Status  Full Code    Disposition  The patient is not medically cleared for discharge to home or a post-acute facility.      I have placed the appropriate orders for post-discharge needs.    Review of Systems  Review of Systems   Unable to perform ROS: Patient nonverbal        Physical Exam  Temp:  [36.6 °C (97.8 °F)-37.1 °C (98.7 °F)] 36.7 °C (98 °F)  Pulse:  [] 96  Resp:  [9-29] 20  BP: (111-130)/(55-68) 125/64  SpO2:  [90 %-95 %] 95 %    Physical Exam  Constitutional:       Appearance: He is ill-appearing.   Neck:      Comments: tracheostomy  Cardiovascular:      Rate and Rhythm: Normal rate and regular rhythm.   Abdominal:      Tenderness: There is no abdominal tenderness.      Comments: PEG   Neurological:      Comments: He is able to slightly move his left upper and lower extremity.  He was able to nod his head to answer question.         Fluids    Intake/Output Summary (Last 24 hours) at 4/8/2025 1623  Last data filed at 4/8/2025 0600  Gross per 24 hour   Intake 840 ml   Output 1000 ml   Net -160 ml        Laboratory  Recent Labs     04/06/25  0335 04/07/25  0448 04/08/25  0515   WBC 10.3 10.5 10.0   RBC 4.83 4.73 4.83   HEMOGLOBIN 13.3* 13.2* 12.9*   HEMATOCRIT 40.4* 41.2* 41.1*   MCV 83.6 87.1 85.1   MCH 27.5 27.9 26.7*   MCHC 32.9 32.0* 31.4*   RDW 39.5 41.0 39.3   PLATELETCT 405 366 423   MPV 8.4* 8.4* 8.5*     Recent Labs     04/06/25  0330  04/07/25  0448 04/08/25  0515   SODIUM 141 139 137   POTASSIUM 4.1 4.9 4.3   CHLORIDE 104 102 101   CO2 26 26 27   GLUCOSE 142* 148* 146*   BUN 23* 24* 28*   CREATININE 0.89 0.86 0.90   CALCIUM 9.5 9.6 9.4                   Imaging  DX-CHEST-PORTABLE (1 VIEW)   Final Result         1.  Left basilar atelectasis and/or subtle infiltrates, similar to prior study      DX-CHEST-PORTABLE (1 VIEW)   Final Result         1.  Left basilar atelectasis, no focal infiltrate   2.  Cardiomegaly      DX-G.I. TUBE INJECTION, ANY TYPE   Final Result      Contrast from a PEG tube injection extends into the stomach without evidence of contrast leak.      CT-CHEST,ABDOMEN,PELVIS WITH   Final Result      1.  Large amount of free intraperitoneal air within the abdomen again seen as was previously identified on chest x-ray by review of the patient's chart, a percutaneous gastrostomy tube was placed on 3/31/2025 and is free intraperitoneal air is possibly    secondary to that recent procedure.      2.  Bibasal atelectasis and small bilateral pleural effusions.      3.  Tracheostomy tube present.      4.  No evidence of bowel obstruction or free fluid within the abdomen or pelvis.      DX-CHEST-PORTABLE (1 VIEW)   Final Result         1.  Bibasilar atelectasis   2.  Intra-abdominal free air, can be associated with history of percutaneous gastrostomy, consider other viscus perforation as warranted. Could be further evaluated with CT of the abdomen with contrast as clinically appropriate.      These findings were discussed with the patient's clinician, Bravo Lala Iv, on 4/2/2025 7:57 AM.      DX-CHEST-PORTABLE (1 VIEW)   Final Result      No evidence of acute cardiopulmonary process.      DX-ABDOMEN FOR TUBE PLACEMENT   Final Result      1.  Enteric tube extends in the fundus of stomach.      DX-ABDOMEN FOR TUBE PLACEMENT   Final Result      The gastric tube has been removed and replaced with a small bowel feeding tube which terminate in  the proximal stomach.      DX-CHEST-PORTABLE (1 VIEW)   Final Result      Atelectasis within the left lung base.      MR-THORACIC SPINE-WITH & W/O   Final Result      1.  There is no abnormal intramedullary T2 signal intensity in the thoracic spinal cord.   2.  Mild degenerative disease at T8-9.   3.  Left lower segmental consolidation.      MR-CERVICAL SPINE-WITH & W/O   Final Result      1.  Small areas of acute infarcts in the lower jero and upper medulla involving both sides of the midline. There are chronic infarcts in the jero and right inferior cerebellum.   2.  There is no abnormal intramedullary T2 signal intensity in the cervical spinal cord to suggest demyelinating lesions. There is no MR evidence of compressive myelopathy.   3.  Mild degenerative disease.            MR-BRAIN-WITH & W/O   Final Result      1.  Small areas of acute infarcts in the lower jero and upper medulla involving both sides of the midline.   2.  There are chronic infarcts in the jero and right inferior cerebellum. . There are areas of chronic lacunae in the bilateral basal ganglia, right thalamus and right periventricular white matter.   3.  There are nonspecific T2 hyperintensities in the periventricular white matter likely representing chronic small vessel disease.   4.  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      MR-LUMBAR SPINE-WITH & W/O   Final Result      1.  Unremarkable pre and postcontrast MR examination of the lumbar spine.   2.  Clinical suspicious for GBS: There is no abnormal enhancement of the lumbar nerve roots.      DX-ABDOMEN FOR TUBE PLACEMENT   Final Result      NG tube tip projects at the peripyloric region.      DX-CHEST-PORTABLE (1 VIEW)   Final Result      1.  Low lung volumes without definite acute cardiopulmonary abnormality.   2.  Support apparatus as above.      CT-HEAD W/O   Final Result      1.  No acute intracranial abnormality.   2.   "Remote right cerebellar infarct.               DX-CHEST-PORTABLE (1 VIEW)   Final Result      No acute cardiopulmonary disease evident.      DX-CHEST-PORTABLE (1 VIEW)   Final Result      No acute cardiopulmonary disease evident.      CT-CEREBRAL PERFUSION ANALYSIS   Final Result      1. Cerebral blood flow less than 30% possibly representing completed infarct = 0 mL. Based on distribution of this finding, this is unlikely to represent artifact.      2. T Max more than 6 seconds possibly representing combination of completed infarct and ischemia = 7 mL. Based on the distribution of this finding, this is possibly artifact.      3. Mismatched volume possibly representing ischemic brain/penumbra= 0 mL      4.  Please note that this cerebral perfusion study and report is Quantitative and targets supratentorial (cerebral) perfusion for evaluation of large vessel territory acute ischemia/infarction. For example, lacunar infarcts, and brainstem/posterior fossa    ischemia/infarction are not evaluated on this study.  Data acquisition is subject to artifacts which can yield non-anatomically plausible perfusion maps which may be due to motion, bolus timing, signal to noise ratio, or other technical factors.    Perfusion map abnormalities which show non-anatomic distributions are likely artifact.   This study is not \"stand-alone\" and should only be utilized for diagnosis, management/treatment in correlation with CT, CTA, and/or MRI and clinical factors.         CT-CTA NECK WITH & W/O-POST PROCESSING   Final Result      CT angiogram of the neck within normal limits.      CT-CTA HEAD WITH & W/O-POST PROCESS   Final Result      CT angiogram of the [place] within normal limits.      CT-HEAD W/O   Final Result      Head CT without contrast within normal limits. No evidence of acute cerebral infarction, hemorrhage or mass lesion.                    Assessment/Plan  * Acute ischemic stroke (HCC)- (present on " admission)  Assessment & Plan  Acute ischemic stroke  MRI of the brain revealed acute infarcts of the lower jero and upper medulla.  He effectively is a quadriplegic and required tracheostomy and PEG tube.  Aspirin and statin  Blood pressure control  Overall prognosis is guarded    Proteus mirabilis pneumonia (HCC)- (present on admission)  Assessment & Plan  Treated with Zosyn de-escalated to Augmentin based on cultures from sputum    Acute neuromuscular respiratory failure (HCC)- (present on admission)  Assessment & Plan  Requiring tracheostomy and mechanical ventilation    Anxiety  Assessment & Plan  Ativan for anxiety.     Type 2 diabetes mellitus with hyperglycemia, without long-term current use of insulin (HCC)- (present on admission)  Assessment & Plan  Details are unclear  Reportedly he is on metformin as an outpatient  Hemoglobin A1c 6.3  He does not require insulin    Primary hypertension- (present on admission)  Assessment & Plan  Blood pressure is appropriate with hydrochlorothiazide 25 mg daily and Norvasc 10 mg daily       04/08/25 plan    He was able to nod his head and able to move his left upper and lower extremity slightly.  Currently is requiring 4 L of oxygen via T-piece to maintain oxygen saturation.  I reviewed lab workup    I discussed plan of care during multidisciplinary rounds regarding patient's current medical condition and plan of care.          VTE prophylaxis:    enoxaparin ppx      I have performed a physical exam and reviewed and updated ROS and Plan today (4/8/2025). In review of yesterday's note (4/7/2025), there are no changes except as documented above.

## 2025-04-08 NOTE — PROGRESS NOTES
Report received from day RN. Assumed care. Received on his room. Awake, non verbal but able to nod his head. No s/s that pt is in pain or discomfort. Trach with T-piece in place saturating well on 4L/28% FiO2. Frequent suctioning noted. POC updated. Call light within reach. Bed alarm on.

## 2025-04-08 NOTE — THERAPY
Speech Language Pathology   Daily Treatment     Patient Name: Jonna Brian  AGE:  49 y.o., SEX:  male  Medical Record #: [id number]  Date of Service: 4/8/2025      Precautions:  Precautions: Fall Risk, Tracheostomy , PEG Tube, Swallow Precautions     Subjective  RN/RT cleared pt for speaking valve trial. Patient was awake in bed, able to nod Y/N to answer questions.     Assessment  Speaking Valve Treatment  Tracheostomy: Portex  Size: 8.0  Cuff Position: Inflated  Air in Cuff (cc): 5  Oxygen Requirements: 4 L 28 FiO2 (T-piece)  Oral Suctioning Provided: Yes; Nasal suctioning provided as well due to copious thick clear secretions oozing from R nare and oral cavity. Suctioning provided intermittently throughout the session.  Tracheal Suctioning Provided: Pre cuff deflation, Subglottic suction, Prior to speaking valve placement, Post speaking valve placement  Upper Airway Patency with Speaking Valve: Supraglottic airflow adequacy        Breath Stacking Present: No  Behavior During Speaking Valve Trial:   Duration of Speaking Valve Trial: 5 minutes;11 minutes  Speaking Valve Left On: No                  Cardiopulmonary Vitals:  Initial: 95% SpO2, 84 HR, 8 RR  End: 95% SpO2, 89 HR, 29 RR  Time: 12 min    Speaking valve was removed once pt's blood pressure registered at 184/93 s/p repositioning with sudden increase in tracheal and oronasal secretions requiring suctioning by SLP and RT. Cuff was reinflated. RT present for trach change.     Clinical Impressions  Patient is tolerating the speaking valve for short periods, able to produce some volitional and reflexive vocalizations though still relies on head nod/shake to communicate wants/needs. He still does not trigger a reflexive swallow and cannot volitionally swallow on command, which is a major barrier to managing secretions and participating in a formal swallow evaluation/therapy. SLP will continue to follow.     Recommendations  Treatment Completed: Speech-Language  Treatment     Voice Prosthesis Training  Placement: Trained staff only  Duration: 15 minutes with direct supervision for secretion management    SLP Treatment Plan  Treatment Plan: Voice Prosthesis Training  SLP Frequency: 4x Per Week  Estimated Duration: Until Therapy Goals Met    Anticipated Discharge Needs  Discharge Recommendations: Recommend post-acute placement for additional speech therapy services prior to discharge home  Therapy Recommendations Upon DC: Dysphagia Training, AAC Training / Development, Patient / Family / Caregiver Education, Tracheostomy Training, Community Re-Integration    Patient / Family Goals  Patient / Family Goal #1: Smiles to continue PMV treatment  Goal #1 Outcome: Progressing as expected  Short Term Goals  Short Term Goal # 1: Pt will tolerate PMV trials without negitive change to cardiopulmonary vitals for 20 minutes.  Goal Outcome # 1: Progressing slower than expected    Breann Dorantes MS,CCC-SLP

## 2025-04-08 NOTE — WOUND TEAM
"[facility]  Inpatient Services  Wound and Skin Care Brief Evaluation    Admission Date: 3/25/2025     Last order of IP CONSULT TO WOUND CARE was found on 4/7/2025 from Hospital Encounter on 3/25/2025     HPI, PMH, SH: Reviewed    Chief Complaint   Patient presents with    Possible Stroke     Pt BIB EMS for possible stroke. Pt LKW 1630 3/24. At 0400 3/25 pt noted to have global weakness, aphasia and ataxia. NIH 17 on arrival.      Diagnosis: Acute weakness [R53.1]  Acute ischemic stroke (HCC) [I63.9]    Unit where seen by Wound Team: [id number]     Wound consult placed regarding \"His guaze on G Tube is crusted over and I attempted to take off but it would not detach and did not want to rip off healthy skin -- not sure if applying saline to wet the dressing first would be appropriate to attempt to take off -- new blood noticed on old dressing as well as old drainage \". Chart and images reviewed, no picture of the site. This clinician in to assess patient. Patient trache. Non-selectively debrided with Normal Saline and Gauze.     No pressure injuries or advanced wound care needs identified. G-tube had blood clot around the tube, cleansed with NS and gauze to remove clot, intact skin.  Sacrum and bilateral heels were assessed due to high risk of skin breakdown as patient unable to move himself.  Sacrum and bilateral heels are normal coloration for skin tone, intact and blanching.  Patient does have a scar to his left heel plantar noted, still continues to ludmila after assessing with moisten skin.  Wound consult completed. No further follow up unless indicated and consulted.                    PREVENTATIVE INTERVENTIONS:    Q shift Lj - performed per nursing policy  Q shift pressure point assessments - performed per nursing policy    Surface/Positioning  ICU Low Airloss - Currently in Place  Reposition q 2 hours - Currently in Place  TAPs Turning system - Currently in " Place    Offloading/Redistribution  Sacral offloading dressing (Silicone dressing) - Currently in Place  Heel offloading dressing (Silicone dressing) - Currently in Place  Float Heels off Bed with Pillows - Currently in Place           Respiratory  Trach with Optifoam split foam and T piece - Currently in Place    Containment/Moisture Prevention    Dri-vern pad - Currently in Place  Purwick/Condom Cath - Currently in Place    Mobilization      Unable to assess

## 2025-04-08 NOTE — ASSESSMENT & PLAN NOTE
Acute ischemic stroke  MRI of the brain revealed acute infarcts of the lower jero and upper medulla.  He effectively is a quadriplegic and required tracheostomy and PEG tube.  Aspirin and statin  Blood pressure control  Family hope for functional recovery. LTACH pending  Speech following, capping trials  PTOT

## 2025-04-09 LAB
ANION GAP SERPL CALC-SCNC: 13 MMOL/L (ref 7–16)
BUN SERPL-MCNC: 29 MG/DL (ref 8–22)
CALCIUM SERPL-MCNC: 9.7 MG/DL (ref 8.5–10.5)
CHLORIDE SERPL-SCNC: 101 MMOL/L (ref 96–112)
CO2 SERPL-SCNC: 26 MMOL/L (ref 20–33)
CREAT SERPL-MCNC: 0.93 MG/DL (ref 0.5–1.4)
ERYTHROCYTE [DISTWIDTH] IN BLOOD BY AUTOMATED COUNT: 38.4 FL (ref 35.9–50)
GFR SERPLBLD CREATININE-BSD FMLA CKD-EPI: 101 ML/MIN/1.73 M 2
GLUCOSE SERPL-MCNC: 142 MG/DL (ref 65–99)
HCT VFR BLD AUTO: 42.7 % (ref 42–52)
HGB BLD-MCNC: 14 G/DL (ref 14–18)
MCH RBC QN AUTO: 27.6 PG (ref 27–33)
MCHC RBC AUTO-ENTMCNC: 32.8 G/DL (ref 32.3–36.5)
MCV RBC AUTO: 84.2 FL (ref 81.4–97.8)
PLATELET # BLD AUTO: 448 K/UL (ref 164–446)
PMV BLD AUTO: 8.8 FL (ref 9–12.9)
POTASSIUM SERPL-SCNC: 4.2 MMOL/L (ref 3.6–5.5)
RBC # BLD AUTO: 5.07 M/UL (ref 4.7–6.1)
SODIUM SERPL-SCNC: 140 MMOL/L (ref 135–145)
WBC # BLD AUTO: 9.6 K/UL (ref 4.8–10.8)

## 2025-04-09 PROCEDURE — 770020 HCHG ROOM/CARE - TELE (206)

## 2025-04-09 PROCEDURE — 94640 AIRWAY INHALATION TREATMENT: CPT

## 2025-04-09 PROCEDURE — A9270 NON-COVERED ITEM OR SERVICE: HCPCS | Performed by: INTERNAL MEDICINE

## 2025-04-09 PROCEDURE — 700102 HCHG RX REV CODE 250 W/ 637 OVERRIDE(OP): Performed by: INTERNAL MEDICINE

## 2025-04-09 PROCEDURE — 97530 THERAPEUTIC ACTIVITIES: CPT

## 2025-04-09 PROCEDURE — 99232 SBSQ HOSP IP/OBS MODERATE 35: CPT | Performed by: STUDENT IN AN ORGANIZED HEALTH CARE EDUCATION/TRAINING PROGRAM

## 2025-04-09 PROCEDURE — 700111 HCHG RX REV CODE 636 W/ 250 OVERRIDE (IP): Mod: JZ | Performed by: INTERNAL MEDICINE

## 2025-04-09 PROCEDURE — 36415 COLL VENOUS BLD VENIPUNCTURE: CPT

## 2025-04-09 PROCEDURE — 80048 BASIC METABOLIC PNL TOTAL CA: CPT

## 2025-04-09 PROCEDURE — 85027 COMPLETE CBC AUTOMATED: CPT

## 2025-04-09 PROCEDURE — A9270 NON-COVERED ITEM OR SERVICE: HCPCS | Performed by: STUDENT IN AN ORGANIZED HEALTH CARE EDUCATION/TRAINING PROGRAM

## 2025-04-09 PROCEDURE — 700102 HCHG RX REV CODE 250 W/ 637 OVERRIDE(OP): Performed by: STUDENT IN AN ORGANIZED HEALTH CARE EDUCATION/TRAINING PROGRAM

## 2025-04-09 PROCEDURE — 97112 NEUROMUSCULAR REEDUCATION: CPT

## 2025-04-09 RX ADMIN — ATORVASTATIN CALCIUM 80 MG: 80 TABLET, FILM COATED ORAL at 17:35

## 2025-04-09 RX ADMIN — GABAPENTIN 400 MG: 400 CAPSULE ORAL at 06:00

## 2025-04-09 RX ADMIN — SENNOSIDES AND DOCUSATE SODIUM 2 TABLET: 50; 8.6 TABLET ORAL at 17:36

## 2025-04-09 RX ADMIN — SENNOSIDES AND DOCUSATE SODIUM 2 TABLET: 50; 8.6 TABLET ORAL at 04:43

## 2025-04-09 RX ADMIN — Medication 5 MG: at 20:51

## 2025-04-09 RX ADMIN — HYDROCHLOROTHIAZIDE 25 MG: 25 TABLET ORAL at 04:42

## 2025-04-09 RX ADMIN — GABAPENTIN 400 MG: 400 CAPSULE ORAL at 17:36

## 2025-04-09 RX ADMIN — ENOXAPARIN SODIUM 40 MG: 100 INJECTION SUBCUTANEOUS at 17:35

## 2025-04-09 RX ADMIN — ASPIRIN 81 MG: 81 TABLET, CHEWABLE ORAL at 04:43

## 2025-04-09 RX ADMIN — GABAPENTIN 400 MG: 400 CAPSULE ORAL at 13:14

## 2025-04-09 RX ADMIN — AMLODIPINE BESYLATE 10 MG: 10 TABLET ORAL at 04:43

## 2025-04-09 ASSESSMENT — COGNITIVE AND FUNCTIONAL STATUS - GENERAL
SUGGESTED CMS G CODE MODIFIER MOBILITY: CN
MOBILITY SCORE: 6
CLIMB 3 TO 5 STEPS WITH RAILING: TOTAL
WALKING IN HOSPITAL ROOM: TOTAL
MOVING FROM LYING ON BACK TO SITTING ON SIDE OF FLAT BED: TOTAL
TURNING FROM BACK TO SIDE WHILE IN FLAT BAD: TOTAL
MOVING TO AND FROM BED TO CHAIR: TOTAL
STANDING UP FROM CHAIR USING ARMS: TOTAL

## 2025-04-09 ASSESSMENT — PAIN DESCRIPTION - PAIN TYPE: TYPE: ACUTE PAIN

## 2025-04-09 ASSESSMENT — GAIT ASSESSMENTS: GAIT LEVEL OF ASSIST: UNABLE TO PARTICIPATE

## 2025-04-09 ASSESSMENT — FIBROSIS 4 INDEX: FIB4 SCORE: 0.41

## 2025-04-09 NOTE — CARE PLAN
Problem: Aerosol Therapy  Goal: Improved hydration/ability to mobilize secretions and/or decreased airway edema  Description: Target End Date:  resolve prior to discharge or when underlying condition is resolved/stabilized1.  Implement heated or cool aerosol therapy2.  Assessed for optimal hydration, decreased edema and/or improved ability to mobilize secretions  Outcome: Not Met

## 2025-04-09 NOTE — PROGRESS NOTES
4 Eyes Skin Assessment Completed by STEVEN carlisle and STEVEN sherman.    Head WDL  Ears WDL  Nose WDL  Mouth WDL  Neck tracheostomy  Breast/Chest WDL  Shoulder Blades WDL  Spine Redness and Blanching  (R) Arm/Elbow/Hand Scab and Scar  (L) Arm/Elbow/Hand Scab and Scar  Abdomen Incision/ Peg tube- with drainage (scant )  Groin Rash left  Scrotum/Coccyx/Buttocks Redness and Blanching  (R) Leg Scar and bruising  (L) Leg Scar and Bruising  (R) Heel/Foot/Toe Boggy  (L) Heel/Foot/Toe Boggy          Devices In Places Tele Box, Blood Pressure Cuff, Pulse Ox, Condom Cath, and Tracheostomy      Interventions In Place Heel Mepilex and Sacral Mepilex,wedges    Possible Skin Injury No    Pictures Uploaded Into Epic N/A  Wound Consult Placed N/A  RN Wound Prevention Protocol Ordered No

## 2025-04-09 NOTE — PROGRESS NOTES
[facility] Medicine Daily Progress Note    Date of Service  4/9/2025    Chief Complaint  Jonna Brian is a 49 y.o. male admitted 3/25/2025 with headache and weakness.    Hospital Course    This is a 49 y.o. male who was initially admitted to the hospital on 3/25/2025 with global weakness, aphasia and ataxia.  His last known normal was 1630 on March 24.  He was initially admitted to the hospital floor, his CT head as well as CTA of the head and neck were within normal limits.  There was consideration for possible Guillain-Barré syndrome.     His condition continued to deteriorate and he underwent a lumbar puncture which revealed elevated protein but no evidence of infection.  He had progressive bulbar symptoms and weakness and was transferred to the ICU.  Unfortunately shortly after he arrived in the ICU he developed stridor and required emergent intubation on 3/27.       He then underwent an MRI of his brain and spine which unfortunately revealed small areas of acute infarcts in the lower jero and the upper medulla on both sides of the midline.  He also had chronic infarcts in his jero and right inferior cerebellum.  They were chronically [place] in the bilateral basal ganglia, the right thalamus and the right periventricular white matter.  He had evidence of small vessel disease as well in the periventricular white matter.     Ultimately it appears he has been suffering multiple small strokes and his progressive decline was due to acute infarcts in the lower jero and upper medulla.  Since that time he has not recovered function, has required tracheostomy and PEG placement with the ultimate goal of transferring him to postacute medical to see what function may be regained in time.     I have had conversations with his family, they want to keep moving forward and with his [place] trials for the foreseeable future.  If he is unable to regain function going forward, further goals of care discussions can be had in the  upcoming months.    Interval Problem Update  No acute events overnight.  On 4L T piece.  Patient able to nod/shake head yes/no. Not moving extremities.  He denies any pain or discomfort.  Completed antibiotics for proteus positive sputum culture.  On aspirin and statin for stroke.  Blood pressure stable, continue HTN medications.  Patient is medically cleared. Pending LTAC.      I have discussed this patient's plan of care and discharge plan at IDT rounds today with Case Management, Nursing, Nursing leadership, and other members of the IDT team.    Consultants/Specialty  critical care I discussed with Dr. Torres    Code Status  Full Code    Disposition  Medically Cleared  I have placed the appropriate orders for post-discharge needs.    Review of Systems  Review of Systems   Unable to perform ROS: Patient nonverbal        Physical Exam  Temp:  [36.4 °C (97.5 °F)-37.1 °C (98.8 °F)] 36.4 °C (97.5 °F)  Pulse:  [74-86] 86  Resp:  [10-26] 18  BP: (109-139)/(58-78) 113/66  SpO2:  [92 %-95 %] 93 %    Physical Exam  Constitutional:       Appearance: He is ill-appearing.   Neck:      Comments: tracheostomy  Cardiovascular:      Rate and Rhythm: Normal rate and regular rhythm.   Abdominal:      Tenderness: There is no abdominal tenderness.      Comments: PEG   Neurological:      Comments: He is able to slightly move his left upper and lower extremity.  He was able to nod his head to answer question.         Fluids    Intake/Output Summary (Last 24 hours) at 4/9/2025 1355  Last data filed at 4/9/2025 0311  Gross per 24 hour   Intake --   Output 1600 ml   Net -1600 ml        Laboratory  Recent Labs     04/07/25  0448 04/08/25  0515 04/09/25  0328   WBC 10.5 10.0 9.6   RBC 4.73 4.83 5.07   HEMOGLOBIN 13.2* 12.9* 14.0   HEMATOCRIT 41.2* 41.1* 42.7   MCV 87.1 85.1 84.2   MCH 27.9 26.7* 27.6   MCHC 32.0* 31.4* 32.8   RDW 41.0 39.3 38.4   PLATELETCT 366 423 448*   MPV 8.4* 8.5* 8.8*     Recent Labs     04/07/25  0448 04/08/25  0515  04/09/25  0328   SODIUM 139 137 140   POTASSIUM 4.9 4.3 4.2   CHLORIDE 102 101 101   CO2 26 27 26   GLUCOSE 148* 146* 142*   BUN 24* 28* 29*   CREATININE 0.86 0.90 0.93   CALCIUM 9.6 9.4 9.7                   Imaging  DX-CHEST-PORTABLE (1 VIEW)   Final Result         1.  Left basilar atelectasis and/or subtle infiltrates, similar to prior study      DX-CHEST-PORTABLE (1 VIEW)   Final Result         1.  Left basilar atelectasis, no focal infiltrate   2.  Cardiomegaly      DX-G.I. TUBE INJECTION, ANY TYPE   Final Result      Contrast from a PEG tube injection extends into the stomach without evidence of contrast leak.      CT-CHEST,ABDOMEN,PELVIS WITH   Final Result      1.  Large amount of free intraperitoneal air within the abdomen again seen as was previously identified on chest x-ray by review of the patient's chart, a percutaneous gastrostomy tube was placed on 3/31/2025 and is free intraperitoneal air is possibly    secondary to that recent procedure.      2.  Bibasal atelectasis and small bilateral pleural effusions.      3.  Tracheostomy tube present.      4.  No evidence of bowel obstruction or free fluid within the abdomen or pelvis.      DX-CHEST-PORTABLE (1 VIEW)   Final Result         1.  Bibasilar atelectasis   2.  Intra-abdominal free air, can be associated with history of percutaneous gastrostomy, consider other viscus perforation as warranted. Could be further evaluated with CT of the abdomen with contrast as clinically appropriate.      These findings were discussed with the patient's clinician, Bravo Lala Iv, on 4/2/2025 7:57 AM.      DX-CHEST-PORTABLE (1 VIEW)   Final Result      No evidence of acute cardiopulmonary process.      DX-ABDOMEN FOR TUBE PLACEMENT   Final Result      1.  Enteric tube extends in the fundus of stomach.      DX-ABDOMEN FOR TUBE PLACEMENT   Final Result      The gastric tube has been removed and replaced with a small bowel feeding tube which terminate in the proximal  stomach.      DX-CHEST-PORTABLE (1 VIEW)   Final Result      Atelectasis within the left lung base.      MR-THORACIC SPINE-WITH & W/O   Final Result      1.  There is no abnormal intramedullary T2 signal intensity in the thoracic spinal cord.   2.  Mild degenerative disease at T8-9.   3.  Left lower segmental consolidation.      MR-CERVICAL SPINE-WITH & W/O   Final Result      1.  Small areas of acute infarcts in the lower jero and upper medulla involving both sides of the midline. There are chronic infarcts in the jero and right inferior cerebellum.   2.  There is no abnormal intramedullary T2 signal intensity in the cervical spinal cord to suggest demyelinating lesions. There is no MR evidence of compressive myelopathy.   3.  Mild degenerative disease.            MR-BRAIN-WITH & W/O   Final Result      1.  Small areas of acute infarcts in the lower jero and upper medulla involving both sides of the midline.   2.  There are chronic infarcts in the jero and right inferior cerebellum. . There are areas of chronic lacunae in the bilateral basal ganglia, right thalamus and right periventricular white matter.   3.  There are nonspecific T2 hyperintensities in the periventricular white matter likely representing chronic small vessel disease.   4.  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      MR-LUMBAR SPINE-WITH & W/O   Final Result      1.  Unremarkable pre and postcontrast MR examination of the lumbar spine.   2.  Clinical suspicious for GBS: There is no abnormal enhancement of the lumbar nerve roots.      DX-ABDOMEN FOR TUBE PLACEMENT   Final Result      NG tube tip projects at the peripyloric region.      DX-CHEST-PORTABLE (1 VIEW)   Final Result      1.  Low lung volumes without definite acute cardiopulmonary abnormality.   2.  Support apparatus as above.      CT-HEAD W/O   Final Result      1.  No acute intracranial abnormality.   2.  Remote right  "cerebellar infarct.               DX-CHEST-PORTABLE (1 VIEW)   Final Result      No acute cardiopulmonary disease evident.      DX-CHEST-PORTABLE (1 VIEW)   Final Result      No acute cardiopulmonary disease evident.      CT-CEREBRAL PERFUSION ANALYSIS   Final Result      1. Cerebral blood flow less than 30% possibly representing completed infarct = 0 mL. Based on distribution of this finding, this is unlikely to represent artifact.      2. T Max more than 6 seconds possibly representing combination of completed infarct and ischemia = 7 mL. Based on the distribution of this finding, this is possibly artifact.      3. Mismatched volume possibly representing ischemic brain/penumbra= 0 mL      4.  Please note that this cerebral perfusion study and report is Quantitative and targets supratentorial (cerebral) perfusion for evaluation of large vessel territory acute ischemia/infarction. For example, lacunar infarcts, and brainstem/posterior fossa    ischemia/infarction are not evaluated on this study.  Data acquisition is subject to artifacts which can yield non-anatomically plausible perfusion maps which may be due to motion, bolus timing, signal to noise ratio, or other technical factors.    Perfusion map abnormalities which show non-anatomic distributions are likely artifact.   This study is not \"stand-alone\" and should only be utilized for diagnosis, management/treatment in correlation with CT, CTA, and/or MRI and clinical factors.         CT-CTA NECK WITH & W/O-POST PROCESSING   Final Result      CT angiogram of the neck within normal limits.      CT-CTA HEAD WITH & W/O-POST PROCESS   Final Result      CT angiogram of the [place] within normal limits.      CT-HEAD W/O   Final Result      Head CT without contrast within normal limits. No evidence of acute cerebral infarction, hemorrhage or mass lesion.                    Assessment/Plan  * Acute ischemic stroke (HCC)- (present on admission)  Assessment & " Plan  Acute ischemic stroke  MRI of the brain revealed acute infarcts of the lower jero and upper medulla.  He effectively is a quadriplegic and required tracheostomy and PEG tube.  Aspirin and statin  Blood pressure control  Overall prognosis is guarded    Proteus mirabilis pneumonia (HCC)- (present on admission)  Assessment & Plan  Treated with Zosyn de-escalated to Augmentin based on cultures from sputum    Acute neuromuscular respiratory failure (HCC)- (present on admission)  Assessment & Plan  Requiring tracheostomy and mechanical ventilation    Anxiety  Assessment & Plan  Ativan for anxiety.     Type 2 diabetes mellitus with hyperglycemia, without long-term current use of insulin (HCC)- (present on admission)  Assessment & Plan  Details are unclear  Reportedly he is on metformin as an outpatient  Hemoglobin A1c 6.3  He does not require insulin    Primary hypertension- (present on admission)  Assessment & Plan  Blood pressure is appropriate with hydrochlorothiazide 25 mg daily and Norvasc 10 mg daily             VTE prophylaxis: VTE Selection    I have performed a physical exam and reviewed and updated ROS and Plan today (4/9/2025). In review of yesterday's note (4/8/2025), there are no changes except as documented above.

## 2025-04-09 NOTE — PROGRESS NOTES
Monitor Summary: SB/SR 43-79, CT 0.16, QRS 0.09, QT 0.33, with rare PVCs per strip from monitor room.

## 2025-04-09 NOTE — CARE PLAN
The patient is Watcher - Medium risk of patient condition declining or worsening    Shift Goals  Clinical Goals: Monitor neuro status, hemodynamically stable  Patient Goals: jennifer  Family Goals: jennifer    Progress made toward(s) clinical / shift goals:    Problem: Respiratory - Stroke Patient  Goal: Patient will achieve/maintain optimum respiratory rate/effort  Outcome: Progressing  Note: Frequent suctioning provided, continuous O2 monitoring, productive cough.     Problem: Mobility - Stroke  Goal: Patient's capacity to carry out activities will improve  Outcome: Progressing  Note: PT worked with patient to get EOB, q2 turns, sacral and heel mepilex in place.     Problem: Skin Integrity  Goal: Skin integrity is maintained or improved  Outcome: Progressing  Note: Q2 turns, bed bath provided.       Patient is not progressing towards the following goals:

## 2025-04-09 NOTE — CARE PLAN
Problem: Optimal Care of the Stroke Patient  Goal: Optimal emergency care for the stroke patient  Description: Target End Date:  End of day 1Time of Onset1.  Time of last known well obtained2.  Patient and family/caregiver verbalize understanding of diagnosis, medications and testing3.  NIHSS performed and documented, including date and time, for ischemic stroke patients prior to any acute recanalization therapy (thrombolytics or mechanical) or within 12 hours of arrival if no intervention is warranted4.  Consults and referrals placed to appropriate departmentsMedications Administration as Ordered:1.  Implement appropriate reversal agents for INR greater than 1.52.  Pre-alteplase administration of antihypertensives for SBP >185 DBP >1103.  Post-alteplase administration of antihypertensives for SBP >185, DBP >1054.  Thrombolytic Therapy for qualifying ischemic stroke patients who arrive within 4.5 hours of time of Last Known Well. Thrombolytic therapy administered within 30 minutes or a documented reason for delay  Outcome: Progressing     Problem: Respiratory - Stroke Patient  Goal: Patient will achieve/maintain optimum respiratory rate/effort  Description: Target End Date:  Prior to discharge or change in level of careDocument on Assessment flowsheet1.  Assess and monitor respiratory rate, rhythm, depth and effort of respiration2.  Oxygenation assessed throughout shift (recommendation of >94% for new stroke patients)3.  Oxygen administered and/or titrated per order4.  Collaboration with RT to administer medication/treatments per order5.  Patient educated on importance of turning, coughing, and deep breathing6.  Patient positioned for maximum ventilatory efficiency7.  Airway suctioning provided as needed8.  Incentive spirometry encouraged 5-10 times every hour or while awake  Outcome: Progressing     Problem: Skin Integrity  Goal: Skin integrity is maintained or improved  Description: Target End Date:  Prior to  discharge or change in level of careDocument interventions on Skin Risk/Lj flowsheet groups and corresponding LDA1.  Assess and monitor skin integrity, appearance and/or temperature2.  Assess risk factors for impaired skin integrity and/or pressures ulcers3.  Implement precautions to protect skin integrity in collaboration with interdisciplinary team4.  Implement pressure ulcer prevention protocol if at risk for skin breakdown5.  Confirm wound care consult if at risk for skin breakdown6.  Ensure patient use of pressure relieving devices  (Low air loss bed, waffle overlay, heel protectors, ROHO cushion, etc)  Outcome: Progressing   The patient is Stable - Low risk of patient condition declining or worsening    Shift Goals  Clinical Goals: Monitor neuro status, hemodynamically stable  Patient Goals: jennifer  Family Goals: jennifer    Progress made toward(s) clinical / shift goals:  patient is responsive to touch and pain stimulus. He is hemodynamically stable at this time . Suctioned as needed for comfort. Turned every 2 hours.     Patient is not progressing towards the following goals:

## 2025-04-09 NOTE — PROGRESS NOTES
Pt SUHAIL via hospital bed on O2 via T-piece and remote tele box to S176-1 with all belongings without incident. Family at bedside and followed to new room.

## 2025-04-10 LAB
ANION GAP SERPL CALC-SCNC: 15 MMOL/L (ref 7–16)
BUN SERPL-MCNC: 31 MG/DL (ref 8–22)
CALCIUM SERPL-MCNC: 9.8 MG/DL (ref 8.5–10.5)
CHLORIDE SERPL-SCNC: 98 MMOL/L (ref 96–112)
CO2 SERPL-SCNC: 29 MMOL/L (ref 20–33)
CREAT SERPL-MCNC: 1.08 MG/DL (ref 0.5–1.4)
ERYTHROCYTE [DISTWIDTH] IN BLOOD BY AUTOMATED COUNT: 39.1 FL (ref 35.9–50)
GFR SERPLBLD CREATININE-BSD FMLA CKD-EPI: 85 ML/MIN/1.73 M 2
GLUCOSE SERPL-MCNC: 159 MG/DL (ref 65–99)
HCT VFR BLD AUTO: 44.6 % (ref 42–52)
HGB BLD-MCNC: 14.2 G/DL (ref 14–18)
MCH RBC QN AUTO: 27.1 PG (ref 27–33)
MCHC RBC AUTO-ENTMCNC: 31.8 G/DL (ref 32.3–36.5)
MCV RBC AUTO: 85.1 FL (ref 81.4–97.8)
PLATELET # BLD AUTO: 500 K/UL (ref 164–446)
PMV BLD AUTO: 9.1 FL (ref 9–12.9)
POTASSIUM SERPL-SCNC: 4 MMOL/L (ref 3.6–5.5)
RBC # BLD AUTO: 5.24 M/UL (ref 4.7–6.1)
SODIUM SERPL-SCNC: 142 MMOL/L (ref 135–145)
WBC # BLD AUTO: 10.6 K/UL (ref 4.8–10.8)

## 2025-04-10 PROCEDURE — 97530 THERAPEUTIC ACTIVITIES: CPT

## 2025-04-10 PROCEDURE — 99232 SBSQ HOSP IP/OBS MODERATE 35: CPT | Performed by: STUDENT IN AN ORGANIZED HEALTH CARE EDUCATION/TRAINING PROGRAM

## 2025-04-10 PROCEDURE — L4398 FOOT DROP SPLINT PRE OTS: HCPCS

## 2025-04-10 PROCEDURE — 700102 HCHG RX REV CODE 250 W/ 637 OVERRIDE(OP): Performed by: INTERNAL MEDICINE

## 2025-04-10 PROCEDURE — 80048 BASIC METABOLIC PNL TOTAL CA: CPT

## 2025-04-10 PROCEDURE — 770020 HCHG ROOM/CARE - TELE (206)

## 2025-04-10 PROCEDURE — 97112 NEUROMUSCULAR REEDUCATION: CPT

## 2025-04-10 PROCEDURE — 92507 TX SP LANG VOICE COMM INDIV: CPT

## 2025-04-10 PROCEDURE — 85027 COMPLETE CBC AUTOMATED: CPT

## 2025-04-10 PROCEDURE — A9270 NON-COVERED ITEM OR SERVICE: HCPCS | Performed by: INTERNAL MEDICINE

## 2025-04-10 PROCEDURE — 700111 HCHG RX REV CODE 636 W/ 250 OVERRIDE (IP): Mod: JZ | Performed by: INTERNAL MEDICINE

## 2025-04-10 PROCEDURE — 94640 AIRWAY INHALATION TREATMENT: CPT

## 2025-04-10 PROCEDURE — A9270 NON-COVERED ITEM OR SERVICE: HCPCS | Performed by: STUDENT IN AN ORGANIZED HEALTH CARE EDUCATION/TRAINING PROGRAM

## 2025-04-10 PROCEDURE — 36415 COLL VENOUS BLD VENIPUNCTURE: CPT

## 2025-04-10 PROCEDURE — 700101 HCHG RX REV CODE 250: Performed by: STUDENT IN AN ORGANIZED HEALTH CARE EDUCATION/TRAINING PROGRAM

## 2025-04-10 PROCEDURE — 700102 HCHG RX REV CODE 250 W/ 637 OVERRIDE(OP): Performed by: STUDENT IN AN ORGANIZED HEALTH CARE EDUCATION/TRAINING PROGRAM

## 2025-04-10 RX ORDER — ACETYLCYSTEINE 200 MG/ML
3 SOLUTION ORAL; RESPIRATORY (INHALATION)
Status: DISCONTINUED | OUTPATIENT
Start: 2025-04-10 | End: 2025-04-29

## 2025-04-10 RX ORDER — ALBUTEROL SULFATE 5 MG/ML
2.5 SOLUTION RESPIRATORY (INHALATION)
Status: DISCONTINUED | OUTPATIENT
Start: 2025-04-10 | End: 2025-04-25

## 2025-04-10 RX ADMIN — Medication 5 MG: at 20:37

## 2025-04-10 RX ADMIN — GABAPENTIN 400 MG: 400 CAPSULE ORAL at 17:21

## 2025-04-10 RX ADMIN — ALBUTEROL SULFATE 2.5 MG: 2.5 SOLUTION RESPIRATORY (INHALATION) at 10:44

## 2025-04-10 RX ADMIN — SENNOSIDES AND DOCUSATE SODIUM 2 TABLET: 50; 8.6 TABLET ORAL at 04:23

## 2025-04-10 RX ADMIN — ACETYLCYSTEINE 3 ML: 200 SOLUTION ORAL; RESPIRATORY (INHALATION) at 20:11

## 2025-04-10 RX ADMIN — ACETYLCYSTEINE 3 ML: 200 SOLUTION ORAL; RESPIRATORY (INHALATION) at 15:14

## 2025-04-10 RX ADMIN — ATORVASTATIN CALCIUM 80 MG: 80 TABLET, FILM COATED ORAL at 17:40

## 2025-04-10 RX ADMIN — ACETYLCYSTEINE 3 ML: 200 SOLUTION ORAL; RESPIRATORY (INHALATION) at 10:44

## 2025-04-10 RX ADMIN — MAGNESIUM HYDROXIDE 30 ML: 2400 SUSPENSION ORAL at 12:37

## 2025-04-10 RX ADMIN — ALBUTEROL SULFATE 2.5 MG: 2.5 SOLUTION RESPIRATORY (INHALATION) at 15:13

## 2025-04-10 RX ADMIN — GABAPENTIN 400 MG: 400 CAPSULE ORAL at 12:37

## 2025-04-10 RX ADMIN — ASPIRIN 81 MG: 81 TABLET, CHEWABLE ORAL at 06:00

## 2025-04-10 RX ADMIN — GABAPENTIN 400 MG: 400 CAPSULE ORAL at 04:23

## 2025-04-10 RX ADMIN — HYDROCHLOROTHIAZIDE 25 MG: 25 TABLET ORAL at 04:23

## 2025-04-10 RX ADMIN — ENOXAPARIN SODIUM 40 MG: 100 INJECTION SUBCUTANEOUS at 17:21

## 2025-04-10 RX ADMIN — AMLODIPINE BESYLATE 10 MG: 10 TABLET ORAL at 04:22

## 2025-04-10 RX ADMIN — ALBUTEROL SULFATE 2.5 MG: 2.5 SOLUTION RESPIRATORY (INHALATION) at 20:11

## 2025-04-10 RX ADMIN — SENNOSIDES AND DOCUSATE SODIUM 2 TABLET: 50; 8.6 TABLET ORAL at 17:21

## 2025-04-10 ASSESSMENT — COGNITIVE AND FUNCTIONAL STATUS - GENERAL
EATING MEALS: TOTAL
STANDING UP FROM CHAIR USING ARMS: TOTAL
MOBILITY SCORE: 6
DAILY ACTIVITIY SCORE: 6
TOILETING: TOTAL
MOVING FROM LYING ON BACK TO SITTING ON SIDE OF FLAT BED: TOTAL
SUGGESTED CMS G CODE MODIFIER MOBILITY: CN
TURNING FROM BACK TO SIDE WHILE IN FLAT BAD: TOTAL
SUGGESTED CMS G CODE MODIFIER DAILY ACTIVITY: CN
PERSONAL GROOMING: TOTAL
DRESSING REGULAR LOWER BODY CLOTHING: TOTAL
HELP NEEDED FOR BATHING: TOTAL
CLIMB 3 TO 5 STEPS WITH RAILING: TOTAL
WALKING IN HOSPITAL ROOM: TOTAL
DRESSING REGULAR UPPER BODY CLOTHING: TOTAL
MOVING TO AND FROM BED TO CHAIR: TOTAL

## 2025-04-10 ASSESSMENT — PAIN DESCRIPTION - PAIN TYPE
TYPE: ACUTE PAIN
TYPE: ACUTE PAIN

## 2025-04-10 ASSESSMENT — GAIT ASSESSMENTS: GAIT LEVEL OF ASSIST: UNABLE TO PARTICIPATE

## 2025-04-10 NOTE — PROGRESS NOTES
Monitor summary: SR 71-89, TN 0.14, QRS 0.09, QT 0.36, with rare PVCs per strip from monitor room.

## 2025-04-10 NOTE — PROGRESS NOTES
[facility] Medicine Daily Progress Note    Date of Service  4/10/2025    Chief Complaint  Jonna Brian is a 49 y.o. male admitted 3/25/2025 with headache and weakness.    Hospital Course    This is a 49 y.o. male who was initially admitted to the hospital on 3/25/2025 with global weakness, aphasia and ataxia.  His last known normal was 1630 on March 24.  He was initially admitted to the hospital floor, his CT head as well as CTA of the head and neck were within normal limits.  There was consideration for possible Guillain-Barré syndrome.     His condition continued to deteriorate and he underwent a lumbar puncture which revealed elevated protein but no evidence of infection.  He had progressive bulbar symptoms and weakness and was transferred to the ICU.  Unfortunately shortly after he arrived in the ICU he developed stridor and required emergent intubation on 3/27.       He then underwent an MRI of his brain and spine which unfortunately revealed small areas of acute infarcts in the lower jero and the upper medulla on both sides of the midline.  He also had chronic infarcts in his jero and right inferior cerebellum.  They were chronically [place] in the bilateral basal ganglia, the right thalamus and the right periventricular white matter.  He had evidence of small vessel disease as well in the periventricular white matter.     Ultimately it appears he has been suffering multiple small strokes and his progressive decline was due to acute infarcts in the lower jero and upper medulla.  Since that time he has not recovered function, has required tracheostomy and PEG placement with the ultimate goal of transferring him to postacute medical to see what function may be regained in time.     I have had conversations with his family, they want to keep moving forward and with his [employer] trials for the foreseeable future.  If he is unable to regain function going forward, further goals of care discussions can be had in the  upcoming months.    Interval Problem Update  Patient with some bleeding by PEG tube site. Dressing changes, no further bleeding noted on exam. Will monitor. He denies abdominal pain, tolerating PEG tube feeding well.  On 4L T piece.  Patient non verbal but able to nod/shake head accurately consistently.  On questioning he responds in the affirmative that he has pain by his bottom. Discussed with nursing to continue regular turns and prevention measures for bed sores. Also will give supportive pain medication.  Patient is medically cleared, pending LTAC.        I have discussed this patient's plan of care and discharge plan at IDT rounds today with Case Management, Nursing, Nursing leadership, and other members of the IDT team.    Consultants/Specialty  critical care I discussed with Dr. Torres    Code Status  Full Code    Disposition  Medically Cleared  I have placed the appropriate orders for post-discharge needs.    Review of Systems  Review of Systems   Unable to perform ROS: Patient nonverbal        Physical Exam  Temp:  [36.4 °C (97.5 °F)-36.8 °C (98.2 °F)] 36.8 °C (98.2 °F)  Pulse:  [] 119  Resp:  [17-18] 18  BP: (104-149)/(65-96) 149/96  SpO2:  [92 %-97 %] 96 %    Physical Exam  Constitutional:       Appearance: He is ill-appearing.   Neck:      Comments: tracheostomy  Cardiovascular:      Rate and Rhythm: Normal rate and regular rhythm.   Abdominal:      Tenderness: There is no abdominal tenderness.      Comments: PEG   Neurological:      Comments: He is able to slightly move his left upper and lower extremity.  He was able to nod his head to answer question.         Fluids    Intake/Output Summary (Last 24 hours) at 4/10/2025 1326  Last data filed at 4/10/2025 1134  Gross per 24 hour   Intake 360 ml   Output 1850 ml   Net -1490 ml        Laboratory  Recent Labs     04/08/25  0515 04/09/25  0328 04/10/25  0508   WBC 10.0 9.6 10.6   RBC 4.83 5.07 5.24   HEMOGLOBIN 12.9* 14.0 14.2   HEMATOCRIT 41.1* 42.7  44.6   MCV 85.1 84.2 85.1   MCH 26.7* 27.6 27.1   MCHC 31.4* 32.8 31.8*   RDW 39.3 38.4 39.1   PLATELETCT 423 448* 500*   MPV 8.5* 8.8* 9.1     Recent Labs     04/08/25  0515 04/09/25  0328 04/10/25  0508   SODIUM 137 140 142   POTASSIUM 4.3 4.2 4.0   CHLORIDE 101 101 98   CO2 27 26 29   GLUCOSE 146* 142* 159*   BUN 28* 29* 31*   CREATININE 0.90 0.93 1.08   CALCIUM 9.4 9.7 9.8                   Imaging  DX-CHEST-PORTABLE (1 VIEW)   Final Result         1.  Left basilar atelectasis and/or subtle infiltrates, similar to prior study      DX-CHEST-PORTABLE (1 VIEW)   Final Result         1.  Left basilar atelectasis, no focal infiltrate   2.  Cardiomegaly      DX-G.I. TUBE INJECTION, ANY TYPE   Final Result      Contrast from a PEG tube injection extends into the stomach without evidence of contrast leak.      CT-CHEST,ABDOMEN,PELVIS WITH   Final Result      1.  Large amount of free intraperitoneal air within the abdomen again seen as was previously identified on chest x-ray by review of the patient's chart, a percutaneous gastrostomy tube was placed on 3/31/2025 and is free intraperitoneal air is possibly    secondary to that recent procedure.      2.  Bibasal atelectasis and small bilateral pleural effusions.      3.  Tracheostomy tube present.      4.  No evidence of bowel obstruction or free fluid within the abdomen or pelvis.      DX-CHEST-PORTABLE (1 VIEW)   Final Result         1.  Bibasilar atelectasis   2.  Intra-abdominal free air, can be associated with history of percutaneous gastrostomy, consider other viscus perforation as warranted. Could be further evaluated with CT of the abdomen with contrast as clinically appropriate.      These findings were discussed with the patient's clinician, Bravo Lala Iv, on 4/2/2025 7:57 AM.      DX-CHEST-PORTABLE (1 VIEW)   Final Result      No evidence of acute cardiopulmonary process.      DX-ABDOMEN FOR TUBE PLACEMENT   Final Result      1.  Enteric tube extends in  the fundus of stomach.      DX-ABDOMEN FOR TUBE PLACEMENT   Final Result      The gastric tube has been removed and replaced with a small bowel feeding tube which terminate in the proximal stomach.      DX-CHEST-PORTABLE (1 VIEW)   Final Result      Atelectasis within the left lung base.      MR-THORACIC SPINE-WITH & W/O   Final Result      1.  There is no abnormal intramedullary T2 signal intensity in the thoracic spinal cord.   2.  Mild degenerative disease at T8-9.   3.  Left lower segmental consolidation.      MR-CERVICAL SPINE-WITH & W/O   Final Result      1.  Small areas of acute infarcts in the lower jero and upper medulla involving both sides of the midline. There are chronic infarcts in the jero and right inferior cerebellum.   2.  There is no abnormal intramedullary T2 signal intensity in the cervical spinal cord to suggest demyelinating lesions. There is no MR evidence of compressive myelopathy.   3.  Mild degenerative disease.            MR-BRAIN-WITH & W/O   Final Result      1.  Small areas of acute infarcts in the lower jero and upper medulla involving both sides of the midline.   2.  There are chronic infarcts in the jero and right inferior cerebellum. . There are areas of chronic lacunae in the bilateral basal ganglia, right thalamus and right periventricular white matter.   3.  There are nonspecific T2 hyperintensities in the periventricular white matter likely representing chronic small vessel disease.   4.  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      MR-LUMBAR SPINE-WITH & W/O   Final Result      1.  Unremarkable pre and postcontrast MR examination of the lumbar spine.   2.  Clinical suspicious for GBS: There is no abnormal enhancement of the lumbar nerve roots.      DX-ABDOMEN FOR TUBE PLACEMENT   Final Result      NG tube tip projects at the peripyloric region.      DX-CHEST-PORTABLE (1 VIEW)   Final Result      1.  Low lung  "volumes without definite acute cardiopulmonary abnormality.   2.  Support apparatus as above.      CT-HEAD W/O   Final Result      1.  No acute intracranial abnormality.   2.  Remote right cerebellar infarct.               DX-CHEST-PORTABLE (1 VIEW)   Final Result      No acute cardiopulmonary disease evident.      DX-CHEST-PORTABLE (1 VIEW)   Final Result      No acute cardiopulmonary disease evident.      CT-CEREBRAL PERFUSION ANALYSIS   Final Result      1. Cerebral blood flow less than 30% possibly representing completed infarct = 0 mL. Based on distribution of this finding, this is unlikely to represent artifact.      2. T Max more than 6 seconds possibly representing combination of completed infarct and ischemia = 7 mL. Based on the distribution of this finding, this is possibly artifact.      3. Mismatched volume possibly representing ischemic brain/penumbra= 0 mL      4.  Please note that this cerebral perfusion study and report is Quantitative and targets supratentorial (cerebral) perfusion for evaluation of large vessel territory acute ischemia/infarction. For example, lacunar infarcts, and brainstem/posterior fossa    ischemia/infarction are not evaluated on this study.  Data acquisition is subject to artifacts which can yield non-anatomically plausible perfusion maps which may be due to motion, bolus timing, signal to noise ratio, or other technical factors.    Perfusion map abnormalities which show non-anatomic distributions are likely artifact.   This study is not \"stand-alone\" and should only be utilized for diagnosis, management/treatment in correlation with CT, CTA, and/or MRI and clinical factors.         CT-CTA NECK WITH & W/O-POST PROCESSING   Final Result      CT angiogram of the neck within normal limits.      CT-CTA HEAD WITH & W/O-POST PROCESS   Final Result      CT angiogram of the [place] within normal limits.      CT-HEAD W/O   Final Result      Head CT without contrast within " normal limits. No evidence of acute cerebral infarction, hemorrhage or mass lesion.                    Assessment/Plan  * Acute ischemic stroke (HCC)- (present on admission)  Assessment & Plan  Acute ischemic stroke  MRI of the brain revealed acute infarcts of the lower jero and upper medulla.  He effectively is a quadriplegic and required tracheostomy and PEG tube.  Aspirin and statin  Blood pressure control  Overall prognosis is guarded    Proteus mirabilis pneumonia (HCC)- (present on admission)  Assessment & Plan  Treated with Zosyn de-escalated to Augmentin based on cultures from sputum    Acute neuromuscular respiratory failure (HCC)- (present on admission)  Assessment & Plan  Requiring tracheostomy and mechanical ventilation    Anxiety  Assessment & Plan  Ativan for anxiety.     Type 2 diabetes mellitus with hyperglycemia, without long-term current use of insulin (HCC)- (present on admission)  Assessment & Plan  Details are unclear  Reportedly he is on metformin as an outpatient  Hemoglobin A1c 6.3  He does not require insulin    Primary hypertension- (present on admission)  Assessment & Plan  Blood pressure is appropriate with hydrochlorothiazide 25 mg daily and Norvasc 10 mg daily             VTE prophylaxis: VTE Selection    I have performed a physical exam and reviewed and updated ROS and Plan today (4/10/2025). In review of yesterday's note (4/9/2025), there are no changes except as documented above.

## 2025-04-10 NOTE — PROGRESS NOTES
Monitor Summary: SR 92-99, OR 0.15, QRS 0.08, QT 0.32, with rare PVCs per strip from monitor room.

## 2025-04-10 NOTE — CARE PLAN
Problem: Optimal Care of the Stroke Patient  Goal: Optimal emergency care for the stroke patient  Description: Target End Date:  End of day 1Time of Onset1.  Time of last known well obtained2.  Patient and family/caregiver verbalize understanding of diagnosis, medications and testing3.  NIHSS performed and documented, including date and time, for ischemic stroke patients prior to any acute recanalization therapy (thrombolytics or mechanical) or within 12 hours of arrival if no intervention is warranted4.  Consults and referrals placed to appropriate departmentsMedications Administration as Ordered:1.  Implement appropriate reversal agents for INR greater than 1.52.  Pre-alteplase administration of antihypertensives for SBP >185 DBP >1103.  Post-alteplase administration of antihypertensives for SBP >185, DBP >1054.  Thrombolytic Therapy for qualifying ischemic stroke patients who arrive within 4.5 hours of time of Last Known Well. Thrombolytic therapy administered within 30 minutes or a documented reason for delay  Outcome: Progressing     Problem: Neuro Status  Goal: Neuro status will remain stable or improve  Description: Target End Date:  Prior to discharge or change in level of careDocument on Neuro assessment in the Assessment flowsheet1.  Assess and monitor neurologic status per provider order/protocol/unit policy2.  Assess level of consciousness and orientation3.  Assess for speech, dysarthria, dysphagia, facial symmetry4.  Assess visual field, eye movements, gaze preference, pupil reaction and size5.  Assess muscle strength and motor response in all four extremities6.  Assess for sensation (numbness and tingling)7.  Assess basic neuro reflexes (cough, gag, corneal)8.  Identify changes in neuro status and report to provider for testing/treatment orders  Outcome: Progressing     Problem: Respiratory - Stroke Patient  Goal: Patient will achieve/maintain optimum respiratory rate/effort  Description: Target End  Date:  Prior to discharge or change in level of careDocument on Assessment flowsheet1.  Assess and monitor respiratory rate, rhythm, depth and effort of respiration2.  Oxygenation assessed throughout shift (recommendation of >94% for new stroke patients)3.  Oxygen administered and/or titrated per order4.  Collaboration with RT to administer medication/treatments per order5.  Patient educated on importance of turning, coughing, and deep breathing6.  Patient positioned for maximum ventilatory efficiency7.  Airway suctioning provided as needed8.  Incentive spirometry encouraged 5-10 times every hour or while awake  Outcome: Progressing   The patient is Stable - Low risk of patient condition declining or worsening    Shift Goals  Clinical Goals: Hemodynamically stable, Respiratory hygiene  Patient Goals: jennifer  Family Goals: jennifer    Progress made toward(s) clinical / shift goals:  Patient is oriented to person and place. He is able to answer by nodding . Positioned every 2 hours. Suctioned and oral care done religiously . Skin inspected for any breakdown . Needs attended.    Patient is not progressing towards the following goals:

## 2025-04-10 NOTE — THERAPY
"Occupational Therapy  Daily Treatment     Patient Name: Jonna Brian  Age:  49 y.o., Sex:  male  Medical Record #: [id number]  Today's Date: 4/10/2025     Precautions: Fall Risk, Tracheostomy , PEG Tube, Swallow Precautions  Comments: difficulty managing secretions    Assessment    Pt seen for OT tx to include: bed mobility, sitting balance, BUE ROM, hand-over-hand grooming using LUE, neuromuscular re-education. See grid below for details. Pt continues to require total A for bed mobility and continuous support to trunk and head with sitting. He follows simple 1-step commands with delay and slow head nod/shake to inquiries. RUE flaccid, non-functional; LUE 2-/5 to most joints, also non-functional. Pt is making slow progress towards OT goals. Will benefit from ongoing acute OT while in-house.    Plan    Treatment Plan Status: Continue Current Treatment Plan  Type of Treatment: Self Care / Activities of Daily Living, Adaptive Equipment, Cognitive Skill Development, Neuro Re-Education / Balance, Therapeutic Exercises, Therapeutic Activity, Family / Caregiver Training  Treatment Frequency: 4 Times per Week  Treatment Duration: Until Therapy Goals Met    DC Equipment Recommendations: Unable to determine at this time  Discharge Recommendations: Recommend post-acute placement for additional occupational therapy services prior to discharge home    Subjective    Pt is non-verbal. Able to weakly nod and shake head appropriately.\"     Objective       04/10/25 1133   Vitals   Pulse Oximetry 95 %   O2 (LPM) 8  (35%)   O2 Delivery Device T-Piece   Pain   Pain Scales 0 to 10 Scale    Pain 0 - 10 Group   Therapist Pain Assessment Post Activity Pain Same as Prior to Activity;Nurse Notified  (no c/o or outward signs of pain)   Non Verbal Descriptors   Non Verbal Scale  Calm;Unlabored Breathing   Cognition    Cognition / Consciousness X   Speech/ Communication Nods Appropriately;Delayed Responses   Level of Consciousness Alert "   Ability To Follow Commands 1 Step  (simple 1-step motor commands)   Safety Awareness Impaired   New Learning Impaired   Attention Impaired   Sequencing Impaired   Initiation Impaired   Comments flat affect, no facial expression   Passive ROM Upper Body   Passive ROM Upper Body WDL   Active ROM Upper Body   Active ROM Upper Body  X   Dominant Hand Right   Comments No active motor RUE this session; LUE profoundly weak negatively impacting AROM   Strength Upper Body   Upper Body Strength  X   Lt Shoulder Flexion Strength 2- (P-)   Lt Shoulder Abduction Strength 2- (P-)   Lt Elbow Flexion Strength 2- (P-)   Lt Elbow Extension Strength 2- (P-)   Lt Forearm Supination Strength 1 (T)   Lt Forearm Pronation Strength 2- (P-)   Lt Wrist Flexion Strength 2- (P-)   Lt Wrist Extension Strength 2- (P-)   Left  Impaired  (2-/5 finger wiggle)   Sensation Upper Body   Upper Extremity Sensation  Not Tested   Upper Body Muscle Tone   Upper Body Muscle Tone  X   Rt Upper Extremity Muscle Tone Hypotonic;Non Functional   Lt Upper Extremity Muscle Tone Hypotonic;Non Functional   Sitting Upper Body Exercises   Sitting Upper Body Exercises Yes   Comments A/AAROM BUE all joints   Neuro-Muscular Treatments   Neuro-Muscular Treatments Anterior weight shift;Weight Shift Right;Weight Shift Left;Tactile Cuing;Verbal Cuing;Postural Facilitation;Joint Approximation   Comments requires support for all trunk control/balance as well as head control (head rests in forward flexion)   Balance   Sitting Balance (Static) Trace   Sitting Balance (Dynamic) Dependent   Weight Shift Sitting Absent   Skilled Intervention Compensatory Strategies;Postural Facilitation;Tactile Cuing;Verbal Cuing   Comments no righting reactions observed   Bed Mobility    Supine to Sit Total Assist   Sit to Supine Total Assist   Scooting Total Assist   Rolling Total Assist to Rt.;Total Assist to Lt.   Skilled Intervention Compensatory Strategies;Postural  Facilitation;Sequencing;Tactile Cuing;Verbal Cuing   Comments assist of 2   Activities of Daily Living   Eating Total Assist  (PEG)   Grooming Total Assist  (suction oral care, facial hygiene (hand-over-hand with L hand))   Lower Body Dressing Total Assist  (B socks)   Toileting Total Assist  (condom cath, no BM)   Skilled Intervention Compensatory Strategies;Postural Facilitation;Sequencing;Tactile Cuing;Verbal Cuing   Functional Mobility   Sit to Stand Unable to Participate   Bed, Chair, Wheelchair Transfer Unable to Participate   Mobility bed mobility, sitting balance   Skilled Intervention Compensatory Strategies;Postural Facilitation;Sequencing;Tactile Cuing;Verbal Cuing   Visual Perception   Visual Perception  X   Comments very delayed, incomplete tracking to R and L to voice; does not appear to focus on objects   Activity Tolerance   Sitting in Chair unable   Sitting Edge of Bed 23   Standing unable   Comments limited by fatigue, weakness   Patient / Family Goals   Patient / Family Goal #1 to go home   Short Term Goals   Short Term Goal # 1 unsupported seated g/h with SPV   Goal Outcome # 1 Progressing slower than expected   Short Term Goal # 2 UB dressing with min A using riddhi-technique   Goal Outcome # 2   (NT this session)   Short Term Goal # 3 BSC txf with mod A   Goal Outcome # 3 Progressing slower than expected   Short Term Goal # 4 sitting EOB unsupported for >2min in prep for LB dressing   Goal Outcome # 4 Progressing slower than expected   Education Group   Education Provided Joint Protection;Upper Extremity Range of Motion   Joint Protection Patient Response Patient;Explanation;Reinforcement Needed  (safe positioning of UEs)   Upper Ext ROM Patient Response Patient;Acceptance;Explanation;Demonstration;Reinforcement Needed  (A/AAROM to BUE)   Interdisciplinary Plan of Care Collaboration   IDT Collaboration with  Nursing;Physical Therapist   Patient Position at End of Therapy In Bed;Bed Alarm On;Call  Light within Reach   Collaboration Comments OT results and recs; sensitivity to R side of oral cavity with oral care and suction (flinches with stimuli)   Session Information   Date / Session Number  4/10 #5 (2/4, 4/13)     Patient seen for team treatment with Physical Therapist for the following reason(s):  Patient required 2 person assistance for safety and to provide effective interventions. Each discipline assisted patient with appropriate and separate goals.

## 2025-04-10 NOTE — THERAPY
Physical Therapy   Daily Treatment     Patient Name: Jonna Brian  Age:  49 y.o., Sex:  male  Medical Record #: [id number]  Today's Date: 4/9/2025     Precautions  Precautions: Fall Risk;Swallow Precautions;PEG Tube;Tracheostomy     Assessment  Pt seen for PT tx session with mobility detailed down below. Pt continues to be at Total A for bed mobility, needing 2p assist and constant postural support at EOB. Working on head and body posture, LUE/LLE movement, and encouraging pt to communicate his needs. Pt spent about 20-25 minutes at EOB before appearing to fatigue. Continue to recommend placement, will follow.     Plan    Treatment Plan Status: Continue Current Treatment Plan  Type of Treatment: Bed Mobility, Family / Caregiver Training, Gait Training, Neuro Re-Education / Balance, Self Care / Home Evaluation, Stair Training, Therapeutic Activities, Therapeutic Exercise  Treatment Frequency: 5 Times per Week  Treatment Duration: Until Therapy Goals Met    DC Equipment Recommendations: Unable to determine at this time  Discharge Recommendations: Recommend post-acute placement for additional physical therapy services prior to discharge home        Vitals   O2 Delivery Device T-Piece   Pain 0 - 10 Group   Therapist Pain Assessment   (no obvious signs of pain)   Non Verbal Descriptors   Non Verbal Scale  Calm   Cognition    Cognition / Consciousness X   Speech/ Communication Nods Appropriately   Level of Consciousness Alert   New Learning Impaired   Attention Impaired   Sequencing Impaired   Initiation Impaired   Active ROM Lower Body    Comments still no active movement in RLE   Sitting Lower Body Exercises   Sitting Lower Body Exercises Yes   Long Arc Quad 1 set of 10;Left   Neuro-Muscular Treatments   Neuro-Muscular Treatments Anterior weight shift;Postural Changes;Tactile Cuing;Verbal Cuing;Weight Shift Right;Weight Shift Left   Comments manual faciliation for upright body and head posture. pt able to lift his head  slightly each time when cued, however needs assistance for full ROM. working on lateral shifts, however pt is not able to meaningfully participate with shifting   Balance   Sitting Balance (Static) Trace   Sitting Balance (Dynamic) Dependent   Weight Shift Sitting Absent   Skilled Intervention Verbal Cuing;Tactile Cuing;Postural Facilitation   Comments working on upright posture and head control. no righting reactions noted   Bed Mobility    Supine to Sit Total Assist   Sit to Supine Total Assist   Scooting Total Assist   Rolling Total Assist to Rt.;Total Assist to Lt.   Skilled Intervention Verbal Cuing;Tactile Cuing;Postural Facilitation   Comments 2p assist   Gait Analysis   Gait Level Of Assist Unable to Participate   Functional Mobility   Sit to Stand Unable to Participate   Bed, Chair, Wheelchair Transfer Unable to Participate   Mobility EOB only   6 Clicks Assessment - How much HELP from from another person do you currently need... (If the patient hasn't done an activity recently, how much help from another person do you think he/she would need if he/she tried?)   Turning from your back to your side while in a flat bed without using bedrails? 1   Moving from lying on your back to sitting on the side of a flat bed without using bedrails? 1   Moving to and from a bed to a chair (including a wheelchair)? 1   Standing up from a chair using your arms (e.g., wheelchair, or bedside chair)? 1   Walking in hospital room? 1   Climbing 3-5 steps with a railing? 1   6 clicks Mobility Score 6   Short Term Goals    Short Term Goal # 1 pt will move supine<>eob with min a in 6 tx for bed mobility.   Goal Outcome # 1 goal not met   Short Term Goal # 2 pt will sit at eob for 5 min with fair- balance in 6 tx for oob tolerance.   Goal Outcome # 2 Goal not met   Short Term Goal # 3 pt will complete sts with hemiwalker and min a in 6 tx for functional mobility.   Goal Outcome # 3 Goal not met   Physical Therapy Treatment Plan    Physical Therapy Treatment Plan Continue Current Treatment Plan   Anticipated Discharge Equipment and Recommendations   DC Equipment Recommendations Unable to determine at this time   Discharge Recommendations Recommend post-acute placement for additional physical therapy services prior to discharge home   Interdisciplinary Plan of Care Collaboration   IDT Collaboration with  Family / Caregiver;Nursing;Therapy Tech   Patient Position at End of Therapy In Bed;Bed Alarm On;Call Light within Reach;Family / Friend in Room   Collaboration Comments RN updated   Session Information   Date / Session Number  4/9- 5 (2/5, 4/9)

## 2025-04-10 NOTE — DIETARY
"Nutrition Services Weekly Nutrition Support Update     Day 16 of admit. Jonna Brian is a 49 y.o. male with admitting DX of Acute weakness and Acute ischemic stroke.     Tube feeding initiated on 3/28. Current TF via PEG tube is Glucerna 1.2 with goal rate 75 ml/hr to provide 2160 kcals, 108 gm protein, and 1449 ml free water per day. Current FWF of 30 ml Q 4 hours.      Nutrition Assessment:      Height: 185.4 cm (6' 0.99\")  Weight: 96 kg (211 lb 10.3 oz)  Weight to Use in Calculations: 90.4 kg (199 lb 4.7 oz)  Weight taken via bed scale  Body mass index is 27.93 kg/m². BMI classification: Overweight.  Wt Readings from Last 6 Encounters:   25 96 kg (211 lb 10.3 oz)   23 120 kg (265 lb)   22 (!) 129 kg (285 lb 0.9 oz)   10/08/21 113 kg (250 lb)   20 115 kg (253 lb 15.5 oz)   20 114 kg (252 lb 3.3 oz)      Weight trend: Weight is stable during admit.    Reassess for transition to bolus feeds.    Calculation/Equation: REE per MSJ x 1.1 = 2079 kcals  Total Calories / day: 2100 - 2300  (Calories / k - 24)  Total Grams Protein / day: 96 - 115 (Grams Protein / k - 1.2)     Objective:  Pt was receiving TF at goal rate and tolerating per RN.   SLP is working with pt on speaking valve.  MD notes pt is medically cleared awaiting placement.  Pertinent labs: glucose 159, BUN 31, ALT 86, Alk phos 117  Pertinent meds: lipitor, neurontin, hydrochlorothiazide, senna, anti-emetics prn, bowel meds prn  Skin/wounds: No wounds or edema noted.    Last BM:  several BMs  CHO-controlled formula is indicated to meet estimated needs with blood sugar control.     Current diet order:   NPO, TF     Nutrition Diagnosis:      Inadequate oral intake r/t acute respiratory failure as evidenced by need for nutrition support.   Nutrition Dx Status: Resolved    NEW: Swallowing difficulty r/t stroke as evidenced by need for nutrition support    Nutrition Interventions:      Transition to bolus feeds. Stop " continuous TF and start bolus regimen. Provide 1/2 carton of Glucerna 1.2 (or equivalent to) at first feed and advance by 1/2 carton per feed until goal is reached. Goal is 8 cartons per day (suggest 2 cartons with meals and HS) to provide 2280 kcals, 112 gm protein, and 1528 ml free water per day.   Fluids per MD. Consider 250 ml TID to provide daily total of 2278 ml (TF + flushes). Will discuss with MD.   Diet upgrades per SLP/MD.  Patient aware of active plan of care as appropriate.     Nutrition Monitoring and Evaluation:     Monitor TF progression and tolerance of bolus TF.  Monitor nutrition POC  Additional fluids per MD/DO  Monitor vital signs pertinent to nutrition       RD following and will provide updated recommendations as indicated.

## 2025-04-10 NOTE — THERAPY
Physical Therapy   Daily Treatment     Patient Name: Jonna Brian  Age:  49 y.o., Sex:  male  Medical Record #: [id number]  Today's Date: 4/10/2025     Precautions  Precautions: Fall Risk;Swallow Precautions;Tracheostomy ;PEG Tube    Assessment  Pt seen for PT tx session with mobility detailed down below. Pt continues to be at Total A for bed mobility, needing constant assist due to poor EOB balance. Pt spent 25 minutes at EOB, working on EOB balance, head posture; alternating work with OT, secretion management, and breaks as needed. Pt fatiguing towards the end, but able to nod and follow commands as able. Continue to recommend placement, will follow.     Plan    Treatment Plan Status: Continue Current Treatment Plan  Type of Treatment: Bed Mobility, Family / Caregiver Training, Gait Training, Neuro Re-Education / Balance, Self Care / Home Evaluation, Stair Training, Therapeutic Activities, Therapeutic Exercise  Treatment Frequency: 5 Times per Week  Treatment Duration: Until Therapy Goals Met    DC Equipment Recommendations: Unable to determine at this time  Discharge Recommendations: Recommend post-acute placement for additional physical therapy services prior to discharge home        Vitals   O2 Delivery Device T-Piece   Vitals Comments VSS, somewhat tachycardic when coughing   Pain 0 - 10 Group   Therapist Pain Assessment   (no c/o pain)   Non Verbal Descriptors   Non Verbal Scale  Calm   Cognition    Cognition / Consciousness X   Speech/ Communication Nods Appropriately   Level of Consciousness Alert   Safety Awareness Impaired   Attention Impaired   Sequencing Impaired   Initiation Impaired   Sitting Lower Body Exercises   Sitting Lower Body Exercises Yes   Long Arc Quad 2 sets of 10;Left  (partial ROM)   Neuro-Muscular Treatments   Neuro-Muscular Treatments Anterior weight shift;Postural Changes;Tactile Cuing;Verbal Cuing;Weight Shift Right;Weight Shift Left   Comments continuing to need manual support for  upright/midline head and body posture. working on lateral leans with shifting onto elbows   Balance   Sitting Balance (Static) Trace   Sitting Balance (Dynamic) Dependent   Weight Shift Sitting Absent   Skilled Intervention Verbal Cuing;Tactile Cuing;Sequencing;Postural Facilitation;Facilitation   Comments no righting reactions noted with all movement, pt with some pushing back with knee extensions. possibly due to tight posterior musculature   Bed Mobility    Supine to Sit Total Assist   Sit to Supine Total Assist   Scooting Total Assist   Rolling Total Assist to Rt.;Total Assist to Lt.   Skilled Intervention Verbal Cuing;Tactile Cuing;Postural Facilitation   Comments 2p assist   Gait Analysis   Gait Level Of Assist Unable to Participate   Functional Mobility   Sit to Stand Unable to Participate   Bed, Chair, Wheelchair Transfer Unable to Participate   Mobility EOB only   6 Clicks Assessment - How much HELP from from another person do you currently need... (If the patient hasn't done an activity recently, how much help from another person do you think he/she would need if he/she tried?)   Turning from your back to your side while in a flat bed without using bedrails? 1   Moving from lying on your back to sitting on the side of a flat bed without using bedrails? 1   Moving to and from a bed to a chair (including a wheelchair)? 1   Standing up from a chair using your arms (e.g., wheelchair, or bedside chair)? 1   Walking in hospital room? 1   Climbing 3-5 steps with a railing? 1   6 clicks Mobility Score 6   Short Term Goals    Short Term Goal # 1 pt will move supine<>eob with min a in 6 tx for bed mobility.   Goal Outcome # 1 goal not met   Short Term Goal # 2 pt will sit at eob for 5 min with fair- balance in 6 tx for oob tolerance.   Goal Outcome # 2 Goal not met   Short Term Goal # 3 pt will complete sts with hemiwalker and min a in 6 tx for functional mobility.   Goal Outcome # 3 Goal not met   Physical Therapy  Treatment Plan   Physical Therapy Treatment Plan Continue Current Treatment Plan   Anticipated Discharge Equipment and Recommendations   DC Equipment Recommendations Unable to determine at this time   Discharge Recommendations Recommend post-acute placement for additional physical therapy services prior to discharge home   Interdisciplinary Plan of Care Collaboration   IDT Collaboration with  Nursing;Occupational Therapist   Patient Position at End of Therapy In Bed;Bed Alarm On;Call Light within Reach;Family / Friend in Room   Collaboration Comments RN updated   Session Information   Date / Session Number  4/10- 6 (1/5, 4/16)         Patient seen for team treatment with Occupational Therapist for the following reason(s):  Patient required 2 person assistance for safety and to provide effective interventions. Each discipline assisted patient with appropriate and separate goals. Due to the medical complexity, the skill of both practitioners is needed to monitor vitals, patient status, and adjust the intervention to fit the patient's needs and goals.

## 2025-04-11 LAB
ANION GAP SERPL CALC-SCNC: 11 MMOL/L (ref 7–16)
APPEARANCE UR: ABNORMAL
BACTERIA #/AREA URNS HPF: ABNORMAL /HPF
BILIRUB UR QL STRIP.AUTO: NEGATIVE
BUN SERPL-MCNC: 32 MG/DL (ref 8–22)
CALCIUM SERPL-MCNC: 9.8 MG/DL (ref 8.5–10.5)
CASTS URNS QL MICRO: ABNORMAL /LPF (ref 0–2)
CHLORIDE SERPL-SCNC: 100 MMOL/L (ref 96–112)
CO2 SERPL-SCNC: 30 MMOL/L (ref 20–33)
COLOR UR: ABNORMAL
CREAT SERPL-MCNC: 1.1 MG/DL (ref 0.5–1.4)
EPITHELIAL CELLS 1715: ABNORMAL /HPF (ref 0–5)
ERYTHROCYTE [DISTWIDTH] IN BLOOD BY AUTOMATED COUNT: 39.6 FL (ref 35.9–50)
GFR SERPLBLD CREATININE-BSD FMLA CKD-EPI: 83 ML/MIN/1.73 M 2
GLUCOSE SERPL-MCNC: 141 MG/DL (ref 65–99)
GLUCOSE UR STRIP.AUTO-MCNC: NEGATIVE MG/DL
HCT VFR BLD AUTO: 43.8 % (ref 42–52)
HGB BLD-MCNC: 14.1 G/DL (ref 14–18)
KETONES UR STRIP.AUTO-MCNC: ABNORMAL MG/DL
LEUKOCYTE ESTERASE UR QL STRIP.AUTO: ABNORMAL
MCH RBC QN AUTO: 27.5 PG (ref 27–33)
MCHC RBC AUTO-ENTMCNC: 32.2 G/DL (ref 32.3–36.5)
MCV RBC AUTO: 85.4 FL (ref 81.4–97.8)
MICRO URNS: ABNORMAL
NITRITE UR QL STRIP.AUTO: NEGATIVE
PH UR STRIP.AUTO: 7 [PH] (ref 5–8)
PLATELET # BLD AUTO: 485 K/UL (ref 164–446)
PMV BLD AUTO: 9.1 FL (ref 9–12.9)
POTASSIUM SERPL-SCNC: 3.9 MMOL/L (ref 3.6–5.5)
PROT UR QL STRIP: 30 MG/DL
RBC # BLD AUTO: 5.13 M/UL (ref 4.7–6.1)
RBC # URNS HPF: >100 /HPF (ref 0–2)
RBC UR QL AUTO: ABNORMAL
SODIUM SERPL-SCNC: 141 MMOL/L (ref 135–145)
SP GR UR STRIP.AUTO: 1.02
UROBILINOGEN UR STRIP.AUTO-MCNC: 1 EU/DL
WBC # BLD AUTO: 12 K/UL (ref 4.8–10.8)
WBC #/AREA URNS HPF: ABNORMAL /HPF

## 2025-04-11 PROCEDURE — 92507 TX SP LANG VOICE COMM INDIV: CPT

## 2025-04-11 PROCEDURE — 94640 AIRWAY INHALATION TREATMENT: CPT

## 2025-04-11 PROCEDURE — A9270 NON-COVERED ITEM OR SERVICE: HCPCS | Performed by: INTERNAL MEDICINE

## 2025-04-11 PROCEDURE — 85027 COMPLETE CBC AUTOMATED: CPT

## 2025-04-11 PROCEDURE — 81001 URINALYSIS AUTO W/SCOPE: CPT

## 2025-04-11 PROCEDURE — 700111 HCHG RX REV CODE 636 W/ 250 OVERRIDE (IP): Mod: JZ | Performed by: INTERNAL MEDICINE

## 2025-04-11 PROCEDURE — 770020 HCHG ROOM/CARE - TELE (206)

## 2025-04-11 PROCEDURE — 700111 HCHG RX REV CODE 636 W/ 250 OVERRIDE (IP): Mod: JZ

## 2025-04-11 PROCEDURE — 700101 HCHG RX REV CODE 250: Performed by: STUDENT IN AN ORGANIZED HEALTH CARE EDUCATION/TRAINING PROGRAM

## 2025-04-11 PROCEDURE — 700102 HCHG RX REV CODE 250 W/ 637 OVERRIDE(OP): Performed by: STUDENT IN AN ORGANIZED HEALTH CARE EDUCATION/TRAINING PROGRAM

## 2025-04-11 PROCEDURE — 700102 HCHG RX REV CODE 250 W/ 637 OVERRIDE(OP): Performed by: INTERNAL MEDICINE

## 2025-04-11 PROCEDURE — 97530 THERAPEUTIC ACTIVITIES: CPT

## 2025-04-11 PROCEDURE — 92523 SPEECH SOUND LANG COMPREHEN: CPT

## 2025-04-11 PROCEDURE — 36415 COLL VENOUS BLD VENIPUNCTURE: CPT

## 2025-04-11 PROCEDURE — 80048 BASIC METABOLIC PNL TOTAL CA: CPT

## 2025-04-11 PROCEDURE — A9270 NON-COVERED ITEM OR SERVICE: HCPCS | Performed by: STUDENT IN AN ORGANIZED HEALTH CARE EDUCATION/TRAINING PROGRAM

## 2025-04-11 PROCEDURE — 99232 SBSQ HOSP IP/OBS MODERATE 35: CPT | Performed by: STUDENT IN AN ORGANIZED HEALTH CARE EDUCATION/TRAINING PROGRAM

## 2025-04-11 RX ADMIN — AMLODIPINE BESYLATE 10 MG: 10 TABLET ORAL at 04:14

## 2025-04-11 RX ADMIN — OXYCODONE HYDROCHLORIDE 5 MG: 5 TABLET ORAL at 14:43

## 2025-04-11 RX ADMIN — ALBUTEROL SULFATE 2.5 MG: 2.5 SOLUTION RESPIRATORY (INHALATION) at 13:22

## 2025-04-11 RX ADMIN — SENNOSIDES AND DOCUSATE SODIUM 2 TABLET: 50; 8.6 TABLET ORAL at 04:14

## 2025-04-11 RX ADMIN — ACETYLCYSTEINE 3 ML: 200 SOLUTION ORAL; RESPIRATORY (INHALATION) at 13:22

## 2025-04-11 RX ADMIN — HYDROCHLOROTHIAZIDE 25 MG: 25 TABLET ORAL at 04:15

## 2025-04-11 RX ADMIN — GABAPENTIN 400 MG: 400 CAPSULE ORAL at 17:39

## 2025-04-11 RX ADMIN — ASPIRIN 81 MG: 81 TABLET, CHEWABLE ORAL at 04:15

## 2025-04-11 RX ADMIN — Medication 5 MG: at 20:07

## 2025-04-11 RX ADMIN — ACETYLCYSTEINE 3 ML: 200 SOLUTION ORAL; RESPIRATORY (INHALATION) at 09:38

## 2025-04-11 RX ADMIN — POLYETHYLENE GLYCOL 3350 1 PACKET: 17 POWDER, FOR SOLUTION ORAL at 04:15

## 2025-04-11 RX ADMIN — ALBUTEROL SULFATE 2.5 MG: 2.5 SOLUTION RESPIRATORY (INHALATION) at 09:37

## 2025-04-11 RX ADMIN — ATORVASTATIN CALCIUM 80 MG: 80 TABLET, FILM COATED ORAL at 17:38

## 2025-04-11 RX ADMIN — ALBUTEROL SULFATE 2.5 MG: 2.5 SOLUTION RESPIRATORY (INHALATION) at 06:30

## 2025-04-11 RX ADMIN — ONDANSETRON 4 MG: 2 INJECTION INTRAMUSCULAR; INTRAVENOUS at 22:08

## 2025-04-11 RX ADMIN — ALBUTEROL SULFATE 2.5 MG: 2.5 SOLUTION RESPIRATORY (INHALATION) at 18:47

## 2025-04-11 RX ADMIN — ACETYLCYSTEINE 3 ML: 200 SOLUTION ORAL; RESPIRATORY (INHALATION) at 06:30

## 2025-04-11 RX ADMIN — ACETYLCYSTEINE 3 ML: 200 SOLUTION ORAL; RESPIRATORY (INHALATION) at 18:47

## 2025-04-11 RX ADMIN — BISACODYL 10 MG: 10 SUPPOSITORY RECTAL at 06:41

## 2025-04-11 RX ADMIN — ENOXAPARIN SODIUM 40 MG: 100 INJECTION SUBCUTANEOUS at 17:39

## 2025-04-11 RX ADMIN — GABAPENTIN 400 MG: 400 CAPSULE ORAL at 13:14

## 2025-04-11 RX ADMIN — GABAPENTIN 400 MG: 400 CAPSULE ORAL at 04:14

## 2025-04-11 RX ADMIN — SENNOSIDES AND DOCUSATE SODIUM 2 TABLET: 50; 8.6 TABLET ORAL at 17:39

## 2025-04-11 ASSESSMENT — COGNITIVE AND FUNCTIONAL STATUS - GENERAL
WALKING IN HOSPITAL ROOM: TOTAL
STANDING UP FROM CHAIR USING ARMS: TOTAL
MOVING FROM LYING ON BACK TO SITTING ON SIDE OF FLAT BED: TOTAL
CLIMB 3 TO 5 STEPS WITH RAILING: TOTAL
MOBILITY SCORE: 6
SUGGESTED CMS G CODE MODIFIER MOBILITY: CN
MOVING TO AND FROM BED TO CHAIR: TOTAL
TURNING FROM BACK TO SIDE WHILE IN FLAT BAD: TOTAL

## 2025-04-11 ASSESSMENT — FIBROSIS 4 INDEX: FIB4 SCORE: 0.34

## 2025-04-11 ASSESSMENT — PAIN DESCRIPTION - PAIN TYPE
TYPE: ACUTE PAIN
TYPE: ACUTE PAIN

## 2025-04-11 ASSESSMENT — GAIT ASSESSMENTS: GAIT LEVEL OF ASSIST: UNABLE TO PARTICIPATE

## 2025-04-11 NOTE — CARE PLAN
Problem: Optimal Care of the Stroke Patient  Goal: Optimal emergency care for the stroke patient  Description: Target End Date:  End of day 1Time of Onset1.  Time of last known well obtained2.  Patient and family/caregiver verbalize understanding of diagnosis, medications and testing3.  NIHSS performed and documented, including date and time, for ischemic stroke patients prior to any acute recanalization therapy (thrombolytics or mechanical) or within 12 hours of arrival if no intervention is warranted4.  Consults and referrals placed to appropriate departmentsMedications Administration as Ordered:1.  Implement appropriate reversal agents for INR greater than 1.52.  Pre-alteplase administration of antihypertensives for SBP >185 DBP >1103.  Post-alteplase administration of antihypertensives for SBP >185, DBP >1054.  Thrombolytic Therapy for qualifying ischemic stroke patients who arrive within 4.5 hours of time of Last Known Well. Thrombolytic therapy administered within 30 minutes or a documented reason for delay  Outcome: Progressing     Problem: Neuro Status  Goal: Neuro status will remain stable or improve  Description: Target End Date:  Prior to discharge or change in level of careDocument on Neuro assessment in the Assessment flowsheet1.  Assess and monitor neurologic status per provider order/protocol/unit policy2.  Assess level of consciousness and orientation3.  Assess for speech, dysarthria, dysphagia, facial symmetry4.  Assess visual field, eye movements, gaze preference, pupil reaction and size5.  Assess muscle strength and motor response in all four extremities6.  Assess for sensation (numbness and tingling)7.  Assess basic neuro reflexes (cough, gag, corneal)8.  Identify changes in neuro status and report to provider for testing/treatment orders  Outcome: Progressing     Problem: Hemodynamic Monitoring  Goal: Patient's hemodynamics, fluid balance and neurologic status will be stable or  improve  Description: Target End Date:  Prior to discharge or change in level of care1.  Vital signs, pulse oximetry and cardiac monitor per provider order and/or policy2.  Frequent pulse checks performed post thrombectomy3.  Frequent monitoring for signs of bleeding post TPA administration4.  Proper management of IV infusions5.  Intake and output monitored per provider order6.  Daily weight obtained per unit policy or provider order7.  Peripheral pulses and capillary refill assessed as needed8.  Monitor for signs/symptoms of excessive bleeding9.  Body temperature assessed and fevers [id number]. Patient positioned for maximum circulation/cardiac output  Outcome: Progressing   The patient is Stable - Low risk of patient condition declining or worsening    Shift Goals  Clinical Goals: Hemodynamically stable, Respiratory hygiene  Patient Goals: jennifer  Family Goals: jennifer    Progress made toward(s) clinical / shift goals:  Patient is A0x3. He will answer appropriately by nodding. Suctioning and mouth care done as needed. Turned every 2 hours to maintain skin integrity. Hemodynamically stable at this time. Hourly rounds to ensure safety and comfort.     Patient is not progressing towards the following goals:

## 2025-04-11 NOTE — THERAPY
"Speech Language Pathology   Daily Treatment     Patient Name: Jonna Brian  AGE:  49 y.o., SEX:  male  Medical Record #: [id number]  Date of Service: 4/10/2025      Precautions:  Precautions: Fall Risk, Tracheostomy , PEG Tube, Swallow Precautions       Subjective:  Patient seen this date for speaking valve management. Patient alert, agreeable to session, and positioned to upright in bed.     Assessment:  Tracheostomy: Portex 8.0  Cuff Position: Deflated; Air in Cuff (cc): N/A  Oxygen Requirements:  8 L 35% FiO2    Cardiopulmonary vitals:  Initial: 93% SpO2, 95 HR  End: 95% SpO2, 100 HR  Time: 15 min      Comments: Mild amount of secretions removed via tracheal suctioning; none removed via subglottic suctioning. PMV placed without signs of distress. Pt with adequate coughing. Moderate amount of clear secretions removed from oral cavity via [place] throughout session. Pt followed command to vocalize; though no intelligible verbalizations this date. Pt attempting to communicate via mouthing; mouthed \"water.\" Education provided regarding purpose for NPO status at this time; pt nodded yes in understanding. Pt expressed fatigue as session progressed, thus PMV doffed. No back pressure appreciated upon removal. RN updated to continue PMV trials throughout the day.      Clinical Impressions:   Patient tolerated the speaking valve without negative change to cardiopulmonary vitals. Pt able to produce some volitional and reflexive vocalizations though still relies on head nod/shake to communicate wants/needs. SLP to continue to follow for speaking valve trials.       Recommendations  Voice Prosthesis Training  Placement: Trained staff only  Duration: 15-25 minutes with direct supervision for secretion management      SLP Treatment Plan  Treatment Plan: Voice Prosthesis Training  SLP Frequency: 4x Per Week  Estimated Duration: Until Therapy Goals Met      Anticipated Discharge Needs  Discharge Recommendations: Recommend " post-acute placement for additional speech therapy services prior to discharge home  Therapy Recommendations Upon DC: Dysphagia Training, AAC Training / Development, Community Re-Integration, Patient / Family / Caregiver Education, Tracheostomy Training      Patient / Family Goals  Patient / Family Goal #1: Smiles to continue PMV treatment  Goal #1 Outcome: Progressing as expected  Short Term Goals  Short Term Goal # 1: Pt will tolerate PMV trials without negitive change to cardiopulmonary vitals for 20 minutes.  Goal Outcome # 1: Progressing as expected      Blake Koenig, SLP

## 2025-04-11 NOTE — PROGRESS NOTES
Monitor summary: SR 93-98, ND -0.14, QRS -0.08, QT -0.33, with (R) PACs per strip from the monitor room.

## 2025-04-11 NOTE — PROGRESS NOTES
[facility] Medicine Daily Progress Note    Date of Service  4/11/2025    Chief Complaint  Jonna Brian is a 49 y.o. male admitted 3/25/2025 with headache and weakness.    Hospital Course    This is a 49 y.o. male who was initially admitted to the hospital on 3/25/2025 with global weakness, aphasia and ataxia.  His last known normal was 1630 on March 24.  He was initially admitted to the hospital floor, his CT head as well as CTA of the head and neck were within normal limits.  There was consideration for possible Guillain-Barré syndrome.     His condition continued to deteriorate and he underwent a lumbar puncture which revealed elevated protein but no evidence of infection.  He had progressive bulbar symptoms and weakness and was transferred to the ICU.  Unfortunately shortly after he arrived in the ICU he developed stridor and required emergent intubation on 3/27.       He then underwent an MRI of his brain and spine which unfortunately revealed small areas of acute infarcts in the lower jero and the upper medulla on both sides of the midline.  He also had chronic infarcts in his jero and right inferior cerebellum.  They were chronically Cuna in the bilateral basal ganglia, the right thalamus and the right periventricular white matter.  He had evidence of small vessel disease as well in the periventricular white matter.     Ultimately it appears he has been suffering multiple small strokes and his progressive decline was due to acute infarcts in the lower jero and upper medulla.  Since that time he has not recovered function, has required tracheostomy and PEG placement with the ultimate goal of transferring him to postacute medical to see what function may be regained in time.     I have had conversations with his family, they want to keep moving forward and with his [employer] trials for the foreseeable future.  If he is unable to regain function going forward, further goals of care discussions can be had in the  upcoming months.    Interval Problem Update  No acute events overnight.  On 8L t piece, monitor, wean as able.  Patient working with therapies this morning.  Updated family at bedside with plan of care, all questions answered.  Tolerating PEG tube feeding well.  Patient is medically cleared, pending LTAC.        I have discussed this patient's plan of care and discharge plan at IDT rounds today with Case Management, Nursing, Nursing leadership, and other members of the IDT team.    Consultants/Specialty  critical care I discussed with Dr. Torres    Code Status  Full Code    Disposition  Medically Cleared  I have placed the appropriate orders for post-discharge needs.    Review of Systems  Review of Systems   Unable to perform ROS: Patient nonverbal        Physical Exam  Temp:  [36.4 °C (97.5 °F)-37.3 °C (99.1 °F)] 37.3 °C (99.1 °F)  Pulse:  [] 96  Resp:  [16-18] 16  BP: (102-143)/() 121/74  SpO2:  [91 %-97 %] 92 %    Physical Exam  Constitutional:       Appearance: He is ill-appearing.   Neck:      Comments: tracheostomy  Cardiovascular:      Rate and Rhythm: Normal rate and regular rhythm.   Abdominal:      Tenderness: There is no abdominal tenderness.      Comments: PEG   Neurological:      Comments: He is able to slightly move his left upper and lower extremity.  He was able to nod his head to answer question.         Fluids    Intake/Output Summary (Last 24 hours) at 4/11/2025 1541  Last data filed at 4/11/2025 1449  Gross per 24 hour   Intake 1448 ml   Output 1375 ml   Net 73 ml        Laboratory  Recent Labs     04/09/25 0328 04/10/25  0508 04/11/25  0617   WBC 9.6 10.6 12.0*   RBC 5.07 5.24 5.13   HEMOGLOBIN 14.0 14.2 14.1   HEMATOCRIT 42.7 44.6 43.8   MCV 84.2 85.1 85.4   MCH 27.6 27.1 27.5   MCHC 32.8 31.8* 32.2*   RDW 38.4 39.1 39.6   PLATELETCT 448* 500* 485*   MPV 8.8* 9.1 9.1     Recent Labs     04/09/25 0328 04/10/25  0508 04/11/25  0617   SODIUM 140 142 141   POTASSIUM 4.2 4.0 3.9    CHLORIDE 101 98 100   CO2 26 29 30   GLUCOSE 142* 159* 141*   BUN 29* 31* 32*   CREATININE 0.93 1.08 1.10   CALCIUM 9.7 9.8 9.8                   Imaging  DX-CHEST-PORTABLE (1 VIEW)   Final Result         1.  Left basilar atelectasis and/or subtle infiltrates, similar to prior study      DX-CHEST-PORTABLE (1 VIEW)   Final Result         1.  Left basilar atelectasis, no focal infiltrate   2.  Cardiomegaly      DX-G.I. TUBE INJECTION, ANY TYPE   Final Result      Contrast from a PEG tube injection extends into the stomach without evidence of contrast leak.      CT-CHEST,ABDOMEN,PELVIS WITH   Final Result      1.  Large amount of free intraperitoneal air within the abdomen again seen as was previously identified on chest x-ray by review of the patient's chart, a percutaneous gastrostomy tube was placed on 3/31/2025 and is free intraperitoneal air is possibly    secondary to that recent procedure.      2.  Bibasal atelectasis and small bilateral pleural effusions.      3.  Tracheostomy tube present.      4.  No evidence of bowel obstruction or free fluid within the abdomen or pelvis.      DX-CHEST-PORTABLE (1 VIEW)   Final Result         1.  Bibasilar atelectasis   2.  Intra-abdominal free air, can be associated with history of percutaneous gastrostomy, consider other viscus perforation as warranted. Could be further evaluated with CT of the abdomen with contrast as clinically appropriate.      These findings were discussed with the patient's clinician, Bravo Lala Iv, on 4/2/2025 7:57 AM.      DX-CHEST-PORTABLE (1 VIEW)   Final Result      No evidence of acute cardiopulmonary process.      DX-ABDOMEN FOR TUBE PLACEMENT   Final Result      1.  Enteric tube extends in the fundus of stomach.      DX-ABDOMEN FOR TUBE PLACEMENT   Final Result      The gastric tube has been removed and replaced with a small bowel feeding tube which terminate in the proximal stomach.      DX-CHEST-PORTABLE (1 VIEW)   Final Result       Atelectasis within the left lung base.      MR-THORACIC SPINE-WITH & W/O   Final Result      1.  There is no abnormal intramedullary T2 signal intensity in the thoracic spinal cord.   2.  Mild degenerative disease at T8-9.   3.  Left lower segmental consolidation.      MR-CERVICAL SPINE-WITH & W/O   Final Result      1.  Small areas of acute infarcts in the lower jero and upper medulla involving both sides of the midline. There are chronic infarcts in the jero and right inferior cerebellum.   2.  There is no abnormal intramedullary T2 signal intensity in the cervical spinal cord to suggest demyelinating lesions. There is no MR evidence of compressive myelopathy.   3.  Mild degenerative disease.            MR-BRAIN-WITH & W/O   Final Result      1.  Small areas of acute infarcts in the lower jero and upper medulla involving both sides of the midline.   2.  There are chronic infarcts in the jero and right inferior cerebellum. . There are areas of chronic lacunae in the bilateral basal ganglia, right thalamus and right periventricular white matter.   3.  There are nonspecific T2 hyperintensities in the periventricular white matter likely representing chronic small vessel disease.   4.  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      MR-LUMBAR SPINE-WITH & W/O   Final Result      1.  Unremarkable pre and postcontrast MR examination of the lumbar spine.   2.  Clinical suspicious for GBS: There is no abnormal enhancement of the lumbar nerve roots.      DX-ABDOMEN FOR TUBE PLACEMENT   Final Result      NG tube tip projects at the peripyloric region.      DX-CHEST-PORTABLE (1 VIEW)   Final Result      1.  Low lung volumes without definite acute cardiopulmonary abnormality.   2.  Support apparatus as above.      CT-HEAD W/O   Final Result      1.  No acute intracranial abnormality.   2.  Remote right cerebellar infarct.               DX-CHEST-PORTABLE (1 VIEW)  "  Final Result      No acute cardiopulmonary disease evident.      DX-CHEST-PORTABLE (1 VIEW)   Final Result      No acute cardiopulmonary disease evident.      CT-CEREBRAL PERFUSION ANALYSIS   Final Result      1. Cerebral blood flow less than 30% possibly representing completed infarct = 0 mL. Based on distribution of this finding, this is unlikely to represent artifact.      2. T Max more than 6 seconds possibly representing combination of completed infarct and ischemia = 7 mL. Based on the distribution of this finding, this is possibly artifact.      3. Mismatched volume possibly representing ischemic brain/penumbra= 0 mL      4.  Please note that this cerebral perfusion study and report is Quantitative and targets supratentorial (cerebral) perfusion for evaluation of large vessel territory acute ischemia/infarction. For example, lacunar infarcts, and brainstem/posterior fossa    ischemia/infarction are not evaluated on this study.  Data acquisition is subject to artifacts which can yield non-anatomically plausible perfusion maps which may be due to motion, bolus timing, signal to noise ratio, or other technical factors.    Perfusion map abnormalities which show non-anatomic distributions are likely artifact.   This study is not \"stand-alone\" and should only be utilized for diagnosis, management/treatment in correlation with CT, CTA, and/or MRI and clinical factors.         CT-CTA NECK WITH & W/O-POST PROCESSING   Final Result      CT angiogram of the neck within normal limits.      CT-CTA HEAD WITH & W/O-POST PROCESS   Final Result      CT angiogram of the [place] within normal limits.      CT-HEAD W/O   Final Result      Head CT without contrast within normal limits. No evidence of acute cerebral infarction, hemorrhage or mass lesion.                    Assessment/Plan  * Acute ischemic stroke (HCC)- (present on admission)  Assessment & Plan  Acute ischemic stroke  MRI of the brain revealed acute " infarcts of the lower jero and upper medulla.  He effectively is a quadriplegic and required tracheostomy and PEG tube.  Aspirin and statin  Blood pressure control  Overall prognosis is guarded    Proteus mirabilis pneumonia (HCC)- (present on admission)  Assessment & Plan  Treated with Zosyn de-escalated to Augmentin based on cultures from sputum    Acute neuromuscular respiratory failure (HCC)- (present on admission)  Assessment & Plan  Requiring tracheostomy and mechanical ventilation    Anxiety  Assessment & Plan  Ativan for anxiety.     Type 2 diabetes mellitus with hyperglycemia, without long-term current use of insulin (HCC)- (present on admission)  Assessment & Plan  Details are unclear  Reportedly he is on metformin as an outpatient  Hemoglobin A1c 6.3  He does not require insulin    Primary hypertension- (present on admission)  Assessment & Plan  Blood pressure is appropriate with hydrochlorothiazide 25 mg daily and Norvasc 10 mg daily             VTE prophylaxis: VTE Selection    I have performed a physical exam and reviewed and updated ROS and Plan today (4/11/2025). In review of yesterday's note (4/10/2025), there are no changes except as documented above.

## 2025-04-11 NOTE — PROGRESS NOTES
Monitor Summary: SR-ST , HI .0.14, QRS .0.07, QT .0.33 with rare PVCs per strip from monitor room

## 2025-04-11 NOTE — CARE PLAN
The patient is Stable - Low risk of patient condition declining or worsening    Shift Goals  Clinical Goals: Hemodynamically stable, Respiratory hygiene  Patient Goals: jennifer  Family Goals: jennifer    Progress made toward(s) clinical / shift goals:    Problem: Knowledge Deficit - Stroke Education  Goal: Patient's knowledge of stroke and risk factors will improve  Outcome: Progressing  Note: Pt demonstrates some understanding of plan of care through head nods.     Problem: Neuro Status  Goal: Neuro status will remain stable or improve  Outcome: Progressing  Note: Neuro status remains stable.      Problem: Skin Integrity  Goal: Skin integrity is maintained or improved  Outcome: Progressing  Note: Pt on TAPs, ALFONSO, heel float and PRAFO boot put on, mepilexes on elbows and heels.

## 2025-04-11 NOTE — THERAPY
Speech Language Pathology   Communication Evaluation and Speaking Valve Treatment     Patient Name: Jonna Brian  AGE:  49 y.o., SEX:  male  Medical Record #: [id number]  Date of Service: 4/11/2025    History of Present Illness  50 y/o M male admit 3/25 with global weakness, aphasia, and ataxia. Hospital course complicated by progressive right side weakness, bulbar symptoms and difficulty with speech. Pt found to have cerebellar and brain stem stroke. Intubated 3/27-3/30. Trach placed 3/30.     PMHx: HTN, anxiety, DM2     SLP Hx:  CSE: 3/26/25-Recommended NPO   CSE 07/2017: [facility] rec [id number]     Imaging:   MRI-Brain-3/28/25  1.  Small areas of acute infarcts in the lower jero and upper medulla involving both sides of the midline.  2.  There are chronic infarcts in the jero and right inferior cerebellum. There are areas of chronic lacunae in the bilateral basal ganglia, right thalamus and right periventricular white matter.  3.  There are nonspecific T2 hyperintensities in the periventricular white matter likely representing chronic small vessel disease.  4.  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    General Information  Vitals  O2 (LPM): 8  O2 Delivery Device: T-Piece  Level of Consciousness: Alert, Awake  Patient Behaviors:  (Calm)  Orientation: Self, General place, Current year (via yes/no nodding)  Follows Directives: Yes    Prior Living Situation & Level of Function  Prior Services: Home-Independent  Housing / Facility: 2 Story Apartment / Condo  Steps Into Home: 2  Lives with - Patient's Self Care Capacity: Alone and Able to Care For Self  Comments: Pt's girlfriend present and supportive. Reports she is retired and can assist PRN.     Communication: WNL  Swallowing: WNL     Oral Mechanism Evaluation  Dentition: Natural dentition  Facial Symmetry: Equal  Facial Sensation: Impaired - right  Labial Observations: WFL  Lingual Observations: Midline  Motor  Speech: mod-severe articulatory impreicison- assessment complicated by notable dysphonia also impacting speech intelligibility    Laryngeal Function Exam  Secretion Management: Excess secretions, suctioning required  Voice Quality:  G3 R0 B2 A3 S0 Perceptually weak : Significant dysphonia w/ reduced vocal intensity, reduced breath support  Cough: Perceptually weak, severe dystussia    Subjective  Pt seen alert and cooperative saturating on 8L T piece at 35% FiO2    Communication Domain(s)  Expressive Language:  (Limited assessment d/t dysphonia- presumed generally intact)  Receptive Language:  (Limited assessment- so far presumed intact)  Cognitive-Linguistic:  (A standardized test was not administered d/t severity of communication impairment- informal tasks were completed)  Reading:  (Reading assessed at the single large print word level. His reading glasses were not available)     Communication Assessment  The patient was seen this date for speaking valve placement/treatment, followed by a Communication evaluation.    Expressive Language: The pt was instructed to nod his head in order to select target items on communication boards. Via nodding- he was able to select a target item on a AAC board with a field of 6 in +3/3 trials. After instruction on a usage of a scanning alphabet board, the pt was able to spell +2/2 target words (CVC words) with the therapist.     Receptive Language and Reading: The pt followed commands, answered simple yes/no questions, and answered complex yes/no questions w/ 100% accuracy (7 trials of each). Did not test comprehension at the paragraph level d/t the pt's reduced endurance, but suspect generally intact receptive language at this time. Can test paragraph level comprehension at a later time. He did not have his reading glasses but was able to read large print single words.     Cognition: Limited ability to complete a standardized exam. Pt demo'd general orientation via yes/no  questions. This evaluation was limited by his endurance, but can test delayed memory/recall at a later date.     Motor Speech: The pt's dysphonia and reduced breath support rendered his speech (w/ PMV donned) largely unintelligible. The pt was able to produce vocalizations but his attempts at single words were 0% intelligible.     Speaking valve Assessment:  Assessment:  Tracheostomy: Portex 8.0  Cuff Position: Deflated; Air in Cuff (cc): N/A, already deflated upon entry  Oxygen Requirements:  8 L 35% FiO2     Cardiopulmonary vitals:  Initial: 96% SpO2, 91 HR  End: 95% SpO2, 95 HR  Time: 31 minutes     Comments: Pt had just finished w/ RT and they reported a large amount thick clear secretions that had been deep suctioned. After additional oral suctioning, pt tolerated the speaking valve comfortably w/o signs of distress. He achieved brief vocalizations w/ significantly reduced vocal intensity. Pt requested speaking valve be removed to rest at the end of the session.     Clinical Impressions    - Continued signs of dysphonia in the context of tracheostomy placement and bulbar weakness r/t CVA. The pt demo's emergent tolerance of speaking valve placement; wear time is limited (< 1 hour at a time) by reduced physical endurance. The pt's phonation is c/b severe dysphonia and he is unable to communicate verbally at this time.     - Full/standardized assessment of the pt's cognitive-linguistic skills is limited d/t his overall status, but informal tasks indicate generally intact cognition as well as receptive & expressive language skills. The pt is unable to communicate verbally at this time, nor use his upper extremities for writing or gesturing/pointing.     - The pt is able to best communicate in the following ways:  1) Nodding yes/no in response to close ended yes/no questions  2) Nodding to select a target on a communication board (field of 6, large print 1-2 word options)  3) Nodding to select a target on an  alphabet board using the scanning method (AAC boards and instructions left in the pt's room)    NOTE: It is not within the scope of practice of Speech-Language Pathologists to determine patient capacity. Please defer to the physician or psych to complete this assessment.    Recommendations  Supervision Needs Upons Discharge:  (Pt will require direct assistance w/ iADLs d/t physical deficits)    Voice Prosthesis Training  Placement: Trained staff only  Duration: 15-25 minutes with direct supervision for secretion management    Benefits of a speaking valve include (1) improved oropharyngeal sensation by restoring airflow to the oropharynx, (2) improved cough effectiveness by restoring subglottic air pressure, (3) improved secretion management through improved ability to cough and orally expectorate, as well as increase evaporation of secretions during exhalation through the upper airway and (4) improved oxygenation by re-establishing physiologic positive end expiratory pressure (PEEP)      SLP Treatment Plan  Treatment Plan: Voice Prosthesis Training, Speech-Language Treatment, Patient/Family/Caregiver Training  SLP Frequency: 4x Per Week  Estimated Duration: Until Therapy Goals Met    Anticipated Discharge Needs  Discharge Recommendations: Recommend post-acute placement for additional speech therapy services prior to discharge home  Therapy Recommendations Upon DC: Expression Training, AAC Training / Development, Tracheostomy Training, Patient / Family / Caregiver Education    Patient / Family Goals  Patient / Family Goal #1: Smiles to continue PMV treatment  Goal #1 Outcome: Progressing as expected  Short Term Goal # 1: Pt will tolerate PMV trials without negitive change to cardiopulmonary vitals for 20 minutes.  Goal Outcome # 1: Goal met  Short Term Goal # 2: Pt will demo intelligible phonation at the word level w/ > 80% intelligibility w/ mod cueing  Short Term Goal # 3: Pt will express 5 target single words  using the alphabet board via scanning w/ mod cueing and > 80% accuracy  Short Term Goal # 4: Pt's family / caregivers will participate in training on utilizing AAC with the pt via demonstration teachback    Anna Gomes SLP

## 2025-04-11 NOTE — THERAPY
Physical Therapy   Daily Treatment     Patient Name: Jonna Brian  Age:  49 y.o., Sex:  male  Medical Record #: [id number]  Today's Date: 4/11/2025     Precautions  Precautions: Fall Risk;Swallow Precautions;Tracheostomy ;PEG Tube    Assessment  Pt seen for PT tx session with mobility detailed down below. Pt requesting a short session today due to overall fatigue. Pt able to use communication boards brought in by SLP or mouth words if needed. Continuing to work on EOB balance, head control, and LLE strength. Continue to recommend placement, will follow.     Plan    Treatment Plan Status: Continue Current Treatment Plan  Type of Treatment: Bed Mobility, Family / Caregiver Training, Gait Training, Neuro Re-Education / Balance, Self Care / Home Evaluation, Stair Training, Therapeutic Activities, Therapeutic Exercise  Treatment Frequency: 5 Times per Week  Treatment Duration: Until Therapy Goals Met    DC Equipment Recommendations: Unable to determine at this time  Discharge Recommendations: Recommend post-acute placement for additional physical therapy services prior to discharge home        Vitals   O2 Delivery Device T-Piece   Vitals Comments VSS throughout   Pain 0 - 10 Group   Therapist Pain Assessment   (no signs of pain.)   Non Verbal Descriptors   Non Verbal Scale  Calm   Cognition    Cognition / Consciousness X   Speech/ Communication Nods Appropriately;Delayed Responses   Level of Consciousness Alert   Ability To Follow Commands 1 Step   Sequencing Impaired   Initiation Impaired   Sitting Lower Body Exercises   Sitting Lower Body Exercises Yes   Long Arc Quad 1 set of 10;Left  (partial ROM, decreased initiation today)   Balance   Sitting Balance (Static) Trace   Sitting Balance (Dynamic) Dependent   Weight Shift Sitting Absent   Skilled Intervention Verbal Cuing;Tactile Cuing;Postural Facilitation   Comments working on upright posture and head control. no righting reactions noted, although pt appears to hold  his head slightly better today   Bed Mobility    Supine to Sit Total Assist   Sit to Supine Total Assist   Scooting Total Assist   Rolling Total Assist to Rt.;Total Assist to Lt.   Skilled Intervention Verbal Cuing;Tactile Cuing   Comments 2p assist   Gait Analysis   Gait Level Of Assist Unable to Participate   Functional Mobility   Sit to Stand Unable to Participate   Bed, Chair, Wheelchair Transfer Unable to Participate   Mobility EOB only   6 Clicks Assessment - How much HELP from from another person do you currently need... (If the patient hasn't done an activity recently, how much help from another person do you think he/she would need if he/she tried?)   Turning from your back to your side while in a flat bed without using bedrails? 1   Moving from lying on your back to sitting on the side of a flat bed without using bedrails? 1   Moving to and from a bed to a chair (including a wheelchair)? 1   Standing up from a chair using your arms (e.g., wheelchair, or bedside chair)? 1   Walking in hospital room? 1   Climbing 3-5 steps with a railing? 1   6 clicks Mobility Score 6   Short Term Goals    Short Term Goal # 1 pt will move supine<>eob with min a in 6 tx for bed mobility.   Goal Outcome # 1 goal not met   Short Term Goal # 2 pt will sit at eob for 5 min with fair- balance in 6 tx for oob tolerance.   Goal Outcome # 2 Goal not met   Short Term Goal # 3 pt will complete sts with hemiwalker and min a in 6 tx for functional mobility.   Physical Therapy Treatment Plan   Physical Therapy Treatment Plan Continue Current Treatment Plan   Anticipated Discharge Equipment and Recommendations   DC Equipment Recommendations Unable to determine at this time   Discharge Recommendations Recommend post-acute placement for additional physical therapy services prior to discharge home   Interdisciplinary Plan of Care Collaboration   IDT Collaboration with  Nursing;Therapy Tech   Patient Position at End of Therapy In Bed;Bed Alarm  On;Call Light within Reach   Collaboration Comments RN updated   Session Information   Date / Session Number  4/11- 7 (2/5, 4/16)`

## 2025-04-12 ENCOUNTER — APPOINTMENT (OUTPATIENT)
Dept: RADIOLOGY | Facility: MEDICAL CENTER | Age: 50
End: 2025-04-12
Attending: STUDENT IN AN ORGANIZED HEALTH CARE EDUCATION/TRAINING PROGRAM
Payer: COMMERCIAL

## 2025-04-12 LAB
ANION GAP SERPL CALC-SCNC: 11 MMOL/L (ref 7–16)
BUN SERPL-MCNC: 36 MG/DL (ref 8–22)
CALCIUM SERPL-MCNC: 9.5 MG/DL (ref 8.5–10.5)
CHLORIDE SERPL-SCNC: 98 MMOL/L (ref 96–112)
CO2 SERPL-SCNC: 30 MMOL/L (ref 20–33)
CREAT SERPL-MCNC: 1.13 MG/DL (ref 0.5–1.4)
ERYTHROCYTE [DISTWIDTH] IN BLOOD BY AUTOMATED COUNT: 40.5 FL (ref 35.9–50)
GFR SERPLBLD CREATININE-BSD FMLA CKD-EPI: 80 ML/MIN/1.73 M 2
GLUCOSE SERPL-MCNC: 150 MG/DL (ref 65–99)
HCT VFR BLD AUTO: 43.6 % (ref 42–52)
HGB BLD-MCNC: 13.6 G/DL (ref 14–18)
MCH RBC QN AUTO: 27.2 PG (ref 27–33)
MCHC RBC AUTO-ENTMCNC: 31.2 G/DL (ref 32.3–36.5)
MCV RBC AUTO: 87.2 FL (ref 81.4–97.8)
PLATELET # BLD AUTO: 429 K/UL (ref 164–446)
PMV BLD AUTO: 9.3 FL (ref 9–12.9)
POTASSIUM SERPL-SCNC: 4 MMOL/L (ref 3.6–5.5)
RBC # BLD AUTO: 5 M/UL (ref 4.7–6.1)
SODIUM SERPL-SCNC: 139 MMOL/L (ref 135–145)
WBC # BLD AUTO: 13 K/UL (ref 4.8–10.8)

## 2025-04-12 PROCEDURE — 700101 HCHG RX REV CODE 250: Performed by: STUDENT IN AN ORGANIZED HEALTH CARE EDUCATION/TRAINING PROGRAM

## 2025-04-12 PROCEDURE — A9270 NON-COVERED ITEM OR SERVICE: HCPCS | Performed by: INTERNAL MEDICINE

## 2025-04-12 PROCEDURE — 76775 US EXAM ABDO BACK WALL LIM: CPT

## 2025-04-12 PROCEDURE — 700102 HCHG RX REV CODE 250 W/ 637 OVERRIDE(OP): Performed by: INTERNAL MEDICINE

## 2025-04-12 PROCEDURE — 700111 HCHG RX REV CODE 636 W/ 250 OVERRIDE (IP): Mod: JZ | Performed by: INTERNAL MEDICINE

## 2025-04-12 PROCEDURE — 770020 HCHG ROOM/CARE - TELE (206)

## 2025-04-12 PROCEDURE — 85027 COMPLETE CBC AUTOMATED: CPT

## 2025-04-12 PROCEDURE — 700102 HCHG RX REV CODE 250 W/ 637 OVERRIDE(OP): Performed by: STUDENT IN AN ORGANIZED HEALTH CARE EDUCATION/TRAINING PROGRAM

## 2025-04-12 PROCEDURE — 94640 AIRWAY INHALATION TREATMENT: CPT

## 2025-04-12 PROCEDURE — A9270 NON-COVERED ITEM OR SERVICE: HCPCS | Performed by: STUDENT IN AN ORGANIZED HEALTH CARE EDUCATION/TRAINING PROGRAM

## 2025-04-12 PROCEDURE — 36415 COLL VENOUS BLD VENIPUNCTURE: CPT

## 2025-04-12 PROCEDURE — 80048 BASIC METABOLIC PNL TOTAL CA: CPT

## 2025-04-12 PROCEDURE — 99232 SBSQ HOSP IP/OBS MODERATE 35: CPT | Performed by: STUDENT IN AN ORGANIZED HEALTH CARE EDUCATION/TRAINING PROGRAM

## 2025-04-12 RX ORDER — CEPHALEXIN 500 MG/1
500 CAPSULE ORAL EVERY 6 HOURS
Status: DISCONTINUED | OUTPATIENT
Start: 2025-04-12 | End: 2025-04-12

## 2025-04-12 RX ORDER — CEPHALEXIN 250 MG/5ML
500 POWDER, FOR SUSPENSION ORAL EVERY 6 HOURS
Status: COMPLETED | OUTPATIENT
Start: 2025-04-12 | End: 2025-04-17

## 2025-04-12 RX ADMIN — ACETYLCYSTEINE 3 ML: 200 SOLUTION ORAL; RESPIRATORY (INHALATION) at 09:57

## 2025-04-12 RX ADMIN — ALBUTEROL SULFATE 2.5 MG: 2.5 SOLUTION RESPIRATORY (INHALATION) at 09:57

## 2025-04-12 RX ADMIN — Medication 5 MG: at 21:27

## 2025-04-12 RX ADMIN — SENNOSIDES AND DOCUSATE SODIUM 2 TABLET: 50; 8.6 TABLET ORAL at 05:04

## 2025-04-12 RX ADMIN — ACETYLCYSTEINE 3 ML: 200 SOLUTION ORAL; RESPIRATORY (INHALATION) at 19:40

## 2025-04-12 RX ADMIN — LABETALOL HYDROCHLORIDE 10 MG: 5 INJECTION, SOLUTION INTRAVENOUS at 15:53

## 2025-04-12 RX ADMIN — CEPHALEXIN 500 MG: 250 FOR SUSPENSION ORAL at 23:52

## 2025-04-12 RX ADMIN — ENOXAPARIN SODIUM 40 MG: 100 INJECTION SUBCUTANEOUS at 17:07

## 2025-04-12 RX ADMIN — GABAPENTIN 400 MG: 400 CAPSULE ORAL at 17:07

## 2025-04-12 RX ADMIN — SENNOSIDES AND DOCUSATE SODIUM 2 TABLET: 50; 8.6 TABLET ORAL at 17:07

## 2025-04-12 RX ADMIN — ASPIRIN 81 MG: 81 TABLET, CHEWABLE ORAL at 05:04

## 2025-04-12 RX ADMIN — ACETYLCYSTEINE 3 ML: 200 SOLUTION ORAL; RESPIRATORY (INHALATION) at 06:12

## 2025-04-12 RX ADMIN — ATORVASTATIN CALCIUM 80 MG: 80 TABLET, FILM COATED ORAL at 17:07

## 2025-04-12 RX ADMIN — ALBUTEROL SULFATE 2.5 MG: 2.5 SOLUTION RESPIRATORY (INHALATION) at 19:40

## 2025-04-12 RX ADMIN — HYDROCHLOROTHIAZIDE 25 MG: 25 TABLET ORAL at 05:04

## 2025-04-12 RX ADMIN — AMLODIPINE BESYLATE 10 MG: 10 TABLET ORAL at 05:04

## 2025-04-12 RX ADMIN — GABAPENTIN 400 MG: 400 CAPSULE ORAL at 05:04

## 2025-04-12 RX ADMIN — GABAPENTIN 400 MG: 400 CAPSULE ORAL at 12:41

## 2025-04-12 RX ADMIN — CEPHALEXIN 500 MG: 250 FOR SUSPENSION ORAL at 18:02

## 2025-04-12 RX ADMIN — ALBUTEROL SULFATE 2.5 MG: 2.5 SOLUTION RESPIRATORY (INHALATION) at 06:12

## 2025-04-12 ASSESSMENT — PAIN DESCRIPTION - PAIN TYPE: TYPE: ACUTE PAIN

## 2025-04-12 ASSESSMENT — FIBROSIS 4 INDEX: FIB4 SCORE: 0.36

## 2025-04-12 NOTE — CARE PLAN
The patient is Watcher - Medium risk of patient condition declining or worsening    Shift Goals  Clinical Goals: monitor respiratory & neuro status  Patient Goals: jennifer  Family Goals: jennifer    Progress made toward(s) clinical / shift goals:  stable neuro status,frequent trach suction  Problem: Optimal Care of the Stroke Patient  Goal: Optimal acute care for the stroke patient  Outcome: Progressing     Problem: Neuro Status  Goal: Neuro status will remain stable or improve  Outcome: Progressing     Problem: Hemodynamic Monitoring  Goal: Patient's hemodynamics, fluid balance and neurologic status will be stable or improve  Outcome: Progressing       Patient is not progressing towards the following goals:

## 2025-04-12 NOTE — PROGRESS NOTES
Monitor summary: SR/ST , LA -0.14, QRS -0.07, QT -0.33, with (R) COUPLETs and PVCs per strip from the monitor room.

## 2025-04-12 NOTE — CARE PLAN
The patient is Watcher - Medium risk of patient condition declining or worsening    Shift Goals  Clinical Goals:  Stable neuro status, monitor respiratory status, maintain skin integrity  Patient Goals: JAMARI  Family Goals: JAMARI    Progress made toward(s) clinical / shift goals:      Problem: Neuro Status  Goal: Neuro status will remain stable or improve  Outcome: Progressing    Q4H neuro checks in place. Pt's neurological status (A/OxUTA-4) remains unchanged throughout shift. Bed alarm is on, call light within reach.     Problem: Skin Integrity  Goal: Skin integrity is maintained or improved  Outcome: Progressing   Pt turned and repositioned Q2H or as requested. Barrier cream and mepilexes used to prevent skin breakdown on delicate perineal areas and bony prominences. Linen changes provided as needed to avoid risk of developing pressure ulcers.     Problem: Hemodynamic Monitoring  Goal: Patient's hemodynamics, fluid balance and neurologic status will be stable or improve  Outcome: Progressing   Pt's blood pressure closely monitored throughout shift. PRN blood pressure medications available to keep SBP within set parameters.        Patient is not progressing towards the following goals:

## 2025-04-12 NOTE — PROGRESS NOTES
[facility] Medicine Daily Progress Note    Date of Service  4/12/2025    Chief Complaint  Jonna Brian is a 49 y.o. male admitted 3/25/2025 with headache and weakness.    Hospital Course    This is a 49 y.o. male who was initially admitted to the hospital on 3/25/2025 with global weakness, aphasia and ataxia.  His last known normal was 1630 on March 24.  He was initially admitted to the hospital floor, his CT head as well as CTA of the head and neck were within normal limits.  There was consideration for possible Guillain-Barré syndrome.     His condition continued to deteriorate and he underwent a lumbar puncture which revealed elevated protein but no evidence of infection.  He had progressive bulbar symptoms and weakness and was transferred to the ICU.  Unfortunately shortly after he arrived in the ICU he developed stridor and required emergent intubation on 3/27.       He then underwent an MRI of his brain and spine which unfortunately revealed small areas of acute infarcts in the lower jero and the upper medulla on both sides of the midline.  He also had chronic infarcts in his jero and right inferior cerebellum.  They were chronically Cuna in the bilateral basal ganglia, the right thalamus and the right periventricular white matter.  He had evidence of small vessel disease as well in the periventricular white matter.     Ultimately it appears he has been suffering multiple small strokes and his progressive decline was due to acute infarcts in the lower jero and upper medulla.  Since that time he has not recovered function, has required tracheostomy and PEG placement with the ultimate goal of transferring him to postacute medical to see what function may be regained in time.     I have had conversations with his family, they want to keep moving forward and with his [employer] trials for the foreseeable future.  If he is unable to regain function going forward, further goals of care discussions can be had in the  upcoming months.    Interval Problem Update  No acute events overnight.  On 8L t piece, monitor, wean as able.  UA shows RBCs, blood, some signs of infection.  Argueta was removed on 4/1, hematuria unlikely to be related to local trauma. Unknown hx of kidney stones.  Renal US ordered to evaluate for stones/bleeding source.  If US is reassuring, will start empiric course of antibiotics for possible UTI. If bleeding persists, patient would need urology referral.  Pending LTAC.        I have discussed this patient's plan of care and discharge plan at IDT rounds today with Case Management, Nursing, Nursing leadership, and other members of the IDT team.    Consultants/Specialty  critical care I discussed with Dr. Torres    Code Status  Full Code    Disposition  Medically Cleared  I have placed the appropriate orders for post-discharge needs.    Review of Systems  Review of Systems   Unable to perform ROS: Patient nonverbal        Physical Exam  Temp:  [36.5 °C (97.7 °F)-37.7 °C (99.9 °F)] 36.8 °C (98.2 °F)  Pulse:  [] 78  Resp:  [16-20] 18  BP: (117-123)/(66-76) 117/73  SpO2:  [93 %-96 %] 94 %    Physical Exam  Constitutional:       Appearance: He is ill-appearing.   Neck:      Comments: tracheostomy  Cardiovascular:      Rate and Rhythm: Normal rate and regular rhythm.   Abdominal:      Tenderness: There is no abdominal tenderness.      Comments: PEG   Neurological:      Comments: He is able to slightly move his left upper and lower extremity.  He was able to nod his head to answer question.         Fluids    Intake/Output Summary (Last 24 hours) at 4/12/2025 1348  Last data filed at 4/12/2025 1230  Gross per 24 hour   Intake 2673 ml   Output 1600 ml   Net 1073 ml        Laboratory  Recent Labs     04/10/25  0508 04/11/25  0617 04/12/25  0403   WBC 10.6 12.0* 13.0*   RBC 5.24 5.13 5.00   HEMOGLOBIN 14.2 14.1 13.6*   HEMATOCRIT 44.6 43.8 43.6   MCV 85.1 85.4 87.2   MCH 27.1 27.5 27.2   MCHC 31.8* 32.2* 31.2*   RDW 39.1  39.6 40.5   PLATELETCT 500* 485* 429   MPV 9.1 9.1 9.3     Recent Labs     04/10/25  0508 04/11/25  0617 04/12/25  0403   SODIUM 142 141 139   POTASSIUM 4.0 3.9 4.0   CHLORIDE 98 100 98   CO2 29 30 30   GLUCOSE 159* 141* 150*   BUN 31* 32* 36*   CREATININE 1.08 1.10 1.13   CALCIUM 9.8 9.8 9.5                   Imaging  DX-CHEST-PORTABLE (1 VIEW)   Final Result         1.  Left basilar atelectasis and/or subtle infiltrates, similar to prior study      DX-CHEST-PORTABLE (1 VIEW)   Final Result         1.  Left basilar atelectasis, no focal infiltrate   2.  Cardiomegaly      DX-G.I. TUBE INJECTION, ANY TYPE   Final Result      Contrast from a PEG tube injection extends into the stomach without evidence of contrast leak.      CT-CHEST,ABDOMEN,PELVIS WITH   Final Result      1.  Large amount of free intraperitoneal air within the abdomen again seen as was previously identified on chest x-ray by review of the patient's chart, a percutaneous gastrostomy tube was placed on 3/31/2025 and is free intraperitoneal air is possibly    secondary to that recent procedure.      2.  Bibasal atelectasis and small bilateral pleural effusions.      3.  Tracheostomy tube present.      4.  No evidence of bowel obstruction or free fluid within the abdomen or pelvis.      DX-CHEST-PORTABLE (1 VIEW)   Final Result         1.  Bibasilar atelectasis   2.  Intra-abdominal free air, can be associated with history of percutaneous gastrostomy, consider other viscus perforation as warranted. Could be further evaluated with CT of the abdomen with contrast as clinically appropriate.      These findings were discussed with the patient's clinician, Bravo Lala Iv, on 4/2/2025 7:57 AM.      DX-CHEST-PORTABLE (1 VIEW)   Final Result      No evidence of acute cardiopulmonary process.      DX-ABDOMEN FOR TUBE PLACEMENT   Final Result      1.  Enteric tube extends in the fundus of stomach.      DX-ABDOMEN FOR TUBE PLACEMENT   Final Result      The  gastric tube has been removed and replaced with a small bowel feeding tube which terminate in the proximal stomach.      DX-CHEST-PORTABLE (1 VIEW)   Final Result      Atelectasis within the left lung base.      MR-THORACIC SPINE-WITH & W/O   Final Result      1.  There is no abnormal intramedullary T2 signal intensity in the thoracic spinal cord.   2.  Mild degenerative disease at T8-9.   3.  Left lower segmental consolidation.      MR-CERVICAL SPINE-WITH & W/O   Final Result      1.  Small areas of acute infarcts in the lower jero and upper medulla involving both sides of the midline. There are chronic infarcts in the jero and right inferior cerebellum.   2.  There is no abnormal intramedullary T2 signal intensity in the cervical spinal cord to suggest demyelinating lesions. There is no MR evidence of compressive myelopathy.   3.  Mild degenerative disease.            MR-BRAIN-WITH & W/O   Final Result      1.  Small areas of acute infarcts in the lower jero and upper medulla involving both sides of the midline.   2.  There are chronic infarcts in the jero and right inferior cerebellum. . There are areas of chronic lacunae in the bilateral basal ganglia, right thalamus and right periventricular white matter.   3.  There are nonspecific T2 hyperintensities in the periventricular white matter likely representing chronic small vessel disease.   4.  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      MR-LUMBAR SPINE-WITH & W/O   Final Result      1.  Unremarkable pre and postcontrast MR examination of the lumbar spine.   2.  Clinical suspicious for GBS: There is no abnormal enhancement of the lumbar nerve roots.      DX-ABDOMEN FOR TUBE PLACEMENT   Final Result      NG tube tip projects at the peripyloric region.      DX-CHEST-PORTABLE (1 VIEW)   Final Result      1.  Low lung volumes without definite acute cardiopulmonary abnormality.   2.  Support apparatus as  "above.      CT-HEAD W/O   Final Result      1.  No acute intracranial abnormality.   2.  Remote right cerebellar infarct.               DX-CHEST-PORTABLE (1 VIEW)   Final Result      No acute cardiopulmonary disease evident.      DX-CHEST-PORTABLE (1 VIEW)   Final Result      No acute cardiopulmonary disease evident.      CT-CEREBRAL PERFUSION ANALYSIS   Final Result      1. Cerebral blood flow less than 30% possibly representing completed infarct = 0 mL. Based on distribution of this finding, this is unlikely to represent artifact.      2. T Max more than 6 seconds possibly representing combination of completed infarct and ischemia = 7 mL. Based on the distribution of this finding, this is possibly artifact.      3. Mismatched volume possibly representing ischemic brain/penumbra= 0 mL      4.  Please note that this cerebral perfusion study and report is Quantitative and targets supratentorial (cerebral) perfusion for evaluation of large vessel territory acute ischemia/infarction. For example, lacunar infarcts, and brainstem/posterior fossa    ischemia/infarction are not evaluated on this study.  Data acquisition is subject to artifacts which can yield non-anatomically plausible perfusion maps which may be due to motion, bolus timing, signal to noise ratio, or other technical factors.    Perfusion map abnormalities which show non-anatomic distributions are likely artifact.   This study is not \"stand-alone\" and should only be utilized for diagnosis, management/treatment in correlation with CT, CTA, and/or MRI and clinical factors.         CT-CTA NECK WITH & W/O-POST PROCESSING   Final Result      CT angiogram of the neck within normal limits.      CT-CTA HEAD WITH & W/O-POST PROCESS   Final Result      CT angiogram of the [place] within normal limits.      CT-HEAD W/O   Final Result      Head CT without contrast within normal limits. No evidence of acute cerebral infarction, hemorrhage or mass lesion.       "         US-RENAL    (Results Pending)        Assessment/Plan  * Acute ischemic stroke (HCC)- (present on admission)  Assessment & Plan  Acute ischemic stroke  MRI of the brain revealed acute infarcts of the lower jero and upper medulla.  He effectively is a quadriplegic and required tracheostomy and PEG tube.  Aspirin and statin  Blood pressure control  Overall prognosis is guarded    Proteus mirabilis pneumonia (HCC)- (present on admission)  Assessment & Plan  Treated with Zosyn de-escalated to Augmentin based on cultures from sputum    Acute neuromuscular respiratory failure (HCC)- (present on admission)  Assessment & Plan  Requiring tracheostomy and mechanical ventilation    Anxiety  Assessment & Plan  Ativan for anxiety.     Type 2 diabetes mellitus with hyperglycemia, without long-term current use of insulin (HCC)- (present on admission)  Assessment & Plan  Details are unclear  Reportedly he is on metformin as an outpatient  Hemoglobin A1c 6.3  He does not require insulin    Primary hypertension- (present on admission)  Assessment & Plan  Blood pressure is appropriate with hydrochlorothiazide 25 mg daily and Norvasc 10 mg daily             VTE prophylaxis: VTE Selection    I have performed a physical exam and reviewed and updated ROS and Plan today (4/12/2025). In review of yesterday's note (4/11/2025), there are no changes except as documented above.

## 2025-04-12 NOTE — DISCHARGE PLANNING
VELVET met with pt's family at bedside, provided update that [employer] is requesting confirmation that pt will have ongoing medical insurance coverage.  Pt's mother reports pt is currently receiving short term disability.  LEXY requested that pt's family contact pt's employer HR department to verify the above information.     TC received from Katiuska  with [facility] [phone] extension [phone].  Katiuska confirmed pt currently has medical coverage and recommends to call pt's employer's HR department to confirm pt will remain insured.    Katiuska provided additional contacts:    [employer] [phone] to confirm pt's benefits, eligibility and resources.      [facility] HR department: [phone]    Disability Vendor [employer]):  [phone]    Hospital admission verification customer service: [phone]    LEXY/EJ to provided this above contact information to pt's mother.

## 2025-04-13 LAB
ANION GAP SERPL CALC-SCNC: 12 MMOL/L (ref 7–16)
BUN SERPL-MCNC: 34 MG/DL (ref 8–22)
CALCIUM SERPL-MCNC: 9.7 MG/DL (ref 8.5–10.5)
CHLORIDE SERPL-SCNC: 96 MMOL/L (ref 96–112)
CO2 SERPL-SCNC: 31 MMOL/L (ref 20–33)
CREAT SERPL-MCNC: 0.99 MG/DL (ref 0.5–1.4)
ERYTHROCYTE [DISTWIDTH] IN BLOOD BY AUTOMATED COUNT: 39 FL (ref 35.9–50)
GFR SERPLBLD CREATININE-BSD FMLA CKD-EPI: 94 ML/MIN/1.73 M 2
GLUCOSE SERPL-MCNC: 142 MG/DL (ref 65–99)
HCT VFR BLD AUTO: 41.9 % (ref 42–52)
HGB BLD-MCNC: 13.6 G/DL (ref 14–18)
MCH RBC QN AUTO: 27.8 PG (ref 27–33)
MCHC RBC AUTO-ENTMCNC: 32.5 G/DL (ref 32.3–36.5)
MCV RBC AUTO: 85.5 FL (ref 81.4–97.8)
PLATELET # BLD AUTO: 435 K/UL (ref 164–446)
PMV BLD AUTO: 9.3 FL (ref 9–12.9)
POTASSIUM SERPL-SCNC: 3.6 MMOL/L (ref 3.6–5.5)
RBC # BLD AUTO: 4.9 M/UL (ref 4.7–6.1)
SODIUM SERPL-SCNC: 139 MMOL/L (ref 135–145)
WBC # BLD AUTO: 10.4 K/UL (ref 4.8–10.8)

## 2025-04-13 PROCEDURE — A9270 NON-COVERED ITEM OR SERVICE: HCPCS | Performed by: INTERNAL MEDICINE

## 2025-04-13 PROCEDURE — 700102 HCHG RX REV CODE 250 W/ 637 OVERRIDE(OP): Performed by: INTERNAL MEDICINE

## 2025-04-13 PROCEDURE — 94640 AIRWAY INHALATION TREATMENT: CPT

## 2025-04-13 PROCEDURE — A9270 NON-COVERED ITEM OR SERVICE: HCPCS | Performed by: STUDENT IN AN ORGANIZED HEALTH CARE EDUCATION/TRAINING PROGRAM

## 2025-04-13 PROCEDURE — 770020 HCHG ROOM/CARE - TELE (206)

## 2025-04-13 PROCEDURE — 80048 BASIC METABOLIC PNL TOTAL CA: CPT

## 2025-04-13 PROCEDURE — 700102 HCHG RX REV CODE 250 W/ 637 OVERRIDE(OP): Performed by: STUDENT IN AN ORGANIZED HEALTH CARE EDUCATION/TRAINING PROGRAM

## 2025-04-13 PROCEDURE — 700101 HCHG RX REV CODE 250: Performed by: STUDENT IN AN ORGANIZED HEALTH CARE EDUCATION/TRAINING PROGRAM

## 2025-04-13 PROCEDURE — 85027 COMPLETE CBC AUTOMATED: CPT

## 2025-04-13 PROCEDURE — 36415 COLL VENOUS BLD VENIPUNCTURE: CPT

## 2025-04-13 PROCEDURE — 99232 SBSQ HOSP IP/OBS MODERATE 35: CPT | Performed by: STUDENT IN AN ORGANIZED HEALTH CARE EDUCATION/TRAINING PROGRAM

## 2025-04-13 PROCEDURE — 700111 HCHG RX REV CODE 636 W/ 250 OVERRIDE (IP): Mod: JZ | Performed by: INTERNAL MEDICINE

## 2025-04-13 RX ADMIN — ALBUTEROL SULFATE 2.5 MG: 2.5 SOLUTION RESPIRATORY (INHALATION) at 11:38

## 2025-04-13 RX ADMIN — ACETYLCYSTEINE 3 ML: 200 SOLUTION ORAL; RESPIRATORY (INHALATION) at 19:10

## 2025-04-13 RX ADMIN — GABAPENTIN 400 MG: 400 CAPSULE ORAL at 04:40

## 2025-04-13 RX ADMIN — GABAPENTIN 400 MG: 400 CAPSULE ORAL at 12:08

## 2025-04-13 RX ADMIN — ALBUTEROL SULFATE 2.5 MG: 2.5 SOLUTION RESPIRATORY (INHALATION) at 19:10

## 2025-04-13 RX ADMIN — ALBUTEROL SULFATE 2.5 MG: 2.5 SOLUTION RESPIRATORY (INHALATION) at 07:36

## 2025-04-13 RX ADMIN — HYDROCHLOROTHIAZIDE 25 MG: 25 TABLET ORAL at 04:40

## 2025-04-13 RX ADMIN — AMLODIPINE BESYLATE 10 MG: 10 TABLET ORAL at 04:40

## 2025-04-13 RX ADMIN — ASPIRIN 81 MG: 81 TABLET, CHEWABLE ORAL at 04:40

## 2025-04-13 RX ADMIN — Medication 5 MG: at 21:23

## 2025-04-13 RX ADMIN — ENOXAPARIN SODIUM 40 MG: 100 INJECTION SUBCUTANEOUS at 17:26

## 2025-04-13 RX ADMIN — ACETYLCYSTEINE 3 ML: 200 SOLUTION ORAL; RESPIRATORY (INHALATION) at 15:00

## 2025-04-13 RX ADMIN — SENNOSIDES AND DOCUSATE SODIUM 2 TABLET: 50; 8.6 TABLET ORAL at 17:25

## 2025-04-13 RX ADMIN — ALBUTEROL SULFATE 2.5 MG: 2.5 SOLUTION RESPIRATORY (INHALATION) at 15:00

## 2025-04-13 RX ADMIN — CEPHALEXIN 500 MG: 250 FOR SUSPENSION ORAL at 04:41

## 2025-04-13 RX ADMIN — SENNOSIDES AND DOCUSATE SODIUM 2 TABLET: 50; 8.6 TABLET ORAL at 04:40

## 2025-04-13 RX ADMIN — ACETYLCYSTEINE 3 ML: 200 SOLUTION ORAL; RESPIRATORY (INHALATION) at 11:00

## 2025-04-13 RX ADMIN — CEPHALEXIN 500 MG: 250 FOR SUSPENSION ORAL at 12:08

## 2025-04-13 RX ADMIN — CEPHALEXIN 500 MG: 250 FOR SUSPENSION ORAL at 17:26

## 2025-04-13 RX ADMIN — ATORVASTATIN CALCIUM 80 MG: 80 TABLET, FILM COATED ORAL at 17:25

## 2025-04-13 RX ADMIN — ACETYLCYSTEINE 3 ML: 200 SOLUTION ORAL; RESPIRATORY (INHALATION) at 07:36

## 2025-04-13 RX ADMIN — IBUPROFEN 600 MG: 400 TABLET, FILM COATED ORAL at 11:03

## 2025-04-13 RX ADMIN — GABAPENTIN 400 MG: 400 CAPSULE ORAL at 17:25

## 2025-04-13 ASSESSMENT — PAIN DESCRIPTION - PAIN TYPE
TYPE: ACUTE PAIN

## 2025-04-13 NOTE — CARE PLAN
The patient is Stable - Low risk of patient condition declining or worsening       Shift Goals  Clinical Goals: Stable neuro status, maintain skin integrity  Patient Goals: JAMARI  Family Goals: JAMARI    Progress made toward(s) clinical / shift goals:    Problem: Pain - Standard  Goal: Alleviation of pain or a reduction in pain to the patient’s comfort goal  Description: Target End Date:  Prior to discharge or change in level of careDocument on Vitals flowsheet1.  Document pain using the appropriate pain scale per order or unit policy2.  Educate and implement non-pharmacologic comfort measures (i.e. relaxation, distraction, massage, cold/heat therapy, etc.)3.  Pain management medications as ordered4.  Reassess pain after pain med administration per policy5.  If opiods administered assess patient's response to pain medication is appropriate per POSS sedation scale6.  Follow pain management plan developed in collaboration with patient and interdisciplinary team (including palliative care or pain specialists if applicable)  Outcome: Progressing     Problem: Neuro Status  Goal: Neuro status will remain stable or improve  Description: Target End Date:  Prior to discharge or change in level of careDocument on Neuro assessment in the Assessment flowsheet1.  Assess and monitor neurologic status per provider order/protocol/unit policy2.  Assess level of consciousness and orientation3.  Assess for speech, dysarthria, dysphagia, facial symmetry4.  Assess visual field, eye movements, gaze preference, pupil reaction and size5.  Assess muscle strength and motor response in all four extremities6.  Assess for sensation (numbness and tingling)7.  Assess basic neuro reflexes (cough, gag, corneal)8.  Identify changes in neuro status and report to provider for testing/treatment orders  Outcome: Progressing       Patient is not progressing towards the following goals:      Problem: Mobility - Stroke  Goal: Patient's capacity to carry out  activities will improve  Description: Target End Date:  Prior to discharge or change in level of care1.  Assess for barriers to mobility/activity2.  Implement activity per interdisciplinary team recommendations3.  Target activity level identified and patient/family/caregiver aware of goal4.  Provide assistive devices5.  Instruct patient/caregiver on proper use of assistive/adaptive devices6.  Schedule activities and rest periods to decrease effects of fatigue7.  Encourage mobilization to extent of ability8.  Maintain proper body alignment9.  Provide adequate pain management to allow progressive [id number]. Implement pace maker precautions as needed  Outcome: Not Progressing     Problem: Self Care  Goal: Patient will have the ability to perform ADLs independently or with assistance (bathe, groom, dress, toilet and feed)  Description: Target End Date:  Prior to discharge or change in level of careDocument on ADL flowsheet1.  Assess the capability and level of deficiency to perform ADLs2.  Encourage family/care giver involvement3.  Provide assistive devices4.  Consider PT/OT evaluations5.  Maintain support, give positive feedback, encourage self-care allowing extra time and verbal cuing as needed6.  Avoid doing something for patients they can do themselves, but provide assistance as needed7.  Assist in anticipating/planning individual needs8.  Collaborate with Case Management and  to meet discharge needs  Outcome: Not Progressing

## 2025-04-13 NOTE — PROGRESS NOTES
Monitor summary: SR 80-96, MS 0.14, QRS 0.10, QT 0.32,with rare PVC per strip from monitor room.           Improved Improved

## 2025-04-13 NOTE — PROGRESS NOTES
[facility] Medicine Daily Progress Note    Date of Service  4/13/2025    Chief Complaint  Jonna Brian is a 49 y.o. male admitted 3/25/2025 with headache and weakness.    Hospital Course    This is a 49 y.o. male who was initially admitted to the hospital on 3/25/2025 with global weakness, aphasia and ataxia.  His last known normal was 1630 on March 24.  He was initially admitted to the hospital floor, his CT head as well as CTA of the head and neck were within normal limits.  There was consideration for possible Guillain-Barré syndrome.     His condition continued to deteriorate and he underwent a lumbar puncture which revealed elevated protein but no evidence of infection.  He had progressive bulbar symptoms and weakness and was transferred to the ICU.  Unfortunately shortly after he arrived in the ICU he developed stridor and required emergent intubation on 3/27.       He then underwent an MRI of his brain and spine which unfortunately revealed small areas of acute infarcts in the lower jero and the upper medulla on both sides of the midline.  He also had chronic infarcts in his jero and right inferior cerebellum.  They were chronically C[place] in the bilateral basal ganglia, the right thalamus and the right periventricular white matter.  He had evidence of small vessel disease as well in the periventricular white matter.     Ultimately it appears he has been suffering multiple small strokes and his progressive decline was due to acute infarcts in the lower jero and upper medulla.  Since that time he has not recovered function, has required tracheostomy and PEG placement with the ultimate goal of transferring him to postacute medical to see what function may be regained in time.     I have had conversations with his family, they want to keep moving forward and with his [place] trials for the foreseeable future.  If he is unable to regain function going forward, further goals of care discussions can be had in the  upcoming months.    Interval Problem Update  No acute events overnight.  On 8L t piece, monitor, wean as able.  Continue antibiotics for possible UTI. Monitor hematuria.  Pending LTAC.        I have discussed this patient's plan of care and discharge plan at IDT rounds today with Case Management, Nursing, Nursing leadership, and other members of the IDT team.    Consultants/Specialty  critical care I discussed with Dr. Torres    Code Status  Full Code    Disposition  Medically Cleared  I have placed the appropriate orders for post-discharge needs.    Review of Systems  Review of Systems   Unable to perform ROS: Patient nonverbal        Physical Exam  Temp:  [36.5 °C (97.7 °F)-37 °C (98.6 °F)] 36.7 °C (98.1 °F)  Pulse:  [80-92] 84  Resp:  [16-20] 17  BP: (115-144)/(64-84) 124/74  SpO2:  [91 %-99 %] 95 %    Physical Exam  Constitutional:       Appearance: He is ill-appearing.   Neck:      Comments: tracheostomy  Cardiovascular:      Rate and Rhythm: Normal rate and regular rhythm.   Abdominal:      Tenderness: There is no abdominal tenderness.      Comments: PEG   Neurological:      Comments: He is able to slightly move his left upper and lower extremity.  He was able to nod his head to answer question.         Fluids    Intake/Output Summary (Last 24 hours) at 4/13/2025 1359  Last data filed at 4/13/2025 0800  Gross per 24 hour   Intake 2203 ml   Output 1275 ml   Net 928 ml        Laboratory  Recent Labs     04/11/25  0617 04/12/25  0403 04/13/25  0430   WBC 12.0* 13.0* 10.4   RBC 5.13 5.00 4.90   HEMOGLOBIN 14.1 13.6* 13.6*   HEMATOCRIT 43.8 43.6 41.9*   MCV 85.4 87.2 85.5   MCH 27.5 27.2 27.8   MCHC 32.2* 31.2* 32.5   RDW 39.6 40.5 39.0   PLATELETCT 485* 429 435   MPV 9.1 9.3 9.3     Recent Labs     04/11/25  0617 04/12/25  0403 04/13/25  0430   SODIUM 141 139 139   POTASSIUM 3.9 4.0 3.6   CHLORIDE 100 98 96   CO2 30 30 31   GLUCOSE 141* 150* 142*   BUN 32* 36* 34*   CREATININE 1.10 1.13 0.99   CALCIUM 9.8 9.5 9.7                    Imaging  US-RENAL   Final Result      1.  No hydronephrosis.      DX-CHEST-PORTABLE (1 VIEW)   Final Result         1.  Left basilar atelectasis and/or subtle infiltrates, similar to prior study      DX-CHEST-PORTABLE (1 VIEW)   Final Result         1.  Left basilar atelectasis, no focal infiltrate   2.  Cardiomegaly      DX-G.I. TUBE INJECTION, ANY TYPE   Final Result      Contrast from a PEG tube injection extends into the stomach without evidence of contrast leak.      CT-CHEST,ABDOMEN,PELVIS WITH   Final Result      1.  Large amount of free intraperitoneal air within the abdomen again seen as was previously identified on chest x-ray by review of the patient's chart, a percutaneous gastrostomy tube was placed on 3/31/2025 and is free intraperitoneal air is possibly    secondary to that recent procedure.      2.  Bibasal atelectasis and small bilateral pleural effusions.      3.  Tracheostomy tube present.      4.  No evidence of bowel obstruction or free fluid within the abdomen or pelvis.      DX-CHEST-PORTABLE (1 VIEW)   Final Result         1.  Bibasilar atelectasis   2.  Intra-abdominal free air, can be associated with history of percutaneous gastrostomy, consider other viscus perforation as warranted. Could be further evaluated with CT of the abdomen with contrast as clinically appropriate.      These findings were discussed with the patient's clinician, Bravo Lala Iv, on 4/2/2025 7:57 AM.      DX-CHEST-PORTABLE (1 VIEW)   Final Result      No evidence of acute cardiopulmonary process.      DX-ABDOMEN FOR TUBE PLACEMENT   Final Result      1.  Enteric tube extends in the fundus of stomach.      DX-ABDOMEN FOR TUBE PLACEMENT   Final Result      The gastric tube has been removed and replaced with a small bowel feeding tube which terminate in the proximal stomach.      DX-CHEST-PORTABLE (1 VIEW)   Final Result      Atelectasis within the left lung base.      MR-THORACIC SPINE-WITH & W/O    Final Result      1.  There is no abnormal intramedullary T2 signal intensity in the thoracic spinal cord.   2.  Mild degenerative disease at T8-9.   3.  Left lower segmental consolidation.      MR-CERVICAL SPINE-WITH & W/O   Final Result      1.  Small areas of acute infarcts in the lower jero and upper medulla involving both sides of the midline. There are chronic infarcts in the jero and right inferior cerebellum.   2.  There is no abnormal intramedullary T2 signal intensity in the cervical spinal cord to suggest demyelinating lesions. There is no MR evidence of compressive myelopathy.   3.  Mild degenerative disease.            MR-BRAIN-WITH & W/O   Final Result      1.  Small areas of acute infarcts in the lower jero and upper medulla involving both sides of the midline.   2.  There are chronic infarcts in the jero and right inferior cerebellum. . There are areas of chronic lacunae in the bilateral basal ganglia, right thalamus and right periventricular white matter.   3.  There are nonspecific T2 hyperintensities in the periventricular white matter likely representing chronic small vessel disease.   4.  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      MR-LUMBAR SPINE-WITH & W/O   Final Result      1.  Unremarkable pre and postcontrast MR examination of the lumbar spine.   2.  Clinical suspicious for GBS: There is no abnormal enhancement of the lumbar nerve roots.      DX-ABDOMEN FOR TUBE PLACEMENT   Final Result      NG tube tip projects at the peripyloric region.      DX-CHEST-PORTABLE (1 VIEW)   Final Result      1.  Low lung volumes without definite acute cardiopulmonary abnormality.   2.  Support apparatus as above.      CT-HEAD W/O   Final Result      1.  No acute intracranial abnormality.   2.  Remote right cerebellar infarct.               DX-CHEST-PORTABLE (1 VIEW)   Final Result      No acute cardiopulmonary disease evident.     "  DX-CHEST-PORTABLE (1 VIEW)   Final Result      No acute cardiopulmonary disease evident.      CT-CEREBRAL PERFUSION ANALYSIS   Final Result      1. Cerebral blood flow less than 30% possibly representing completed infarct = 0 mL. Based on distribution of this finding, this is unlikely to represent artifact.      2. T Max more than 6 seconds possibly representing combination of completed infarct and ischemia = 7 mL. Based on the distribution of this finding, this is possibly artifact.      3. Mismatched volume possibly representing ischemic brain/penumbra= 0 mL      4.  Please note that this cerebral perfusion study and report is Quantitative and targets supratentorial (cerebral) perfusion for evaluation of large vessel territory acute ischemia/infarction. For example, lacunar infarcts, and brainstem/posterior fossa    ischemia/infarction are not evaluated on this study.  Data acquisition is subject to artifacts which can yield non-anatomically plausible perfusion maps which may be due to motion, bolus timing, signal to noise ratio, or other technical factors.    Perfusion map abnormalities which show non-anatomic distributions are likely artifact.   This study is not \"stand-alone\" and should only be utilized for diagnosis, management/treatment in correlation with CT, CTA, and/or MRI and clinical factors.         CT-CTA NECK WITH & W/O-POST PROCESSING   Final Result      CT angiogram of the neck within normal limits.      CT-CTA HEAD WITH & W/O-POST PROCESS   Final Result      CT angiogram of the [place] within normal limits.      CT-HEAD W/O   Final Result      Head CT without contrast within normal limits. No evidence of acute cerebral infarction, hemorrhage or mass lesion.                    Assessment/Plan  * Acute ischemic stroke (HCC)- (present on admission)  Assessment & Plan  Acute ischemic stroke  MRI of the brain revealed acute infarcts of the lower jero and upper medulla.  He effectively is a " quadriplegic and required tracheostomy and PEG tube.  Aspirin and statin  Blood pressure control  Overall prognosis is guarded    Proteus mirabilis pneumonia (HCC)- (present on admission)  Assessment & Plan  Treated with Zosyn de-escalated to Augmentin based on cultures from sputum    Acute neuromuscular respiratory failure (HCC)- (present on admission)  Assessment & Plan  Requiring tracheostomy and mechanical ventilation    Anxiety  Assessment & Plan  Ativan for anxiety.     Type 2 diabetes mellitus with hyperglycemia, without long-term current use of insulin (HCC)- (present on admission)  Assessment & Plan  Details are unclear  Reportedly he is on metformin as an outpatient  Hemoglobin A1c 6.3  He does not require insulin    Primary hypertension- (present on admission)  Assessment & Plan  Blood pressure is appropriate with hydrochlorothiazide 25 mg daily and Norvasc 10 mg daily             VTE prophylaxis: VTE Selection    I have performed a physical exam and reviewed and updated ROS and Plan today (4/13/2025). In review of yesterday's note (4/12/2025), there are no changes except as documented above.

## 2025-04-13 NOTE — CARE PLAN
Problem: Aerosol Therapy  Goal: Improved hydration/ability to mobilize secretions and/or decreased airway edema  Description: Target End Date:  resolve prior to discharge or when underlying condition is resolved/stabilized1.  Implement heated or cool aerosol therapy2.  Assessed for optimal hydration, decreased edema and/or improved ability to mobilize secretions  Outcome: Progressing   RESPIRATORY UPDATE    Airway: Trach/T-piece 8.0 Portex  Treatment Modality: Aerosol  Settings: 35% 8 liters   Pt tolerating current therapy well with no adverse reactions, will continue to monitor and wean as tolerated    RESPIRATORY UPDATE    Respiratory Modality: Albuterol and Mucomyst QID  Frequency:    Pt tolerating current treatments well with no adverse reactions, will continue to monitor

## 2025-04-13 NOTE — PROGRESS NOTES
Monitor summary: SR 84-90, DE -0.16, QRS -0.08, QT -0.35, with (R) PVCs per strip from the monitor room.

## 2025-04-13 NOTE — CARE PLAN
The patient is Watcher - Medium risk of patient condition declining or worsening    Shift Goals  Clinical Goals: stable neuro status,maintain skin integrity  Patient Goals: jennifer  Family Goals: JENNIFER    Progress made toward(s) clinical / shift goals:  stable neuro checks  Problem: Optimal Care of the Stroke Patient  Goal: Optimal acute care for the stroke patient  Outcome: Progressing     Problem: Hemodynamic Monitoring  Goal: Patient's hemodynamics, fluid balance and neurologic status will be stable or improve  Outcome: Progressing     Problem: Urinary Elimination  Goal: Establish and maintain regular urinary output  Outcome: Progressing     Problem: Skin Integrity  Goal: Skin integrity is maintained or improved  Outcome: Progressing       Patient is not progressing towards the following goals:

## 2025-04-14 ENCOUNTER — APPOINTMENT (OUTPATIENT)
Dept: RADIOLOGY | Facility: MEDICAL CENTER | Age: 50
End: 2025-04-14
Attending: STUDENT IN AN ORGANIZED HEALTH CARE EDUCATION/TRAINING PROGRAM
Payer: COMMERCIAL

## 2025-04-14 PROBLEM — R31.9 HEMATURIA: Status: ACTIVE | Noted: 2025-04-14

## 2025-04-14 LAB
ALBUMIN SERPL BCP-MCNC: 3.4 G/DL (ref 3.2–4.9)
ALBUMIN/GLOB SERPL: 0.8 G/DL
ALP SERPL-CCNC: 85 U/L (ref 30–99)
ALT SERPL-CCNC: 197 U/L (ref 2–50)
ANION GAP SERPL CALC-SCNC: 11 MMOL/L (ref 7–16)
AST SERPL-CCNC: 72 U/L (ref 12–45)
BILIRUB SERPL-MCNC: 0.3 MG/DL (ref 0.1–1.5)
BUN SERPL-MCNC: 32 MG/DL (ref 8–22)
CALCIUM ALBUM COR SERPL-MCNC: 10 MG/DL (ref 8.5–10.5)
CALCIUM SERPL-MCNC: 9.5 MG/DL (ref 8.5–10.5)
CHLORIDE SERPL-SCNC: 94 MMOL/L (ref 96–112)
CO2 SERPL-SCNC: 32 MMOL/L (ref 20–33)
CREAT SERPL-MCNC: 0.83 MG/DL (ref 0.5–1.4)
ERYTHROCYTE [DISTWIDTH] IN BLOOD BY AUTOMATED COUNT: 38.8 FL (ref 35.9–50)
GFR SERPLBLD CREATININE-BSD FMLA CKD-EPI: 108 ML/MIN/1.73 M 2
GLOBULIN SER CALC-MCNC: 4.1 G/DL (ref 1.9–3.5)
GLUCOSE SERPL-MCNC: 133 MG/DL (ref 65–99)
HCT VFR BLD AUTO: 41.1 % (ref 42–52)
HGB BLD-MCNC: 13.2 G/DL (ref 14–18)
MCH RBC QN AUTO: 27.5 PG (ref 27–33)
MCHC RBC AUTO-ENTMCNC: 32.1 G/DL (ref 32.3–36.5)
MCV RBC AUTO: 85.6 FL (ref 81.4–97.8)
PLATELET # BLD AUTO: 400 K/UL (ref 164–446)
PMV BLD AUTO: 9.6 FL (ref 9–12.9)
POTASSIUM SERPL-SCNC: 4.3 MMOL/L (ref 3.6–5.5)
PROT SERPL-MCNC: 7.5 G/DL (ref 6–8.2)
RBC # BLD AUTO: 4.8 M/UL (ref 4.7–6.1)
SODIUM SERPL-SCNC: 137 MMOL/L (ref 135–145)
WBC # BLD AUTO: 8.3 K/UL (ref 4.8–10.8)

## 2025-04-14 PROCEDURE — A9270 NON-COVERED ITEM OR SERVICE: HCPCS | Performed by: INTERNAL MEDICINE

## 2025-04-14 PROCEDURE — 97535 SELF CARE MNGMENT TRAINING: CPT

## 2025-04-14 PROCEDURE — A9270 NON-COVERED ITEM OR SERVICE: HCPCS | Performed by: STUDENT IN AN ORGANIZED HEALTH CARE EDUCATION/TRAINING PROGRAM

## 2025-04-14 PROCEDURE — 700102 HCHG RX REV CODE 250 W/ 637 OVERRIDE(OP): Performed by: INTERNAL MEDICINE

## 2025-04-14 PROCEDURE — 85027 COMPLETE CBC AUTOMATED: CPT

## 2025-04-14 PROCEDURE — 71045 X-RAY EXAM CHEST 1 VIEW: CPT

## 2025-04-14 PROCEDURE — 94669 MECHANICAL CHEST WALL OSCILL: CPT

## 2025-04-14 PROCEDURE — 97110 THERAPEUTIC EXERCISES: CPT

## 2025-04-14 PROCEDURE — 80053 COMPREHEN METABOLIC PANEL: CPT

## 2025-04-14 PROCEDURE — 94640 AIRWAY INHALATION TREATMENT: CPT

## 2025-04-14 PROCEDURE — 700101 HCHG RX REV CODE 250: Performed by: STUDENT IN AN ORGANIZED HEALTH CARE EDUCATION/TRAINING PROGRAM

## 2025-04-14 PROCEDURE — 700102 HCHG RX REV CODE 250 W/ 637 OVERRIDE(OP): Performed by: STUDENT IN AN ORGANIZED HEALTH CARE EDUCATION/TRAINING PROGRAM

## 2025-04-14 PROCEDURE — 700111 HCHG RX REV CODE 636 W/ 250 OVERRIDE (IP): Mod: JZ | Performed by: INTERNAL MEDICINE

## 2025-04-14 PROCEDURE — 770020 HCHG ROOM/CARE - TELE (206)

## 2025-04-14 PROCEDURE — 36415 COLL VENOUS BLD VENIPUNCTURE: CPT

## 2025-04-14 PROCEDURE — 99232 SBSQ HOSP IP/OBS MODERATE 35: CPT | Performed by: STUDENT IN AN ORGANIZED HEALTH CARE EDUCATION/TRAINING PROGRAM

## 2025-04-14 RX ORDER — SCOPOLAMINE 1 MG/3D
1 PATCH, EXTENDED RELEASE TRANSDERMAL
Status: DISCONTINUED | OUTPATIENT
Start: 2025-04-14 | End: 2025-04-22

## 2025-04-14 RX ADMIN — OXYCODONE HYDROCHLORIDE 5 MG: 5 TABLET ORAL at 08:01

## 2025-04-14 RX ADMIN — Medication 5 MG: at 22:34

## 2025-04-14 RX ADMIN — ACETYLCYSTEINE 3 ML: 200 SOLUTION ORAL; RESPIRATORY (INHALATION) at 09:47

## 2025-04-14 RX ADMIN — CEPHALEXIN 500 MG: 250 FOR SUSPENSION ORAL at 17:20

## 2025-04-14 RX ADMIN — POLYETHYLENE GLYCOL 3350 1 PACKET: 17 POWDER, FOR SOLUTION ORAL at 17:25

## 2025-04-14 RX ADMIN — SENNOSIDES AND DOCUSATE SODIUM 2 TABLET: 50; 8.6 TABLET ORAL at 17:22

## 2025-04-14 RX ADMIN — GABAPENTIN 400 MG: 400 CAPSULE ORAL at 11:14

## 2025-04-14 RX ADMIN — ACETYLCYSTEINE 3 ML: 200 SOLUTION ORAL; RESPIRATORY (INHALATION) at 14:10

## 2025-04-14 RX ADMIN — ALBUTEROL SULFATE 2.5 MG: 2.5 SOLUTION RESPIRATORY (INHALATION) at 06:43

## 2025-04-14 RX ADMIN — HYDROCHLOROTHIAZIDE 25 MG: 25 TABLET ORAL at 04:36

## 2025-04-14 RX ADMIN — ENOXAPARIN SODIUM 40 MG: 100 INJECTION SUBCUTANEOUS at 17:30

## 2025-04-14 RX ADMIN — ACETYLCYSTEINE 3 ML: 200 SOLUTION ORAL; RESPIRATORY (INHALATION) at 18:44

## 2025-04-14 RX ADMIN — ASPIRIN 81 MG: 81 TABLET, CHEWABLE ORAL at 04:36

## 2025-04-14 RX ADMIN — CEPHALEXIN 500 MG: 250 FOR SUSPENSION ORAL at 11:15

## 2025-04-14 RX ADMIN — ALBUTEROL SULFATE 2.5 MG: 2.5 SOLUTION RESPIRATORY (INHALATION) at 18:44

## 2025-04-14 RX ADMIN — ALBUTEROL SULFATE 2.5 MG: 2.5 SOLUTION RESPIRATORY (INHALATION) at 09:47

## 2025-04-14 RX ADMIN — CEPHALEXIN 500 MG: 250 FOR SUSPENSION ORAL at 00:15

## 2025-04-14 RX ADMIN — AMLODIPINE BESYLATE 10 MG: 10 TABLET ORAL at 04:36

## 2025-04-14 RX ADMIN — OXYCODONE HYDROCHLORIDE 5 MG: 5 TABLET ORAL at 17:21

## 2025-04-14 RX ADMIN — GABAPENTIN 400 MG: 400 CAPSULE ORAL at 17:22

## 2025-04-14 RX ADMIN — GABAPENTIN 400 MG: 400 CAPSULE ORAL at 04:36

## 2025-04-14 RX ADMIN — CEPHALEXIN 500 MG: 250 FOR SUSPENSION ORAL at 04:36

## 2025-04-14 RX ADMIN — SCOPOLAMINE 1 PATCH: 1.5 PATCH, EXTENDED RELEASE TRANSDERMAL at 13:43

## 2025-04-14 RX ADMIN — ALBUTEROL SULFATE 2.5 MG: 2.5 SOLUTION RESPIRATORY (INHALATION) at 14:10

## 2025-04-14 RX ADMIN — ACETYLCYSTEINE 3 ML: 200 SOLUTION ORAL; RESPIRATORY (INHALATION) at 06:43

## 2025-04-14 RX ADMIN — ATORVASTATIN CALCIUM 80 MG: 80 TABLET, FILM COATED ORAL at 17:22

## 2025-04-14 RX ADMIN — SENNOSIDES AND DOCUSATE SODIUM 2 TABLET: 50; 8.6 TABLET ORAL at 04:35

## 2025-04-14 ASSESSMENT — COGNITIVE AND FUNCTIONAL STATUS - GENERAL
PERSONAL GROOMING: TOTAL
SUGGESTED CMS G CODE MODIFIER DAILY ACTIVITY: CN
DRESSING REGULAR UPPER BODY CLOTHING: TOTAL
DAILY ACTIVITIY SCORE: 6
HELP NEEDED FOR BATHING: TOTAL
DRESSING REGULAR LOWER BODY CLOTHING: TOTAL
TOILETING: TOTAL
EATING MEALS: TOTAL

## 2025-04-14 ASSESSMENT — FIBROSIS 4 INDEX: FIB4 SCORE: 0.4

## 2025-04-14 ASSESSMENT — PAIN DESCRIPTION - PAIN TYPE
TYPE: ACUTE PAIN

## 2025-04-14 NOTE — THERAPY
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 Pt engaged in P/AAROM to BUE all joints. Continues to demo flaccid RUE with no detected motor activity. LUE presents with 2-/5 to most muscle groups with 2/5 wrist extension (slightly improved). BUE are non-functional. Provided hand-over-hand assist to LUE for grooming tasks for neuromuscular re-education. Also engaged pt in supported neck ROM for head control. Pt head tends to rest in R rotation; he can initiate L rotation and extension, but incomplete with limited control. Utilized Z-Moises pillow to position pt's head in neutral. Pt demos improved eye contact this session as well as weak smile and eyebrow elevation. Appears to comprehend all verbal input and responds appropriately as able. Pt will benefit from ongoing acute OT to maximize functional independence and safety.     Plan    Treatment Plan Status: Continue Current Treatment Plan  Type of Treatment: Self Care / Activities of Daily Living, Adaptive Equipment, Cognitive Skill Development, Neuro Re-Education / Balance, Therapeutic Exercises, Therapeutic Activity, Family / Caregiver Training  Treatment Frequency: 4 Times per Week  Treatment Duration: Until Therapy Goals Met    DC Equipment Recommendations: Unable to determine at this time  Discharge Recommendations: Recommend post-acute placement for additional occupational therapy services prior to discharge home     Objective       04/14/25 1038   Vitals   Pulse Oximetry 94 %   O2 (LPM) 4   O2 Delivery Device T-Piece   Pain   Pain Scales 0 to 10 Scale    Pain 0 - 10 Group   Therapist Pain Assessment Post Activity Pain Same as Prior to Activity;Nurse Notified  (no outward signs;  agreeable to activity)   Non Verbal Descriptors   Non Verbal Scale  Calm;Unlabored Breathing   Cognition    Cognition / Consciousness X   Speech/ Communication Nods Appropriately;Delayed Responses   Level of Consciousness Alert   Ability To Follow Commands 1 Step   Initiation Impaired   Comments pt demos weak head nod & shake to inquiries as well as new weak smile and eyebrow raise   Passive ROM Upper Body   Passive ROM Upper Body WDL   Active ROM Upper Body   Active ROM Upper Body  X   Dominant Hand Right   Comments no AROM to RUE; very weak AROM to LUE in supported positions only   Strength Upper Body   Upper Body Strength  X   Lt Shoulder Flexion Strength 2- (P-)   Lt Shoulder Adduction Strength 2- (P-)   Lt Elbow Flexion Strength 2- (P-)   Lt Elbow Extension Strength 2- (P-)   Lt Forearm Supination Strength 2- (P-)   Lt Forearm Pronation Strength 2- (P-)   Lt Wrist Flexion Strength 2- (P-)   Lt Wrist Extension Strength 2 (P)   Left  Impaired  (2-/5 digit flexion; trace digit extension)   Comments no AROM to RUE   Upper Body Muscle Tone   Upper Body Muscle Tone  X   Rt Upper Extremity Muscle Tone Hypotonic;Non Functional   Lt Upper Extremity Muscle Tone Non Functional;Hypotonic   Supine Upper Body Exercises   Supine Upper Body Exercises Yes   Comments AAROM and PROM to BUE all joints  (partial Carreon's position)   Other Treatments   Other Treatments Provided Worked on supported AROM to head with emphasis on L rotation   Bed Mobility    Rolling Total Assist to Lt.;Total Assist to Rt.   Activities of Daily Living   Eating Total Assist   Grooming Total Assist  (bed level suction oral care and facial hygiene (hand-over-hand assist))   Upper Body Dressing Total Assist  (gown change)   Skilled Intervention Compensatory Strategies;Tactile Cuing;Postural Facilitation;Sequencing;Verbal Cuing   Modified Gale (mRS)   Modified Gale Score 5   Functional Mobility   Mobility rolling in bed   Visual Perception   Visual  Perception  X   Comments delayed, incomplete tracking to R and L to voice   Patient / Family Goals   Patient / Family Goal #1 to go home   Short Term Goals   Short Term Goal # 1 unsupported seated g/h with SPV   Goal Outcome # 1 Progressing slower than expected   Short Term Goal # 2 UB dressing with min A using riddhi-technique   Goal Outcome # 2 Progressing slower than expected   Short Term Goal # 3 BSC txf with mod A   Goal Outcome # 3   (NT this session)   Short Term Goal # 4 sitting EOB unsupported for >2min in prep for LB dressing   Goal Outcome # 4   (NT this session)   Education Group   Education Provided Other (comments)   Additional Comments Instructed pt on AROM to neck and LUE as able   Interdisciplinary Plan of Care Collaboration   IDT Collaboration with  Nursing   Patient Position at End of Therapy In Bed;Bed Alarm On;Call Light within Reach   Collaboration Comments OT results and recs   Session Information   Date / Session Number  4/14 #6 (1/4, 4/20)

## 2025-04-14 NOTE — CARE PLAN
Problem: Aerosol Therapy  Goal: Improved hydration/ability to mobilize secretions and/or decreased airway edema  Description: Target End Date:  resolve prior to discharge or when underlying condition is resolved/stabilized 1.  Implement heated or cool aerosol therapy 2.  Assessed for optimal hydration, decreased edema and/or improved ability to mobilize secretions  Outcome: Progressing   Respiratory Update  Airway: 8.0 portex  Treatment Modality: T-piece   Settings: 4L/28%    Pt tolerating current therapy well with no adverse reactions, will continue to monitor and wean as tolerated    Problem: Bronchopulmonary Hygiene  Goal: Increase mobilization of retained secretions  Description: 1.  Perform bronchopulmonary therapy as indicated by assessment 2.  Perform airway suctioning3.  Perform actions to maintain patient airway  Outcome: Progressing   Mucomyst + Albuterol QID  CPT QID

## 2025-04-14 NOTE — CARE PLAN
The patient is Stable - Low risk of patient condition declining or worsening    Shift Goals  Clinical Goals: Safety/maintain skin integrity  Patient Goals: jennifer  Family Goals: jennifer    Progress made toward(s) clinical / shift goals:   Problem: Fall Risk  Goal: Patient will remain free from falls  Outcome: Progressing  Fall precautions in place. Call light, bedside table, and pt belongings within reach. Pt provided education on the use of the call light when needing nursing staff.     Problem: Skin Integrity  Goal: Skin integrity is maintained or improved  Outcome: Progressing    Pt repositioned every 2 hours and as needed. Pt nods yes, when asked if he understands education provided on the importance of turning in bed to prevent skin injury.         Patient is not progressing towards the following goals: N/A

## 2025-04-14 NOTE — PROGRESS NOTES
[facility] Medicine Daily Progress Note    Date of Service  4/14/2025    Chief Complaint  Jonna Brian is a 49 y.o. male admitted 3/25/2025 with headache and weakness.    Hospital Course    This is a 49 y.o. male who was initially admitted to the hospital on 3/25/2025 with global weakness, aphasia and ataxia.  His last known normal was 1630 on March 24.  He was initially admitted to the hospital floor, his CT head as well as CTA of the head and neck were within normal limits.  There was consideration for possible Guillain-Barré syndrome.     His condition continued to deteriorate and he underwent a lumbar puncture which revealed elevated protein but no evidence of infection.  He had progressive bulbar symptoms and weakness and was transferred to the ICU.  Unfortunately shortly after he arrived in the ICU he developed stridor and required emergent intubation on 3/27.       He then underwent an MRI of his brain and spine which unfortunately revealed small areas of acute infarcts in the lower jero and the upper medulla on both sides of the midline.  He also had chronic infarcts in his jero and right inferior cerebellum.  They were chronically Cuna in the bilateral basal ganglia, the right thalamus and the right periventricular white matter.  He had evidence of small vessel disease as well in the periventricular white matter.     Ultimately it appears he has been suffering multiple small strokes and his progressive decline was due to acute infarcts in the lower jero and upper medulla.  Since that time he has not recovered function, has required tracheostomy and PEG placement with the ultimate goal of transferring him to postacute medical to see what function may be regained in time.    Patient able to nod head, shakes head for yes/no, able to use alphabet sheet to make needs known. He continues to work with PT/OT/SLP therapies with some minor signs of improvement. Veratent is medically cleared, plan for discharge to  LTAC for ongoing management of tracheostomy tube, PEG tube, therapies.    Interval Problem Update  No acute events overnight.  On 4L t piece, monitor.  Requiring regular suctioning for oral secretions. Start scopolamine patch for secretions.  He denies any focal symptoms otherwise.  Continue keflex for possible UTI, monitor hematuria. If no improvement in mild hematuria with UTI treatment, consider urology consult/referral.  Patient is medically cleared, pending LTAC.      I have discussed this patient's plan of care and discharge plan at IDT rounds today with Case Management, Nursing, Nursing leadership, and other members of the IDT team.    Consultants/Specialty  critical care I discussed with Dr. Torres    Code Status  Full Code    Disposition  Medically Cleared  I have placed the appropriate orders for post-discharge needs.    Review of Systems  Review of Systems   Unable to perform ROS: Patient nonverbal        Physical Exam  Temp:  [36.3 °C (97.3 °F)-36.9 °C (98.4 °F)] 36.6 °C (97.9 °F)  Pulse:  [68-89] 89  Resp:  [16-18] 18  BP: (118-126)/(69-75) 119/75  SpO2:  [93 %-98 %] 95 %    Physical Exam  Constitutional:       Appearance: He is ill-appearing.   Neck:      Comments: tracheostomy  Cardiovascular:      Rate and Rhythm: Normal rate and regular rhythm.   Abdominal:      Tenderness: There is no abdominal tenderness.      Comments: PEG   Neurological:      Comments: He is able to slightly move his left upper and lower extremity.  He was able to nod his head to answer question.         Fluids    Intake/Output Summary (Last 24 hours) at 4/14/2025 1347  Last data filed at 4/14/2025 0801  Gross per 24 hour   Intake 961 ml   Output 825 ml   Net 136 ml        Laboratory  Recent Labs     04/12/25  0403 04/13/25  0430 04/14/25  0334   WBC 13.0* 10.4 8.3   RBC 5.00 4.90 4.80   HEMOGLOBIN 13.6* 13.6* 13.2*   HEMATOCRIT 43.6 41.9* 41.1*   MCV 87.2 85.5 85.6   MCH 27.2 27.8 27.5   MCHC 31.2* 32.5 32.1*   RDW 40.5 39.0 38.8    PLATELETCT 429 435 400   MPV 9.3 9.3 9.6     Recent Labs     04/12/25  0403 04/13/25  0430 04/14/25  0334   SODIUM 139 139 137   POTASSIUM 4.0 3.6 4.3   CHLORIDE 98 96 94*   CO2 30 31 32   GLUCOSE 150* 142* 133*   BUN 36* 34* 32*   CREATININE 1.13 0.99 0.83   CALCIUM 9.5 9.7 9.5                   Imaging  DX-CHEST-PORTABLE (1 VIEW)   Final Result      Hypoinflation with bibasilar atelectasis.      US-RENAL   Final Result      1.  No hydronephrosis.      DX-CHEST-PORTABLE (1 VIEW)   Final Result         1.  Left basilar atelectasis and/or subtle infiltrates, similar to prior study      DX-CHEST-PORTABLE (1 VIEW)   Final Result         1.  Left basilar atelectasis, no focal infiltrate   2.  Cardiomegaly      DX-G.I. TUBE INJECTION, ANY TYPE   Final Result      Contrast from a PEG tube injection extends into the stomach without evidence of contrast leak.      CT-CHEST,ABDOMEN,PELVIS WITH   Final Result      1.  Large amount of free intraperitoneal air within the abdomen again seen as was previously identified on chest x-ray by review of the patient's chart, a percutaneous gastrostomy tube was placed on 3/31/2025 and is free intraperitoneal air is possibly    secondary to that recent procedure.      2.  Bibasal atelectasis and small bilateral pleural effusions.      3.  Tracheostomy tube present.      4.  No evidence of bowel obstruction or free fluid within the abdomen or pelvis.      DX-CHEST-PORTABLE (1 VIEW)   Final Result         1.  Bibasilar atelectasis   2.  Intra-abdominal free air, can be associated with history of percutaneous gastrostomy, consider other viscus perforation as warranted. Could be further evaluated with CT of the abdomen with contrast as clinically appropriate.      These findings were discussed with the patient's clinician, Bravo Lala Iv, on 4/2/2025 7:57 AM.      DX-CHEST-PORTABLE (1 VIEW)   Final Result      No evidence of acute cardiopulmonary process.      DX-ABDOMEN FOR TUBE  PLACEMENT   Final Result      1.  Enteric tube extends in the fundus of stomach.      DX-ABDOMEN FOR TUBE PLACEMENT   Final Result      The gastric tube has been removed and replaced with a small bowel feeding tube which terminate in the proximal stomach.      DX-CHEST-PORTABLE (1 VIEW)   Final Result      Atelectasis within the left lung base.      MR-THORACIC SPINE-WITH & W/O   Final Result      1.  There is no abnormal intramedullary T2 signal intensity in the thoracic spinal cord.   2.  Mild degenerative disease at T8-9.   3.  Left lower segmental consolidation.      MR-CERVICAL SPINE-WITH & W/O   Final Result      1.  Small areas of acute infarcts in the lower jero and upper medulla involving both sides of the midline. There are chronic infarcts in the jero and right inferior cerebellum.   2.  There is no abnormal intramedullary T2 signal intensity in the cervical spinal cord to suggest demyelinating lesions. There is no MR evidence of compressive myelopathy.   3.  Mild degenerative disease.            MR-BRAIN-WITH & W/O   Final Result      1.  Small areas of acute infarcts in the lower jero and upper medulla involving both sides of the midline.   2.  There are chronic infarcts in the jero and right inferior cerebellum. . There are areas of chronic lacunae in the bilateral basal ganglia, right thalamus and right periventricular white matter.   3.  There are nonspecific T2 hyperintensities in the periventricular white matter likely representing chronic small vessel disease.   4.  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      MR-LUMBAR SPINE-WITH & W/O   Final Result      1.  Unremarkable pre and postcontrast MR examination of the lumbar spine.   2.  Clinical suspicious for GBS: There is no abnormal enhancement of the lumbar nerve roots.      DX-ABDOMEN FOR TUBE PLACEMENT   Final Result      NG tube tip projects at the peripyloric region.     "  DX-CHEST-PORTABLE (1 VIEW)   Final Result      1.  Low lung volumes without definite acute cardiopulmonary abnormality.   2.  Support apparatus as above.      CT-HEAD W/O   Final Result      1.  No acute intracranial abnormality.   2.  Remote right cerebellar infarct.               DX-CHEST-PORTABLE (1 VIEW)   Final Result      No acute cardiopulmonary disease evident.      DX-CHEST-PORTABLE (1 VIEW)   Final Result      No acute cardiopulmonary disease evident.      CT-CEREBRAL PERFUSION ANALYSIS   Final Result      1. Cerebral blood flow less than 30% possibly representing completed infarct = 0 mL. Based on distribution of this finding, this is unlikely to represent artifact.      2. T Max more than 6 seconds possibly representing combination of completed infarct and ischemia = 7 mL. Based on the distribution of this finding, this is possibly artifact.      3. Mismatched volume possibly representing ischemic brain/penumbra= 0 mL      4.  Please note that this cerebral perfusion study and report is Quantitative and targets supratentorial (cerebral) perfusion for evaluation of large vessel territory acute ischemia/infarction. For example, lacunar infarcts, and brainstem/posterior fossa    ischemia/infarction are not evaluated on this study.  Data acquisition is subject to artifacts which can yield non-anatomically plausible perfusion maps which may be due to motion, bolus timing, signal to noise ratio, or other technical factors.    Perfusion map abnormalities which show non-anatomic distributions are likely artifact.   This study is not \"stand-alone\" and should only be utilized for diagnosis, management/treatment in correlation with CT, CTA, and/or MRI and clinical factors.         CT-CTA NECK WITH & W/O-POST PROCESSING   Final Result      CT angiogram of the neck within normal limits.      CT-CTA HEAD WITH & W/O-POST PROCESS   Final Result      CT angiogram of the [place] within normal limits.    "   CT-HEAD W/O   Final Result      Head CT without contrast within normal limits. No evidence of acute cerebral infarction, hemorrhage or mass lesion.                    Assessment/Plan  * Acute ischemic stroke (HCC)- (present on admission)  Assessment & Plan  Acute ischemic stroke  MRI of the brain revealed acute infarcts of the lower jero and upper medulla.  He effectively is a quadriplegic and required tracheostomy and PEG tube.  Aspirin and statin  Blood pressure control  Family hope for functional recovery    Hematuria  Assessment & Plan  Darkened urine, no gross hematuria  No recent grove trauma  Renal US normal, no stones  Continue antibiotics for possible UTI given mild signs of infection on UA  If hematuria persists after keflex treatment, consider urology consult/referral    Proteus mirabilis pneumonia (HCC)- (present on admission)  Assessment & Plan  Treated with Zosyn de-escalated to Augmentin based on cultures from sputum    Acute neuromuscular respiratory failure (HCC)- (present on admission)  Assessment & Plan  Requiring tracheostomy    Anxiety  Assessment & Plan  Ativan for anxiety.     Type 2 diabetes mellitus with hyperglycemia, without long-term current use of insulin (HCC)- (present on admission)  Assessment & Plan  Details are unclear  Reportedly he is on metformin as an outpatient  Hemoglobin A1c 6.3  He does not require insulin    Primary hypertension- (present on admission)  Assessment & Plan  Blood pressure is appropriate with hydrochlorothiazide 25 mg daily and Norvasc 10 mg daily             VTE prophylaxis: VTE Selection    I have performed a physical exam and reviewed and updated ROS and Plan today (4/14/2025). In review of yesterday's note (4/13/2025), there are no changes except as documented above.

## 2025-04-14 NOTE — PROGRESS NOTES
Monitor summary: SR 76-86, AK 0.16, QRS 0.07, QT 0.36, with rare PVCs per strip from monitor room.

## 2025-04-14 NOTE — ASSESSMENT & PLAN NOTE
Darkened urine, no gross hematuria  No recent grove trauma  Renal US normal, no stones  Completed empiric keflex for possible UTI  urology consulted, following

## 2025-04-14 NOTE — PROGRESS NOTES
Monitor Summary: SR 70-78, PA .18, QRS .09, QT .36 with rare PVc per strip from monitor room

## 2025-04-14 NOTE — DISCHARGE PLANNING
LEXY provided pt's mother Natalie with contact information obtained from [facility].  Natalie confirmed will contact pt's HR department to seek assurance that pt's insurance will continue while pt is at LTAC.

## 2025-04-14 NOTE — CARE PLAN
The patient is Stable - Low risk of patient condition declining or worsening    Shift Goals  Clinical Goals: Stable neuro status, maintain skin integrity, monitor respiratory status, manage secretions  Patient Goals: jennifer  Family Goals: jennifer    Progress made toward(s) clinical / shift goals:  Pt has been AAOx3-4 throughout the shift, disoriented to situation at times. Q2h turns, fall/aspiration precautions in place.     Problem: Neuro Status  Goal: Neuro status will remain stable or improve  Outcome: Progressing  Note: Q4h neuro checks      Problem: Skin Integrity  Goal: Skin integrity is maintained or improved  Outcome: Progressing  Note: Q2h turns, ALFONSO bed, TAPS system in place, elbow/heel mepilex in place, heel float boots in use, wedges/pillows in use for positioning and offloading, z-vern pillow in use, condom catheter in place for urinary elimination, barrier cream/wipes in use.     Problem: Respiratory - Stroke Patient  Goal: Patient will achieve/maintain optimum respiratory rate/effort  Outcome: Progressing  Note: Pt requires frequent oral/trach suctioning. Tolerating 8 L on T-piece.       Problem: Mobility - Stroke  Goal: Patient's capacity to carry out activities will improve  Outcome: Progressing       Patient is not progressing towards the following goals:

## 2025-04-15 LAB
ANION GAP SERPL CALC-SCNC: 13 MMOL/L (ref 7–16)
BUN SERPL-MCNC: 29 MG/DL (ref 8–22)
CALCIUM SERPL-MCNC: 10 MG/DL (ref 8.5–10.5)
CHLORIDE SERPL-SCNC: 94 MMOL/L (ref 96–112)
CO2 SERPL-SCNC: 31 MMOL/L (ref 20–33)
CREAT SERPL-MCNC: 0.88 MG/DL (ref 0.5–1.4)
CRP SERPL HS-MCNC: 4.47 MG/DL (ref 0–0.75)
ERYTHROCYTE [DISTWIDTH] IN BLOOD BY AUTOMATED COUNT: 37.7 FL (ref 35.9–50)
GFR SERPLBLD CREATININE-BSD FMLA CKD-EPI: 106 ML/MIN/1.73 M 2
GLUCOSE SERPL-MCNC: 158 MG/DL (ref 65–99)
HCT VFR BLD AUTO: 43.6 % (ref 42–52)
HGB BLD-MCNC: 13.8 G/DL (ref 14–18)
MCH RBC QN AUTO: 26.6 PG (ref 27–33)
MCHC RBC AUTO-ENTMCNC: 31.7 G/DL (ref 32.3–36.5)
MCV RBC AUTO: 84.2 FL (ref 81.4–97.8)
PLATELET # BLD AUTO: 464 K/UL (ref 164–446)
PMV BLD AUTO: 9.4 FL (ref 9–12.9)
POTASSIUM SERPL-SCNC: 3.4 MMOL/L (ref 3.6–5.5)
PREALB SERPL-MCNC: 25.9 MG/DL (ref 18–38)
RBC # BLD AUTO: 5.18 M/UL (ref 4.7–6.1)
SODIUM SERPL-SCNC: 138 MMOL/L (ref 135–145)
WBC # BLD AUTO: 11.7 K/UL (ref 4.8–10.8)

## 2025-04-15 PROCEDURE — 700102 HCHG RX REV CODE 250 W/ 637 OVERRIDE(OP): Performed by: STUDENT IN AN ORGANIZED HEALTH CARE EDUCATION/TRAINING PROGRAM

## 2025-04-15 PROCEDURE — 700102 HCHG RX REV CODE 250 W/ 637 OVERRIDE(OP): Performed by: INTERNAL MEDICINE

## 2025-04-15 PROCEDURE — 85027 COMPLETE CBC AUTOMATED: CPT

## 2025-04-15 PROCEDURE — 770020 HCHG ROOM/CARE - TELE (206)

## 2025-04-15 PROCEDURE — A9270 NON-COVERED ITEM OR SERVICE: HCPCS | Performed by: INTERNAL MEDICINE

## 2025-04-15 PROCEDURE — 94640 AIRWAY INHALATION TREATMENT: CPT

## 2025-04-15 PROCEDURE — A9270 NON-COVERED ITEM OR SERVICE: HCPCS | Performed by: STUDENT IN AN ORGANIZED HEALTH CARE EDUCATION/TRAINING PROGRAM

## 2025-04-15 PROCEDURE — 700111 HCHG RX REV CODE 636 W/ 250 OVERRIDE (IP): Mod: JZ | Performed by: INTERNAL MEDICINE

## 2025-04-15 PROCEDURE — 99233 SBSQ HOSP IP/OBS HIGH 50: CPT | Performed by: INTERNAL MEDICINE

## 2025-04-15 PROCEDURE — 97530 THERAPEUTIC ACTIVITIES: CPT

## 2025-04-15 PROCEDURE — 700101 HCHG RX REV CODE 250: Performed by: STUDENT IN AN ORGANIZED HEALTH CARE EDUCATION/TRAINING PROGRAM

## 2025-04-15 PROCEDURE — 36415 COLL VENOUS BLD VENIPUNCTURE: CPT

## 2025-04-15 PROCEDURE — 94669 MECHANICAL CHEST WALL OSCILL: CPT

## 2025-04-15 PROCEDURE — 86140 C-REACTIVE PROTEIN: CPT

## 2025-04-15 PROCEDURE — 80048 BASIC METABOLIC PNL TOTAL CA: CPT

## 2025-04-15 PROCEDURE — 84134 ASSAY OF PREALBUMIN: CPT

## 2025-04-15 RX ADMIN — CEPHALEXIN 500 MG: 250 FOR SUSPENSION ORAL at 13:25

## 2025-04-15 RX ADMIN — Medication 5 MG: at 20:47

## 2025-04-15 RX ADMIN — ASPIRIN 81 MG: 81 TABLET, CHEWABLE ORAL at 06:03

## 2025-04-15 RX ADMIN — ACETYLCYSTEINE 3 ML: 200 SOLUTION ORAL; RESPIRATORY (INHALATION) at 08:36

## 2025-04-15 RX ADMIN — CEPHALEXIN 500 MG: 250 FOR SUSPENSION ORAL at 18:09

## 2025-04-15 RX ADMIN — AMLODIPINE BESYLATE 10 MG: 10 TABLET ORAL at 06:03

## 2025-04-15 RX ADMIN — ALBUTEROL SULFATE 2.5 MG: 2.5 SOLUTION RESPIRATORY (INHALATION) at 19:56

## 2025-04-15 RX ADMIN — SENNOSIDES AND DOCUSATE SODIUM 2 TABLET: 50; 8.6 TABLET ORAL at 06:03

## 2025-04-15 RX ADMIN — CEPHALEXIN 500 MG: 250 FOR SUSPENSION ORAL at 06:03

## 2025-04-15 RX ADMIN — GABAPENTIN 400 MG: 400 CAPSULE ORAL at 13:25

## 2025-04-15 RX ADMIN — ACETYLCYSTEINE 3 ML: 200 SOLUTION ORAL; RESPIRATORY (INHALATION) at 20:10

## 2025-04-15 RX ADMIN — ACETYLCYSTEINE 3 ML: 200 SOLUTION ORAL; RESPIRATORY (INHALATION) at 14:11

## 2025-04-15 RX ADMIN — ALBUTEROL SULFATE 2.5 MG: 2.5 SOLUTION RESPIRATORY (INHALATION) at 14:11

## 2025-04-15 RX ADMIN — ALBUTEROL SULFATE 2.5 MG: 2.5 SOLUTION RESPIRATORY (INHALATION) at 08:36

## 2025-04-15 RX ADMIN — HYDROCHLOROTHIAZIDE 25 MG: 25 TABLET ORAL at 06:03

## 2025-04-15 RX ADMIN — ATORVASTATIN CALCIUM 80 MG: 80 TABLET, FILM COATED ORAL at 18:09

## 2025-04-15 RX ADMIN — SENNOSIDES AND DOCUSATE SODIUM 2 TABLET: 50; 8.6 TABLET ORAL at 18:09

## 2025-04-15 RX ADMIN — ALBUTEROL SULFATE 2.5 MG: 2.5 SOLUTION RESPIRATORY (INHALATION) at 11:20

## 2025-04-15 RX ADMIN — ACETYLCYSTEINE 3 ML: 200 SOLUTION ORAL; RESPIRATORY (INHALATION) at 11:20

## 2025-04-15 RX ADMIN — ENOXAPARIN SODIUM 40 MG: 100 INJECTION SUBCUTANEOUS at 18:09

## 2025-04-15 RX ADMIN — GABAPENTIN 400 MG: 400 CAPSULE ORAL at 06:03

## 2025-04-15 RX ADMIN — CEPHALEXIN 500 MG: 250 FOR SUSPENSION ORAL at 00:55

## 2025-04-15 RX ADMIN — GABAPENTIN 400 MG: 400 CAPSULE ORAL at 18:09

## 2025-04-15 ASSESSMENT — COGNITIVE AND FUNCTIONAL STATUS - GENERAL
SUGGESTED CMS G CODE MODIFIER MOBILITY: CN
MOVING TO AND FROM BED TO CHAIR: TOTAL
MOVING FROM LYING ON BACK TO SITTING ON SIDE OF FLAT BED: TOTAL
STANDING UP FROM CHAIR USING ARMS: TOTAL
CLIMB 3 TO 5 STEPS WITH RAILING: TOTAL
TURNING FROM BACK TO SIDE WHILE IN FLAT BAD: TOTAL
WALKING IN HOSPITAL ROOM: TOTAL
MOBILITY SCORE: 6

## 2025-04-15 ASSESSMENT — GAIT ASSESSMENTS: GAIT LEVEL OF ASSIST: UNABLE TO PARTICIPATE

## 2025-04-15 ASSESSMENT — PAIN DESCRIPTION - PAIN TYPE: TYPE: ACUTE PAIN

## 2025-04-15 NOTE — CARE PLAN
The patient is Stable - Low risk of patient condition declining or worsening    Shift Goals  Clinical Goals: Stable neuro status, monitor respiratory status, maintain skin integrity, manage secretions  Patient Goals: jennifer  Family Goals: jennifer    Progress made toward(s) clinical / shift goals:  Pt has been AAOx4 throughout the shift, responds to voice, Q4h neuro checks, fall/aspiration precautions in place.     Problem: Neuro Status  Goal: Neuro status will remain stable or improve  Outcome: Progressing  Note: Q4h neuro checks      Problem: Skin Integrity  Goal: Skin integrity is maintained or improved  Outcome: Progressing  Note: Q2h turns, ALFONSO bed, TAPS system in place, wedges/pillows in use for positioning and offloading, heel float boots in use, elbow/heel mepilex in place, condom catheter in place for urinary elimination.      Problem: Respiratory - Stroke Patient  Goal: Patient will achieve/maintain optimum respiratory rate/effort  Outcome: Progressing  Note: Frequent oral/trach suctioning provided.         Patient is not progressing towards the following goals:

## 2025-04-15 NOTE — PROGRESS NOTES
Monitor Summary: SR/ST , MA 0.16, QRS 0.08, QT 0.32, with rare PVCs per strip from monitor room.

## 2025-04-15 NOTE — CARE PLAN
Problem: Aerosol Therapy  Goal: Improved hydration/ability to mobilize secretions and/or decreased airway edema  Description: Target End Date:  resolve prior to discharge or when underlying condition is resolved/stabilized1.  Implement heated or cool aerosol therapy2.  Assessed for optimal hydration, decreased edema and/or improved ability to mobilize secretions  Outcome: Not Met     Problem: Bronchopulmonary Hygiene  Goal: Increase mobilization of retained secretions  Description: 1.  Perform bronchopulmonary therapy as indicated by assessment2.  Perform airway suctioning3.  Perform actions to maintain patient airway  Outcome: Not Met   SVN CPT Aerosol

## 2025-04-15 NOTE — PROGRESS NOTES
Monitor Summary  Rhythm ST/ST  Rate   Ectopy rare PVCs  IN 0.18  QRS 0.10  QT 0.36  Per monitor room

## 2025-04-15 NOTE — THERAPY
Physical Therapy   Daily Treatment     Patient Name: Jonna Brian  Age:  49 y.o., Sex:  male  Medical Record #: [id number]  Today's Date: 4/15/2025     Precautions  Precautions: Fall Risk;Swallow Precautions;PEG Tube;Tracheostomy   Comments: copious secretions    Assessment    Patient continues to be limited by impaired motor function. He required max A x2 to total A for bed mobility and max A to maintain upright posture while sitting EOB. He was able to initiate movement for head to neutral but unable to maintain. Some movement of L extremities and patient followed commands as able. Copious secretions throughout. Will continue to follow.    Plan    Treatment Plan Status: Continue Current Treatment Plan  Type of Treatment: Bed Mobility, Family / Caregiver Training, Gait Training, Neuro Re-Education / Balance, Self Care / Home Evaluation, Stair Training, Therapeutic Activities, Therapeutic Exercise  Treatment Frequency: 5 Times per Week  Treatment Duration: Until Therapy Goals Met    DC Equipment Recommendations: Unable to determine at this time  Discharge Recommendations: Recommend post-acute placement for additional physical therapy services prior to discharge home      Subjective    Patient received in bed and agreeable to treatment     Objective       04/15/25 1323   Vitals   O2 (LPM) 4   O2 Delivery Device T-Piece   Pain 0 - 10 Group   Therapist Pain Assessment   (unable to verbalize; no overt signs/symptoms)   Cognition    Cognition / Consciousness X   Speech/ Communication Nods Appropriately   Level of Consciousness Responds to voice   Ability To Follow Commands 1 Step  (within functional ability)   Safety Awareness Impaired   New Learning Impaired   Passive ROM Lower Body   Passive ROM Lower Body WDL   Active ROM Lower Body    Comments no volitional movement RLE, limited LLE DF, knee extension, hip extension. not able to perform hip flexion against gravity   Neurological Concerns   Neurological Concerns Yes    Comments as before, no righting reactions   Balance   Sitting Balance (Static) Dependent   Sitting Balance (Dynamic) Dependent   Weight Shift Sitting Absent   Weight Shift Standing Absent   Skilled Intervention Verbal Cuing;Compensatory Strategies;Sequencing;Facilitation;Postural Facilitation   Comments patient able to initiate head into upright / neutral position but unable to maintain   Bed Mobility    Supine to Sit Total Assist   Sit to Supine Total Assist   Scooting Total Assist   Skilled Intervention Verbal Cuing;Compensatory Strategies;Sequencing;Facilitation;Postural Facilitation   Gait Analysis   Gait Level Of Assist Unable to Participate   Functional Mobility   Sit to Stand Unable to Participate   Bed, Chair, Wheelchair Transfer Unable to Participate   6 Clicks Assessment - How much HELP from from another person do you currently need... (If the patient hasn't done an activity recently, how much help from another person do you think he/she would need if he/she tried?)   Turning from your back to your side while in a flat bed without using bedrails? 1   Moving from lying on your back to sitting on the side of a flat bed without using bedrails? 1   Moving to and from a bed to a chair (including a wheelchair)? 1   Standing up from a chair using your arms (e.g., wheelchair, or bedside chair)? 1   Walking in hospital room? 1   Climbing 3-5 steps with a railing? 1   6 clicks Mobility Score 6   Short Term Goals    Short Term Goal # 1 pt will move supine<>eob with min a in 6 tx for bed mobility.   Goal Outcome # 1 goal not met   Short Term Goal # 2 pt will sit at eob for 5 min with fair- balance in 6 tx for oob tolerance.   Goal Outcome # 2 Goal not met   Short Term Goal # 3 pt will complete sts with hemiwalker and min a in 6 tx for functional mobility.   Goal Outcome # 3 Goal not met

## 2025-04-15 NOTE — CARE PLAN
The patient is Stable - Low risk of patient condition declining or worsening    Shift Goals  Clinical Goals: stable neuro, monitor respiratory, maintain skin integrity  Patient Goals: jennifer  Family Goals: jennifer    Progress made toward(s) clinical / shift goals:  progressing    Patient is not progressing towards the following goals:      Problem: Knowledge Deficit - Standard  Goal: Patient and family/care givers will demonstrate understanding of plan of care, disease process/condition, diagnostic tests and medications  Outcome: Not Progressing     Problem: Knowledge Deficit - Stroke Education  Goal: Patient's knowledge of stroke and risk factors will improve  Outcome: Not Progressing     Problem: Dysphagia  Goal: Dysphagia will improve  Outcome: Not Progressing     Problem: Mobility - Stroke  Goal: Patient's capacity to carry out activities will improve  Outcome: Not Progressing  Goal: Spasticity will be prevented or improved  Outcome: Not Progressing  Goal: Subluxation will be prevented or improved  Outcome: Not Progressing     Problem: Self Care  Goal: Patient will have the ability to perform ADLs independently or with assistance (bathe, groom, dress, toilet and feed)  Outcome: Not Progressing

## 2025-04-15 NOTE — PROGRESS NOTES
[facility] Medicine Daily Progress Note    Date of Service  4/15/2025    Chief Complaint  Jonna Brian is a 49 y.o. male admitted 3/25/2025 with headache and weakness.    Hospital Course    This is a 49 y.o. male who was initially admitted to the hospital on 3/25/2025 with global weakness, aphasia and ataxia.  His last known normal was 1630 on March 24.  He was initially admitted to the hospital floor, his CT head as well as CTA of the head and neck were within normal limits.  There was consideration for possible Guillain-Barré syndrome.     His condition continued to deteriorate and he underwent a lumbar puncture which revealed elevated protein but no evidence of infection.  He had progressive bulbar symptoms and weakness and was transferred to the ICU.  Unfortunately shortly after he arrived in the ICU he developed stridor and required emergent intubation on 3/27.       He then underwent an MRI of his brain and spine which unfortunately revealed small areas of acute infarcts in the lower jero and the upper medulla on both sides of the midline.  He also had chronic infarcts in his jero and right inferior cerebellum.  They were chronically Cuna in the bilateral basal ganglia, the right thalamus and the right periventricular white matter.  He had evidence of small vessel disease as well in the periventricular white matter.     Ultimately it appears he has been suffering multiple small strokes and his progressive decline was due to acute infarcts in the lower jero and upper medulla.  Since that time he has not recovered function, has required tracheostomy and PEG placement with the ultimate goal of transferring him to postacute medical to see what function may be regained in time.    Patient able to nod head, shakes head for yes/no, able to use alphabet sheet to make needs known. He continues to work with PT/OT/SLP therapies with some minor signs of improvement. Veratent is medically cleared, plan for discharge to  LTAC for ongoing management of tracheostomy tube, PEG tube, therapies.    Interval Problem Update  Patient was seen and examined at bedside.  No acute events overnight. Patient is resting comfortably in bed and in no acute distress.     4L T piece  Significant secretion burden  Continue keflex for possible UTI, monitor hematuria. If no improvement in mild hematuria with UTI treatment, consider urology consult/referral.  Patient is medically cleared, pending LTAC.      I have discussed this patient's plan of care and discharge plan at IDT rounds today with Case Management, Nursing, Nursing leadership, and other members of the IDT team.    Consultants/Specialty  critical care I discussed with Dr. Torres    Code Status  Full Code    Disposition  The patient is not medically cleared for discharge to home or a post-acute facility.      I have placed the appropriate orders for post-discharge needs.    Review of Systems  Review of Systems   Unable to perform ROS: Patient nonverbal        Physical Exam  Temp:  [36.3 °C (97.3 °F)-36.8 °C (98.3 °F)] 36.3 °C (97.3 °F)  Pulse:  [] 117  Resp:  [16-20] 18  BP: (118-128)/(72-81) 128/81  SpO2:  [92 %-97 %] 96 %    Physical Exam  Constitutional:       Appearance: He is ill-appearing.   HENT:      Right Ear: External ear normal.      Left Ear: External ear normal.   Neck:      Comments: Tracheostomy on Tpiece  Cardiovascular:      Rate and Rhythm: Normal rate and regular rhythm.   Pulmonary:      Breath sounds: Rhonchi present.      Comments: Significant secretion burden  Abdominal:      Tenderness: There is no abdominal tenderness.      Comments: PEG   Neurological:      Mental Status: He is alert.      Comments: He is able to slightly move his left upper and lower extremity.  He was able to nod his head to answer question.         Fluids    Intake/Output Summary (Last 24 hours) at 4/15/2025 1546  Last data filed at 4/15/2025 0840  Gross per 24 hour   Intake 1448 ml   Output 700  ml   Net 748 ml        Laboratory  Recent Labs     04/13/25  0430 04/14/25  0334 04/15/25  0749   WBC 10.4 8.3 11.7*   RBC 4.90 4.80 5.18   HEMOGLOBIN 13.6* 13.2* 13.8*   HEMATOCRIT 41.9* 41.1* 43.6   MCV 85.5 85.6 84.2   MCH 27.8 27.5 26.6*   MCHC 32.5 32.1* 31.7*   RDW 39.0 38.8 37.7   PLATELETCT 435 400 464*   MPV 9.3 9.6 9.4     Recent Labs     04/13/25  0430 04/14/25  0334 04/15/25  0749   SODIUM 139 137 138   POTASSIUM 3.6 4.3 3.4*   CHLORIDE 96 94* 94*   CO2 31 32 31   GLUCOSE 142* 133* 158*   BUN 34* 32* 29*   CREATININE 0.99 0.83 0.88   CALCIUM 9.7 9.5 10.0                   Imaging  DX-CHEST-PORTABLE (1 VIEW)   Final Result      Hypoinflation with bibasilar atelectasis.      US-RENAL   Final Result      1.  No hydronephrosis.      DX-CHEST-PORTABLE (1 VIEW)   Final Result         1.  Left basilar atelectasis and/or subtle infiltrates, similar to prior study      DX-CHEST-PORTABLE (1 VIEW)   Final Result         1.  Left basilar atelectasis, no focal infiltrate   2.  Cardiomegaly      DX-G.I. TUBE INJECTION, ANY TYPE   Final Result      Contrast from a PEG tube injection extends into the stomach without evidence of contrast leak.      CT-CHEST,ABDOMEN,PELVIS WITH   Final Result      1.  Large amount of free intraperitoneal air within the abdomen again seen as was previously identified on chest x-ray by review of the patient's chart, a percutaneous gastrostomy tube was placed on 3/31/2025 and is free intraperitoneal air is possibly    secondary to that recent procedure.      2.  Bibasal atelectasis and small bilateral pleural effusions.      3.  Tracheostomy tube present.      4.  No evidence of bowel obstruction or free fluid within the abdomen or pelvis.      DX-CHEST-PORTABLE (1 VIEW)   Final Result         1.  Bibasilar atelectasis   2.  Intra-abdominal free air, can be associated with history of percutaneous gastrostomy, consider other viscus perforation as warranted. Could be further evaluated with CT  of the abdomen with contrast as clinically appropriate.      These findings were discussed with the patient's clinician, Bravo Lala Iv, on 4/2/2025 7:57 AM.      DX-CHEST-PORTABLE (1 VIEW)   Final Result      No evidence of acute cardiopulmonary process.      DX-ABDOMEN FOR TUBE PLACEMENT   Final Result      1.  Enteric tube extends in the fundus of stomach.      DX-ABDOMEN FOR TUBE PLACEMENT   Final Result      The gastric tube has been removed and replaced with a small bowel feeding tube which terminate in the proximal stomach.      DX-CHEST-PORTABLE (1 VIEW)   Final Result      Atelectasis within the left lung base.      MR-THORACIC SPINE-WITH & W/O   Final Result      1.  There is no abnormal intramedullary T2 signal intensity in the thoracic spinal cord.   2.  Mild degenerative disease at T8-9.   3.  Left lower segmental consolidation.      MR-CERVICAL SPINE-WITH & W/O   Final Result      1.  Small areas of acute infarcts in the lower jero and upper medulla involving both sides of the midline. There are chronic infarcts in the jero and right inferior cerebellum.   2.  There is no abnormal intramedullary T2 signal intensity in the cervical spinal cord to suggest demyelinating lesions. There is no MR evidence of compressive myelopathy.   3.  Mild degenerative disease.            MR-BRAIN-WITH & W/O   Final Result      1.  Small areas of acute infarcts in the lower jero and upper medulla involving both sides of the midline.   2.  There are chronic infarcts in the jero and right inferior cerebellum. . There are areas of chronic lacunae in the bilateral basal ganglia, right thalamus and right periventricular white matter.   3.  There are nonspecific T2 hyperintensities in the periventricular white matter likely representing chronic small vessel disease.   4.  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      MR-LUMBAR SPINE-WITH & W/O   Final Result     "  1.  Unremarkable pre and postcontrast MR examination of the lumbar spine.   2.  Clinical suspicious for GBS: There is no abnormal enhancement of the lumbar nerve roots.      DX-ABDOMEN FOR TUBE PLACEMENT   Final Result      NG tube tip projects at the peripyloric region.      DX-CHEST-PORTABLE (1 VIEW)   Final Result      1.  Low lung volumes without definite acute cardiopulmonary abnormality.   2.  Support apparatus as above.      CT-HEAD W/O   Final Result      1.  No acute intracranial abnormality.   2.  Remote right cerebellar infarct.               DX-CHEST-PORTABLE (1 VIEW)   Final Result      No acute cardiopulmonary disease evident.      DX-CHEST-PORTABLE (1 VIEW)   Final Result      No acute cardiopulmonary disease evident.      CT-CEREBRAL PERFUSION ANALYSIS   Final Result      1. Cerebral blood flow less than 30% possibly representing completed infarct = 0 mL. Based on distribution of this finding, this is unlikely to represent artifact.      2. T Max more than 6 seconds possibly representing combination of completed infarct and ischemia = 7 mL. Based on the distribution of this finding, this is possibly artifact.      3. Mismatched volume possibly representing ischemic brain/penumbra= 0 mL      4.  Please note that this cerebral perfusion study and report is Quantitative and targets supratentorial (cerebral) perfusion for evaluation of large vessel territory acute ischemia/infarction. For example, lacunar infarcts, and brainstem/posterior fossa    ischemia/infarction are not evaluated on this study.  Data acquisition is subject to artifacts which can yield non-anatomically plausible perfusion maps which may be due to motion, bolus timing, signal to noise ratio, or other technical factors.    Perfusion map abnormalities which show non-anatomic distributions are likely artifact.   This study is not \"stand-alone\" and should only be utilized for diagnosis, management/treatment in correlation with CT, CTA, " and/or MRI and clinical factors.         CT-CTA NECK WITH & W/O-POST PROCESSING   Final Result      CT angiogram of the neck within normal limits.      CT-CTA HEAD WITH & W/O-POST PROCESS   Final Result      CT angiogram of the [place] within normal limits.      CT-HEAD W/O   Final Result      Head CT without contrast within normal limits. No evidence of acute cerebral infarction, hemorrhage or mass lesion.                    Assessment/Plan  * Acute ischemic stroke (HCC)- (present on admission)  Assessment & Plan  Acute ischemic stroke  MRI of the brain revealed acute infarcts of the lower jero and upper medulla.  He effectively is a quadriplegic and required tracheostomy and PEG tube.  Aspirin and statin  Blood pressure control  Family hope for functional recovery    Acute neuromuscular respiratory failure (HCC)- (present on admission)  Assessment & Plan  Requiring tracheostomy    Proteus mirabilis pneumonia (HCC)- (present on admission)  Assessment & Plan  Treated with Zosyn de-escalated to Augmentin based on cultures from sputum    Type 2 diabetes mellitus with hyperglycemia, without long-term current use of insulin (HCC)- (present on admission)  Assessment & Plan  Details are unclear  Reportedly he is on metformin as an outpatient  Hemoglobin A1c 6.3  He does not require insulin    Anxiety  Assessment & Plan  Ativan for anxiety.     Hematuria  Assessment & Plan  Darkened urine, no gross hematuria  No recent grove trauma  Renal US normal, no stones  Continue antibiotics for possible UTI given mild signs of infection on UA  If hematuria persists after keflex treatment, consider urology consult/referral    Primary hypertension- (present on admission)  Assessment & Plan  Blood pressure is appropriate with hydrochlorothiazide 25 mg daily and Norvasc 10 mg daily             VTE prophylaxis: VTE Selection    I have performed a physical exam and reviewed and updated ROS and Plan today (4/15/2025). In review of  yesterday's note (4/14/2025), there are no changes except as documented above.    Greater than 51 minutes spent prepping to see patient (e.g. review of tests) obtaining and/or reviewing separately obtained history. Performing a medically appropriate examination and/ evaluation.  Counseling and educating the patient/family/caregiver.  Ordering medications, tests, or procedures.  Referring and communicating with other health care professionals.  Documenting clinical information in EPIC.  Independently interpreting results and communicating results to patient/family/caregiver.  Care coordination.

## 2025-04-15 NOTE — DISCHARGE PLANNING
On 4/14 SW received message from pt's mother Natalie who confirmed with pt's employer's HR department that pt will continue to have insurance payor coverage throughout rehabilitation and if placed at LTAC.  [facility]AMS and IDT aware.  HR phone number provided to SUNG Jackson.

## 2025-04-16 LAB
ANION GAP SERPL CALC-SCNC: 11 MMOL/L (ref 7–16)
BUN SERPL-MCNC: 32 MG/DL (ref 8–22)
CALCIUM SERPL-MCNC: 9.4 MG/DL (ref 8.5–10.5)
CHLORIDE SERPL-SCNC: 95 MMOL/L (ref 96–112)
CO2 SERPL-SCNC: 28 MMOL/L (ref 20–33)
CREAT SERPL-MCNC: 0.84 MG/DL (ref 0.5–1.4)
ERYTHROCYTE [DISTWIDTH] IN BLOOD BY AUTOMATED COUNT: 38.5 FL (ref 35.9–50)
GFR SERPLBLD CREATININE-BSD FMLA CKD-EPI: 108 ML/MIN/1.73 M 2
GLUCOSE SERPL-MCNC: 168 MG/DL (ref 65–99)
HCT VFR BLD AUTO: 41.3 % (ref 42–52)
HGB BLD-MCNC: 13.2 G/DL (ref 14–18)
MCH RBC QN AUTO: 27.2 PG (ref 27–33)
MCHC RBC AUTO-ENTMCNC: 32 G/DL (ref 32.3–36.5)
MCV RBC AUTO: 85.2 FL (ref 81.4–97.8)
PLATELET # BLD AUTO: 369 K/UL (ref 164–446)
PMV BLD AUTO: 9.6 FL (ref 9–12.9)
POTASSIUM SERPL-SCNC: 3.5 MMOL/L (ref 3.6–5.5)
RBC # BLD AUTO: 4.85 M/UL (ref 4.7–6.1)
SODIUM SERPL-SCNC: 134 MMOL/L (ref 135–145)
WBC # BLD AUTO: 7.7 K/UL (ref 4.8–10.8)

## 2025-04-16 PROCEDURE — 94669 MECHANICAL CHEST WALL OSCILL: CPT

## 2025-04-16 PROCEDURE — 97530 THERAPEUTIC ACTIVITIES: CPT

## 2025-04-16 PROCEDURE — 99233 SBSQ HOSP IP/OBS HIGH 50: CPT | Performed by: INTERNAL MEDICINE

## 2025-04-16 PROCEDURE — A9270 NON-COVERED ITEM OR SERVICE: HCPCS | Performed by: INTERNAL MEDICINE

## 2025-04-16 PROCEDURE — 80048 BASIC METABOLIC PNL TOTAL CA: CPT

## 2025-04-16 PROCEDURE — 770020 HCHG ROOM/CARE - TELE (206)

## 2025-04-16 PROCEDURE — A9270 NON-COVERED ITEM OR SERVICE: HCPCS | Performed by: STUDENT IN AN ORGANIZED HEALTH CARE EDUCATION/TRAINING PROGRAM

## 2025-04-16 PROCEDURE — 700111 HCHG RX REV CODE 636 W/ 250 OVERRIDE (IP): Mod: JZ | Performed by: INTERNAL MEDICINE

## 2025-04-16 PROCEDURE — 700101 HCHG RX REV CODE 250: Performed by: STUDENT IN AN ORGANIZED HEALTH CARE EDUCATION/TRAINING PROGRAM

## 2025-04-16 PROCEDURE — 85027 COMPLETE CBC AUTOMATED: CPT

## 2025-04-16 PROCEDURE — 94640 AIRWAY INHALATION TREATMENT: CPT

## 2025-04-16 PROCEDURE — 36415 COLL VENOUS BLD VENIPUNCTURE: CPT

## 2025-04-16 PROCEDURE — 700102 HCHG RX REV CODE 250 W/ 637 OVERRIDE(OP): Performed by: INTERNAL MEDICINE

## 2025-04-16 PROCEDURE — 700102 HCHG RX REV CODE 250 W/ 637 OVERRIDE(OP): Performed by: STUDENT IN AN ORGANIZED HEALTH CARE EDUCATION/TRAINING PROGRAM

## 2025-04-16 PROCEDURE — 92507 TX SP LANG VOICE COMM INDIV: CPT

## 2025-04-16 RX ADMIN — ALBUTEROL SULFATE 2.5 MG: 2.5 SOLUTION RESPIRATORY (INHALATION) at 13:48

## 2025-04-16 RX ADMIN — CEPHALEXIN 500 MG: 250 FOR SUSPENSION ORAL at 00:45

## 2025-04-16 RX ADMIN — ALBUTEROL SULFATE 2.5 MG: 2.5 SOLUTION RESPIRATORY (INHALATION) at 08:53

## 2025-04-16 RX ADMIN — ALBUTEROL SULFATE 2.5 MG: 2.5 SOLUTION RESPIRATORY (INHALATION) at 20:19

## 2025-04-16 RX ADMIN — ENOXAPARIN SODIUM 40 MG: 100 INJECTION SUBCUTANEOUS at 17:38

## 2025-04-16 RX ADMIN — ACETYLCYSTEINE 3 ML: 200 SOLUTION ORAL; RESPIRATORY (INHALATION) at 20:19

## 2025-04-16 RX ADMIN — GABAPENTIN 400 MG: 400 CAPSULE ORAL at 04:56

## 2025-04-16 RX ADMIN — SENNOSIDES AND DOCUSATE SODIUM 2 TABLET: 50; 8.6 TABLET ORAL at 17:38

## 2025-04-16 RX ADMIN — ACETYLCYSTEINE 3 ML: 200 SOLUTION ORAL; RESPIRATORY (INHALATION) at 08:53

## 2025-04-16 RX ADMIN — GABAPENTIN 400 MG: 400 CAPSULE ORAL at 13:59

## 2025-04-16 RX ADMIN — HYDROCHLOROTHIAZIDE 25 MG: 25 TABLET ORAL at 04:56

## 2025-04-16 RX ADMIN — AMLODIPINE BESYLATE 10 MG: 10 TABLET ORAL at 04:56

## 2025-04-16 RX ADMIN — ACETYLCYSTEINE 3 ML: 200 SOLUTION ORAL; RESPIRATORY (INHALATION) at 13:48

## 2025-04-16 RX ADMIN — ALBUTEROL SULFATE 2.5 MG: 2.5 SOLUTION RESPIRATORY (INHALATION) at 11:28

## 2025-04-16 RX ADMIN — ACETYLCYSTEINE 3 ML: 200 SOLUTION ORAL; RESPIRATORY (INHALATION) at 11:28

## 2025-04-16 RX ADMIN — Medication 5 MG: at 20:58

## 2025-04-16 RX ADMIN — SENNOSIDES AND DOCUSATE SODIUM 2 TABLET: 50; 8.6 TABLET ORAL at 04:57

## 2025-04-16 RX ADMIN — GABAPENTIN 400 MG: 400 CAPSULE ORAL at 17:38

## 2025-04-16 RX ADMIN — MAGNESIUM HYDROXIDE 30 ML: 2400 SUSPENSION ORAL at 17:38

## 2025-04-16 RX ADMIN — CEPHALEXIN 500 MG: 250 FOR SUSPENSION ORAL at 13:59

## 2025-04-16 RX ADMIN — ATORVASTATIN CALCIUM 80 MG: 80 TABLET, FILM COATED ORAL at 17:38

## 2025-04-16 RX ADMIN — CEPHALEXIN 500 MG: 250 FOR SUSPENSION ORAL at 05:41

## 2025-04-16 RX ADMIN — CEPHALEXIN 500 MG: 250 FOR SUSPENSION ORAL at 17:38

## 2025-04-16 RX ADMIN — ASPIRIN 81 MG: 81 TABLET, CHEWABLE ORAL at 04:57

## 2025-04-16 ASSESSMENT — COGNITIVE AND FUNCTIONAL STATUS - GENERAL
MOVING FROM LYING ON BACK TO SITTING ON SIDE OF FLAT BED: TOTAL
MOBILITY SCORE: 6
MOVING TO AND FROM BED TO CHAIR: TOTAL
WALKING IN HOSPITAL ROOM: TOTAL
SUGGESTED CMS G CODE MODIFIER MOBILITY: CN
CLIMB 3 TO 5 STEPS WITH RAILING: TOTAL
TURNING FROM BACK TO SIDE WHILE IN FLAT BAD: TOTAL
STANDING UP FROM CHAIR USING ARMS: TOTAL

## 2025-04-16 ASSESSMENT — GAIT ASSESSMENTS: GAIT LEVEL OF ASSIST: UNABLE TO PARTICIPATE

## 2025-04-16 ASSESSMENT — PAIN DESCRIPTION - PAIN TYPE: TYPE: ACUTE PAIN

## 2025-04-16 ASSESSMENT — FIBROSIS 4 INDEX: FIB4 SCORE: 0.52

## 2025-04-16 NOTE — THERAPY
Speech Language Pathology   Daily Treatment     Patient Name: Jonna Brian  AGE:  49 y.o., SEX:  male  Medical Record #: [id number]  Date of Service: 4/16/2025      Precautions:  Precautions: Fall Risk, Swallow Precautions, Tracheostomy , PEG Tube         Subjective  RN and RT cleared pt for SLP tx with caveat that pt is still having copious secretions.   Patient was agreeable to participating and was repositioned per his request w/ A from RN.      Assessment  Speaking valve:  Pt tolerated for 20 minutes with no significant changes in cardiopulmonary vitals. Cuff was already deflated. Tracheal suctioning was performed pre/post PMV placement with copious thick secretions removed from trach and orally while PMV was donned. Though still copious, secretions appear improved in amount from the last time this SLP worked with pt on 4/8/25. Pt is still not able to trigger a reflexive or volitional swallow.     Communication:  Given mod verbal/visual cues for phonatory/breath coordination and increased amplitude, pt repeated sounds and CV/CVC words for ~25% intelligibility. He orally spelled words as well with success in 1/4 trials.   He used AAC alphabet board with clinician A for scanning/pointing to letters with pt nodding his head to indicate his selection to spell words accurately in 5/5 opportunities given min cues.   Pt used basic needs AAC board to communicate appropriately in 6/6 opportunities.       Clinical Impressions  Patient continues to present with grossly intact cognitive-communication skills and is limited by bulbar weakness negatively impacting his voice, motor speech and swallow function. He is benefiting from AAC alphabet and basic need communication boards in his room with partner A for scanning, as well as nodding head Y and N. Pt is still not able to trigger a reflexive swallow but is tolerating the speaking valve well despite copious secretions. He will need direct supervision with any speaking valve  placemen due to amount of secretions and need for oral/tracheal suctioning.      Recommendations  Treatment Completed: Speech-Language Treatment, Voice Prosthesis Training     - The pt is able to best communicate in the following ways:  1) Nodding yes/no in response to close ended yes/no questions  2) Nodding to select a target on a communication board (field of 6, large print 1-2 word options)  3) Nodding to select a target on an alphabet board using the scanning method (AAC boards and instructions left in the pt's room)       Speech-Language Treatment  Speech-Language Treatment: PMV trials, AAC training, dysarthria/voice training    Cognitive Treatment  Supervision Needs Upons Discharge: Direct assistance with IADLs (see below) (due to physical impairments)    Voice Prosthesis Training  Placement: Trained staff only  Duration: 15-30 minutes w/ close supervision due to secretions      SLP Treatment Plan  Treatment Plan: Voice Prosthesis Training, Speech-Language Treatment, Patient/Family/Caregiver Training  SLP Frequency: 4x Per Week  Estimated Duration: Until Therapy Goals Met      Anticipated Discharge Needs  Discharge Recommendations: Recommend post-acute placement for additional speech therapy services prior to discharge home  Therapy Recommendations Upon DC: Dysphagia Training, Expression Training, AAC Training / Development, Patient / Family / Caregiver Education, Community Re-Integration, Tracheostomy Training      Patient / Family Goals  Patient / Family Goal #1: Smiles to continue PMV treatment  Goal #1 Outcome: Progressing as expected  Short Term Goals  Short Term Goal # 1: Pt will tolerate PMV trials without negitive change to cardiopulmonary vitals for 20 minutes.  Goal Outcome # 1: Goal met, new goal added  Short Term Goal # 1 B : Patient will tolerate PMV trials for 30 minutes without negative changes to cardiopulmonary vitals.  Short Term Goal # 2: Pt will demo intelligible phonation at the word  level w/ > 80% intelligibility w/ mod cueing  Goal Outcome # 2 : Progressing as expected  Short Term Goal # 3: Pt will express 5 target single words using the alphabet board via scanning w/ mod cueing and > 80% accuracy  Goal Outcome  # 3: Progressing as expected  Short Term Goal # 4: Pt's family / caregivers will participate in training on utilizing AAC with the pt via demonstration teachback  Goal Outcome  # 4: Goal not met (no family present today)      Breann Dorantes MS,CCC-SLP

## 2025-04-16 NOTE — DISCHARGE PLANNING
Case Management Discharge Planning    Admission Date: 3/25/2025  GMLOS: 23.1  ALOS: 22    6-Clicks ADL Score: 6  6-Clicks Mobility Score: 6  PT and/or OT Eval ordered: Yes  Post-acute Referrals Ordered: Yes  Post-acute Choice Obtained: Yes  Has referral(s) been sent to post-acute provider:  Yes      Anticipated Discharge Dispo: Discharge Disposition: Disch to a long term care facility (63)    DME Needed: No    Action(s) Taken: OTHER    1250 - Spoke to [facility] liaison Renetta who reports will discuss and review pt with administration regarding acceptance/denial.  Renetta confirmed will call this writer back with any updates.  Renetta reports that insurance authorization has been re-submitted.    1200 - Spoke with Lawrence in admissions at [facility], [place] who requested that referral be re-submitted.  DPA aware.      1210 -TC placed to [facility], LVM with Yg requesting a call back regarding referral.      1255 - Received message from [facility]  [place] [phone] extension [phone], [facility] requesting a call back to direct line.    1356 - Spoke with Tony at [facility] who confirmed will review pt clinicals and possibly visit with pt at bedside tomorrow.    1400 - Spoke with [facility]  [place] [phone] extension [phone] who reports spoke with [facility] and reports that pt's insurance coverage for duration  LTAC placement has been confirmed.  [place] reports discussed with [facility] that insurance authorization will be resubmitted today.    Escalations Completed: None    Medically Clear: Yes    Next Steps: Follow up with [facility], [facility], [place], and [facility].      Barriers to Discharge: Pending Placement, Pending Insurance Authorization, and Transportation    Is the patient up for discharge tomorrow: No

## 2025-04-16 NOTE — CARE PLAN
The patient is Stable - Low risk of patient condition declining or worsening    Shift Goals  Clinical Goals: Mont[place] neuro status, Manage secretions, Maintain skin integrity  Patient Goals: JAMARI  Family Goals: JAMARI    Progress made toward(s) clinical / shift goals:  progressing    Patient is not progressing towards the following goals:      Problem: Mobility - Stroke  Goal: Patient's capacity to carry out activities will improve  Outcome: Not Progressing     Problem: Self Care  Goal: Patient will have the ability to perform ADLs independently or with assistance (bathe, groom, dress, toilet and feed)  Outcome: Not Progressing

## 2025-04-16 NOTE — PROGRESS NOTES
[facility] Medicine Daily Progress Note    Date of Service  4/16/2025    Chief Complaint  Jonna Brian is a 49 y.o. male admitted 3/25/2025 with headache and weakness.    Hospital Course    This is a 49 y.o. male who was initially admitted to the hospital on 3/25/2025 with global weakness, aphasia and ataxia.  His last known normal was 1630 on March 24.  He was initially admitted to the hospital floor, his CT head as well as CTA of the head and neck were within normal limits.  There was consideration for possible Guillain-Barré syndrome.     His condition continued to deteriorate and he underwent a lumbar puncture which revealed elevated protein but no evidence of infection.  He had progressive bulbar symptoms and weakness and was transferred to the ICU.  Unfortunately shortly after he arrived in the ICU he developed stridor and required emergent intubation on 3/27.       He then underwent an MRI of his brain and spine which unfortunately revealed small areas of acute infarcts in the lower jero and the upper medulla on both sides of the midline.  He also had chronic infarcts in his jero and right inferior cerebellum.  They were chronically Cuna in the bilateral basal ganglia, the right thalamus and the right periventricular white matter.  He had evidence of small vessel disease as well in the periventricular white matter.     Ultimately it appears he has been suffering multiple small strokes and his progressive decline was due to acute infarcts in the lower jero and upper medulla.  Since that time he has not recovered function, has required tracheostomy and PEG placement with the ultimate goal of transferring him to postacute medical to see what function may be regained in time.    Patient able to nod head, shakes head for yes/no, able to use alphabet sheet to make needs known. He continues to work with PT/OT/SLP therapies with some minor signs of improvement. Veratent is medically cleared, plan for discharge to  LTAC for ongoing management of tracheostomy tube, PEG tube, therapies.    Interval Problem Update  Patient was seen and examined at bedside.  No acute events overnight. Patient is resting comfortably in bed and in no acute distress.     4L T piece  Secretion burden improved with chest physiotherapy  Patient is medically cleared, pending LTAC.      I have discussed this patient's plan of care and discharge plan at IDT rounds today with Case Management, Nursing, Nursing leadership, and other members of the IDT team.    Consultants/Specialty  critical care I discussed with Dr. Torres    Code Status  Full Code    Disposition  The patient is not medically cleared for discharge to home or a post-acute facility.      I have placed the appropriate orders for post-discharge needs.    Review of Systems  Review of Systems   Unable to perform ROS: Patient nonverbal        Physical Exam  Temp:  [36.2 °C (97.2 °F)-37.3 °C (99.1 °F)] 36.7 °C (98.1 °F)  Pulse:  [] 83  Resp:  [18] 18  BP: (110-125)/(71-80) 111/71  SpO2:  [92 %-96 %] 93 %    Physical Exam  Constitutional:       Appearance: He is ill-appearing.   HENT:      Right Ear: External ear normal.      Left Ear: External ear normal.   Neck:      Comments: Tracheostomy on Tpiece  Cardiovascular:      Rate and Rhythm: Normal rate and regular rhythm.   Pulmonary:      Breath sounds: Rhonchi present.      Comments: Significant secretion burden  Abdominal:      Tenderness: There is no abdominal tenderness.      Comments: PEG   Neurological:      Mental Status: He is alert.      Comments: He is able to slightly move his left upper and lower extremity.  He was able to nod his head to answer question.         Fluids    Intake/Output Summary (Last 24 hours) at 4/16/2025 1416  Last data filed at 4/16/2025 0545  Gross per 24 hour   Intake 1448 ml   Output 1600 ml   Net -152 ml        Laboratory  Recent Labs     04/14/25  0334 04/15/25  0749 04/16/25  0800   WBC 8.3 11.7* 7.7   RBC  4.80 5.18 4.85   HEMOGLOBIN 13.2* 13.8* 13.2*   HEMATOCRIT 41.1* 43.6 41.3*   MCV 85.6 84.2 85.2   MCH 27.5 26.6* 27.2   MCHC 32.1* 31.7* 32.0*   RDW 38.8 37.7 38.5   PLATELETCT 400 464* 369   MPV 9.6 9.4 9.6     Recent Labs     04/14/25  0334 04/15/25  0749 04/16/25  0800   SODIUM 137 138 134*   POTASSIUM 4.3 3.4* 3.5*   CHLORIDE 94* 94* 95*   CO2 32 31 28   GLUCOSE 133* 158* 168*   BUN 32* 29* 32*   CREATININE 0.83 0.88 0.84   CALCIUM 9.5 10.0 9.4                   Imaging  DX-CHEST-PORTABLE (1 VIEW)   Final Result      Hypoinflation with bibasilar atelectasis.      US-RENAL   Final Result      1.  No hydronephrosis.      DX-CHEST-PORTABLE (1 VIEW)   Final Result         1.  Left basilar atelectasis and/or subtle infiltrates, similar to prior study      DX-CHEST-PORTABLE (1 VIEW)   Final Result         1.  Left basilar atelectasis, no focal infiltrate   2.  Cardiomegaly      DX-G.I. TUBE INJECTION, ANY TYPE   Final Result      Contrast from a PEG tube injection extends into the stomach without evidence of contrast leak.      CT-CHEST,ABDOMEN,PELVIS WITH   Final Result      1.  Large amount of free intraperitoneal air within the abdomen again seen as was previously identified on chest x-ray by review of the patient's chart, a percutaneous gastrostomy tube was placed on 3/31/2025 and is free intraperitoneal air is possibly    secondary to that recent procedure.      2.  Bibasal atelectasis and small bilateral pleural effusions.      3.  Tracheostomy tube present.      4.  No evidence of bowel obstruction or free fluid within the abdomen or pelvis.      DX-CHEST-PORTABLE (1 VIEW)   Final Result         1.  Bibasilar atelectasis   2.  Intra-abdominal free air, can be associated with history of percutaneous gastrostomy, consider other viscus perforation as warranted. Could be further evaluated with CT of the abdomen with contrast as clinically appropriate.      These findings were discussed with the patient's  clinician, Bravo Lala Iv, on 4/2/2025 7:57 AM.      DX-CHEST-PORTABLE (1 VIEW)   Final Result      No evidence of acute cardiopulmonary process.      DX-ABDOMEN FOR TUBE PLACEMENT   Final Result      1.  Enteric tube extends in the fundus of stomach.      DX-ABDOMEN FOR TUBE PLACEMENT   Final Result      The gastric tube has been removed and replaced with a small bowel feeding tube which terminate in the proximal stomach.      DX-CHEST-PORTABLE (1 VIEW)   Final Result      Atelectasis within the left lung base.      MR-THORACIC SPINE-WITH & W/O   Final Result      1.  There is no abnormal intramedullary T2 signal intensity in the thoracic spinal cord.   2.  Mild degenerative disease at T8-9.   3.  Left lower segmental consolidation.      MR-CERVICAL SPINE-WITH & W/O   Final Result      1.  Small areas of acute infarcts in the lower jero and upper medulla involving both sides of the midline. There are chronic infarcts in the jero and right inferior cerebellum.   2.  There is no abnormal intramedullary T2 signal intensity in the cervical spinal cord to suggest demyelinating lesions. There is no MR evidence of compressive myelopathy.   3.  Mild degenerative disease.            MR-BRAIN-WITH & W/O   Final Result      1.  Small areas of acute infarcts in the lower jero and upper medulla involving both sides of the midline.   2.  There are chronic infarcts in the jero and right inferior cerebellum. . There are areas of chronic lacunae in the bilateral basal ganglia, right thalamus and right periventricular white matter.   3.  There are nonspecific T2 hyperintensities in the periventricular white matter likely representing chronic small vessel disease.   4.  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      MR-LUMBAR SPINE-WITH & W/O   Final Result      1.  Unremarkable pre and postcontrast MR examination of the lumbar spine.   2.  Clinical suspicious for GBS:  "There is no abnormal enhancement of the lumbar nerve roots.      DX-ABDOMEN FOR TUBE PLACEMENT   Final Result      NG tube tip projects at the peripyloric region.      DX-CHEST-PORTABLE (1 VIEW)   Final Result      1.  Low lung volumes without definite acute cardiopulmonary abnormality.   2.  Support apparatus as above.      CT-HEAD W/O   Final Result      1.  No acute intracranial abnormality.   2.  Remote right cerebellar infarct.               DX-CHEST-PORTABLE (1 VIEW)   Final Result      No acute cardiopulmonary disease evident.      DX-CHEST-PORTABLE (1 VIEW)   Final Result      No acute cardiopulmonary disease evident.      CT-CEREBRAL PERFUSION ANALYSIS   Final Result      1. Cerebral blood flow less than 30% possibly representing completed infarct = 0 mL. Based on distribution of this finding, this is unlikely to represent artifact.      2. T Max more than 6 seconds possibly representing combination of completed infarct and ischemia = 7 mL. Based on the distribution of this finding, this is possibly artifact.      3. Mismatched volume possibly representing ischemic brain/penumbra= 0 mL      4.  Please note that this cerebral perfusion study and report is Quantitative and targets supratentorial (cerebral) perfusion for evaluation of large vessel territory acute ischemia/infarction. For example, lacunar infarcts, and brainstem/posterior fossa    ischemia/infarction are not evaluated on this study.  Data acquisition is subject to artifacts which can yield non-anatomically plausible perfusion maps which may be due to motion, bolus timing, signal to noise ratio, or other technical factors.    Perfusion map abnormalities which show non-anatomic distributions are likely artifact.   This study is not \"stand-alone\" and should only be utilized for diagnosis, management/treatment in correlation with CT, CTA, and/or MRI and clinical factors.         CT-CTA NECK WITH & W/O-POST PROCESSING   Final Result      CT " angiogram of the neck within normal limits.      CT-CTA HEAD WITH & W/O-POST PROCESS   Final Result      CT angiogram of the [place] within normal limits.      CT-HEAD W/O   Final Result      Head CT without contrast within normal limits. No evidence of acute cerebral infarction, hemorrhage or mass lesion.                    Assessment/Plan  * Acute ischemic stroke (HCC)- (present on admission)  Assessment & Plan  Acute ischemic stroke  MRI of the brain revealed acute infarcts of the lower jero and upper medulla.  He effectively is a quadriplegic and required tracheostomy and PEG tube.  Aspirin and statin  Blood pressure control  Family hope for functional recovery    Acute neuromuscular respiratory failure (HCC)- (present on admission)  Assessment & Plan  Requiring tracheostomy    Proteus mirabilis pneumonia (HCC)- (present on admission)  Assessment & Plan  Treated with Zosyn de-escalated to Augmentin based on cultures from sputum    Type 2 diabetes mellitus with hyperglycemia, without long-term current use of insulin (HCC)- (present on admission)  Assessment & Plan  Details are unclear  Reportedly he is on metformin as an outpatient  Hemoglobin A1c 6.3  He does not require insulin    Anxiety  Assessment & Plan  Ativan for anxiety.     Hematuria  Assessment & Plan  Darkened urine, no gross hematuria  No recent grove trauma  Renal US normal, no stones  Continue antibiotics for possible UTI given mild signs of infection on UA  If hematuria persists after keflex treatment, consider urology consult/referral    Primary hypertension- (present on admission)  Assessment & Plan  Blood pressure is appropriate with hydrochlorothiazide 25 mg daily and Norvasc 10 mg daily             VTE prophylaxis: VTE Selection    I have performed a physical exam and reviewed and updated ROS and Plan today (4/16/2025). In review of yesterday's note (4/15/2025), there are no changes except as documented above.    Greater than 53  minutes spent prepping to see patient (e.g. review of tests) obtaining and/or reviewing separately obtained history. Performing a medically appropriate examination and/ evaluation.  Counseling and educating the patient/family/caregiver.  Ordering medications, tests, or procedures.  Referring and communicating with other health care professionals.  Documenting clinical information in EPIC.  Independently interpreting results and communicating results to patient/family/caregiver.  Care coordination.

## 2025-04-16 NOTE — PROGRESS NOTES
Monitor Summary: SR 86-95, PA 0.15, QRS 0.08, QT 0.36, with rare PVCs per strip from monitor room.

## 2025-04-16 NOTE — CARE PLAN
Problem: Aerosol Therapy  Goal: Improved hydration/ability to mobilize secretions and/or decreased airway edema  Description: Target End Date:  resolve prior to discharge or when underlying condition is resolved/stabilized1.  Implement heated or cool aerosol therapy2.  Assessed for optimal hydration, decreased edema and/or improved ability to mobilize secretions  Outcome: Not Met     Problem: Bronchopulmonary Hygiene  Goal: Increase mobilization of retained secretions  Description: 1.  Perform bronchopulmonary therapy as indicated by assessment2.  Perform airway suctioning3.  Perform actions to maintain patient airway  Outcome: Not Met   4 28  Muco ALB ca

## 2025-04-16 NOTE — CARE PLAN
The patient is Watcher - Medium risk of patient condition declining or worsening    Shift Goals  Clinical Goals: [place] neuro status, Manage secretions, Maintain skin integrity  Patient Goals: JAMARI  Family Goals: JAMARI    Progress made toward(s) clinical / shift goals:  Patient is A&Ox4. Patient nods appropriately to yes/no questions. Copious oral and tracheal secretions requiring frequent suctioning. Q2 hour turns in place using TAPS. Condom cath in place for voiding. Heel and elbow mepilex on. Heel float boot and foot-drop boot on and alternating Q2 hours with turns. HOB at 30 degrees or greater. Bed is low and locked. Bed alarm on. Call light within reach. Hourly rounding continues.     Patient is not progressing towards the following goals:      Problem: Psychosocial - Patient Condition  Goal: Patient's ability to verbalize feelings about condition will improve  Outcome: Not Progressing  Note: Patient remains non-verbal but nods appropriately.     Problem: Mobility - Stroke  Goal: Patient's capacity to carry out activities will improve  Outcome: Not Progressing  Note: Patient is unable to mobilize. Max assist. Q2 hour turns in place.      Problem: Self Care  Goal: Patient will have the ability to perform ADLs independently or with assistance (bathe, groom, dress, toilet and feed)  Outcome: Not Progressing  Note: Patient is unable to perform ADL's independently.

## 2025-04-16 NOTE — PROGRESS NOTES
Monitor summary: SR/ST , TN 0.14, QRS 0.07, QT 0.39, with rare PVCs per strip from monitor room.

## 2025-04-17 LAB
ANION GAP SERPL CALC-SCNC: 10 MMOL/L (ref 7–16)
BUN SERPL-MCNC: 29 MG/DL (ref 8–22)
CALCIUM SERPL-MCNC: 9.8 MG/DL (ref 8.5–10.5)
CHLORIDE SERPL-SCNC: 92 MMOL/L (ref 96–112)
CO2 SERPL-SCNC: 34 MMOL/L (ref 20–33)
CREAT SERPL-MCNC: 0.84 MG/DL (ref 0.5–1.4)
ERYTHROCYTE [DISTWIDTH] IN BLOOD BY AUTOMATED COUNT: 38.5 FL (ref 35.9–50)
GFR SERPLBLD CREATININE-BSD FMLA CKD-EPI: 108 ML/MIN/1.73 M 2
GLUCOSE SERPL-MCNC: 136 MG/DL (ref 65–99)
HCT VFR BLD AUTO: 41.2 % (ref 42–52)
HGB BLD-MCNC: 13.3 G/DL (ref 14–18)
MCH RBC QN AUTO: 27.4 PG (ref 27–33)
MCHC RBC AUTO-ENTMCNC: 32.3 G/DL (ref 32.3–36.5)
MCV RBC AUTO: 84.9 FL (ref 81.4–97.8)
PLATELET # BLD AUTO: 403 K/UL (ref 164–446)
PMV BLD AUTO: 10 FL (ref 9–12.9)
POTASSIUM SERPL-SCNC: 3.4 MMOL/L (ref 3.6–5.5)
RBC # BLD AUTO: 4.85 M/UL (ref 4.7–6.1)
SODIUM SERPL-SCNC: 136 MMOL/L (ref 135–145)
WBC # BLD AUTO: 8.6 K/UL (ref 4.8–10.8)

## 2025-04-17 PROCEDURE — 94669 MECHANICAL CHEST WALL OSCILL: CPT

## 2025-04-17 PROCEDURE — A9270 NON-COVERED ITEM OR SERVICE: HCPCS | Performed by: INTERNAL MEDICINE

## 2025-04-17 PROCEDURE — A9270 NON-COVERED ITEM OR SERVICE: HCPCS | Performed by: STUDENT IN AN ORGANIZED HEALTH CARE EDUCATION/TRAINING PROGRAM

## 2025-04-17 PROCEDURE — 770020 HCHG ROOM/CARE - TELE (206)

## 2025-04-17 PROCEDURE — 700101 HCHG RX REV CODE 250: Performed by: STUDENT IN AN ORGANIZED HEALTH CARE EDUCATION/TRAINING PROGRAM

## 2025-04-17 PROCEDURE — 80048 BASIC METABOLIC PNL TOTAL CA: CPT

## 2025-04-17 PROCEDURE — 94640 AIRWAY INHALATION TREATMENT: CPT

## 2025-04-17 PROCEDURE — 700102 HCHG RX REV CODE 250 W/ 637 OVERRIDE(OP): Performed by: INTERNAL MEDICINE

## 2025-04-17 PROCEDURE — 700111 HCHG RX REV CODE 636 W/ 250 OVERRIDE (IP): Mod: JZ | Performed by: INTERNAL MEDICINE

## 2025-04-17 PROCEDURE — 85027 COMPLETE CBC AUTOMATED: CPT

## 2025-04-17 PROCEDURE — 36415 COLL VENOUS BLD VENIPUNCTURE: CPT

## 2025-04-17 PROCEDURE — 700102 HCHG RX REV CODE 250 W/ 637 OVERRIDE(OP): Performed by: STUDENT IN AN ORGANIZED HEALTH CARE EDUCATION/TRAINING PROGRAM

## 2025-04-17 PROCEDURE — 97110 THERAPEUTIC EXERCISES: CPT

## 2025-04-17 PROCEDURE — 99233 SBSQ HOSP IP/OBS HIGH 50: CPT | Performed by: INTERNAL MEDICINE

## 2025-04-17 PROCEDURE — 97535 SELF CARE MNGMENT TRAINING: CPT

## 2025-04-17 PROCEDURE — 700111 HCHG RX REV CODE 636 W/ 250 OVERRIDE (IP): Mod: JZ

## 2025-04-17 RX ORDER — POTASSIUM CHLORIDE 1500 MG/1
40 TABLET, EXTENDED RELEASE ORAL ONCE
Status: COMPLETED | OUTPATIENT
Start: 2025-04-17 | End: 2025-04-17

## 2025-04-17 RX ORDER — GUAIFENESIN 600 MG/1
600 TABLET, EXTENDED RELEASE ORAL EVERY 12 HOURS
Status: DISCONTINUED | OUTPATIENT
Start: 2025-04-17 | End: 2025-04-18

## 2025-04-17 RX ADMIN — GABAPENTIN 400 MG: 400 CAPSULE ORAL at 13:30

## 2025-04-17 RX ADMIN — SENNOSIDES AND DOCUSATE SODIUM 2 TABLET: 50; 8.6 TABLET ORAL at 04:51

## 2025-04-17 RX ADMIN — POTASSIUM CHLORIDE 40 MEQ: 1500 TABLET, EXTENDED RELEASE ORAL at 09:30

## 2025-04-17 RX ADMIN — ACETYLCYSTEINE 3 ML: 200 SOLUTION ORAL; RESPIRATORY (INHALATION) at 10:42

## 2025-04-17 RX ADMIN — CEPHALEXIN 500 MG: 250 FOR SUSPENSION ORAL at 00:47

## 2025-04-17 RX ADMIN — GUAIFENESIN 600 MG: 600 TABLET, EXTENDED RELEASE ORAL at 19:34

## 2025-04-17 RX ADMIN — ALBUTEROL SULFATE 2.5 MG: 2.5 SOLUTION RESPIRATORY (INHALATION) at 10:42

## 2025-04-17 RX ADMIN — HYDROCHLOROTHIAZIDE 25 MG: 25 TABLET ORAL at 04:51

## 2025-04-17 RX ADMIN — ACETYLCYSTEINE 3 ML: 200 SOLUTION ORAL; RESPIRATORY (INHALATION) at 07:30

## 2025-04-17 RX ADMIN — SCOPOLAMINE 1 PATCH: 1.5 PATCH, EXTENDED RELEASE TRANSDERMAL at 13:30

## 2025-04-17 RX ADMIN — ALBUTEROL SULFATE 2.5 MG: 2.5 SOLUTION RESPIRATORY (INHALATION) at 14:48

## 2025-04-17 RX ADMIN — ALBUTEROL SULFATE 2.5 MG: 2.5 SOLUTION RESPIRATORY (INHALATION) at 07:30

## 2025-04-17 RX ADMIN — ACETYLCYSTEINE 3 ML: 200 SOLUTION ORAL; RESPIRATORY (INHALATION) at 19:02

## 2025-04-17 RX ADMIN — BISACODYL 10 MG: 10 SUPPOSITORY RECTAL at 18:06

## 2025-04-17 RX ADMIN — ALBUTEROL SULFATE 2.5 MG: 2.5 SOLUTION RESPIRATORY (INHALATION) at 19:02

## 2025-04-17 RX ADMIN — SENNOSIDES AND DOCUSATE SODIUM 2 TABLET: 50; 8.6 TABLET ORAL at 18:05

## 2025-04-17 RX ADMIN — ENOXAPARIN SODIUM 40 MG: 100 INJECTION SUBCUTANEOUS at 18:05

## 2025-04-17 RX ADMIN — CEPHALEXIN 500 MG: 250 FOR SUSPENSION ORAL at 13:30

## 2025-04-17 RX ADMIN — ONDANSETRON 4 MG: 2 INJECTION INTRAMUSCULAR; INTRAVENOUS at 18:49

## 2025-04-17 RX ADMIN — GABAPENTIN 400 MG: 400 CAPSULE ORAL at 04:51

## 2025-04-17 RX ADMIN — Medication 5 MG: at 23:20

## 2025-04-17 RX ADMIN — ATORVASTATIN CALCIUM 80 MG: 80 TABLET, FILM COATED ORAL at 18:05

## 2025-04-17 RX ADMIN — AMLODIPINE BESYLATE 10 MG: 10 TABLET ORAL at 04:51

## 2025-04-17 RX ADMIN — ASPIRIN 81 MG: 81 TABLET, CHEWABLE ORAL at 04:51

## 2025-04-17 RX ADMIN — GABAPENTIN 400 MG: 400 CAPSULE ORAL at 18:05

## 2025-04-17 RX ADMIN — CEPHALEXIN 500 MG: 250 FOR SUSPENSION ORAL at 04:51

## 2025-04-17 RX ADMIN — ACETYLCYSTEINE 3 ML: 200 SOLUTION ORAL; RESPIRATORY (INHALATION) at 14:48

## 2025-04-17 ASSESSMENT — COGNITIVE AND FUNCTIONAL STATUS - GENERAL
EATING MEALS: TOTAL
DRESSING REGULAR UPPER BODY CLOTHING: TOTAL
SUGGESTED CMS G CODE MODIFIER DAILY ACTIVITY: CN
HELP NEEDED FOR BATHING: TOTAL
DRESSING REGULAR LOWER BODY CLOTHING: TOTAL
PERSONAL GROOMING: TOTAL
DAILY ACTIVITIY SCORE: 6
TOILETING: TOTAL

## 2025-04-17 ASSESSMENT — PAIN DESCRIPTION - PAIN TYPE
TYPE: ACUTE PAIN
TYPE: ACUTE PAIN

## 2025-04-17 ASSESSMENT — FIBROSIS 4 INDEX: FIB4 SCORE: 0.65

## 2025-04-17 NOTE — THERAPY
"Occupational Therapy  Daily Treatment     Patient Name: Jonna Brian  Age:  49 y.o., Sex:  male  Medical Record #: [id number]  Today's Date: 4/17/2025     Precautions: Fall Risk, Tracheostomy , Swallow Precautions, PEG Tube  Comments: R hemiplegia, copious secretions    Assessment    Pt seen for OT tx to include: PROM to RUE (continues to demo 0/5 strength), AAROM LUE (2- to 2/5 all motor groups) as well as PNF patterns to LUE. Educated pt on tenodesis to facilitate digit flexion. Engaged pt in hand-over-hand grooming task using tenodesis to grasp items (total A). Pt used letter board to spell \"shock therapy\" inquiring about e-stim. Pt demos slight improvement in LUE strength. Will benefit from ongoing acute OT.     Plan    Treatment Plan Status: Continue Current Treatment Plan  Type of Treatment: Self Care / Activities of Daily Living, Adaptive Equipment, Cognitive Skill Development, Neuro Re-Education / Balance, Therapeutic Exercises, Therapeutic Activity, Family / Caregiver Training  Treatment Frequency: 4 Times per Week  Treatment Duration: Until Therapy Goals Met    DC Equipment Recommendations: Unable to determine at this time  Discharge Recommendations: Recommend post-acute placement for additional occupational therapy services prior to discharge home    Subjective    Via spelling board pt stated \"shock therapy\"; confirmed he was inquiring about e-stim option to facilitate motor to RUE     Objective       04/17/25 1121   Vitals   O2 (LPM) 4   O2 Delivery Device T-Piece   Pain   Pain Scales Non Verbal Scale   Pain 0 - 10 Group   Therapist Pain Assessment Post Activity Pain Same as Prior to Activity;Nurse Notified  (no outward signs of pain)   Non Verbal Descriptors   Non Verbal Scale  Calm;Unlabored Breathing   Cognition    Cognition / Consciousness X   Speech/ Communication Nods Appropriately  (attempting to mouth words; using spelling board)   Level of Consciousness Alert   Ability To Follow Commands 1 Step "   Passive ROM Upper Body   Passive ROM Upper Body WDL   Active ROM Upper Body   Active ROM Upper Body  X   Dominant Hand Right   Comments no AROM to RUE; weak AROM to LUE in supported positions only   Strength Upper Body   Upper Body Strength  X   Lt Shoulder Flexion Strength 2- (P-)   Lt Shoulder Abduction Strength 2- (P-)   Lt Shoulder Adduction Strength 2- (P-)   Lt Elbow Flexion Strength 2- (P-)   Lt Elbow Extension Strength 2- (P-)   Lt Forearm Supination Strength 2- (P-)   Lt Forearm Pronation Strength 2- (P-)   Lt Wrist Flexion Strength 2- (P-)   Lt Wrist Extension Strength 2 (P)   Left  Impaired   Comments RUE 0/5 throughout   Upper Body Muscle Tone   Upper Body Muscle Tone  X   Rt Upper Extremity Muscle Tone Hypotonic;Non Functional   Lt Upper Extremity Muscle Tone Hypotonic  (able to use limb with max hand-over-hand assist for grooming tasks)   Supine Upper Body Exercises   Supine Upper Body Exercises Yes  (Carreon's position)   Comments AAROM and PROM to BUE all joints; PNF patterns 1&2 to LUE   Hand Strengthening   Hand Strengthening Right Pinch   Comment Educated pt on tenodesis pinch and engaged in use of strategy with hand-over-hand grooming tasks   Other Treatments   Other Treatments Provided Head and neck control in supported position. Pt demos improved head turn to R compared to last session. Utilized letter board for communication via weak head nod/shake   Activities of Daily Living   Grooming Total Assist  (suction oral care with hand-over-hand assist)   Skilled Intervention Tactile Cuing;Verbal Cuing;Compensatory Strategies   Visual Perception   Visual Perception  X   Comments able to see letters on letter board; delayed tracking all quadrants   Patient / Family Goals   Patient / Family Goal #1 to go home   Short Term Goals   Short Term Goal # 1 Pt will complete suction oral care in supported position with mod A using AE PRN  (4/17 goal updated)   Goal Outcome # 1 Progressing slower than  expected   Short Term Goal # 2 UB dressing with mod A  (4/17 goal updated)   Goal Outcome # 2   (NT this session)   Short Term Goal # 3 BSC txf with max A  (4/17 goal updated)   Goal Outcome # 3   (NT this session)   Short Term Goal # 4 Pt will sit with min A >2 minutes for participation in ADL  (4/17 goal updated)   Education Group   Additional Comments Ongoing education on repositioning head as able   Interdisciplinary Plan of Care Collaboration   IDT Collaboration with  Nursing   Patient Position at End of Therapy In Bed;Bed Alarm On;Call Light within Reach   Collaboration Comments OT results and recs   Session Information   Date / Session Number  4/17 #7 (2/4, 4/20)

## 2025-04-17 NOTE — THERAPY
Physical Therapy   Daily Treatment     Patient Name: Jonna Brian  Age:  49 y.o., Sex:  male  Medical Record #: [id number]  Today's Date: 4/16/2025     Precautions  Precautions: Fall Risk;Swallow Precautions;Tracheostomy ;PEG Tube  Comments: R hemiplegia, copious secretions    Assessment  Pt seen for PT tx session with mobility detailed down below. Pt continues to be at Total A for bed mobility and needing consistent postural support at EOB. Pt demonstrated improved head control today and requested to spend more time at EOB to keep working. Pt needing intermittent oral and tracheal suction throughout, noted to have one minor episode of emesis. Pt sitting at EOB for roughly 35 minutes today. Continue to recommend placement, will follow.     Plan    Treatment Plan Status: Continue Current Treatment Plan  Type of Treatment: Bed Mobility, Family / Caregiver Training, Gait Training, Neuro Re-Education / Balance, Self Care / Home Evaluation, Stair Training, Therapeutic Activities, Therapeutic Exercise  Treatment Frequency: 5 Times per Week  Treatment Duration: Until Therapy Goals Met    DC Equipment Recommendations: Unable to determine at this time  Discharge Recommendations: Recommend post-acute placement for additional physical therapy services prior to discharge home        Vitals   O2 Delivery Device T-Piece   Vitals Comments VSS   Pain 0 - 10 Group   Therapist Pain Assessment   (no signs of pain)   Non Verbal Descriptors   Non Verbal Scale  Calm   Cognition    Speech/ Communication Nods Appropriately   Level of Consciousness Responds to voice   Ability To Follow Commands 1 Step   Sitting Lower Body Exercises   Sitting Lower Body Exercises Yes   Long Arc Quad 3 sets of 10;Left   Other Treatments   Other Treatments Provided left hand and UE movements appear to be improving slightly. working on head posture throughout session, pt able to hold his head fully upright for 10-15 seconds at a time and control lowering it  down. still has tendency to turn to the right as expected given strong left SCM, however he is able to keep his head more midline today   Balance   Sitting Balance (Static) Dependent   Sitting Balance (Dynamic) Dependent   Weight Shift Sitting Absent   Skilled Intervention Verbal Cuing;Tactile Cuing;Postural Facilitation   Comments continual postural and head support throughout   Bed Mobility    Supine to Sit Total Assist   Sit to Supine Total Assist   Scooting Total Assist   Rolling Total Assist to Lt.;Total Assist to Rt.   Skilled Intervention Verbal Cuing   Comments 2p assist   Gait Analysis   Gait Level Of Assist Unable to Participate   Functional Mobility   Sit to Stand Unable to Participate   Bed, Chair, Wheelchair Transfer Unable to Participate   Mobility EOB only   6 Clicks Assessment - How much HELP from from another person do you currently need... (If the patient hasn't done an activity recently, how much help from another person do you think he/she would need if he/she tried?)   Turning from your back to your side while in a flat bed without using bedrails? 1   Moving from lying on your back to sitting on the side of a flat bed without using bedrails? 1   Moving to and from a bed to a chair (including a wheelchair)? 1   Standing up from a chair using your arms (e.g., wheelchair, or bedside chair)? 1   Walking in hospital room? 1   Climbing 3-5 steps with a railing? 1   6 clicks Mobility Score 6   Short Term Goals    Short Term Goal # 1 pt will move supine<>eob with min a in 6 tx for bed mobility.   Goal Outcome # 1 goal not met   Short Term Goal # 2 pt will sit at eob for 5 min with fair- balance in 6 tx for oob tolerance.   Goal Outcome # 2 Goal not met   Short Term Goal # 3 pt will complete sts with hemiwalker and min a in 6 tx for functional mobility.   Goal Outcome # 3 Goal not met   Short Term Goal # 4 pt will hold head in upright position for 30 seconds for 6 tx for posture.   Goal Outcome # 4    (added 4/16)   Physical Therapy Treatment Plan   Physical Therapy Treatment Plan Continue Current Treatment Plan   Anticipated Discharge Equipment and Recommendations   DC Equipment Recommendations Unable to determine at this time   Discharge Recommendations Recommend post-acute placement for additional physical therapy services prior to discharge home   Interdisciplinary Plan of Care Collaboration   IDT Collaboration with  Nursing;Therapy Tech   Patient Position at End of Therapy In Bed;Chair Alarm On;Call Light within Reach   Collaboration Comments RN updated   Session Information   Date / Session Number  4/16- 9 (4/5, 4/16)

## 2025-04-17 NOTE — CARE PLAN
The patient is Stable - Low risk of patient condition declining or worsening    Shift Goals  Clinical Goals: neuro exam  Patient Goals: updates  Family Goals: jennifer    Progress made toward(s) clinical / shift goals:    Problem: Optimal Care of the Stroke Patient  Goal: Optimal acute care for the stroke patient  Description: Target End Date:  1 to 3 days- Vital signs and neuro checks performed and documented per order- NIHSS completed and documented per order- Continuous telemetry monitoring for 72 hours or until discontinued by provider- Head CT without contrast obtained- Consideration of MRI/MRA-    Dysphagia screen completed and documented prior to any PO intake.  Speech Therapy evaluation - Rehabilitation assessment including PT/OT/SLP evaluations for referral to Physical Medicine and Rehabilitation services. If none needed, provider needs to document reason- Neurology consult placed- Consideration of cardiology consult for cryptogenic strokes  Outcome: Progressing     Problem: Neuro Status  Goal: Neuro status will remain stable or improve  Description: Target End Date:  Prior to discharge or change in level of careDocument on Neuro assessment in the Assessment flowsheet1.  Assess and monitor neurologic status per provider order/protocol/unit policy2.  Assess level of consciousness and orientation3.  Assess for speech, dysarthria, dysphagia, facial symmetry4.  Assess visual field, eye movements, gaze preference, pupil reaction and size5.  Assess muscle strength and motor response in all four extremities6.  Assess for sensation (numbness and tingling)7.  Assess basic neuro reflexes (cough, gag, corneal)8.  Identify changes in neuro status and report to provider for testing/treatment orders  Outcome: Progressing       Patient is not progressing towards the following goals:

## 2025-04-17 NOTE — DISCHARGE PLANNING
Case Management Discharge Planning    Admission Date: 3/25/2025  GMLOS: 23.1  ALOS: 23    6-Clicks ADL Score: 6  6-Clicks Mobility Score: 6  PT and/or OT Eval ordered: Yes  Post-acute Referrals Ordered: Yes  Post-acute Choice Obtained: Yes  Has referral(s) been sent to post-acute provider:  Yes      Anticipated Discharge Dispo: Discharge Disposition: Disch to a long term care facility (63)    DME Needed: No    Action(s) Taken: OTHER    [facility] and [facility] have declined.      [facility] liaison confirmed will continue to follow pt, for acceptance pt would need to participate more with therapies and progress further.      TC placed to [facility], [place] with Tony requesting a call back regarding Tony's plan to visit with pt at bedside and review pt's referral.      Escalations Completed: [facility] leadership    Medically Clear: Yes    Next Steps: Follow up with [facility].  Consult [facility] leadership for alternative placement suggestions.    Barriers to Discharge: Pending Placement    Is the patient up for discharge tomorrow: No

## 2025-04-17 NOTE — PROGRESS NOTES
[facility] Medicine Daily Progress Note    Date of Service  4/17/2025    Chief Complaint  Jonna Brian is a 49 y.o. male admitted 3/25/2025 with headache and weakness.    Hospital Course    This is a 49 y.o. male who was initially admitted to the hospital on 3/25/2025 with global weakness, aphasia and ataxia.  His last known normal was 1630 on March 24.  He was initially admitted to the hospital floor, his CT head as well as CTA of the head and neck were within normal limits.  There was consideration for possible Guillain-Barré syndrome.     His condition continued to deteriorate and he underwent a lumbar puncture which revealed elevated protein but no evidence of infection.  He had progressive bulbar symptoms and weakness and was transferred to the ICU.  Unfortunately shortly after he arrived in the ICU he developed stridor and required emergent intubation on 3/27.       He then underwent an MRI of his brain and spine which unfortunately revealed small areas of acute infarcts in the lower jero and the upper medulla on both sides of the midline.  He also had chronic infarcts in his jero and right inferior cerebellum.  They were chronically Cuna in the bilateral basal ganglia, the right thalamus and the right periventricular white matter.  He had evidence of small vessel disease as well in the periventricular white matter.     Ultimately it appears he has been suffering multiple small strokes and his progressive decline was due to acute infarcts in the lower jero and upper medulla.  Since that time he has not recovered function, has required tracheostomy and PEG placement with the ultimate goal of transferring him to postacute medical to see what function may be regained in time.    Patient able to nod head, shakes head for yes/no, able to use alphabet sheet to make needs known. He continues to work with PT/OT/SLP therapies with some minor signs of improvement. Veratent is medically cleared, plan for discharge to  LTAC for ongoing management of tracheostomy tube, PEG tube, therapies.    Interval Problem Update  Patient was seen and examined at bedside.  No acute events overnight. Patient is resting comfortably in bed and in no acute distress.     4L T piece  Secretion burden improved with chest physiotherapy  Patient is medically cleared, pending LTAC.      I have discussed this patient's plan of care and discharge plan at IDT rounds today with Case Management, Nursing, Nursing leadership, and other members of the IDT team.    Consultants/Specialty  critical care I discussed with Dr. Torres    Code Status  Full Code    Disposition  Medically Cleared  I have placed the appropriate orders for post-discharge needs.    Review of Systems  Review of Systems   Unable to perform ROS: Patient nonverbal        Physical Exam  Temp:  [36.6 °C (97.9 °F)-37.2 °C (99 °F)] 36.8 °C (98.2 °F)  Pulse:  [] 85  Resp:  [16-18] 16  BP: (112-146)/(78-97) 120/82  SpO2:  [92 %-99 %] 93 %    Physical Exam  Constitutional:       Appearance: He is ill-appearing.   HENT:      Right Ear: External ear normal.      Left Ear: External ear normal.   Neck:      Comments: Tracheostomy on Tpiece  Cardiovascular:      Rate and Rhythm: Normal rate and regular rhythm.   Pulmonary:      Breath sounds: Rhonchi present.      Comments: Significant secretion burden  Abdominal:      Tenderness: There is no abdominal tenderness.      Comments: PEG   Neurological:      Mental Status: He is alert.      Comments: He is able to slightly move his left upper and lower extremity.  He was able to nod his head to answer question.         Fluids    Intake/Output Summary (Last 24 hours) at 4/17/2025 1559  Last data filed at 4/17/2025 1300  Gross per 24 hour   Intake 1909 ml   Output 700 ml   Net 1209 ml        Laboratory  Recent Labs     04/15/25  0749 04/16/25  0800 04/17/25  0440   WBC 11.7* 7.7 8.6   RBC 5.18 4.85 4.85   HEMOGLOBIN 13.8* 13.2* 13.3*   HEMATOCRIT 43.6 41.3*  41.2*   MCV 84.2 85.2 84.9   MCH 26.6* 27.2 27.4   MCHC 31.7* 32.0* 32.3   RDW 37.7 38.5 38.5   PLATELETCT 464* 369 403   MPV 9.4 9.6 10.0     Recent Labs     04/15/25  0749 04/16/25  0800 04/17/25  0440   SODIUM 138 134* 136   POTASSIUM 3.4* 3.5* 3.4*   CHLORIDE 94* 95* 92*   CO2 31 28 34*   GLUCOSE 158* 168* 136*   BUN 29* 32* 29*   CREATININE 0.88 0.84 0.84   CALCIUM 10.0 9.4 9.8                   Imaging  DX-CHEST-PORTABLE (1 VIEW)   Final Result      Hypoinflation with bibasilar atelectasis.      US-RENAL   Final Result      1.  No hydronephrosis.      DX-CHEST-PORTABLE (1 VIEW)   Final Result         1.  Left basilar atelectasis and/or subtle infiltrates, similar to prior study      DX-CHEST-PORTABLE (1 VIEW)   Final Result         1.  Left basilar atelectasis, no focal infiltrate   2.  Cardiomegaly      DX-G.I. TUBE INJECTION, ANY TYPE   Final Result      Contrast from a PEG tube injection extends into the stomach without evidence of contrast leak.      CT-CHEST,ABDOMEN,PELVIS WITH   Final Result      1.  Large amount of free intraperitoneal air within the abdomen again seen as was previously identified on chest x-ray by review of the patient's chart, a percutaneous gastrostomy tube was placed on 3/31/2025 and is free intraperitoneal air is possibly    secondary to that recent procedure.      2.  Bibasal atelectasis and small bilateral pleural effusions.      3.  Tracheostomy tube present.      4.  No evidence of bowel obstruction or free fluid within the abdomen or pelvis.      DX-CHEST-PORTABLE (1 VIEW)   Final Result         1.  Bibasilar atelectasis   2.  Intra-abdominal free air, can be associated with history of percutaneous gastrostomy, consider other viscus perforation as warranted. Could be further evaluated with CT of the abdomen with contrast as clinically appropriate.      These findings were discussed with the patient's clinician, Bravo Lala Iv, on 4/2/2025 7:57 AM.      DX-CHEST-PORTABLE (1  VIEW)   Final Result      No evidence of acute cardiopulmonary process.      DX-ABDOMEN FOR TUBE PLACEMENT   Final Result      1.  Enteric tube extends in the fundus of stomach.      DX-ABDOMEN FOR TUBE PLACEMENT   Final Result      The gastric tube has been removed and replaced with a small bowel feeding tube which terminate in the proximal stomach.      DX-CHEST-PORTABLE (1 VIEW)   Final Result      Atelectasis within the left lung base.      MR-THORACIC SPINE-WITH & W/O   Final Result      1.  There is no abnormal intramedullary T2 signal intensity in the thoracic spinal cord.   2.  Mild degenerative disease at T8-9.   3.  Left lower segmental consolidation.      MR-CERVICAL SPINE-WITH & W/O   Final Result      1.  Small areas of acute infarcts in the lower jero and upper medulla involving both sides of the midline. There are chronic infarcts in the jero and right inferior cerebellum.   2.  There is no abnormal intramedullary T2 signal intensity in the cervical spinal cord to suggest demyelinating lesions. There is no MR evidence of compressive myelopathy.   3.  Mild degenerative disease.            MR-BRAIN-WITH & W/O   Final Result      1.  Small areas of acute infarcts in the lower jero and upper medulla involving both sides of the midline.   2.  There are chronic infarcts in the jero and right inferior cerebellum. . There are areas of chronic lacunae in the bilateral basal ganglia, right thalamus and right periventricular white matter.   3.  There are nonspecific T2 hyperintensities in the periventricular white matter likely representing chronic small vessel disease.   4.  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      MR-LUMBAR SPINE-WITH & W/O   Final Result      1.  Unremarkable pre and postcontrast MR examination of the lumbar spine.   2.  Clinical suspicious for GBS: There is no abnormal enhancement of the lumbar nerve roots.      DX-ABDOMEN  "FOR TUBE PLACEMENT   Final Result      NG tube tip projects at the peripyloric region.      DX-CHEST-PORTABLE (1 VIEW)   Final Result      1.  Low lung volumes without definite acute cardiopulmonary abnormality.   2.  Support apparatus as above.      CT-HEAD W/O   Final Result      1.  No acute intracranial abnormality.   2.  Remote right cerebellar infarct.               DX-CHEST-PORTABLE (1 VIEW)   Final Result      No acute cardiopulmonary disease evident.      DX-CHEST-PORTABLE (1 VIEW)   Final Result      No acute cardiopulmonary disease evident.      CT-CEREBRAL PERFUSION ANALYSIS   Final Result      1. Cerebral blood flow less than 30% possibly representing completed infarct = 0 mL. Based on distribution of this finding, this is unlikely to represent artifact.      2. T Max more than 6 seconds possibly representing combination of completed infarct and ischemia = 7 mL. Based on the distribution of this finding, this is possibly artifact.      3. Mismatched volume possibly representing ischemic brain/penumbra= 0 mL      4.  Please note that this cerebral perfusion study and report is Quantitative and targets supratentorial (cerebral) perfusion for evaluation of large vessel territory acute ischemia/infarction. For example, lacunar infarcts, and brainstem/posterior fossa    ischemia/infarction are not evaluated on this study.  Data acquisition is subject to artifacts which can yield non-anatomically plausible perfusion maps which may be due to motion, bolus timing, signal to noise ratio, or other technical factors.    Perfusion map abnormalities which show non-anatomic distributions are likely artifact.   This study is not \"stand-alone\" and should only be utilized for diagnosis, management/treatment in correlation with CT, CTA, and/or MRI and clinical factors.         CT-CTA NECK WITH & W/O-POST PROCESSING   Final Result      CT angiogram of the neck within normal limits.      CT-CTA HEAD WITH & W/O-POST PROCESS "   Final Result      CT angiogram of the [place] within normal limits.      CT-HEAD W/O   Final Result      Head CT without contrast within normal limits. No evidence of acute cerebral infarction, hemorrhage or mass lesion.                    Assessment/Plan  * Acute ischemic stroke (HCC)- (present on admission)  Assessment & Plan  Acute ischemic stroke  MRI of the brain revealed acute infarcts of the lower jero and upper medulla.  He effectively is a quadriplegic and required tracheostomy and PEG tube.  Aspirin and statin  Blood pressure control  Family hope for functional recovery    Acute neuromuscular respiratory failure (HCC)- (present on admission)  Assessment & Plan  Requiring tracheostomy    Proteus mirabilis pneumonia (HCC)- (present on admission)  Assessment & Plan  Treated with Zosyn de-escalated to Augmentin based on cultures from sputum    Type 2 diabetes mellitus with hyperglycemia, without long-term current use of insulin (HCC)- (present on admission)  Assessment & Plan  Details are unclear  Reportedly he is on metformin as an outpatient  Hemoglobin A1c 6.3  He does not require insulin    Anxiety  Assessment & Plan  Ativan for anxiety.     Hematuria  Assessment & Plan  Darkened urine, no gross hematuria  No recent grove trauma  Renal US normal, no stones  Continue antibiotics for possible UTI given mild signs of infection on UA  If hematuria persists after keflex treatment, consider urology consult/referral    Primary hypertension- (present on admission)  Assessment & Plan  Blood pressure is appropriate with hydrochlorothiazide 25 mg daily and Norvasc 10 mg daily             VTE prophylaxis: VTE Selection    I have performed a physical exam and reviewed and updated ROS and Plan today (4/17/2025). In review of yesterday's note (4/16/2025), there are no changes except as documented above.    Greater than 52 minutes spent prepping to see patient (e.g. review of tests) obtaining and/or reviewing  separately obtained history. Performing a medically appropriate examination and/ evaluation.  Counseling and educating the patient/family/caregiver.  Ordering medications, tests, or procedures.  Referring and communicating with other health care professionals.  Documenting clinical information in EPIC.  Independently interpreting results and communicating results to patient/family/caregiver.  Care coordination.

## 2025-04-17 NOTE — PROGRESS NOTES
4 Eyes Skin Assessment Completed by STEVEN Worrell and STEVEN Diop.    Head WDL  Ears WDL  Nose WDL  Mouth WDL  Neck Trach in place  Breast/Chest WDL  Shoulder Blades WDL  Spine WDL  (R) Arm/Elbow/Hand Scattered scars  (L) Arm/Elbow/Hand Scattered scars  Abdomen  PEG tube  Groin WDL  Scrotum/Coccyx/Buttocks WDL  (R) Leg Scar  (L) Leg Scar  (R) Heel/Foot/Toe Blanching, dry  (L) Heel/Foot/Toe Blanching, dry          Devices In Places Tele Box, Pulse Ox, SCD's, and Tracheostomy, PEG tube      Interventions In Place Heel Mepilex, Heel Float Boots, TAP System, Pillows, Elbow Mepilex, Q2 Turns, Low Air Loss Mattress, Barrier Cream, and Heels Loaded W/Pillows    Possible Skin Injury No    Pictures Uploaded Into Epic N/A  Wound Consult Placed N/A  RN Wound Prevention Protocol Ordered No

## 2025-04-17 NOTE — DIETARY
"Nutrition Services Weekly Nutrition Support Update     Day 23 of admit. Jonna Brian is a 49 y.o. male with admitting DX of Acute weakness and Acute ischemic stroke.     Tube feeding initiated on 3/28. Current TF via PEG tube is 8 cartons of Glucerna 1.2 per day to provide 2280 kcals, 112 gm protein, and 1528 ml free water per day. Current FWF of 250 ml TID.      Nutrition Assessment:      Height: 185.4 cm (6' 0.99\")  Weight: 104 kg (229 lb 0.9 oz)  Weight taken via bed scale  Body mass index is 30.23 kg/m². BMI classification: Obesity Class I.  Wt Readings from Last 6 Encounters:   04/17/25 104 kg (229 lb 0.9 oz)   07/19/23 120 kg (265 lb)   04/20/22 (!) 129 kg (285 lb 0.9 oz)   10/08/21 113 kg (250 lb)   08/31/20 115 kg (253 lb 15.5 oz)   08/21/20 114 kg (252 lb 3.3 oz)      Weight trend: Weight has increased from admit weight of 95.3 kg. Pt is +4.1 L fluids per I/O, weight increase is perceived to be r/t fluid status.     Objective:  Pt is receiving bolus TF at goal rate, tolerating per RN.   SLP is working with pt on speaking valve.   Pertinent labs: K 3.4, glucose 136, BUN 29, AST 72,   Pertinent meds: lipitor, neurontin, hydrochlorothiazide, senna, anti-emetics prn, bowel meds prn  Skin/wounds: No wounds or edema noted.   Last BM: 4/11, getting senna BID via enteral tube  CHO-controlled formula is indicated to meet estimated needs with blood sugar control.      Current diet order:   NPO, TF     Nutrition Diagnosis:      Inadequate oral intake r/t acute respiratory failure as evidenced by need for nutrition support.   Nutrition Dx Status: Resolved     wallowing difficulty r/t stroke as evidenced by need for nutrition support  Nutrition Dx Status: Ongoing    Nutrition Interventions:      Continue TF Glucerna 1.2 with goal of 8 cartons per day.   Fluids per MD.  Diet upgrades per SLP/MD.   Patient aware of active plan of care as appropriate.     Nutrition Monitoring and Evaluation:     Monitor nutrition " POC  Additional fluids per MD/DO  Monitor vital signs pertinent to nutrition       RD following and will provide updated recommendations as indicated.

## 2025-04-17 NOTE — DISCHARGE PLANNING
1031  Agency/Facility Name: [facility]  Spoke To: Lawrence  Outcome: DPA inquired follow up on pending LTAC referral, per Lawrence will assign referral and have them follow up with DPA.     1406  SHWETHA hard faxed LTAC referral to   [facility]   Phone: [phone]  Fax: [phone]

## 2025-04-17 NOTE — CARE PLAN
The patient is Watcher - Medium risk of patient condition declining or worsening    Shift Goals  Clinical Goals: Monitor neuro status, Manage secretions  Patient Goals: Ice packs  Family Goals: Comfort    Progress made toward(s) clinical / shift goals:  Patient is A&Ox4. Educated patient on POC. Frequent oral and tracheal suctioning d/t a copious amount of secretions. Elbow and heel mepilex's replaced. Q2 hour turns in place using TAPS. Bedtime bolus feed completed. Ice packs provided for comfort. Oral care completed. HOB at 30 degrees or greater. Bed is low and locked. Frame alarm on. Call light within reach. Hourly rounding continues.       Problem: Knowledge Deficit - Standard  Goal: Patient and family/care givers will demonstrate understanding of plan of care, disease process/condition, diagnostic tests and medications  Outcome: Progressing     Problem: Neuro Status  Goal: Neuro status will remain stable or improve  Outcome: Progressing     Problem: Skin Integrity  Goal: Skin integrity is maintained or improved  Outcome: Progressing       Patient is not progressing towards the following goals:

## 2025-04-17 NOTE — CARE PLAN
Problem: Aerosol Therapy  Goal: Improved hydration/ability to mobilize secretions and/or decreased airway edema  Description: Target End Date:  resolve prior to discharge or when underlying condition is resolved/stabilized1.  Implement heated or cool aerosol therapy2.  Assessed for optimal hydration, decreased edema and/or improved ability to mobilize secretions  Outcome: Progressing     Problem: Bronchopulmonary Hygiene  Goal: Increase mobilization of retained secretions  Description: 1.  Perform bronchopulmonary therapy as indicated by assessment2.  Perform airway suctioning3.  Perform actions to maintain patient airway  Outcome: Progressing

## 2025-04-18 ENCOUNTER — APPOINTMENT (OUTPATIENT)
Dept: RADIOLOGY | Facility: MEDICAL CENTER | Age: 50
End: 2025-04-18
Attending: INTERNAL MEDICINE
Payer: COMMERCIAL

## 2025-04-18 ENCOUNTER — APPOINTMENT (OUTPATIENT)
Dept: RADIOLOGY | Facility: MEDICAL CENTER | Age: 50
DRG: 004 | End: 2025-04-18
Attending: INTERNAL MEDICINE
Payer: COMMERCIAL

## 2025-04-18 LAB
ALBUMIN SERPL BCP-MCNC: 3.4 G/DL (ref 3.2–4.9)
BUN SERPL-MCNC: 28 MG/DL (ref 8–22)
CALCIUM ALBUM COR SERPL-MCNC: 10 MG/DL (ref 8.5–10.5)
CALCIUM SERPL-MCNC: 9.5 MG/DL (ref 8.5–10.5)
CHLORIDE SERPL-SCNC: 94 MMOL/L (ref 96–112)
CO2 SERPL-SCNC: 30 MMOL/L (ref 20–33)
CREAT SERPL-MCNC: 0.94 MG/DL (ref 0.5–1.4)
GFR SERPLBLD CREATININE-BSD FMLA CKD-EPI: 100 ML/MIN/1.73 M 2
GLUCOSE SERPL-MCNC: 125 MG/DL (ref 65–99)
MAGNESIUM SERPL-MCNC: 2.2 MG/DL (ref 1.5–2.5)
PHOSPHATE SERPL-MCNC: 3.8 MG/DL (ref 2.5–4.5)
POTASSIUM SERPL-SCNC: 3.7 MMOL/L (ref 3.6–5.5)
SODIUM SERPL-SCNC: 136 MMOL/L (ref 135–145)

## 2025-04-18 PROCEDURE — 700102 HCHG RX REV CODE 250 W/ 637 OVERRIDE(OP): Performed by: INTERNAL MEDICINE

## 2025-04-18 PROCEDURE — A9270 NON-COVERED ITEM OR SERVICE: HCPCS | Performed by: INTERNAL MEDICINE

## 2025-04-18 PROCEDURE — 700111 HCHG RX REV CODE 636 W/ 250 OVERRIDE (IP): Mod: JZ | Performed by: INTERNAL MEDICINE

## 2025-04-18 PROCEDURE — 94640 AIRWAY INHALATION TREATMENT: CPT

## 2025-04-18 PROCEDURE — 83735 ASSAY OF MAGNESIUM: CPT

## 2025-04-18 PROCEDURE — 80069 RENAL FUNCTION PANEL: CPT

## 2025-04-18 PROCEDURE — 700101 HCHG RX REV CODE 250: Performed by: STUDENT IN AN ORGANIZED HEALTH CARE EDUCATION/TRAINING PROGRAM

## 2025-04-18 PROCEDURE — 770020 HCHG ROOM/CARE - TELE (206)

## 2025-04-18 PROCEDURE — 94669 MECHANICAL CHEST WALL OSCILL: CPT

## 2025-04-18 PROCEDURE — 700102 HCHG RX REV CODE 250 W/ 637 OVERRIDE(OP): Performed by: STUDENT IN AN ORGANIZED HEALTH CARE EDUCATION/TRAINING PROGRAM

## 2025-04-18 PROCEDURE — 97530 THERAPEUTIC ACTIVITIES: CPT

## 2025-04-18 PROCEDURE — 74018 RADEX ABDOMEN 1 VIEW: CPT

## 2025-04-18 PROCEDURE — A9270 NON-COVERED ITEM OR SERVICE: HCPCS | Performed by: STUDENT IN AN ORGANIZED HEALTH CARE EDUCATION/TRAINING PROGRAM

## 2025-04-18 PROCEDURE — 99233 SBSQ HOSP IP/OBS HIGH 50: CPT | Performed by: INTERNAL MEDICINE

## 2025-04-18 PROCEDURE — 71045 X-RAY EXAM CHEST 1 VIEW: CPT

## 2025-04-18 PROCEDURE — 36415 COLL VENOUS BLD VENIPUNCTURE: CPT

## 2025-04-18 RX ORDER — GUAIFENESIN 200 MG/10ML
10 LIQUID ORAL EVERY 4 HOURS
Status: DISCONTINUED | OUTPATIENT
Start: 2025-04-18 | End: 2025-04-24

## 2025-04-18 RX ADMIN — HYDROCHLOROTHIAZIDE 25 MG: 25 TABLET ORAL at 04:11

## 2025-04-18 RX ADMIN — AMLODIPINE BESYLATE 10 MG: 10 TABLET ORAL at 04:11

## 2025-04-18 RX ADMIN — ASPIRIN 81 MG: 81 TABLET, CHEWABLE ORAL at 04:11

## 2025-04-18 RX ADMIN — GUAIFENESIN 600 MG: 600 TABLET, EXTENDED RELEASE ORAL at 04:11

## 2025-04-18 RX ADMIN — ALBUTEROL SULFATE 2.5 MG: 2.5 SOLUTION RESPIRATORY (INHALATION) at 14:42

## 2025-04-18 RX ADMIN — GABAPENTIN 400 MG: 400 CAPSULE ORAL at 04:11

## 2025-04-18 RX ADMIN — ALBUTEROL SULFATE 2.5 MG: 2.5 SOLUTION RESPIRATORY (INHALATION) at 06:25

## 2025-04-18 RX ADMIN — ACETYLCYSTEINE 3 ML: 200 SOLUTION ORAL; RESPIRATORY (INHALATION) at 18:28

## 2025-04-18 RX ADMIN — ACETYLCYSTEINE 3 ML: 200 SOLUTION ORAL; RESPIRATORY (INHALATION) at 06:25

## 2025-04-18 RX ADMIN — ACETAMINOPHEN 650 MG: 325 TABLET, FILM COATED ORAL at 08:54

## 2025-04-18 RX ADMIN — ACETYLCYSTEINE 3 ML: 200 SOLUTION ORAL; RESPIRATORY (INHALATION) at 14:42

## 2025-04-18 RX ADMIN — ACETYLCYSTEINE 3 ML: 200 SOLUTION ORAL; RESPIRATORY (INHALATION) at 10:23

## 2025-04-18 RX ADMIN — SENNOSIDES AND DOCUSATE SODIUM 2 TABLET: 50; 8.6 TABLET ORAL at 04:11

## 2025-04-18 RX ADMIN — ALBUTEROL SULFATE 2.5 MG: 2.5 SOLUTION RESPIRATORY (INHALATION) at 18:28

## 2025-04-18 RX ADMIN — ENOXAPARIN SODIUM 40 MG: 100 INJECTION SUBCUTANEOUS at 17:55

## 2025-04-18 RX ADMIN — ALBUTEROL SULFATE 2.5 MG: 2.5 SOLUTION RESPIRATORY (INHALATION) at 10:23

## 2025-04-18 ASSESSMENT — FIBROSIS 4 INDEX: FIB4 SCORE: 0.6

## 2025-04-18 ASSESSMENT — COGNITIVE AND FUNCTIONAL STATUS - GENERAL
MOBILITY SCORE: 6
WALKING IN HOSPITAL ROOM: TOTAL
MOVING TO AND FROM BED TO CHAIR: TOTAL
TURNING FROM BACK TO SIDE WHILE IN FLAT BAD: TOTAL
SUGGESTED CMS G CODE MODIFIER MOBILITY: CN
CLIMB 3 TO 5 STEPS WITH RAILING: TOTAL
MOVING FROM LYING ON BACK TO SITTING ON SIDE OF FLAT BED: TOTAL
STANDING UP FROM CHAIR USING ARMS: TOTAL

## 2025-04-18 ASSESSMENT — PAIN DESCRIPTION - PAIN TYPE
TYPE: ACUTE PAIN
TYPE: ACUTE PAIN

## 2025-04-18 ASSESSMENT — GAIT ASSESSMENTS: GAIT LEVEL OF ASSIST: UNABLE TO PARTICIPATE

## 2025-04-18 NOTE — PROGRESS NOTES
Monitor summary: SR 80-94, WI 0.16, QRS 0.08, QT 0.37, with rare PVCs and bigem per strip from monitor room.

## 2025-04-18 NOTE — DIETARY
"Nutrition Services: Initial Assessment     Day 24 of admit. Jonna Brian is a 49 y.o. male with admitting DX of Acute weakness [R53.1]  Acute ischemic stroke (HCC) [I63.9]     Consult received for enteral nutrition restart.      Nutrition Assessment:      Height: 185.4 cm (6' 0.99\")  Weight: 99.9 kg (220 lb 3.8 oz)  Weight to Use in Calculations: 96 kg (211 lb 10.3 oz)  Body mass index is 29.06 kg/m². BMI classification: Overweight.  Wt Readings from Last 6 Encounters:   25 99.9 kg (220 lb 3.8 oz)   23 120 kg (265 lb)   22 (!) 129 kg (285 lb 0.9 oz)   10/08/21 113 kg (250 lb)   20 115 kg (253 lb 15.5 oz)   20 114 kg (252 lb 3.3 oz)      Calculation/Equation: REE per MSJ x 1.1 = 2079 kcals  Total Calories / day: 2100 - 2300  (Calories / k - 24)  Total Grams Protein / day: 96 - 115 (Grams Protein / k - 1.2)    Pertinent Labs: Glucose 125, Bun 28, AST 72,   Pertinent Meds: Zofran, BM protocol, Neurontin, Lipitor  Skin/wounds: No pressure injuries documented/ no edema noted.  Last BM:   Type 6.  CHO-controlled formula is indicated to meet estimated needs with blood sugar control.    Current TF via PEG tube is 8 cartons of Glucerna 1.2 per day to provide 2280 kcals, 112 gm protein, and 1528 ml free water per day. Current FWF of 250 ml TID.    Per chart review, pt aspirated on tube feed last night. TF/ flushes held. TF was previously at goal. RN messaged RD via Vo[employer] to request restarting continuous feeds as patient cannot tolerate bolus. RN stated not bolus feeds makes patient gag because he has a trach and has aggressive coughing fits.     PES Statement:   Swallowing difficulty r/t stroke as evidenced by need for nutrition support     Recommendations/Plan:   Transition to continuous feeds. Stop bolus TF and start continuous regimen. Start Glucerna 1.2 and start at 25 ml/hr and advance per protocol to goal rate of  and rate of 75 ml/hr, providing 2160 kcal, 108 gm " protein, and 1449 ml free water daily. Will message RN.   Additional fluids per MD.   Diet upgrades per SLP/MD    RD following.

## 2025-04-18 NOTE — PROGRESS NOTES
[facility] Medicine Daily Progress Note    Date of Service  4/18/2025    Chief Complaint  Jonna Brian is a 49 y.o. male admitted 3/25/2025 with headache and weakness.    Hospital Course    This is a 49 y.o. male who was initially admitted to the hospital on 3/25/2025 with global weakness, aphasia and ataxia.  His last known normal was 1630 on March 24.  He was initially admitted to the hospital floor, his CT head as well as CTA of the head and neck were within normal limits.  There was consideration for possible Guillain-Barré syndrome.     His condition continued to deteriorate and he underwent a lumbar puncture which revealed elevated protein but no evidence of infection.  He had progressive bulbar symptoms and weakness and was transferred to the ICU.  Unfortunately shortly after he arrived in the ICU he developed stridor and required emergent intubation on 3/27.       He then underwent an MRI of his brain and spine which unfortunately revealed small areas of acute infarcts in the lower jero and the upper medulla on both sides of the midline.  He also had chronic infarcts in his jero and right inferior cerebellum.  They were chronically Cuna in the bilateral basal ganglia, the right thalamus and the right periventricular white matter.  He had evidence of small vessel disease as well in the periventricular white matter.     Ultimately it appears he has been suffering multiple small strokes and his progressive decline was due to acute infarcts in the lower jero and upper medulla.  Since that time he has not recovered function, has required tracheostomy and PEG placement with the ultimate goal of transferring him to postacute medical to see what function may be regained in time.    Patient able to nod head, shakes head for yes/no, able to use alphabet sheet to make needs known. He continues to work with PT/OT/SLP therapies with some minor signs of improvement. Veratent is medically cleared, plan for discharge to  LTAC for ongoing management of tracheostomy tube, PEG tube, therapies.    Interval Problem Update  Patient was seen and examined at bedside.  No acute events overnight. Patient is resting comfortably in bed and in no acute distress.     4L T piece  Secretion burden improved with chest physiotherapy  Patient is medically cleared, pending LTAC.  Mucinex for secretions  Tube feeds changed to continuous due to concern for aspiration yesterday  No increase in oxygen requirements    I have discussed this patient's plan of care and discharge plan at IDT rounds today with Case Management, Nursing, Nursing leadership, and other members of the IDT team.    Consultants/Specialty  critical care I discussed with Dr. Torres    Code Status  Full Code    Disposition  The patient is not medically cleared for discharge to home or a post-acute facility.      I have placed the appropriate orders for post-discharge needs.    Review of Systems  Review of Systems   Unable to perform ROS: Patient nonverbal        Physical Exam  Temp:  [36.4 °C (97.5 °F)-37.1 °C (98.8 °F)] 36.8 °C (98.2 °F)  Pulse:  [73-96] 73  Resp:  [16-32] 16  BP: (111-139)/(72-94) 123/79  SpO2:  [91 %-96 %] 96 %    Physical Exam  Constitutional:       Appearance: He is ill-appearing.   HENT:      Right Ear: External ear normal.      Left Ear: External ear normal.   Neck:      Comments: Tracheostomy on Tpiece  Cardiovascular:      Rate and Rhythm: Normal rate and regular rhythm.   Pulmonary:      Breath sounds: Rhonchi present.      Comments: Significant secretion burden  Abdominal:      Tenderness: There is no abdominal tenderness.      Comments: PEG   Neurological:      Mental Status: He is alert.      Comments: He is able to slightly move his left upper and lower extremity.  He was able to nod his head to answer question.         Fluids    Intake/Output Summary (Last 24 hours) at 4/18/2025 1501  Last data filed at 4/18/2025 0600  Gross per 24 hour   Intake 724 ml    Output 850 ml   Net -126 ml        Laboratory  Recent Labs     04/16/25  0800 04/17/25  0440   WBC 7.7 8.6   RBC 4.85 4.85   HEMOGLOBIN 13.2* 13.3*   HEMATOCRIT 41.3* 41.2*   MCV 85.2 84.9   MCH 27.2 27.4   MCHC 32.0* 32.3   RDW 38.5 38.5   PLATELETCT 369 403   MPV 9.6 10.0     Recent Labs     04/16/25  0800 04/17/25  0440 04/18/25  0201   SODIUM 134* 136 136   POTASSIUM 3.5* 3.4* 3.7   CHLORIDE 95* 92* 94*   CO2 28 34* 30   GLUCOSE 168* 136* 125*   BUN 32* 29* 28*   CREATININE 0.84 0.84 0.94   CALCIUM 9.4 9.8 9.5                   Imaging  DX-CHEST-PORTABLE (1 VIEW)   Final Result      Hypoinflation with bibasilar atelectasis.      US-RENAL   Final Result      1.  No hydronephrosis.      DX-CHEST-PORTABLE (1 VIEW)   Final Result         1.  Left basilar atelectasis and/or subtle infiltrates, similar to prior study      DX-CHEST-PORTABLE (1 VIEW)   Final Result         1.  Left basilar atelectasis, no focal infiltrate   2.  Cardiomegaly      DX-G.I. TUBE INJECTION, ANY TYPE   Final Result      Contrast from a PEG tube injection extends into the stomach without evidence of contrast leak.      CT-CHEST,ABDOMEN,PELVIS WITH   Final Result      1.  Large amount of free intraperitoneal air within the abdomen again seen as was previously identified on chest x-ray by review of the patient's chart, a percutaneous gastrostomy tube was placed on 3/31/2025 and is free intraperitoneal air is possibly    secondary to that recent procedure.      2.  Bibasal atelectasis and small bilateral pleural effusions.      3.  Tracheostomy tube present.      4.  No evidence of bowel obstruction or free fluid within the abdomen or pelvis.      DX-CHEST-PORTABLE (1 VIEW)   Final Result         1.  Bibasilar atelectasis   2.  Intra-abdominal free air, can be associated with history of percutaneous gastrostomy, consider other viscus perforation as warranted. Could be further evaluated with CT of the abdomen with contrast as clinically  appropriate.      These findings were discussed with the patient's clinician, Bravo Lala Iv, on 4/2/2025 7:57 AM.      DX-CHEST-PORTABLE (1 VIEW)   Final Result      No evidence of acute cardiopulmonary process.      DX-ABDOMEN FOR TUBE PLACEMENT   Final Result      1.  Enteric tube extends in the fundus of stomach.      DX-ABDOMEN FOR TUBE PLACEMENT   Final Result      The gastric tube has been removed and replaced with a small bowel feeding tube which terminate in the proximal stomach.      DX-CHEST-PORTABLE (1 VIEW)   Final Result      Atelectasis within the left lung base.      MR-THORACIC SPINE-WITH & W/O   Final Result      1.  There is no abnormal intramedullary T2 signal intensity in the thoracic spinal cord.   2.  Mild degenerative disease at T8-9.   3.  Left lower segmental consolidation.      MR-CERVICAL SPINE-WITH & W/O   Final Result      1.  Small areas of acute infarcts in the lower jero and upper medulla involving both sides of the midline. There are chronic infarcts in the jero and right inferior cerebellum.   2.  There is no abnormal intramedullary T2 signal intensity in the cervical spinal cord to suggest demyelinating lesions. There is no MR evidence of compressive myelopathy.   3.  Mild degenerative disease.            MR-BRAIN-WITH & W/O   Final Result      1.  Small areas of acute infarcts in the lower jero and upper medulla involving both sides of the midline.   2.  There are chronic infarcts in the jero and right inferior cerebellum. . There are areas of chronic lacunae in the bilateral basal ganglia, right thalamus and right periventricular white matter.   3.  There are nonspecific T2 hyperintensities in the periventricular white matter likely representing chronic small vessel disease.   4.  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      MR-LUMBAR SPINE-WITH & W/O   Final Result      1.  Unremarkable pre and postcontrast MR  "examination of the lumbar spine.   2.  Clinical suspicious for GBS: There is no abnormal enhancement of the lumbar nerve roots.      DX-ABDOMEN FOR TUBE PLACEMENT   Final Result      NG tube tip projects at the peripyloric region.      DX-CHEST-PORTABLE (1 VIEW)   Final Result      1.  Low lung volumes without definite acute cardiopulmonary abnormality.   2.  Support apparatus as above.      CT-HEAD W/O   Final Result      1.  No acute intracranial abnormality.   2.  Remote right cerebellar infarct.               DX-CHEST-PORTABLE (1 VIEW)   Final Result      No acute cardiopulmonary disease evident.      DX-CHEST-PORTABLE (1 VIEW)   Final Result      No acute cardiopulmonary disease evident.      CT-CEREBRAL PERFUSION ANALYSIS   Final Result      1. Cerebral blood flow less than 30% possibly representing completed infarct = 0 mL. Based on distribution of this finding, this is unlikely to represent artifact.      2. T Max more than 6 seconds possibly representing combination of completed infarct and ischemia = 7 mL. Based on the distribution of this finding, this is possibly artifact.      3. Mismatched volume possibly representing ischemic brain/penumbra= 0 mL      4.  Please note that this cerebral perfusion study and report is Quantitative and targets supratentorial (cerebral) perfusion for evaluation of large vessel territory acute ischemia/infarction. For example, lacunar infarcts, and brainstem/posterior fossa    ischemia/infarction are not evaluated on this study.  Data acquisition is subject to artifacts which can yield non-anatomically plausible perfusion maps which may be due to motion, bolus timing, signal to noise ratio, or other technical factors.    Perfusion map abnormalities which show non-anatomic distributions are likely artifact.   This study is not \"stand-alone\" and should only be utilized for diagnosis, management/treatment in correlation with CT, CTA, and/or MRI and clinical factors.       "   CT-CTA NECK WITH & W/O-POST PROCESSING   Final Result      CT angiogram of the neck within normal limits.      CT-CTA HEAD WITH & W/O-POST PROCESS   Final Result      CT angiogram of the [place] within normal limits.      CT-HEAD W/O   Final Result      Head CT without contrast within normal limits. No evidence of acute cerebral infarction, hemorrhage or mass lesion.                    Assessment/Plan  * Acute ischemic stroke (HCC)- (present on admission)  Assessment & Plan  Acute ischemic stroke  MRI of the brain revealed acute infarcts of the lower jero and upper medulla.  He effectively is a quadriplegic and required tracheostomy and PEG tube.  Aspirin and statin  Blood pressure control  Family hope for functional recovery    Acute neuromuscular respiratory failure (HCC)- (present on admission)  Assessment & Plan  Requiring tracheostomy    Proteus mirabilis pneumonia (HCC)- (present on admission)  Assessment & Plan  Treated with Zosyn de-escalated to Augmentin based on cultures from sputum    Type 2 diabetes mellitus with hyperglycemia, without long-term current use of insulin (HCC)- (present on admission)  Assessment & Plan  Details are unclear  Reportedly he is on metformin as an outpatient  Hemoglobin A1c 6.3  He does not require insulin    Anxiety  Assessment & Plan  Ativan for anxiety.     Hematuria  Assessment & Plan  Darkened urine, no gross hematuria  No recent grove trauma  Renal US normal, no stones  Continue antibiotics for possible UTI given mild signs of infection on UA  If hematuria persists after keflex treatment, consider urology consult/referral    Primary hypertension- (present on admission)  Assessment & Plan  Blood pressure is appropriate with hydrochlorothiazide 25 mg daily and Norvasc 10 mg daily             VTE prophylaxis: VTE Selection    I have performed a physical exam and reviewed and updated ROS and Plan today (4/18/2025). In review of yesterday's note (4/17/2025), there  are no changes except as documented above.    Greater than 51 minutes spent prepping to see patient (e.g. review of tests) obtaining and/or reviewing separately obtained history. Performing a medically appropriate examination and/ evaluation.  Counseling and educating the patient/family/caregiver.  Ordering medications, tests, or procedures.  Referring and communicating with other health care professionals.  Documenting clinical information in EPIC.  Independently interpreting results and communicating results to patient/family/caregiver.  Care coordination.

## 2025-04-18 NOTE — THERAPY
Physical Therapy   Daily Treatment     Patient Name: Jonna Brian  Age:  49 y.o., Sex:  male  Medical Record #: [id number]  Today's Date: 4/18/2025     Precautions  Precautions: Fall Risk;Swallow Precautions;PEG Tube;Tracheostomy   Comments: R hemiplegia, copious secretions    Assessment  Pt seen for PT tx session with mobility detailed down below. Pt is still at Total A for bed mobility with no change in R sided movement. Pt's head control and LUE/LLE movement are slowly improving; pt communicated that he wants to keep working on both. Several breaks needed throughout tx due to coughing and oral/trach suction. Continue to recommend placement when appropriate, will follow.     Plan    Treatment Plan Status: Continue Current Treatment Plan  Type of Treatment: Bed Mobility, Family / Caregiver Training, Gait Training, Neuro Re-Education / Balance, Self Care / Home Evaluation, Stair Training, Therapeutic Activities, Therapeutic Exercise  Treatment Frequency: 5 Times per Week  Treatment Duration: Until Therapy Goals Met    DC Equipment Recommendations: Unable to determine at this time  Discharge Recommendations: Recommend post-acute placement for additional physical therapy services prior to discharge home        Vitals   O2 Delivery Device T-Piece   Pain 0 - 10 Group   Therapist Pain Assessment   (no complaints or signs of pain)   Cognition    Speech/ Communication Nods Appropriately   Level of Consciousness Alert   Strength Lower Body   Comments still no RLE movement   Sitting Lower Body Exercises   Sitting Lower Body Exercises Yes   Ankle Pumps 2 sets of 10;Left   Long Arc Quad 3 sets of 10;Left   Other Treatments   Other Treatments Provided Head and neck control in supported position. Pt demos improved head turn to right and left compared to last session. working on eccentric and concentric neck extension with improved tolerance to hold his head up   Balance   Sitting Balance (Static) Dependent   Sitting Balance  (Dynamic) Dependent   Weight Shift Sitting Absent   Skilled Intervention Verbal Cuing;Postural Facilitation;Tactile Cuing   Comments continual postural and head support throughout   Bed Mobility    Supine to Sit Total Assist   Sit to Supine Total Assist   Scooting Total Assist   Rolling Total Assist to Lt.;Total Assist to Rt.   Skilled Intervention Verbal Cuing   Comments 2p assist   Gait Analysis   Gait Level Of Assist Unable to Participate   Functional Mobility   Sit to Stand Unable to Participate   Bed, Chair, Wheelchair Transfer Unable to Participate   Mobility EOB only   6 Clicks Assessment - How much HELP from from another person do you currently need... (If the patient hasn't done an activity recently, how much help from another person do you think he/she would need if he/she tried?)   Turning from your back to your side while in a flat bed without using bedrails? 1   Moving from lying on your back to sitting on the side of a flat bed without using bedrails? 1   Moving to and from a bed to a chair (including a wheelchair)? 1   Standing up from a chair using your arms (e.g., wheelchair, or bedside chair)? 1   Walking in hospital room? 1   Climbing 3-5 steps with a railing? 1   6 clicks Mobility Score 6   Short Term Goals    Short Term Goal # 1 pt will move supine<>eob with min a in 6 tx for bed mobility.   Goal Outcome # 1 goal not met   Short Term Goal # 2 pt will sit at eob for 5 min with fair- balance in 6 tx for oob tolerance.   Goal Outcome # 2 Goal not met   Short Term Goal # 3 pt will complete sts with hemiwalker and min a in 6 tx for functional mobility.   Goal Outcome # 3 Goal not met   Short Term Goal # 4 pt will hold head in upright position for 30 seconds for 6 tx for posture.   Goal Outcome # 4 Progressing as expected   Physical Therapy Treatment Plan   Physical Therapy Treatment Plan Continue Current Treatment Plan   Anticipated Discharge Equipment and Recommendations   DC Equipment  Recommendations Unable to determine at this time   Discharge Recommendations Recommend post-acute placement for additional physical therapy services prior to discharge home   Interdisciplinary Plan of Care Collaboration   IDT Collaboration with  Nursing;Therapy Tech   Patient Position at End of Therapy In Bed;Call Light within Reach   Collaboration Comments RN updated   Session Information   Date / Session Number  4/18- 10 (1/5, 4/23)

## 2025-04-18 NOTE — CARE PLAN
The patient is Stable - Low risk of patient condition declining or worsening    Shift Goals  Clinical Goals: Monitor respiratory status, Stable neuros, Maintain skin integrity  Patient Goals: Rest  Family Goals: JAMARI    Progress made toward(s) clinical / shift goals:  Patient is A&Ox4. Patient is non-verbal but nods appropriately. Patient remained NPO throughout shift, but ok for meds. Suctioned frequently throughout shift. Q2 hour turns in place using TAPS. Condom cath in place for voiding needs. Patient incontinent to bowel. HOB at 30 degrees. Bed is low and locked. Frame alarm on. Call light within reach. Hourly rounding continues.       Problem: Neuro Status  Goal: Neuro status will remain stable or improve  Outcome: Progressing     Problem: Urinary Elimination  Goal: Establish and maintain regular urinary output  Outcome: Progressing     Problem: Bowel Elimination  Goal: Establish and maintain regular bowel function  Outcome: Progressing       Patient is not progressing towards the following goals:

## 2025-04-18 NOTE — PROGRESS NOTES
Monitor summary: SR 75-94, AK .17, QRS .08, QT .31 with rare PVC's per strip from monitor room.

## 2025-04-18 NOTE — PROGRESS NOTES
Patient aspirated on tube feed.  Oxygen requirements unchanged, still a&o4.  MD notified.  Hold off on chest xray, monitor for now per MD.  Held tube feed/flushes, made patient strict npo sips with meds through PEG per order.

## 2025-04-18 NOTE — THERAPY
Speech Language Therapy Contact Note    Patient Name: Jonna Brian  Age:  49 y.o., Sex:  male  Medical Record #: [id number]  Today's Date: 4/18/2025    Discussed missed therapy with RN       04/18/25 7721   Treatment Variance   Reason For Missed Therapy Medical - Patient  in Procedure   Interdisciplinary Plan of Care Collaboration   IDT Collaboration with  Nursing   Collaboration Comments This SLP attempted to see patient for speaking valve and communication tx session. Patient currently working with PT. SLP will hold and re-attempt as able/appropriate. Thank you.

## 2025-04-19 LAB
CRP SERPL HS-MCNC: 7.2 MG/DL (ref 0–0.75)
PREALB SERPL-MCNC: 23.7 MG/DL (ref 18–38)
PROCALCITONIN SERPL-MCNC: 0.08 NG/ML

## 2025-04-19 PROCEDURE — 700101 HCHG RX REV CODE 250: Performed by: STUDENT IN AN ORGANIZED HEALTH CARE EDUCATION/TRAINING PROGRAM

## 2025-04-19 PROCEDURE — A9270 NON-COVERED ITEM OR SERVICE: HCPCS | Performed by: STUDENT IN AN ORGANIZED HEALTH CARE EDUCATION/TRAINING PROGRAM

## 2025-04-19 PROCEDURE — 36415 COLL VENOUS BLD VENIPUNCTURE: CPT

## 2025-04-19 PROCEDURE — 700102 HCHG RX REV CODE 250 W/ 637 OVERRIDE(OP): Performed by: STUDENT IN AN ORGANIZED HEALTH CARE EDUCATION/TRAINING PROGRAM

## 2025-04-19 PROCEDURE — 770020 HCHG ROOM/CARE - TELE (206)

## 2025-04-19 PROCEDURE — 700102 HCHG RX REV CODE 250 W/ 637 OVERRIDE(OP): Performed by: INTERNAL MEDICINE

## 2025-04-19 PROCEDURE — A9270 NON-COVERED ITEM OR SERVICE: HCPCS | Performed by: INTERNAL MEDICINE

## 2025-04-19 PROCEDURE — 86140 C-REACTIVE PROTEIN: CPT

## 2025-04-19 PROCEDURE — 700105 HCHG RX REV CODE 258: Performed by: INTERNAL MEDICINE

## 2025-04-19 PROCEDURE — 94669 MECHANICAL CHEST WALL OSCILL: CPT

## 2025-04-19 PROCEDURE — 84134 ASSAY OF PREALBUMIN: CPT

## 2025-04-19 PROCEDURE — 94640 AIRWAY INHALATION TREATMENT: CPT

## 2025-04-19 PROCEDURE — 84145 PROCALCITONIN (PCT): CPT

## 2025-04-19 PROCEDURE — 99233 SBSQ HOSP IP/OBS HIGH 50: CPT | Performed by: INTERNAL MEDICINE

## 2025-04-19 PROCEDURE — 700111 HCHG RX REV CODE 636 W/ 250 OVERRIDE (IP): Mod: JZ | Performed by: INTERNAL MEDICINE

## 2025-04-19 RX ORDER — SODIUM CHLORIDE 9 MG/ML
INJECTION, SOLUTION INTRAVENOUS CONTINUOUS
Status: ACTIVE | OUTPATIENT
Start: 2025-04-19 | End: 2025-04-20

## 2025-04-19 RX ADMIN — ALBUTEROL SULFATE 2.5 MG: 2.5 SOLUTION RESPIRATORY (INHALATION) at 09:23

## 2025-04-19 RX ADMIN — GUAIFENESIN 200 MG: 100 LIQUID ORAL at 23:26

## 2025-04-19 RX ADMIN — ENOXAPARIN SODIUM 40 MG: 100 INJECTION SUBCUTANEOUS at 18:17

## 2025-04-19 RX ADMIN — ACETYLCYSTEINE 3 ML: 200 SOLUTION ORAL; RESPIRATORY (INHALATION) at 09:23

## 2025-04-19 RX ADMIN — SODIUM CHLORIDE: 9 INJECTION, SOLUTION INTRAVENOUS at 08:56

## 2025-04-19 RX ADMIN — Medication 5 MG: at 23:26

## 2025-04-19 RX ADMIN — ALBUTEROL SULFATE 2.5 MG: 2.5 SOLUTION RESPIRATORY (INHALATION) at 13:45

## 2025-04-19 RX ADMIN — ATORVASTATIN CALCIUM 80 MG: 80 TABLET, FILM COATED ORAL at 18:17

## 2025-04-19 RX ADMIN — ACETAMINOPHEN 650 MG: 325 TABLET, FILM COATED ORAL at 11:12

## 2025-04-19 RX ADMIN — ACETYLCYSTEINE 3 ML: 200 SOLUTION ORAL; RESPIRATORY (INHALATION) at 13:45

## 2025-04-19 RX ADMIN — GUAIFENESIN 200 MG: 100 LIQUID ORAL at 15:00

## 2025-04-19 RX ADMIN — ACETYLCYSTEINE 3 ML: 200 SOLUTION ORAL; RESPIRATORY (INHALATION) at 22:25

## 2025-04-19 RX ADMIN — GABAPENTIN 400 MG: 400 CAPSULE ORAL at 11:12

## 2025-04-19 RX ADMIN — GUAIFENESIN 200 MG: 100 LIQUID ORAL at 18:17

## 2025-04-19 RX ADMIN — GABAPENTIN 400 MG: 400 CAPSULE ORAL at 18:17

## 2025-04-19 RX ADMIN — ALBUTEROL SULFATE 2.5 MG: 2.5 SOLUTION RESPIRATORY (INHALATION) at 22:25

## 2025-04-19 RX ADMIN — GUAIFENESIN 200 MG: 100 LIQUID ORAL at 11:13

## 2025-04-19 RX ADMIN — SENNOSIDES AND DOCUSATE SODIUM 2 TABLET: 50; 8.6 TABLET ORAL at 18:17

## 2025-04-19 ASSESSMENT — PAIN DESCRIPTION - PAIN TYPE
TYPE: ACUTE PAIN
TYPE: ACUTE PAIN

## 2025-04-19 ASSESSMENT — FIBROSIS 4 INDEX: FIB4 SCORE: 0.6

## 2025-04-19 NOTE — PROGRESS NOTES
[facility] Medicine Daily Progress Note    Date of Service  4/19/2025    Chief Complaint  Jonna Brian is a 49 y.o. male admitted 3/25/2025 with headache and weakness.    Hospital Course    This is a 49 y.o. male who was initially admitted to the hospital on 3/25/2025 with global weakness, aphasia and ataxia.  His last known normal was 1630 on March 24.  He was initially admitted to the hospital floor, his CT head as well as CTA of the head and neck were within normal limits.  There was consideration for possible Guillain-Barré syndrome.     His condition continued to deteriorate and he underwent a lumbar puncture which revealed elevated protein but no evidence of infection.  He had progressive bulbar symptoms and weakness and was transferred to the ICU.  Unfortunately shortly after he arrived in the ICU he developed stridor and required emergent intubation on 3/27.       He then underwent an MRI of his brain and spine which unfortunately revealed small areas of acute infarcts in the lower jero and the upper medulla on both sides of the midline.  He also had chronic infarcts in his jero and right inferior cerebellum.  They were chronically Cuna in the bilateral basal ganglia, the right thalamus and the right periventricular white matter.  He had evidence of small vessel disease as well in the periventricular white matter.     Ultimately it appears he has been suffering multiple small strokes and his progressive decline was due to acute infarcts in the lower jero and upper medulla.  Since that time he has not recovered function, has required tracheostomy and PEG placement with the ultimate goal of transferring him to postacute medical to see what function may be regained in time.    Patient able to nod head, shakes head for yes/no, able to use alphabet sheet to make needs known. He continues to work with PT/OT/SLP therapies with some minor signs of improvement. Veratent is medically cleared, plan for discharge to  LTAC for ongoing management of tracheostomy tube, PEG tube, therapies.    Interval Problem Update  Patient was seen and examined at bedside.  No acute events overnight. Patient is resting comfortably in bed and in no acute distress.     4L T piece  Secretion burden improved with chest physiotherapy  Patient is medically cleared, pending LTAC.  Mucinex for secretions  Tube feeds changed to continuous due to concern for aspiration yesterday  CXR and KUB reviewed  Last BM 04/17    I have discussed this patient's plan of care and discharge plan at IDT rounds today with Case Management, Nursing, Nursing leadership, and other members of the IDT team.    Consultants/Specialty  critical care I discussed with Dr. Torres    Code Status  Full Code    Disposition  The patient is medically cleared for discharge to home or a post-acute facility.  Anticipate discharge to: a long-term acute care hospital    I have placed the appropriate orders for post-discharge needs.    Review of Systems  Review of Systems   Unable to perform ROS: Patient nonverbal        Physical Exam  Temp:  [36.3 °C (97.3 °F)-37.3 °C (99.1 °F)] 36.6 °C (97.9 °F)  Pulse:  [] 99  Resp:  [16-26] 18  BP: (117-147)/(70-86) 117/75  SpO2:  [92 %-96 %] 92 %    Physical Exam  Constitutional:       Appearance: He is ill-appearing.   HENT:      Right Ear: External ear normal.      Left Ear: External ear normal.   Neck:      Comments: Tracheostomy on Tpiece  Cardiovascular:      Rate and Rhythm: Normal rate and regular rhythm.   Pulmonary:      Breath sounds: Rhonchi present.      Comments: Significant secretion burden  Abdominal:      Tenderness: There is no abdominal tenderness.      Comments: PEG   Neurological:      Mental Status: He is alert.      Comments: He is able to slightly move his left upper and lower extremity.  He was able to nod his head to answer question.         Fluids    Intake/Output Summary (Last 24 hours) at 4/19/2025 1034  Last data filed at  4/19/2025 0638  Gross per 24 hour   Intake 0 ml   Output 475 ml   Net -475 ml        Laboratory  Recent Labs     04/17/25  0440   WBC 8.6   RBC 4.85   HEMOGLOBIN 13.3*   HEMATOCRIT 41.2*   MCV 84.9   MCH 27.4   MCHC 32.3   RDW 38.5   PLATELETCT 403   MPV 10.0     Recent Labs     04/17/25  0440 04/18/25  0201   SODIUM 136 136   POTASSIUM 3.4* 3.7   CHLORIDE 92* 94*   CO2 34* 30   GLUCOSE 136* 125*   BUN 29* 28*   CREATININE 0.84 0.94   CALCIUM 9.8 9.5                   Imaging  DX-CHEST-LIMITED (1 VIEW)   Final Result         No acute cardiopulmonary abnormalities are identified.      OE-GRIZSLI-0 VIEW   Final Result         No specific finding to suggest small bowel obstruction.      DX-CHEST-PORTABLE (1 VIEW)   Final Result      Hypoinflation with bibasilar atelectasis.      US-RENAL   Final Result      1.  No hydronephrosis.      DX-CHEST-PORTABLE (1 VIEW)   Final Result         1.  Left basilar atelectasis and/or subtle infiltrates, similar to prior study      DX-CHEST-PORTABLE (1 VIEW)   Final Result         1.  Left basilar atelectasis, no focal infiltrate   2.  Cardiomegaly      DX-G.I. TUBE INJECTION, ANY TYPE   Final Result      Contrast from a PEG tube injection extends into the stomach without evidence of contrast leak.      CT-CHEST,ABDOMEN,PELVIS WITH   Final Result      1.  Large amount of free intraperitoneal air within the abdomen again seen as was previously identified on chest x-ray by review of the patient's chart, a percutaneous gastrostomy tube was placed on 3/31/2025 and is free intraperitoneal air is possibly    secondary to that recent procedure.      2.  Bibasal atelectasis and small bilateral pleural effusions.      3.  Tracheostomy tube present.      4.  No evidence of bowel obstruction or free fluid within the abdomen or pelvis.      DX-CHEST-PORTABLE (1 VIEW)   Final Result         1.  Bibasilar atelectasis   2.  Intra-abdominal free air, can be associated with history of percutaneous  gastrostomy, consider other viscus perforation as warranted. Could be further evaluated with CT of the abdomen with contrast as clinically appropriate.      These findings were discussed with the patient's clinician, Bravo Lala Iv, on 4/2/2025 7:57 AM.      DX-CHEST-PORTABLE (1 VIEW)   Final Result      No evidence of acute cardiopulmonary process.      DX-ABDOMEN FOR TUBE PLACEMENT   Final Result      1.  Enteric tube extends in the fundus of stomach.      DX-ABDOMEN FOR TUBE PLACEMENT   Final Result      The gastric tube has been removed and replaced with a small bowel feeding tube which terminate in the proximal stomach.      DX-CHEST-PORTABLE (1 VIEW)   Final Result      Atelectasis within the left lung base.      MR-THORACIC SPINE-WITH & W/O   Final Result      1.  There is no abnormal intramedullary T2 signal intensity in the thoracic spinal cord.   2.  Mild degenerative disease at T8-9.   3.  Left lower segmental consolidation.      MR-CERVICAL SPINE-WITH & W/O   Final Result      1.  Small areas of acute infarcts in the lower jero and upper medulla involving both sides of the midline. There are chronic infarcts in the jero and right inferior cerebellum.   2.  There is no abnormal intramedullary T2 signal intensity in the cervical spinal cord to suggest demyelinating lesions. There is no MR evidence of compressive myelopathy.   3.  Mild degenerative disease.            MR-BRAIN-WITH & W/O   Final Result      1.  Small areas of acute infarcts in the lower jero and upper medulla involving both sides of the midline.   2.  There are chronic infarcts in the jero and right inferior cerebellum. . There are areas of chronic lacunae in the bilateral basal ganglia, right thalamus and right periventricular white matter.   3.  There are nonspecific T2 hyperintensities in the periventricular white matter likely representing chronic small vessel disease.   4.  Review of CT angiogram dated 3/25/2025 demonstrates focal  mild area of stenosis in the basilar artery.      MR-LUMBAR SPINE-WITH & W/O   Final Result      1.  Unremarkable pre and postcontrast MR examination of the lumbar spine.   2.  Clinical suspicious for GBS: There is no abnormal enhancement of the lumbar nerve roots.      DX-ABDOMEN FOR TUBE PLACEMENT   Final Result      NG tube tip projects at the peripyloric region.      DX-CHEST-PORTABLE (1 VIEW)   Final Result      1.  Low lung volumes without definite acute cardiopulmonary abnormality.   2.  Support apparatus as above.      CT-HEAD W/O   Final Result      1.  No acute intracranial abnormality.   2.  Remote right cerebellar infarct.               DX-CHEST-PORTABLE (1 VIEW)   Final Result      No acute cardiopulmonary disease evident.      DX-CHEST-PORTABLE (1 VIEW)   Final Result      No acute cardiopulmonary disease evident.      CT-CEREBRAL PERFUSION ANALYSIS   Final Result      1. Cerebral blood flow less than 30% possibly representing completed infarct = 0 mL. Based on distribution of this finding, this is unlikely to represent artifact.      2. T Max more than 6 seconds possibly representing combination of completed infarct and ischemia = 7 mL. Based on the distribution of this finding, this is possibly artifact.      3. Mismatched volume possibly representing ischemic brain/penumbra= 0 mL      4.  Please note that this cerebral perfusion study and report is Quantitative and targets supratentorial (cerebral) perfusion for evaluation of large vessel territory acute ischemia/infarction. For example, lacunar infarcts, and brainstem/posterior fossa    ischemia/infarction are not evaluated on this study.  Data acquisition is subject to artifacts which can yield non-anatomically plausible perfusion maps which may be due to motion, bolus timing, signal to noise ratio, or other technical factors.    Perfusion map abnormalities which show non-anatomic distributions are likely artifact.   This study is not  "\"stand-alone\" and should only be utilized for diagnosis, management/treatment in correlation with CT, CTA, and/or MRI and clinical factors.         CT-CTA NECK WITH & W/O-POST PROCESSING   Final Result      CT angiogram of the neck within normal limits.      CT-CTA HEAD WITH & W/O-POST PROCESS   Final Result      CT angiogram of the [place] within normal limits.      CT-HEAD W/O   Final Result      Head CT without contrast within normal limits. No evidence of acute cerebral infarction, hemorrhage or mass lesion.                    Assessment/Plan  * Acute ischemic stroke (HCC)- (present on admission)  Assessment & Plan  Acute ischemic stroke  MRI of the brain revealed acute infarcts of the lower jero and upper medulla.  He effectively is a quadriplegic and required tracheostomy and PEG tube.  Aspirin and statin  Blood pressure control  Family hope for functional recovery    Acute neuromuscular respiratory failure (HCC)- (present on admission)  Assessment & Plan  Requiring tracheostomy    Proteus mirabilis pneumonia (HCC)- (present on admission)  Assessment & Plan  Treated with Zosyn de-escalated to Augmentin based on cultures from sputum    Type 2 diabetes mellitus with hyperglycemia, without long-term current use of insulin (HCC)- (present on admission)  Assessment & Plan  Details are unclear  Reportedly he is on metformin as an outpatient  Hemoglobin A1c 6.3  He does not require insulin    Anxiety  Assessment & Plan  Ativan for anxiety.     Hematuria  Assessment & Plan  Darkened urine, no gross hematuria  No recent grove trauma  Renal US normal, no stones  Continue antibiotics for possible UTI given mild signs of infection on UA  If hematuria persists after keflex treatment, consider urology consult/referral    Primary hypertension- (present on admission)  Assessment & Plan  Blood pressure is appropriate with hydrochlorothiazide 25 mg daily and Norvasc 10 mg daily             VTE prophylaxis: VTE " Selection    I have performed a physical exam and reviewed and updated ROS and Plan today (4/19/2025). In review of yesterday's note (4/18/2025), there are no changes except as documented above.    Greater than 54 minutes spent prepping to see patient (e.g. review of tests) obtaining and/or reviewing separately obtained history. Performing a medically appropriate examination and/ evaluation.  Counseling and educating the patient/family/caregiver.  Ordering medications, tests, or procedures.  Referring and communicating with other health care professionals.  Documenting clinical information in EPIC.  Independently interpreting results and communicating results to patient/family/caregiver.  Care coordination.

## 2025-04-19 NOTE — PROGRESS NOTES
Monitor summary: SR 68-87, SD 0.19, QRS 0.08, QT 0.37, with rare PVCs per strip from monitor room.

## 2025-04-19 NOTE — PROGRESS NOTES
Brown pasty secretions witnessed.   MD notified.   Received orders to hold tube feed/make the patient strict NPO.

## 2025-04-19 NOTE — CARE PLAN
The patient is Watcher - Medium risk of patient condition declining or worsening    Shift Goals  Clinical Goals: Monitor respiratory secretions and suction PRN, maintain skin integrity, VSS,  Patient Goals: rest, reposition, suction  Family Goals: jennifer    Progress made toward(s) clinical / shift goals:    Problem: Knowledge Deficit - Standard  Goal: Patient and family/care givers will demonstrate understanding of plan of care, disease process/condition, diagnostic tests and medications  Description: Target End Date:  1-3 days or as soon as patient condition allowsDocument in Patient Education1.  Patient and family/caregiver oriented to unit, equipment, visitation policy and means for communicating concern2.  Complete/review Learning Assessment3.  Assess knowledge level of disease process/condition, treatment plan, diagnostic tests and medications4.  Explain disease process/condition, treatment plan, diagnostic tests and medications  Outcome: Progressing     Problem: Pain - Standard  Goal: Alleviation of pain or a reduction in pain to the patient’s comfort goal  Description: Target End Date:  Prior to discharge or change in level of careDocument on Vitals flowsheet1.  Document pain using the appropriate pain scale per order or unit policy2.  Educate and implement non-pharmacologic comfort measures (i.e. relaxation, distraction, massage, cold/heat therapy, etc.)3.  Pain management medications as ordered4.  Reassess pain after pain med administration per policy5.  If opiods administered assess patient's response to pain medication is appropriate per POSS sedation scale6.  Follow pain management plan developed in collaboration with patient and interdisciplinary team (including palliative care or pain specialists if applicable)  Outcome: Progressing     Problem: Optimal Care of the Stroke Patient  Goal: Optimal acute care for the stroke patient  Description: Target End Date:  1 to 3 days- Vital signs and neuro checks  performed and documented per order- NIHSS completed and documented per order- Continuous telemetry monitoring for 72 hours or until discontinued by provider- Head CT without contrast obtained- Consideration of MRI/MRA- MRI screening form completed in worklist if MRI ordered- Echocardiogram with Bubble Study ordered/completed with consideration of SHAYAN- Carotid Doppler ordered/completed (Not required if CTA of neck completed in ED)- Lipid Panel obtained within 48 hours of admission- PT, PTT, INR obtained per Anticoagulation orders (if applicable)- Antithrombotic therapy by end of hospital day 2 for ischemic stroke. Provider must document reason if contraindicated.- Venous Thromboembolism (VTE) Prophylaxis by end of hospital day 2 for ischemic and hemorrhagic stroke. Provider must document reason if contraindicated- Dysphagia screen completed and documented prior to any PO intake. Patient to remain NPO until Speech Therapy evaluation if thrombolytic or thrombectomy performed- Rehabilitation assessment including PT/OT/SLP evaluations for referral to Physical Medicine and Rehabilitation services. If none needed, provider needs to document reason- Neurology consult placed- Consideration of cardiology consult for cryptogenic strokes  Outcome: Progressing     Problem: Knowledge Deficit - Stroke Education  Goal: Patient's knowledge of stroke and risk factors will improve  Description: Target End Date:  1-3 days or as soon as patient condition allowsDocument in Patient Education1.  Stroke education booklet provided2.  Education regarding EMS activation, need for follow up, medication prescribed at discharge, risk factors for stroke/lifestyle modifications, warning signs and symptoms of stroke provided  Outcome: Progressing     Problem: Psychosocial - Patient Condition  Goal: Patient's ability to verbalize feelings about condition will improve  Description: Target End Date:  Prior to discharge or change in level of care1.   Discuss coping with medical condition and its effects2.  Encourage patient participation in care3.  Encourage acknowledgement of body changes and accompanying emotions4.  Perform depression screening  Outcome: Progressing  Goal: Patient's ability to re-evaluate and adapt role responsibilities will improve  Description: Target End Date:  Prior to discharge or change in level of care1.  Assess family support2.  Encourage support system participation in care3.  Encouraged verbalization of feelings regarding caregiver responsibilities4.  Discuss changes in role and responsibilities caused by patient's condition  Outcome: Progressing     Problem: Neuro Status  Goal: Neuro status will remain stable or improve  Description: Target End Date:  Prior to discharge or change in level of careDocument on Neuro assessment in the Assessment flowsheet1.  Assess and monitor neurologic status per provider order/protocol/unit policy2.  Assess level of consciousness and orientation3.  Assess for speech, dysarthria, dysphagia, facial symmetry4.  Assess visual field, eye movements, gaze preference, pupil reaction and size5.  Assess muscle strength and motor response in all four extremities6.  Assess for sensation (numbness and tingling)7.  Assess basic neuro reflexes (cough, gag, corneal)8.  Identify changes in neuro status and report to provider for testing/treatment orders  Outcome: Progressing     Problem: Respiratory - Stroke Patient  Goal: Patient will achieve/maintain optimum respiratory rate/effort  Description: Target End Date:  Prior to discharge or change in level of careDocument on Assessment flowsheet1.  Assess and monitor respiratory rate, rhythm, depth and effort of respiration2.  Oxygenation assessed throughout shift (recommendation of >94% for new stroke patients)3.  Oxygen administered and/or titrated per order4.  Collaboration with RT to administer medication/treatments per order5.  Patient educated on importance of  turning, coughing, and deep breathing6.  Patient positioned for maximum ventilatory efficiency7.  Airway suctioning provided as needed8.  Incentive spirometry encouraged 5-10 times every hour or while awake  Outcome: Progressing       Patient is not progressing towards the following goals:      Problem: Risk for Aspiration  Goal: Patient's risk for aspiration will be absent or decrease  Description: Target End Date:  Prior to discharge or change in level of care1.   Complete dysphagia screening on admission2.   NPO until dysphagia screening complete or medically cleared3.   Collaborate with Speech Therapy, Clinical Dietitian and interdisciplinary team4.   Implement aspiration precautions5.   Assist patient up to chair for meals6.   Elevate head of bed 90 degrees if patient is unable to get out of bed7.   Encourage small bites8.   Ensure foods/liquids are of appropriate consistency9.   Assess for any signs/symptoms of [id number]. Assess breath sounds and vital signs after oral intake  Outcome: Not Progressing

## 2025-04-20 LAB
ALBUMIN SERPL BCP-MCNC: 3.4 G/DL (ref 3.2–4.9)
BUN SERPL-MCNC: 29 MG/DL (ref 8–22)
CALCIUM ALBUM COR SERPL-MCNC: 10.3 MG/DL (ref 8.5–10.5)
CALCIUM SERPL-MCNC: 9.8 MG/DL (ref 8.5–10.5)
CHLORIDE SERPL-SCNC: 98 MMOL/L (ref 96–112)
CO2 SERPL-SCNC: 29 MMOL/L (ref 20–33)
CREAT SERPL-MCNC: 0.89 MG/DL (ref 0.5–1.4)
ERYTHROCYTE [DISTWIDTH] IN BLOOD BY AUTOMATED COUNT: 38.8 FL (ref 35.9–50)
GFR SERPLBLD CREATININE-BSD FMLA CKD-EPI: 106 ML/MIN/1.73 M 2
GLUCOSE SERPL-MCNC: 121 MG/DL (ref 65–99)
HCT VFR BLD AUTO: 40.5 % (ref 42–52)
HGB BLD-MCNC: 13 G/DL (ref 14–18)
MAGNESIUM SERPL-MCNC: 2.2 MG/DL (ref 1.5–2.5)
MCH RBC QN AUTO: 27.5 PG (ref 27–33)
MCHC RBC AUTO-ENTMCNC: 32.1 G/DL (ref 32.3–36.5)
MCV RBC AUTO: 85.8 FL (ref 81.4–97.8)
PHOSPHATE SERPL-MCNC: 3.3 MG/DL (ref 2.5–4.5)
PLATELET # BLD AUTO: 374 K/UL (ref 164–446)
PMV BLD AUTO: 9.4 FL (ref 9–12.9)
POTASSIUM SERPL-SCNC: 3.6 MMOL/L (ref 3.6–5.5)
RBC # BLD AUTO: 4.72 M/UL (ref 4.7–6.1)
SODIUM SERPL-SCNC: 140 MMOL/L (ref 135–145)
WBC # BLD AUTO: 7.1 K/UL (ref 4.8–10.8)

## 2025-04-20 PROCEDURE — 94640 AIRWAY INHALATION TREATMENT: CPT

## 2025-04-20 PROCEDURE — 700102 HCHG RX REV CODE 250 W/ 637 OVERRIDE(OP): Performed by: INTERNAL MEDICINE

## 2025-04-20 PROCEDURE — 99233 SBSQ HOSP IP/OBS HIGH 50: CPT | Performed by: INTERNAL MEDICINE

## 2025-04-20 PROCEDURE — A9270 NON-COVERED ITEM OR SERVICE: HCPCS | Performed by: INTERNAL MEDICINE

## 2025-04-20 PROCEDURE — 700102 HCHG RX REV CODE 250 W/ 637 OVERRIDE(OP): Performed by: STUDENT IN AN ORGANIZED HEALTH CARE EDUCATION/TRAINING PROGRAM

## 2025-04-20 PROCEDURE — 80069 RENAL FUNCTION PANEL: CPT

## 2025-04-20 PROCEDURE — 700101 HCHG RX REV CODE 250: Performed by: STUDENT IN AN ORGANIZED HEALTH CARE EDUCATION/TRAINING PROGRAM

## 2025-04-20 PROCEDURE — 85027 COMPLETE CBC AUTOMATED: CPT

## 2025-04-20 PROCEDURE — A9270 NON-COVERED ITEM OR SERVICE: HCPCS | Performed by: STUDENT IN AN ORGANIZED HEALTH CARE EDUCATION/TRAINING PROGRAM

## 2025-04-20 PROCEDURE — 83735 ASSAY OF MAGNESIUM: CPT

## 2025-04-20 PROCEDURE — 700111 HCHG RX REV CODE 636 W/ 250 OVERRIDE (IP): Mod: JZ | Performed by: INTERNAL MEDICINE

## 2025-04-20 PROCEDURE — 36415 COLL VENOUS BLD VENIPUNCTURE: CPT

## 2025-04-20 PROCEDURE — 770020 HCHG ROOM/CARE - TELE (206)

## 2025-04-20 PROCEDURE — 94669 MECHANICAL CHEST WALL OSCILL: CPT

## 2025-04-20 RX ADMIN — SENNOSIDES AND DOCUSATE SODIUM 2 TABLET: 50; 8.6 TABLET ORAL at 05:07

## 2025-04-20 RX ADMIN — GABAPENTIN 400 MG: 400 CAPSULE ORAL at 05:07

## 2025-04-20 RX ADMIN — ACETYLCYSTEINE 3 ML: 200 SOLUTION ORAL; RESPIRATORY (INHALATION) at 11:27

## 2025-04-20 RX ADMIN — Medication 5 MG: at 21:41

## 2025-04-20 RX ADMIN — ALBUTEROL SULFATE 2.5 MG: 2.5 SOLUTION RESPIRATORY (INHALATION) at 11:27

## 2025-04-20 RX ADMIN — GUAIFENESIN 200 MG: 100 LIQUID ORAL at 12:10

## 2025-04-20 RX ADMIN — ALBUTEROL SULFATE 2.5 MG: 2.5 SOLUTION RESPIRATORY (INHALATION) at 15:02

## 2025-04-20 RX ADMIN — GUAIFENESIN 200 MG: 100 LIQUID ORAL at 05:05

## 2025-04-20 RX ADMIN — ENOXAPARIN SODIUM 40 MG: 100 INJECTION SUBCUTANEOUS at 17:53

## 2025-04-20 RX ADMIN — GABAPENTIN 400 MG: 400 CAPSULE ORAL at 17:53

## 2025-04-20 RX ADMIN — ATORVASTATIN CALCIUM 80 MG: 80 TABLET, FILM COATED ORAL at 17:53

## 2025-04-20 RX ADMIN — ALBUTEROL SULFATE 2.5 MG: 2.5 SOLUTION RESPIRATORY (INHALATION) at 19:42

## 2025-04-20 RX ADMIN — GUAIFENESIN 200 MG: 100 LIQUID ORAL at 17:53

## 2025-04-20 RX ADMIN — HYDROCHLOROTHIAZIDE 25 MG: 25 TABLET ORAL at 05:10

## 2025-04-20 RX ADMIN — ACETYLCYSTEINE 3 ML: 200 SOLUTION ORAL; RESPIRATORY (INHALATION) at 07:20

## 2025-04-20 RX ADMIN — GABAPENTIN 400 MG: 400 CAPSULE ORAL at 12:10

## 2025-04-20 RX ADMIN — SENNOSIDES AND DOCUSATE SODIUM 2 TABLET: 50; 8.6 TABLET ORAL at 17:53

## 2025-04-20 RX ADMIN — GUAIFENESIN 200 MG: 100 LIQUID ORAL at 16:05

## 2025-04-20 RX ADMIN — ACETYLCYSTEINE 3 ML: 200 SOLUTION ORAL; RESPIRATORY (INHALATION) at 15:02

## 2025-04-20 RX ADMIN — ASPIRIN 81 MG: 81 TABLET, CHEWABLE ORAL at 05:07

## 2025-04-20 RX ADMIN — SCOPOLAMINE 1 PATCH: 1.5 PATCH, EXTENDED RELEASE TRANSDERMAL at 12:11

## 2025-04-20 RX ADMIN — GUAIFENESIN 200 MG: 100 LIQUID ORAL at 21:41

## 2025-04-20 RX ADMIN — ACETYLCYSTEINE 3 ML: 200 SOLUTION ORAL; RESPIRATORY (INHALATION) at 19:42

## 2025-04-20 RX ADMIN — AMLODIPINE BESYLATE 10 MG: 10 TABLET ORAL at 05:07

## 2025-04-20 RX ADMIN — ALBUTEROL SULFATE 2.5 MG: 2.5 SOLUTION RESPIRATORY (INHALATION) at 07:20

## 2025-04-20 ASSESSMENT — PAIN DESCRIPTION - PAIN TYPE: TYPE: ACUTE PAIN

## 2025-04-20 ASSESSMENT — FIBROSIS 4 INDEX: FIB4 SCORE: 0.6

## 2025-04-20 NOTE — PROGRESS NOTES
[facility] Medicine Daily Progress Note    Date of Service  4/20/2025    Chief Complaint  Jonna Brian is a 49 y.o. male admitted 3/25/2025 with headache and weakness.    Hospital Course    This is a 49 y.o. male who was initially admitted to the hospital on 3/25/2025 with global weakness, aphasia and ataxia.  His last known normal was 1630 on March 24.  He was initially admitted to the hospital floor, his CT head as well as CTA of the head and neck were within normal limits.  There was consideration for possible Guillain-Barré syndrome.     His condition continued to deteriorate and he underwent a lumbar puncture which revealed elevated protein but no evidence of infection.  He had progressive bulbar symptoms and weakness and was transferred to the ICU.  Unfortunately shortly after he arrived in the ICU he developed stridor and required emergent intubation on 3/27.       He then underwent an MRI of his brain and spine which unfortunately revealed small areas of acute infarcts in the lower jero and the upper medulla on both sides of the midline.  He also had chronic infarcts in his jero and right inferior cerebellum.  They were chronically Cuna in the bilateral basal ganglia, the right thalamus and the right periventricular white matter.  He had evidence of small vessel disease as well in the periventricular white matter.     Ultimately it appears he has been suffering multiple small strokes and his progressive decline was due to acute infarcts in the lower jero and upper medulla.  Since that time he has not recovered function, has required tracheostomy and PEG placement with the ultimate goal of transferring him to postacute medical to see what function may be regained in time.    Patient able to nod head, shakes head for yes/no, able to use alphabet sheet to make needs known. He continues to work with PT/OT/SLP therapies with some minor signs of improvement. Veratent is medically cleared, plan for discharge to  LTAC for ongoing management of tracheostomy tube, PEG tube, therapies.    Interval Problem Update  Patient was seen and examined at bedside.      4L T piece  Secretion burden improved with chest physiotherapy  Patient is medically cleared, pending LTAC.  Mucinex for secretions  Call placed to significant other, Susan [phone], message left for call back.     I have discussed this patient's plan of care and discharge plan at IDT rounds today with Case Management, Nursing, Nursing leadership, and other members of the IDT team.    Consultants/Specialty  critical care I discussed with Dr. Torres    Code Status  Full Code    Disposition  Medically Cleared  I have placed the appropriate orders for post-discharge needs.    Review of Systems  Review of Systems   Unable to perform ROS: Patient nonverbal        Physical Exam  Temp:  [36.8 °C (98.2 °F)-37.2 °C (99 °F)] 36.8 °C (98.2 °F)  Pulse:  [83-97] 83  Resp:  [15-32] 16  BP: (117-154)/() 148/90  SpO2:  [92 %-97 %] 96 %    Physical Exam  Constitutional:       Appearance: He is ill-appearing.   HENT:      Right Ear: External ear normal.      Left Ear: External ear normal.      Nose: No congestion.   Neck:      Comments: Tracheostomy on Tpiece  Cardiovascular:      Rate and Rhythm: Normal rate and regular rhythm.   Pulmonary:      Breath sounds: Rhonchi present.      Comments: Significant secretion burden  Abdominal:      Tenderness: There is no abdominal tenderness. There is no guarding.      Comments: PEG   Neurological:      Mental Status: He is alert.      Comments: He is able to slightly move his left upper and lower extremity.  He was able to nod his head to answer question.  Follows commands         Fluids    Intake/Output Summary (Last 24 hours) at 4/20/2025 1126  Last data filed at 4/20/2025 0413  Gross per 24 hour   Intake 180 ml   Output 415 ml   Net -235 ml        Laboratory  Recent Labs     04/20/25  0519   WBC 7.1   RBC 4.72   HEMOGLOBIN 13.0*    HEMATOCRIT 40.5*   MCV 85.8   MCH 27.5   MCHC 32.1*   RDW 38.8   PLATELETCT 374   MPV 9.4     Recent Labs     04/18/25  0201 04/20/25  0519   SODIUM 136 140   POTASSIUM 3.7 3.6   CHLORIDE 94* 98   CO2 30 29   GLUCOSE 125* 121*   BUN 28* 29*   CREATININE 0.94 0.89   CALCIUM 9.5 9.8                   Imaging  DX-CHEST-LIMITED (1 VIEW)   Final Result         No acute cardiopulmonary abnormalities are identified.      [id number] VIEW   Final Result         No specific finding to suggest small bowel obstruction.      DX-CHEST-PORTABLE (1 VIEW)   Final Result      Hypoinflation with bibasilar atelectasis.      US-RENAL   Final Result      1.  No hydronephrosis.      DX-CHEST-PORTABLE (1 VIEW)   Final Result         1.  Left basilar atelectasis and/or subtle infiltrates, similar to prior study      DX-CHEST-PORTABLE (1 VIEW)   Final Result         1.  Left basilar atelectasis, no focal infiltrate   2.  Cardiomegaly      DX-G.I. TUBE INJECTION, ANY TYPE   Final Result      Contrast from a PEG tube injection extends into the stomach without evidence of contrast leak.      CT-CHEST,ABDOMEN,PELVIS WITH   Final Result      1.  Large amount of free intraperitoneal air within the abdomen again seen as was previously identified on chest x-ray by review of the patient's chart, a percutaneous gastrostomy tube was placed on 3/31/2025 and is free intraperitoneal air is possibly    secondary to that recent procedure.      2.  Bibasal atelectasis and small bilateral pleural effusions.      3.  Tracheostomy tube present.      4.  No evidence of bowel obstruction or free fluid within the abdomen or pelvis.      DX-CHEST-PORTABLE (1 VIEW)   Final Result         1.  Bibasilar atelectasis   2.  Intra-abdominal free air, can be associated with history of percutaneous gastrostomy, consider other viscus perforation as warranted. Could be further evaluated with CT of the abdomen with contrast as clinically appropriate.      These findings  were discussed with the patient's clinician, Bravo Lala Iv, on 4/2/2025 7:57 AM.      DX-CHEST-PORTABLE (1 VIEW)   Final Result      No evidence of acute cardiopulmonary process.      DX-ABDOMEN FOR TUBE PLACEMENT   Final Result      1.  Enteric tube extends in the fundus of stomach.      DX-ABDOMEN FOR TUBE PLACEMENT   Final Result      The gastric tube has been removed and replaced with a small bowel feeding tube which terminate in the proximal stomach.      DX-CHEST-PORTABLE (1 VIEW)   Final Result      Atelectasis within the left lung base.      MR-THORACIC SPINE-WITH & W/O   Final Result      1.  There is no abnormal intramedullary T2 signal intensity in the thoracic spinal cord.   2.  Mild degenerative disease at T8-9.   3.  Left lower segmental consolidation.      MR-CERVICAL SPINE-WITH & W/O   Final Result      1.  Small areas of acute infarcts in the lower jero and upper medulla involving both sides of the midline. There are chronic infarcts in the jero and right inferior cerebellum.   2.  There is no abnormal intramedullary T2 signal intensity in the cervical spinal cord to suggest demyelinating lesions. There is no MR evidence of compressive myelopathy.   3.  Mild degenerative disease.            MR-BRAIN-WITH & W/O   Final Result      1.  Small areas of acute infarcts in the lower jero and upper medulla involving both sides of the midline.   2.  There are chronic infarcts in the jero and right inferior cerebellum. . There are areas of chronic lacunae in the bilateral basal ganglia, right thalamus and right periventricular white matter.   3.  There are nonspecific T2 hyperintensities in the periventricular white matter likely representing chronic small vessel disease.   4.  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      MR-LUMBAR SPINE-WITH & W/O   Final Result      1.  Unremarkable pre and postcontrast MR examination of the lumbar spine.  "  2.  Clinical suspicious for GBS: There is no abnormal enhancement of the lumbar nerve roots.      DX-ABDOMEN FOR TUBE PLACEMENT   Final Result      NG tube tip projects at the peripyloric region.      DX-CHEST-PORTABLE (1 VIEW)   Final Result      1.  Low lung volumes without definite acute cardiopulmonary abnormality.   2.  Support apparatus as above.      CT-HEAD W/O   Final Result      1.  No acute intracranial abnormality.   2.  Remote right cerebellar infarct.               DX-CHEST-PORTABLE (1 VIEW)   Final Result      No acute cardiopulmonary disease evident.      DX-CHEST-PORTABLE (1 VIEW)   Final Result      No acute cardiopulmonary disease evident.      CT-CEREBRAL PERFUSION ANALYSIS   Final Result      1. Cerebral blood flow less than 30% possibly representing completed infarct = 0 mL. Based on distribution of this finding, this is unlikely to represent artifact.      2. T Max more than 6 seconds possibly representing combination of completed infarct and ischemia = 7 mL. Based on the distribution of this finding, this is possibly artifact.      3. Mismatched volume possibly representing ischemic brain/penumbra= 0 mL      4.  Please note that this cerebral perfusion study and report is Quantitative and targets supratentorial (cerebral) perfusion for evaluation of large vessel territory acute ischemia/infarction. For example, lacunar infarcts, and brainstem/posterior fossa    ischemia/infarction are not evaluated on this study.  Data acquisition is subject to artifacts which can yield non-anatomically plausible perfusion maps which may be due to motion, bolus timing, signal to noise ratio, or other technical factors.    Perfusion map abnormalities which show non-anatomic distributions are likely artifact.   This study is not \"stand-alone\" and should only be utilized for diagnosis, management/treatment in correlation with CT, CTA, and/or MRI and clinical factors.         CT-CTA NECK WITH & W/O-POST " PROCESSING   Final Result      CT angiogram of the neck within normal limits.      CT-CTA HEAD WITH & W/O-POST PROCESS   Final Result      CT angiogram of the [place] within normal limits.      CT-HEAD W/O   Final Result      Head CT without contrast within normal limits. No evidence of acute cerebral infarction, hemorrhage or mass lesion.                    Assessment/Plan  * Acute ischemic stroke (HCC)- (present on admission)  Assessment & Plan  Acute ischemic stroke  MRI of the brain revealed acute infarcts of the lower jero and upper medulla.  He effectively is a quadriplegic and required tracheostomy and PEG tube.  Aspirin and statin  Blood pressure control  Family hope for functional recovery    Acute neuromuscular respiratory failure (HCC)- (present on admission)  Assessment & Plan  Requiring tracheostomy    Proteus mirabilis pneumonia (HCC)- (present on admission)  Assessment & Plan  Treated with Zosyn de-escalated to Augmentin based on cultures from sputum    Type 2 diabetes mellitus with hyperglycemia, without long-term current use of insulin (HCC)- (present on admission)  Assessment & Plan  Details are unclear  Reportedly he is on metformin as an outpatient  Hemoglobin A1c 6.3  He does not require insulin    Anxiety  Assessment & Plan  Ativan for anxiety.     Hematuria  Assessment & Plan  Darkened urine, no gross hematuria  No recent grove trauma  Renal US normal, no stones  Continue antibiotics for possible UTI given mild signs of infection on UA  If hematuria persists after keflex treatment, consider urology consult/referral    Primary hypertension- (present on admission)  Assessment & Plan  Blood pressure is appropriate with hydrochlorothiazide 25 mg daily and Norvasc 10 mg daily             VTE prophylaxis: VTE Selection    I have performed a physical exam and reviewed and updated ROS and Plan today (4/20/2025). In review of yesterday's note (4/19/2025), there are no changes except as  documented above.    Greater than 51 minutes spent prepping to see patient (e.g. review of tests) obtaining and/or reviewing separately obtained history. Performing a medically appropriate examination and/ evaluation.  Counseling and educating the patient/family/caregiver.  Ordering medications, tests, or procedures.  Referring and communicating with other health care professionals.  Documenting clinical information in EPIC.  Independently interpreting results and communicating results to patient/family/caregiver.  Care coordination.

## 2025-04-20 NOTE — PROGRESS NOTES
Monitor summary: SR-ST, HR , OR .15, QRS .07, QT .34 with rare pvc per strip from monitor room

## 2025-04-20 NOTE — PROGRESS NOTES
Monitor Summary  Rhythm: SR  Rate: 82-95  Ectopy: rare PVC, rare coup  Measurements: .18/.08/.34  ---12 hr Chart Review---

## 2025-04-20 NOTE — CARE PLAN
The patient is Watcher - Medium risk of patient condition declining or worsening    Shift Goals  Clinical Goals: Neuro assessments, trache care, tube feeds, VSS  Patient Goals: comfort  Family Goals: updates on POC    Progress made toward(s) clinical / shift goals:    Problem: Knowledge Deficit - Standard  Goal: Patient and family/care givers will demonstrate understanding of plan of care, disease process/condition, diagnostic tests and medications  Description: Target End Date:  1-3 days or as soon as patient condition allowsDocument in Patient Education1.  Patient and family/caregiver oriented to unit, equipment, visitation policy and means for communicating concern2.  Complete/review Learning Assessment3.  Assess knowledge level of disease process/condition, treatment plan, diagnostic tests and medications4.  Explain disease process/condition, treatment plan, diagnostic tests and medications  Outcome: Progressing     Problem: Pain - Standard  Goal: Alleviation of pain or a reduction in pain to the patient’s comfort goal  Description: Target End Date:  Prior to discharge or change in level of careDocument on Vitals flowsheet1.  Document pain using the appropriate pain scale per order or unit policy2.  Educate and implement non-pharmacologic comfort measures (i.e. relaxation, distraction, massage, cold/heat therapy, etc.)3.  Pain management medications as ordered4.  Reassess pain after pain med administration per policy5.  If opiods administered assess patient's response to pain medication is appropriate per POSS sedation scale6.  Follow pain management plan developed in collaboration with patient and interdisciplinary team (including palliative care or pain specialists if applicable)  Outcome: Progressing     Problem: Optimal Care of the Stroke Patient  Goal: Optimal emergency care for the stroke patient  Description: Target End Date:  End of day 1Time of Onset1.  Time of last known well obtained2.  Patient and  family/caregiver verbalize understanding of diagnosis, medications and testing3.  NIHSS performed and documented, including date and time, for ischemic stroke patients prior to any acute recanalization therapy (thrombolytics or mechanical) or within 12 hours of arrival if no intervention is warranted4.  Consults and referrals placed to appropriate departmentsMedications Administration as Ordered:1.  Implement appropriate reversal agents for INR greater than 1.52.  Pre-alteplase administration of antihypertensives for SBP >185 DBP >1103.  Post-alteplase administration of antihypertensives for SBP >185, DBP >1054.  Thrombolytic Therapy for qualifying ischemic stroke patients who arrive within 4.5 hours of time of Last Known Well. Thrombolytic therapy administered within 30 minutes or a documented reason for delay  Outcome: Progressing  Goal: Optimal acute care for the stroke patient  Description: Target End Date:  1 to 3 days- Vital signs and neuro checks performed and documented per order- NIHSS completed and documented per order- Continuous telemetry monitoring for 72 hours or until discontinued by provider- Head CT without contrast obtained- Consideration of MRI/MRA- MRI screening form completed in worklist if MRI ordered- Echocardiogram with Bubble Study ordered/completed with consideration of SHAYAN- Carotid Doppler ordered/completed (Not required if CTA of neck completed in ED)- Lipid Panel obtained within 48 hours of admission- PT, PTT, INR obtained per Anticoagulation orders (if applicable)- Antithrombotic therapy by end of hospital day 2 for ischemic stroke. Provider must document reason if contraindicated.- Venous Thromboembolism (VTE) Prophylaxis by end of hospital day 2 for ischemic and hemorrhagic stroke. Provider must document reason if contraindicated- Dysphagia screen completed and documented prior to any PO intake. Patient to remain NPO until Speech Therapy evaluation if thrombolytic or thrombectomy  performed- Rehabilitation assessment including PT/OT/SLP evaluations for referral to Physical Medicine and Rehabilitation services. If none needed, provider needs to document reason- Neurology consult placed- Consideration of cardiology consult for cryptogenic strokes  Outcome: Progressing     Problem: Knowledge Deficit - Stroke Education  Goal: Patient's knowledge of stroke and risk factors will improve  Description: Target End Date:  1-3 days or as soon as patient condition allowsDocument in Patient Education1.  Stroke education booklet provided2.  Education regarding EMS activation, need for follow up, medication prescribed at discharge, risk factors for stroke/lifestyle modifications, warning signs and symptoms of stroke provided  Outcome: Progressing     Problem: Psychosocial - Patient Condition  Goal: Patient's ability to verbalize feelings about condition will improve  Description: Target End Date:  Prior to discharge or change in level of care1.  Discuss coping with medical condition and its effects2.  Encourage patient participation in care3.  Encourage acknowledgement of body changes and accompanying emotions4.  Perform depression screening  Outcome: Progressing  Goal: Patient's ability to re-evaluate and adapt role responsibilities will improve  Description: Target End Date:  Prior to discharge or change in level of care1.  Assess family support2.  Encourage support system participation in care3.  Encouraged verbalization of feelings regarding caregiver responsibilities4.  Discuss changes in role and responsibilities caused by patient's condition  Outcome: Progressing     Problem: Neuro Status  Goal: Neuro status will remain stable or improve  Description: Target End Date:  Prior to discharge or change in level of careDocument on Neuro assessment in the Assessment flowsheet1.  Assess and monitor neurologic status per provider order/protocol/unit policy2.  Assess level of consciousness and orientation3.   Assess for speech, dysarthria, dysphagia, facial symmetry4.  Assess visual field, eye movements, gaze preference, pupil reaction and size5.  Assess muscle strength and motor response in all four extremities6.  Assess for sensation (numbness and tingling)7.  Assess basic neuro reflexes (cough, gag, corneal)8.  Identify changes in neuro status and report to provider for testing/treatment orders  Outcome: Progressing     Problem: Hemodynamic Monitoring  Goal: Patient's hemodynamics, fluid balance and neurologic status will be stable or improve  Description: Target End Date:  Prior to discharge or change in level of care1.  Vital signs, pulse oximetry and cardiac monitor per provider order and/or policy2.  Frequent pulse checks performed post thrombectomy3.  Frequent monitoring for signs of bleeding post TPA administration4.  Proper management of IV infusions5.  Intake and output monitored per provider order6.  Daily weight obtained per unit policy or provider order7.  Peripheral pulses and capillary refill assessed as needed8.  Monitor for signs/symptoms of excessive bleeding9.  Body temperature assessed and fevers [id number]. Patient positioned for maximum circulation/cardiac output  Outcome: Progressing     Problem: Respiratory - Stroke Patient  Goal: Patient will achieve/maintain optimum respiratory rate/effort  Description: Target End Date:  Prior to discharge or change in level of careDocument on Assessment flowsheet1.  Assess and monitor respiratory rate, rhythm, depth and effort of respiration2.  Oxygenation assessed throughout shift (recommendation of >94% for new stroke patients)3.  Oxygen administered and/or titrated per order4.  Collaboration with RT to administer medication/treatments per order5.  Patient educated on importance of turning, coughing, and deep breathing6.  Patient positioned for maximum ventilatory efficiency7.  Airway suctioning provided as needed8.  Incentive spirometry encouraged 5-10 times  every hour or while awake  Outcome: Progressing       Patient is not progressing towards the following goals:

## 2025-04-21 LAB
ALBUMIN SERPL BCP-MCNC: 3.4 G/DL (ref 3.2–4.9)
ALBUMIN/GLOB SERPL: 0.8 G/DL
ALP SERPL-CCNC: 82 U/L (ref 30–99)
ALT SERPL-CCNC: 39 U/L (ref 2–50)
ANION GAP SERPL CALC-SCNC: 13 MMOL/L (ref 7–16)
AST SERPL-CCNC: 30 U/L (ref 12–45)
BILIRUB SERPL-MCNC: 0.3 MG/DL (ref 0.1–1.5)
BUN SERPL-MCNC: 29 MG/DL (ref 8–22)
CALCIUM ALBUM COR SERPL-MCNC: 10.4 MG/DL (ref 8.5–10.5)
CALCIUM SERPL-MCNC: 9.9 MG/DL (ref 8.5–10.5)
CHLORIDE SERPL-SCNC: 98 MMOL/L (ref 96–112)
CO2 SERPL-SCNC: 30 MMOL/L (ref 20–33)
CREAT SERPL-MCNC: 0.99 MG/DL (ref 0.5–1.4)
CRP SERPL HS-MCNC: 4.87 MG/DL (ref 0–0.75)
ERYTHROCYTE [DISTWIDTH] IN BLOOD BY AUTOMATED COUNT: 38.6 FL (ref 35.9–50)
GFR SERPLBLD CREATININE-BSD FMLA CKD-EPI: 94 ML/MIN/1.73 M 2
GLOBULIN SER CALC-MCNC: 4.3 G/DL (ref 1.9–3.5)
GLUCOSE SERPL-MCNC: 140 MG/DL (ref 65–99)
HCT VFR BLD AUTO: 41.1 % (ref 42–52)
HGB BLD-MCNC: 13.1 G/DL (ref 14–18)
MAGNESIUM SERPL-MCNC: 2.2 MG/DL (ref 1.5–2.5)
MCH RBC QN AUTO: 27.2 PG (ref 27–33)
MCHC RBC AUTO-ENTMCNC: 31.9 G/DL (ref 32.3–36.5)
MCV RBC AUTO: 85.4 FL (ref 81.4–97.8)
PHOSPHATE SERPL-MCNC: 4 MG/DL (ref 2.5–4.5)
PLATELET # BLD AUTO: 357 K/UL (ref 164–446)
PMV BLD AUTO: 9.4 FL (ref 9–12.9)
POTASSIUM SERPL-SCNC: 3.3 MMOL/L (ref 3.6–5.5)
PREALB SERPL-MCNC: 21.1 MG/DL (ref 18–38)
PROT SERPL-MCNC: 7.7 G/DL (ref 6–8.2)
RBC # BLD AUTO: 4.81 M/UL (ref 4.7–6.1)
SODIUM SERPL-SCNC: 141 MMOL/L (ref 135–145)
WBC # BLD AUTO: 8.1 K/UL (ref 4.8–10.8)

## 2025-04-21 PROCEDURE — 770020 HCHG ROOM/CARE - TELE (206)

## 2025-04-21 PROCEDURE — 94640 AIRWAY INHALATION TREATMENT: CPT

## 2025-04-21 PROCEDURE — 85027 COMPLETE CBC AUTOMATED: CPT

## 2025-04-21 PROCEDURE — 84100 ASSAY OF PHOSPHORUS: CPT

## 2025-04-21 PROCEDURE — 700102 HCHG RX REV CODE 250 W/ 637 OVERRIDE(OP): Performed by: INTERNAL MEDICINE

## 2025-04-21 PROCEDURE — A9270 NON-COVERED ITEM OR SERVICE: HCPCS | Performed by: INTERNAL MEDICINE

## 2025-04-21 PROCEDURE — 86140 C-REACTIVE PROTEIN: CPT

## 2025-04-21 PROCEDURE — 83735 ASSAY OF MAGNESIUM: CPT

## 2025-04-21 PROCEDURE — 700102 HCHG RX REV CODE 250 W/ 637 OVERRIDE(OP): Performed by: STUDENT IN AN ORGANIZED HEALTH CARE EDUCATION/TRAINING PROGRAM

## 2025-04-21 PROCEDURE — 700111 HCHG RX REV CODE 636 W/ 250 OVERRIDE (IP): Mod: JZ | Performed by: INTERNAL MEDICINE

## 2025-04-21 PROCEDURE — A9270 NON-COVERED ITEM OR SERVICE: HCPCS | Performed by: STUDENT IN AN ORGANIZED HEALTH CARE EDUCATION/TRAINING PROGRAM

## 2025-04-21 PROCEDURE — 94669 MECHANICAL CHEST WALL OSCILL: CPT

## 2025-04-21 PROCEDURE — 700101 HCHG RX REV CODE 250: Performed by: STUDENT IN AN ORGANIZED HEALTH CARE EDUCATION/TRAINING PROGRAM

## 2025-04-21 PROCEDURE — 80053 COMPREHEN METABOLIC PANEL: CPT

## 2025-04-21 PROCEDURE — 36415 COLL VENOUS BLD VENIPUNCTURE: CPT

## 2025-04-21 PROCEDURE — 99233 SBSQ HOSP IP/OBS HIGH 50: CPT | Performed by: INTERNAL MEDICINE

## 2025-04-21 PROCEDURE — 84134 ASSAY OF PREALBUMIN: CPT

## 2025-04-21 RX ORDER — METOCLOPRAMIDE 10 MG/1
5 TABLET ORAL
Status: COMPLETED | OUTPATIENT
Start: 2025-04-21 | End: 2025-04-26

## 2025-04-21 RX ADMIN — GUAIFENESIN 200 MG: 100 LIQUID ORAL at 04:07

## 2025-04-21 RX ADMIN — GUAIFENESIN 200 MG: 100 LIQUID ORAL at 23:09

## 2025-04-21 RX ADMIN — ALBUTEROL SULFATE 2.5 MG: 2.5 SOLUTION RESPIRATORY (INHALATION) at 11:30

## 2025-04-21 RX ADMIN — ALBUTEROL SULFATE 2.5 MG: 2.5 SOLUTION RESPIRATORY (INHALATION) at 14:12

## 2025-04-21 RX ADMIN — ATORVASTATIN CALCIUM 80 MG: 80 TABLET, FILM COATED ORAL at 18:00

## 2025-04-21 RX ADMIN — GUAIFENESIN 200 MG: 100 LIQUID ORAL at 15:16

## 2025-04-21 RX ADMIN — SENNOSIDES AND DOCUSATE SODIUM 2 TABLET: 50; 8.6 TABLET ORAL at 04:09

## 2025-04-21 RX ADMIN — GABAPENTIN 400 MG: 400 CAPSULE ORAL at 18:00

## 2025-04-21 RX ADMIN — METOCLOPRAMIDE 5 MG: 10 TABLET ORAL at 23:09

## 2025-04-21 RX ADMIN — AMLODIPINE BESYLATE 10 MG: 10 TABLET ORAL at 04:08

## 2025-04-21 RX ADMIN — ASPIRIN 81 MG: 81 TABLET, CHEWABLE ORAL at 04:08

## 2025-04-21 RX ADMIN — ALBUTEROL SULFATE 2.5 MG: 2.5 SOLUTION RESPIRATORY (INHALATION) at 07:32

## 2025-04-21 RX ADMIN — ALBUTEROL SULFATE 2.5 MG: 2.5 SOLUTION RESPIRATORY (INHALATION) at 19:02

## 2025-04-21 RX ADMIN — GABAPENTIN 400 MG: 400 CAPSULE ORAL at 04:08

## 2025-04-21 RX ADMIN — GUAIFENESIN 200 MG: 100 LIQUID ORAL at 18:00

## 2025-04-21 RX ADMIN — GUAIFENESIN 200 MG: 100 LIQUID ORAL at 11:52

## 2025-04-21 RX ADMIN — GUAIFENESIN 200 MG: 100 LIQUID ORAL at 09:24

## 2025-04-21 RX ADMIN — METOCLOPRAMIDE 5 MG: 10 TABLET ORAL at 18:17

## 2025-04-21 RX ADMIN — OXYCODONE HYDROCHLORIDE 5 MG: 5 TABLET ORAL at 09:25

## 2025-04-21 RX ADMIN — ENOXAPARIN SODIUM 40 MG: 100 INJECTION SUBCUTANEOUS at 18:00

## 2025-04-21 RX ADMIN — SENNOSIDES AND DOCUSATE SODIUM 2 TABLET: 50; 8.6 TABLET ORAL at 18:00

## 2025-04-21 RX ADMIN — HYDROCHLOROTHIAZIDE 25 MG: 25 TABLET ORAL at 04:08

## 2025-04-21 RX ADMIN — ACETYLCYSTEINE 3 ML: 200 SOLUTION ORAL; RESPIRATORY (INHALATION) at 19:00

## 2025-04-21 RX ADMIN — ACETYLCYSTEINE 3 ML: 200 SOLUTION ORAL; RESPIRATORY (INHALATION) at 07:32

## 2025-04-21 RX ADMIN — GABAPENTIN 400 MG: 400 CAPSULE ORAL at 11:52

## 2025-04-21 RX ADMIN — Medication 5 MG: at 19:46

## 2025-04-21 RX ADMIN — ACETYLCYSTEINE 3 ML: 200 SOLUTION ORAL; RESPIRATORY (INHALATION) at 14:12

## 2025-04-21 RX ADMIN — ACETYLCYSTEINE 3 ML: 200 SOLUTION ORAL; RESPIRATORY (INHALATION) at 11:30

## 2025-04-21 ASSESSMENT — PAIN DESCRIPTION - PAIN TYPE
TYPE: ACUTE PAIN

## 2025-04-21 ASSESSMENT — FIBROSIS 4 INDEX: FIB4 SCORE: 0.63

## 2025-04-21 NOTE — CARE PLAN
Problem: Aerosol Therapy  Goal: Improved hydration/ability to mobilize secretions and/or decreased airway edema  Description: Target End Date:  resolve prior to discharge or when underlying condition is resolved/stabilized1.  Implement heated or cool aerosol therapy2.  Assessed for optimal hydration, decreased edema and/or improved ability to mobilize secretions  Outcome: Progressing     Problem: Bronchopulmonary Hygiene  Goal: Increase mobilization of retained secretions  Description: 1.  Perform bronchopulmonary therapy as indicated by assessment2.  Perform airway suctioning3.  Perform actions to maintain patient airway  Outcome: Progressing   Tpiece 4L/28%  IPV QID  Alb/muco QID

## 2025-04-21 NOTE — CARE PLAN
The patient is Stable - Low risk of patient condition declining or worsening    Shift Goals  Clinical Goals: Safety/maintain skin integrity  Patient Goals: sleep  Family Goals: updates    Progress made toward(s) clinical / shift goals:   Problem: Fall Risk  Goal: Patient will remain free from falls  Outcome: Progressing   Fall precautions in place. Call light, bedside table, and pt belongings within reach. Pt able to demonstrate the use of call light prior to getting out of bed.      Problem: Skin Integrity  Goal: Skin integrity is maintained or improved  Outcome: Progressing   Pt repositioned every 2 hours and as needed. Pt understands education provided on the importance of turning in bed to prevent skin injury.      Patient is not progressing towards the following goals: N/A

## 2025-04-21 NOTE — DISCHARGE PLANNING
Complex Case Management    Order received for Complex Case Management. Patient's chart has been reviewed.     Complex case management following? Yes    Yes: Case assigned to Jude MONTIEL    NOTE: There may be cases that are NOT assigned to a CCM but will be followed for progression of care by leaders of the Complex Discharge Committee.

## 2025-04-21 NOTE — DIETARY
Nutrition Services Brief Update:    Problem: Nutritional:  Goal: Nutrition support tolerated and meeting greater than 85% of estimated needs  Outcome: Not met     Glucerna 1.2 currently running at 50 mL/hr  Patient on 4 L T piece    LBM 4/17- Type 6. BM protocol in place.   Patient medically clear for discharge-pending LTAC placement.     RD following.

## 2025-04-21 NOTE — PROGRESS NOTES
Monitor summary: SR 81-88, NE -0.16, QRS -0.09, QT -0.38, with (R) PVCs and couplet PVCs per strip from the monitor room.

## 2025-04-21 NOTE — THERAPY
Speech Language Therapy Contact Note    Patient Name: Jonna Brian  Age:  49 y.o., Sex:  male  Medical Record #: [id number]  Today's Date: 4/21/2025 04/21/25 1421   Treatment Variance   Reason For Missed Therapy Medical - Patient  in Procedure   Interdisciplinary Plan of Care Collaboration   IDT Collaboration with  Respiratory Therapist   Collaboration Comments Attempted to see pt but he was receiving therapy from respiratory therapy. SLP to follow at a later time as able.

## 2025-04-21 NOTE — PROGRESS NOTES
Monitor Summary: SR 77-84, IN .15, QRS .08, QT .33 with rare PVC per strip from monitor room

## 2025-04-21 NOTE — CARE PLAN
The patient is Stable - Low risk of patient condition declining or worsening    Shift Goals  Clinical Goals: maintain skin integrity, neuro checks  Patient Goals: sleep  Family Goals: updates    Progress made toward(s) clinical / shift goals:    Problem: Knowledge Deficit - Standard  Goal: Patient and family/care givers will demonstrate understanding of plan of care, disease process/condition, diagnostic tests and medications  Description: Target End Date:  1-3 days or as soon as patient condition allowsDocument in Patient Education1.  Patient and family/caregiver oriented to unit, equipment, visitation policy and means for communicating concern2.  Complete/review Learning Assessment3.  Assess knowledge level of disease process/condition, treatment plan, diagnostic tests and medications4.  Explain disease process/condition, treatment plan, diagnostic tests and medications  Outcome: Progressing     Problem: Pain - Standard  Goal: Alleviation of pain or a reduction in pain to the patient’s comfort goal  Description: Target End Date:  Prior to discharge or change in level of careDocument on Vitals flowsheet1.  Document pain using the appropriate pain scale per order or unit policy2.  Educate and implement non-pharmacologic comfort measures (i.e. relaxation, distraction, massage, cold/heat therapy, etc.)3.  Pain management medications as ordered4.  Reassess pain after pain med administration per policy5.  If opiods administered assess patient's response to pain medication is appropriate per POSS sedation scale6.  Follow pain management plan developed in collaboration with patient and interdisciplinary team (including palliative care or pain specialists if applicable)  Outcome: Progressing     Goal: Optimal acute care for the stroke patient  Description: Target End Date:  1 to 3 days- Vital signs and neuro checks performed and documented per order- NIHSS completed and documented per order- Continuous telemetry monitoring  for 72 hours or until discontinued by provider- Head CT without contrast obtained- Consideration of MRI/MRA- MRI screening form completed in worklist if MRI ordered- Echocardiogram with Bubble Study ordered/completed with consideration of SHAYAN- Carotid Doppler ordered/completed (Not required if CTA of neck completed in ED)- Lipid Panel obtained within 48 hours of admission- PT, PTT, INR obtained per Anticoagulation orders (if applicable)- Antithrombotic therapy by end of hospital day 2 for ischemic stroke. Provider must document reason if contraindicated.- Venous Thromboembolism (VTE) Prophylaxis by end of hospital day 2 for ischemic and hemorrhagic stroke. Provider must document reason if contraindicated- Dysphagia screen completed and documented prior to any PO intake. Patient to remain NPO until Speech Therapy evaluation if thrombolytic or thrombectomy performed- Rehabilitation assessment including PT/OT/SLP evaluations for referral to Physical Medicine and Rehabilitation services. If none needed, provider needs to document reason- Neurology consult placed- Consideration of cardiology consult for cryptogenic strokes  Outcome: Progressing     Problem: Knowledge Deficit - Stroke Education  Goal: Patient's knowledge of stroke and risk factors will improve  Description: Target End Date:  1-3 days or as soon as patient condition allowsDocument in Patient Education1.  Stroke education booklet provided2.  Education regarding EMS activation, need for follow up, medication prescribed at discharge, risk factors for stroke/lifestyle modifications, warning signs and symptoms of stroke provided  Outcome: Progressing     Problem: Psychosocial - Patient Condition  Goal: Patient's ability to verbalize feelings about condition will improve  Description: Target End Date:  Prior to discharge or change in level of care1.  Discuss coping with medical condition and its effects2.  Encourage patient participation in care3.  Encourage  acknowledgement of body changes and accompanying emotions4.  Perform depression screening  Outcome: Progressing  Goal: Patient's ability to re-evaluate and adapt role responsibilities will improve  Description: Target End Date:  Prior to discharge or change in level of care1.  Assess family support2.  Encourage support system participation in care3.  Encouraged verbalization of feelings regarding caregiver responsibilities4.  Discuss changes in role and responsibilities caused by patient's condition  Outcome: Progressing     Problem: Discharge Planning - Stroke  Goal: Ensure Stroke Core Measures are met prior to discharge  Description: Target End Date:  Prior to discharge or change in level of care1. Patient discharged on antithrombotic therapy (Ischemic Stroke)2. Patient discharged on intensive statin if LDL is greater than or equal to 70 mg/dl (Ischemic Stroke)3. Patient discharged on anticoagulation therapy for patients with atrial fibrillation/flutter (Ischemic Stroke)4. Smoking education/cessation provided if applicable  Outcome: Progressing  Goal: Patient’s continuum of care needs will be met  Description: Target End Date:  Prior to discharge or change in level of care1.  Potential discharge barriers identified upon admission and throughout hospital stay2.  Ensure appropriate referrals in place for follow up with specialists after discharge3.  Ensure appropriate referrals in place for DMEs if applicable4.  Collaboration with transitional care team and interdisciplinary team to meet discharge needs5.  Involve patient and family/caregiver in prioritizing goals for discharge planning6.  Educate patient and caregiver about discharge instructions, medications, and follow up appointments7.  Referral placed to [facility]8.  Assure orders and follow up appointments are made for outpatient extended cardiac monitoring if appropriate  Outcome: Progressing     Problem: Neuro Status  Goal: Neuro status will  remain stable or improve  Description: Target End Date:  Prior to discharge or change in level of careDocument on Neuro assessment in the Assessment flowsheet1.  Assess and monitor neurologic status per provider order/protocol/unit policy2.  Assess level of consciousness and orientation3.  Assess for speech, dysarthria, dysphagia, facial symmetry4.  Assess visual field, eye movements, gaze preference, pupil reaction and size5.  Assess muscle strength and motor response in all four extremities6.  Assess for sensation (numbness and tingling)7.  Assess basic neuro reflexes (cough, gag, corneal)8.  Identify changes in neuro status and report to provider for testing/treatment orders  Outcome: Progressing     Problem: Hemodynamic Monitoring  Goal: Patient's hemodynamics, fluid balance and neurologic status will be stable or improve  Description: Target End Date:  Prior to discharge or change in level of care1.  Vital signs, pulse oximetry and cardiac monitor per provider order and/or policy2.  Frequent pulse checks performed post thrombectomy3.  Frequent monitoring for signs of bleeding post TPA administration4.  Proper management of IV infusions5.  Intake and output monitored per provider order6.  Daily weight obtained per unit policy or provider order7.  Peripheral pulses and capillary refill assessed as needed8.  Monitor for signs/symptoms of excessive bleeding9.  Body temperature assessed and fevers [id number]. Patient positioned for maximum circulation/cardiac output  Outcome: Progressing     Problem: Respiratory - Stroke Patient  Goal: Patient will achieve/maintain optimum respiratory rate/effort  Description: Target End Date:  Prior to discharge or change in level of careDocument on Assessment flowsheet1.  Assess and monitor respiratory rate, rhythm, depth and effort of respiration2.  Oxygenation assessed throughout shift (recommendation of >94% for new stroke patients)3.  Oxygen administered and/or titrated per  order4.  Collaboration with RT to administer medication/treatments per order5.  Patient educated on importance of turning, coughing, and deep breathing6.  Patient positioned for maximum ventilatory efficiency7.  Airway suctioning provided as needed8.  Incentive spirometry encouraged 5-10 times every hour or while awake  Outcome: Progressing     Problem: Dysphagia  Goal: Dysphagia will improve  Description: Target End Date:  Prior to discharge or change in level of care1.  Assess and monitor ability to swallow2.  Collaborate with Speech Therapy to determine appropriate adaptation for safe administration of medications and oral nutrition3.  Elevate head of bed to 90 degrees during feedings and for 30 minutes after each feeding4.  Encourage proper swallowing techniques5.  Screening on admission or as soon as possible  Outcome: Progressing     Problem: Risk for Aspiration  Goal: Patient's risk for aspiration will be absent or decrease  Description: Target End Date:  Prior to discharge or change in level of care1.   Complete dysphagia screening on admission2.   NPO until dysphagia screening complete or medically cleared3.   Collaborate with Speech Therapy, Clinical Dietitian and interdisciplinary team4.   Implement aspiration precautions5.   Assist patient up to chair for meals6.   Elevate head of bed 90 degrees if patient is unable to get out of bed7.   Encourage small bites8.   Ensure foods/liquids are of appropriate consistency9.   Assess for any signs/symptoms of [id number]. Assess breath sounds and vital signs after oral intake  Outcome: Progressing     Problem: Urinary Elimination  Goal: Establish and maintain regular urinary output  Description: Target End Date:  Prior to discharge or change in level of careDocument on I/O and Assessment flowsheets1.  Evaluate need to continue indwelling catheter every shift2.  Assess signs and symptoms of urinary retention3.  Assess post-void residual volumes4.  Implement  bladder training program5.  Encourage scheduled voidings6.  Assist patient to sit on bedside commode or toilet for voiding7.  Educate patient and family/caregiver on use and purpose of urine collection devices (document in Patient Education)  Outcome: Progressing     Problem: Bowel Elimination  Goal: Establish and maintain regular bowel function  Description: Target End Date:  Prior to discharge or change in level of care1.   Note date of last BM2.   Educate about diet, fluid intake, medication and activity to promote bowel function3.   Educate signs and symptoms of constipation and interventions to implement4.   Pharmacologic bowel management per provider order5.   Regular toileting schedule6.   Upright position for toileting7.   High fiber diet8.   Encourage hydration9.   Collaborate with Clinical [id number]. Care and maintenance of ostomy if applicable  Outcome: Progressing     Problem: Mobility - Stroke  Goal: Patient's capacity to carry out activities will improve  Description: Target End Date:  Prior to discharge or change in level of care1.  Assess for barriers to mobility/activity2.  Implement activity per interdisciplinary team recommendations3.  Target activity level identified and patient/family/caregiver aware of goal4.  Provide assistive devices5.  Instruct patient/caregiver on proper use of assistive/adaptive devices6.  Schedule activities and rest periods to decrease effects of fatigue7.  Encourage mobilization to extent of ability8.  Maintain proper body alignment9.  Provide adequate pain management to allow progressive [id number]. Implement pace maker precautions as needed  Outcome: Progressing     Problem: Self Care  Goal: Patient will have the ability to perform ADLs independently or with assistance (bathe, groom, dress, toilet and feed)  Description: Target End Date:  Prior to discharge or change in level of careDocument on ADL flowsheet1.  Assess the capability and level of deficiency to  perform ADLs2.  Encourage family/care giver involvement3.  Provide assistive devices4.  Consider PT/OT evaluations5.  Maintain support, give positive feedback, encourage self-care allowing extra time and verbal cuing as needed6.  Avoid doing something for patients they can do themselves, but provide assistance as needed7.  Assist in anticipating/planning individual needs8.  Collaborate with Case Management and  to meet discharge needs  Outcome: Progressing     Problem: Fall Risk  Goal: Patient will remain free from falls  Description: Target End Date:  Prior to discharge or change in level of careDocument interventions on the [facility] Fall Risk Assessment1.  Assess for fall risk factors2.  Implement fall precautions  Outcome: Progressing     Problem: Skin Integrity  Goal: Skin integrity is maintained or improved  Description: Target End Date:  Prior to discharge or change in level of careDocument interventions on Skin Risk/Lj flowsheet groups and corresponding LDA1.  Assess and monitor skin integrity, appearance and/or temperature2.  Assess risk factors for impaired skin integrity and/or pressures ulcers3.  Implement precautions to protect skin integrity in collaboration with interdisciplinary team4.  Implement pressure ulcer prevention protocol if at risk for skin breakdown5.  Confirm wound care consult if at risk for skin breakdown6.  Ensure patient use of pressure relieving devices  (Low air loss bed, waffle overlay, heel protectors, ROHO cushion, etc)  Outcome: Progressing       Patient is not progressing towards the following goals:

## 2025-04-21 NOTE — PROGRESS NOTES
[facility] Medicine Daily Progress Note    Date of Service  4/21/2025    Chief Complaint  Jonna Brian is a 49 y.o. male admitted 3/25/2025 with headache and weakness.    Hospital Course    This is a 49 y.o. male who was initially admitted to the hospital on 3/25/2025 with global weakness, aphasia and ataxia.  His last known normal was 1630 on March 24.  He was initially admitted to the hospital floor, his CT head as well as CTA of the head and neck were within normal limits.  There was consideration for possible Guillain-Barré syndrome.     His condition continued to deteriorate and he underwent a lumbar puncture which revealed elevated protein but no evidence of infection.  He had progressive bulbar symptoms and weakness and was transferred to the ICU.  Unfortunately shortly after he arrived in the ICU he developed stridor and required emergent intubation on 3/27.       He then underwent an MRI of his brain and spine which unfortunately revealed small areas of acute infarcts in the lower jero and the upper medulla on both sides of the midline.  He also had chronic infarcts in his jero and right inferior cerebellum.  They were chronically Cuna in the bilateral basal ganglia, the right thalamus and the right periventricular white matter.  He had evidence of small vessel disease as well in the periventricular white matter.     Ultimately it appears he has been suffering multiple small strokes and his progressive decline was due to acute infarcts in the lower jero and upper medulla.  Since that time he has not recovered function, has required tracheostomy and PEG placement with the ultimate goal of transferring him to postacute medical to see what function may be regained in time.    Patient able to nod head, shakes head for yes/no, able to use alphabet sheet to make needs known. He continues to work with PT/OT/SLP therapies with some minor signs of improvement. Veratent is medically cleared, plan for discharge to  LTAC for ongoing management of tracheostomy tube, PEG tube, therapies.    Interval Problem Update  Patient was seen and examined at bedside.  No acute events overnight. Patient is resting comfortably in bed and in no acute distress.     4L T piece  Secretion burden improved with chest physiotherapy  Patient is medically cleared, pending LTAC.  Mucinex for secretions  Update to friend, Susan, at bedside   Not tolerating tube feeds - tube feeds seen in trache  Hold tube feeds - consider restart in AM  Trial reglan x5 days    I have discussed this patient's plan of care and discharge plan at IDT rounds today with Case Management, Nursing, Nursing leadership, and other members of the IDT team.    Consultants/Specialty  critical care I discussed with Dr. Torres    Code Status  Full Code    Disposition  The patient is medically cleared for discharge to home or a post-acute facility.  Anticipate discharge to: a long-term acute care hospital    I have placed the appropriate orders for post-discharge needs.    Review of Systems  Review of Systems   Unable to perform ROS: Patient nonverbal        Physical Exam  Temp:  [36.5 °C (97.7 °F)-37.2 °C (99 °F)] 36.9 °C (98.4 °F)  Pulse:  [62-93] 74  Resp:  [16-18] 16  BP: (117-139)/(72-83) 122/72  SpO2:  [92 %-98 %] 93 %    Physical Exam  Constitutional:       Appearance: He is ill-appearing.   HENT:      Right Ear: External ear normal.      Left Ear: External ear normal.      Nose: No congestion.   Neck:      Comments: Tracheostomy on Tpiece  Cardiovascular:      Rate and Rhythm: Normal rate and regular rhythm.   Pulmonary:      Breath sounds: Rhonchi present.      Comments: Significant secretion burden  Abdominal:      Tenderness: There is no abdominal tenderness. There is no guarding.      Comments: PEG   Neurological:      Mental Status: He is alert.      Comments: He is able to slightly move his left upper and lower extremity.  He was able to nod his head to answer  question.  Follows commands         Fluids    Intake/Output Summary (Last 24 hours) at 4/21/2025 1845  Last data filed at 4/21/2025 1800  Gross per 24 hour   Intake 120 ml   Output 475 ml   Net -355 ml        Laboratory  Recent Labs     04/20/25  0519 04/21/25  0005   WBC 7.1 8.1   RBC 4.72 4.81   HEMOGLOBIN 13.0* 13.1*   HEMATOCRIT 40.5* 41.1*   MCV 85.8 85.4   MCH 27.5 27.2   MCHC 32.1* 31.9*   RDW 38.8 38.6   PLATELETCT 374 357   MPV 9.4 9.4     Recent Labs     04/20/25  0519 04/21/25  0005   SODIUM 140 141   POTASSIUM 3.6 3.3*   CHLORIDE 98 98   CO2 29 30   GLUCOSE 121* 140*   BUN 29* 29*   CREATININE 0.89 0.99   CALCIUM 9.8 9.9                   Imaging  DX-CHEST-LIMITED (1 VIEW)   Final Result         No acute cardiopulmonary abnormalities are identified.      PS-IEKEHNS-4 VIEW   Final Result         No specific finding to suggest small bowel obstruction.      DX-CHEST-PORTABLE (1 VIEW)   Final Result      Hypoinflation with bibasilar atelectasis.      US-RENAL   Final Result      1.  No hydronephrosis.      DX-CHEST-PORTABLE (1 VIEW)   Final Result         1.  Left basilar atelectasis and/or subtle infiltrates, similar to prior study      DX-CHEST-PORTABLE (1 VIEW)   Final Result         1.  Left basilar atelectasis, no focal infiltrate   2.  Cardiomegaly      DX-G.I. TUBE INJECTION, ANY TYPE   Final Result      Contrast from a PEG tube injection extends into the stomach without evidence of contrast leak.      CT-CHEST,ABDOMEN,PELVIS WITH   Final Result      1.  Large amount of free intraperitoneal air within the abdomen again seen as was previously identified on chest x-ray by review of the patient's chart, a percutaneous gastrostomy tube was placed on 3/31/2025 and is free intraperitoneal air is possibly    secondary to that recent procedure.      2.  Bibasal atelectasis and small bilateral pleural effusions.      3.  Tracheostomy tube present.      4.  No evidence of bowel obstruction or free fluid within  the abdomen or pelvis.      DX-CHEST-PORTABLE (1 VIEW)   Final Result         1.  Bibasilar atelectasis   2.  Intra-abdominal free air, can be associated with history of percutaneous gastrostomy, consider other viscus perforation as warranted. Could be further evaluated with CT of the abdomen with contrast as clinically appropriate.      These findings were discussed with the patient's clinician, Bravo Lala Iv, on 4/2/2025 7:57 AM.      DX-CHEST-PORTABLE (1 VIEW)   Final Result      No evidence of acute cardiopulmonary process.      DX-ABDOMEN FOR TUBE PLACEMENT   Final Result      1.  Enteric tube extends in the fundus of stomach.      DX-ABDOMEN FOR TUBE PLACEMENT   Final Result      The gastric tube has been removed and replaced with a small bowel feeding tube which terminate in the proximal stomach.      DX-CHEST-PORTABLE (1 VIEW)   Final Result      Atelectasis within the left lung base.      MR-THORACIC SPINE-WITH & W/O   Final Result      1.  There is no abnormal intramedullary T2 signal intensity in the thoracic spinal cord.   2.  Mild degenerative disease at T8-9.   3.  Left lower segmental consolidation.      MR-CERVICAL SPINE-WITH & W/O   Final Result      1.  Small areas of acute infarcts in the lower jero and upper medulla involving both sides of the midline. There are chronic infarcts in the jero and right inferior cerebellum.   2.  There is no abnormal intramedullary T2 signal intensity in the cervical spinal cord to suggest demyelinating lesions. There is no MR evidence of compressive myelopathy.   3.  Mild degenerative disease.            MR-BRAIN-WITH & W/O   Final Result      1.  Small areas of acute infarcts in the lower jero and upper medulla involving both sides of the midline.   2.  There are chronic infarcts in the jero and right inferior cerebellum. . There are areas of chronic lacunae in the bilateral basal ganglia, right thalamus and right periventricular white matter.   3.  There are  nonspecific T2 hyperintensities in the periventricular white matter likely representing chronic small vessel disease.   4.  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      MR-LUMBAR SPINE-WITH & W/O   Final Result      1.  Unremarkable pre and postcontrast MR examination of the lumbar spine.   2.  Clinical suspicious for GBS: There is no abnormal enhancement of the lumbar nerve roots.      DX-ABDOMEN FOR TUBE PLACEMENT   Final Result      NG tube tip projects at the peripyloric region.      DX-CHEST-PORTABLE (1 VIEW)   Final Result      1.  Low lung volumes without definite acute cardiopulmonary abnormality.   2.  Support apparatus as above.      CT-HEAD W/O   Final Result      1.  No acute intracranial abnormality.   2.  Remote right cerebellar infarct.               DX-CHEST-PORTABLE (1 VIEW)   Final Result      No acute cardiopulmonary disease evident.      DX-CHEST-PORTABLE (1 VIEW)   Final Result      No acute cardiopulmonary disease evident.      CT-CEREBRAL PERFUSION ANALYSIS   Final Result      1. Cerebral blood flow less than 30% possibly representing completed infarct = 0 mL. Based on distribution of this finding, this is unlikely to represent artifact.      2. T Max more than 6 seconds possibly representing combination of completed infarct and ischemia = 7 mL. Based on the distribution of this finding, this is possibly artifact.      3. Mismatched volume possibly representing ischemic brain/penumbra= 0 mL      4.  Please note that this cerebral perfusion study and report is Quantitative and targets supratentorial (cerebral) perfusion for evaluation of large vessel territory acute ischemia/infarction. For example, lacunar infarcts, and brainstem/posterior fossa    ischemia/infarction are not evaluated on this study.  Data acquisition is subject to artifacts which can yield non-anatomically plausible perfusion maps which may be due to motion, bolus  "timing, signal to noise ratio, or other technical factors.    Perfusion map abnormalities which show non-anatomic distributions are likely artifact.   This study is not \"stand-alone\" and should only be utilized for diagnosis, management/treatment in correlation with CT, CTA, and/or MRI and clinical factors.         CT-CTA NECK WITH & W/O-POST PROCESSING   Final Result      CT angiogram of the neck within normal limits.      CT-CTA HEAD WITH & W/O-POST PROCESS   Final Result      CT angiogram of the [place] within normal limits.      CT-HEAD W/O   Final Result      Head CT without contrast within normal limits. No evidence of acute cerebral infarction, hemorrhage or mass lesion.                    Assessment/Plan  * Acute ischemic stroke (HCC)- (present on admission)  Assessment & Plan  Acute ischemic stroke  MRI of the brain revealed acute infarcts of the lower jero and upper medulla.  He effectively is a quadriplegic and required tracheostomy and PEG tube.  Aspirin and statin  Blood pressure control  Family hope for functional recovery    Acute neuromuscular respiratory failure (HCC)- (present on admission)  Assessment & Plan  Requiring tracheostomy    Proteus mirabilis pneumonia (HCC)- (present on admission)  Assessment & Plan  Treated with Zosyn de-escalated to Augmentin based on cultures from sputum    Type 2 diabetes mellitus with hyperglycemia, without long-term current use of insulin (HCC)- (present on admission)  Assessment & Plan  Details are unclear  Reportedly he is on metformin as an outpatient  Hemoglobin A1c 6.3  He does not require insulin    Anxiety  Assessment & Plan  Ativan for anxiety.     Hematuria  Assessment & Plan  Darkened urine, no gross hematuria  No recent grove trauma  Renal US normal, no stones  Continue antibiotics for possible UTI given mild signs of infection on UA  If hematuria persists after keflex treatment, consider urology consult/referral    Primary hypertension- " (present on admission)  Assessment & Plan  Blood pressure is appropriate with hydrochlorothiazide 25 mg daily and Norvasc 10 mg daily             VTE prophylaxis: VTE Selection    I have performed a physical exam and reviewed and updated ROS and Plan today (4/21/2025). In review of yesterday's note (4/20/2025), there are no changes except as documented above.    Greater than 53 minutes spent prepping to see patient (e.g. review of tests) obtaining and/or reviewing separately obtained history. Performing a medically appropriate examination and/ evaluation.  Counseling and educating the patient/family/caregiver.  Ordering medications, tests, or procedures.  Referring and communicating with other health care professionals.  Documenting clinical information in EPIC.  Independently interpreting results and communicating results to patient/family/caregiver.  Care coordination.

## 2025-04-22 ENCOUNTER — APPOINTMENT (OUTPATIENT)
Dept: RADIOLOGY | Facility: MEDICAL CENTER | Age: 50
End: 2025-04-22
Attending: INTERNAL MEDICINE
Payer: COMMERCIAL

## 2025-04-22 ENCOUNTER — APPOINTMENT (OUTPATIENT)
Dept: RADIOLOGY | Facility: MEDICAL CENTER | Age: 50
DRG: 004 | End: 2025-04-22
Attending: INTERNAL MEDICINE
Payer: COMMERCIAL

## 2025-04-22 PROBLEM — R11.10 EMESIS: Status: ACTIVE | Noted: 2025-04-22

## 2025-04-22 PROBLEM — K59.00 CONSTIPATION: Status: ACTIVE | Noted: 2025-04-22

## 2025-04-22 LAB
ALBUMIN SERPL BCP-MCNC: 3.4 G/DL (ref 3.2–4.9)
BUN SERPL-MCNC: 24 MG/DL (ref 8–22)
CALCIUM ALBUM COR SERPL-MCNC: 10.4 MG/DL (ref 8.5–10.5)
CALCIUM SERPL-MCNC: 9.9 MG/DL (ref 8.5–10.5)
CHLORIDE SERPL-SCNC: 97 MMOL/L (ref 96–112)
CO2 SERPL-SCNC: 29 MMOL/L (ref 20–33)
CREAT SERPL-MCNC: 0.83 MG/DL (ref 0.5–1.4)
ERYTHROCYTE [DISTWIDTH] IN BLOOD BY AUTOMATED COUNT: 39 FL (ref 35.9–50)
GFR SERPLBLD CREATININE-BSD FMLA CKD-EPI: 108 ML/MIN/1.73 M 2
GLUCOSE SERPL-MCNC: 120 MG/DL (ref 65–99)
HCT VFR BLD AUTO: 41.8 % (ref 42–52)
HGB BLD-MCNC: 13.2 G/DL (ref 14–18)
MAGNESIUM SERPL-MCNC: 2.2 MG/DL (ref 1.5–2.5)
MCH RBC QN AUTO: 27.2 PG (ref 27–33)
MCHC RBC AUTO-ENTMCNC: 31.6 G/DL (ref 32.3–36.5)
MCV RBC AUTO: 86.2 FL (ref 81.4–97.8)
PHOSPHATE SERPL-MCNC: 3.8 MG/DL (ref 2.5–4.5)
PLATELET # BLD AUTO: 360 K/UL (ref 164–446)
PMV BLD AUTO: 9 FL (ref 9–12.9)
POTASSIUM SERPL-SCNC: 3.6 MMOL/L (ref 3.6–5.5)
RBC # BLD AUTO: 4.85 M/UL (ref 4.7–6.1)
SODIUM SERPL-SCNC: 139 MMOL/L (ref 135–145)
WBC # BLD AUTO: 7.2 K/UL (ref 4.8–10.8)

## 2025-04-22 PROCEDURE — 97530 THERAPEUTIC ACTIVITIES: CPT

## 2025-04-22 PROCEDURE — 700102 HCHG RX REV CODE 250 W/ 637 OVERRIDE(OP): Performed by: STUDENT IN AN ORGANIZED HEALTH CARE EDUCATION/TRAINING PROGRAM

## 2025-04-22 PROCEDURE — 97110 THERAPEUTIC EXERCISES: CPT

## 2025-04-22 PROCEDURE — 92507 TX SP LANG VOICE COMM INDIV: CPT

## 2025-04-22 PROCEDURE — A9270 NON-COVERED ITEM OR SERVICE: HCPCS | Performed by: INTERNAL MEDICINE

## 2025-04-22 PROCEDURE — 94640 AIRWAY INHALATION TREATMENT: CPT

## 2025-04-22 PROCEDURE — 700102 HCHG RX REV CODE 250 W/ 637 OVERRIDE(OP): Performed by: INTERNAL MEDICINE

## 2025-04-22 PROCEDURE — 700111 HCHG RX REV CODE 636 W/ 250 OVERRIDE (IP): Mod: JZ | Performed by: INTERNAL MEDICINE

## 2025-04-22 PROCEDURE — 770020 HCHG ROOM/CARE - TELE (206)

## 2025-04-22 PROCEDURE — 85027 COMPLETE CBC AUTOMATED: CPT

## 2025-04-22 PROCEDURE — 80069 RENAL FUNCTION PANEL: CPT

## 2025-04-22 PROCEDURE — 74018 RADEX ABDOMEN 1 VIEW: CPT

## 2025-04-22 PROCEDURE — 94669 MECHANICAL CHEST WALL OSCILL: CPT

## 2025-04-22 PROCEDURE — A9270 NON-COVERED ITEM OR SERVICE: HCPCS | Performed by: STUDENT IN AN ORGANIZED HEALTH CARE EDUCATION/TRAINING PROGRAM

## 2025-04-22 PROCEDURE — 97112 NEUROMUSCULAR REEDUCATION: CPT

## 2025-04-22 PROCEDURE — 99233 SBSQ HOSP IP/OBS HIGH 50: CPT | Performed by: INTERNAL MEDICINE

## 2025-04-22 PROCEDURE — 700101 HCHG RX REV CODE 250: Performed by: STUDENT IN AN ORGANIZED HEALTH CARE EDUCATION/TRAINING PROGRAM

## 2025-04-22 PROCEDURE — 36415 COLL VENOUS BLD VENIPUNCTURE: CPT

## 2025-04-22 PROCEDURE — 83735 ASSAY OF MAGNESIUM: CPT

## 2025-04-22 RX ADMIN — MAGNESIUM HYDROXIDE 30 ML: 2400 SUSPENSION ORAL at 04:44

## 2025-04-22 RX ADMIN — METOCLOPRAMIDE 5 MG: 10 TABLET ORAL at 11:24

## 2025-04-22 RX ADMIN — GUAIFENESIN 200 MG: 100 LIQUID ORAL at 15:59

## 2025-04-22 RX ADMIN — GUAIFENESIN 200 MG: 100 LIQUID ORAL at 18:10

## 2025-04-22 RX ADMIN — ACETYLCYSTEINE 3 ML: 200 SOLUTION ORAL; RESPIRATORY (INHALATION) at 09:58

## 2025-04-22 RX ADMIN — GUAIFENESIN 200 MG: 100 LIQUID ORAL at 21:50

## 2025-04-22 RX ADMIN — HYDROCHLOROTHIAZIDE 25 MG: 25 TABLET ORAL at 04:42

## 2025-04-22 RX ADMIN — AMLODIPINE BESYLATE 10 MG: 10 TABLET ORAL at 04:43

## 2025-04-22 RX ADMIN — ACETYLCYSTEINE 3 ML: 200 SOLUTION ORAL; RESPIRATORY (INHALATION) at 13:49

## 2025-04-22 RX ADMIN — ENOXAPARIN SODIUM 40 MG: 100 INJECTION SUBCUTANEOUS at 17:50

## 2025-04-22 RX ADMIN — METOCLOPRAMIDE 5 MG: 10 TABLET ORAL at 17:50

## 2025-04-22 RX ADMIN — BISACODYL 10 MG: 10 SUPPOSITORY RECTAL at 10:07

## 2025-04-22 RX ADMIN — GUAIFENESIN 200 MG: 100 LIQUID ORAL at 03:27

## 2025-04-22 RX ADMIN — METOCLOPRAMIDE 5 MG: 10 TABLET ORAL at 04:44

## 2025-04-22 RX ADMIN — ALBUTEROL SULFATE 2.5 MG: 2.5 SOLUTION RESPIRATORY (INHALATION) at 13:49

## 2025-04-22 RX ADMIN — ATORVASTATIN CALCIUM 80 MG: 80 TABLET, FILM COATED ORAL at 17:50

## 2025-04-22 RX ADMIN — Medication 5 MG: at 21:50

## 2025-04-22 RX ADMIN — SENNOSIDES AND DOCUSATE SODIUM 2 TABLET: 50; 8.6 TABLET ORAL at 04:42

## 2025-04-22 RX ADMIN — SENNOSIDES AND DOCUSATE SODIUM 2 TABLET: 50; 8.6 TABLET ORAL at 17:50

## 2025-04-22 RX ADMIN — GUAIFENESIN 200 MG: 100 LIQUID ORAL at 11:24

## 2025-04-22 RX ADMIN — GABAPENTIN 400 MG: 400 CAPSULE ORAL at 11:24

## 2025-04-22 RX ADMIN — ACETYLCYSTEINE 3 ML: 200 SOLUTION ORAL; RESPIRATORY (INHALATION) at 19:03

## 2025-04-22 RX ADMIN — ALBUTEROL SULFATE 2.5 MG: 2.5 SOLUTION RESPIRATORY (INHALATION) at 09:58

## 2025-04-22 RX ADMIN — GUAIFENESIN 200 MG: 100 LIQUID ORAL at 08:08

## 2025-04-22 RX ADMIN — ACETYLCYSTEINE 3 ML: 200 SOLUTION ORAL; RESPIRATORY (INHALATION) at 06:24

## 2025-04-22 RX ADMIN — GABAPENTIN 400 MG: 400 CAPSULE ORAL at 17:50

## 2025-04-22 RX ADMIN — ALBUTEROL SULFATE 2.5 MG: 2.5 SOLUTION RESPIRATORY (INHALATION) at 06:24

## 2025-04-22 RX ADMIN — ASPIRIN 81 MG: 81 TABLET, CHEWABLE ORAL at 04:43

## 2025-04-22 RX ADMIN — ALBUTEROL SULFATE 2.5 MG: 2.5 SOLUTION RESPIRATORY (INHALATION) at 19:03

## 2025-04-22 RX ADMIN — GABAPENTIN 400 MG: 400 CAPSULE ORAL at 04:42

## 2025-04-22 ASSESSMENT — COGNITIVE AND FUNCTIONAL STATUS - GENERAL
TURNING FROM BACK TO SIDE WHILE IN FLAT BAD: TOTAL
WALKING IN HOSPITAL ROOM: TOTAL
CLIMB 3 TO 5 STEPS WITH RAILING: TOTAL
DRESSING REGULAR UPPER BODY CLOTHING: TOTAL
SUGGESTED CMS G CODE MODIFIER MOBILITY: CN
TOILETING: TOTAL
MOVING FROM LYING ON BACK TO SITTING ON SIDE OF FLAT BED: TOTAL
DAILY ACTIVITIY SCORE: 6
PERSONAL GROOMING: TOTAL
SUGGESTED CMS G CODE MODIFIER DAILY ACTIVITY: CN
STANDING UP FROM CHAIR USING ARMS: TOTAL
MOBILITY SCORE: 6
DRESSING REGULAR LOWER BODY CLOTHING: TOTAL
EATING MEALS: TOTAL
HELP NEEDED FOR BATHING: TOTAL
MOVING TO AND FROM BED TO CHAIR: TOTAL

## 2025-04-22 ASSESSMENT — GAIT ASSESSMENTS: GAIT LEVEL OF ASSIST: UNABLE TO PARTICIPATE

## 2025-04-22 ASSESSMENT — PAIN DESCRIPTION - PAIN TYPE: TYPE: ACUTE PAIN

## 2025-04-22 ASSESSMENT — FIBROSIS 4 INDEX: FIB4 SCORE: 0.63

## 2025-04-22 NOTE — THERAPY
"Speech Language Pathology   Daily Treatment     Patient Name: Jonna Brian  AGE:  49 y.o., SEX:  male  Medical Record #: [id number]  Date of Service: 4/22/2025      Precautions: Fall Risk;Swallow Precautions;PEG Tube;Tracheostomy ;       Subjective  Attempted to see patient; on first attempt, patient indicated that he was tired and used Y/N and facial expressions to request SLP to re-attempt later in the afternoon. Agreeable to target communication on second attempt, although continued to report fatigue. He reported that staff has been using alphabet board (instructions hung HOB).      Assessment  Pt seen for communication training. Patient used multiple communication modalities during session including: facial expressions, pointing with eye gaze, head shaking Y/N, mouthing words, and partner assisted scanning on alphabet board. Using multi-modal communication system, patient expressed wants/needs/ideas x50 throughout session.     Patient used alphabet board to express \"swallow.\" SLP and patient discussed dysphagia POC, including concern that patient has not demonstrated a reflexive swallow. Discussed dysphagia and aspiration risk, as well as risks of negative sequelae resulting from dysphagia and aspiration. Discussed swallow physiology. Discussed role of PMV usage in improving positive upper airway pressure and sensation, which can help with swallow. Answered all patient questions related to dysphagia at this time.       Clinical Impressions  Patient continues to present with grossly intact cognitive-communication skills and is limited by bulbar weakness negatively impacting his voice, motor speech and swallow function. He is benefiting from AAC alphabet and basic need communication boards in his room with partner A for scanning, as well as nodding head Y/N.       Recommendations  Communication Strategies:     1) Nodding Y/N in response to closed-ended questions   2) Use of partner assisted scanning (partner " "points and patient nods Y/N) on alphabet board and low-tech AAC  3) Use of facial expressions to augment meaning    Voice Prosthesis Training  Placement: Trained staff only  Duration: 15-30 minutes w/ close supervision due to secretions     Supervision Needs Upons Discharge: Direct assistance with IADLs (see below) (with use of partner assisted scanning as needed)      SLP Treatment Plan  Treatment Plan: Speech-Language Treatment, Voice Prosthesis Training, Patient/Family/Caregiver Training  SLP Frequency: 4x Per Week  Estimated Duration: Until Therapy Goals Met      Anticipated Discharge Needs  Discharge Recommendations: Recommend post-acute placement for additional speech therapy services prior to discharge home  Therapy Recommendations Upon DC: Dysphagia Training, Expression Training, AAC Training / Development, Patient / Family / Caregiver Education, Community Re-Integration, Tracheostomy Training      Patient / Family Goals  Patient / Family Goal #1: \"Swallow\"  Goal #1 Outcome: Goal not met  Short Term Goals  Short Term Goal # 1: Pt will tolerate PMV trials without negitive change to cardiopulmonary vitals for 20 minutes.  Goal Outcome # 1: Goal met, new goal added  Short Term Goal # 1 B : Patient will tolerate PMV trials for 30 minutes without negative changes to cardiopulmonary vitals.  Goal Outcome  # 1 B:  (Goal not targeted this date due to fatigue)  Short Term Goal # 2: Pt will demo intelligible phonation at the word level w/ > 80% intelligibility w/ mod cueing  Goal Outcome # 2 :  (Goal not targeted this date)  Short Term Goal # 3: Pt will express 5 target single words using the alphabet board via scanning w/ mod cueing and > 80% accuracy  Goal Outcome  # 3: Progressing as expected  Short Term Goal # 4: Pt's family / caregivers will participate in training on utilizing AAC with the pt via demonstration teachback  Goal Outcome  # 4:  (Goal not targeted - family not present)      Julia Sherman, SLP  "

## 2025-04-22 NOTE — DISCHARGE PLANNING
Medical Social Work  PC to Natalie [phone], LEXY provided an update   Natalie stated she will try to call and get answers.  LEXY requested Natalie's email so LEXY could send questions that needed to be answered, [email or web address]    How long will his [facility] be active  Short term disability, how long is it valid/ as they range from 3 months to 1 year  Short term disability, what is the payout amount.   Natalie stated that she remembers some in HR saying patient will get 100% of his pay for 12 weeks, not sure after that.      LEXY asked Natalie what the patient's discharge plan is, long term skilled, living with a family member, significant other.    LEXY was told that her son has made progress and wants him to go to a rehab.  Saying he is young and has a strong drive, that she is not giving up on him.

## 2025-04-22 NOTE — CARE PLAN
The patient is Watcher - Medium risk of patient condition declining or worsening    Shift Goals  Clinical Goals: monitor respiratory status  Patient Goals: sleep  Family Goals: comfort    Progress made toward(s) clinical / shift goals:  POC discussed with patient. Patient remains trach with t-piece. Nonverbal, but nods yes/no appropriately     Patient is not progressing towards the following goals:

## 2025-04-22 NOTE — PROGRESS NOTES
[facility] Medicine Daily Progress Note    Date of Service  4/22/2025    Chief Complaint  Jonna Brian is a 49 y.o. male admitted 3/25/2025 with headache and weakness.    Hospital Course  This is a 49 y.o. male who was initially admitted to the hospital on 3/25/2025 with global weakness, aphasia and ataxia.  His last known normal was 1630 on March 24.  He was initially admitted to the hospital floor, his CT head as well as CTA of the head and neck were within normal limits.  There was consideration for possible Guillain-Barré syndrome.     His condition continued to deteriorate and he underwent a lumbar puncture which revealed elevated protein but no evidence of infection.  He had progressive bulbar symptoms and weakness and was transferred to the ICU.  Unfortunately shortly after he arrived in the ICU he developed stridor and required emergent intubation on 3/27.       He then underwent an MRI of his brain and spine which unfortunately revealed small areas of acute infarcts in the lower jero and the upper medulla on both sides of the midline.  He also had chronic infarcts in his jero and right inferior cerebellum.  They were chronically Cuna in the bilateral basal ganglia, the right thalamus and the right periventricular white matter.  He had evidence of small vessel disease as well in the periventricular white matter.     Ultimately it appears he has been suffering multiple small strokes and his progressive decline was due to acute infarcts in the lower jero and upper medulla.  Since that time he has not recovered function, has required tracheostomy and PEG placement with the ultimate goal of transferring him to postacute medical to see what function may be regained in time.    Patient able to nod head, shakes head for yes/no, able to use alphabet sheet to make needs known. He continues to work with PT/OT/SLP therapies with some minor signs of improvement. Veratent is medically cleared, plan for discharge to LTAC  for ongoing management of tracheostomy tube, PEG tube, therapies.    Interval Problem Update  4/22 vital stable on 4 L T-piece  Patient had episode of emesis early this morning, concern for possible aspiration continue to monitor vital signs  WBC 7.2, hemoglobin 13.2  Renal function and electrolytes stable  Discussed with bedside nursing patient has not had a bowel movement since 4/17, will give suppository, likely contributing to his nausea.  Once patient has a bowel movement will trial restarting tube feeds.  KUB pending  Nursing also noted patient having very thick secretions, he is on Robitussin and Mucomyst.  Will DC scopolamine patch   Following commands and nodding yes and no.    I have discussed this patient's plan of care and discharge plan at IDT rounds today with Case Management, Nursing, Nursing leadership, and other members of the IDT team.    Consultants/Specialty  critical care I discussed with Dr. Torres    Code Status  Full Code    Disposition  The patient is not medically cleared for discharge to home or a post-acute facility.  Anticipate discharge to: a long-term acute care hospital    I have placed the appropriate orders for post-discharge needs.    Review of Systems  Review of Systems   Unable to perform ROS: Patient nonverbal        Physical Exam  Temp:  [36.4 °C (97.5 °F)-37.1 °C (98.7 °F)] 36.4 °C (97.5 °F)  Pulse:  [71-90] 83  Resp:  [16-20] 18  BP: (119-137)/(70-94) 137/94  SpO2:  [92 %-97 %] 93 %    Physical Exam  Vitals and nursing note reviewed.   Constitutional:       Appearance: He is ill-appearing.   Eyes:      Conjunctiva/sclera: Conjunctivae normal.   Neck:      Comments: Tracheostomy on Tpiece  Cardiovascular:      Rate and Rhythm: Normal rate and regular rhythm.   Pulmonary:      Breath sounds: Rhonchi present. No wheezing.      Comments: Significant secretion burden  Abdominal:      General: There is distension.      Palpations: Abdomen is soft.      Tenderness: There is no  abdominal tenderness.      Comments: PEG   Skin:     General: Skin is warm and dry.   Neurological:      Mental Status: He is alert.      Comments: He is able to slightly move his left upper and lower extremity.  He was able to nod his head to answer question.  Follows commands         Fluids    Intake/Output Summary (Last 24 hours) at 4/22/2025 1428  Last data filed at 4/22/2025 1213  Gross per 24 hour   Intake 90 ml   Output 475 ml   Net -385 ml        Laboratory  Recent Labs     04/20/25  0519 04/21/25  0005 04/22/25  0927   WBC 7.1 8.1 7.2   RBC 4.72 4.81 4.85   HEMOGLOBIN 13.0* 13.1* 13.2*   HEMATOCRIT 40.5* 41.1* 41.8*   MCV 85.8 85.4 86.2   MCH 27.5 27.2 27.2   MCHC 32.1* 31.9* 31.6*   RDW 38.8 38.6 39.0   PLATELETCT 374 357 360   MPV 9.4 9.4 9.0     Recent Labs     04/20/25 0519 04/21/25  0005 04/22/25  0927   SODIUM 140 141 139   POTASSIUM 3.6 3.3* 3.6   CHLORIDE 98 98 97   CO2 29 30 29   GLUCOSE 121* 140* 120*   BUN 29* 29* 24*   CREATININE 0.89 0.99 0.83   CALCIUM 9.8 9.9 9.9                   Imaging  DX-CHEST-LIMITED (1 VIEW)   Final Result         No acute cardiopulmonary abnormalities are identified.      [id number] VIEW   Final Result         No specific finding to suggest small bowel obstruction.      DX-CHEST-PORTABLE (1 VIEW)   Final Result      Hypoinflation with bibasilar atelectasis.      US-RENAL   Final Result      1.  No hydronephrosis.      DX-CHEST-PORTABLE (1 VIEW)   Final Result         1.  Left basilar atelectasis and/or subtle infiltrates, similar to prior study      DX-CHEST-PORTABLE (1 VIEW)   Final Result         1.  Left basilar atelectasis, no focal infiltrate   2.  Cardiomegaly      DX-G.I. TUBE INJECTION, ANY TYPE   Final Result      Contrast from a PEG tube injection extends into the stomach without evidence of contrast leak.      CT-CHEST,ABDOMEN,PELVIS WITH   Final Result      1.  Large amount of free intraperitoneal air within the abdomen again seen as was previously  identified on chest x-ray by review of the patient's chart, a percutaneous gastrostomy tube was placed on 3/31/2025 and is free intraperitoneal air is possibly    secondary to that recent procedure.      2.  Bibasal atelectasis and small bilateral pleural effusions.      3.  Tracheostomy tube present.      4.  No evidence of bowel obstruction or free fluid within the abdomen or pelvis.      DX-CHEST-PORTABLE (1 VIEW)   Final Result         1.  Bibasilar atelectasis   2.  Intra-abdominal free air, can be associated with history of percutaneous gastrostomy, consider other viscus perforation as warranted. Could be further evaluated with CT of the abdomen with contrast as clinically appropriate.      These findings were discussed with the patient's clinician, Bravo Lala Iv, on 4/2/2025 7:57 AM.      DX-CHEST-PORTABLE (1 VIEW)   Final Result      No evidence of acute cardiopulmonary process.      DX-ABDOMEN FOR TUBE PLACEMENT   Final Result      1.  Enteric tube extends in the fundus of stomach.      DX-ABDOMEN FOR TUBE PLACEMENT   Final Result      The gastric tube has been removed and replaced with a small bowel feeding tube which terminate in the proximal stomach.      DX-CHEST-PORTABLE (1 VIEW)   Final Result      Atelectasis within the left lung base.      MR-THORACIC SPINE-WITH & W/O   Final Result      1.  There is no abnormal intramedullary T2 signal intensity in the thoracic spinal cord.   2.  Mild degenerative disease at T8-9.   3.  Left lower segmental consolidation.      MR-CERVICAL SPINE-WITH & W/O   Final Result      1.  Small areas of acute infarcts in the lower jero and upper medulla involving both sides of the midline. There are chronic infarcts in the jero and right inferior cerebellum.   2.  There is no abnormal intramedullary T2 signal intensity in the cervical spinal cord to suggest demyelinating lesions. There is no MR evidence of compressive myelopathy.   3.  Mild degenerative disease.             MR-BRAIN-WITH & W/O   Final Result      1.  Small areas of acute infarcts in the lower jero and upper medulla involving both sides of the midline.   2.  There are chronic infarcts in the jero and right inferior cerebellum. . There are areas of chronic lacunae in the bilateral basal ganglia, right thalamus and right periventricular white matter.   3.  There are nonspecific T2 hyperintensities in the periventricular white matter likely representing chronic small vessel disease.   4.  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      MR-LUMBAR SPINE-WITH & W/O   Final Result      1.  Unremarkable pre and postcontrast MR examination of the lumbar spine.   2.  Clinical suspicious for GBS: There is no abnormal enhancement of the lumbar nerve roots.      DX-ABDOMEN FOR TUBE PLACEMENT   Final Result      NG tube tip projects at the peripyloric region.      DX-CHEST-PORTABLE (1 VIEW)   Final Result      1.  Low lung volumes without definite acute cardiopulmonary abnormality.   2.  Support apparatus as above.      CT-HEAD W/O   Final Result      1.  No acute intracranial abnormality.   2.  Remote right cerebellar infarct.               DX-CHEST-PORTABLE (1 VIEW)   Final Result      No acute cardiopulmonary disease evident.      DX-CHEST-PORTABLE (1 VIEW)   Final Result      No acute cardiopulmonary disease evident.      CT-CEREBRAL PERFUSION ANALYSIS   Final Result      1. Cerebral blood flow less than 30% possibly representing completed infarct = 0 mL. Based on distribution of this finding, this is unlikely to represent artifact.      2. T Max more than 6 seconds possibly representing combination of completed infarct and ischemia = 7 mL. Based on the distribution of this finding, this is possibly artifact.      3. Mismatched volume possibly representing ischemic brain/penumbra= 0 mL      4.  Please note that this cerebral perfusion study and report is  "Quantitative and targets supratentorial (cerebral) perfusion for evaluation of large vessel territory acute ischemia/infarction. For example, lacunar infarcts, and brainstem/posterior fossa    ischemia/infarction are not evaluated on this study.  Data acquisition is subject to artifacts which can yield non-anatomically plausible perfusion maps which may be due to motion, bolus timing, signal to noise ratio, or other technical factors.    Perfusion map abnormalities which show non-anatomic distributions are likely artifact.   This study is not \"stand-alone\" and should only be utilized for diagnosis, management/treatment in correlation with CT, CTA, and/or MRI and clinical factors.         CT-CTA NECK WITH & W/O-POST PROCESSING   Final Result      CT angiogram of the neck within normal limits.      CT-CTA HEAD WITH & W/O-POST PROCESS   Final Result      CT angiogram of the [place] within normal limits.      CT-HEAD W/O   Final Result      Head CT without contrast within normal limits. No evidence of acute cerebral infarction, hemorrhage or mass lesion.               [id number] VIEW    (Results Pending)        Assessment/Plan  * Acute ischemic stroke (HCC)- (present on admission)  Assessment & Plan  Acute ischemic stroke  MRI of the brain revealed acute infarcts of the lower jero and upper medulla.  He effectively is a quadriplegic and required tracheostomy and PEG tube.  Aspirin and statin  Blood pressure control  Family hope for functional recovery    Emesis  Assessment & Plan  Tube feeds held   Likely worsened by constipation, bowel protocol   Reglan     Constipation  Assessment & Plan  KUB pending  Bowel protocol    Hematuria  Assessment & Plan  Darkened urine, no gross hematuria  No recent grove trauma  Renal US normal, no stones  Continue antibiotics for possible UTI given mild signs of infection on UA  If hematuria persists after keflex treatment, consider urology consult/referral    Proteus mirabilis " pneumonia (HCC)- (present on admission)  Assessment & Plan  Treated with Zosyn de-escalated to Augmentin based on cultures from sputum    Acute neuromuscular respiratory failure (HCC)- (present on admission)  Assessment & Plan  Requiring tracheostomy    Anxiety  Assessment & Plan  Ativan for anxiety.     Type 2 diabetes mellitus with hyperglycemia, without long-term current use of insulin (HCC)- (present on admission)  Assessment & Plan  Details are unclear  Reportedly he is on metformin as an outpatient  Hemoglobin A1c 6.3  He does not require insulin    Primary hypertension- (present on admission)  Assessment & Plan  Blood pressure is appropriate with hydrochlorothiazide 25 mg daily and Norvasc 10 mg daily         Total time spent in chart review, at bedside with the patient, discussing with pharmacy, nursing and case management: 51 minutes    VTE prophylaxis:    enoxaparin ppx    I have performed a physical exam and reviewed and updated ROS and Plan today (4/22/2025). In review of yesterday's note (4/21/2025), there are no changes except as documented above.

## 2025-04-22 NOTE — DIETARY
Nutrition Services: Brief Update    TF Glucerna 1.2 with rate up to 75 ml/hr yesterday, which is goal rate. TF then stopped per MD as pt had TF in his trache. Previously bolus feeds not tolerated and was reassessed for return to continuous TF and appears to not be tolerated either via PEG tube. PEG tube was placed by GI on 3/31.    Unclear why TF not being tolerated via bolus and continuous TF regimen.   Consider GI consult? Is PEG tube within correct placement?  Pt is already on reglan, which I agree with.  Monitor POC.    RD following.

## 2025-04-22 NOTE — CARE PLAN
The patient is Stable - Low risk of patient condition declining or worsening    Shift Goals  Clinical Goals: safety, airway maintenance  Patient Goals: sleep  Family Goals: comfort    Progress made toward(s) clinical / shift goals:    Problem: Knowledge Deficit - Standard  Goal: Patient and family/care givers will demonstrate understanding of plan of care, disease process/condition, diagnostic tests and medications  Description: Target End Date:  1-3 days or as soon as patient condition allowsDocument in Patient Education1.  Patient and family/caregiver oriented to unit, equipment, visitation policy and means for communicating concern2.  Complete/review Learning Assessment3.  Assess knowledge level of disease process/condition, treatment plan, diagnostic tests and medications4.  Explain disease process/condition, treatment plan, diagnostic tests and medications  Outcome: Progressing     Problem: Pain - Standard  Goal: Alleviation of pain or a reduction in pain to the patient’s comfort goal  Description: Target End Date:  Prior to discharge or change in level of careDocument on Vitals flowsheet1.  Document pain using the appropriate pain scale per order or unit policy2.  Educate and implement non-pharmacologic comfort measures (i.e. relaxation, distraction, massage, cold/heat therapy, etc.)3.  Pain management medications as ordered4.  Reassess pain after pain med administration per policy5.  If opiods administered assess patient's response to pain medication is appropriate per POSS sedation scale6.  Follow pain management plan developed in collaboration with patient and interdisciplinary team (including palliative care or pain specialists if applicable)  Outcome: Progressing     Problem: Optimal Care of the Stroke Patient  Goal: Optimal acute care for the stroke patient  Description: Target End Date:  1 to 3 days- Vital signs and neuro checks performed and documented per order- NIHSS completed and documented per  order- Continuous telemetry monitoring for 72 hours or until discontinued by provider- Head CT without contrast obtained- Consideration of MRI/MRA- MRI screening form completed in worklist if MRI ordered- Echocardiogram with Bubble Study ordered/completed with consideration of SHAYAN- Carotid Doppler ordered/completed (Not required if CTA of neck completed in ED)- Lipid Panel obtained within 48 hours of admission- PT, PTT, INR obtained per Anticoagulation orders (if applicable)- Antithrombotic therapy by end of hospital day 2 for ischemic stroke. Provider must document reason if contraindicated.- Venous Thromboembolism (VTE) Prophylaxis by end of hospital day 2 for ischemic and hemorrhagic stroke. Provider must document reason if contraindicated- Dysphagia screen completed and documented prior to any PO intake. Patient to remain NPO until Speech Therapy evaluation if thrombolytic or thrombectomy performed- Rehabilitation assessment including PT/OT/SLP evaluations for referral to Physical Medicine and Rehabilitation services. If none needed, provider needs to document reason- Neurology consult placed- Consideration of cardiology consult for cryptogenic strokes  Outcome: Progressing     Problem: Knowledge Deficit - Stroke Education  Goal: Patient's knowledge of stroke and risk factors will improve  Description: Target End Date:  1-3 days or as soon as patient condition allowsDocument in Patient Education1.  Stroke education booklet provided2.  Education regarding EMS activation, need for follow up, medication prescribed at discharge, risk factors for stroke/lifestyle modifications, warning signs and symptoms of stroke provided  Outcome: Progressing     Problem: Psychosocial - Patient Condition  Goal: Patient's ability to verbalize feelings about condition will improve  Description: Target End Date:  Prior to discharge or change in level of care1.  Discuss coping with medical condition and its effects2.  Encourage patient  participation in care3.  Encourage acknowledgement of body changes and accompanying emotions4.  Perform depression screening  Outcome: Progressing  Goal: Patient's ability to re-evaluate and adapt role responsibilities will improve  Description: Target End Date:  Prior to discharge or change in level of care1.  Assess family support2.  Encourage support system participation in care3.  Encouraged verbalization of feelings regarding caregiver responsibilities4.  Discuss changes in role and responsibilities caused by patient's condition  Outcome: Progressing     Problem: Discharge Planning - Stroke  Goal: Ensure Stroke Core Measures are met prior to discharge  Description: Target End Date:  Prior to discharge or change in level of care1. Patient discharged on antithrombotic therapy (Ischemic Stroke)2. Patient discharged on intensive statin if LDL is greater than or equal to 70 mg/dl (Ischemic Stroke)3. Patient discharged on anticoagulation therapy for patients with atrial fibrillation/flutter (Ischemic Stroke)4. Smoking education/cessation provided if applicable  Outcome: Progressing  Goal: Patient’s continuum of care needs will be met  Description: Target End Date:  Prior to discharge or change in level of care1.  Potential discharge barriers identified upon admission and throughout hospital stay2.  Ensure appropriate referrals in place for follow up with specialists after discharge3.  Ensure appropriate referrals in place for DMEs if applicable4.  Collaboration with transitional care team and interdisciplinary team to meet discharge needs5.  Involve patient and family/caregiver in prioritizing goals for discharge planning6.  Educate patient and caregiver about discharge instructions, medications, and follow up appointments7.  Referral placed to [facility]8.  Assure orders and follow up appointments are made for outpatient extended cardiac monitoring if appropriate  Outcome: Progressing     Problem: Neuro  Status  Goal: Neuro status will remain stable or improve  Description: Target End Date:  Prior to discharge or change in level of careDocument on Neuro assessment in the Assessment flowsheet1.  Assess and monitor neurologic status per provider order/protocol/unit policy2.  Assess level of consciousness and orientation3.  Assess for speech, dysarthria, dysphagia, facial symmetry4.  Assess visual field, eye movements, gaze preference, pupil reaction and size5.  Assess muscle strength and motor response in all four extremities6.  Assess for sensation (numbness and tingling)7.  Assess basic neuro reflexes (cough, gag, corneal)8.  Identify changes in neuro status and report to provider for testing/treatment orders  Outcome: Progressing  Note: Q4 neuro checks, A&O X4     Problem: Hemodynamic Monitoring  Goal: Patient's hemodynamics, fluid balance and neurologic status will be stable or improve  Description: Target End Date:  Prior to discharge or change in level of care1.  Vital signs, pulse oximetry and cardiac monitor per provider order and/or policy2.  Frequent pulse checks performed post thrombectomy3.  Frequent monitoring for signs of bleeding post TPA administration4.  Proper management of IV infusions5.  Intake and output monitored per provider order6.  Daily weight obtained per unit policy or provider order7.  Peripheral pulses and capillary refill assessed as needed8.  Monitor for signs/symptoms of excessive bleeding9.  Body temperature assessed and fevers [id number]. Patient positioned for maximum circulation/cardiac output  Outcome: Progressing     Problem: Respiratory - Stroke Patient  Goal: Patient will achieve/maintain optimum respiratory rate/effort  Description: Target End Date:  Prior to discharge or change in level of careDocument on Assessment flowsheet1.  Assess and monitor respiratory rate, rhythm, depth and effort of respiration2.  Oxygenation assessed throughout shift (recommendation of >94% for new  stroke patients)3.  Oxygen administered and/or titrated per order4.  Collaboration with RT to administer medication/treatments per order5.  Patient educated on importance of turning, coughing, and deep breathing6.  Patient positioned for maximum ventilatory efficiency7.  Airway suctioning provided as needed8.  Incentive spirometry encouraged 5-10 times every hour or while awake  Outcome: Progressing     Problem: Dysphagia  Goal: Dysphagia will improve  Description: Target End Date:  Prior to discharge or change in level of care1.  Assess and monitor ability to swallow2.  Collaborate with Speech Therapy to determine appropriate adaptation for safe administration of medications and oral nutrition3.  Elevate head of bed to 90 degrees during feedings and for 30 minutes after each feeding4.  Encourage proper swallowing techniques5.  Screening on admission or as soon as possible  Outcome: Progressing     Problem: Risk for Aspiration  Goal: Patient's risk for aspiration will be absent or decrease  Description: Target End Date:  Prior to discharge or change in level of care1.   Complete dysphagia screening on admission2.   NPO until dysphagia screening complete or medically cleared3.   Collaborate with Speech Therapy, Clinical Dietitian and interdisciplinary team4.   Implement aspiration precautions5.   Assist patient up to chair for meals6.   Elevate head of bed 90 degrees if patient is unable to get out of bed7.   Encourage small bites8.   Ensure foods/liquids are of appropriate consistency9.   Assess for any signs/symptoms of [id number]. Assess breath sounds and vital signs after oral intake  Outcome: Progressing  Note: Administered oral care and suctioning     Problem: Urinary Elimination  Goal: Establish and maintain regular urinary output  Description: Target End Date:  Prior to discharge or change in level of careDocument on I/O and Assessment flowsheets1.  Evaluate need to continue indwelling catheter every  shift2.  Assess signs and symptoms of urinary retention3.  Assess post-void residual volumes4.  Implement bladder training program5.  Encourage scheduled voidings6.  Assist patient to sit on bedside commode or toilet for voiding7.  Educate patient and family/caregiver on use and purpose of urine collection devices (document in Patient Education)  Outcome: Progressing     Problem: Bowel Elimination  Goal: Establish and maintain regular bowel function  Description: Target End Date:  Prior to discharge or change in level of care1.   Note date of last BM2.   Educate about diet, fluid intake, medication and activity to promote bowel function3.   Educate signs and symptoms of constipation and interventions to implement4.   Pharmacologic bowel management per provider order5.   Regular toileting schedule6.   Upright position for toileting7.   High fiber diet8.   Encourage hydration9.   Collaborate with Clinical [id number]. Care and maintenance of ostomy if applicable  Outcome: Progressing     Problem: Mobility - Stroke  Goal: Patient's capacity to carry out activities will improve  Description: Target End Date:  Prior to discharge or change in level of care1.  Assess for barriers to mobility/activity2.  Implement activity per interdisciplinary team recommendations3.  Target activity level identified and patient/family/caregiver aware of goal4.  Provide assistive devices5.  Instruct patient/caregiver on proper use of assistive/adaptive devices6.  Schedule activities and rest periods to decrease effects of fatigue7.  Encourage mobilization to extent of ability8.  Maintain proper body alignment9.  Provide adequate pain management to allow progressive [id number]. Implement pace maker precautions as needed  Outcome: Progressing     Problem: Self Care  Goal: Patient will have the ability to perform ADLs independently or with assistance (bathe, groom, dress, toilet and feed)  Description: Target End Date:  Prior to discharge  or change in level of careDocument on ADL flowsheet1.  Assess the capability and level of deficiency to perform ADLs2.  Encourage family/care giver involvement3.  Provide assistive devices4.  Consider PT/OT evaluations5.  Maintain support, give positive feedback, encourage self-care allowing extra time and verbal cuing as needed6.  Avoid doing something for patients they can do themselves, but provide assistance as needed7.  Assist in anticipating/planning individual needs8.  Collaborate with Case Management and  to meet discharge needs  Outcome: Progressing     Problem: Fall Risk  Goal: Patient will remain free from falls  Description: Target End Date:  Prior to discharge or change in level of careDocument interventions on the [facility] Fall Risk Assessment1.  Assess for fall risk factors2.  Implement fall precautions  Outcome: Progressing     Problem: Skin Integrity  Goal: Skin integrity is maintained or improved  Description: Target End Date:  Prior to discharge or change in level of careDocument interventions on Skin Risk/Lj flowsheet groups and corresponding LDA1.  Assess and monitor skin integrity, appearance and/or temperature2.  Assess risk factors for impaired skin integrity and/or pressures ulcers3.  Implement precautions to protect skin integrity in collaboration with interdisciplinary team4.  Implement pressure ulcer prevention protocol if at risk for skin breakdown5.  Confirm wound care consult if at risk for skin breakdown6.  Ensure patient use of pressure relieving devices  (Low air loss bed, waffle overlay, heel protectors, ROHO cushion, etc)  Outcome: Progressing       Patient is not progressing towards the following goals:

## 2025-04-22 NOTE — THERAPY
Occupational Therapy  Daily Treatment     Patient Name: Jonna Brian  Age:  49 y.o., Sex:  male  Medical Record #: [id number]  Today's Date: 4/22/2025     Precautions: Fall Risk, Swallow Precautions, PEG Tube, Tracheostomy   Comments: R riddhi    Assessment    Pt seen for bed-level OT to include: P/AAROM to BUE, PNF patterns 1&2, head/neck control exercises, oral care, facial hygiene. See grid below for details. Pt demos emerging RUE pronator tone, otherwise no changes to BUE strength/function. Pt slightly less engaged this session. Will continue to follow for acute OT.     Plan    Treatment Plan Status: Continue Current Treatment Plan  Type of Treatment: Self Care / Activities of Daily Living, Adaptive Equipment, Cognitive Skill Development, Neuro Re-Education / Balance, Therapeutic Exercises, Therapeutic Activity, Family / Caregiver Training  Treatment Frequency: 4 Times per Week  Treatment Duration: Until Therapy Goals Met    DC Equipment Recommendations: Unable to determine at this time  Discharge Recommendations: Recommend post-acute placement for additional occupational therapy services prior to discharge home     Objective       04/22/25 0911   Pain   Pain Scales Non Verbal Scale   Pain 0 - 10 Group   Therapist Pain Assessment Post Activity Pain Same as Prior to Activity;Nurse Notified  (no outward signs of pain)   Non Verbal Descriptors   Non Verbal Scale  Calm;Unlabored Breathing   Cognition    Cognition / Consciousness X   Speech/ Communication Nods Appropriately   Level of Consciousness Alert   Passive ROM Upper Body   Passive ROM Upper Body WDL   Active ROM Upper Body   Active ROM Upper Body  X   Dominant Hand Right   Comments no AROM to RUE; weak AROM to LUE in supported positions only   Strength Upper Body   Upper Body Strength  X   Lt Shoulder Flexion Strength 2- (P-)   Lt Elbow Flexion Strength 2- (P-)   Lt Elbow Extension Strength 2- (P-)   Lt Forearm Supination Strength 2- (P-)   Lt Forearm Pronation  Strength 2- (P-)   Lt Wrist Flexion Strength 2- (P-)   Lt Wrist Extension Strength 2- (P-)   Left  Impaired  (incomplete digit flex/ext)   Comments RUE 0/5 throughout   Upper Body Muscle Tone   Upper Body Muscle Tone  X   Rt Upper Extremity Muscle Tone Hypotonic;Non Functional   Lt Upper Extremity Muscle Tone Hypotonic  (able to use limb with max hand-over-hand assist for grooming tasks)   Comments emerging pronator tone to RUE   Supine Upper Body Exercises   Supine Upper Body Exercises Yes   Comments AAROM and PROM to BUE all joints; PNF patterns 1&2 to LUE   Other Treatments   Other Treatments Provided head and neck control in supported position   Activities of Daily Living   Grooming Total Assist  (suction oral care with hand-over-hand assist)   Upper Body Dressing Total Assist  (gown)   Skilled Intervention Verbal Cuing;Tactile Cuing;Compensatory Strategies;Facilitation   Short Term Goals   Short Term Goal # 1 Pt will complete suction oral care in supported position with mod A using AE PRN   Goal Outcome # 1 Progressing slower than expected   Short Term Goal # 2 UB dressing with mod A   Goal Outcome # 2 Progressing as expected   Short Term Goal # 3 BSC txf with max A   Goal Outcome # 3   (NT this session)   Short Term Goal # 4 Pt will sit with min A >2 minutes for participation in ADL   Goal Outcome # 4   (NT this session)   Interdisciplinary Plan of Care Collaboration   IDT Collaboration with  Nursing   Patient Position at End of Therapy In Bed;Bed Alarm On;Call Light within Reach   Collaboration Comments OT results and recs   Session Information   Date / Session Number  4/22 #8 1/4, 4/27_

## 2025-04-22 NOTE — THERAPY
Physical Therapy   Daily Treatment     Patient Name: Jonna Brian  Age:  49 y.o., Sex:  male  Medical Record #: [id number]  Today's Date: 4/22/2025     Precautions  Precautions: Fall Risk;Swallow Precautions;PEG Tube;Tracheostomy     Assessment    Patient continues to be limited by impaired motor function. He required total A for bed mobility, to maintain sitting balance at EOB, and to maintain upright head in sitting. Will continue to follow.    Plan    Treatment Plan Status: Modify Current Treatment Plan  Type of Treatment: Bed Mobility, Family / Caregiver Training, Gait Training, Neuro Re-Education / Balance, Self Care / Home Evaluation, Stair Training, Therapeutic Activities, Therapeutic Exercise  Treatment Frequency: 3 Times per Week  Treatment Duration: Until Therapy Goals Met    DC Equipment Recommendations: Unable to determine at this time  Discharge Recommendations: Recommend post-acute placement for additional physical therapy services prior to discharge home      Subjective    Patient received in bed, nodded head yes when asked if he would like to participate with therapy for sitting EOB     Objective       04/22/25 1353   Vitals   O2 (LPM) 4   O2 Delivery Device T-Piece   Pain 0 - 10 Group   Location Generalized   Therapist Pain Assessment During Activity;Nurse Notified  (patient nodded head when asked if he had pain)   Cognition    Cognition / Consciousness X   Speech/ Communication Nods Appropriately   Level of Consciousness Alert   Ability To Follow Commands 1 Step  (within functional limits)   Comments limited ability to communicate but engaged and participatory within abilities   Passive ROM Lower Body   Passive ROM Lower Body WDL   Strength Lower Body   Comments RLE 0/5   Other Treatments   Other Treatments Provided facilitated upright trunk and head in sitting   Balance   Sitting Balance (Static) Dependent   Sitting Balance (Dynamic) Dependent   Standing Balance (Static) Dependent   Standing  Balance (Dynamic) Dependent   Weight Shift Sitting Absent   Weight Shift Standing Absent   Skilled Intervention Verbal Cuing;Compensatory Strategies;Facilitation;Sequencing;Postural Facilitation   Bed Mobility    Supine to Sit Total Assist   Sit to Supine Total Assist   Scooting Total Assist   Rolling Total Assist to Rt.   Skilled Intervention Verbal Cuing;Compensatory Strategies;Facilitation;Sequencing;Postural Facilitation   Gait Analysis   Gait Level Of Assist Unable to Participate   Functional Mobility   Sit to Stand Unable to Participate   Bed, Chair, Wheelchair Transfer Unable to Participate   6 Clicks Assessment - How much HELP from from another person do you currently need... (If the patient hasn't done an activity recently, how much help from another person do you think he/she would need if he/she tried?)   Turning from your back to your side while in a flat bed without using bedrails? 1   Moving from lying on your back to sitting on the side of a flat bed without using bedrails? 1   Moving to and from a bed to a chair (including a wheelchair)? 1   Standing up from a chair using your arms (e.g., wheelchair, or bedside chair)? 1   Walking in hospital room? 1   Climbing 3-5 steps with a railing? 1   6 clicks Mobility Score 6   Short Term Goals    Short Term Goal # 1 pt will move supine<>eob with min a in 6 tx for bed mobility.   Goal Outcome # 1 goal not met   Short Term Goal # 2 pt will sit at eob for 5 min with fair- balance in 6 tx for oob tolerance.   Goal Outcome # 2 Goal not met   Short Term Goal # 3 pt will complete sts with hemiwalker and min a in 6 tx for functional mobility.   Goal Outcome # 3 Goal not met   Short Term Goal # 4 pt will hold head in upright position for 30 seconds for 6 tx for posture.   Goal Outcome # 4 Goal not met

## 2025-04-22 NOTE — DISCHARGE PLANNING
Medical Social Work  PC to patient's mother Natalie [phone]; LEXY called to obtain information on patient's short term disability/amount and how long/[employer] Insurance/how long is it valid.    LEXY explained that patient could qualify for Institutional Medicaid, income has to be less than $2901.    LEXY was told that she had been getting patient's checks, last one she can find at this time was for the week ending 3/28 for 1939.81.  Natalie stated; she remember the representation telling her that the patient would get 100% of his income for 12 weeks.  Natalie stated she isn't sure after that.    LEXY was provided two names/numbers to call   [employer] [phone], payroll  [place] [phone], benefits    LEXY called both numbers unable to access customer service, as they are requiring a phone pin    PC to [facility]  [place] [phone], message left to call LEXY

## 2025-04-22 NOTE — CARE PLAN
Problem: Aerosol Therapy  Goal: Improved hydration/ability to mobilize secretions and/or decreased airway edema  Description: Target End Date:  resolve prior to discharge or when underlying condition is resolved/stabilized1.  Implement heated or cool aerosol therapy2.  Assessed for optimal hydration, decreased edema and/or improved ability to mobilize secretions  Outcome: Progressing  4L/28%  8 portex     Problem: Bronchopulmonary Hygiene  Goal: Increase mobilization of retained secretions  Description: 1.  Perform bronchopulmonary therapy as indicated by assessment2.  Perform airway suctioning3.  Perform actions to maintain patient airway  Outcome: Progressing   Mucomyst/albuterol/IPV qid

## 2025-04-22 NOTE — PROGRESS NOTES
Monitor Summary: SR 71-76, DC 0.15, QRS 0.07, QT 0.36, with rare PVCs per strip from monitor room.

## 2025-04-22 NOTE — CARE PLAN
Problem: Aerosol Therapy  Goal: Improved hydration/ability to mobilize secretions and/or decreased airway edema  Description: Target End Date:  resolve prior to discharge or when underlying condition is resolved/stabilized1.  Implement heated or cool aerosol therapy2.  Assessed for optimal hydration, decreased edema and/or improved ability to mobilize secretions  Outcome: Not Progressing   4L 28% heated t-Piece      Problem: Bronchopulmonary Hygiene  Goal: Increase mobilization of retained secretions  Description: 1.  Perform bronchopulmonary therapy as indicated by assessment2.  Perform airway suctioning3.  Perform actions to maintain patient airway  Outcome: Not Progressing   Alb/MM and IPV QID

## 2025-04-22 NOTE — CARE PLAN
The patient is Watcher - Medium risk of patient condition declining or worsening    Shift Goals  Clinical Goals: safety, airway maintenance  Patient Goals: sleep  Family Goals: comfort    Progress made toward(s) clinical / shift goals:    Problem: Respiratory - Stroke Patient  Goal: Patient will achieve/maintain optimum respiratory rate/effort  Outcome: Progressing  Note: Patient on continuous pulse oximetry to monitor oxygenation. Trach in place at 4 L 28% FiO2. Frequent suctioning due to large amount of secretions.      Problem: Fall Risk  Goal: Patient will remain free from falls  Outcome: Progressing  Note: Fall precautions in place. Bed locked and in the lowest position. Call light in reach. Frame alarm in use.      Problem: Neuro Status  Goal: Neuro status will remain stable or improve  Outcome: Progressing  Note: Q 4 neuro checks in place. Patient A&O x 4. No sensation in the right upper and lower extremities. Right upper extremity withdraws to pain.      Problem: Knowledge Deficit - Stroke Education  Goal: Patient's knowledge of stroke and risk factors will improve  Outcome: Progressing  Note: Patient educated on the pathophysiology of stroke and related risk factors such as HTN and DM.        Patient is not progressing towards the following goals: N/A

## 2025-04-22 NOTE — PROGRESS NOTES
Patient had an episode of emesis after medication administration. Hospitalist notified of possible aspiration due to the appearance of vomit mixed with mucus. Vital signs stable at this time.

## 2025-04-22 NOTE — ASSESSMENT & PLAN NOTE
He was doing ok with 1 carton boluses, but had N/V when 2 cartons given last night.   KUB neg for obstruction.   Will get gastric emptying study, radiology test will be tomorrow  Hold tube feeds midnight, give 1 carton at a time for now

## 2025-04-23 PROBLEM — E87.6 HYPOKALEMIA: Status: ACTIVE | Noted: 2025-04-23

## 2025-04-23 LAB
ALBUMIN SERPL BCP-MCNC: 3.4 G/DL (ref 3.2–4.9)
BUN SERPL-MCNC: 26 MG/DL (ref 8–22)
CALCIUM ALBUM COR SERPL-MCNC: 10.6 MG/DL (ref 8.5–10.5)
CALCIUM SERPL-MCNC: 10.1 MG/DL (ref 8.5–10.5)
CHLORIDE SERPL-SCNC: 95 MMOL/L (ref 96–112)
CO2 SERPL-SCNC: 30 MMOL/L (ref 20–33)
CREAT SERPL-MCNC: 0.92 MG/DL (ref 0.5–1.4)
ERYTHROCYTE [DISTWIDTH] IN BLOOD BY AUTOMATED COUNT: 38.1 FL (ref 35.9–50)
GFR SERPLBLD CREATININE-BSD FMLA CKD-EPI: 103 ML/MIN/1.73 M 2
GLUCOSE SERPL-MCNC: 105 MG/DL (ref 65–99)
HCT VFR BLD AUTO: 41.5 % (ref 42–52)
HGB BLD-MCNC: 13.6 G/DL (ref 14–18)
MCH RBC QN AUTO: 27.4 PG (ref 27–33)
MCHC RBC AUTO-ENTMCNC: 32.8 G/DL (ref 32.3–36.5)
MCV RBC AUTO: 83.5 FL (ref 81.4–97.8)
PHOSPHATE SERPL-MCNC: 3.5 MG/DL (ref 2.5–4.5)
PLATELET # BLD AUTO: 342 K/UL (ref 164–446)
PMV BLD AUTO: 9 FL (ref 9–12.9)
POTASSIUM SERPL-SCNC: 3.4 MMOL/L (ref 3.6–5.5)
RBC # BLD AUTO: 4.97 M/UL (ref 4.7–6.1)
SODIUM SERPL-SCNC: 138 MMOL/L (ref 135–145)
WBC # BLD AUTO: 6.8 K/UL (ref 4.8–10.8)

## 2025-04-23 PROCEDURE — 700102 HCHG RX REV CODE 250 W/ 637 OVERRIDE(OP): Performed by: INTERNAL MEDICINE

## 2025-04-23 PROCEDURE — 92507 TX SP LANG VOICE COMM INDIV: CPT

## 2025-04-23 PROCEDURE — 94640 AIRWAY INHALATION TREATMENT: CPT

## 2025-04-23 PROCEDURE — A9270 NON-COVERED ITEM OR SERVICE: HCPCS | Performed by: INTERNAL MEDICINE

## 2025-04-23 PROCEDURE — 99232 SBSQ HOSP IP/OBS MODERATE 35: CPT | Performed by: INTERNAL MEDICINE

## 2025-04-23 PROCEDURE — 700102 HCHG RX REV CODE 250 W/ 637 OVERRIDE(OP): Performed by: STUDENT IN AN ORGANIZED HEALTH CARE EDUCATION/TRAINING PROGRAM

## 2025-04-23 PROCEDURE — 700101 HCHG RX REV CODE 250: Performed by: STUDENT IN AN ORGANIZED HEALTH CARE EDUCATION/TRAINING PROGRAM

## 2025-04-23 PROCEDURE — 94669 MECHANICAL CHEST WALL OSCILL: CPT

## 2025-04-23 PROCEDURE — 80069 RENAL FUNCTION PANEL: CPT

## 2025-04-23 PROCEDURE — 700111 HCHG RX REV CODE 636 W/ 250 OVERRIDE (IP): Mod: JZ | Performed by: INTERNAL MEDICINE

## 2025-04-23 PROCEDURE — A9270 NON-COVERED ITEM OR SERVICE: HCPCS | Performed by: STUDENT IN AN ORGANIZED HEALTH CARE EDUCATION/TRAINING PROGRAM

## 2025-04-23 PROCEDURE — 85027 COMPLETE CBC AUTOMATED: CPT

## 2025-04-23 PROCEDURE — 36415 COLL VENOUS BLD VENIPUNCTURE: CPT

## 2025-04-23 PROCEDURE — 94760 N-INVAS EAR/PLS OXIMETRY 1: CPT

## 2025-04-23 PROCEDURE — 770001 HCHG ROOM/CARE - MED/SURG/GYN PRIV*

## 2025-04-23 RX ADMIN — GUAIFENESIN 200 MG: 100 LIQUID ORAL at 16:46

## 2025-04-23 RX ADMIN — ACETYLCYSTEINE 3 ML: 200 SOLUTION ORAL; RESPIRATORY (INHALATION) at 11:04

## 2025-04-23 RX ADMIN — ENOXAPARIN SODIUM 40 MG: 100 INJECTION SUBCUTANEOUS at 16:46

## 2025-04-23 RX ADMIN — ASPIRIN 81 MG: 81 TABLET, CHEWABLE ORAL at 04:23

## 2025-04-23 RX ADMIN — GUAIFENESIN 200 MG: 100 LIQUID ORAL at 12:44

## 2025-04-23 RX ADMIN — ALBUTEROL SULFATE 2.5 MG: 2.5 SOLUTION RESPIRATORY (INHALATION) at 11:04

## 2025-04-23 RX ADMIN — METOCLOPRAMIDE 5 MG: 10 TABLET ORAL at 12:44

## 2025-04-23 RX ADMIN — ATORVASTATIN CALCIUM 80 MG: 80 TABLET, FILM COATED ORAL at 16:46

## 2025-04-23 RX ADMIN — ACETYLCYSTEINE 3 ML: 200 SOLUTION ORAL; RESPIRATORY (INHALATION) at 18:49

## 2025-04-23 RX ADMIN — GABAPENTIN 400 MG: 400 CAPSULE ORAL at 12:44

## 2025-04-23 RX ADMIN — Medication 5 MG: at 20:27

## 2025-04-23 RX ADMIN — SENNOSIDES AND DOCUSATE SODIUM 2 TABLET: 50; 8.6 TABLET ORAL at 04:23

## 2025-04-23 RX ADMIN — METOCLOPRAMIDE 5 MG: 10 TABLET ORAL at 16:46

## 2025-04-23 RX ADMIN — GABAPENTIN 400 MG: 400 CAPSULE ORAL at 04:23

## 2025-04-23 RX ADMIN — METOCLOPRAMIDE 5 MG: 10 TABLET ORAL at 05:14

## 2025-04-23 RX ADMIN — AMLODIPINE BESYLATE 10 MG: 10 TABLET ORAL at 04:23

## 2025-04-23 RX ADMIN — HYDROCHLOROTHIAZIDE 25 MG: 25 TABLET ORAL at 04:23

## 2025-04-23 RX ADMIN — POTASSIUM BICARBONATE 50 MEQ: 978 TABLET, EFFERVESCENT ORAL at 08:20

## 2025-04-23 RX ADMIN — ALBUTEROL SULFATE 2.5 MG: 2.5 SOLUTION RESPIRATORY (INHALATION) at 14:13

## 2025-04-23 RX ADMIN — GABAPENTIN 400 MG: 400 CAPSULE ORAL at 16:46

## 2025-04-23 RX ADMIN — GUAIFENESIN 200 MG: 100 LIQUID ORAL at 04:23

## 2025-04-23 RX ADMIN — ALBUTEROL SULFATE 2.5 MG: 2.5 SOLUTION RESPIRATORY (INHALATION) at 06:54

## 2025-04-23 RX ADMIN — GUAIFENESIN 200 MG: 100 LIQUID ORAL at 20:27

## 2025-04-23 RX ADMIN — ACETYLCYSTEINE 3 ML: 200 SOLUTION ORAL; RESPIRATORY (INHALATION) at 06:54

## 2025-04-23 RX ADMIN — ALBUTEROL SULFATE 2.5 MG: 2.5 SOLUTION RESPIRATORY (INHALATION) at 18:49

## 2025-04-23 RX ADMIN — SENNOSIDES AND DOCUSATE SODIUM 2 TABLET: 50; 8.6 TABLET ORAL at 16:46

## 2025-04-23 RX ADMIN — GUAIFENESIN 200 MG: 100 LIQUID ORAL at 08:20

## 2025-04-23 RX ADMIN — METOCLOPRAMIDE 5 MG: 10 TABLET ORAL at 00:06

## 2025-04-23 RX ADMIN — ACETYLCYSTEINE 3 ML: 200 SOLUTION ORAL; RESPIRATORY (INHALATION) at 14:13

## 2025-04-23 ASSESSMENT — PAIN DESCRIPTION - PAIN TYPE
TYPE: ACUTE PAIN

## 2025-04-23 ASSESSMENT — FIBROSIS 4 INDEX: FIB4 SCORE: 0.63

## 2025-04-23 NOTE — PROGRESS NOTES
Monitor Summary: SR 73-87, CT .0.16, QRS .0.07, QT .0.35 with rare PVCs and rare PACs per strip from monitor room

## 2025-04-23 NOTE — PROGRESS NOTES
Monitor Summary: SR/ST , AR 0.16, QRS 0.09, QT 0.36, with rare PVCs per strip from monitor room.

## 2025-04-23 NOTE — CARE PLAN
The patient is Stable - Low risk of patient condition declining or worsening    Shift Goals  Clinical Goals: skin integrity and stable neuro assessments  Patient Goals: JAMARI  Family Goals: comfort    Progress made toward(s) clinical / shift goals:    Problem: Neuro Status  Goal: Neuro status will remain stable or improve  Outcome: Progressing  Note: Patient has remained A&O x4 using yes/no questions, patient is able to move the left side but it is still very weak scoring a 3 and the right upper extremity moves to painful stimuli but so far this shift the right lower has not moved to pain and when asking if the patient is able to feel anything he does not respond.      Problem: Skin Integrity  Goal: Skin integrity is maintained or improved  Outcome: Progressing  Note: Patient has remained on Q2 turns with the TAPs system and ALFONSO mattress in place. Patient has a heel float boot and a foot drop boot that is being alternated every 2 hours with turns.        Patient is not progressing towards the following goals:

## 2025-04-23 NOTE — THERAPY
"Speech Language Pathology   Daily Treatment     Patient Name: Jonna Brian  AGE:  49 y.o., SEX:  male  Medical Record #: [id number]  Date of Service: 4/23/2025      Precautions:  Precautions: Fall Risk, Swallow Precautions, PEG Tube, Tracheostomy        Subjective  Pt agreeable and cooperative with SLP. Reported \"I get overheated\" and reported that is it 3-4 times a day, related to his tracheostomy. Education provided on need for heat and humidification via trach. Patient also asked \"Will I get my voice back?\" and asked about swallowing/eating and drinking.       Assessment  Pt seen for speaking valve training and for multi-modal communication training.    Speaking Valve:  Patient reported that he had recently been suctioned and did not feel that he needed to be suctioned again. Cuff deflated on arrival; this SLP used syringe to ensure complete deflation of cuff prior to placement of PMV. Pt tolerated for 25 minutes with no significant changes in cardiopulmonary vitals. Thick, clear secretions removed x3 from oral cavity during session. Patient attempted x3 to trigger volitional swallow; patient appeared to have some movement (visualized by tracheostomy moving) but suspect that patient did not demonstrate full laryngeal movement. Patient able to phonate x10 during session with PMV donned.     Communication:   Pt seen for communication training. Patient used multiple communication modalities during session including: facial expressions, pointing with eye gaze, head shaking Y/N, mouthing words, and partner assisted scanning on alphabet board. Patient spelled prompted words using partner-assisted scanning on alphabet board x2/2 attempts; patient then used partner-assisted scanning on alphabet board to write \"I get overheated\" \"Will I get my voice back\" and \"drink\" with errors x2 across words. Patient used head tilt to indicate that communication partner should advance. Patient reported that he does appreciate " communication partner attempting to predict his intended message. Voalized to command in 50% of opportunities with limited intelligibility.       Clinical Impressions  Speaking Valve:  Recommend PMV be donned by trained staff intermittently throughout the day for 15-30 min periods with direct spb for the following potential benefits:     Benefits of a speaking valve include (1) improved oropharyngeal sensation by restoring airflow to the oropharynx, (2) improved cough effectiveness by restoring subglottic air pressure, (3) improved secretion management through improved ability to cough and orally expectorate, as well as increase evaporation of secretions during exhalation through the upper airway and (4) improved oxygenation by re-establishing physiologic positive end expiratory pressure (PEEP).     Multi-modal Communication:  Patient continues to present with grossly intact cognitive-communication skills and is limited by bulbar weakness negatively impacting his voice, motor speech and swallow function. He is benefiting from AAC alphabet and basic need communication boards in his room with partner A for scanning, as well as nodding head Y/N. Patient would likely be an excellent candidate for a high-tech AAC device such as an eye gaze device.       Recommendations  Communication Strategies:                1) Nodding Y/N in response to closed-ended questions              2) Use of partner assisted scanning (partner points and patient nods Y/N) on alphabet board and low-tech AAC  3) Use of facial expressions to augment meaning     Voice Prosthesis Training:  Placement: Trained staff only  Duration: 15-30 minutes w/ close supervision due to secretions     Supervision Needs Upons Discharge: Direct assistance with IADLs (see below) (with use of partner assisted scanning as needed)      SLP Treatment Plan  Treatment Plan: Speech-Language Treatment, Voice Prosthesis Training, Patient/Family/Caregiver Training  SLP  "Frequency: 4x Per Week  Estimated Duration: Until Therapy Goals Met      Anticipated Discharge Needs  Discharge Recommendations: Recommend post-acute placement for additional speech therapy services prior to discharge home  Therapy Recommendations Upon DC: Dysphagia Training, Expression Training, AAC Training / Development, Patient / Family / Caregiver Education, Community Re-Integration, Tracheostomy Training      Patient / Family Goals  Patient / Family Goal #1: \"Swallow\"  Goal #1 Outcome: Goal not met  Short Term Goals  Short Term Goal # 1: Pt will tolerate PMV trials without negitive change to cardiopulmonary vitals for 20 minutes.  Goal Outcome # 1: Goal met, new goal added  Short Term Goal # 1 B : Patient will tolerate PMV trials for 30 minutes without negative changes to cardiopulmonary vitals.  Goal Outcome  # 1 B: Progressing as expected  Short Term Goal # 2: Pt will demo intelligible phonation at the word level w/ > 80% intelligibility w/ mod cueing  Goal Outcome # 2 : Progressing as expected  Short Term Goal # 3: Pt will express 5 target single words using the alphabet board via scanning w/ mod cueing and > 80% accuracy  Goal Outcome  # 3: Progressing as expected  Short Term Goal # 4: Pt's family / caregivers will participate in training on utilizing AAC with the pt via demonstration teachback  Goal Outcome  # 4:  (Goal not targeted - family not present)      Julia Sherman, SLP  "

## 2025-04-23 NOTE — PROGRESS NOTES
[facility] Daily Progress Note    Date of Service  4/23/2025    Chief Complaint  Jonna Brian is a 49 y.o. male admitted 3/25/2025 with headache and weakness.    Hospital Course  This is a 49 y.o. male who was initially admitted to the hospital on 3/25/2025 with global weakness, aphasia and ataxia.  His last known normal was 1630 on March 24.  He was initially admitted to the hospital floor, his CT head as well as CTA of the head and neck were within normal limits.  There was consideration for possible Guillain-Barré syndrome.     His condition continued to deteriorate and he underwent a lumbar puncture which revealed elevated protein but no evidence of infection.  He had progressive bulbar symptoms and weakness and was transferred to the ICU.  Unfortunately shortly after he arrived in the ICU he developed stridor and required emergent intubation on 3/27.       He then underwent an MRI of his brain and spine which unfortunately revealed small areas of acute infarcts in the lower jero and the upper medulla on both sides of the midline.  He also had chronic infarcts in his jero and right inferior cerebellum.  They were chronically Cuna in the bilateral basal ganglia, the right thalamus and the right periventricular white matter.  He had evidence of small vessel disease as well in the periventricular white matter.     Ultimately it appears he has been suffering multiple small strokes and his progressive decline was due to acute infarcts in the lower jero and upper medulla. Neurology recommended DAPT for 10 days with aspirin indefinitely.  Patient completed Plavix 4/4.  Since that time he has not recovered function, has required tracheostomy and PEG placement with the ultimate goal of transferring him to postacute medical to see what function may be regained in time.    Patient able to nod head, shakes head for yes/no, able to use alphabet sheet to make needs known. He continues to work with PT/OT/SLP  therapies with some minor signs of improvement. Paitent is medically cleared, plan for discharge to LTAC for ongoing management of tracheostomy tube, PEG tube, therapies.    Interval Problem Update  4/22 vital stable on 4 L T-piece  Patient had episode of emesis early this morning, concern for possible aspiration continue to monitor vital signs  WBC 7.2, hemoglobin 13.2  Renal function and electrolytes stable  Discussed with bedside nursing patient has not had a bowel movement since 4/17, will give suppository, likely contributing to his nausea.  Once patient has a bowel movement will trial restarting tube feeds.  KUB pending  Nursing also noted patient having very thick secretions, he is on Robitussin and Mucomyst.  Will DC scopolamine patch   Following commands and nodding yes and no.    4/23 afebrile on 4 L t-piece  WBC 6.8, Hb 13.6   K 3.4, replaced   KUB reviewed no obstruction  Telemetry reviewed patient in sinus rhythm   Reviewed last neurology note, no need for Zio patch monitoring or echo. Continue aspirin indefinitely, patient completed Plavix on 4/4.  Since patient has been stable in sinus rhythm for multiple days will DC telemetry monitoring  Restart tube feeds slowly and monitor for nausea/vomiting   Patient is nodding yes and no appropriately to questions.  Denies any nausea or abdominal pain today.    I have discussed this patient's plan of care and discharge plan at IDT rounds today with Case Management, Nursing, Nursing leadership, and other members of the IDT team.    Consultants/Specialty  critical care     Code Status  Full Code    Disposition  The patient is medically cleared for discharge to home or a post-acute facility.  Anticipate discharge to: a long-term acute care hospital    I have placed the appropriate orders for post-discharge needs.    Review of Systems  Review of Systems   Unable to perform ROS: Patient nonverbal        Physical Exam  Temp:  [36.3 °C (97.3 °F)-37.2 °C (99 °F)] 36.5  °C (97.7 °F)  Pulse:  [65-84] 70  Resp:  [18] 18  BP: (118-135)/(70-81) 119/70  SpO2:  [91 %-98 %] 91 %    Physical Exam  Vitals and nursing note reviewed.   Constitutional:       General: He is not in acute distress.     Appearance: He is ill-appearing.   Eyes:      Conjunctiva/sclera: Conjunctivae normal.   Neck:      Comments: Tracheostomy on Tpiece  Cardiovascular:      Rate and Rhythm: Normal rate and regular rhythm.   Pulmonary:      Effort: No respiratory distress.      Breath sounds: No wheezing.      Comments: Significant secretion burden  On T-piece  Abdominal:      General: There is no distension.      Palpations: Abdomen is soft.      Tenderness: There is no abdominal tenderness.      Comments: PEG   Skin:     General: Skin is warm and dry.   Neurological:      Mental Status: He is alert.      Comments: He is able to slightly move his left upper and lower extremity.  He was able to nod his head to answer question.  Follows commands         Fluids    Intake/Output Summary (Last 24 hours) at 4/23/2025 1538  Last data filed at 4/23/2025 1100  Gross per 24 hour   Intake 90 ml   Output 1250 ml   Net -1160 ml        Laboratory  Recent Labs     04/21/25  0005 04/22/25  0927 04/23/25  0342   WBC 8.1 7.2 6.8   RBC 4.81 4.85 4.97   HEMOGLOBIN 13.1* 13.2* 13.6*   HEMATOCRIT 41.1* 41.8* 41.5*   MCV 85.4 86.2 83.5   MCH 27.2 27.2 27.4   MCHC 31.9* 31.6* 32.8   RDW 38.6 39.0 38.1   PLATELETCT 357 360 342   MPV 9.4 9.0 9.0     Recent Labs     04/21/25  0005 04/22/25  0927 04/23/25  0342   SODIUM 141 139 138   POTASSIUM 3.3* 3.6 3.4*   CHLORIDE 98 97 95*   CO2 30 29 30   GLUCOSE 140* 120* 105*   BUN 29* 24* 26*   CREATININE 0.99 0.83 0.92   CALCIUM 9.9 9.9 10.1                   Imaging  [id number] VIEW   Final Result      No evidence of bowel obstruction.                  DX-CHEST-LIMITED (1 VIEW)   Final Result         No acute cardiopulmonary abnormalities are identified.      [id number] VIEW   Final Result          No specific finding to suggest small bowel obstruction.      DX-CHEST-PORTABLE (1 VIEW)   Final Result      Hypoinflation with bibasilar atelectasis.      US-RENAL   Final Result      1.  No hydronephrosis.      DX-CHEST-PORTABLE (1 VIEW)   Final Result         1.  Left basilar atelectasis and/or subtle infiltrates, similar to prior study      DX-CHEST-PORTABLE (1 VIEW)   Final Result         1.  Left basilar atelectasis, no focal infiltrate   2.  Cardiomegaly      DX-G.I. TUBE INJECTION, ANY TYPE   Final Result      Contrast from a PEG tube injection extends into the stomach without evidence of contrast leak.      CT-CHEST,ABDOMEN,PELVIS WITH   Final Result      1.  Large amount of free intraperitoneal air within the abdomen again seen as was previously identified on chest x-ray by review of the patient's chart, a percutaneous gastrostomy tube was placed on 3/31/2025 and is free intraperitoneal air is possibly    secondary to that recent procedure.      2.  Bibasal atelectasis and small bilateral pleural effusions.      3.  Tracheostomy tube present.      4.  No evidence of bowel obstruction or free fluid within the abdomen or pelvis.      DX-CHEST-PORTABLE (1 VIEW)   Final Result         1.  Bibasilar atelectasis   2.  Intra-abdominal free air, can be associated with history of percutaneous gastrostomy, consider other viscus perforation as warranted. Could be further evaluated with CT of the abdomen with contrast as clinically appropriate.      These findings were discussed with the patient's clinician, Bravo Lala Iv, on 4/2/2025 7:57 AM.      DX-CHEST-PORTABLE (1 VIEW)   Final Result      No evidence of acute cardiopulmonary process.      DX-ABDOMEN FOR TUBE PLACEMENT   Final Result      1.  Enteric tube extends in the fundus of stomach.      DX-ABDOMEN FOR TUBE PLACEMENT   Final Result      The gastric tube has been removed and replaced with a small bowel feeding tube which terminate in the proximal  stomach.      DX-CHEST-PORTABLE (1 VIEW)   Final Result      Atelectasis within the left lung base.      MR-THORACIC SPINE-WITH & W/O   Final Result      1.  There is no abnormal intramedullary T2 signal intensity in the thoracic spinal cord.   2.  Mild degenerative disease at T8-9.   3.  Left lower segmental consolidation.      MR-CERVICAL SPINE-WITH & W/O   Final Result      1.  Small areas of acute infarcts in the lower jero and upper medulla involving both sides of the midline. There are chronic infarcts in the jero and right inferior cerebellum.   2.  There is no abnormal intramedullary T2 signal intensity in the cervical spinal cord to suggest demyelinating lesions. There is no MR evidence of compressive myelopathy.   3.  Mild degenerative disease.            MR-BRAIN-WITH & W/O   Final Result      1.  Small areas of acute infarcts in the lower jero and upper medulla involving both sides of the midline.   2.  There are chronic infarcts in the jero and right inferior cerebellum. . There are areas of chronic lacunae in the bilateral basal ganglia, right thalamus and right periventricular white matter.   3.  There are nonspecific T2 hyperintensities in the periventricular white matter likely representing chronic small vessel disease.   4.  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      MR-LUMBAR SPINE-WITH & W/O   Final Result      1.  Unremarkable pre and postcontrast MR examination of the lumbar spine.   2.  Clinical suspicious for GBS: There is no abnormal enhancement of the lumbar nerve roots.      DX-ABDOMEN FOR TUBE PLACEMENT   Final Result      NG tube tip projects at the peripyloric region.      DX-CHEST-PORTABLE (1 VIEW)   Final Result      1.  Low lung volumes without definite acute cardiopulmonary abnormality.   2.  Support apparatus as above.      CT-HEAD W/O   Final Result      1.  No acute intracranial abnormality.   2.  Remote right  "cerebellar infarct.               DX-CHEST-PORTABLE (1 VIEW)   Final Result      No acute cardiopulmonary disease evident.      DX-CHEST-PORTABLE (1 VIEW)   Final Result      No acute cardiopulmonary disease evident.      CT-CEREBRAL PERFUSION ANALYSIS   Final Result      1. Cerebral blood flow less than 30% possibly representing completed infarct = 0 mL. Based on distribution of this finding, this is unlikely to represent artifact.      2. T Max more than 6 seconds possibly representing combination of completed infarct and ischemia = 7 mL. Based on the distribution of this finding, this is possibly artifact.      3. Mismatched volume possibly representing ischemic brain/penumbra= 0 mL      4.  Please note that this cerebral perfusion study and report is Quantitative and targets supratentorial (cerebral) perfusion for evaluation of large vessel territory acute ischemia/infarction. For example, lacunar infarcts, and brainstem/posterior fossa    ischemia/infarction are not evaluated on this study.  Data acquisition is subject to artifacts which can yield non-anatomically plausible perfusion maps which may be due to motion, bolus timing, signal to noise ratio, or other technical factors.    Perfusion map abnormalities which show non-anatomic distributions are likely artifact.   This study is not \"stand-alone\" and should only be utilized for diagnosis, management/treatment in correlation with CT, CTA, and/or MRI and clinical factors.         CT-CTA NECK WITH & W/O-POST PROCESSING   Final Result      CT angiogram of the neck within normal limits.      CT-CTA HEAD WITH & W/O-POST PROCESS   Final Result      CT angiogram of the [place] within normal limits.      CT-HEAD W/O   Final Result      Head CT without contrast within normal limits. No evidence of acute cerebral infarction, hemorrhage or mass lesion.                    Assessment/Plan  * Acute ischemic stroke (HCC)- (present on admission)  Assessment & " Plan  Acute ischemic stroke  MRI of the brain revealed acute infarcts of the lower jero and upper medulla.  He effectively is a quadriplegic and required tracheostomy and PEG tube.  Aspirin and statin  Blood pressure control  Family hope for functional recovery    Emesis  Assessment & Plan  Tube feeds held   Continue Reglan  Patient has not had a bowel movement    Restart tube feeds at slow rate and monitor    Constipation  Assessment & Plan  KUB showed no obstruction  Bowel protocol    Improving    Hematuria  Assessment & Plan  Darkened urine, no gross hematuria  No recent grove trauma  Renal US normal, no stones  Continue antibiotics for possible UTI given mild signs of infection on UA  If hematuria persists after keflex treatment, consider urology consult/referral    Proteus mirabilis pneumonia (HCC)- (present on admission)  Assessment & Plan  Treated with Zosyn de-escalated to Augmentin based on cultures from sputum    Acute neuromuscular respiratory failure (HCC)- (present on admission)  Assessment & Plan  Requiring tracheostomy    Anxiety  Assessment & Plan  Ativan for anxiety.     Type 2 diabetes mellitus with hyperglycemia, without long-term current use of insulin (HCC)- (present on admission)  Assessment & Plan  Details are unclear  Reportedly he is on metformin as an outpatient  Hemoglobin A1c 6.3  He does not require insulin    Primary hypertension- (present on admission)  Assessment & Plan  Blood pressure is appropriate with hydrochlorothiazide 25 mg daily and Norvasc 10 mg daily         VTE prophylaxis:    enoxaparin ppx    I have performed a physical exam and reviewed and updated ROS and Plan today (4/23/2025). In review of yesterday's note (4/22/2025), there are no changes except as documented above.

## 2025-04-23 NOTE — DIETARY
Nutrition Services Brief Update:    Received telephone call from RN this morning, pt cleared to resume TF.   RN clarified starting rate. Recommended pt start at 25 mL/hr d/t previous poor tolerance.  Glucerna 1.2 resumed at 25 mL/hr per flow sheet.  +BM yesterday.  Pt continues to receive Reglan.  Advance TF to final goal rate per protocol.    RD will continue to follow.

## 2025-04-23 NOTE — CARE PLAN
The patient is Stable - Low risk of patient condition declining or worsening    Shift Goals  Clinical Goals: monitor neuro status, maintain skin integrity, monitor respiratory status  Patient Goals: JAMARI  Family Goals: comfort    Progress made toward(s) clinical / shift goals:    Problem: Knowledge Deficit - Standard  Goal: Patient and family/care givers will demonstrate understanding of plan of care, disease process/condition, diagnostic tests and medications  Outcome: Progressing  Note: Patient is able to nod appropriately yes/no questions.      Problem: Pain - Standard  Goal: Alleviation of pain or a reduction in pain to the patient’s comfort goal  Outcome: Progressing  Note: Patient denies pain.      Problem: Urinary Elimination  Goal: Establish and maintain regular urinary output  Outcome: Progressing  Note: Patient has condom cath in place. Only 200ml output.      Problem: Bowel Elimination  Goal: Establish and maintain regular bowel function  Outcome: Progressing  Note: Last BM 4/22     Problem: Fall Risk  Goal: Patient will remain free from falls  Outcome: Progressing  Note: Bed frame alarm on. Bed locked and in lowest position.      Problem: Skin Integrity  Goal: Skin integrity is maintained or improved  Outcome: Progressing  Note: ALFONSO bed in place. New mepilex placed on elbows and heels. Float boots in place. Q2H turns.        Patient is not progressing towards the following goals:

## 2025-04-24 LAB
ALBUMIN SERPL BCP-MCNC: 3.4 G/DL (ref 3.2–4.9)
BUN SERPL-MCNC: 27 MG/DL (ref 8–22)
CALCIUM ALBUM COR SERPL-MCNC: 10.3 MG/DL (ref 8.5–10.5)
CALCIUM SERPL-MCNC: 9.8 MG/DL (ref 8.5–10.5)
CHLORIDE SERPL-SCNC: 95 MMOL/L (ref 96–112)
CO2 SERPL-SCNC: 28 MMOL/L (ref 20–33)
CREAT SERPL-MCNC: 0.91 MG/DL (ref 0.5–1.4)
GFR SERPLBLD CREATININE-BSD FMLA CKD-EPI: 104 ML/MIN/1.73 M 2
GLUCOSE SERPL-MCNC: 122 MG/DL (ref 65–99)
MAGNESIUM SERPL-MCNC: 2 MG/DL (ref 1.5–2.5)
PHOSPHATE SERPL-MCNC: 3.8 MG/DL (ref 2.5–4.5)
POTASSIUM SERPL-SCNC: 3.7 MMOL/L (ref 3.6–5.5)
SODIUM SERPL-SCNC: 135 MMOL/L (ref 135–145)

## 2025-04-24 PROCEDURE — 700102 HCHG RX REV CODE 250 W/ 637 OVERRIDE(OP): Performed by: INTERNAL MEDICINE

## 2025-04-24 PROCEDURE — 770001 HCHG ROOM/CARE - MED/SURG/GYN PRIV*

## 2025-04-24 PROCEDURE — 36415 COLL VENOUS BLD VENIPUNCTURE: CPT

## 2025-04-24 PROCEDURE — A9270 NON-COVERED ITEM OR SERVICE: HCPCS | Performed by: INTERNAL MEDICINE

## 2025-04-24 PROCEDURE — 94640 AIRWAY INHALATION TREATMENT: CPT

## 2025-04-24 PROCEDURE — 97110 THERAPEUTIC EXERCISES: CPT

## 2025-04-24 PROCEDURE — 97530 THERAPEUTIC ACTIVITIES: CPT

## 2025-04-24 PROCEDURE — 83735 ASSAY OF MAGNESIUM: CPT

## 2025-04-24 PROCEDURE — 700102 HCHG RX REV CODE 250 W/ 637 OVERRIDE(OP): Performed by: STUDENT IN AN ORGANIZED HEALTH CARE EDUCATION/TRAINING PROGRAM

## 2025-04-24 PROCEDURE — 80069 RENAL FUNCTION PANEL: CPT

## 2025-04-24 PROCEDURE — 94669 MECHANICAL CHEST WALL OSCILL: CPT

## 2025-04-24 PROCEDURE — 700101 HCHG RX REV CODE 250: Performed by: STUDENT IN AN ORGANIZED HEALTH CARE EDUCATION/TRAINING PROGRAM

## 2025-04-24 PROCEDURE — 99232 SBSQ HOSP IP/OBS MODERATE 35: CPT | Performed by: INTERNAL MEDICINE

## 2025-04-24 PROCEDURE — 700111 HCHG RX REV CODE 636 W/ 250 OVERRIDE (IP): Mod: JZ | Performed by: INTERNAL MEDICINE

## 2025-04-24 PROCEDURE — A9270 NON-COVERED ITEM OR SERVICE: HCPCS | Performed by: STUDENT IN AN ORGANIZED HEALTH CARE EDUCATION/TRAINING PROGRAM

## 2025-04-24 PROCEDURE — 94760 N-INVAS EAR/PLS OXIMETRY 1: CPT

## 2025-04-24 RX ORDER — GLYCOPYRROLATE 1 MG/1
1 TABLET ORAL 3 TIMES DAILY
Status: DISCONTINUED | OUTPATIENT
Start: 2025-04-24 | End: 2025-04-25

## 2025-04-24 RX ADMIN — METOCLOPRAMIDE 5 MG: 10 TABLET ORAL at 17:49

## 2025-04-24 RX ADMIN — POLYETHYLENE GLYCOL 3350 1 PACKET: 17 POWDER, FOR SOLUTION ORAL at 17:49

## 2025-04-24 RX ADMIN — METOCLOPRAMIDE 5 MG: 10 TABLET ORAL at 23:55

## 2025-04-24 RX ADMIN — GLYCOPYRROLATE 1 MG: 1 TABLET ORAL at 12:13

## 2025-04-24 RX ADMIN — GABAPENTIN 400 MG: 400 CAPSULE ORAL at 12:13

## 2025-04-24 RX ADMIN — METOCLOPRAMIDE 5 MG: 10 TABLET ORAL at 00:22

## 2025-04-24 RX ADMIN — METOCLOPRAMIDE 5 MG: 10 TABLET ORAL at 05:42

## 2025-04-24 RX ADMIN — ALBUTEROL SULFATE 2.5 MG: 2.5 SOLUTION RESPIRATORY (INHALATION) at 10:23

## 2025-04-24 RX ADMIN — ALBUTEROL SULFATE 2.5 MG: 2.5 SOLUTION RESPIRATORY (INHALATION) at 14:24

## 2025-04-24 RX ADMIN — Medication 5 MG: at 21:19

## 2025-04-24 RX ADMIN — SENNOSIDES AND DOCUSATE SODIUM 2 TABLET: 50; 8.6 TABLET ORAL at 17:49

## 2025-04-24 RX ADMIN — GABAPENTIN 400 MG: 400 CAPSULE ORAL at 04:52

## 2025-04-24 RX ADMIN — METOCLOPRAMIDE 5 MG: 10 TABLET ORAL at 12:13

## 2025-04-24 RX ADMIN — ACETYLCYSTEINE 3 ML: 200 SOLUTION ORAL; RESPIRATORY (INHALATION) at 14:24

## 2025-04-24 RX ADMIN — ALBUTEROL SULFATE 2.5 MG: 2.5 SOLUTION RESPIRATORY (INHALATION) at 18:37

## 2025-04-24 RX ADMIN — SENNOSIDES AND DOCUSATE SODIUM 2 TABLET: 50; 8.6 TABLET ORAL at 04:52

## 2025-04-24 RX ADMIN — ENOXAPARIN SODIUM 40 MG: 100 INJECTION SUBCUTANEOUS at 17:49

## 2025-04-24 RX ADMIN — ACETYLCYSTEINE 3 ML: 200 SOLUTION ORAL; RESPIRATORY (INHALATION) at 10:23

## 2025-04-24 RX ADMIN — GLYCOPYRROLATE 1 MG: 1 TABLET ORAL at 17:50

## 2025-04-24 RX ADMIN — GABAPENTIN 400 MG: 400 CAPSULE ORAL at 18:00

## 2025-04-24 RX ADMIN — GUAIFENESIN 200 MG: 100 LIQUID ORAL at 00:22

## 2025-04-24 RX ADMIN — ATORVASTATIN CALCIUM 80 MG: 80 TABLET, FILM COATED ORAL at 17:49

## 2025-04-24 RX ADMIN — ACETYLCYSTEINE 3 ML: 200 SOLUTION ORAL; RESPIRATORY (INHALATION) at 18:37

## 2025-04-24 RX ADMIN — GUAIFENESIN 200 MG: 100 LIQUID ORAL at 04:52

## 2025-04-24 RX ADMIN — ASPIRIN 81 MG: 81 TABLET, CHEWABLE ORAL at 04:52

## 2025-04-24 RX ADMIN — HYDROCHLOROTHIAZIDE 25 MG: 25 TABLET ORAL at 04:52

## 2025-04-24 RX ADMIN — GUAIFENESIN 200 MG: 100 LIQUID ORAL at 09:26

## 2025-04-24 RX ADMIN — AMLODIPINE BESYLATE 10 MG: 10 TABLET ORAL at 04:52

## 2025-04-24 RX ADMIN — ACETYLCYSTEINE 3 ML: 200 SOLUTION ORAL; RESPIRATORY (INHALATION) at 06:22

## 2025-04-24 RX ADMIN — ALBUTEROL SULFATE 2.5 MG: 2.5 SOLUTION RESPIRATORY (INHALATION) at 06:22

## 2025-04-24 ASSESSMENT — COGNITIVE AND FUNCTIONAL STATUS - GENERAL
CLIMB 3 TO 5 STEPS WITH RAILING: TOTAL
SUGGESTED CMS G CODE MODIFIER MOBILITY: CN
TURNING FROM BACK TO SIDE WHILE IN FLAT BAD: TOTAL
MOBILITY SCORE: 6
MOVING TO AND FROM BED TO CHAIR: TOTAL
MOVING FROM LYING ON BACK TO SITTING ON SIDE OF FLAT BED: TOTAL
WALKING IN HOSPITAL ROOM: TOTAL
STANDING UP FROM CHAIR USING ARMS: TOTAL

## 2025-04-24 ASSESSMENT — ENCOUNTER SYMPTOMS
DEPRESSION: 1
NAUSEA: 0
ABDOMINAL PAIN: 0

## 2025-04-24 ASSESSMENT — PAIN DESCRIPTION - PAIN TYPE
TYPE: ACUTE PAIN

## 2025-04-24 ASSESSMENT — GAIT ASSESSMENTS: GAIT LEVEL OF ASSIST: UNABLE TO PARTICIPATE

## 2025-04-24 NOTE — PROGRESS NOTES
[facility] Medicine Daily Progress Note    Date of Service  4/24/2025    Chief Complaint  Jonna Brian is a 49 y.o. male admitted 3/25/2025 with headache and weakness.    Hospital Course  This is a 49 y.o. male who was initially admitted to the hospital on 3/25/2025 with global weakness, aphasia and ataxia.  His last known normal was 1630 on March 24.  He was initially admitted to the hospital floor, his CT head as well as CTA of the head and neck were within normal limits.  There was consideration for possible Guillain-Barré syndrome.     His condition continued to deteriorate and he underwent a lumbar puncture which revealed elevated protein but no evidence of infection.  He had progressive bulbar symptoms and weakness and was transferred to the ICU.  Unfortunately shortly after he arrived in the ICU he developed stridor and required emergent intubation on 3/27.       He then underwent an MRI of his brain and spine which unfortunately revealed small areas of acute infarcts in the lower jero and the upper medulla on both sides of the midline.  He also had chronic infarcts in his jero and right inferior cerebellum.  They were chronically Cuna in the bilateral basal ganglia, the right thalamus and the right periventricular white matter.  He had evidence of small vessel disease as well in the periventricular white matter.     Ultimately it appears he has been suffering multiple small strokes and his progressive decline was due to acute infarcts in the lower jero and upper medulla. Neurology recommended DAPT for 10 days with aspirin indefinitely.  Patient completed Plavix 4/4.  Since that time he has not recovered function, has required tracheostomy and PEG placement with the ultimate goal of transferring him to postacute medical to see what function may be regained in time.    Patient able to nod head, shakes head for yes/no, able to use alphabet sheet to make needs known. He continues to work with PT/OT/SLP  therapies with some minor signs of improvement. Paitent is medically cleared, plan for discharge to LTAC for ongoing management of tracheostomy tube, PEG tube, therapies.    Interval Problem Update  4/24 Vitals stable, no acute changes   Renal function and electrolytes stable  Patient has been tolerating tube feeds without vomiting or abdominal pain  Patient continues to have significant thick secretions change Robitussin to Robinul to see if there is improvement.  Patient nodding yes and no to questions appropriately, pending placement    I have discussed this patient's plan of care and discharge plan at IDT rounds today with Case Management, Nursing, Nursing leadership, and other members of the IDT team.    Consultants/Specialty  critical care     Code Status  Full Code    Disposition  The patient is medically cleared for discharge to home or a post-acute facility.  Anticipate discharge to: a long-term acute care hospital    I have placed the appropriate orders for post-discharge needs.    Review of Systems  Review of Systems   Unable to perform ROS: Patient nonverbal   Gastrointestinal:  Negative for abdominal pain and nausea.   Psychiatric/Behavioral:  Positive for depression.         Physical Exam  Temp:  [36.5 °C (97.7 °F)-36.8 °C (98.2 °F)] 36.6 °C (97.9 °F)  Pulse:  [70-79] 76  Resp:  [17-18] 17  BP: (116-120)/(70-72) 117/72  SpO2:  [91 %-95 %] 95 %    Physical Exam  Vitals and nursing note reviewed.   Constitutional:       General: He is not in acute distress.     Appearance: He is ill-appearing.   Eyes:      Conjunctiva/sclera: Conjunctivae normal.   Neck:      Comments: Tracheostomy on Tpiece  Cardiovascular:      Rate and Rhythm: Normal rate and regular rhythm.   Pulmonary:      Effort: No respiratory distress.      Breath sounds: No wheezing.      Comments: On T-piece  Abdominal:      General: There is no distension.      Palpations: Abdomen is soft.      Tenderness: There is no abdominal tenderness.       Comments: PEG   Skin:     General: Skin is warm and dry.   Neurological:      Mental Status: He is alert.      Motor: Weakness present.      Comments: He is able to slightly move his left upper and lower extremity.  He was able to nod his head to answer question.  Follows commands   Psychiatric:         Mood and Affect: Mood is depressed.         Fluids    Intake/Output Summary (Last 24 hours) at 4/24/2025 1513  Last data filed at 4/24/2025 0800  Gross per 24 hour   Intake 120 ml   Output 1000 ml   Net -880 ml        Laboratory  Recent Labs     04/22/25 0927 04/23/25  0342   WBC 7.2 6.8   RBC 4.85 4.97   HEMOGLOBIN 13.2* 13.6*   HEMATOCRIT 41.8* 41.5*   MCV 86.2 83.5   MCH 27.2 27.4   MCHC 31.6* 32.8   RDW 39.0 38.1   PLATELETCT 360 342   MPV 9.0 9.0     Recent Labs     04/22/25 0927 04/23/25 0342 04/24/25  0858   SODIUM 139 138 135   POTASSIUM 3.6 3.4* 3.7   CHLORIDE 97 95* 95*   CO2 29 30 28   GLUCOSE 120* 105* 122*   BUN 24* 26* 27*   CREATININE 0.83 0.92 0.91   CALCIUM 9.9 10.1 9.8                   Imaging  [id number] VIEW   Final Result      No evidence of bowel obstruction.                  DX-CHEST-LIMITED (1 VIEW)   Final Result         No acute cardiopulmonary abnormalities are identified.      [id number] VIEW   Final Result         No specific finding to suggest small bowel obstruction.      DX-CHEST-PORTABLE (1 VIEW)   Final Result      Hypoinflation with bibasilar atelectasis.      US-RENAL   Final Result      1.  No hydronephrosis.      DX-CHEST-PORTABLE (1 VIEW)   Final Result         1.  Left basilar atelectasis and/or subtle infiltrates, similar to prior study      DX-CHEST-PORTABLE (1 VIEW)   Final Result         1.  Left basilar atelectasis, no focal infiltrate   2.  Cardiomegaly      DX-G.I. TUBE INJECTION, ANY TYPE   Final Result      Contrast from a PEG tube injection extends into the stomach without evidence of contrast leak.      CT-CHEST,ABDOMEN,PELVIS WITH   Final Result      1.   Large amount of free intraperitoneal air within the abdomen again seen as was previously identified on chest x-ray by review of the patient's chart, a percutaneous gastrostomy tube was placed on 3/31/2025 and is free intraperitoneal air is possibly    secondary to that recent procedure.      2.  Bibasal atelectasis and small bilateral pleural effusions.      3.  Tracheostomy tube present.      4.  No evidence of bowel obstruction or free fluid within the abdomen or pelvis.      DX-CHEST-PORTABLE (1 VIEW)   Final Result         1.  Bibasilar atelectasis   2.  Intra-abdominal free air, can be associated with history of percutaneous gastrostomy, consider other viscus perforation as warranted. Could be further evaluated with CT of the abdomen with contrast as clinically appropriate.      These findings were discussed with the patient's clinician, Bravo Lala Iv, on 4/2/2025 7:57 AM.      DX-CHEST-PORTABLE (1 VIEW)   Final Result      No evidence of acute cardiopulmonary process.      DX-ABDOMEN FOR TUBE PLACEMENT   Final Result      1.  Enteric tube extends in the fundus of stomach.      DX-ABDOMEN FOR TUBE PLACEMENT   Final Result      The gastric tube has been removed and replaced with a small bowel feeding tube which terminate in the proximal stomach.      DX-CHEST-PORTABLE (1 VIEW)   Final Result      Atelectasis within the left lung base.      MR-THORACIC SPINE-WITH & W/O   Final Result      1.  There is no abnormal intramedullary T2 signal intensity in the thoracic spinal cord.   2.  Mild degenerative disease at T8-9.   3.  Left lower segmental consolidation.      MR-CERVICAL SPINE-WITH & W/O   Final Result      1.  Small areas of acute infarcts in the lower jero and upper medulla involving both sides of the midline. There are chronic infarcts in the jero and right inferior cerebellum.   2.  There is no abnormal intramedullary T2 signal intensity in the cervical spinal cord to suggest demyelinating lesions.  There is no MR evidence of compressive myelopathy.   3.  Mild degenerative disease.            MR-BRAIN-WITH & W/O   Final Result      1.  Small areas of acute infarcts in the lower jero and upper medulla involving both sides of the midline.   2.  There are chronic infarcts in the jero and right inferior cerebellum. . There are areas of chronic lacunae in the bilateral basal ganglia, right thalamus and right periventricular white matter.   3.  There are nonspecific T2 hyperintensities in the periventricular white matter likely representing chronic small vessel disease.   4.  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      MR-LUMBAR SPINE-WITH & W/O   Final Result      1.  Unremarkable pre and postcontrast MR examination of the lumbar spine.   2.  Clinical suspicious for GBS: There is no abnormal enhancement of the lumbar nerve roots.      DX-ABDOMEN FOR TUBE PLACEMENT   Final Result      NG tube tip projects at the peripyloric region.      DX-CHEST-PORTABLE (1 VIEW)   Final Result      1.  Low lung volumes without definite acute cardiopulmonary abnormality.   2.  Support apparatus as above.      CT-HEAD W/O   Final Result      1.  No acute intracranial abnormality.   2.  Remote right cerebellar infarct.               DX-CHEST-PORTABLE (1 VIEW)   Final Result      No acute cardiopulmonary disease evident.      DX-CHEST-PORTABLE (1 VIEW)   Final Result      No acute cardiopulmonary disease evident.      CT-CEREBRAL PERFUSION ANALYSIS   Final Result      1. Cerebral blood flow less than 30% possibly representing completed infarct = 0 mL. Based on distribution of this finding, this is unlikely to represent artifact.      2. T Max more than 6 seconds possibly representing combination of completed infarct and ischemia = 7 mL. Based on the distribution of this finding, this is possibly artifact.      3. Mismatched volume possibly representing ischemic  "brain/penumbra= 0 mL      4.  Please note that this cerebral perfusion study and report is Quantitative and targets supratentorial (cerebral) perfusion for evaluation of large vessel territory acute ischemia/infarction. For example, lacunar infarcts, and brainstem/posterior fossa    ischemia/infarction are not evaluated on this study.  Data acquisition is subject to artifacts which can yield non-anatomically plausible perfusion maps which may be due to motion, bolus timing, signal to noise ratio, or other technical factors.    Perfusion map abnormalities which show non-anatomic distributions are likely artifact.   This study is not \"stand-alone\" and should only be utilized for diagnosis, management/treatment in correlation with CT, CTA, and/or MRI and clinical factors.         CT-CTA NECK WITH & W/O-POST PROCESSING   Final Result      CT angiogram of the neck within normal limits.      CT-CTA HEAD WITH & W/O-POST PROCESS   Final Result      CT angiogram of the [place] within normal limits.      CT-HEAD W/O   Final Result      Head CT without contrast within normal limits. No evidence of acute cerebral infarction, hemorrhage or mass lesion.                    Assessment/Plan  * Acute ischemic stroke (HCC)- (present on admission)  Assessment & Plan  Acute ischemic stroke  MRI of the brain revealed acute infarcts of the lower jero and upper medulla.  He effectively is a quadriplegic and required tracheostomy and PEG tube.  Aspirin and statin  Blood pressure control  Family hope for functional recovery    Hypokalemia  Assessment & Plan  Replace as needed    Emesis  Assessment & Plan  Tube feeds held   Continue Reglan  Patient has not had a bowel movement    Restart tube feeds at slow rate and monitor    Constipation  Assessment & Plan  KUB showed no obstruction  Bowel protocol    Improving    Hematuria  Assessment & Plan  Darkened urine, no gross hematuria  No recent grove trauma  Renal US normal, no " stones  Continue antibiotics for possible UTI given mild signs of infection on UA  If hematuria persists after keflex treatment, consider urology consult/referral    Proteus mirabilis pneumonia (HCC)- (present on admission)  Assessment & Plan  Treated with Zosyn de-escalated to Augmentin based on cultures from sputum    Acute neuromuscular respiratory failure (HCC)- (present on admission)  Assessment & Plan  Requiring tracheostomy    Anxiety  Assessment & Plan  Ativan for anxiety.     Type 2 diabetes mellitus with hyperglycemia, without long-term current use of insulin (HCC)- (present on admission)  Assessment & Plan  Details are unclear  Reportedly he is on metformin as an outpatient  Hemoglobin A1c 6.3  He does not require insulin    Primary hypertension- (present on admission)  Assessment & Plan  Blood pressure is appropriate with hydrochlorothiazide 25 mg daily and Norvasc 10 mg daily         VTE prophylaxis:    enoxaparin ppx    I have performed a physical exam and reviewed and updated ROS and Plan today (4/24/2025). In review of yesterday's note (4/23/2025), there are no changes except as documented above.

## 2025-04-24 NOTE — PROGRESS NOTES
4 Eyes Skin Assessment Completed by STEVEN Tee and STEVEN Snow.    Head WDL,   Ears WDL  Nose WDL  Mouth , lips swollen  Neck WDL, trach site  Breast/Chest WDL  Shoulder Blades WDL  Spine WDL  (R) Arm/Elbow/Hand Discoloration and Scar(healed), dry  (L) Arm/Elbow/Hand Discoloration and Scar(healed, dry  Abdomen WDL, peg site sutures  Groin WDL, dry  Scrotum/Coccyx/Buttocks WDL  (R) Leg discoloration to knees  (L) Leg discoloration to knees  (R) Heel/Foot/Toe WDL, dry  (L) Heel/Foot/Toe dry, callous          Devices In Places Pulse Ox, SCD's, Condom Cath, and Tracheostomy, tpiece inline suction, peg tube      Interventions In Place Heel Mepilex, Heel Float Boots, TAP System, Pillows, Elbow Mepilex, Q2 Turns, Low Air Loss Mattress, and Heels Loaded W/Pillows    Possible Skin Injury Yes    Pictures Uploaded Into Epic N/A  Wound Consult Placed N/A  RN Wound Prevention Protocol Ordered No

## 2025-04-24 NOTE — PROGRESS NOTES
Monitor summary: SR, HR 66-74, SD .16, QRS .12, QT .38 with rare pvc per strip from monitor room

## 2025-04-24 NOTE — CARE PLAN
The patient is Stable - Low risk of patient condition declining or worsening    Shift Goals  Clinical Goals: monitor neuro status, maintain skin integrity, monitor oxygen levels, suction  Patient Goals: JAMARI  Family Goals: comfort    Progress made toward(s) clinical / shift goals:    Problem: Knowledge Deficit - Standard  Goal: Patient and family/care givers will demonstrate understanding of plan of care, disease process/condition, diagnostic tests and medications  Outcome: Progressing  Note: Patient nods appropriately to understanding plan of care.      Problem: Pain - Standard  Goal: Alleviation of pain or a reduction in pain to the patient’s comfort goal  Outcome: Progressing  Note: Patient denies pain.      Problem: Urinary Elimination  Goal: Establish and maintain regular urinary output  Outcome: Progressing  Note: Condom cath in place.      Problem: Bowel Elimination  Goal: Establish and maintain regular bowel function  Outcome: Progressing  Note: Last BM 4/22     Problem: Skin Integrity  Goal: Skin integrity is maintained or improved  Outcome: Progressing  Note: ALFONSO in place. q2H turns. Heel/elbow mepilex changed. Heel float boot in place. Tap system in place.      Problem: Fall Risk  Goal: Patient will remain free from falls  Outcome: Progressing       Patient is not progressing towards the following goals:

## 2025-04-25 PROCEDURE — 700102 HCHG RX REV CODE 250 W/ 637 OVERRIDE(OP): Performed by: INTERNAL MEDICINE

## 2025-04-25 PROCEDURE — A9270 NON-COVERED ITEM OR SERVICE: HCPCS | Performed by: INTERNAL MEDICINE

## 2025-04-25 PROCEDURE — 94760 N-INVAS EAR/PLS OXIMETRY 1: CPT

## 2025-04-25 PROCEDURE — 770001 HCHG ROOM/CARE - MED/SURG/GYN PRIV*

## 2025-04-25 PROCEDURE — A9270 NON-COVERED ITEM OR SERVICE: HCPCS | Performed by: STUDENT IN AN ORGANIZED HEALTH CARE EDUCATION/TRAINING PROGRAM

## 2025-04-25 PROCEDURE — 99232 SBSQ HOSP IP/OBS MODERATE 35: CPT | Performed by: INTERNAL MEDICINE

## 2025-04-25 PROCEDURE — 700101 HCHG RX REV CODE 250: Performed by: INTERNAL MEDICINE

## 2025-04-25 PROCEDURE — 700102 HCHG RX REV CODE 250 W/ 637 OVERRIDE(OP): Performed by: STUDENT IN AN ORGANIZED HEALTH CARE EDUCATION/TRAINING PROGRAM

## 2025-04-25 PROCEDURE — 700101 HCHG RX REV CODE 250: Performed by: STUDENT IN AN ORGANIZED HEALTH CARE EDUCATION/TRAINING PROGRAM

## 2025-04-25 PROCEDURE — 700111 HCHG RX REV CODE 636 W/ 250 OVERRIDE (IP): Mod: JZ | Performed by: INTERNAL MEDICINE

## 2025-04-25 PROCEDURE — 94640 AIRWAY INHALATION TREATMENT: CPT

## 2025-04-25 PROCEDURE — 94669 MECHANICAL CHEST WALL OSCILL: CPT

## 2025-04-25 RX ORDER — ALBUTEROL SULFATE 5 MG/ML
2.5 SOLUTION RESPIRATORY (INHALATION)
Status: DISCONTINUED | OUTPATIENT
Start: 2025-04-25 | End: 2025-04-26

## 2025-04-25 RX ORDER — IPRATROPIUM BROMIDE AND ALBUTEROL SULFATE 2.5; .5 MG/3ML; MG/3ML
3 SOLUTION RESPIRATORY (INHALATION)
Status: DISCONTINUED | OUTPATIENT
Start: 2025-04-25 | End: 2025-04-26

## 2025-04-25 RX ORDER — GLYCOPYRROLATE 1 MG/1
2 TABLET ORAL 3 TIMES DAILY
Status: DISCONTINUED | OUTPATIENT
Start: 2025-04-25 | End: 2025-04-25

## 2025-04-25 RX ORDER — GLYCOPYRROLATE 1 MG/1
2 TABLET ORAL 3 TIMES DAILY
Status: DISCONTINUED | OUTPATIENT
Start: 2025-04-25 | End: 2025-04-29

## 2025-04-25 RX ADMIN — MAGNESIUM HYDROXIDE 30 ML: 2400 SUSPENSION ORAL at 04:14

## 2025-04-25 RX ADMIN — ACETYLCYSTEINE 3 ML: 200 SOLUTION ORAL; RESPIRATORY (INHALATION) at 18:45

## 2025-04-25 RX ADMIN — ACETYLCYSTEINE 3 ML: 200 SOLUTION ORAL; RESPIRATORY (INHALATION) at 10:45

## 2025-04-25 RX ADMIN — ACETYLCYSTEINE 3 ML: 200 SOLUTION ORAL; RESPIRATORY (INHALATION) at 14:14

## 2025-04-25 RX ADMIN — MAGNESIUM HYDROXIDE 30 ML: 2400 SUSPENSION ORAL at 17:06

## 2025-04-25 RX ADMIN — Medication 5 MG: at 21:30

## 2025-04-25 RX ADMIN — ALBUTEROL SULFATE 2.5 MG: 2.5 SOLUTION RESPIRATORY (INHALATION) at 10:45

## 2025-04-25 RX ADMIN — METOCLOPRAMIDE 5 MG: 10 TABLET ORAL at 17:07

## 2025-04-25 RX ADMIN — ATORVASTATIN CALCIUM 80 MG: 80 TABLET, FILM COATED ORAL at 17:08

## 2025-04-25 RX ADMIN — GABAPENTIN 400 MG: 400 CAPSULE ORAL at 11:37

## 2025-04-25 RX ADMIN — GABAPENTIN 400 MG: 400 CAPSULE ORAL at 04:15

## 2025-04-25 RX ADMIN — IPRATROPIUM BROMIDE AND ALBUTEROL SULFATE 3 ML: .5; 2.5 SOLUTION RESPIRATORY (INHALATION) at 14:14

## 2025-04-25 RX ADMIN — SENNOSIDES AND DOCUSATE SODIUM 2 TABLET: 50; 8.6 TABLET ORAL at 04:16

## 2025-04-25 RX ADMIN — HYDROCHLOROTHIAZIDE 25 MG: 25 TABLET ORAL at 04:15

## 2025-04-25 RX ADMIN — GLYCOPYRROLATE 1 MG: 1 TABLET ORAL at 11:37

## 2025-04-25 RX ADMIN — POLYETHYLENE GLYCOL 3350 1 PACKET: 17 POWDER, FOR SOLUTION ORAL at 17:06

## 2025-04-25 RX ADMIN — GLYCOPYRROLATE 1 MG: 1 TABLET ORAL at 04:17

## 2025-04-25 RX ADMIN — METOCLOPRAMIDE 5 MG: 10 TABLET ORAL at 11:37

## 2025-04-25 RX ADMIN — IPRATROPIUM BROMIDE AND ALBUTEROL SULFATE 3 ML: .5; 2.5 SOLUTION RESPIRATORY (INHALATION) at 18:45

## 2025-04-25 RX ADMIN — BISACODYL 10 MG: 10 SUPPOSITORY RECTAL at 16:56

## 2025-04-25 RX ADMIN — ACETAMINOPHEN 650 MG: 325 TABLET, FILM COATED ORAL at 17:07

## 2025-04-25 RX ADMIN — ACETYLCYSTEINE 3 ML: 200 SOLUTION ORAL; RESPIRATORY (INHALATION) at 06:38

## 2025-04-25 RX ADMIN — SENNOSIDES AND DOCUSATE SODIUM 2 TABLET: 50; 8.6 TABLET ORAL at 17:07

## 2025-04-25 RX ADMIN — IPRATROPIUM BROMIDE AND ALBUTEROL SULFATE 3 ML: .5; 2.5 SOLUTION RESPIRATORY (INHALATION) at 21:55

## 2025-04-25 RX ADMIN — AMLODIPINE BESYLATE 10 MG: 10 TABLET ORAL at 04:15

## 2025-04-25 RX ADMIN — ASPIRIN 81 MG: 81 TABLET, CHEWABLE ORAL at 04:15

## 2025-04-25 RX ADMIN — GABAPENTIN 400 MG: 400 CAPSULE ORAL at 17:06

## 2025-04-25 RX ADMIN — ALBUTEROL SULFATE 2.5 MG: 2.5 SOLUTION RESPIRATORY (INHALATION) at 06:38

## 2025-04-25 RX ADMIN — OXYCODONE HYDROCHLORIDE 5 MG: 5 TABLET ORAL at 17:07

## 2025-04-25 RX ADMIN — ENOXAPARIN SODIUM 40 MG: 100 INJECTION SUBCUTANEOUS at 17:06

## 2025-04-25 RX ADMIN — GLYCOPYRROLATE 2 MG: 1 TABLET ORAL at 17:06

## 2025-04-25 RX ADMIN — METOCLOPRAMIDE 5 MG: 10 TABLET ORAL at 04:51

## 2025-04-25 ASSESSMENT — PAIN DESCRIPTION - PAIN TYPE
TYPE: ACUTE PAIN
TYPE: ACUTE PAIN

## 2025-04-25 ASSESSMENT — ENCOUNTER SYMPTOMS
ABDOMINAL PAIN: 0
NAUSEA: 0
DEPRESSION: 1

## 2025-04-25 NOTE — DISCHARGE PLANNING
Case Management Discharge Planning    Admission Date: 3/25/2025  GMLOS: 23.1  ALOS: 31    6-Clicks ADL Score: 6  6-Clicks Mobility Score: 6    Anticipated Discharge Dispo: Discharge Disposition: Disch to a long term care facility (63)    This RN CM called Mother Natalie to follow up on short term disability information. Mom is driving and pulled over to take this RN CM direct line. Mom to call back and update on short term disability insurance information.     [employer] insurance is valid thru end of the year December 31st, 2025 for [facility].   How long Short term disability is 26 weeks then he goes into long term  1st 13 weeks its 100%, the next 13 weeks is 70% , then long term insurance starts and will be 60% of income     Stroke March 24th last day worked.

## 2025-04-25 NOTE — CARE PLAN
The patient is Watcher - Medium risk of patient condition declining or worsening    Shift Goals  Clinical Goals: stable neuro status, maintain skin integrity, pulmonary hygiene  Patient Goals: jennifer  Family Goals: jennifer    Progress made toward(s) clinical / shift goals:      Problem: Neuro Status  Goal: Neuro status will remain stable or improve  Outcome: Progressing  Note:   1.  Assess and monitor neurologic status per provider order/protocol/unit policy   2.  Assess level of consciousness and orientation   3.  Assess for speech, dysarthria, dysphagia, facial symmetry   4.  Assess visual field, eye movements, gaze preference, pupil reaction and size  5.  Assess muscle strength and motor response in all four extremities  6.  Assess for sensation (numbness and tingling)  7.  Assess basic neuro reflexes (cough, gag, corneal)  8.  Identify changes in neuro status and report to provider for testing/treatment orders     Problem: Hemodynamic Monitoring  Goal: Patient's hemodynamics, fluid balance and neurologic status will be stable or improve  Outcome: Progressing  Note: Vital signs, pulse oximetry and cardiac monitor per provider order and/or policy     Problem: Respiratory - Stroke Patient  Goal: Patient will achieve/maintain optimum respiratory rate/effort  Outcome: Progressing  Note:   1.  Assess and monitor respiratory rate, rhythm, depth and effort of respiration  2.  Oxygenation assessed throughout shift (recommendation of >94% for new stroke patients)  3.  Oxygen administered and/or titrated per order  4.  Collaboration with RT to administer medication/treatments per order  5.  Patient educated on importance of turning, coughing, and deep breathing  6.  Patient positioned for maximum ventilatory efficiency  7.  Airway suctioning provided as needed     Problem: Risk for Aspiration  Goal: Patient's risk for aspiration will be absent or decrease  Outcome: Progressing  Note: Implement aspiration precautions     Problem:  Skin Integrity  Goal: Skin integrity is maintained or improved  Outcome: Progressing  Note:   1.  Assess and monitor skin integrity, appearance and/or temperature  2.  Assess risk factors for impaired skin integrity and/or pressures ulcers  3.  Implement precautions to protect skin integrity in collaboration with interdisciplinary team  4.  Implement pressure ulcer prevention protocol if at risk for skin breakdown  5.  Confirm wound care consult if at risk for skin breakdown  6.  Ensure patient use of pressure relieving devices  (Low air loss bed, waffle overlay, heel protectors, ROHO cushion, etc)       Patient is not progressing towards the following goals:    Problem: Mobility - Stroke  Goal: Patient's capacity to carry out activities will improve  Outcome: Not Progressing     Problem: Self Care  Goal: Patient will have the ability to perform ADLs independently or with assistance (bathe, groom, dress, toilet and feed)  Outcome: Not Progressing

## 2025-04-25 NOTE — CARE PLAN
The patient is Watcher - Medium risk of patient condition declining or worsening    Shift Goals  Clinical Goals: improve  Patient Goals: rest  Family Goals: jennifer    Progress made toward(s) clinical / shift goals:    Problem: Pain - Standard  Goal: Alleviation of pain or a reduction in pain to the patient’s comfort goal  Outcome: Progressing       Patient is not progressing towards the following goals:

## 2025-04-25 NOTE — THERAPY
Physical Therapy   Daily Treatment     Patient Name: Jonna Brian  Age:  49 y.o., Sex:  male  Medical Record #: [id number]  Today's Date: 4/24/2025     Precautions  Precautions: Fall Risk;Swallow Precautions;Tracheostomy ;PEG Tube  Comments: R hemiplegia    Assessment  Pt seen for PT tx session with mobility detailed down below. Pt continues to be at Total A for bed mobility, although his head control is slowly improving. Pt worked on LUE/LLE therex in a seated position, needing intermittent support for his head as he fatigued. Continue to recommend placement when medically cleared, will follow.     Plan    Treatment Plan Status: Continue Current Treatment Plan  Type of Treatment: Bed Mobility, Family / Caregiver Training, Gait Training, Neuro Re-Education / Balance, Self Care / Home Evaluation, Stair Training, Therapeutic Activities, Therapeutic Exercise  Treatment Frequency: 3 Times per Week  Treatment Duration: Until Therapy Goals Met    DC Equipment Recommendations: Unable to determine at this time  Discharge Recommendations: Recommend post-acute placement for additional physical therapy services prior to discharge home        Vitals   O2 Delivery Device T-Piece   Vitals Comments significantly decreased secretions today   Pain 0 - 10 Group   Therapist Pain Assessment   (no complaints or signs of pain)   Cognition    Speech/ Communication Nods Appropriately   Level of Consciousness Alert   Active ROM Lower Body    Comments still no RLE movement or fasciculations   Sitting Lower Body Exercises   Sitting Lower Body Exercises Yes   Tricep Press 2 sets of 10  (with arm supported on pillow)   Bicep Curls 2 sets of 10  (with arm supported on pillow)   Ankle Pumps 2 sets of 10;Left   Long Arc Quad 3 sets of 10;Left   Balance   Sitting Balance (Static) Dependent   Sitting Balance (Dynamic) Dependent   Weight Shift Sitting Absent   Skilled Intervention Verbal Cuing;Postural Facilitation   Comments needing continous  postural support, however he was able to hold his head up for 30-45 seconds at a time. able to readjust and bring his head back up after a break if it dropped down   Bed Mobility    Supine to Sit Total Assist   Sit to Supine Total Assist   Scooting Total Assist   Comments 2p assist   Gait Analysis   Gait Level Of Assist Unable to Participate   Functional Mobility   Sit to Stand Unable to Participate   Bed, Chair, Wheelchair Transfer Unable to Participate   Mobility EOB only   6 Clicks Assessment - How much HELP from from another person do you currently need... (If the patient hasn't done an activity recently, how much help from another person do you think he/she would need if he/she tried?)   Turning from your back to your side while in a flat bed without using bedrails? 1   Moving from lying on your back to sitting on the side of a flat bed without using bedrails? 1   Moving to and from a bed to a chair (including a wheelchair)? 1   Standing up from a chair using your arms (e.g., wheelchair, or bedside chair)? 1   Walking in hospital room? 1   Climbing 3-5 steps with a railing? 1   6 clicks Mobility Score 6   Short Term Goals    Short Term Goal # 1 pt will move supine<>eob with min a in 6 tx for bed mobility.   Goal Outcome # 1 goal not met   Short Term Goal # 2 pt will sit at eob for 5 min with fair- balance in 6 tx for oob tolerance.   Goal Outcome # 2 Goal not met   Short Term Goal # 3 pt will complete sts with hemiwalker and min a in 6 tx for functional mobility.   Goal Outcome # 3 Goal not met   Short Term Goal # 4 pt will hold head in upright position for 30 seconds for 6 tx for posture.   Goal Outcome # 4 Progressing as expected   Physical Therapy Treatment Plan   Physical Therapy Treatment Plan Continue Current Treatment Plan   Anticipated Discharge Equipment and Recommendations   DC Equipment Recommendations Unable to determine at this time   Discharge Recommendations Recommend post-acute placement for  additional physical therapy services prior to discharge home   Interdisciplinary Plan of Care Collaboration   IDT Collaboration with  Nursing;Therapy Tech   Patient Position at End of Therapy In Bed;Bed Alarm On;Call Light within Reach   Collaboration Comments RN updated   Session Information   Date / Session Number  4/24- 12 (1/3, 4/30)

## 2025-04-25 NOTE — PROGRESS NOTES
[facility] Medicine Daily Progress Note    Date of Service  4/25/2025    Chief Complaint  Jonna Brian is a 49 y.o. male admitted 3/25/2025 with headache and weakness.    Hospital Course  This is a 49 y.o. male who was initially admitted to the hospital on 3/25/2025 with global weakness, aphasia and ataxia.  His last known normal was 1630 on March 24.  He was initially admitted to the hospital floor, his CT head as well as CTA of the head and neck were within normal limits.  There was consideration for possible Guillain-Barré syndrome.     His condition continued to deteriorate and he underwent a lumbar puncture which revealed elevated protein but no evidence of infection.  He had progressive bulbar symptoms and weakness and was transferred to the ICU.  Unfortunately shortly after he arrived in the ICU he developed stridor and required emergent intubation on 3/27.       He then underwent an MRI of his brain and spine which unfortunately revealed small areas of acute infarcts in the lower jero and the upper medulla on both sides of the midline.  He also had chronic infarcts in his jero and right inferior cerebellum.  They were chronically Cuna in the bilateral basal ganglia, the right thalamus and the right periventricular white matter.  He had evidence of small vessel disease as well in the periventricular white matter.     Ultimately it appears he has been suffering multiple small strokes and his progressive decline was due to acute infarcts in the lower jero and upper medulla. Neurology recommended DAPT for 10 days with aspirin indefinitely.  Patient completed Plavix 4/4.  Since that time he has not recovered function, has required tracheostomy and PEG placement with the ultimate goal of transferring him to postacute medical to see what function may be regained in time.    Patient able to nod head, shakes head for yes/no, able to use alphabet sheet to make needs known. He continues to work with PT/OT/SLP  therapies with some minor signs of improvement. Veratent is medically cleared, plan for discharge to LTAC for ongoing management of tracheostomy tube, PEG tube, therapies.    Interval Problem Update  4/25 no acute events overnight  Patient continues to have significant cough with secretions  Discussed with respiratory therapy, will increase Robinul to 2 mg 3 times daily and will change breathing treatments to DuoNebs to see if this helps decrease secretions  Discussed plan with family at bedside    I have discussed this patient's plan of care and discharge plan at IDT rounds today with Case Management, Nursing, Nursing leadership, and other members of the IDT team.    Consultants/Specialty  critical care     Code Status  Full Code    Disposition  The patient is medically cleared for discharge to home or a post-acute facility.  Anticipate discharge to: a long-term acute care hospital    I have placed the appropriate orders for post-discharge needs.    Review of Systems  Review of Systems   Unable to perform ROS: Patient nonverbal   Gastrointestinal:  Negative for abdominal pain and nausea.   Psychiatric/Behavioral:  Positive for depression.         Physical Exam  Temp:  [36.5 °C (97.7 °F)-37 °C (98.6 °F)] 36.6 °C (97.9 °F)  Pulse:  [73-85] 85  Resp:  [17-18] 17  BP: (110-130)/(70-81) 130/71  SpO2:  [18 %-97 %] 96 %    Physical Exam  Vitals and nursing note reviewed. Exam conducted with a chaperone present (Family at bedside).   Constitutional:       General: He is not in acute distress.     Appearance: He is ill-appearing.   Eyes:      Conjunctiva/sclera: Conjunctivae normal.   Neck:      Comments: Tracheostomy on Tpiece  Cardiovascular:      Rate and Rhythm: Normal rate and regular rhythm.   Pulmonary:      Effort: No respiratory distress.      Breath sounds: No wheezing.      Comments: On T-piece  Continues to have significant secretions  Abdominal:      General: There is no distension.      Palpations: Abdomen is  soft.      Tenderness: There is no abdominal tenderness.      Comments: PEG   Skin:     General: Skin is warm and dry.   Neurological:      Mental Status: He is alert.      Motor: Weakness present.      Comments: He is able to slightly move his left upper and lower extremity.  He was able to nod his head to answer question.  Follows commands   Psychiatric:         Mood and Affect: Mood is depressed.         Fluids    Intake/Output Summary (Last 24 hours) at 4/25/2025 1431  Last data filed at 4/25/2025 0800  Gross per 24 hour   Intake 895 ml   Output 800 ml   Net 95 ml        Laboratory  Recent Labs     04/23/25  0342   WBC 6.8   RBC 4.97   HEMOGLOBIN 13.6*   HEMATOCRIT 41.5*   MCV 83.5   MCH 27.4   MCHC 32.8   RDW 38.1   PLATELETCT 342   MPV 9.0     Recent Labs     04/23/25  0342 04/24/25  0858   SODIUM 138 135   POTASSIUM 3.4* 3.7   CHLORIDE 95* 95*   CO2 30 28   GLUCOSE 105* 122*   BUN 26* 27*   CREATININE 0.92 0.91   CALCIUM 10.1 9.8                   Imaging  [id number] VIEW   Final Result      No evidence of bowel obstruction.                  DX-CHEST-LIMITED (1 VIEW)   Final Result         No acute cardiopulmonary abnormalities are identified.      [id number] VIEW   Final Result         No specific finding to suggest small bowel obstruction.      DX-CHEST-PORTABLE (1 VIEW)   Final Result      Hypoinflation with bibasilar atelectasis.      US-RENAL   Final Result      1.  No hydronephrosis.      DX-CHEST-PORTABLE (1 VIEW)   Final Result         1.  Left basilar atelectasis and/or subtle infiltrates, similar to prior study      DX-CHEST-PORTABLE (1 VIEW)   Final Result         1.  Left basilar atelectasis, no focal infiltrate   2.  Cardiomegaly      DX-G.I. TUBE INJECTION, ANY TYPE   Final Result      Contrast from a PEG tube injection extends into the stomach without evidence of contrast leak.      CT-CHEST,ABDOMEN,PELVIS WITH   Final Result      1.  Large amount of free intraperitoneal air within the  abdomen again seen as was previously identified on chest x-ray by review of the patient's chart, a percutaneous gastrostomy tube was placed on 3/31/2025 and is free intraperitoneal air is possibly    secondary to that recent procedure.      2.  Bibasal atelectasis and small bilateral pleural effusions.      3.  Tracheostomy tube present.      4.  No evidence of bowel obstruction or free fluid within the abdomen or pelvis.      DX-CHEST-PORTABLE (1 VIEW)   Final Result         1.  Bibasilar atelectasis   2.  Intra-abdominal free air, can be associated with history of percutaneous gastrostomy, consider other viscus perforation as warranted. Could be further evaluated with CT of the abdomen with contrast as clinically appropriate.      These findings were discussed with the patient's clinician, Bravo Lala Iv, on 4/2/2025 7:57 AM.      DX-CHEST-PORTABLE (1 VIEW)   Final Result      No evidence of acute cardiopulmonary process.      DX-ABDOMEN FOR TUBE PLACEMENT   Final Result      1.  Enteric tube extends in the fundus of stomach.      DX-ABDOMEN FOR TUBE PLACEMENT   Final Result      The gastric tube has been removed and replaced with a small bowel feeding tube which terminate in the proximal stomach.      DX-CHEST-PORTABLE (1 VIEW)   Final Result      Atelectasis within the left lung base.      MR-THORACIC SPINE-WITH & W/O   Final Result      1.  There is no abnormal intramedullary T2 signal intensity in the thoracic spinal cord.   2.  Mild degenerative disease at T8-9.   3.  Left lower segmental consolidation.      MR-CERVICAL SPINE-WITH & W/O   Final Result      1.  Small areas of acute infarcts in the lower jero and upper medulla involving both sides of the midline. There are chronic infarcts in the jero and right inferior cerebellum.   2.  There is no abnormal intramedullary T2 signal intensity in the cervical spinal cord to suggest demyelinating lesions. There is no MR evidence of compressive myelopathy.    3.  Mild degenerative disease.            MR-BRAIN-WITH & W/O   Final Result      1.  Small areas of acute infarcts in the lower jero and upper medulla involving both sides of the midline.   2.  There are chronic infarcts in the jero and right inferior cerebellum. . There are areas of chronic lacunae in the bilateral basal ganglia, right thalamus and right periventricular white matter.   3.  There are nonspecific T2 hyperintensities in the periventricular white matter likely representing chronic small vessel disease.   4.  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      MR-LUMBAR SPINE-WITH & W/O   Final Result      1.  Unremarkable pre and postcontrast MR examination of the lumbar spine.   2.  Clinical suspicious for GBS: There is no abnormal enhancement of the lumbar nerve roots.      DX-ABDOMEN FOR TUBE PLACEMENT   Final Result      NG tube tip projects at the peripyloric region.      DX-CHEST-PORTABLE (1 VIEW)   Final Result      1.  Low lung volumes without definite acute cardiopulmonary abnormality.   2.  Support apparatus as above.      CT-HEAD W/O   Final Result      1.  No acute intracranial abnormality.   2.  Remote right cerebellar infarct.               DX-CHEST-PORTABLE (1 VIEW)   Final Result      No acute cardiopulmonary disease evident.      DX-CHEST-PORTABLE (1 VIEW)   Final Result      No acute cardiopulmonary disease evident.      CT-CEREBRAL PERFUSION ANALYSIS   Final Result      1. Cerebral blood flow less than 30% possibly representing completed infarct = 0 mL. Based on distribution of this finding, this is unlikely to represent artifact.      2. T Max more than 6 seconds possibly representing combination of completed infarct and ischemia = 7 mL. Based on the distribution of this finding, this is possibly artifact.      3. Mismatched volume possibly representing ischemic brain/penumbra= 0 mL      4.  Please note that this cerebral  "perfusion study and report is Quantitative and targets supratentorial (cerebral) perfusion for evaluation of large vessel territory acute ischemia/infarction. For example, lacunar infarcts, and brainstem/posterior fossa    ischemia/infarction are not evaluated on this study.  Data acquisition is subject to artifacts which can yield non-anatomically plausible perfusion maps which may be due to motion, bolus timing, signal to noise ratio, or other technical factors.    Perfusion map abnormalities which show non-anatomic distributions are likely artifact.   This study is not \"stand-alone\" and should only be utilized for diagnosis, management/treatment in correlation with CT, CTA, and/or MRI and clinical factors.         CT-CTA NECK WITH & W/O-POST PROCESSING   Final Result      CT angiogram of the neck within normal limits.      CT-CTA HEAD WITH & W/O-POST PROCESS   Final Result      CT angiogram of the [place] within normal limits.      CT-HEAD W/O   Final Result      Head CT without contrast within normal limits. No evidence of acute cerebral infarction, hemorrhage or mass lesion.                    Assessment/Plan  * Acute ischemic stroke (HCC)- (present on admission)  Assessment & Plan  Acute ischemic stroke  MRI of the brain revealed acute infarcts of the lower jero and upper medulla.  He effectively is a quadriplegic and required tracheostomy and PEG tube.  Aspirin and statin  Blood pressure control  Family hope for functional recovery    Hypokalemia  Assessment & Plan  Replace as needed    Emesis  Assessment & Plan  Tube feeds held   Continue Reglan  Patient has not had a bowel movement    Restart tube feeds at slow rate and monitor    Constipation  Assessment & Plan  KUB showed no obstruction  Bowel protocol    Improving    Hematuria  Assessment & Plan  Darkened urine, no gross hematuria  No recent grove trauma  Renal US normal, no stones  Continue antibiotics for possible UTI given mild signs of " infection on UA  If hematuria persists after keflex treatment, consider urology consult/referral    Proteus mirabilis pneumonia (HCC)- (present on admission)  Assessment & Plan  Treated with Zosyn de-escalated to Augmentin based on cultures from sputum    Acute neuromuscular respiratory failure (HCC)- (present on admission)  Assessment & Plan  Requiring tracheostomy    Anxiety  Assessment & Plan  Ativan for anxiety.     Type 2 diabetes mellitus with hyperglycemia, without long-term current use of insulin (HCC)- (present on admission)  Assessment & Plan  Details are unclear  Reportedly he is on metformin as an outpatient  Hemoglobin A1c 6.3  He does not require insulin    Primary hypertension- (present on admission)  Assessment & Plan  Blood pressure is appropriate with hydrochlorothiazide 25 mg daily and Norvasc 10 mg daily         VTE prophylaxis:    enoxaparin ppx    I have performed a physical exam and reviewed and updated ROS and Plan today (4/25/2025). In review of yesterday's note (4/24/2025), there are no changes except as documented above.

## 2025-04-25 NOTE — CARE PLAN
The patient is Stable - Low risk of patient condition declining or worsening    Shift Goals  Clinical Goals: stable neuro assessments and skin integrity  Patient Goals: JAMARI  Family Goals: comfort    Progress made toward(s) clinical / shift goals:    Problem: Pain - Standard  Goal: Alleviation of pain or a reduction in pain to the patient’s comfort goal  Outcome: Progressing  Note: Patients pain has been controlled with repositioning, patient denies pain medication when asked if it is needed.     Problem: Mobility - Stroke  Goal: Patient's capacity to carry out activities will improve  Outcome: Progressing  Note: Patient was able to work with PT this shift and tolerated EOB for about 20 minutes.        Patient is not progressing towards the following goals:

## 2025-04-25 NOTE — DIETARY
"Nutrition Services Weekly Nutrition Support Update     Day 31 of admit. Jonna Brian is a 49 y.o. male with admitting DX of Acute weakness [R53.1]  Acute ischemic stroke (HCC) [I63.9]    Tube feeding initiated on 3/28. Current TF via PEG is Glucerna 1.2 @75 mL/hr to provide 2160 kcal, 108 gm protein, and 1449 ml free water daily       Nutrition Assessment:      Height: 185.4 cm (6' 0.99\")  Weight: 96.3 kg (212 lb 4.9 oz)  Weight to Use in Calculations: 96 kg (211 lb 10.3 oz)  Weight taken via  method not listed  on   Body mass index is 28.02 kg/m². BMI classification: Overweight.    Re-estimation of nutrition needs not indicated.    Calculation/Equation: REE per MSJ x 1.1 = 2079 kcals  Total Calories / day: 2100 - 2300  (Calories / k - 24)  Total Grams Protein / day: 96 - 115 (Grams Protein / k - 1.2)     Objective:  Pt previously intolerant of bolus feeds, transitioned to continuous on . Per progress note yesterday, pt tolerating without vomiting or abdominal pain  Pertinent labs: chloride 95, glucose 122, BUN 27  Pertinent meds: Lipitor, Reglan QID, Zofran PRN, BM protocol  Skin/wounds: no wounds or edema noted   Last BM   Current feeding remains appropriate to meet pt's nutritional needs     Current diet order:   NPO     Nutrition Diagnosis:      Swallowing difficulty r/t stroke as evidenced by need for nutrition support     Nutrition Dx Status: Ongoing     Nutrition Interventions:      Continue TF as ordered  Additional fluids per provider  Diet upgrade per SLP    Nutrition Monitoring and Evaluation:     Monitor nutrition POC  Monitor weight  Monitor vital signs pertinent to nutrition         RD following and will provide updated recommendations as indicated.   "

## 2025-04-26 LAB
ALBUMIN SERPL BCP-MCNC: 3.5 G/DL (ref 3.2–4.9)
BUN SERPL-MCNC: 24 MG/DL (ref 8–22)
CALCIUM ALBUM COR SERPL-MCNC: 10.3 MG/DL (ref 8.5–10.5)
CALCIUM SERPL-MCNC: 9.9 MG/DL (ref 8.5–10.5)
CHLORIDE SERPL-SCNC: 93 MMOL/L (ref 96–112)
CO2 SERPL-SCNC: 32 MMOL/L (ref 20–33)
CREAT SERPL-MCNC: 0.87 MG/DL (ref 0.5–1.4)
ERYTHROCYTE [DISTWIDTH] IN BLOOD BY AUTOMATED COUNT: 38.4 FL (ref 35.9–50)
GFR SERPLBLD CREATININE-BSD FMLA CKD-EPI: 107 ML/MIN/1.73 M 2
GLUCOSE SERPL-MCNC: 130 MG/DL (ref 65–99)
HCT VFR BLD AUTO: 41.3 % (ref 42–52)
HGB BLD-MCNC: 12.9 G/DL (ref 14–18)
MCH RBC QN AUTO: 26.8 PG (ref 27–33)
MCHC RBC AUTO-ENTMCNC: 31.2 G/DL (ref 32.3–36.5)
MCV RBC AUTO: 85.7 FL (ref 81.4–97.8)
PHOSPHATE SERPL-MCNC: 3.8 MG/DL (ref 2.5–4.5)
PLATELET # BLD AUTO: 292 K/UL (ref 164–446)
PMV BLD AUTO: 9.1 FL (ref 9–12.9)
POTASSIUM SERPL-SCNC: 3.5 MMOL/L (ref 3.6–5.5)
RBC # BLD AUTO: 4.82 M/UL (ref 4.7–6.1)
SODIUM SERPL-SCNC: 136 MMOL/L (ref 135–145)
WBC # BLD AUTO: 7 K/UL (ref 4.8–10.8)

## 2025-04-26 PROCEDURE — 700102 HCHG RX REV CODE 250 W/ 637 OVERRIDE(OP): Performed by: INTERNAL MEDICINE

## 2025-04-26 PROCEDURE — 85027 COMPLETE CBC AUTOMATED: CPT

## 2025-04-26 PROCEDURE — A9270 NON-COVERED ITEM OR SERVICE: HCPCS | Performed by: INTERNAL MEDICINE

## 2025-04-26 PROCEDURE — A9270 NON-COVERED ITEM OR SERVICE: HCPCS | Performed by: STUDENT IN AN ORGANIZED HEALTH CARE EDUCATION/TRAINING PROGRAM

## 2025-04-26 PROCEDURE — 80069 RENAL FUNCTION PANEL: CPT

## 2025-04-26 PROCEDURE — 770001 HCHG ROOM/CARE - MED/SURG/GYN PRIV*

## 2025-04-26 PROCEDURE — 36415 COLL VENOUS BLD VENIPUNCTURE: CPT

## 2025-04-26 PROCEDURE — 700111 HCHG RX REV CODE 636 W/ 250 OVERRIDE (IP): Mod: JZ | Performed by: INTERNAL MEDICINE

## 2025-04-26 PROCEDURE — 94640 AIRWAY INHALATION TREATMENT: CPT

## 2025-04-26 PROCEDURE — 700101 HCHG RX REV CODE 250: Performed by: INTERNAL MEDICINE

## 2025-04-26 PROCEDURE — 700102 HCHG RX REV CODE 250 W/ 637 OVERRIDE(OP): Performed by: STUDENT IN AN ORGANIZED HEALTH CARE EDUCATION/TRAINING PROGRAM

## 2025-04-26 PROCEDURE — 700101 HCHG RX REV CODE 250: Performed by: STUDENT IN AN ORGANIZED HEALTH CARE EDUCATION/TRAINING PROGRAM

## 2025-04-26 PROCEDURE — 94669 MECHANICAL CHEST WALL OSCILL: CPT

## 2025-04-26 PROCEDURE — 99232 SBSQ HOSP IP/OBS MODERATE 35: CPT | Performed by: INTERNAL MEDICINE

## 2025-04-26 PROCEDURE — 94760 N-INVAS EAR/PLS OXIMETRY 1: CPT

## 2025-04-26 RX ORDER — ALBUTEROL SULFATE 5 MG/ML
2.5 SOLUTION RESPIRATORY (INHALATION)
Status: DISCONTINUED | OUTPATIENT
Start: 2025-04-26 | End: 2025-04-29

## 2025-04-26 RX ADMIN — IPRATROPIUM BROMIDE AND ALBUTEROL SULFATE 3 ML: .5; 2.5 SOLUTION RESPIRATORY (INHALATION) at 09:40

## 2025-04-26 RX ADMIN — ACETYLCYSTEINE 3 ML: 200 SOLUTION ORAL; RESPIRATORY (INHALATION) at 06:49

## 2025-04-26 RX ADMIN — ASPIRIN 81 MG: 81 TABLET, CHEWABLE ORAL at 04:29

## 2025-04-26 RX ADMIN — SENNOSIDES AND DOCUSATE SODIUM 2 TABLET: 50; 8.6 TABLET ORAL at 04:29

## 2025-04-26 RX ADMIN — IPRATROPIUM BROMIDE AND ALBUTEROL SULFATE 3 ML: .5; 2.5 SOLUTION RESPIRATORY (INHALATION) at 06:50

## 2025-04-26 RX ADMIN — ATORVASTATIN CALCIUM 80 MG: 80 TABLET, FILM COATED ORAL at 18:01

## 2025-04-26 RX ADMIN — METOCLOPRAMIDE 5 MG: 10 TABLET ORAL at 13:54

## 2025-04-26 RX ADMIN — SENNOSIDES AND DOCUSATE SODIUM 2 TABLET: 50; 8.6 TABLET ORAL at 18:00

## 2025-04-26 RX ADMIN — METOCLOPRAMIDE 5 MG: 10 TABLET ORAL at 04:30

## 2025-04-26 RX ADMIN — GLYCOPYRROLATE 2 MG: 1 TABLET ORAL at 13:55

## 2025-04-26 RX ADMIN — ACETYLCYSTEINE 3 ML: 200 SOLUTION ORAL; RESPIRATORY (INHALATION) at 18:57

## 2025-04-26 RX ADMIN — ACETYLCYSTEINE 3 ML: 200 SOLUTION ORAL; RESPIRATORY (INHALATION) at 14:14

## 2025-04-26 RX ADMIN — METOCLOPRAMIDE 5 MG: 10 TABLET ORAL at 00:15

## 2025-04-26 RX ADMIN — ACETYLCYSTEINE 3 ML: 200 SOLUTION ORAL; RESPIRATORY (INHALATION) at 09:40

## 2025-04-26 RX ADMIN — ALBUTEROL SULFATE 2.5 MG: 2.5 SOLUTION RESPIRATORY (INHALATION) at 18:57

## 2025-04-26 RX ADMIN — ENOXAPARIN SODIUM 40 MG: 100 INJECTION SUBCUTANEOUS at 18:01

## 2025-04-26 RX ADMIN — IPRATROPIUM BROMIDE AND ALBUTEROL SULFATE 3 ML: .5; 2.5 SOLUTION RESPIRATORY (INHALATION) at 02:04

## 2025-04-26 RX ADMIN — GABAPENTIN 400 MG: 400 CAPSULE ORAL at 13:54

## 2025-04-26 RX ADMIN — GABAPENTIN 400 MG: 400 CAPSULE ORAL at 04:29

## 2025-04-26 RX ADMIN — GLYCOPYRROLATE 2 MG: 1 TABLET ORAL at 18:00

## 2025-04-26 RX ADMIN — GABAPENTIN 400 MG: 400 CAPSULE ORAL at 18:00

## 2025-04-26 RX ADMIN — AMLODIPINE BESYLATE 10 MG: 10 TABLET ORAL at 04:29

## 2025-04-26 RX ADMIN — POTASSIUM BICARBONATE 50 MEQ: 978 TABLET, EFFERVESCENT ORAL at 10:30

## 2025-04-26 RX ADMIN — GLYCOPYRROLATE 2 MG: 1 TABLET ORAL at 04:29

## 2025-04-26 RX ADMIN — HYDROCHLOROTHIAZIDE 25 MG: 25 TABLET ORAL at 04:29

## 2025-04-26 RX ADMIN — IPRATROPIUM BROMIDE AND ALBUTEROL SULFATE 3 ML: .5; 2.5 SOLUTION RESPIRATORY (INHALATION) at 14:14

## 2025-04-26 RX ADMIN — Medication 5 MG: at 20:21

## 2025-04-26 ASSESSMENT — ENCOUNTER SYMPTOMS
DEPRESSION: 1
ABDOMINAL PAIN: 0
NAUSEA: 0

## 2025-04-26 NOTE — CARE PLAN
The patient is Watcher - Medium risk of patient condition declining or worsening    Shift Goals  Clinical Goals: pulmonary hygiene, stable neuro status, maintain skin integrity  Patient Goals: rest  Family Goals: pt comfort and updates    Progress made toward(s) clinical / shift goals:      Problem: Knowledge Deficit - Stroke Education  Goal: Patient's knowledge of stroke and risk factors will improve  Outcome: Progressing  Note:   1.  Stroke education booklet provided  2.  Education regarding EMS activation, need for follow up, medication prescribed at discharge, risk factors for stroke/lifestyle modifications, warning signs and symptoms of stroke provided     Problem: Neuro Status  Goal: Neuro status will remain stable or improve  Outcome: Progressing  Note:   1.  Assess and monitor neurologic status per provider order/protocol/unit policy  2.  Assess level of consciousness and orientation  3.  Assess for speech, dysarthria, dysphagia, facial symmetry  4.  Assess visual field, eye movements, gaze preference, pupil reaction and size  5.  Assess muscle strength and motor response in all four extremities  6.  Assess for sensation (numbness and tingling)  7.  Assess basic neuro reflexes (cough, gag, corneal)  8.  Identify changes in neuro status and report to provider for testing/treatment orders     Problem: Respiratory - Stroke Patient  Goal: Patient will achieve/maintain optimum respiratory rate/effort  Outcome: Progressing  Note:   1.  Assess and monitor respiratory rate, rhythm, depth and effort of respiration  2.  Oxygenation assessed throughout shift (recommendation of >94% for new stroke patients)  3.  Oxygen administered and/or titrated per order  4.  Collaboration with RT to administer medication/treatments per order  5.  Patient educated on importance of turning, coughing, and deep breathing  6.  Patient positioned for maximum ventilatory efficiency  7.  Airway suctioning provided as needed       Patient is  not progressing towards the following goals: N/A

## 2025-04-26 NOTE — PROGRESS NOTES
[facility] Medicine Daily Progress Note    Date of Service  4/26/2025    Chief Complaint  Jonna Brian is a 49 y.o. male admitted 3/25/2025 with headache and weakness.    Hospital Course  This is a 49 y.o. male who was initially admitted to the hospital on 3/25/2025 with global weakness, aphasia and ataxia.  His last known normal was 1630 on March 24.  He was initially admitted to the hospital floor, his CT head as well as CTA of the head and neck were within normal limits.  There was consideration for possible Guillain-Barré syndrome.     His condition continued to deteriorate and he underwent a lumbar puncture which revealed elevated protein but no evidence of infection.  He had progressive bulbar symptoms and weakness and was transferred to the ICU.  Unfortunately shortly after he arrived in the ICU he developed stridor and required emergent intubation on 3/27.       He then underwent an MRI of his brain and spine which unfortunately revealed small areas of acute infarcts in the lower jero and the upper medulla on both sides of the midline.  He also had chronic infarcts in his jero and right inferior cerebellum.  They were chronically Cuna in the bilateral basal ganglia, the right thalamus and the right periventricular white matter.  He had evidence of small vessel disease as well in the periventricular white matter.     Ultimately it appears he has been suffering multiple small strokes and his progressive decline was due to acute infarcts in the lower jero and upper medulla. Neurology recommended DAPT for 10 days with aspirin indefinitely.  Patient completed Plavix 4/4.  Since that time he has not recovered function, has required tracheostomy and PEG placement with the ultimate goal of transferring him to postacute medical to see what function may be regained in time.    Patient able to nod head, shakes head for yes/no, able to use alphabet sheet to make needs known. He continues to work with PT/OT/SLP  therapies with some minor signs of improvement. Veratenessence is medically cleared, plan for discharge to LTAC for ongoing management of tracheostomy tube, PEG tube, therapies.    Interval Problem Update  4/25 no acute events overnight  Patient continues to have significant cough with secretions  Discussed with respiratory therapy, will increase Robinul to 2 mg 3 times daily and will change breathing treatments to DuoNebs to see if this helps decrease secretions  Discussed plan with family at bedside    4/26 vital stable, afebrile  WBC 7, hemoglobin 12.9  Potassium 3.5, replaced  Patient reports some improvement in his secretions with the change in therapy yesterday      I have discussed this patient's plan of care and discharge plan at IDT rounds today with Case Management, Nursing, Nursing leadership, and other members of the IDT team.    Consultants/Specialty  critical care     Code Status  Full Code    Disposition  The patient is medically cleared for discharge to home or a post-acute facility.  Anticipate discharge to: a long-term acute care hospital    I have placed the appropriate orders for post-discharge needs.    Review of Systems  Review of Systems   Unable to perform ROS: Patient nonverbal   HENT:  Positive for congestion.    Gastrointestinal:  Negative for abdominal pain and nausea.   Psychiatric/Behavioral:  Positive for depression.         Physical Exam  Temp:  [36.5 °C (97.7 °F)-36.6 °C (97.9 °F)] 36.5 °C (97.7 °F)  Pulse:  [67-85] 85  Resp:  [16-18] 18  BP: (117-137)/(69-79) 117/69  SpO2:  [95 %-100 %] 98 %    Physical Exam  Vitals and nursing note reviewed.   Constitutional:       General: He is not in acute distress.     Appearance: He is ill-appearing.   Eyes:      Conjunctiva/sclera: Conjunctivae normal.   Neck:      Comments: Tracheostomy on Tpiece  Cardiovascular:      Rate and Rhythm: Normal rate and regular rhythm.   Pulmonary:      Effort: No respiratory distress.      Breath sounds: No wheezing.       Comments: On T-piece  Continues to have significant secretions  Abdominal:      General: There is no distension.      Palpations: Abdomen is soft.      Tenderness: There is no abdominal tenderness.      Comments: PEG   Skin:     General: Skin is warm and dry.   Neurological:      Mental Status: He is alert.      Motor: Weakness present.      Comments: He is able to slightly move his left upper and lower extremity.  Nodding yes and no appropriately to questions.  Follows commands   Psychiatric:         Mood and Affect: Mood is depressed.         Fluids    Intake/Output Summary (Last 24 hours) at 4/26/2025 1402  Last data filed at 4/26/2025 1213  Gross per 24 hour   Intake 1639 ml   Output 650 ml   Net 989 ml        Laboratory  Recent Labs     04/26/25  0454   WBC 7.0   RBC 4.82   HEMOGLOBIN 12.9*   HEMATOCRIT 41.3*   MCV 85.7   MCH 26.8*   MCHC 31.2*   RDW 38.4   PLATELETCT 292   MPV 9.1     Recent Labs     04/24/25  0858 04/26/25  0454   SODIUM 135 136   POTASSIUM 3.7 3.5*   CHLORIDE 95* 93*   CO2 28 32   GLUCOSE 122* 130*   BUN 27* 24*   CREATININE 0.91 0.87   CALCIUM 9.8 9.9                   Imaging  [id number] VIEW   Final Result      No evidence of bowel obstruction.                  DX-CHEST-LIMITED (1 VIEW)   Final Result         No acute cardiopulmonary abnormalities are identified.      [id number] VIEW   Final Result         No specific finding to suggest small bowel obstruction.      DX-CHEST-PORTABLE (1 VIEW)   Final Result      Hypoinflation with bibasilar atelectasis.      US-RENAL   Final Result      1.  No hydronephrosis.      DX-CHEST-PORTABLE (1 VIEW)   Final Result         1.  Left basilar atelectasis and/or subtle infiltrates, similar to prior study      DX-CHEST-PORTABLE (1 VIEW)   Final Result         1.  Left basilar atelectasis, no focal infiltrate   2.  Cardiomegaly      DX-G.I. TUBE INJECTION, ANY TYPE   Final Result      Contrast from a PEG tube injection extends into the stomach  without evidence of contrast leak.      CT-CHEST,ABDOMEN,PELVIS WITH   Final Result      1.  Large amount of free intraperitoneal air within the abdomen again seen as was previously identified on chest x-ray by review of the patient's chart, a percutaneous gastrostomy tube was placed on 3/31/2025 and is free intraperitoneal air is possibly    secondary to that recent procedure.      2.  Bibasal atelectasis and small bilateral pleural effusions.      3.  Tracheostomy tube present.      4.  No evidence of bowel obstruction or free fluid within the abdomen or pelvis.      DX-CHEST-PORTABLE (1 VIEW)   Final Result         1.  Bibasilar atelectasis   2.  Intra-abdominal free air, can be associated with history of percutaneous gastrostomy, consider other viscus perforation as warranted. Could be further evaluated with CT of the abdomen with contrast as clinically appropriate.      These findings were discussed with the patient's clinician, Bravo Lala Iv, on 4/2/2025 7:57 AM.      DX-CHEST-PORTABLE (1 VIEW)   Final Result      No evidence of acute cardiopulmonary process.      DX-ABDOMEN FOR TUBE PLACEMENT   Final Result      1.  Enteric tube extends in the fundus of stomach.      DX-ABDOMEN FOR TUBE PLACEMENT   Final Result      The gastric tube has been removed and replaced with a small bowel feeding tube which terminate in the proximal stomach.      DX-CHEST-PORTABLE (1 VIEW)   Final Result      Atelectasis within the left lung base.      MR-THORACIC SPINE-WITH & W/O   Final Result      1.  There is no abnormal intramedullary T2 signal intensity in the thoracic spinal cord.   2.  Mild degenerative disease at T8-9.   3.  Left lower segmental consolidation.      MR-CERVICAL SPINE-WITH & W/O   Final Result      1.  Small areas of acute infarcts in the lower jero and upper medulla involving both sides of the midline. There are chronic infarcts in the jero and right inferior cerebellum.   2.  There is no abnormal  intramedullary T2 signal intensity in the cervical spinal cord to suggest demyelinating lesions. There is no MR evidence of compressive myelopathy.   3.  Mild degenerative disease.            MR-BRAIN-WITH & W/O   Final Result      1.  Small areas of acute infarcts in the lower jero and upper medulla involving both sides of the midline.   2.  There are chronic infarcts in the jero and right inferior cerebellum. . There are areas of chronic lacunae in the bilateral basal ganglia, right thalamus and right periventricular white matter.   3.  There are nonspecific T2 hyperintensities in the periventricular white matter likely representing chronic small vessel disease.   4.  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      MR-LUMBAR SPINE-WITH & W/O   Final Result      1.  Unremarkable pre and postcontrast MR examination of the lumbar spine.   2.  Clinical suspicious for GBS: There is no abnormal enhancement of the lumbar nerve roots.      DX-ABDOMEN FOR TUBE PLACEMENT   Final Result      NG tube tip projects at the peripyloric region.      DX-CHEST-PORTABLE (1 VIEW)   Final Result      1.  Low lung volumes without definite acute cardiopulmonary abnormality.   2.  Support apparatus as above.      CT-HEAD W/O   Final Result      1.  No acute intracranial abnormality.   2.  Remote right cerebellar infarct.               DX-CHEST-PORTABLE (1 VIEW)   Final Result      No acute cardiopulmonary disease evident.      DX-CHEST-PORTABLE (1 VIEW)   Final Result      No acute cardiopulmonary disease evident.      CT-CEREBRAL PERFUSION ANALYSIS   Final Result      1. Cerebral blood flow less than 30% possibly representing completed infarct = 0 mL. Based on distribution of this finding, this is unlikely to represent artifact.      2. T Max more than 6 seconds possibly representing combination of completed infarct and ischemia = 7 mL. Based on the distribution of this finding,  "this is possibly artifact.      3. Mismatched volume possibly representing ischemic brain/penumbra= 0 mL      4.  Please note that this cerebral perfusion study and report is Quantitative and targets supratentorial (cerebral) perfusion for evaluation of large vessel territory acute ischemia/infarction. For example, lacunar infarcts, and brainstem/posterior fossa    ischemia/infarction are not evaluated on this study.  Data acquisition is subject to artifacts which can yield non-anatomically plausible perfusion maps which may be due to motion, bolus timing, signal to noise ratio, or other technical factors.    Perfusion map abnormalities which show non-anatomic distributions are likely artifact.   This study is not \"stand-alone\" and should only be utilized for diagnosis, management/treatment in correlation with CT, CTA, and/or MRI and clinical factors.         CT-CTA NECK WITH & W/O-POST PROCESSING   Final Result      CT angiogram of the neck within normal limits.      CT-CTA HEAD WITH & W/O-POST PROCESS   Final Result      CT angiogram of the [place] within normal limits.      CT-HEAD W/O   Final Result      Head CT without contrast within normal limits. No evidence of acute cerebral infarction, hemorrhage or mass lesion.                    Assessment/Plan  * Acute ischemic stroke (HCC)- (present on admission)  Assessment & Plan  Acute ischemic stroke  MRI of the brain revealed acute infarcts of the lower jero and upper medulla.  He effectively is a quadriplegic and required tracheostomy and PEG tube.  Aspirin and statin  Blood pressure control  Family hope for functional recovery    Hypokalemia  Assessment & Plan  Replace as needed    Emesis  Assessment & Plan  Tube feeds held   Continue Reglan  Patient has not had a bowel movement    Restart tube feeds at slow rate and monitor    Constipation  Assessment & Plan  KUB showed no obstruction  Bowel protocol    Improving    Hematuria  Assessment & " Plan  Darkened urine, no gross hematuria  No recent grove trauma  Renal US normal, no stones  Continue antibiotics for possible UTI given mild signs of infection on UA  If hematuria persists after keflex treatment, consider urology consult/referral    Proteus mirabilis pneumonia (HCC)- (present on admission)  Assessment & Plan  Treated with Zosyn de-escalated to Augmentin based on cultures from sputum    Acute neuromuscular respiratory failure (HCC)- (present on admission)  Assessment & Plan  Requiring tracheostomy    Anxiety  Assessment & Plan  Ativan for anxiety.     Type 2 diabetes mellitus with hyperglycemia, without long-term current use of insulin (HCC)- (present on admission)  Assessment & Plan  Details are unclear  Reportedly he is on metformin as an outpatient  Hemoglobin A1c 6.3  He does not require insulin    Primary hypertension- (present on admission)  Assessment & Plan  Blood pressure is appropriate with hydrochlorothiazide 25 mg daily and Norvasc 10 mg daily         VTE prophylaxis:    enoxaparin ppx    I have performed a physical exam and reviewed and updated ROS and Plan today (4/26/2025). In review of yesterday's note (4/25/2025), there are no changes except as documented above.

## 2025-04-27 LAB
ALBUMIN SERPL BCP-MCNC: 3.3 G/DL (ref 3.2–4.9)
BUN SERPL-MCNC: 24 MG/DL (ref 8–22)
CALCIUM ALBUM COR SERPL-MCNC: 9.7 MG/DL (ref 8.5–10.5)
CALCIUM SERPL-MCNC: 9.1 MG/DL (ref 8.5–10.5)
CHLORIDE SERPL-SCNC: 92 MMOL/L (ref 96–112)
CO2 SERPL-SCNC: 29 MMOL/L (ref 20–33)
CREAT SERPL-MCNC: 0.9 MG/DL (ref 0.5–1.4)
GFR SERPLBLD CREATININE-BSD FMLA CKD-EPI: 106 ML/MIN/1.73 M 2
GLUCOSE SERPL-MCNC: 124 MG/DL (ref 65–99)
MAGNESIUM SERPL-MCNC: 2.1 MG/DL (ref 1.5–2.5)
PHOSPHATE SERPL-MCNC: 3.5 MG/DL (ref 2.5–4.5)
POTASSIUM SERPL-SCNC: 3.5 MMOL/L (ref 3.6–5.5)
SODIUM SERPL-SCNC: 135 MMOL/L (ref 135–145)

## 2025-04-27 PROCEDURE — 80069 RENAL FUNCTION PANEL: CPT

## 2025-04-27 PROCEDURE — 700101 HCHG RX REV CODE 250: Performed by: STUDENT IN AN ORGANIZED HEALTH CARE EDUCATION/TRAINING PROGRAM

## 2025-04-27 PROCEDURE — 99232 SBSQ HOSP IP/OBS MODERATE 35: CPT | Performed by: INTERNAL MEDICINE

## 2025-04-27 PROCEDURE — 700111 HCHG RX REV CODE 636 W/ 250 OVERRIDE (IP): Mod: JZ | Performed by: INTERNAL MEDICINE

## 2025-04-27 PROCEDURE — 83735 ASSAY OF MAGNESIUM: CPT

## 2025-04-27 PROCEDURE — 700102 HCHG RX REV CODE 250 W/ 637 OVERRIDE(OP): Performed by: INTERNAL MEDICINE

## 2025-04-27 PROCEDURE — A9270 NON-COVERED ITEM OR SERVICE: HCPCS | Performed by: INTERNAL MEDICINE

## 2025-04-27 PROCEDURE — 94640 AIRWAY INHALATION TREATMENT: CPT

## 2025-04-27 PROCEDURE — 700102 HCHG RX REV CODE 250 W/ 637 OVERRIDE(OP): Performed by: STUDENT IN AN ORGANIZED HEALTH CARE EDUCATION/TRAINING PROGRAM

## 2025-04-27 PROCEDURE — 94669 MECHANICAL CHEST WALL OSCILL: CPT

## 2025-04-27 PROCEDURE — 770001 HCHG ROOM/CARE - MED/SURG/GYN PRIV*

## 2025-04-27 PROCEDURE — 700101 HCHG RX REV CODE 250: Performed by: INTERNAL MEDICINE

## 2025-04-27 PROCEDURE — 36415 COLL VENOUS BLD VENIPUNCTURE: CPT

## 2025-04-27 PROCEDURE — A9270 NON-COVERED ITEM OR SERVICE: HCPCS | Performed by: STUDENT IN AN ORGANIZED HEALTH CARE EDUCATION/TRAINING PROGRAM

## 2025-04-27 RX ADMIN — ENOXAPARIN SODIUM 40 MG: 100 INJECTION SUBCUTANEOUS at 18:25

## 2025-04-27 RX ADMIN — SENNOSIDES AND DOCUSATE SODIUM 2 TABLET: 50; 8.6 TABLET ORAL at 18:26

## 2025-04-27 RX ADMIN — AMLODIPINE BESYLATE 10 MG: 10 TABLET ORAL at 04:39

## 2025-04-27 RX ADMIN — GABAPENTIN 400 MG: 400 CAPSULE ORAL at 18:26

## 2025-04-27 RX ADMIN — POTASSIUM BICARBONATE 25 MEQ: 978 TABLET, EFFERVESCENT ORAL at 18:26

## 2025-04-27 RX ADMIN — ALBUTEROL SULFATE 2.5 MG: 2.5 SOLUTION RESPIRATORY (INHALATION) at 06:26

## 2025-04-27 RX ADMIN — ACETYLCYSTEINE 3 ML: 200 SOLUTION ORAL; RESPIRATORY (INHALATION) at 19:30

## 2025-04-27 RX ADMIN — ASPIRIN 81 MG: 81 TABLET, CHEWABLE ORAL at 04:39

## 2025-04-27 RX ADMIN — POLYETHYLENE GLYCOL 3350 1 PACKET: 17 POWDER, FOR SOLUTION ORAL at 04:39

## 2025-04-27 RX ADMIN — ALBUTEROL SULFATE 2.5 MG: 2.5 SOLUTION RESPIRATORY (INHALATION) at 19:29

## 2025-04-27 RX ADMIN — GLYCOPYRROLATE 2 MG: 1 TABLET ORAL at 13:20

## 2025-04-27 RX ADMIN — HYDROCHLOROTHIAZIDE 25 MG: 25 TABLET ORAL at 04:39

## 2025-04-27 RX ADMIN — GABAPENTIN 400 MG: 400 CAPSULE ORAL at 04:39

## 2025-04-27 RX ADMIN — GLYCOPYRROLATE 2 MG: 1 TABLET ORAL at 04:39

## 2025-04-27 RX ADMIN — Medication 5 MG: at 20:05

## 2025-04-27 RX ADMIN — GABAPENTIN 400 MG: 400 CAPSULE ORAL at 13:20

## 2025-04-27 RX ADMIN — ACETYLCYSTEINE 3 ML: 200 SOLUTION ORAL; RESPIRATORY (INHALATION) at 13:57

## 2025-04-27 RX ADMIN — ALBUTEROL SULFATE 2.5 MG: 2.5 SOLUTION RESPIRATORY (INHALATION) at 13:57

## 2025-04-27 RX ADMIN — ALBUTEROL SULFATE 2.5 MG: 2.5 SOLUTION RESPIRATORY (INHALATION) at 10:10

## 2025-04-27 RX ADMIN — ATORVASTATIN CALCIUM 80 MG: 80 TABLET, FILM COATED ORAL at 18:26

## 2025-04-27 RX ADMIN — ACETYLCYSTEINE 3 ML: 200 SOLUTION ORAL; RESPIRATORY (INHALATION) at 06:27

## 2025-04-27 RX ADMIN — ACETYLCYSTEINE 3 ML: 200 SOLUTION ORAL; RESPIRATORY (INHALATION) at 10:10

## 2025-04-27 RX ADMIN — SENNOSIDES AND DOCUSATE SODIUM 2 TABLET: 50; 8.6 TABLET ORAL at 04:39

## 2025-04-27 RX ADMIN — GLYCOPYRROLATE 2 MG: 1 TABLET ORAL at 18:26

## 2025-04-27 ASSESSMENT — PAIN DESCRIPTION - PAIN TYPE: TYPE: ACUTE PAIN

## 2025-04-27 ASSESSMENT — ENCOUNTER SYMPTOMS
ABDOMINAL PAIN: 0
NAUSEA: 0

## 2025-04-27 NOTE — PROGRESS NOTES
[facility] Medicine Daily Progress Note    Date of Service  4/27/2025    Chief Complaint  Jonna Brian is a 49 y.o. male admitted 3/25/2025 with headache and weakness.    Hospital Course  This is a 49 y.o. male who was initially admitted to the hospital on 3/25/2025 with global weakness, aphasia and ataxia.  His last known normal was 1630 on March 24.  He was initially admitted to the hospital floor, his CT head as well as CTA of the head and neck were within normal limits.  There was consideration for possible Guillain-Barré syndrome.     His condition continued to deteriorate and he underwent a lumbar puncture which revealed elevated protein but no evidence of infection.  He had progressive bulbar symptoms and weakness and was transferred to the ICU.  Unfortunately shortly after he arrived in the ICU he developed stridor and required emergent intubation on 3/27.       He then underwent an MRI of his brain and spine which unfortunately revealed small areas of acute infarcts in the lower jero and the upper medulla on both sides of the midline.  He also had chronic infarcts in his jero and right inferior cerebellum.  They were chronically Cuna in the bilateral basal ganglia, the right thalamus and the right periventricular white matter.  He had evidence of small vessel disease as well in the periventricular white matter.     Ultimately it appears he has been suffering multiple small strokes and his progressive decline was due to acute infarcts in the lower jero and upper medulla. Neurology recommended DAPT for 10 days with aspirin indefinitely.  Patient completed Plavix 4/4.  Since that time he has not recovered function, has required tracheostomy and PEG placement with the ultimate goal of transferring him to postacute medical to see what function may be regained in time.    Patient able to nod head, shakes head for yes/no, able to use alphabet sheet to make needs known. He continues to work with PT/OT/SLP  therapies with some minor signs of improvement. Paitent is medically cleared, plan for discharge to LTAC for ongoing management of tracheostomy tube, PEG tube, therapies.    Interval Problem Update  4/27 no acute changes  Potassium 3.5, replaced  Labs otherwise stable  Patient has no new complaints today.  Patient's family at bedside, discussed plan of care.  Will follow-up with case management tomorrow    I have discussed this patient's plan of care and discharge plan at IDT rounds today with Case Management, Nursing, Nursing leadership, and other members of the IDT team.    Consultants/Specialty  critical care     Code Status  Full Code    Disposition  The patient is medically cleared for discharge to home or a post-acute facility.  Anticipate discharge to: a long-term acute care hospital    I have placed the appropriate orders for post-discharge needs.    Review of Systems  Review of Systems   Unable to perform ROS: Patient nonverbal   HENT:  Positive for congestion.    Gastrointestinal:  Negative for abdominal pain and nausea.        Physical Exam  Temp:  [36.5 °C (97.7 °F)-36.9 °C (98.4 °F)] 36.7 °C (98.1 °F)  Pulse:  [67-82] 81  Resp:  [16-18] 18  BP: (117-125)/(70-85) 125/77  SpO2:  [95 %-100 %] 98 %    Physical Exam  Vitals and nursing note reviewed. Exam conducted with a chaperone present (Family at bedside).   Constitutional:       General: He is not in acute distress.     Appearance: He is ill-appearing.   Eyes:      Conjunctiva/sclera: Conjunctivae normal.   Neck:      Comments: Tracheostomy on Tpiece  Cardiovascular:      Rate and Rhythm: Normal rate and regular rhythm.   Pulmonary:      Effort: No respiratory distress.      Breath sounds: No wheezing.      Comments: On T-piece  Wet upper airway sounds  Abdominal:      General: There is no distension.      Palpations: Abdomen is soft.      Tenderness: There is no abdominal tenderness.      Comments: PEG   Skin:     General: Skin is warm and dry.    Neurological:      Mental Status: He is alert.      Motor: Weakness present.      Comments: He is able to slightly move his left upper and lower extremity.  Nodding yes and no appropriately to questions.  Follows commands   Psychiatric:         Mood and Affect: Mood is depressed.         Fluids    Intake/Output Summary (Last 24 hours) at 4/27/2025 1529  Last data filed at 4/27/2025 0625  Gross per 24 hour   Intake 1800 ml   Output 1750 ml   Net 50 ml        Laboratory  Recent Labs     04/26/25  0454   WBC 7.0   RBC 4.82   HEMOGLOBIN 12.9*   HEMATOCRIT 41.3*   MCV 85.7   MCH 26.8*   MCHC 31.2*   RDW 38.4   PLATELETCT 292   MPV 9.1     Recent Labs     04/26/25  0454 04/27/25  0844   SODIUM 136 135   POTASSIUM 3.5* 3.5*   CHLORIDE 93* 92*   CO2 32 29   GLUCOSE 130* 124*   BUN 24* 24*   CREATININE 0.87 0.90   CALCIUM 9.9 9.1                   Imaging  [id number] VIEW   Final Result      No evidence of bowel obstruction.                  DX-CHEST-LIMITED (1 VIEW)   Final Result         No acute cardiopulmonary abnormalities are identified.      [id number] VIEW   Final Result         No specific finding to suggest small bowel obstruction.      DX-CHEST-PORTABLE (1 VIEW)   Final Result      Hypoinflation with bibasilar atelectasis.      US-RENAL   Final Result      1.  No hydronephrosis.      DX-CHEST-PORTABLE (1 VIEW)   Final Result         1.  Left basilar atelectasis and/or subtle infiltrates, similar to prior study      DX-CHEST-PORTABLE (1 VIEW)   Final Result         1.  Left basilar atelectasis, no focal infiltrate   2.  Cardiomegaly      DX-G.I. TUBE INJECTION, ANY TYPE   Final Result      Contrast from a PEG tube injection extends into the stomach without evidence of contrast leak.      CT-CHEST,ABDOMEN,PELVIS WITH   Final Result      1.  Large amount of free intraperitoneal air within the abdomen again seen as was previously identified on chest x-ray by review of the patient's chart, a percutaneous  gastrostomy tube was placed on 3/31/2025 and is free intraperitoneal air is possibly    secondary to that recent procedure.      2.  Bibasal atelectasis and small bilateral pleural effusions.      3.  Tracheostomy tube present.      4.  No evidence of bowel obstruction or free fluid within the abdomen or pelvis.      DX-CHEST-PORTABLE (1 VIEW)   Final Result         1.  Bibasilar atelectasis   2.  Intra-abdominal free air, can be associated with history of percutaneous gastrostomy, consider other viscus perforation as warranted. Could be further evaluated with CT of the abdomen with contrast as clinically appropriate.      These findings were discussed with the patient's clinician, Bravo Lala Iv, on 4/2/2025 7:57 AM.      DX-CHEST-PORTABLE (1 VIEW)   Final Result      No evidence of acute cardiopulmonary process.      DX-ABDOMEN FOR TUBE PLACEMENT   Final Result      1.  Enteric tube extends in the fundus of stomach.      DX-ABDOMEN FOR TUBE PLACEMENT   Final Result      The gastric tube has been removed and replaced with a small bowel feeding tube which terminate in the proximal stomach.      DX-CHEST-PORTABLE (1 VIEW)   Final Result      Atelectasis within the left lung base.      MR-THORACIC SPINE-WITH & W/O   Final Result      1.  There is no abnormal intramedullary T2 signal intensity in the thoracic spinal cord.   2.  Mild degenerative disease at T8-9.   3.  Left lower segmental consolidation.      MR-CERVICAL SPINE-WITH & W/O   Final Result      1.  Small areas of acute infarcts in the lower jero and upper medulla involving both sides of the midline. There are chronic infarcts in the jero and right inferior cerebellum.   2.  There is no abnormal intramedullary T2 signal intensity in the cervical spinal cord to suggest demyelinating lesions. There is no MR evidence of compressive myelopathy.   3.  Mild degenerative disease.            MR-BRAIN-WITH & W/O   Final Result      1.  Small areas of acute  infarcts in the lower jero and upper medulla involving both sides of the midline.   2.  There are chronic infarcts in the jero and right inferior cerebellum. . There are areas of chronic lacunae in the bilateral basal ganglia, right thalamus and right periventricular white matter.   3.  There are nonspecific T2 hyperintensities in the periventricular white matter likely representing chronic small vessel disease.   4.  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      MR-LUMBAR SPINE-WITH & W/O   Final Result      1.  Unremarkable pre and postcontrast MR examination of the lumbar spine.   2.  Clinical suspicious for GBS: There is no abnormal enhancement of the lumbar nerve roots.      DX-ABDOMEN FOR TUBE PLACEMENT   Final Result      NG tube tip projects at the peripyloric region.      DX-CHEST-PORTABLE (1 VIEW)   Final Result      1.  Low lung volumes without definite acute cardiopulmonary abnormality.   2.  Support apparatus as above.      CT-HEAD W/O   Final Result      1.  No acute intracranial abnormality.   2.  Remote right cerebellar infarct.               DX-CHEST-PORTABLE (1 VIEW)   Final Result      No acute cardiopulmonary disease evident.      DX-CHEST-PORTABLE (1 VIEW)   Final Result      No acute cardiopulmonary disease evident.      CT-CEREBRAL PERFUSION ANALYSIS   Final Result      1. Cerebral blood flow less than 30% possibly representing completed infarct = 0 mL. Based on distribution of this finding, this is unlikely to represent artifact.      2. T Max more than 6 seconds possibly representing combination of completed infarct and ischemia = 7 mL. Based on the distribution of this finding, this is possibly artifact.      3. Mismatched volume possibly representing ischemic brain/penumbra= 0 mL      4.  Please note that this cerebral perfusion study and report is Quantitative and targets supratentorial (cerebral) perfusion for evaluation of  "large vessel territory acute ischemia/infarction. For example, lacunar infarcts, and brainstem/posterior fossa    ischemia/infarction are not evaluated on this study.  Data acquisition is subject to artifacts which can yield non-anatomically plausible perfusion maps which may be due to motion, bolus timing, signal to noise ratio, or other technical factors.    Perfusion map abnormalities which show non-anatomic distributions are likely artifact.   This study is not \"stand-alone\" and should only be utilized for diagnosis, management/treatment in correlation with CT, CTA, and/or MRI and clinical factors.         CT-CTA NECK WITH & W/O-POST PROCESSING   Final Result      CT angiogram of the neck within normal limits.      CT-CTA HEAD WITH & W/O-POST PROCESS   Final Result      CT angiogram of the [place] within normal limits.      CT-HEAD W/O   Final Result      Head CT without contrast within normal limits. No evidence of acute cerebral infarction, hemorrhage or mass lesion.                    Assessment/Plan  * Acute ischemic stroke (HCC)- (present on admission)  Assessment & Plan  Acute ischemic stroke  MRI of the brain revealed acute infarcts of the lower jero and upper medulla.  He effectively is a quadriplegic and required tracheostomy and PEG tube.  Aspirin and statin  Blood pressure control  Family hope for functional recovery    Hypokalemia  Assessment & Plan  Replace as needed    Emesis  Assessment & Plan  Tube feeds held   Continue Reglan  Patient had not had a bowel movement    Patient now tolerating tube feeds  Resolved    Constipation  Assessment & Plan  KUB showed no obstruction  Bowel protocol    Improving    Hematuria  Assessment & Plan  Darkened urine, no gross hematuria  No recent grove trauma  Renal US normal, no stones  Continue antibiotics for possible UTI given mild signs of infection on UA  If hematuria persists after keflex treatment, consider urology consult/referral    Proteus " mirabilis pneumonia (HCC)- (present on admission)  Assessment & Plan  Treated with Zosyn de-escalated to Augmentin based on cultures from sputum    Acute neuromuscular respiratory failure (HCC)- (present on admission)  Assessment & Plan  Requiring tracheostomy    Anxiety  Assessment & Plan  Ativan for anxiety.     Type 2 diabetes mellitus with hyperglycemia, without long-term current use of insulin (HCC)- (present on admission)  Assessment & Plan  Details are unclear  Reportedly he is on metformin as an outpatient  Hemoglobin A1c 6.3  He does not require insulin    Primary hypertension- (present on admission)  Assessment & Plan  Blood pressure is appropriate with hydrochlorothiazide 25 mg daily and Norvasc 10 mg daily         VTE prophylaxis:    enoxaparin ppx    I have performed a physical exam and reviewed and updated ROS and Plan today (4/27/2025). In review of yesterday's note (4/26/2025), there are no changes except as documented above.

## 2025-04-27 NOTE — CARE PLAN
The patient is Stable - Low risk of patient condition declining or worsening    Shift Goals  Clinical Goals: pulmonary hygiene, stable neuro status, maintain skin integrity  Patient Goals: rest  Family Goals: pt comfort and updates    Progress made toward(s) clinical / shift goals:    Problem: Knowledge Deficit - Standard  Goal: Patient and family/care givers will demonstrate understanding of plan of care, disease process/condition, diagnostic tests and medications  Outcome: Progressing     Problem: Pain - Standard  Goal: Alleviation of pain or a reduction in pain to the patient’s comfort goal  Outcome: Progressing     Problem: Optimal Care of the Stroke Patient  Goal: Optimal emergency care for the stroke patient  Outcome: Progressing     Problem: Knowledge Deficit - Stroke Education  Goal: Patient's knowledge of stroke and risk factors will improve  Outcome: Progressing     Problem: Respiratory - Stroke Patient  Goal: Patient will achieve/maintain optimum respiratory rate/effort  Outcome: Progressing     Problem: Mobility - Stroke  Goal: Patient's capacity to carry out activities will improve  Outcome: Progressing       Patient is not progressing towards the following goals:

## 2025-04-27 NOTE — CARE PLAN
The patient is Stable - Low risk of patient condition declining or worsening    Shift Goals  Clinical Goals: pulmonary hygiene, stable neuro status, maintain skin integrity  Patient Goals: rest  Family Goals: updates    Progress made toward(s) clinical / shift goals:    Problem: Neuro Status  Goal: Neuro status will remain stable or improve  Outcome: Progressing  Note:   1.  Assess and monitor neurologic status per provider order/protocol/unit policy  2.  Assess level of consciousness and orientation  3.  Assess for speech, dysarthria, dysphagia, facial symmetry  4.  Assess visual field, eye movements, gaze preference, pupil reaction and size  5.  Assess muscle strength and motor response in all four extremities  6.  Assess for sensation (numbness and tingling)  7.  Assess basic neuro reflexes (cough, gag, corneal)  8.  Identify changes in neuro status and report to provider for testing/treatment orders     Problem: Hemodynamic Monitoring  Goal: Patient's hemodynamics, fluid balance and neurologic status will be stable or improve  Outcome: Progressing  Note:   Vital signs, pulse oximetry and cardiac monitor per provider order and/or policy     Problem: Respiratory - Stroke Patient  Goal: Patient will achieve/maintain optimum respiratory rate/effort  Outcome: Progressing  Note:   1.  Assess and monitor respiratory rate, rhythm, depth and effort of respiration  2.  Oxygenation assessed throughout shift (recommendation of >94% for new stroke patients)  3.  Oxygen administered and/or titrated per order  4.  Collaboration with RT to administer medication/treatments per order  5.  Patient educated on importance of turning, coughing, and deep breathing  6.  Patient positioned for maximum ventilatory efficiency  7.  Airway suctioning provided as needed     Problem: Skin Integrity  Goal: Skin integrity is maintained or improved  Outcome: Progressing  Note:   1.  Assess and monitor skin integrity, appearance and/or  temperature  2.  Assess risk factors for impaired skin integrity and/or pressures ulcers  3.  Implement precautions to protect skin integrity in collaboration with interdisciplinary team  4.  Implement pressure ulcer prevention protocol if at risk for skin breakdown  5.  Confirm wound care consult if at risk for skin breakdown  6.  Ensure patient use of pressure relieving devices  (Low air loss bed, waffle overlay, heel protectors, ROHO cushion, etc)       Patient is not progressing towards the following goals: N/A

## 2025-04-27 NOTE — CARE PLAN
The patient is Stable - Low risk of patient condition declining or worsening    Shift Goals  Clinical Goals: oral care, maintain skin integrity  Patient Goals: rest, comfort  Family Goals: updates, comfort    Progress made toward(s) clinical / shift goals:    Problem: Knowledge Deficit - Standard  Goal: Patient and family/care givers will demonstrate understanding of plan of care, disease process/condition, diagnostic tests and medications  Outcome: Progressing     Problem: Neuro Status  Goal: Neuro status will remain stable or improve  Outcome: Progressing     Problem: Respiratory - Stroke Patient  Goal: Patient will achieve/maintain optimum respiratory rate/effort  Outcome: Progressing     Problem: Risk for Aspiration  Goal: Patient's risk for aspiration will be absent or decrease  Outcome: Progressing     Problem: Skin Integrity  Goal: Skin integrity is maintained or improved  Outcome: Progressing       Patient is not progressing towards the following goals:

## 2025-04-28 LAB
ALBUMIN SERPL BCP-MCNC: 3.2 G/DL (ref 3.2–4.9)
ALBUMIN/GLOB SERPL: 0.8 G/DL
ALP SERPL-CCNC: 67 U/L (ref 30–99)
ALT SERPL-CCNC: 29 U/L (ref 2–50)
ANION GAP SERPL CALC-SCNC: 13 MMOL/L (ref 7–16)
AST SERPL-CCNC: 25 U/L (ref 12–45)
BILIRUB SERPL-MCNC: <0.2 MG/DL (ref 0.1–1.5)
BUN SERPL-MCNC: 21 MG/DL (ref 8–22)
CALCIUM ALBUM COR SERPL-MCNC: 10 MG/DL (ref 8.5–10.5)
CALCIUM SERPL-MCNC: 9.4 MG/DL (ref 8.5–10.5)
CHLORIDE SERPL-SCNC: 93 MMOL/L (ref 96–112)
CO2 SERPL-SCNC: 28 MMOL/L (ref 20–33)
CREAT SERPL-MCNC: 0.87 MG/DL (ref 0.5–1.4)
CRP SERPL HS-MCNC: 2.19 MG/DL (ref 0–0.75)
GFR SERPLBLD CREATININE-BSD FMLA CKD-EPI: 107 ML/MIN/1.73 M 2
GLOBULIN SER CALC-MCNC: 3.8 G/DL (ref 1.9–3.5)
GLUCOSE SERPL-MCNC: 129 MG/DL (ref 65–99)
MAGNESIUM SERPL-MCNC: 2 MG/DL (ref 1.5–2.5)
POTASSIUM SERPL-SCNC: 3.7 MMOL/L (ref 3.6–5.5)
PREALB SERPL-MCNC: 24.3 MG/DL (ref 18–38)
PROT SERPL-MCNC: 7 G/DL (ref 6–8.2)
SODIUM SERPL-SCNC: 134 MMOL/L (ref 135–145)

## 2025-04-28 PROCEDURE — 700101 HCHG RX REV CODE 250: Performed by: INTERNAL MEDICINE

## 2025-04-28 PROCEDURE — 94669 MECHANICAL CHEST WALL OSCILL: CPT

## 2025-04-28 PROCEDURE — A9270 NON-COVERED ITEM OR SERVICE: HCPCS | Performed by: INTERNAL MEDICINE

## 2025-04-28 PROCEDURE — 80053 COMPREHEN METABOLIC PANEL: CPT

## 2025-04-28 PROCEDURE — 97530 THERAPEUTIC ACTIVITIES: CPT

## 2025-04-28 PROCEDURE — 84134 ASSAY OF PREALBUMIN: CPT

## 2025-04-28 PROCEDURE — 700111 HCHG RX REV CODE 636 W/ 250 OVERRIDE (IP): Mod: JZ | Performed by: INTERNAL MEDICINE

## 2025-04-28 PROCEDURE — A9270 NON-COVERED ITEM OR SERVICE: HCPCS | Performed by: STUDENT IN AN ORGANIZED HEALTH CARE EDUCATION/TRAINING PROGRAM

## 2025-04-28 PROCEDURE — 94640 AIRWAY INHALATION TREATMENT: CPT

## 2025-04-28 PROCEDURE — 83735 ASSAY OF MAGNESIUM: CPT

## 2025-04-28 PROCEDURE — 700101 HCHG RX REV CODE 250: Performed by: STUDENT IN AN ORGANIZED HEALTH CARE EDUCATION/TRAINING PROGRAM

## 2025-04-28 PROCEDURE — 86140 C-REACTIVE PROTEIN: CPT

## 2025-04-28 PROCEDURE — 770001 HCHG ROOM/CARE - MED/SURG/GYN PRIV*

## 2025-04-28 PROCEDURE — 700102 HCHG RX REV CODE 250 W/ 637 OVERRIDE(OP): Performed by: INTERNAL MEDICINE

## 2025-04-28 PROCEDURE — 99232 SBSQ HOSP IP/OBS MODERATE 35: CPT | Performed by: INTERNAL MEDICINE

## 2025-04-28 PROCEDURE — 36415 COLL VENOUS BLD VENIPUNCTURE: CPT

## 2025-04-28 PROCEDURE — 700102 HCHG RX REV CODE 250 W/ 637 OVERRIDE(OP): Performed by: STUDENT IN AN ORGANIZED HEALTH CARE EDUCATION/TRAINING PROGRAM

## 2025-04-28 RX ADMIN — AMLODIPINE BESYLATE 10 MG: 10 TABLET ORAL at 05:17

## 2025-04-28 RX ADMIN — ASPIRIN 81 MG: 81 TABLET, CHEWABLE ORAL at 05:17

## 2025-04-28 RX ADMIN — ALBUTEROL SULFATE 2.5 MG: 2.5 SOLUTION RESPIRATORY (INHALATION) at 06:25

## 2025-04-28 RX ADMIN — POTASSIUM BICARBONATE 25 MEQ: 978 TABLET, EFFERVESCENT ORAL at 18:34

## 2025-04-28 RX ADMIN — ACETYLCYSTEINE 3 ML: 200 SOLUTION ORAL; RESPIRATORY (INHALATION) at 09:53

## 2025-04-28 RX ADMIN — GABAPENTIN 400 MG: 400 CAPSULE ORAL at 12:58

## 2025-04-28 RX ADMIN — ACETYLCYSTEINE 3 ML: 200 SOLUTION ORAL; RESPIRATORY (INHALATION) at 19:31

## 2025-04-28 RX ADMIN — GABAPENTIN 400 MG: 400 CAPSULE ORAL at 05:17

## 2025-04-28 RX ADMIN — POTASSIUM BICARBONATE 25 MEQ: 978 TABLET, EFFERVESCENT ORAL at 05:18

## 2025-04-28 RX ADMIN — SENNOSIDES AND DOCUSATE SODIUM 2 TABLET: 50; 8.6 TABLET ORAL at 05:17

## 2025-04-28 RX ADMIN — ALBUTEROL SULFATE 2.5 MG: 2.5 SOLUTION RESPIRATORY (INHALATION) at 19:31

## 2025-04-28 RX ADMIN — GLYCOPYRROLATE 2 MG: 1 TABLET ORAL at 18:38

## 2025-04-28 RX ADMIN — HYDROCHLOROTHIAZIDE 25 MG: 25 TABLET ORAL at 05:18

## 2025-04-28 RX ADMIN — GLYCOPYRROLATE 2 MG: 1 TABLET ORAL at 12:58

## 2025-04-28 RX ADMIN — ENOXAPARIN SODIUM 40 MG: 100 INJECTION SUBCUTANEOUS at 18:34

## 2025-04-28 RX ADMIN — ACETYLCYSTEINE 3 ML: 200 SOLUTION ORAL; RESPIRATORY (INHALATION) at 06:25

## 2025-04-28 RX ADMIN — ALBUTEROL SULFATE 2.5 MG: 2.5 SOLUTION RESPIRATORY (INHALATION) at 09:53

## 2025-04-28 RX ADMIN — Medication 5 MG: at 21:12

## 2025-04-28 RX ADMIN — GLYCOPYRROLATE 2 MG: 1 TABLET ORAL at 05:17

## 2025-04-28 RX ADMIN — SENNOSIDES AND DOCUSATE SODIUM 2 TABLET: 50; 8.6 TABLET ORAL at 18:34

## 2025-04-28 RX ADMIN — ACETYLCYSTEINE 3 ML: 200 SOLUTION ORAL; RESPIRATORY (INHALATION) at 13:47

## 2025-04-28 RX ADMIN — GABAPENTIN 400 MG: 400 CAPSULE ORAL at 18:34

## 2025-04-28 RX ADMIN — ATORVASTATIN CALCIUM 80 MG: 80 TABLET, FILM COATED ORAL at 18:34

## 2025-04-28 RX ADMIN — ALBUTEROL SULFATE 2.5 MG: 2.5 SOLUTION RESPIRATORY (INHALATION) at 13:47

## 2025-04-28 ASSESSMENT — COGNITIVE AND FUNCTIONAL STATUS - GENERAL
MOBILITY SCORE: 6
TURNING FROM BACK TO SIDE WHILE IN FLAT BAD: TOTAL
SUGGESTED CMS G CODE MODIFIER MOBILITY: CN
STANDING UP FROM CHAIR USING ARMS: TOTAL
WALKING IN HOSPITAL ROOM: TOTAL
MOVING FROM LYING ON BACK TO SITTING ON SIDE OF FLAT BED: TOTAL
CLIMB 3 TO 5 STEPS WITH RAILING: TOTAL
MOVING TO AND FROM BED TO CHAIR: TOTAL

## 2025-04-28 ASSESSMENT — GAIT ASSESSMENTS: GAIT LEVEL OF ASSIST: UNABLE TO PARTICIPATE

## 2025-04-28 ASSESSMENT — ENCOUNTER SYMPTOMS
WEAKNESS: 1
FOCAL WEAKNESS: 1
ABDOMINAL PAIN: 0
SHORTNESS OF BREATH: 0
COUGH: 1

## 2025-04-28 ASSESSMENT — PAIN DESCRIPTION - PAIN TYPE: TYPE: ACUTE PAIN

## 2025-04-28 NOTE — DISCHARGE PLANNING
Medical Social Work  Voice message from patient's mother requesting a referral be sent to [facility].  That she has spoken to them and they are requesting a new referral.    Stating that the patient is only laying in bed, that he longer he lays in bed the worse he will get.  Saying her son was a very active person.    Teams to [facility] requesting a referral be sent to [facility]

## 2025-04-28 NOTE — CARE PLAN
Problem: Neuro Status  Goal: Neuro status will remain stable or improve  Description: Target End Date:  Prior to discharge or change in level of careDocument on Neuro assessment in the Assessment flowsheet1.  Assess and monitor neurologic status per provider order/protocol/unit policy2.  Assess level of consciousness and orientation3.  Assess for speech, dysarthria, dysphagia, facial symmetry4.  Assess visual field, eye movements, gaze preference, pupil reaction and size5.  Assess muscle strength and motor response in all four extremities6.  Assess for sensation (numbness and tingling)7.  Assess basic neuro reflexes (cough, gag, corneal)8.  Identify changes in neuro status and report to provider for testing/treatment orders  Outcome: Progressing     Problem: Hemodynamic Monitoring  Goal: Patient's hemodynamics, fluid balance and neurologic status will be stable or improve  Description: Target End Date:  Prior to discharge or change in level of care1.  Vital signs, pulse oximetry and cardiac monitor per provider order and/or policy2.  Frequent pulse checks performed post thrombectomy3.  Frequent monitoring for signs of bleeding post TPA administration4.  Proper management of IV infusions5.  Intake and output monitored per provider order6.  Daily weight obtained per unit policy or provider order7.  Peripheral pulses and capillary refill assessed as needed8.  Monitor for signs/symptoms of excessive bleeding9.  Body temperature assessed and fevers [id number]. Patient positioned for maximum circulation/cardiac output  Outcome: Progressing     Problem: Respiratory - Stroke Patient  Goal: Patient will achieve/maintain optimum respiratory rate/effort  Description: Target End Date:  Prior to discharge or change in level of careDocument on Assessment flowsheet1.  Assess and monitor respiratory rate, rhythm, depth and effort of respiration2.  Oxygenation assessed throughout shift (recommendation of >94% for new stroke  patients)3.  Oxygen administered and/or titrated per order4.  Collaboration with RT to administer medication/treatments per order5.  Patient educated on importance of turning, coughing, and deep breathing6.  Patient positioned for maximum ventilatory efficiency7.  Airway suctioning provided as needed8.  Incentive spirometry encouraged 5-10 times every hour or while awake  Outcome: Progressing     Problem: Fall Risk  Goal: Patient will remain free from falls  Description: Target End Date:  Prior to discharge or change in level of careDocument interventions on the [place] Fall Risk Assessment1.  Assess for fall risk factors2.  Implement fall precautions  Outcome: Progressing   The patient is Stable - Low risk of patient condition declining or worsening    Shift Goals  Clinical Goals: maintain neruo status, trach/oral care, safety  Patient Goals: comfort  Family Goals: updates, comfort    Progress made toward(s) clinical / shift goals:  Pt shows no pain throughout shift. Fall risk precautions in place, remaining free from falls. Oral care done, trach care done. Q2 turns in place. Pt resting throughout shift.    Patient is not progressing towards the following goals:

## 2025-04-28 NOTE — PROGRESS NOTES
[facility] Medicine Daily Progress Note    Date of Service  4/28/2025    Chief Complaint  Jonna Brian is a 49 y.o. male admitted 3/25/2025 with headache and weakness.    Hospital Course  This is a 49 y.o. male who was initially admitted to the hospital on 3/25/2025 with global weakness, aphasia and ataxia.  His last known normal was 1630 on March 24.  He was initially admitted to the hospital floor, his CT head as well as CTA of the head and neck were within normal limits.  There was consideration for possible Guillain-Barré syndrome.     His condition continued to deteriorate and he underwent a lumbar puncture which revealed elevated protein but no evidence of infection.  He had progressive bulbar symptoms and weakness and was transferred to the ICU.  Unfortunately shortly after he arrived in the ICU he developed stridor and required emergent intubation on 3/27.       He then underwent an MRI of his brain and spine which unfortunately revealed small areas of acute infarcts in the lower jero and the upper medulla on both sides of the midline.  He also had chronic infarcts in his jero and right inferior cerebellum.  They were chronically Cuna in the bilateral basal ganglia, the right thalamus and the right periventricular white matter.  He had evidence of small vessel disease as well in the periventricular white matter.     Ultimately it appears he has been suffering multiple small strokes and his progressive decline was due to acute infarcts in the lower jero and upper medulla. Neurology recommended DAPT for 10 days with aspirin indefinitely.  Patient completed Plavix 4/4.  Since that time he has not recovered function, has required tracheostomy and PEG placement with the ultimate goal of transferring him to postacute medical to see what function may be regained in time.    Patient able to nod head, shakes head for yes/no, able to use alphabet sheet to make needs known. He continues to work with PT/OT/SLP  therapies with some minor signs of improvement. Veratent is medically cleared, plan for discharge to LTAC for ongoing management of tracheostomy tube, PEG tube, therapies.    Interval Problem Update  4/28 BP stable satting 100% on 4 L  Electrolytes stable  Sodium 134, decrease free water flushes to 100 mL every 4 hours  Has been tolerating speaking valve for about 10 to 15 minutes, will try again this afternoon  Pending placement    I have discussed this patient's plan of care and discharge plan at IDT rounds today with Case Management, Nursing, Nursing leadership, and other members of the IDT team.    Consultants/Specialty  critical care     Code Status  Full Code    Disposition  The patient is medically cleared for discharge to home or a post-acute facility.  Anticipate discharge to: a long-term acute care hospital    I have placed the appropriate orders for post-discharge needs.    Review of Systems  Review of Systems   Unable to perform ROS: Patient nonverbal   Constitutional:  Positive for malaise/fatigue.   HENT:  Positive for congestion.         Excessive secretions    Respiratory:  Positive for cough. Negative for shortness of breath.    Gastrointestinal:  Negative for abdominal pain.   Neurological:  Positive for focal weakness and weakness.        Physical Exam  Temp:  [36.5 °C (97.7 °F)-36.9 °C (98.4 °F)] 36.8 °C (98.2 °F)  Pulse:  [72-81] 73  Resp:  [16-18] 18  BP: (112-146)/(66-82) 113/67  SpO2:  [94 %-100 %] 97 %    Physical Exam  Vitals and nursing note reviewed.   Constitutional:       General: He is not in acute distress.     Appearance: He is ill-appearing.      Comments: Resting comfortably    Eyes:      Conjunctiva/sclera: Conjunctivae normal.   Neck:      Comments: Tracheostomy on Tpiece  Cardiovascular:      Rate and Rhythm: Normal rate and regular rhythm.   Pulmonary:      Effort: No respiratory distress.      Breath sounds: No wheezing.      Comments: On T-piece  Wet upper airway  sounds  Abdominal:      General: There is no distension.      Palpations: Abdomen is soft.      Tenderness: There is no abdominal tenderness.      Comments: PEG   Skin:     General: Skin is warm and dry.   Neurological:      Motor: Weakness present.      Comments: He is able to slightly move his left upper and lower extremity.  Nodding yes and no appropriately to questions.  Follows commands   Psychiatric:         Mood and Affect: Mood is depressed.         Fluids    Intake/Output Summary (Last 24 hours) at 4/28/2025 1557  Last data filed at 4/28/2025 0413  Gross per 24 hour   Intake 600 ml   Output --   Net 600 ml        Laboratory  Recent Labs     04/26/25  0454   WBC 7.0   RBC 4.82   HEMOGLOBIN 12.9*   HEMATOCRIT 41.3*   MCV 85.7   MCH 26.8*   MCHC 31.2*   RDW 38.4   PLATELETCT 292   MPV 9.1     Recent Labs     04/26/25  0454 04/27/25  0844 04/28/25  0137   SODIUM 136 135 134*   POTASSIUM 3.5* 3.5* 3.7   CHLORIDE 93* 92* 93*   CO2 32 29 28   GLUCOSE 130* 124* 129*   BUN 24* 24* 21   CREATININE 0.87 0.90 0.87   CALCIUM 9.9 9.1 9.4                   Imaging  [id number] VIEW   Final Result      No evidence of bowel obstruction.                  DX-CHEST-LIMITED (1 VIEW)   Final Result         No acute cardiopulmonary abnormalities are identified.      [id number] VIEW   Final Result         No specific finding to suggest small bowel obstruction.      DX-CHEST-PORTABLE (1 VIEW)   Final Result      Hypoinflation with bibasilar atelectasis.      US-RENAL   Final Result      1.  No hydronephrosis.      DX-CHEST-PORTABLE (1 VIEW)   Final Result         1.  Left basilar atelectasis and/or subtle infiltrates, similar to prior study      DX-CHEST-PORTABLE (1 VIEW)   Final Result         1.  Left basilar atelectasis, no focal infiltrate   2.  Cardiomegaly      DX-G.I. TUBE INJECTION, ANY TYPE   Final Result      Contrast from a PEG tube injection extends into the stomach without evidence of contrast leak.       CT-CHEST,ABDOMEN,PELVIS WITH   Final Result      1.  Large amount of free intraperitoneal air within the abdomen again seen as was previously identified on chest x-ray by review of the patient's chart, a percutaneous gastrostomy tube was placed on 3/31/2025 and is free intraperitoneal air is possibly    secondary to that recent procedure.      2.  Bibasal atelectasis and small bilateral pleural effusions.      3.  Tracheostomy tube present.      4.  No evidence of bowel obstruction or free fluid within the abdomen or pelvis.      DX-CHEST-PORTABLE (1 VIEW)   Final Result         1.  Bibasilar atelectasis   2.  Intra-abdominal free air, can be associated with history of percutaneous gastrostomy, consider other viscus perforation as warranted. Could be further evaluated with CT of the abdomen with contrast as clinically appropriate.      These findings were discussed with the patient's clinician, Bravo Lala Iv, on 4/2/2025 7:57 AM.      DX-CHEST-PORTABLE (1 VIEW)   Final Result      No evidence of acute cardiopulmonary process.      DX-ABDOMEN FOR TUBE PLACEMENT   Final Result      1.  Enteric tube extends in the fundus of stomach.      DX-ABDOMEN FOR TUBE PLACEMENT   Final Result      The gastric tube has been removed and replaced with a small bowel feeding tube which terminate in the proximal stomach.      DX-CHEST-PORTABLE (1 VIEW)   Final Result      Atelectasis within the left lung base.      MR-THORACIC SPINE-WITH & W/O   Final Result      1.  There is no abnormal intramedullary T2 signal intensity in the thoracic spinal cord.   2.  Mild degenerative disease at T8-9.   3.  Left lower segmental consolidation.      MR-CERVICAL SPINE-WITH & W/O   Final Result      1.  Small areas of acute infarcts in the lower jero and upper medulla involving both sides of the midline. There are chronic infarcts in the jero and right inferior cerebellum.   2.  There is no abnormal intramedullary T2 signal intensity in the  cervical spinal cord to suggest demyelinating lesions. There is no MR evidence of compressive myelopathy.   3.  Mild degenerative disease.            MR-BRAIN-WITH & W/O   Final Result      1.  Small areas of acute infarcts in the lower jero and upper medulla involving both sides of the midline.   2.  There are chronic infarcts in the jero and right inferior cerebellum. . There are areas of chronic lacunae in the bilateral basal ganglia, right thalamus and right periventricular white matter.   3.  There are nonspecific T2 hyperintensities in the periventricular white matter likely representing chronic small vessel disease.   4.  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      MR-LUMBAR SPINE-WITH & W/O   Final Result      1.  Unremarkable pre and postcontrast MR examination of the lumbar spine.   2.  Clinical suspicious for GBS: There is no abnormal enhancement of the lumbar nerve roots.      DX-ABDOMEN FOR TUBE PLACEMENT   Final Result      NG tube tip projects at the peripyloric region.      DX-CHEST-PORTABLE (1 VIEW)   Final Result      1.  Low lung volumes without definite acute cardiopulmonary abnormality.   2.  Support apparatus as above.      CT-HEAD W/O   Final Result      1.  No acute intracranial abnormality.   2.  Remote right cerebellar infarct.               DX-CHEST-PORTABLE (1 VIEW)   Final Result      No acute cardiopulmonary disease evident.      DX-CHEST-PORTABLE (1 VIEW)   Final Result      No acute cardiopulmonary disease evident.      CT-CEREBRAL PERFUSION ANALYSIS   Final Result      1. Cerebral blood flow less than 30% possibly representing completed infarct = 0 mL. Based on distribution of this finding, this is unlikely to represent artifact.      2. T Max more than 6 seconds possibly representing combination of completed infarct and ischemia = 7 mL. Based on the distribution of this finding, this is possibly artifact.      3.  "Mismatched volume possibly representing ischemic brain/penumbra= 0 mL      4.  Please note that this cerebral perfusion study and report is Quantitative and targets supratentorial (cerebral) perfusion for evaluation of large vessel territory acute ischemia/infarction. For example, lacunar infarcts, and brainstem/posterior fossa    ischemia/infarction are not evaluated on this study.  Data acquisition is subject to artifacts which can yield non-anatomically plausible perfusion maps which may be due to motion, bolus timing, signal to noise ratio, or other technical factors.    Perfusion map abnormalities which show non-anatomic distributions are likely artifact.   This study is not \"stand-alone\" and should only be utilized for diagnosis, management/treatment in correlation with CT, CTA, and/or MRI and clinical factors.         CT-CTA NECK WITH & W/O-POST PROCESSING   Final Result      CT angiogram of the neck within normal limits.      CT-CTA HEAD WITH & W/O-POST PROCESS   Final Result      CT angiogram of the [place] within normal limits.      CT-HEAD W/O   Final Result      Head CT without contrast within normal limits. No evidence of acute cerebral infarction, hemorrhage or mass lesion.                    Assessment/Plan  * Acute ischemic stroke (HCC)- (present on admission)  Assessment & Plan  Acute ischemic stroke  MRI of the brain revealed acute infarcts of the lower jero and upper medulla.  He effectively is a quadriplegic and required tracheostomy and PEG tube.  Aspirin and statin  Blood pressure control  Family hope for functional recovery    Hypokalemia  Assessment & Plan  Replace as needed    Emesis  Assessment & Plan  Tube feeds held   Continue Reglan  Patient had not had a bowel movement    Patient now tolerating tube feeds  Resolved    Constipation  Assessment & Plan  KUB showed no obstruction  Bowel protocol    Improving    Hematuria  Assessment & Plan  Darkened urine, no gross hematuria  No " recent grove trauma  Renal US normal, no stones  Continue antibiotics for possible UTI given mild signs of infection on UA  If hematuria persists after keflex treatment, consider urology consult/referral    Proteus mirabilis pneumonia (HCC)- (present on admission)  Assessment & Plan  Treated with Zosyn de-escalated to Augmentin based on cultures from sputum    Acute neuromuscular respiratory failure (HCC)- (present on admission)  Assessment & Plan  Requiring tracheostomy    Anxiety  Assessment & Plan  Ativan for anxiety.     Type 2 diabetes mellitus with hyperglycemia, without long-term current use of insulin (HCC)- (present on admission)  Assessment & Plan  Details are unclear  Reportedly he is on metformin as an outpatient  Hemoglobin A1c 6.3  He does not require insulin    Primary hypertension- (present on admission)  Assessment & Plan  Blood pressure is appropriate with hydrochlorothiazide 25 mg daily and Norvasc 10 mg daily         VTE prophylaxis:    enoxaparin ppx    I have performed a physical exam and reviewed and updated ROS and Plan today (4/28/2025). In review of yesterday's note (4/27/2025), there are no changes except as documented above.

## 2025-04-29 PROCEDURE — 92507 TX SP LANG VOICE COMM INDIV: CPT

## 2025-04-29 PROCEDURE — A9270 NON-COVERED ITEM OR SERVICE: HCPCS | Performed by: INTERNAL MEDICINE

## 2025-04-29 PROCEDURE — 700111 HCHG RX REV CODE 636 W/ 250 OVERRIDE (IP): Mod: JZ | Performed by: INTERNAL MEDICINE

## 2025-04-29 PROCEDURE — 99232 SBSQ HOSP IP/OBS MODERATE 35: CPT | Performed by: INTERNAL MEDICINE

## 2025-04-29 PROCEDURE — 700102 HCHG RX REV CODE 250 W/ 637 OVERRIDE(OP): Performed by: INTERNAL MEDICINE

## 2025-04-29 PROCEDURE — 94669 MECHANICAL CHEST WALL OSCILL: CPT

## 2025-04-29 PROCEDURE — A9270 NON-COVERED ITEM OR SERVICE: HCPCS | Performed by: STUDENT IN AN ORGANIZED HEALTH CARE EDUCATION/TRAINING PROGRAM

## 2025-04-29 PROCEDURE — 94640 AIRWAY INHALATION TREATMENT: CPT

## 2025-04-29 PROCEDURE — 700101 HCHG RX REV CODE 250: Performed by: STUDENT IN AN ORGANIZED HEALTH CARE EDUCATION/TRAINING PROGRAM

## 2025-04-29 PROCEDURE — 770001 HCHG ROOM/CARE - MED/SURG/GYN PRIV*

## 2025-04-29 PROCEDURE — 700102 HCHG RX REV CODE 250 W/ 637 OVERRIDE(OP): Performed by: STUDENT IN AN ORGANIZED HEALTH CARE EDUCATION/TRAINING PROGRAM

## 2025-04-29 PROCEDURE — 700101 HCHG RX REV CODE 250: Performed by: INTERNAL MEDICINE

## 2025-04-29 RX ORDER — ACETAMINOPHEN 325 MG/1
650 TABLET ORAL EVERY 6 HOURS PRN
Status: DISCONTINUED | OUTPATIENT
Start: 2025-04-29 | End: 2025-05-13 | Stop reason: HOSPADM

## 2025-04-29 RX ORDER — OXYCODONE HYDROCHLORIDE 5 MG/1
5 TABLET ORAL EVERY 4 HOURS PRN
Refills: 0 | Status: DISCONTINUED | OUTPATIENT
Start: 2025-04-29 | End: 2025-05-13 | Stop reason: HOSPADM

## 2025-04-29 RX ADMIN — SENNOSIDES AND DOCUSATE SODIUM 2 TABLET: 50; 8.6 TABLET ORAL at 17:12

## 2025-04-29 RX ADMIN — BISACODYL 10 MG: 10 SUPPOSITORY RECTAL at 17:13

## 2025-04-29 RX ADMIN — ALBUTEROL SULFATE 2.5 MG: 2.5 SOLUTION RESPIRATORY (INHALATION) at 10:59

## 2025-04-29 RX ADMIN — ACETYLCYSTEINE 3 ML: 200 SOLUTION ORAL; RESPIRATORY (INHALATION) at 10:59

## 2025-04-29 RX ADMIN — ASPIRIN 81 MG: 81 TABLET, CHEWABLE ORAL at 05:28

## 2025-04-29 RX ADMIN — ENOXAPARIN SODIUM 40 MG: 100 INJECTION SUBCUTANEOUS at 17:12

## 2025-04-29 RX ADMIN — AMLODIPINE BESYLATE 10 MG: 10 TABLET ORAL at 05:27

## 2025-04-29 RX ADMIN — GABAPENTIN 400 MG: 400 CAPSULE ORAL at 05:27

## 2025-04-29 RX ADMIN — ACETYLCYSTEINE 3 ML: 200 SOLUTION ORAL; RESPIRATORY (INHALATION) at 08:11

## 2025-04-29 RX ADMIN — GABAPENTIN 400 MG: 400 CAPSULE ORAL at 17:13

## 2025-04-29 RX ADMIN — POTASSIUM BICARBONATE 25 MEQ: 978 TABLET, EFFERVESCENT ORAL at 05:27

## 2025-04-29 RX ADMIN — ALBUTEROL SULFATE 2.5 MG: 2.5 SOLUTION RESPIRATORY (INHALATION) at 08:11

## 2025-04-29 RX ADMIN — GLYCOPYRROLATE 2 MG: 1 TABLET ORAL at 05:27

## 2025-04-29 RX ADMIN — Medication 5 MG: at 20:58

## 2025-04-29 RX ADMIN — SENNOSIDES AND DOCUSATE SODIUM 2 TABLET: 50; 8.6 TABLET ORAL at 05:28

## 2025-04-29 RX ADMIN — GABAPENTIN 400 MG: 400 CAPSULE ORAL at 12:36

## 2025-04-29 RX ADMIN — ATORVASTATIN CALCIUM 80 MG: 80 TABLET, FILM COATED ORAL at 17:13

## 2025-04-29 RX ADMIN — GLYCOPYRROLATE 2 MG: 1 TABLET ORAL at 12:36

## 2025-04-29 RX ADMIN — HYDROCHLOROTHIAZIDE 25 MG: 25 TABLET ORAL at 05:27

## 2025-04-29 ASSESSMENT — ENCOUNTER SYMPTOMS
DOUBLE VISION: 0
SHORTNESS OF BREATH: 0
COUGH: 1
PALPITATIONS: 0
FOCAL WEAKNESS: 1
HEADACHES: 0
ABDOMINAL PAIN: 0
WEAKNESS: 1
BLURRED VISION: 0
FALLS: 0

## 2025-04-29 ASSESSMENT — PAIN DESCRIPTION - PAIN TYPE: TYPE: ACUTE PAIN

## 2025-04-29 NOTE — CARE PLAN
The patient is Stable - Low risk of patient condition declining or worsening    Shift Goals  Clinical Goals: maintain neruo status, trach/oral care, safety  Patient Goals: comfort  Family Goals: updates, comfort    Progress made toward(s) clinical / shift goals:    Problem: Knowledge Deficit - Standard  Goal: Patient and family/care givers will demonstrate understanding of plan of care, disease process/condition, diagnostic tests and medications  Outcome: Progressing     Problem: Pain - Standard  Goal: Alleviation of pain or a reduction in pain to the patient’s comfort goal  Outcome: Progressing     Problem: Optimal Care of the Stroke Patient  Goal: Optimal emergency care for the stroke patient  Outcome: Progressing     Problem: Mobility - Stroke  Goal: Patient's capacity to carry out activities will improve  Outcome: patient up to        Patient is not progressing towards the following goals:

## 2025-04-29 NOTE — DISCHARGE PLANNING
Medical Social Work  Patient shook his head yes/no to questions asked by LEXY.  Appears to be orientated to location, name, and why he is here.  SW needed to ask direct none complicated questions.    Patient shook his head yes when asked if his  mother is both his medical and financial power of .  Is aware that his mother had enrolled him in both long and short term insurance.    SW told patient what his mother had stated, that she wants patient to get stronger and get back to his previous life.  Patient shook his head yes.      Patient lived alone, only support is his girlfriend.    Patient smiled when SW stated PT notes indicate he was sitting in the cardiac chair yesterday.     PC to patient's mother to verify his income/paid weekly or every two weeks.  LEXY was told that patient is paid every two weeks, receives $20.18 an hour.    SW asked if he could call back, still at work. Scheduled phone call for tomorrow morning.      80 x $20.18/ $1614.40 every two weeks/ $3228.80 per month for 13 weeks/100 %    Maximum amount for Institutional Medicaid is $2901.00 per month.      13 weeks 70%  $1130.08 every two weeks/ $2260.16 per month.    After 26 weeks will receive 60%, Long term  $968.64 every two weeks/$1937.28

## 2025-04-29 NOTE — PROGRESS NOTES
Assumed care of pt 04/28/2025  PM assessment complete  Education provided to the pt reinforcement will be needed pt was able to nod yes or no.  Suction complete, trach care complete. Proper precautions in place for the pt  Bed is locked and in the lowest position, call light is within reach. Hourly rounding and suction in place. Call light within reach. Care continuous. No further needs at this time pt is resting comfortably.

## 2025-04-29 NOTE — THERAPY
Speech Language Pathology   Daily Treatment     Patient Name: Jonna Brian  AGE:  49 y.o., SEX:  male  Medical Record #: [id number]  Date of Service: 4/29/2025      Precautions:  Precautions: Fall Risk, Swallow Precautions, PEG Tube, Tracheostomy, R hemiparesis       Subjective  Pt agreeable and cooperative with SLP tx targets. Patient verbalizing pain with being in bed, asking about mobility.       Assessment  Pt seen for PMV training.     Cardiopulmonary Vitals  Vital Changes Observed: None    Tracheostomy  Tracheostomy: Portex 8.0  Cuff Position: Deflated  Oxygen Requirements:  4 L 28% FiO2, T-piece    Suctioning  Oral Suctioning Provided: Yes  Tracheal Suctioning Provided: Prior to speaking valve placement  Comments: Thick secretions removed from tracheal and oral suctioning    Speaking Valve  Upper Airway Patency with Speaking Valve: Adequate airflow, loudness diminished  Breath Stacking Present: Yes - initial trial only  Behavior During Speaking Valve Trial: fatigue  Speaking Valve Left on Patient at Conclusion: No    Comments: PMV donned x3 during session with decreasing length of usage each trial due to fatigue. Back noted on initial trial (longest duration) but not on subsequent. Patient able to phonate in ~75% of attempts, other times mouthing words. Intelligibility for audible verbalizations at ~30%. Cues provided re where to focus effort (pharynx for volume, oral structures for clarity) which improved accuracy. Patient noted to be dysarthric with articulatory imprecision and poor breath support for speech. Improved with cues to break up phrases/words into smaller parts. Intensity improved with cues to focus effort to pharynx and with brief breaks during which PMV would be doffed. PMV doffed at end of session and recommendations hung HOB.     Clinical Impressions  Recommend PMV be donned by trained staff intermittently throughout the day for 15-30 min periods with direct spv to increase endurance and to  "promote communicative efficiency. AAC tools not meaningfully used this date, but patient continues to be a good candidate for use of partner-assisted scanning using alphabet board (directions hung HOB). Patient would likely be an excellent candidate for a high-tech AAC device such as an eye gaze device to augment increasing verbal communication.        Recommendations  Communication Strategies:                1) Nodding Y/N in response to closed-ended questions              2) Use of partner assisted scanning (partner points and patient nods Y/N) on alphabet board and low-tech AAC  3) Use of facial expressions to augment meaning  4) Placement of PMV when staff in the room to allow for verbal communication     Voice Prosthesis Training:  Placement: Trained staff only  Duration: 15-30 minutes w/ close supervision due to secretions      SLP Treatment Plan  Treatment Plan: Speech-Language Treatment, Voice Prosthesis Training, Patient/Family/Caregiver Training  SLP Frequency: 3x Per Week  Estimated Duration: Until Therapy Goals Met      Anticipated Discharge Needs  Discharge Recommendations: Recommend post-acute placement for additional speech therapy services prior to discharge home  Therapy Recommendations Upon DC: Dysphagia Training, Expression Training, AAC Training / Development, Community Re-Integration, Patient / Family / Caregiver Education      Patient / Family Goals  Patient / Family Goal #1: \"Swallow\"  Goal #1 Outcome: Goal not met  Short Term Goals  Short Term Goal # 1: Pt will tolerate PMV trials without negitive change to cardiopulmonary vitals for 20 minutes.  Goal Outcome # 1: Goal met, new goal added  Short Term Goal # 1 B : Patient will tolerate PMV trials for 30 minutes without negative changes to cardiopulmonary vitals.  Goal Outcome  # 1 B: Progressing as expected  Short Term Goal # 2: Pt will demo intelligible phonation at the word level w/ > 80% intelligibility w/ mod cueing  Goal Outcome # 2 : " Progressing as expected  Short Term Goal # 3: Pt will express 5 target single words using the alphabet board via scanning w/ mod cueing and > 80% accuracy  Goal Outcome  # 3:  (Goal not targeted this date)  Short Term Goal # 4: Pt's family / caregivers will participate in training on utilizing AAC with the pt via demonstration teachback  Goal Outcome  # 4:  (Goal not targeted - family not present)      Julia Sherman, SLP

## 2025-04-29 NOTE — CARE PLAN
Problem: Knowledge Deficit - Standard  Goal: Patient and family/care givers will demonstrate understanding of plan of care, disease process/condition, diagnostic tests and medications  Outcome: Progressing  Note:  Education1.  Patient and family/caregiver oriented to unit, equipment, visitation policy and means for communicating concern2.  Complete/review Learning Assessment3.  Assess knowledge level of disease process/condition, treatment plan, diagnostic tests and medications4.  Explain disease process/condition, treatment plan, diagnostic tests and medications    Expected end:  4/30/2025  Education provided reinforcement will be needed     Problem: Pain - Standard  Goal: Alleviation of pain or a reduction in pain to the patient’s comfort goal  Outcome: Met     Problem: Fall Risk  Goal: Patient will remain free from falls  Outcome: Met  Note: Patient/caregiver will verbalize understanding of home safety and fall prevention precautions related to altered mobility. Pt as remained free from falls throughout my shift.       Problem: Skin Integrity  Goal: Skin integrity is maintained or improved  Outcome: Met  Note: Skin has remain intact. Q2 turns complete. Extra pillows and blankets given for comfort.    The patient is Stable - Low risk of patient condition declining or worsening    Shift Goals  Clinical Goals: Maintain neuro status/ Tach and oral care  Patient Goals: JAMARI  Family Goals: JAMARI

## 2025-04-29 NOTE — THERAPY
Physical Therapy   Daily Treatment     Patient Name: Jonna Brian  Age:  49 y.o., Sex:  male  Medical Record #: [id number]  Today's Date: 4/28/2025     Precautions  Precautions: Fall Risk;Swallow Precautions  Comments: R hemiplegia    Assessment  Pt seen for PT tx session with mobility detailed down below. Continuing to work on LUE/LLE therex, no R sided movement still. Pt also working on head control with improved endurance and less coughing with fully upright posture. Transferred pt to cardiac chair for first time today; pt able to sit up at ~50 degrees with stable vitals. Recommend pt spend time daily in chair as staff is able to help. Continue to recommend placement, will follow.     Plan    Treatment Plan Status: Continue Current Treatment Plan  Type of Treatment: Bed Mobility, Family / Caregiver Training, Gait Training, Neuro Re-Education / Balance, Self Care / Home Evaluation, Stair Training, Therapeutic Activities, Therapeutic Exercise  Treatment Frequency: 3 Times per Week  Treatment Duration: Until Therapy Goals Met    DC Equipment Recommendations: Unable to determine at this time  Discharge Recommendations: Recommend post-acute placement for additional physical therapy services prior to discharge home        Vitals   O2 Delivery Device T-Piece   Pain 0 - 10 Group   Therapist Pain Assessment Post Activity Pain Same as Prior to Activity   Non Verbal Descriptors   Non Verbal Scale  Calm   Cognition    Level of Consciousness Alert   Sitting Lower Body Exercises   Sitting Lower Body Exercises Yes   Tricep Press 1 set of 10;Left   Bicep Curls 1 set of 10;Left   Ankle Pumps 1 set of 10;Left   Long Arc Quad 2 sets of 10;Left   Other Treatments   Other Treatments Provided working on upright head posture with midline   Balance   Sitting Balance (Static) Dependent   Sitting Balance (Dynamic) Dependent   Weight Shift Sitting Absent   Skilled Intervention Postural Facilitation   Bed Mobility    Supine to Sit Total  Assist   Sit to Supine Total Assist   Scooting Total Assist   Rolling Total Assist to Rt.   Skilled Intervention Verbal Cuing   Comments 2p assist   Gait Analysis   Gait Level Of Assist Unable to Participate   Functional Mobility   Bed, Chair, Wheelchair Transfer Total Assist   Mobility slide transfer to cardiac chair   Skilled Intervention Verbal Cuing   6 Clicks Assessment - How much HELP from from another person do you currently need... (If the patient hasn't done an activity recently, how much help from another person do you think he/she would need if he/she tried?)   Turning from your back to your side while in a flat bed without using bedrails? 1   Moving from lying on your back to sitting on the side of a flat bed without using bedrails? 1   Moving to and from a bed to a chair (including a wheelchair)? 1   Standing up from a chair using your arms (e.g., wheelchair, or bedside chair)? 1   Walking in hospital room? 1   Climbing 3-5 steps with a railing? 1   6 clicks Mobility Score 6   Short Term Goals    Short Term Goal # 1 pt will move supine<>eob with min a in 6 tx for bed mobility.   Goal Outcome # 1 goal not met   Short Term Goal # 2 pt will sit at eob for 5 min with fair- balance in 6 tx for oob tolerance.   Goal Outcome # 2 Goal not met   Short Term Goal # 3 pt will complete sts with hemiwalker and min a in 6 tx for functional mobility.   Goal Outcome # 3 Goal not met   Short Term Goal # 4 pt will hold head in upright position for 30 seconds for 6 tx for posture.   Goal Outcome # 4 Progressing as expected   Physical Therapy Treatment Plan   Physical Therapy Treatment Plan Continue Current Treatment Plan   Anticipated Discharge Equipment and Recommendations   DC Equipment Recommendations Unable to determine at this time   Discharge Recommendations Recommend post-acute placement for additional physical therapy services prior to discharge home   Interdisciplinary Plan of Care Collaboration   IDT  Collaboration with  Nursing;Therapy Tech   Patient Position at End of Therapy Seated;Chair Alarm On;Call Light within Reach;Family / Friend in Room  (in cardiac chair)   Collaboration Comments RN updated   Session Information   Date / Session Number  4/28- 13 (2/3, 4/30)

## 2025-04-29 NOTE — PROGRESS NOTES
Pharmacy Pharmacotherapy Consult for LOS >30 days    Admit Date: 3/25/2025      Medications were reviewed for appropriateness and ongoing need.     Current Facility-Administered Medications   Medication Dose Route Frequency Provider Last Rate Last Admin    albuterol (Proventil) 2.5mg/0.5ml nebulizer solution 2.5 mg  2.5 mg Nebulization 4X/DAY (RT) LLUVIA YoussefO.   2.5 mg at 04/29/25 1059    albuterol (Proventil) 2.5mg/0.5ml nebulizer solution 2.5 mg  2.5 mg Nebulization Q2HRS PRN (RT) GABBY [email or web address]        glycopyrrolate (Robinul) tablet 2 mg  2 mg Enteral Tube TID Gera John D.O.   2 mg at 04/29/25 0527    acetylcysteine (Mucomyst) 20 % solution 3 mL  3 mL Inhalation 4X/DAY (RT) Tony Odom M.D.   3 mL at 04/29/25 1059    enoxaparin (Lovenox) inj 40 mg  40 mg Subcutaneous DAILY AT 1800 Gera John D.O.   40 mg at 04/28/25 1834    hydroCHLOROthiazide tablet 25 mg  25 mg Enteral Tube Q DAY Gera John D.O.   25 mg at 04/29/25 0527    acetaminophen (Tylenol) tablet 650 mg  650 mg Enteral Tube Q6HRS PRN Gera John D.O.   650 mg at 04/25/25 1707    oxyCODONE immediate-release (Roxicodone) tablet 5 mg  5 mg Enteral Tube Q4HRS PRN Gera John D.O.   5 mg at 04/25/25 1707    hydrALAZINE (Apresoline) injection 20 mg  20 mg Intravenous Q6HRS PRN Gera John D.O.   20 mg at 04/05/25 1503    labetalol (Normodyne/Trandate) injection 10 mg  10 mg Intravenous Q4HRS PRN Gera John D.O.   10 mg at 04/12/25 1553    atorvastatin (Lipitor) tablet 80 mg  80 mg Enteral Tube Q EVENING Gera John D.O.   80 mg at 04/28/25 1834    gabapentin (Neurontin) capsule 400 mg  400 mg Enteral Tube TID Gera John D.O.   400 mg at 04/29/25 0527    Respiratory Therapy Consult   Nebulization Continuous RT Gera John D.O.        senna-docusate (Pericolace Or Senokot S) 8.6-50 MG per tablet 2 Tablet  2 Tablet Enteral Tube BID Gera John D.O.   2 Tablet at 04/29/25 0528    And     polyethylene glycol/lytes (Miralax) Packet 1 Packet  1 Packet Enteral Tube QDAY PRN GABBY Wu.O.   1 Packet at 04/27/25 0439    And    magnesium hydroxide (Milk Of Magnesia) suspension 30 mL  30 mL Enteral Tube QDAY PRN GABBY Wu.O.   30 mL at 04/25/25 1706    And    bisacodyl (Dulcolax) suppository 10 mg  10 mg Rectal QDAY PRN GABBY Wu.O.   10 mg at 04/25/25 1656    Pharmacy Consult: Enteral tube insertion - review meds/change route/product selection  1 Each Other PHARMACY TO DOSE GABBY Wu.O.        aspirin (Asa) chewable tab 81 mg  81 mg Enteral Tube DAILY GABBY Wu.O.   81 mg at 04/29/25 0528    ibuprofen (Motrin) tablet 600 mg  600 mg Enteral Tube Q6HRS PRN GABBY Wu.O.   600 mg at 04/13/25 1103    melatonin tablet 5 mg  5 mg Enteral Tube Nightly GABBY Wu.O.   5 mg at 04/28/25 2112    amLODIPine (Norvasc) tablet 10 mg  10 mg Enteral Tube DAILY GABBY Wu.O.   10 mg at 04/29/25 0527    ondansetron (Zofran ODT) dispertab 4 mg  4 mg Enteral Tube Q4HRS PRN GABBY Wu.O.        ondansetron (Zofran) syringe/vial injection 4 mg  4 mg Intravenous Q4HRS PRN Gera John D.O.   4 mg at 04/17/25 1849       Recommendations:  Discontinue any PRN medications not utilized in the past 2+ weeks; hydralazine, labetalol, ibuprofen, Zofran  Consider adjusting pain medications - patient w/ sporadic oxycodone 5mg use (once every few days), could adjust acetaminophen to PRN mild/moderate/severe pain and oxycodone 2.5mg PRN breakthrough pain.  Reassess ongoing need for scheduled inhaled Mucomyst (receiving since 4/10), scheduled glycopyrrolate, and scheduled/PRN albuterol. RT utilizing IPV to help mobilize secretions.     Veronica Delgadillo, PharmD, BCCCP

## 2025-04-29 NOTE — PROGRESS NOTES
[facility] Medicine Daily Progress Note    Date of Service  4/29/2025    Chief Complaint  Jonna Brian is a 49 y.o. male admitted 3/25/2025 with headache and weakness.    Hospital Course  This is a 49 y.o. male who was initially admitted to the hospital on 3/25/2025 with global weakness, aphasia and ataxia.  His last known normal was 1630 on March 24.  He was initially admitted to the hospital floor, his CT head as well as CTA of the head and neck were within normal limits.  There was consideration for possible Guillain-Barré syndrome.     His condition continued to deteriorate and he underwent a lumbar puncture which revealed elevated protein but no evidence of infection.  He had progressive bulbar symptoms and weakness and was transferred to the ICU.  Unfortunately shortly after he arrived in the ICU he developed stridor and required emergent intubation on 3/27.       He then underwent an MRI of his brain and spine which unfortunately revealed small areas of acute infarcts in the lower jero and the upper medulla on both sides of the midline.  He also had chronic infarcts in his jero and right inferior cerebellum.  They were chronically Cuna in the bilateral basal ganglia, the right thalamus and the right periventricular white matter.  He had evidence of small vessel disease as well in the periventricular white matter.     Ultimately it appears he has been suffering multiple small strokes and his progressive decline was due to acute infarcts in the lower jero and upper medulla. Neurology recommended DAPT for 10 days with aspirin indefinitely.  Patient completed Plavix 4/4.  Since that time he has not recovered function, has required tracheostomy and PEG placement with the ultimate goal of transferring him to postacute medical to see what function may be regained in time.    Patient able to nod head, shakes head for yes/no, able to use alphabet sheet to make needs known. He continues to work with PT/OT/SLP  therapies with some minor signs of improvement. Veratent is medically cleared, plan for discharge to LTAC for ongoing management of tracheostomy tube, PEG tube, therapies.    Interval Problem Update  04/29 - Patient was seen and examined at bedside.  No acute events overnight. Patient is resting comfortably in bed and in no acute distress.     Pending placement    I have discussed this patient's plan of care and discharge plan at IDT rounds today with Case Management, Nursing, Nursing leadership, and other members of the IDT team.    Consultants/Specialty  critical care     Code Status  Full Code    Disposition  The patient is medically cleared for discharge to home or a post-acute facility.  Anticipate discharge to: a long-term acute care hospital    I have placed the appropriate orders for post-discharge needs.    Review of Systems  Review of Systems   Unable to perform ROS: Patient nonverbal   Constitutional:  Positive for malaise/fatigue.   HENT:  Positive for congestion.         Excessive secretions    Eyes:  Negative for blurred vision and double vision.   Respiratory:  Positive for cough. Negative for shortness of breath.    Cardiovascular:  Negative for chest pain and palpitations.   Gastrointestinal:  Negative for abdominal pain.   Genitourinary:  Negative for dysuria and frequency.   Musculoskeletal:  Negative for falls.   Neurological:  Positive for focal weakness and weakness. Negative for headaches.        Physical Exam  Temp:  [36.7 °C (98 °F)-36.9 °C (98.4 °F)] 36.8 °C (98.2 °F)  Pulse:  [77-88] 88  Resp:  [18-20] 18  BP: (113-129)/(70-87) 113/74  SpO2:  [92 %-98 %] 95 %    Physical Exam  Vitals and nursing note reviewed.   Constitutional:       General: He is not in acute distress.     Appearance: He is ill-appearing.      Comments: Resting comfortably    Eyes:      Conjunctiva/sclera: Conjunctivae normal.   Neck:      Comments: Tracheostomy on Tpiece  Cardiovascular:      Rate and Rhythm: Normal rate  and regular rhythm.   Pulmonary:      Effort: No respiratory distress.      Breath sounds: No wheezing.      Comments: On T-piece  Wet upper airway sounds  Abdominal:      General: There is no distension.      Palpations: Abdomen is soft.      Tenderness: There is no abdominal tenderness.      Comments: PEG   Skin:     General: Skin is warm and dry.   Neurological:      Motor: Weakness present.      Comments: He is able to slightly move his left upper and lower extremity.  Nodding yes and no appropriately to questions.  Follows commands   Psychiatric:         Mood and Affect: Mood is depressed.         Fluids    Intake/Output Summary (Last 24 hours) at 4/29/2025 1657  Last data filed at 4/29/2025 1213  Gross per 24 hour   Intake 1190 ml   Output 1900 ml   Net -710 ml        Laboratory        Recent Labs     04/27/25  0844 04/28/25  0137   SODIUM 135 134*   POTASSIUM 3.5* 3.7   CHLORIDE 92* 93*   CO2 29 28   GLUCOSE 124* 129*   BUN 24* 21   CREATININE 0.90 0.87   CALCIUM 9.1 9.4                   Imaging  [id number] VIEW   Final Result      No evidence of bowel obstruction.                  DX-CHEST-LIMITED (1 VIEW)   Final Result         No acute cardiopulmonary abnormalities are identified.      [id number] VIEW   Final Result         No specific finding to suggest small bowel obstruction.      DX-CHEST-PORTABLE (1 VIEW)   Final Result      Hypoinflation with bibasilar atelectasis.      US-RENAL   Final Result      1.  No hydronephrosis.      DX-CHEST-PORTABLE (1 VIEW)   Final Result         1.  Left basilar atelectasis and/or subtle infiltrates, similar to prior study      DX-CHEST-PORTABLE (1 VIEW)   Final Result         1.  Left basilar atelectasis, no focal infiltrate   2.  Cardiomegaly      DX-G.I. TUBE INJECTION, ANY TYPE   Final Result      Contrast from a PEG tube injection extends into the stomach without evidence of contrast leak.      CT-CHEST,ABDOMEN,PELVIS WITH   Final Result      1.  Large amount  of free intraperitoneal air within the abdomen again seen as was previously identified on chest x-ray by review of the patient's chart, a percutaneous gastrostomy tube was placed on 3/31/2025 and is free intraperitoneal air is possibly    secondary to that recent procedure.      2.  Bibasal atelectasis and small bilateral pleural effusions.      3.  Tracheostomy tube present.      4.  No evidence of bowel obstruction or free fluid within the abdomen or pelvis.      DX-CHEST-PORTABLE (1 VIEW)   Final Result         1.  Bibasilar atelectasis   2.  Intra-abdominal free air, can be associated with history of percutaneous gastrostomy, consider other viscus perforation as warranted. Could be further evaluated with CT of the abdomen with contrast as clinically appropriate.      These findings were discussed with the patient's clinician, Bravo Lala Iv, on 4/2/2025 7:57 AM.      DX-CHEST-PORTABLE (1 VIEW)   Final Result      No evidence of acute cardiopulmonary process.      DX-ABDOMEN FOR TUBE PLACEMENT   Final Result      1.  Enteric tube extends in the fundus of stomach.      DX-ABDOMEN FOR TUBE PLACEMENT   Final Result      The gastric tube has been removed and replaced with a small bowel feeding tube which terminate in the proximal stomach.      DX-CHEST-PORTABLE (1 VIEW)   Final Result      Atelectasis within the left lung base.      MR-THORACIC SPINE-WITH & W/O   Final Result      1.  There is no abnormal intramedullary T2 signal intensity in the thoracic spinal cord.   2.  Mild degenerative disease at T8-9.   3.  Left lower segmental consolidation.      MR-CERVICAL SPINE-WITH & W/O   Final Result      1.  Small areas of acute infarcts in the lower jero and upper medulla involving both sides of the midline. There are chronic infarcts in the jero and right inferior cerebellum.   2.  There is no abnormal intramedullary T2 signal intensity in the cervical spinal cord to suggest demyelinating lesions. There is no MR  evidence of compressive myelopathy.   3.  Mild degenerative disease.            MR-BRAIN-WITH & W/O   Final Result      1.  Small areas of acute infarcts in the lower jero and upper medulla involving both sides of the midline.   2.  There are chronic infarcts in the jero and right inferior cerebellum. . There are areas of chronic lacunae in the bilateral basal ganglia, right thalamus and right periventricular white matter.   3.  There are nonspecific T2 hyperintensities in the periventricular white matter likely representing chronic small vessel disease.   4.  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      MR-LUMBAR SPINE-WITH & W/O   Final Result      1.  Unremarkable pre and postcontrast MR examination of the lumbar spine.   2.  Clinical suspicious for GBS: There is no abnormal enhancement of the lumbar nerve roots.      DX-ABDOMEN FOR TUBE PLACEMENT   Final Result      NG tube tip projects at the peripyloric region.      DX-CHEST-PORTABLE (1 VIEW)   Final Result      1.  Low lung volumes without definite acute cardiopulmonary abnormality.   2.  Support apparatus as above.      CT-HEAD W/O   Final Result      1.  No acute intracranial abnormality.   2.  Remote right cerebellar infarct.               DX-CHEST-PORTABLE (1 VIEW)   Final Result      No acute cardiopulmonary disease evident.      DX-CHEST-PORTABLE (1 VIEW)   Final Result      No acute cardiopulmonary disease evident.      CT-CEREBRAL PERFUSION ANALYSIS   Final Result      1. Cerebral blood flow less than 30% possibly representing completed infarct = 0 mL. Based on distribution of this finding, this is unlikely to represent artifact.      2. T Max more than 6 seconds possibly representing combination of completed infarct and ischemia = 7 mL. Based on the distribution of this finding, this is possibly artifact.      3. Mismatched volume possibly representing ischemic brain/penumbra= 0 mL     "  4.  Please note that this cerebral perfusion study and report is Quantitative and targets supratentorial (cerebral) perfusion for evaluation of large vessel territory acute ischemia/infarction. For example, lacunar infarcts, and brainstem/posterior fossa    ischemia/infarction are not evaluated on this study.  Data acquisition is subject to artifacts which can yield non-anatomically plausible perfusion maps which may be due to motion, bolus timing, signal to noise ratio, or other technical factors.    Perfusion map abnormalities which show non-anatomic distributions are likely artifact.   This study is not \"stand-alone\" and should only be utilized for diagnosis, management/treatment in correlation with CT, CTA, and/or MRI and clinical factors.         CT-CTA NECK WITH & W/O-POST PROCESSING   Final Result      CT angiogram of the neck within normal limits.      CT-CTA HEAD WITH & W/O-POST PROCESS   Final Result      CT angiogram of the [place] within normal limits.      CT-HEAD W/O   Final Result      Head CT without contrast within normal limits. No evidence of acute cerebral infarction, hemorrhage or mass lesion.                    Assessment/Plan  * Acute ischemic stroke (HCC)- (present on admission)  Assessment & Plan  Acute ischemic stroke  MRI of the brain revealed acute infarcts of the lower jero and upper medulla.  He effectively is a quadriplegic and required tracheostomy and PEG tube.  Aspirin and statin  Blood pressure control  Family hope for functional recovery    Acute neuromuscular respiratory failure (HCC)- (present on admission)  Assessment & Plan  Requiring tracheostomy    Proteus mirabilis pneumonia (HCC)- (present on admission)  Assessment & Plan  Treated with Zosyn de-escalated to Augmentin based on cultures from sputum    Type 2 diabetes mellitus with hyperglycemia, without long-term current use of insulin (HCC)- (present on admission)  Assessment & Plan  Details are " unclear  Reportedly he is on metformin as an outpatient  Hemoglobin A1c 6.3  He does not require insulin    Hypokalemia  Assessment & Plan  Replace as needed    Emesis  Assessment & Plan  Tube feeds held   Continue Reglan  Patient had not had a bowel movement    Patient now tolerating tube feeds  Resolved    Constipation  Assessment & Plan  KUB showed no obstruction  Bowel protocol    Improving    Anxiety  Assessment & Plan  Ativan for anxiety.     Hematuria  Assessment & Plan  Darkened urine, no gross hematuria  No recent grove trauma  Renal US normal, no stones  Continue antibiotics for possible UTI given mild signs of infection on UA  If hematuria persists after keflex treatment, consider urology consult/referral    Primary hypertension- (present on admission)  Assessment & Plan  Blood pressure is appropriate with hydrochlorothiazide 25 mg daily and Norvasc 10 mg daily         VTE prophylaxis:    enoxaparin ppx    I have performed a physical exam and reviewed and updated ROS and Plan today (4/29/2025). In review of yesterday's note (4/28/2025), there are no changes except as documented above.    Greater than 48 minutes spent prepping to see patient (e.g. review of tests) obtaining and/or reviewing separately obtained history. Performing a medically appropriate examination and/ evaluation.  Counseling and educating the patient/family/caregiver.  Ordering medications, tests, or procedures.  Referring and communicating with other health care professionals.  Documenting clinical information in EPIC.  Independently interpreting results and communicating results to patient/family/caregiver.  Care coordination.

## 2025-04-29 NOTE — DISCHARGE PLANNING
5605  Agency/Facility Name: [facility]  Spoke To: Tony  Outcome: DPA inquired about pending referral. Augustine Jimenez received updated referral. Augustine Jimenez will review referral tonight and stop by tomorrow for a bedside visit.  LSW notified.    1253  DPA resent up dated referrals to the following facilities:    [facility]    1436  DPA resent referral to [facility]    1523  DPA sent referral to [facility]

## 2025-04-30 PROCEDURE — 97535 SELF CARE MNGMENT TRAINING: CPT

## 2025-04-30 PROCEDURE — 97112 NEUROMUSCULAR REEDUCATION: CPT

## 2025-04-30 PROCEDURE — A9270 NON-COVERED ITEM OR SERVICE: HCPCS | Performed by: INTERNAL MEDICINE

## 2025-04-30 PROCEDURE — 700102 HCHG RX REV CODE 250 W/ 637 OVERRIDE(OP): Performed by: INTERNAL MEDICINE

## 2025-04-30 PROCEDURE — 99232 SBSQ HOSP IP/OBS MODERATE 35: CPT | Performed by: INTERNAL MEDICINE

## 2025-04-30 PROCEDURE — 700111 HCHG RX REV CODE 636 W/ 250 OVERRIDE (IP): Mod: JZ | Performed by: INTERNAL MEDICINE

## 2025-04-30 PROCEDURE — 94640 AIRWAY INHALATION TREATMENT: CPT

## 2025-04-30 PROCEDURE — 94669 MECHANICAL CHEST WALL OSCILL: CPT

## 2025-04-30 PROCEDURE — 770001 HCHG ROOM/CARE - MED/SURG/GYN PRIV*

## 2025-04-30 RX ADMIN — GABAPENTIN 400 MG: 400 CAPSULE ORAL at 05:58

## 2025-04-30 RX ADMIN — AMLODIPINE BESYLATE 10 MG: 10 TABLET ORAL at 05:58

## 2025-04-30 RX ADMIN — SENNOSIDES AND DOCUSATE SODIUM 2 TABLET: 50; 8.6 TABLET ORAL at 17:08

## 2025-04-30 RX ADMIN — HYDROCHLOROTHIAZIDE 25 MG: 25 TABLET ORAL at 05:58

## 2025-04-30 RX ADMIN — ENOXAPARIN SODIUM 40 MG: 100 INJECTION SUBCUTANEOUS at 17:08

## 2025-04-30 RX ADMIN — Medication 5 MG: at 21:35

## 2025-04-30 RX ADMIN — ASPIRIN 81 MG: 81 TABLET, CHEWABLE ORAL at 05:59

## 2025-04-30 RX ADMIN — GABAPENTIN 400 MG: 400 CAPSULE ORAL at 13:04

## 2025-04-30 RX ADMIN — ATORVASTATIN CALCIUM 80 MG: 80 TABLET, FILM COATED ORAL at 17:08

## 2025-04-30 RX ADMIN — GABAPENTIN 400 MG: 400 CAPSULE ORAL at 17:08

## 2025-04-30 ASSESSMENT — PAIN DESCRIPTION - PAIN TYPE
TYPE: ACUTE PAIN
TYPE: ACUTE PAIN

## 2025-04-30 ASSESSMENT — ENCOUNTER SYMPTOMS
SHORTNESS OF BREATH: 0
ABDOMINAL PAIN: 0
BLURRED VISION: 0
FOCAL WEAKNESS: 1
FALLS: 0
COUGH: 1
WEAKNESS: 1
PALPITATIONS: 0
DOUBLE VISION: 0
HEADACHES: 0

## 2025-04-30 ASSESSMENT — COGNITIVE AND FUNCTIONAL STATUS - GENERAL
DRESSING REGULAR LOWER BODY CLOTHING: TOTAL
SUGGESTED CMS G CODE MODIFIER DAILY ACTIVITY: CN
HELP NEEDED FOR BATHING: TOTAL
PERSONAL GROOMING: TOTAL
DRESSING REGULAR UPPER BODY CLOTHING: TOTAL
EATING MEALS: TOTAL
TOILETING: TOTAL
DAILY ACTIVITIY SCORE: 6

## 2025-04-30 NOTE — CARE PLAN
Problem: Optimal Care of the Stroke Patient  Goal: Optimal emergency care for the stroke patient  Description: Target End Date:  End of day 1Time of Onset1.  Time of last known well obtained2.  Patient and family/caregiver verbalize understanding of diagnosis, medications and testing3.  NIHSS performed and documented, including date and time, for ischemic stroke patients prior to any acute recanalization therapy (thrombolytics or mechanical) or within 12 hours of arrival if no intervention is warranted4.  Consults and referrals placed to appropriate departmentsMedications Administration as Ordered:1.  Implement appropriate reversal agents for INR greater than 1.52.  Pre-alteplase administration of antihypertensives for SBP >185 DBP >1103.  Post-alteplase administration of antihypertensives for SBP >185, DBP >1054.  Thrombolytic Therapy for qualifying ischemic stroke patients who arrive within 4.5 hours of time of Last Known Well. Thrombolytic therapy administered within 30 minutes or a documented reason for delay  Outcome: Progressing     Problem: Risk for Aspiration  Goal: Patient's risk for aspiration will be absent or decrease  Description: Target End Date:  Prior to discharge or change in level of care1.   Complete dysphagia screening on admission2.   NPO until dysphagia screening complete or medically cleared3.   Collaborate with Speech Therapy, Clinical Dietitian and interdisciplinary team4.   Implement aspiration precautions5.   Assist patient up to chair for meals6.   Elevate head of bed 90 degrees if patient is unable to get out of bed7.   Encourage small bites8.   Ensure foods/liquids are of appropriate consistency9.   Assess for any signs/symptoms of [id number]. Assess breath sounds and vital signs after oral intake  Outcome: Progressing     Problem: Bowel Elimination  Goal: Establish and maintain regular bowel function  Description: Target End Date:  Prior to discharge or change in level of care1.    Note date of last BM2.   Educate about diet, fluid intake, medication and activity to promote bowel function3.   Educate signs and symptoms of constipation and interventions to implement4.   Pharmacologic bowel management per provider order5.   Regular toileting schedule6.   Upright position for toileting7.   High fiber diet8.   Encourage hydration9.   Collaborate with Clinical [id number]. Care and maintenance of ostomy if applicable  Outcome: Progressing   The patient is Stable - Low risk of patient condition declining or worsening    Shift Goals  Clinical Goals: Suction secretions, maintain skin integrity  Patient Goals: jennifer  Family Goals: comfort    Progress made toward(s) clinical / shift goals:  Patient is A0x4, able to answer short close ended questions. He was able to speak 2 words , voice hoarness noted. Turned every 2 hours. Skin assessed for any breakdown . Suctioned, oral care, tracheostomy care done .    Patient is not progressing towards the following goals:

## 2025-04-30 NOTE — CARE PLAN
The patient is Stable - Low risk of patient condition declining or worsening    Shift Goals  Clinical Goals: Maintain neuro status/ Trach and oral care  Patient Goals: JAMARI  Family Goals: JAMARI    Progress made toward(s) clinical / shift goals:    Problem: Knowledge Deficit - Standard  Goal: Patient and family/care givers will demonstrate understanding of plan of care, disease process/condition, diagnostic tests and medications  Outcome: Progressing     Problem: Optimal Care of the Stroke Patient  Goal: Optimal acute care for the stroke patient  Outcome: Progressing     Problem: Knowledge Deficit - Stroke Education  Goal: Patient's knowledge of stroke and risk factors will improve  Outcome: Progressing     Problem: Neuro Status  Goal: Neuro status will remain stable or improve  Outcome: Progressing     Problem: Urinary Elimination  Goal: Establish and maintain regular urinary output  Outcome: Progressing     Problem: Mobility - Stroke  Goal: Patient's capacity to carry out activities will improve  Outcome: Progressing       Patient is not progressing towards the following goals:

## 2025-04-30 NOTE — FLOWSHEET NOTE
04/30/25 1541   Events/Summary/Plan   Events/Summary/Plan called bedside, pt desatting,after working with OT and  he couldn't keep sats up, suctioned , bagged 3 times to pop lungs open, 10L/90% aerosol while pt recovers

## 2025-04-30 NOTE — PROGRESS NOTES
OT notified RN that PT oxygen was sustaining in the low to mid 80's. PT was put on 15L t-piece. RT was called, per RT this RN placed patient on 100% FiO2 on 15L t-piece. PT continued to sustain in the mid 80's. When RT arrived pt was repositioned sitting upright and suctioned. RT performed interventions, MD was notified and came to bedside. Pt's oxygen now in the mid to high 90's.

## 2025-04-30 NOTE — THERAPY
Occupational Therapy  Daily Treatment     Patient Name: Jonna Brian  Age:  49 y.o., Sex:  male  Medical Record #: [id number]  Today's Date: 4/30/2025     Precautions  Precautions: Fall Risk, Swallow Precautions, PEG Tube, Tracheostomy   Comments: R riddhi    Assessment    Pt appeared to tolerate BUE AAROM/PROM neuro re-ed exercises detailed below completed at the bed level and pt nodded no when asked if he had any discomfort. At end of session, pt's SpO2 was 90% and pt began to have increased secretions pt could not manage. Deep suction was performed and pt had further difficulty managing his secretions with SpO2 dropping into 80s. RN immediately notified and in to further assess along with RT. Pt left in care of RN and RT. RN later reported pt had a mucous plug that was able to be removed. Continue to recommend post-acute placement.     Plan    Treatment Plan Status: Continue Current Treatment Plan  Type of Treatment: Self Care / Activities of Daily Living, Adaptive Equipment, Cognitive Skill Development, Neuro Re-Education / Balance, Therapeutic Exercises, Therapeutic Activity, Family / Caregiver Training  Treatment Frequency: 4 Times per Week  Treatment Duration: Until Therapy Goals Met    DC Equipment Recommendations: Unable to determine at this time  Discharge Recommendations: Recommend post-acute placement for additional occupational therapy services prior to discharge home    Subjective    Pt nodded yes to UE neuro re-ed exercises and nodded no when asked if he had any discomfort or pain.      Objective     04/30/25 1525   Vitals   Vitals Comments Increased secretions with pt having difficulty clearing at end of session. Deep suctioned trach, pt had further difficulty with strong coughing, SpO2 dropped into 80s, RN immediately notified and in to assess followed by RT. Pt left in care of nursing and RT. RN later reported pt had a mucus plug that had to be removed   Pain   Pain Scales Non Verbal Scale    Intervention Declines   Cognition    Speech/ Communication Intubated / Trached;Nods Appropriately   Level of Consciousness Alert   Ability To Follow Commands 1 Step   Comments Nods yes/no, follows directions for UE neuro re-ed exercise   Active ROM Upper Body   Comments No AROM RUE, some active ROM distal LUE for assisted elbow flexion, active pronation/supination and wrist extension for tenodesis   Sitting Upper Body Exercises   Comments PROM RUE: shoulder flexion to 90, elbow flexion/extension, shoulder IR/ER, pronation/supination, wrist flexion/extension.     AAROM LUE: elbow flexion with muscle belly tapping, pronation/supination, wrist extension, finger flexion with tenodesis, shoulder IR/ER; L shoulder PROM flexion to 90. 2x10 for all the above completed at the bed level with HOB elevated. Placed pillow and rolled blanket under pt's RUE for increased support and head control to midline   Activities of Daily Living   Upper Body Dressing Total Assist  (gown)   Lower Body Dressing Total Assist   Toileting Total Assist  (condom cath)   Skilled Intervention Verbal Cuing;Compensatory Strategies   How much help from another person does the patient currently need...   Putting on and taking off regular lower body clothing? 1   Bathing (including washing, rinsing, and drying)? 1   Toileting, which includes using a toilet, bedpan, or urinal? 1   Putting on and taking off regular upper body clothing? 1   Taking care of personal grooming such as brushing teeth? 1   Eating meals? 1   6 Clicks Daily Activity Score 6   Patient / Family Goals   Patient / Family Goal #1 to go home   Goal #1 Outcome Progressing slower than expected   Short Term Goals   Short Term Goal # 1 Pt will complete suction oral care in supported position with mod A using AE PRN   Goal Outcome # 1 Goal not met   Short Term Goal # 2 UB dressing with mod A   Goal Outcome # 2 Progressing slower than expected   Short Term Goal # 3 BSC txf with max A   Goal  Outcome # 3 Goal not met   Short Term Goal # 4 Pt will sit with min A >2 minutes for participation in ADL   Goal Outcome # 4 Goal not met   Education Group   Upper Ext ROM Patient Response Patient;Acceptance;Explanation;Demonstration;Verbal Demonstration;Action Demonstration;Reinforcement Needed   Occupational Therapy Treatment Plan    O.T. Treatment Plan Continue Current Treatment Plan   Anticipated Discharge Equipment and Recommendations   DC Equipment Recommendations Unable to determine at this time   Discharge Recommendations Recommend post-acute placement for additional occupational therapy services prior to discharge home   Interdisciplinary Plan of Care Collaboration   IDT Collaboration with  Nursing;Respiratory Therapist   Patient Position at End of Therapy In Bed;Bed Alarm On;Other (Comments)   Collaboration Comments Handoff to RN and RT   Session Information   Date / Session Number  4/30 #9 (1/4, 5/4)

## 2025-04-30 NOTE — PROGRESS NOTES
[facility] Medicine Daily Progress Note    Date of Service  4/30/2025    Chief Complaint  Jonna Brian is a 49 y.o. male admitted 3/25/2025 with headache and weakness.    Hospital Course  This is a 49 y.o. male who was initially admitted to the hospital on 3/25/2025 with global weakness, aphasia and ataxia.  His last known normal was 1630 on March 24.  He was initially admitted to the hospital floor, his CT head as well as CTA of the head and neck were within normal limits.  There was consideration for possible Guillain-Barré syndrome.     His condition continued to deteriorate and he underwent a lumbar puncture which revealed elevated protein but no evidence of infection.  He had progressive bulbar symptoms and weakness and was transferred to the ICU.  Unfortunately shortly after he arrived in the ICU he developed stridor and required emergent intubation on 3/27.       He then underwent an MRI of his brain and spine which unfortunately revealed small areas of acute infarcts in the lower jero and the upper medulla on both sides of the midline.  He also had chronic infarcts in his jero and right inferior cerebellum.  They were chronically Cuna in the bilateral basal ganglia, the right thalamus and the right periventricular white matter.  He had evidence of small vessel disease as well in the periventricular white matter.     Ultimately it appears he has been suffering multiple small strokes and his progressive decline was due to acute infarcts in the lower jero and upper medulla. Neurology recommended DAPT for 10 days with aspirin indefinitely.  Patient completed Plavix 4/4.  Since that time he has not recovered function, has required tracheostomy and PEG placement with the ultimate goal of transferring him to postacute medical to see what function may be regained in time.    Patient able to nod head, shakes head for yes/no, able to use alphabet sheet to make needs known. He continues to work with PT/OT/SLP  therapies with some minor signs of improvement. Paitent is medically cleared, plan for discharge to LTAC for ongoing management of tracheostomy tube, PEG tube, therapies.    Interval Problem Update  Patient was seen and examined at bedside.  No acute events overnight. Patient is resting comfortably in bed and in no acute distress.      Squeezes left hand and moves left toes when instructed  Check labs in AM  Again having tube feeds in trache - hold tube feeds for now  Did a trial of reglan earlier in admisison    I have discussed this patient's plan of care and discharge plan at IDT rounds today with Case Management, Nursing, Nursing leadership, and other members of the IDT team.    Consultants/Specialty  critical care     Code Status  Full Code    Disposition  The patient is medically cleared for discharge to home or a post-acute facility.  Anticipate discharge to: a long-term acute care hospital    I have placed the appropriate orders for post-discharge needs.    Review of Systems  Review of Systems   Unable to perform ROS: Patient nonverbal   Constitutional:  Positive for malaise/fatigue.   HENT:  Positive for congestion.         Excessive secretions    Eyes:  Negative for blurred vision and double vision.   Respiratory:  Positive for cough. Negative for shortness of breath.    Cardiovascular:  Negative for chest pain and palpitations.   Gastrointestinal:  Negative for abdominal pain.   Genitourinary:  Negative for dysuria and frequency.   Musculoskeletal:  Negative for falls.   Neurological:  Positive for focal weakness and weakness. Negative for headaches.        Physical Exam  Temp:  [36.6 °C (97.9 °F)-37.3 °C (99.1 °F)] 36.6 °C (97.9 °F)  Pulse:  [] 91  Resp:  [16-20] 18  BP: (113-134)/(74-81) 119/76  SpO2:  [91 %-97 %] 91 %    Physical Exam  Vitals and nursing note reviewed.   Constitutional:       General: He is not in acute distress.     Appearance: He is ill-appearing.      Comments: Resting  comfortably    Eyes:      Conjunctiva/sclera: Conjunctivae normal.   Neck:      Comments: Tracheostomy on Tpiece  Cardiovascular:      Rate and Rhythm: Normal rate and regular rhythm.   Pulmonary:      Effort: No respiratory distress.      Breath sounds: No wheezing.      Comments: On T-piece  Wet upper airway sounds  Abdominal:      General: There is no distension.      Palpations: Abdomen is soft.      Tenderness: There is no abdominal tenderness.      Comments: PEG   Skin:     General: Skin is warm and dry.   Neurological:      Motor: Weakness present.      Comments: He is able to slightly move his left upper and lower extremity.  Nodding yes and no appropriately to questions.  Follows commands   Psychiatric:         Mood and Affect: Mood is depressed.         Fluids    Intake/Output Summary (Last 24 hours) at 4/30/2025 1339  Last data filed at 4/29/2025 1733  Gross per 24 hour   Intake 100 ml   Output 1500 ml   Net -1400 ml        Laboratory        Recent Labs     04/28/25  0137   SODIUM 134*   POTASSIUM 3.7   CHLORIDE 93*   CO2 28   GLUCOSE 129*   BUN 21   CREATININE 0.87   CALCIUM 9.4                   Imaging  [id number]-5 VIEW   Final Result      No evidence of bowel obstruction.                  DX-CHEST-LIMITED (1 VIEW)   Final Result         No acute cardiopulmonary abnormalities are identified.      PR-BAIAPUZ-6 VIEW   Final Result         No specific finding to suggest small bowel obstruction.      DX-CHEST-PORTABLE (1 VIEW)   Final Result      Hypoinflation with bibasilar atelectasis.      US-RENAL   Final Result      1.  No hydronephrosis.      DX-CHEST-PORTABLE (1 VIEW)   Final Result         1.  Left basilar atelectasis and/or subtle infiltrates, similar to prior study      DX-CHEST-PORTABLE (1 VIEW)   Final Result         1.  Left basilar atelectasis, no focal infiltrate   2.  Cardiomegaly      DX-G.I. TUBE INJECTION, ANY TYPE   Final Result      Contrast from a PEG tube injection extends into the  stomach without evidence of contrast leak.      CT-CHEST,ABDOMEN,PELVIS WITH   Final Result      1.  Large amount of free intraperitoneal air within the abdomen again seen as was previously identified on chest x-ray by review of the patient's chart, a percutaneous gastrostomy tube was placed on 3/31/2025 and is free intraperitoneal air is possibly    secondary to that recent procedure.      2.  Bibasal atelectasis and small bilateral pleural effusions.      3.  Tracheostomy tube present.      4.  No evidence of bowel obstruction or free fluid within the abdomen or pelvis.      DX-CHEST-PORTABLE (1 VIEW)   Final Result         1.  Bibasilar atelectasis   2.  Intra-abdominal free air, can be associated with history of percutaneous gastrostomy, consider other viscus perforation as warranted. Could be further evaluated with CT of the abdomen with contrast as clinically appropriate.      These findings were discussed with the patient's clinician, Bravo Lala Iv, on 4/2/2025 7:57 AM.      DX-CHEST-PORTABLE (1 VIEW)   Final Result      No evidence of acute cardiopulmonary process.      DX-ABDOMEN FOR TUBE PLACEMENT   Final Result      1.  Enteric tube extends in the fundus of stomach.      DX-ABDOMEN FOR TUBE PLACEMENT   Final Result      The gastric tube has been removed and replaced with a small bowel feeding tube which terminate in the proximal stomach.      DX-CHEST-PORTABLE (1 VIEW)   Final Result      Atelectasis within the left lung base.      MR-THORACIC SPINE-WITH & W/O   Final Result      1.  There is no abnormal intramedullary T2 signal intensity in the thoracic spinal cord.   2.  Mild degenerative disease at T8-9.   3.  Left lower segmental consolidation.      MR-CERVICAL SPINE-WITH & W/O   Final Result      1.  Small areas of acute infarcts in the lower jero and upper medulla involving both sides of the midline. There are chronic infarcts in the jero and right inferior cerebellum.   2.  There is no  abnormal intramedullary T2 signal intensity in the cervical spinal cord to suggest demyelinating lesions. There is no MR evidence of compressive myelopathy.   3.  Mild degenerative disease.            MR-BRAIN-WITH & W/O   Final Result      1.  Small areas of acute infarcts in the lower jero and upper medulla involving both sides of the midline.   2.  There are chronic infarcts in the jero and right inferior cerebellum. . There are areas of chronic lacunae in the bilateral basal ganglia, right thalamus and right periventricular white matter.   3.  There are nonspecific T2 hyperintensities in the periventricular white matter likely representing chronic small vessel disease.   4.  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      MR-LUMBAR SPINE-WITH & W/O   Final Result      1.  Unremarkable pre and postcontrast MR examination of the lumbar spine.   2.  Clinical suspicious for GBS: There is no abnormal enhancement of the lumbar nerve roots.      DX-ABDOMEN FOR TUBE PLACEMENT   Final Result      NG tube tip projects at the peripyloric region.      DX-CHEST-PORTABLE (1 VIEW)   Final Result      1.  Low lung volumes without definite acute cardiopulmonary abnormality.   2.  Support apparatus as above.      CT-HEAD W/O   Final Result      1.  No acute intracranial abnormality.   2.  Remote right cerebellar infarct.               DX-CHEST-PORTABLE (1 VIEW)   Final Result      No acute cardiopulmonary disease evident.      DX-CHEST-PORTABLE (1 VIEW)   Final Result      No acute cardiopulmonary disease evident.      CT-CEREBRAL PERFUSION ANALYSIS   Final Result      1. Cerebral blood flow less than 30% possibly representing completed infarct = 0 mL. Based on distribution of this finding, this is unlikely to represent artifact.      2. T Max more than 6 seconds possibly representing combination of completed infarct and ischemia = 7 mL. Based on the distribution of this  "finding, this is possibly artifact.      3. Mismatched volume possibly representing ischemic brain/penumbra= 0 mL      4.  Please note that this cerebral perfusion study and report is Quantitative and targets supratentorial (cerebral) perfusion for evaluation of large vessel territory acute ischemia/infarction. For example, lacunar infarcts, and brainstem/posterior fossa    ischemia/infarction are not evaluated on this study.  Data acquisition is subject to artifacts which can yield non-anatomically plausible perfusion maps which may be due to motion, bolus timing, signal to noise ratio, or other technical factors.    Perfusion map abnormalities which show non-anatomic distributions are likely artifact.   This study is not \"stand-alone\" and should only be utilized for diagnosis, management/treatment in correlation with CT, CTA, and/or MRI and clinical factors.         CT-CTA NECK WITH & W/O-POST PROCESSING   Final Result      CT angiogram of the neck within normal limits.      CT-CTA HEAD WITH & W/O-POST PROCESS   Final Result      CT angiogram of the [place] within normal limits.      CT-HEAD W/O   Final Result      Head CT without contrast within normal limits. No evidence of acute cerebral infarction, hemorrhage or mass lesion.                    Assessment/Plan  * Acute ischemic stroke (HCC)- (present on admission)  Assessment & Plan  Acute ischemic stroke  MRI of the brain revealed acute infarcts of the lower jero and upper medulla.  He effectively is a quadriplegic and required tracheostomy and PEG tube.  Aspirin and statin  Blood pressure control  Family hope for functional recovery    Acute neuromuscular respiratory failure (HCC)- (present on admission)  Assessment & Plan  Requiring tracheostomy    Proteus mirabilis pneumonia (HCC)- (present on admission)  Assessment & Plan  Treated with Zosyn de-escalated to Augmentin based on cultures from sputum    Type 2 diabetes mellitus with hyperglycemia, " without long-term current use of insulin (HCC)- (present on admission)  Assessment & Plan  Details are unclear  Reportedly he is on metformin as an outpatient  Hemoglobin A1c 6.3  He does not require insulin    Hypokalemia  Assessment & Plan  Replace as needed    Emesis  Assessment & Plan  Tube feeds held   Continue Reglan  Patient had not had a bowel movement    Patient now tolerating tube feeds  Resolved    Constipation  Assessment & Plan  KUB showed no obstruction  Bowel protocol    Improving    Anxiety  Assessment & Plan  Ativan for anxiety.     Hematuria  Assessment & Plan  Darkened urine, no gross hematuria  No recent grove trauma  Renal US normal, no stones  Continue antibiotics for possible UTI given mild signs of infection on UA  If hematuria persists after keflex treatment, consider urology consult/referral    Primary hypertension- (present on admission)  Assessment & Plan  Blood pressure is appropriate with hydrochlorothiazide 25 mg daily and Norvasc 10 mg daily         VTE prophylaxis:    enoxaparin ppx    I have performed a physical exam and reviewed and updated ROS and Plan today (4/30/2025). In review of yesterday's note (4/29/2025), there are no changes except as documented above.    Greater than 47 minutes spent prepping to see patient (e.g. review of tests) obtaining and/or reviewing separately obtained history. Performing a medically appropriate examination and/ evaluation.  Counseling and educating the patient/family/caregiver.  Ordering medications, tests, or procedures.  Referring and communicating with other health care professionals.  Documenting clinical information in EPIC.  Independently interpreting results and communicating results to patient/family/caregiver.  Care coordination.

## 2025-04-30 NOTE — DISCHARGE PLANNING
Medical Social Work  PC from [place],  requesting an update.  LEXY provided update on patient's discharge plan to date.    PC from patient's mother, Susan.  LEXY provided an update to her on patient's progress with therapies.      SW was able to verify that Susan is her son's medical and financial POA.  SW went over when patient may qualify for Institutional Medicaid, 6/1/25.  SW went over income requirements and how much Medicaid allows in savings/checking etc.  Susan stated that patient only has about $600 in her account now.  Susan stated patient is court order to pay child support, not sure if it is for 2 or 3 children/has 6 children total.     LEXY discussed patient's discharge plan from hospital.  Susan stated she is hopefull patient can go to a facility first.  To get stronger and be able to ambulate on his own, be independent.  Possible stay with her/patient's brother or stay in [place] with his significant other.      Discussed what patient's discharge plan would be if he does not become independent/new base line.  Susan would be unable to provide full time care, possible his significant other.    Susan did not want to discuss this in detail as she is focusing on patient improving to be able to  be independent.

## 2025-04-30 NOTE — DISCHARGE PLANNING
2551  DPA received email from Be with [facility], Per be Pt does not meet LTAC criteria and will have to decline Pt.    [id number]  Agency/Facility Name: [facility]   Spoke To: Admissions  Outcome: DPA inquired about pending referral. Per Admissions has not received referral. Per Admissions to have DPA fax referral to [phone].    0374  DPA RightFax referral to [phone]

## 2025-04-30 NOTE — THERAPY
Physical Therapy Contact Note    Patient Name: Jonna Brian  Age:  49 y.o., Sex:  male  Medical Record #: [id number]  Today's Date: 4/30/2025    Pt had just finished working with OT and then desatted to low 80s; staff assist was called with RT coming to assist. Will hold PT tx at this time and follow up once pt's respiratory status improves.     Regan Carbajal, PT, DPT

## 2025-04-30 NOTE — PROGRESS NOTES
1300 this RN noticed pt needed suctioning, and found what looked like tube feed comming out of trach. MD notified. Tube feeds stopped at 1309 and will resume off per MD.

## 2025-05-01 LAB
ALBUMIN SERPL BCP-MCNC: 3.6 G/DL (ref 3.2–4.9)
ANION GAP SERPL CALC-SCNC: 10 MMOL/L (ref 7–16)
BUN SERPL-MCNC: 24 MG/DL (ref 8–22)
CALCIUM ALBUM COR SERPL-MCNC: 10.3 MG/DL (ref 8.5–10.5)
CALCIUM SERPL-MCNC: 10 MG/DL (ref 8.5–10.5)
CHLORIDE SERPL-SCNC: 94 MMOL/L (ref 96–112)
CO2 SERPL-SCNC: 33 MMOL/L (ref 20–33)
CREAT SERPL-MCNC: 0.86 MG/DL (ref 0.5–1.4)
GFR SERPLBLD CREATININE-BSD FMLA CKD-EPI: 107 ML/MIN/1.73 M 2
GLUCOSE SERPL-MCNC: 126 MG/DL (ref 65–99)
MAGNESIUM SERPL-MCNC: 2 MG/DL (ref 1.5–2.5)
PHOSPHATE SERPL-MCNC: 3.9 MG/DL (ref 2.5–4.5)
POTASSIUM SERPL-SCNC: 3.3 MMOL/L (ref 3.6–5.5)
SODIUM SERPL-SCNC: 137 MMOL/L (ref 135–145)

## 2025-05-01 PROCEDURE — A9270 NON-COVERED ITEM OR SERVICE: HCPCS | Performed by: INTERNAL MEDICINE

## 2025-05-01 PROCEDURE — 80069 RENAL FUNCTION PANEL: CPT

## 2025-05-01 PROCEDURE — 700111 HCHG RX REV CODE 636 W/ 250 OVERRIDE (IP): Mod: JZ | Performed by: INTERNAL MEDICINE

## 2025-05-01 PROCEDURE — 94669 MECHANICAL CHEST WALL OSCILL: CPT

## 2025-05-01 PROCEDURE — 31502 CHANGE OF WINDPIPE AIRWAY: CPT

## 2025-05-01 PROCEDURE — 97530 THERAPEUTIC ACTIVITIES: CPT

## 2025-05-01 PROCEDURE — 99232 SBSQ HOSP IP/OBS MODERATE 35: CPT | Performed by: INTERNAL MEDICINE

## 2025-05-01 PROCEDURE — 97110 THERAPEUTIC EXERCISES: CPT

## 2025-05-01 PROCEDURE — 700102 HCHG RX REV CODE 250 W/ 637 OVERRIDE(OP): Performed by: INTERNAL MEDICINE

## 2025-05-01 PROCEDURE — 94640 AIRWAY INHALATION TREATMENT: CPT

## 2025-05-01 PROCEDURE — 36415 COLL VENOUS BLD VENIPUNCTURE: CPT

## 2025-05-01 PROCEDURE — 83735 ASSAY OF MAGNESIUM: CPT

## 2025-05-01 PROCEDURE — 770001 HCHG ROOM/CARE - MED/SURG/GYN PRIV*

## 2025-05-01 RX ADMIN — GABAPENTIN 400 MG: 400 CAPSULE ORAL at 17:28

## 2025-05-01 RX ADMIN — AMLODIPINE BESYLATE 10 MG: 10 TABLET ORAL at 05:27

## 2025-05-01 RX ADMIN — GABAPENTIN 400 MG: 400 CAPSULE ORAL at 12:50

## 2025-05-01 RX ADMIN — ENOXAPARIN SODIUM 40 MG: 100 INJECTION SUBCUTANEOUS at 17:28

## 2025-05-01 RX ADMIN — SENNOSIDES AND DOCUSATE SODIUM 2 TABLET: 50; 8.6 TABLET ORAL at 17:29

## 2025-05-01 RX ADMIN — GABAPENTIN 400 MG: 400 CAPSULE ORAL at 05:27

## 2025-05-01 RX ADMIN — HYDROCHLOROTHIAZIDE 25 MG: 25 TABLET ORAL at 05:27

## 2025-05-01 RX ADMIN — ASPIRIN 81 MG: 81 TABLET, CHEWABLE ORAL at 05:27

## 2025-05-01 RX ADMIN — SENNOSIDES AND DOCUSATE SODIUM 2 TABLET: 50; 8.6 TABLET ORAL at 05:27

## 2025-05-01 RX ADMIN — ATORVASTATIN CALCIUM 80 MG: 80 TABLET, FILM COATED ORAL at 17:28

## 2025-05-01 RX ADMIN — Medication 5 MG: at 21:52

## 2025-05-01 ASSESSMENT — COGNITIVE AND FUNCTIONAL STATUS - GENERAL
SUGGESTED CMS G CODE MODIFIER MOBILITY: CN
WALKING IN HOSPITAL ROOM: TOTAL
TURNING FROM BACK TO SIDE WHILE IN FLAT BAD: TOTAL
MOBILITY SCORE: 6
MOVING FROM LYING ON BACK TO SITTING ON SIDE OF FLAT BED: TOTAL
STANDING UP FROM CHAIR USING ARMS: TOTAL
MOVING TO AND FROM BED TO CHAIR: TOTAL
CLIMB 3 TO 5 STEPS WITH RAILING: TOTAL

## 2025-05-01 ASSESSMENT — ENCOUNTER SYMPTOMS
FALLS: 0
HEADACHES: 0
BLURRED VISION: 0
FOCAL WEAKNESS: 1
PALPITATIONS: 0
COUGH: 1
ABDOMINAL PAIN: 0
SHORTNESS OF BREATH: 0
WEAKNESS: 1
DOUBLE VISION: 0

## 2025-05-01 ASSESSMENT — PAIN DESCRIPTION - PAIN TYPE
TYPE: ACUTE PAIN
TYPE: ACUTE PAIN

## 2025-05-01 ASSESSMENT — GAIT ASSESSMENTS: GAIT LEVEL OF ASSIST: UNABLE TO PARTICIPATE

## 2025-05-01 NOTE — DISCHARGE PLANNING
0936  Agency/Facility Name: [facility]  Spoke To: Tony  Outcome: DPA inquired about pending referral. Per Tony still reviewing referral. DPA to f/u later.    0941  Agency/Facility Name: [facility]  Outcome: DPA left voicemail for admissions.

## 2025-05-01 NOTE — CARE PLAN
The patient is Stable - Low risk of patient condition declining or worsening    Shift Goals  Clinical Goals: Safety/mainatian skin integrity  Patient Goals: jennifer  Family Goals: comfort    Progress made toward(s) clinical / shift goals:  Fall precautions in place. Call light, bedside table, and pt belongings within reach. Pt able to demonstrate the use of call light prior to getting out of bed.    Pt repositioned every 2 hours and as needed. Pt understands education on the importance of turning in bed to prevent skin injury.      Patient is not progressing towards the following goals: N/A

## 2025-05-01 NOTE — PROGRESS NOTES
[facility] Medicine Daily Progress Note    Date of Service  5/1/2025    Chief Complaint  Jonna Brian is a 49 y.o. male admitted 3/25/2025 with headache and weakness.    Hospital Course  This is a 49 y.o. male who was initially admitted to the hospital on 3/25/2025 with global weakness, aphasia and ataxia.  His last known normal was 1630 on March 24.  He was initially admitted to the hospital floor, his CT head as well as CTA of the head and neck were within normal limits.  There was consideration for possible Guillain-Barré syndrome.     His condition continued to deteriorate and he underwent a lumbar puncture which revealed elevated protein but no evidence of infection.  He had progressive bulbar symptoms and weakness and was transferred to the ICU.  Unfortunately shortly after he arrived in the ICU he developed stridor and required emergent intubation on 3/27.       He then underwent an MRI of his brain and spine which unfortunately revealed small areas of acute infarcts in the lower jero and the upper medulla on both sides of the midline.  He also had chronic infarcts in his jero and right inferior cerebellum.  They were chronically Cuna in the bilateral basal ganglia, the right thalamus and the right periventricular white matter.  He had evidence of small vessel disease as well in the periventricular white matter.     Ultimately it appears he has been suffering multiple small strokes and his progressive decline was due to acute infarcts in the lower jero and upper medulla. Neurology recommended DAPT for 10 days with aspirin indefinitely.  Patient completed Plavix 4/4.  Since that time he has not recovered function, has required tracheostomy and PEG placement with the ultimate goal of transferring him to postacute medical to see what function may be regained in time.    Patient able to nod head, shakes head for yes/no, able to use alphabet sheet to make needs known. He continues to work with PT/OT/SLP therapies  with some minor signs of improvement. Paitent is medically cleared, plan for discharge to LTAC for ongoing management of tracheostomy tube, PEG tube, therapies.    Interval Problem Update  Patient was seen and examined at bedside.  No acute events overnight. Patient is resting comfortably in bed and in no acute distress.      Squeezes left hand and moves left toes when instructed  Mucus plug yesterday afternoon requiring deep suctioning, tempoarily requiring 10L supplemental oxygen  Now back to 4L supplemental oxygen  Intermittently experiencing tube feed regurgitation throughout hospitalization  Restart tube feeds today    I have discussed this patient's plan of care and discharge plan at IDT rounds today with Case Management, Nursing, Nursing leadership, and other members of the IDT team.    Consultants/Specialty  critical care     Code Status  Full Code    Disposition  The patient is medically cleared for discharge to home or a post-acute facility.  Anticipate discharge to: a long-term acute care hospital    I have placed the appropriate orders for post-discharge needs.    Review of Systems  Review of Systems   Unable to perform ROS: Patient nonverbal   Constitutional:  Positive for malaise/fatigue.   HENT:  Positive for congestion.         Excessive secretions    Eyes:  Negative for blurred vision and double vision.   Respiratory:  Positive for cough. Negative for shortness of breath.    Cardiovascular:  Negative for chest pain and palpitations.   Gastrointestinal:  Negative for abdominal pain.   Genitourinary:  Negative for dysuria and frequency.   Musculoskeletal:  Negative for falls.   Neurological:  Positive for focal weakness and weakness. Negative for headaches.        Physical Exam  Temp:  [36.4 °C (97.5 °F)-36.9 °C (98.4 °F)] 36.8 °C (98.2 °F)  Pulse:  [] 95  Resp:  [18-20] 18  BP: (111-144)/(81-97) 117/81  SpO2:  [90 %-96 %] 94 %    Physical Exam  Vitals and nursing note reviewed.    Constitutional:       General: He is not in acute distress.     Appearance: He is ill-appearing.      Comments: Resting comfortably    HENT:      Right Ear: External ear normal.      Left Ear: External ear normal.   Eyes:      Conjunctiva/sclera: Conjunctivae normal.   Neck:      Comments: Tracheostomy on Tpiece  Cardiovascular:      Rate and Rhythm: Normal rate and regular rhythm.   Pulmonary:      Effort: No respiratory distress.      Breath sounds: No wheezing.      Comments: On T-piece  Wet upper airway sounds  Abdominal:      General: There is no distension.      Palpations: Abdomen is soft.      Tenderness: There is no abdominal tenderness.      Comments: PEG   Skin:     General: Skin is warm and dry.   Neurological:      Motor: Weakness present.      Comments: Follows commands  He is able to slightly move his left upper and lower extremity.  Nodding yes and no appropriately to questions.  Moves left toes when instructed   Psychiatric:         Mood and Affect: Mood is depressed.         Fluids    Intake/Output Summary (Last 24 hours) at 5/1/2025 0900  Last data filed at 4/30/2025 1719  Gross per 24 hour   Intake 200 ml   Output 1100 ml   Net -900 ml        Laboratory                            Imaging  [id number] VIEW   Final Result      No evidence of bowel obstruction.                  DX-CHEST-LIMITED (1 VIEW)   Final Result         No acute cardiopulmonary abnormalities are identified.      [id number] VIEW   Final Result         No specific finding to suggest small bowel obstruction.      DX-CHEST-PORTABLE (1 VIEW)   Final Result      Hypoinflation with bibasilar atelectasis.      US-RENAL   Final Result      1.  No hydronephrosis.      DX-CHEST-PORTABLE (1 VIEW)   Final Result         1.  Left basilar atelectasis and/or subtle infiltrates, similar to prior study      DX-CHEST-PORTABLE (1 VIEW)   Final Result         1.  Left basilar atelectasis, no focal infiltrate   2.  Cardiomegaly      DX-G.I.  TUBE INJECTION, ANY TYPE   Final Result      Contrast from a PEG tube injection extends into the stomach without evidence of contrast leak.      CT-CHEST,ABDOMEN,PELVIS WITH   Final Result      1.  Large amount of free intraperitoneal air within the abdomen again seen as was previously identified on chest x-ray by review of the patient's chart, a percutaneous gastrostomy tube was placed on 3/31/2025 and is free intraperitoneal air is possibly    secondary to that recent procedure.      2.  Bibasal atelectasis and small bilateral pleural effusions.      3.  Tracheostomy tube present.      4.  No evidence of bowel obstruction or free fluid within the abdomen or pelvis.      DX-CHEST-PORTABLE (1 VIEW)   Final Result         1.  Bibasilar atelectasis   2.  Intra-abdominal free air, can be associated with history of percutaneous gastrostomy, consider other viscus perforation as warranted. Could be further evaluated with CT of the abdomen with contrast as clinically appropriate.      These findings were discussed with the patient's clinician, Bravo Lala Iv, on 4/2/2025 7:57 AM.      DX-CHEST-PORTABLE (1 VIEW)   Final Result      No evidence of acute cardiopulmonary process.      DX-ABDOMEN FOR TUBE PLACEMENT   Final Result      1.  Enteric tube extends in the fundus of stomach.      DX-ABDOMEN FOR TUBE PLACEMENT   Final Result      The gastric tube has been removed and replaced with a small bowel feeding tube which terminate in the proximal stomach.      DX-CHEST-PORTABLE (1 VIEW)   Final Result      Atelectasis within the left lung base.      MR-THORACIC SPINE-WITH & W/O   Final Result      1.  There is no abnormal intramedullary T2 signal intensity in the thoracic spinal cord.   2.  Mild degenerative disease at T8-9.   3.  Left lower segmental consolidation.      MR-CERVICAL SPINE-WITH & W/O   Final Result      1.  Small areas of acute infarcts in the lower jero and upper medulla involving both sides of the  midline. There are chronic infarcts in the jero and right inferior cerebellum.   2.  There is no abnormal intramedullary T2 signal intensity in the cervical spinal cord to suggest demyelinating lesions. There is no MR evidence of compressive myelopathy.   3.  Mild degenerative disease.            MR-BRAIN-WITH & W/O   Final Result      1.  Small areas of acute infarcts in the lower jero and upper medulla involving both sides of the midline.   2.  There are chronic infarcts in the jero and right inferior cerebellum. . There are areas of chronic lacunae in the bilateral basal ganglia, right thalamus and right periventricular white matter.   3.  There are nonspecific T2 hyperintensities in the periventricular white matter likely representing chronic small vessel disease.   4.  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      MR-LUMBAR SPINE-WITH & W/O   Final Result      1.  Unremarkable pre and postcontrast MR examination of the lumbar spine.   2.  Clinical suspicious for GBS: There is no abnormal enhancement of the lumbar nerve roots.      DX-ABDOMEN FOR TUBE PLACEMENT   Final Result      NG tube tip projects at the peripyloric region.      DX-CHEST-PORTABLE (1 VIEW)   Final Result      1.  Low lung volumes without definite acute cardiopulmonary abnormality.   2.  Support apparatus as above.      CT-HEAD W/O   Final Result      1.  No acute intracranial abnormality.   2.  Remote right cerebellar infarct.               DX-CHEST-PORTABLE (1 VIEW)   Final Result      No acute cardiopulmonary disease evident.      DX-CHEST-PORTABLE (1 VIEW)   Final Result      No acute cardiopulmonary disease evident.      CT-CEREBRAL PERFUSION ANALYSIS   Final Result      1. Cerebral blood flow less than 30% possibly representing completed infarct = 0 mL. Based on distribution of this finding, this is unlikely to represent artifact.      2. T Max more than 6 seconds possibly  "representing combination of completed infarct and ischemia = 7 mL. Based on the distribution of this finding, this is possibly artifact.      3. Mismatched volume possibly representing ischemic brain/penumbra= 0 mL      4.  Please note that this cerebral perfusion study and report is Quantitative and targets supratentorial (cerebral) perfusion for evaluation of large vessel territory acute ischemia/infarction. For example, lacunar infarcts, and brainstem/posterior fossa    ischemia/infarction are not evaluated on this study.  Data acquisition is subject to artifacts which can yield non-anatomically plausible perfusion maps which may be due to motion, bolus timing, signal to noise ratio, or other technical factors.    Perfusion map abnormalities which show non-anatomic distributions are likely artifact.   This study is not \"stand-alone\" and should only be utilized for diagnosis, management/treatment in correlation with CT, CTA, and/or MRI and clinical factors.         CT-CTA NECK WITH & W/O-POST PROCESSING   Final Result      CT angiogram of the neck within normal limits.      CT-CTA HEAD WITH & W/O-POST PROCESS   Final Result      CT angiogram of the [place] within normal limits.      CT-HEAD W/O   Final Result      Head CT without contrast within normal limits. No evidence of acute cerebral infarction, hemorrhage or mass lesion.                    Assessment/Plan  * Acute ischemic stroke (HCC)- (present on admission)  Assessment & Plan  Acute ischemic stroke  MRI of the brain revealed acute infarcts of the lower jero and upper medulla.  He effectively is a quadriplegic and required tracheostomy and PEG tube.  Aspirin and statin  Blood pressure control  Family hope for functional recovery    Acute neuromuscular respiratory failure (HCC)- (present on admission)  Assessment & Plan  Requiring tracheostomy    Proteus mirabilis pneumonia (HCC)- (present on admission)  Assessment & Plan  Treated with Zosyn " de-escalated to Augmentin based on cultures from sputum    Type 2 diabetes mellitus with hyperglycemia, without long-term current use of insulin (HCC)- (present on admission)  Assessment & Plan  Details are unclear  Reportedly he is on metformin as an outpatient  Hemoglobin A1c 6.3  He does not require insulin    Hypokalemia  Assessment & Plan  Replace as needed    Emesis  Assessment & Plan  Tube feeds held   Continue Reglan  Patient had not had a bowel movement    Patient now tolerating tube feeds  Resolved    Constipation  Assessment & Plan  KUB showed no obstruction  Bowel protocol    Improving    Anxiety  Assessment & Plan  Ativan for anxiety.     Hematuria  Assessment & Plan  Darkened urine, no gross hematuria  No recent grove trauma  Renal US normal, no stones  Continue antibiotics for possible UTI given mild signs of infection on UA  If hematuria persists after keflex treatment, consider urology consult/referral    Primary hypertension- (present on admission)  Assessment & Plan  Blood pressure is appropriate with hydrochlorothiazide 25 mg daily and Norvasc 10 mg daily         VTE prophylaxis:    enoxaparin ppx    I have performed a physical exam and reviewed and updated ROS and Plan today (5/1/2025). In review of yesterday's note (4/30/2025), there are no changes except as documented above.    Greater than 48 minutes spent prepping to see patient (e.g. review of tests) obtaining and/or reviewing separately obtained history. Performing a medically appropriate examination and/ evaluation.  Counseling and educating the patient/family/caregiver.  Ordering medications, tests, or procedures.  Referring and communicating with other health care professionals.  Documenting clinical information in EPIC.  Independently interpreting results and communicating results to patient/family/caregiver.  Care coordination.

## 2025-05-01 NOTE — PROGRESS NOTES
4 Eyes Skin Assessment Completed by STEVEN carlisle and STEVEN villarreal.    Head WDL  Ears WDL  Nose WDL  Mouth WDL  Neck Tracheostomy  Breast/Chest WDL  Shoulder Blades WDL  Spine WDL  (R) Arm/Elbow/Hand Scar  (L) Arm/Elbow/Hand Scar  Abdomen WDL  Groin WDL  Scrotum/Coccyx/Buttocks WDL  (R) Leg Scar  (L) Leg Scar  (R) Heel/Foot/Toe callus  (L) Heel/Foot/Toe callus          Devices In Places Blood Pressure Cuff, Pulse Ox, Condom Cath, and Tracheostomy      Interventions In Place Heel Mepilex, TAP System, and Q2 Turns    Possible Skin Injury No    Pictures Uploaded Into Epic N/A  Wound Consult Placed N/A  RN Wound Prevention Protocol Ordered No

## 2025-05-01 NOTE — CARE PLAN
Problem: Optimal Care of the Stroke Patient  Goal: Optimal emergency care for the stroke patient  Description: Target End Date:  End of day 1Time of Onset1.  Time of last known well obtained2.  Patient and family/caregiver verbalize understanding of diagnosis, medications and testing3.  NIHSS performed and documented, including date and time, for ischemic stroke patients prior to any acute recanalization therapy (thrombolytics or mechanical) or within 12 hours of arrival if no intervention is warranted4.  Consults and referrals placed to appropriate departmentsMedications Administration as Ordered:1.  Implement appropriate reversal agents for INR greater than 1.52.  Pre-alteplase administration of antihypertensives for SBP >185 DBP >1103.  Post-alteplase administration of antihypertensives for SBP >185, DBP >1054.  Thrombolytic Therapy for qualifying ischemic stroke patients who arrive within 4.5 hours of time of Last Known Well. Thrombolytic therapy administered within 30 minutes or a documented reason for delay  Outcome: Progressing     Problem: Hemodynamic Monitoring  Goal: Patient's hemodynamics, fluid balance and neurologic status will be stable or improve  Description: Target End Date:  Prior to discharge or change in level of care1.  Vital signs, pulse oximetry and cardiac monitor per provider order and/or policy2.  Frequent pulse checks performed post thrombectomy3.  Frequent monitoring for signs of bleeding post TPA administration4.  Proper management of IV infusions5.  Intake and output monitored per provider order6.  Daily weight obtained per unit policy or provider order7.  Peripheral pulses and capillary refill assessed as needed8.  Monitor for signs/symptoms of excessive bleeding9.  Body temperature assessed and fevers [id number]. Patient positioned for maximum circulation/cardiac output  Outcome: Progressing   The patient is Stable - Low risk of patient condition declining or worsening    Shift  Goals  Clinical Goals: Respiratory hygiene,hemodynamically stable  Patient Goals: jennifer  Family Goals: comfort    Progress made toward(s) clinical / shift goals:  patient is suctioned as needed. Collaborate with respiratory therapist regarding his plan of care. Able to wean his 02 needs to 4 liters form 10, with 28% Fi02 ,saturating between 92-94. Turned every 2 hours. Skin checked done .     Patient is not progressing towards the following goals:

## 2025-05-02 PROCEDURE — A9270 NON-COVERED ITEM OR SERVICE: HCPCS | Performed by: INTERNAL MEDICINE

## 2025-05-02 PROCEDURE — 92507 TX SP LANG VOICE COMM INDIV: CPT

## 2025-05-02 PROCEDURE — 94669 MECHANICAL CHEST WALL OSCILL: CPT

## 2025-05-02 PROCEDURE — 700102 HCHG RX REV CODE 250 W/ 637 OVERRIDE(OP): Performed by: INTERNAL MEDICINE

## 2025-05-02 PROCEDURE — 770001 HCHG ROOM/CARE - MED/SURG/GYN PRIV*

## 2025-05-02 PROCEDURE — 700111 HCHG RX REV CODE 636 W/ 250 OVERRIDE (IP): Mod: JZ

## 2025-05-02 PROCEDURE — 94640 AIRWAY INHALATION TREATMENT: CPT

## 2025-05-02 PROCEDURE — 700111 HCHG RX REV CODE 636 W/ 250 OVERRIDE (IP): Mod: JZ | Performed by: INTERNAL MEDICINE

## 2025-05-02 PROCEDURE — 99232 SBSQ HOSP IP/OBS MODERATE 35: CPT | Performed by: STUDENT IN AN ORGANIZED HEALTH CARE EDUCATION/TRAINING PROGRAM

## 2025-05-02 RX ORDER — ONDANSETRON 2 MG/ML
4 INJECTION INTRAMUSCULAR; INTRAVENOUS EVERY 4 HOURS PRN
Status: DISCONTINUED | OUTPATIENT
Start: 2025-05-02 | End: 2025-05-13 | Stop reason: HOSPADM

## 2025-05-02 RX ORDER — PROCHLORPERAZINE EDISYLATE 5 MG/ML
10 INJECTION INTRAMUSCULAR; INTRAVENOUS EVERY 6 HOURS PRN
Status: DISCONTINUED | OUTPATIENT
Start: 2025-05-02 | End: 2025-05-13 | Stop reason: HOSPADM

## 2025-05-02 RX ADMIN — GABAPENTIN 400 MG: 400 CAPSULE ORAL at 05:31

## 2025-05-02 RX ADMIN — HYDROCHLOROTHIAZIDE 25 MG: 25 TABLET ORAL at 05:31

## 2025-05-02 RX ADMIN — ATORVASTATIN CALCIUM 80 MG: 80 TABLET, FILM COATED ORAL at 16:36

## 2025-05-02 RX ADMIN — ASPIRIN 81 MG: 81 TABLET, CHEWABLE ORAL at 05:32

## 2025-05-02 RX ADMIN — ENOXAPARIN SODIUM 40 MG: 100 INJECTION SUBCUTANEOUS at 16:36

## 2025-05-02 RX ADMIN — GABAPENTIN 400 MG: 400 CAPSULE ORAL at 16:36

## 2025-05-02 RX ADMIN — GABAPENTIN 400 MG: 400 CAPSULE ORAL at 11:32

## 2025-05-02 RX ADMIN — SENNOSIDES AND DOCUSATE SODIUM 2 TABLET: 50; 8.6 TABLET ORAL at 05:31

## 2025-05-02 RX ADMIN — SENNOSIDES AND DOCUSATE SODIUM 2 TABLET: 50; 8.6 TABLET ORAL at 16:36

## 2025-05-02 RX ADMIN — Medication 5 MG: at 23:15

## 2025-05-02 RX ADMIN — ONDANSETRON 4 MG: 2 INJECTION INTRAMUSCULAR; INTRAVENOUS at 22:01

## 2025-05-02 RX ADMIN — AMLODIPINE BESYLATE 10 MG: 10 TABLET ORAL at 05:32

## 2025-05-02 ASSESSMENT — ENCOUNTER SYMPTOMS
COUGH: 1
BLURRED VISION: 0
SHORTNESS OF BREATH: 0
FALLS: 0
FOCAL WEAKNESS: 1
HEADACHES: 0
WEAKNESS: 1
DOUBLE VISION: 0
PALPITATIONS: 0
ABDOMINAL PAIN: 0

## 2025-05-02 ASSESSMENT — PAIN DESCRIPTION - PAIN TYPE
TYPE: ACUTE PAIN
TYPE: ACUTE PAIN

## 2025-05-02 NOTE — DIETARY
"Nutrition Services Weekly Nutrition Support Update     Day 38 of admit. Jonna Brian is a 49 y.o. male with admitting DX of Acute weakness [R53.1]  Acute ischemic stroke (HCC) [I63.9]    Tube feeding initiated on 3/2. Current TF via PEG  is Glucerna 1.2 @75 mL/hr to provide 2160 kcal, 108 gm protein, and 1449 ml free water daily       Nutrition Assessment:      Height: 185.4 cm (6' 0.99\")  Weight: 96.3 kg (212 lb 4.9 oz)  Weight to Use in Calculations: 96 kg (211 lb 10.3 oz)  Weight taken via  source not listed  on   Body mass index is 28.02 kg/m². BMI classification: Overweight.    Weight trend: stable    Re-estimation of nutrition needs is not indicated.    Calculation/Equation: REE per MSJ x 1.1 = 2079 kcals  Total Calories / day: 2100 - 2300  (Calories / k - 24)  Total Grams Protein / day: 96 - 115 (Grams Protein / k - 1.2     Objective:  TF was paused on  as tube feed was seen coming out of trach. Resumed feeds yesterday morning, now back to goal  Pertinent labs: potassium 3.3, chloride 94, glucose 126, BUN 24  Pertinent meds: Lipitor, Zofran PRN, BM protocol  Skin/wounds: no wounds or edema noted   Last BM   Current feeding remains appropriate to meet pt's nutritional needs     Current diet order:   NPO     Nutrition Diagnosis:      Swallowing difficulty r/t stroke as evidenced by need for nutrition support      Nutrition Dx Status: Ongoing     Nutrition Interventions:      Spoke with MD, agreed to transition to bolus feeds given regurgitation and likely will be preferred for discharge.   Bolus regimen: Glucerna 1.5, 6 cartons per day, 2 cartons given at each meal time  Give ½ container of Glucerna 1.5 via PEG at 1st feeding. Advance each feeding ½ container until goal is reached.  This regimen will provide 2136 kcal, 118 grams protein and 1080 ml free water per day.  Additional fluids per MD, pt would benefit from an additional ~1000 mL fluids per day.  Diet progression per " SLP    Nutrition Monitoring and Evaluation:     Monitor nutrition POC  Monitor weight  Monitor vital signs pertinent to nutrition         RD following and will provide updated recommendations as indicated.

## 2025-05-02 NOTE — THERAPY
Speech Language Pathology   Daily Treatment     Patient Name: Jonna Brian  AGE:  49 y.o., SEX:  male  Medical Record #: [id number]  Date of Service: 5/2/2025      Precautions:  Precautions: Fall Risk, Swallow Precautions, PEG Tube, Tracheostomy      Subjective  Patient was awake, agreeable to SLP tx.     Assessment  Speaking valve:  Cardiopulmonary Vitals  Vital Changes Observed: None     Tracheostomy  Tracheostomy: Portex 8.0  Cuff Position: Deflated  Oxygen Requirements:  4 L 28% FiO2, T-piece    Upper Airway Patency with Speaking Valve: Adequate airflow, loudness diminished  Breath Stacking Present: Yes   Behavior During Speaking Valve Trial: fatigue  Speaking Valve Left on Patient at Conclusion: No  Duration of tolerance: 15 minutes with PMV removed x2 for tracheal suctioning per pt request  Pt is still not able to trigger a reflexive swallow.    Communication:   Mod cues provided for phonatory/respiratory coordination and increased amplitude at the sound-word level for ~10% intelligiblity today.  Partner assisted AAC training provided with pt able to spell words in 4/4 trials to effectively communicate his wants and needs.     Clinical Impressions  Patient continues to present with a profound motor speech/voice disorder, absent reflexive swallow. He is tolerating the PMV for 15 minutes with intermittent tracheal suctioning. Patient would be a strong candidate for a high tech eye gaze device for communication. Patient would also benefit from a hands-free puff call light system.     Recommendations  Treatment Completed: Speech-Language Treatment, Voice Prosthesis Training    Speech-Language Treatment  Speech-Language Treatment: PMV trials, AAC training, dysarthria/voice training    Cognitive Treatment  Supervision Needs Upons Discharge: Direct assistance with IADLs (see below)    Voice Prosthesis Training  Placement: Patient  Duration: 15-30 minutes w/ close supervision    SLP Treatment Plan  Treatment Plan:  "Speech-Language Treatment, Voice Prosthesis Training, Patient/Family/Caregiver Training  SLP Frequency: 3x Per Week  Estimated Duration: Until Therapy Goals Met      Anticipated Discharge Needs  Discharge Recommendations: Recommend post-acute placement for additional speech therapy services prior to discharge home  Therapy Recommendations Upon DC: Dysphagia Training, AAC Training / Development, Patient / Family / Caregiver Education, Tracheostomy Training, Community Re-Integration, Expression Training      Patient / Family Goals  Patient / Family Goal #1: \"Swallow\"  Goal #1 Outcome: Goal not met  Short Term Goals  Short Term Goal # 1: Pt will tolerate PMV trials without negitive change to cardiopulmonary vitals for 20 minutes.  Goal Outcome # 1: Goal met, new goal added  Short Term Goal # 1 B : Patient will tolerate PMV trials for 30 minutes without negative changes to cardiopulmonary vitals.  Goal Outcome  # 1 B: Progressing as expected  Short Term Goal # 2: Pt will demo intelligible phonation at the word level w/ > 80% intelligibility w/ mod cueing  Goal Outcome # 2 : Progressing as expected  Short Term Goal # 3: Pt will express 5 target single words using the alphabet board via scanning w/ mod cueing and > 80% accuracy  Goal Outcome  # 3: Progressing as expected  Short Term Goal # 4: Pt's family / caregivers will participate in training on utilizing AAC with the pt via demonstration teachback  Goal Outcome  # 4: Goal not met (family not present)      Breann Dorantes MS,CCC-SLP  "

## 2025-05-02 NOTE — THERAPY
Speech Language Therapy Contact Note    Patient Name: Jonna Brian  Age:  49 y.o., Sex:  male  Medical Record #: [id number]  Today's Date: 5/2/2025    Discussed missed therapy with RN. Attempted to see pt for SLP tx earlier but pt was just heading out to the patio with staff. Will reattempt as able.

## 2025-05-02 NOTE — CARE PLAN
Problem: Aerosol Therapy  Goal: Improved hydration/ability to mobilize secretions and/or decreased airway edema  Description: Target End Date:  resolve prior to discharge or when underlying condition is resolved/stabilized1.  Implement heated or cool aerosol therapy2.  Assessed for optimal hydration, decreased edema and/or improved ability to mobilize secretions  Outcome: Progressing     Problem: Bronchopulmonary Hygiene  Goal: Increase mobilization of retained secretions  Description: 1.  Perform bronchopulmonary therapy as indicated by assessment2.  Perform airway suctioning3.  Perform actions to maintain patient airway  Outcome: Not Progressing

## 2025-05-02 NOTE — PROGRESS NOTES
[facility] Medicine Daily Progress Note    Date of Service  5/2/2025    Chief Complaint  Jonna Brian is a 49 y.o. male admitted 3/25/2025 with headache and weakness.    Hospital Course  This is a 49 y.o. male who was initially admitted to the hospital on 3/25/2025 with global weakness, aphasia and ataxia.  His last known normal was 1630 on March 24.  He was initially admitted to the hospital floor, his CT head as well as CTA of the head and neck were within normal limits.  There was consideration for possible Guillain-Barré syndrome.     His condition continued to deteriorate and he underwent a lumbar puncture which revealed elevated protein but no evidence of infection.  He had progressive bulbar symptoms and weakness and was transferred to the ICU.  Unfortunately shortly after he arrived in the ICU he developed stridor and required emergent intubation on 3/27.       He then underwent an MRI of his brain and spine which unfortunately revealed small areas of acute infarcts in the lower jero and the upper medulla on both sides of the midline.  He also had chronic infarcts in his jero and right inferior cerebellum.  They were chronically Cuna in the bilateral basal ganglia, the right thalamus and the right periventricular white matter.  He had evidence of small vessel disease as well in the periventricular white matter.     Ultimately it appears he has been suffering multiple small strokes and his progressive decline was due to acute infarcts in the lower jero and upper medulla. Neurology recommended DAPT for 10 days with aspirin indefinitely.  Patient completed Plavix 4/4.  Since that time he has not recovered function, has required tracheostomy and PEG placement with the ultimate goal of transferring him to postacute medical to see what function may be regained in time.    Patient able to nod head, shakes head for yes/no, able to use alphabet sheet to make needs known. He continues to work with PT/OT/SLP therapies  with some minor signs of improvement. Gio is medically cleared, plan for discharge to LTAC for ongoing management of tracheostomy tube, PEG tube, therapies.    Interval Problem Update  No acute events overnight.  On 4L T piece. Continue to monitor, manage secretions.  He is medically cleared.  Tolerating tube feeds via PEG tube.  Pending placement.      I have discussed this patient's plan of care and discharge plan at IDT rounds today with Case Management, Nursing, Nursing leadership, and other members of the IDT team.    Consultants/Specialty  critical care     Code Status  Full Code    Disposition  The patient is medically cleared for discharge to home or a post-acute facility.  Anticipate discharge to: a long-term acute care hospital    I have placed the appropriate orders for post-discharge needs.    Review of Systems  Review of Systems   Unable to perform ROS: Patient nonverbal   Constitutional:  Positive for malaise/fatigue.   HENT:  Positive for congestion.         Excessive secretions    Eyes:  Negative for blurred vision and double vision.   Respiratory:  Positive for cough. Negative for shortness of breath.    Cardiovascular:  Negative for chest pain and palpitations.   Gastrointestinal:  Negative for abdominal pain.   Genitourinary:  Negative for dysuria and frequency.   Musculoskeletal:  Negative for falls.   Neurological:  Positive for focal weakness and weakness. Negative for headaches.        Physical Exam  Temp:  [36.2 °C (97.2 °F)-36.8 °C (98.2 °F)] 36.6 °C (97.9 °F)  Pulse:  [] 96  Resp:  [16-18] 16  BP: (121-125)/(82-86) 125/86  SpO2:  [94 %-98 %] 96 %    Physical Exam  Vitals and nursing note reviewed.   Constitutional:       General: He is not in acute distress.     Appearance: He is ill-appearing.      Comments: Resting comfortably    HENT:      Right Ear: External ear normal.      Left Ear: External ear normal.   Eyes:      Conjunctiva/sclera: Conjunctivae normal.   Neck:       Comments: Tracheostomy on Tpiece  Cardiovascular:      Rate and Rhythm: Normal rate and regular rhythm.   Pulmonary:      Effort: No respiratory distress.      Breath sounds: No wheezing.      Comments: On T-piece  Wet upper airway sounds  Abdominal:      General: There is no distension.      Palpations: Abdomen is soft.      Tenderness: There is no abdominal tenderness.      Comments: PEG   Skin:     General: Skin is warm and dry.   Neurological:      Motor: Weakness present.      Comments: Follows commands  He is able to slightly move his left upper and lower extremity.  Nodding yes and no appropriately to questions.  Moves left toes when instructed   Psychiatric:         Mood and Affect: Mood is depressed.         Fluids    Intake/Output Summary (Last 24 hours) at 5/2/2025 1608  Last data filed at 5/2/2025 0800  Gross per 24 hour   Intake 100 ml   Output 600 ml   Net -500 ml        Laboratory        Recent Labs     05/01/25  0901   SODIUM 137   POTASSIUM 3.3*   CHLORIDE 94*   CO2 33   GLUCOSE 126*   BUN 24*   CREATININE 0.86   CALCIUM 10.0                     Imaging  [id number] VIEW   Final Result      No evidence of bowel obstruction.                  DX-CHEST-LIMITED (1 VIEW)   Final Result         No acute cardiopulmonary abnormalities are identified.      [id number] VIEW   Final Result         No specific finding to suggest small bowel obstruction.      DX-CHEST-PORTABLE (1 VIEW)   Final Result      Hypoinflation with bibasilar atelectasis.      US-RENAL   Final Result      1.  No hydronephrosis.      DX-CHEST-PORTABLE (1 VIEW)   Final Result         1.  Left basilar atelectasis and/or subtle infiltrates, similar to prior study      DX-CHEST-PORTABLE (1 VIEW)   Final Result         1.  Left basilar atelectasis, no focal infiltrate   2.  Cardiomegaly      DX-G.I. TUBE INJECTION, ANY TYPE   Final Result      Contrast from a PEG tube injection extends into the stomach without evidence of contrast leak.       CT-CHEST,ABDOMEN,PELVIS WITH   Final Result      1.  Large amount of free intraperitoneal air within the abdomen again seen as was previously identified on chest x-ray by review of the patient's chart, a percutaneous gastrostomy tube was placed on 3/31/2025 and is free intraperitoneal air is possibly    secondary to that recent procedure.      2.  Bibasal atelectasis and small bilateral pleural effusions.      3.  Tracheostomy tube present.      4.  No evidence of bowel obstruction or free fluid within the abdomen or pelvis.      DX-CHEST-PORTABLE (1 VIEW)   Final Result         1.  Bibasilar atelectasis   2.  Intra-abdominal free air, can be associated with history of percutaneous gastrostomy, consider other viscus perforation as warranted. Could be further evaluated with CT of the abdomen with contrast as clinically appropriate.      These findings were discussed with the patient's clinician, Bravo Lala Iv, on 4/2/2025 7:57 AM.      DX-CHEST-PORTABLE (1 VIEW)   Final Result      No evidence of acute cardiopulmonary process.      DX-ABDOMEN FOR TUBE PLACEMENT   Final Result      1.  Enteric tube extends in the fundus of stomach.      DX-ABDOMEN FOR TUBE PLACEMENT   Final Result      The gastric tube has been removed and replaced with a small bowel feeding tube which terminate in the proximal stomach.      DX-CHEST-PORTABLE (1 VIEW)   Final Result      Atelectasis within the left lung base.      MR-THORACIC SPINE-WITH & W/O   Final Result      1.  There is no abnormal intramedullary T2 signal intensity in the thoracic spinal cord.   2.  Mild degenerative disease at T8-9.   3.  Left lower segmental consolidation.      MR-CERVICAL SPINE-WITH & W/O   Final Result      1.  Small areas of acute infarcts in the lower jero and upper medulla involving both sides of the midline. There are chronic infarcts in the jero and right inferior cerebellum.   2.  There is no abnormal intramedullary T2 signal intensity in the  cervical spinal cord to suggest demyelinating lesions. There is no MR evidence of compressive myelopathy.   3.  Mild degenerative disease.            MR-BRAIN-WITH & W/O   Final Result      1.  Small areas of acute infarcts in the lower jero and upper medulla involving both sides of the midline.   2.  There are chronic infarcts in the jero and right inferior cerebellum. . There are areas of chronic lacunae in the bilateral basal ganglia, right thalamus and right periventricular white matter.   3.  There are nonspecific T2 hyperintensities in the periventricular white matter likely representing chronic small vessel disease.   4.  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      MR-LUMBAR SPINE-WITH & W/O   Final Result      1.  Unremarkable pre and postcontrast MR examination of the lumbar spine.   2.  Clinical suspicious for GBS: There is no abnormal enhancement of the lumbar nerve roots.      DX-ABDOMEN FOR TUBE PLACEMENT   Final Result      NG tube tip projects at the peripyloric region.      DX-CHEST-PORTABLE (1 VIEW)   Final Result      1.  Low lung volumes without definite acute cardiopulmonary abnormality.   2.  Support apparatus as above.      CT-HEAD W/O   Final Result      1.  No acute intracranial abnormality.   2.  Remote right cerebellar infarct.               DX-CHEST-PORTABLE (1 VIEW)   Final Result      No acute cardiopulmonary disease evident.      DX-CHEST-PORTABLE (1 VIEW)   Final Result      No acute cardiopulmonary disease evident.      CT-CEREBRAL PERFUSION ANALYSIS   Final Result      1. Cerebral blood flow less than 30% possibly representing completed infarct = 0 mL. Based on distribution of this finding, this is unlikely to represent artifact.      2. T Max more than 6 seconds possibly representing combination of completed infarct and ischemia = 7 mL. Based on the distribution of this finding, this is possibly artifact.      3.  "Mismatched volume possibly representing ischemic brain/penumbra= 0 mL      4.  Please note that this cerebral perfusion study and report is Quantitative and targets supratentorial (cerebral) perfusion for evaluation of large vessel territory acute ischemia/infarction. For example, lacunar infarcts, and brainstem/posterior fossa    ischemia/infarction are not evaluated on this study.  Data acquisition is subject to artifacts which can yield non-anatomically plausible perfusion maps which may be due to motion, bolus timing, signal to noise ratio, or other technical factors.    Perfusion map abnormalities which show non-anatomic distributions are likely artifact.   This study is not \"stand-alone\" and should only be utilized for diagnosis, management/treatment in correlation with CT, CTA, and/or MRI and clinical factors.         CT-CTA NECK WITH & W/O-POST PROCESSING   Final Result      CT angiogram of the neck within normal limits.      CT-CTA HEAD WITH & W/O-POST PROCESS   Final Result      CT angiogram of the [place] within normal limits.      CT-HEAD W/O   Final Result      Head CT without contrast within normal limits. No evidence of acute cerebral infarction, hemorrhage or mass lesion.                    Assessment/Plan  * Acute ischemic stroke (HCC)- (present on admission)  Assessment & Plan  Acute ischemic stroke  MRI of the brain revealed acute infarcts of the lower jero and upper medulla.  He effectively is a quadriplegic and required tracheostomy and PEG tube.  Aspirin and statin  Blood pressure control  Family hope for functional recovery    Hypokalemia  Assessment & Plan  Replace as needed    Emesis  Assessment & Plan  Tube feeds held   Continue Reglan  Patient had not had a bowel movement    Patient now tolerating tube feeds  Resolved    Constipation  Assessment & Plan  KUB showed no obstruction  Bowel protocol    Improving    Hematuria  Assessment & Plan  Darkened urine, no gross hematuria  No " recent grove trauma  Renal US normal, no stones  Continue antibiotics for possible UTI given mild signs of infection on UA  If hematuria persists after keflex treatment, consider urology consult/referral    Proteus mirabilis pneumonia (HCC)- (present on admission)  Assessment & Plan  Treated with Zosyn de-escalated to Augmentin based on cultures from sputum    Acute neuromuscular respiratory failure (HCC)- (present on admission)  Assessment & Plan  Requiring tracheostomy    Anxiety  Assessment & Plan  Ativan for anxiety.     Type 2 diabetes mellitus with hyperglycemia, without long-term current use of insulin (HCC)- (present on admission)  Assessment & Plan  Details are unclear  Reportedly he is on metformin as an outpatient  Hemoglobin A1c 6.3  He does not require insulin    Primary hypertension- (present on admission)  Assessment & Plan  Blood pressure is appropriate with hydrochlorothiazide 25 mg daily and Norvasc 10 mg daily         VTE prophylaxis:    enoxaparin ppx    I have performed a physical exam and reviewed and updated ROS and Plan today (5/2/2025). In review of yesterday's note (5/1/2025), there are no changes except as documented above.    Greater than 48 minutes spent prepping to see patient (e.g. review of tests) obtaining and/or reviewing separately obtained history. Performing a medically appropriate examination and/ evaluation.  Counseling and educating the patient/family/caregiver.  Ordering medications, tests, or procedures.  Referring and communicating with other health care professionals.  Documenting clinical information in EPIC.  Independently interpreting results and communicating results to patient/family/caregiver.  Care coordination.

## 2025-05-02 NOTE — THERAPY
Physical Therapy   Daily Treatment     Patient Name: Jonna Brian  Age:  49 y.o., Sex:  male  Medical Record #: [id number]  Today's Date: 5/1/2025     Precautions  Precautions: Fall Risk;Swallow Precautions;Tracheostomy ;PEG Tube  Comments: R hemiplegia    Assessment  Pt seen for PT tx session with mobility detailed down below. Pt continues to be at Total A for bed mobility, however his LUE and LLE appear to be improving with therex. Noted to have stronger contractions in left biceps, triceps, and quad. Pt also has a involuntary, mild but consistent right triceps contraction with end range cervical extension. Pt transferred to cardiac chair, educated on importance of time spent upright with skin integrity considerations. Continue to recommend placement, will follow.     Plan    Treatment Plan Status: Continue Current Treatment Plan  Type of Treatment: Bed Mobility, Family / Caregiver Training, Gait Training, Neuro Re-Education / Balance, Self Care / Home Evaluation, Stair Training, Therapeutic Activities, Therapeutic Exercise  Treatment Frequency: 3 Times per Week  Treatment Duration: Until Therapy Goals Met    DC Equipment Recommendations: Unable to determine at this time  Discharge Recommendations: Recommend post-acute placement for additional physical therapy services prior to discharge home        Vitals   O2 Delivery Device T-Piece   Non Verbal Descriptors   Non Verbal Scale  Calm   Cognition    Speech/ Communication Intubated / Trached;Nods Appropriately   Level of Consciousness Alert   Sitting Lower Body Exercises   Sitting Lower Body Exercises Yes   Tricep Press 2 sets of 10;Left  (gravity eliminated)   Bicep Curls 2 sets of 10;Left  (gravity eliminated)   Ankle Pumps 2 sets of 10;Left   Long Arc Quad 2 sets of 10;Left  (1 second holds at the top)   Other Treatments   Other Treatments Provided continuing to work on holding head in midline and bringing to full upright posture. pt noted to have consistent,  mild right triceps contraction with upper range cervical extension   Balance   Sitting Balance (Static) Dependent   Sitting Balance (Dynamic) Dependent   Weight Shift Sitting Absent   Skilled Intervention Postural Facilitation;Verbal Cuing   Comments needing continous postural support, however he was able to hold his head up for 30-45 seconds at a time.   Bed Mobility    Supine to Sit Total Assist   Sit to Supine Total Assist   Scooting Total Assist   Rolling Total Assist to Rt.   Skilled Intervention Verbal Cuing   Comments 2p assist   Gait Analysis   Gait Level Of Assist Unable to Participate   Functional Mobility   Sit to Stand Unable to Participate   Bed, Chair, Wheelchair Transfer Total Assist   Mobility slide transfer to cardiac chair   Skilled Intervention Verbal Cuing   6 Clicks Assessment - How much HELP from from another person do you currently need... (If the patient hasn't done an activity recently, how much help from another person do you think he/she would need if he/she tried?)   Turning from your back to your side while in a flat bed without using bedrails? 1   Moving from lying on your back to sitting on the side of a flat bed without using bedrails? 1   Moving to and from a bed to a chair (including a wheelchair)? 1   Standing up from a chair using your arms (e.g., wheelchair, or bedside chair)? 1   Walking in hospital room? 1   Climbing 3-5 steps with a railing? 1   6 clicks Mobility Score 6   Short Term Goals    Short Term Goal # 1 pt will move supine<>eob with min a in 6 tx for bed mobility.   Goal Outcome # 1 goal not met   Short Term Goal # 2 pt will sit at eob for 5 min with fair- balance in 6 tx for oob tolerance.   Goal Outcome # 2 Goal not met   Short Term Goal # 3 pt will complete sts with hemiwalker and min a in 6 tx for functional mobility.   Goal Outcome # 3 Goal not met   Short Term Goal # 4 pt will hold head in upright position for 30 seconds for 6 tx for posture.   Goal Outcome # 4  Progressing as expected   Physical Therapy Treatment Plan   Physical Therapy Treatment Plan Continue Current Treatment Plan   Anticipated Discharge Equipment and Recommendations   DC Equipment Recommendations Unable to determine at this time   Discharge Recommendations Recommend post-acute placement for additional physical therapy services prior to discharge home   Interdisciplinary Plan of Care Collaboration   IDT Collaboration with  Nursing;Therapy Tech   Patient Position at End of Therapy Seated  (up in cardiac chair with RN)   Collaboration Comments RN updated   Session Information   Date / Session Number  5/1- 14 (1/3, 5/7)

## 2025-05-02 NOTE — CARE PLAN
The patient is Stable - Low risk of patient condition declining or worsening    Shift Goals  Clinical Goals: Monitor Neuro Status, Mobilize up to chair  Patient Goals: jennifer  Family Goals: updates, rest    Progress made toward(s) clinical / shift goals:    Problem: Optimal Care of the Stroke Patient  Goal: Optimal acute care for the stroke patient  Outcome: Progressing  Note: Q4 neuro checks are in place     Problem: Neuro Status  Goal: Neuro status will remain stable or improve  Outcome: Progressing  Note: Patient is alert and oriented but is currently non-verbal     Problem: Dysphagia  Goal: Dysphagia will improve  Outcome: Progressing     Problem: Risk for Aspiration  Goal: Patient's risk for aspiration will be absent or decrease  Outcome: Progressing       Patient is not progressing towards the following goals:

## 2025-05-02 NOTE — CARE PLAN
The patient is Stable - Low risk of patient condition declining or worsening    Shift Goals  Clinical Goals: neuro monitoring, rest  Patient Goals: JAMARI  Family Goals: sleep    Progress made toward(s) clinical / shift goals:    Problem: Neuro Status  Goal: Neuro status will remain stable or improve  Description: Target End Date:  Prior to discharge or change in level of careDocument on Neuro assessment in the Assessment flowsheet1.  Assess and monitor neurologic status per provider order/protocol/unit policy2.  Assess level of consciousness and orientation3.  Assess for speech, dysarthria, dysphagia, facial symmetry4.  Assess visual field, eye movements, gaze preference, pupil reaction and size5.  Assess muscle strength and motor response in all four extremities6.  Assess for sensation (numbness and tingling)7.  Assess basic neuro reflexes (cough, gag, corneal)8.  Identify changes in neuro status and report to provider for testing/treatment orders  Outcome: Progressing  Note: Q4 neuro checks in place     Problem: Respiratory - Stroke Patient  Goal: Patient will achieve/maintain optimum respiratory rate/effort  Description: Target End Date:  Prior to discharge or change in level of careDocument on Assessment flowsheet1.  Assess and monitor respiratory rate, rhythm, depth and effort of respiration2.  Oxygenation assessed throughout shift (recommendation of >94% for new stroke patients)3.  Oxygen administered and/or titrated per order4.  Collaboration with RT to administer medication/treatments per order5.  Patient educated on importance of turning, coughing, and deep breathing6.  Patient positioned for maximum ventilatory efficiency7.  Airway suctioning provided as needed8.  Incentive spirometry encouraged 5-10 times every hour or while awake  Outcome: Progressing  Note: Administered oral and trach care, suctioning and monitoring     Problem: Risk for Aspiration  Goal: Patient's risk for aspiration will be absent or  decrease  Description: Target End Date:  Prior to discharge or change in level of care1.   Complete dysphagia screening on admission2.   NPO until dysphagia screening complete or medically cleared3.   Collaborate with Speech Therapy, Clinical Dietitian and interdisciplinary team4.   Implement aspiration precautions5.   Assist patient up to chair for meals6.   Elevate head of bed 90 degrees if patient is unable to get out of bed7.   Encourage small bites8.   Ensure foods/liquids are of appropriate consistency9.   Assess for any signs/symptoms of [id number]. Assess breath sounds and vital signs after oral intake  Outcome: Progressing  Note: Aspiration precautions in place, monitored for vomiting     Problem: Mobility - Stroke  Goal: Patient's capacity to carry out activities will improve  Description: Target End Date:  Prior to discharge or change in level of care1.  Assess for barriers to mobility/activity2.  Implement activity per interdisciplinary team recommendations3.  Target activity level identified and patient/family/caregiver aware of goal4.  Provide assistive devices5.  Instruct patient/caregiver on proper use of assistive/adaptive devices6.  Schedule activities and rest periods to decrease effects of fatigue7.  Encourage mobilization to extent of ability8.  Maintain proper body alignment9.  Provide adequate pain management to allow progressive [id number]. Implement pace maker precautions as needed  Outcome: Progressing  Note: Q2 turns, assisted range of motion       Patient is not progressing towards the following goals:

## 2025-05-03 PROCEDURE — A9270 NON-COVERED ITEM OR SERVICE: HCPCS | Performed by: INTERNAL MEDICINE

## 2025-05-03 PROCEDURE — 700102 HCHG RX REV CODE 250 W/ 637 OVERRIDE(OP): Performed by: INTERNAL MEDICINE

## 2025-05-03 PROCEDURE — 94669 MECHANICAL CHEST WALL OSCILL: CPT

## 2025-05-03 PROCEDURE — 99232 SBSQ HOSP IP/OBS MODERATE 35: CPT | Performed by: STUDENT IN AN ORGANIZED HEALTH CARE EDUCATION/TRAINING PROGRAM

## 2025-05-03 PROCEDURE — 700111 HCHG RX REV CODE 636 W/ 250 OVERRIDE (IP): Mod: JZ | Performed by: INTERNAL MEDICINE

## 2025-05-03 PROCEDURE — 770001 HCHG ROOM/CARE - MED/SURG/GYN PRIV*

## 2025-05-03 PROCEDURE — 94640 AIRWAY INHALATION TREATMENT: CPT

## 2025-05-03 PROCEDURE — 700111 HCHG RX REV CODE 636 W/ 250 OVERRIDE (IP): Mod: JZ

## 2025-05-03 RX ADMIN — GABAPENTIN 400 MG: 400 CAPSULE ORAL at 12:28

## 2025-05-03 RX ADMIN — ONDANSETRON 4 MG: 2 INJECTION INTRAMUSCULAR; INTRAVENOUS at 09:08

## 2025-05-03 RX ADMIN — ATORVASTATIN CALCIUM 80 MG: 80 TABLET, FILM COATED ORAL at 16:55

## 2025-05-03 RX ADMIN — Medication 5 MG: at 19:46

## 2025-05-03 RX ADMIN — ONDANSETRON 4 MG: 2 INJECTION INTRAMUSCULAR; INTRAVENOUS at 19:46

## 2025-05-03 RX ADMIN — GABAPENTIN 400 MG: 400 CAPSULE ORAL at 04:41

## 2025-05-03 RX ADMIN — ENOXAPARIN SODIUM 40 MG: 100 INJECTION SUBCUTANEOUS at 16:55

## 2025-05-03 RX ADMIN — AMLODIPINE BESYLATE 10 MG: 10 TABLET ORAL at 04:41

## 2025-05-03 RX ADMIN — GABAPENTIN 400 MG: 400 CAPSULE ORAL at 16:55

## 2025-05-03 RX ADMIN — ACETAMINOPHEN 650 MG: 325 TABLET ORAL at 12:28

## 2025-05-03 RX ADMIN — ASPIRIN 81 MG: 81 TABLET, CHEWABLE ORAL at 04:41

## 2025-05-03 RX ADMIN — HYDROCHLOROTHIAZIDE 25 MG: 25 TABLET ORAL at 04:41

## 2025-05-03 ASSESSMENT — ENCOUNTER SYMPTOMS
SHORTNESS OF BREATH: 0
ABDOMINAL PAIN: 0
FOCAL WEAKNESS: 1
WEAKNESS: 1
HEADACHES: 0
COUGH: 1
DOUBLE VISION: 0
BLURRED VISION: 0
PALPITATIONS: 0
FALLS: 0

## 2025-05-03 ASSESSMENT — PAIN DESCRIPTION - PAIN TYPE: TYPE: ACUTE PAIN

## 2025-05-03 NOTE — CARE PLAN
The patient is Stable - Low risk of patient condition declining or worsening    Shift Goals  Clinical Goals: monitor neuro status, respiratory status  Patient Goals: JAMARI  Family Goals: updates, rest    Progress made toward(s) clinical / shift goals:    Problem: Neuro Status  Goal: Neuro status will remain stable or improve  Description: Target End Date:  Prior to discharge or change in level of careDocument on Neuro assessment in the Assessment flowsheet1.  Assess and monitor neurologic status per provider order/protocol/unit policy2.  Assess level of consciousness and orientation3.  Assess for speech, dysarthria, dysphagia, facial symmetry4.  Assess visual field, eye movements, gaze preference, pupil reaction and size5.  Assess muscle strength and motor response in all four extremities6.  Assess for sensation (numbness and tingling)7.  Assess basic neuro reflexes (cough, gag, corneal)8.  Identify changes in neuro status and report to provider for testing/treatment orders  Outcome: Progressing  Note: Monitored neurological status every four hours and discussed purpose of assessment     Problem: Respiratory - Stroke Patient  Goal: Patient will achieve/maintain optimum respiratory rate/effort  Description: Target End Date:  Prior to discharge or change in level of careDocument on Assessment flowsheet1.  Assess and monitor respiratory rate, rhythm, depth and effort of respiration2.  Oxygenation assessed throughout shift (recommendation of >94% for new stroke patients)3.  Oxygen administered and/or titrated per order4.  Collaboration with RT to administer medication/treatments per order5.  Patient educated on importance of turning, coughing, and deep breathing6.  Patient positioned for maximum ventilatory efficiency7.  Airway suctioning provided as needed8.  Incentive spirometry encouraged 5-10 times every hour or while awake  Outcome: Progressing  Note: Administered o2 monitoring and provided oral care, trach care, and  suctioning     Problem: Risk for Aspiration  Goal: Patient's risk for aspiration will be absent or decrease  Description: Target End Date:  Prior to discharge or change in level of care1.   Complete dysphagia screening on admission2.   NPO until dysphagia screening complete or medically cleared3.   Collaborate with Speech Therapy, Clinical Dietitian and interdisciplinary team4.   Implement aspiration precautions5.   Assist patient up to chair for meals6.   Elevate head of bed 90 degrees if patient is unable to get out of bed7.   Encourage small bites8.   Ensure foods/liquids are of appropriate consistency9.   Assess for any signs/symptoms of [id number]. Assess breath sounds and vital signs after oral intake  Note: Ensured proper positioning and precautions were in place to prevent aspiration       Patient is not progressing towards the following goals:

## 2025-05-03 NOTE — PROGRESS NOTES
[facility] Medicine Daily Progress Note    Date of Service  5/3/2025    Chief Complaint  Jonna Brian is a 49 y.o. male admitted 3/25/2025 with headache and weakness.    Hospital Course  This is a 49 y.o. male who was initially admitted to the hospital on 3/25/2025 with global weakness, aphasia and ataxia.  His last known normal was 1630 on March 24.  He was initially admitted to the hospital floor, his CT head as well as CTA of the head and neck were within normal limits.  There was consideration for possible Guillain-Barré syndrome.     His condition continued to deteriorate and he underwent a lumbar puncture which revealed elevated protein but no evidence of infection.  He had progressive bulbar symptoms and weakness and was transferred to the ICU.  Unfortunately shortly after he arrived in the ICU he developed stridor and required emergent intubation on 3/27.       He then underwent an MRI of his brain and spine which unfortunately revealed small areas of acute infarcts in the lower jero and the upper medulla on both sides of the midline.  He also had chronic infarcts in his jero and right inferior cerebellum.  They were chronically Cuna in the bilateral basal ganglia, the right thalamus and the right periventricular white matter.  He had evidence of small vessel disease as well in the periventricular white matter.     Ultimately it appears he has been suffering multiple small strokes and his progressive decline was due to acute infarcts in the lower jero and upper medulla. Neurology recommended DAPT for 10 days with aspirin indefinitely.  Patient completed Plavix 4/4.  Since that time he has not recovered function, has required tracheostomy and PEG placement with the ultimate goal of transferring him to postacute medical to see what function may be regained in time.    Patient able to nod head, shakes head for yes/no, able to use alphabet sheet to make needs known. He continues to work with PT/OT/SLP therapies  with some minor signs of improvement. Gio is medically cleared, plan for discharge to LTAC for ongoing management of tracheostomy tube, PEG tube, therapies.    Interval Problem Update  No acute events overnight.  On 4L T piece. Continue to monitor, manage secretions.  He is medically cleared.  Pending placement.      I have discussed this patient's plan of care and discharge plan at IDT rounds today with Case Management, Nursing, Nursing leadership, and other members of the IDT team.    Consultants/Specialty  critical care     Code Status  Full Code    Disposition  The patient is medically cleared for discharge to home or a post-acute facility.  Anticipate discharge to: a long-term acute care hospital    I have placed the appropriate orders for post-discharge needs.    Review of Systems  Review of Systems   Unable to perform ROS: Patient nonverbal   Constitutional:  Positive for malaise/fatigue.   HENT:  Positive for congestion.         Excessive secretions    Eyes:  Negative for blurred vision and double vision.   Respiratory:  Positive for cough. Negative for shortness of breath.    Cardiovascular:  Negative for chest pain and palpitations.   Gastrointestinal:  Negative for abdominal pain.   Genitourinary:  Negative for dysuria and frequency.   Musculoskeletal:  Negative for falls.   Neurological:  Positive for focal weakness and weakness. Negative for headaches.        Physical Exam  Temp:  [36.7 °C (98.1 °F)-36.9 °C (98.4 °F)] 36.9 °C (98.4 °F)  Pulse:  [] 91  Resp:  [16-20] 18  BP: (118-126)/(79-86) 126/79  SpO2:  [93 %-97 %] 96 %    Physical Exam  Vitals and nursing note reviewed.   Constitutional:       General: He is not in acute distress.     Appearance: He is ill-appearing.      Comments: Resting comfortably    HENT:      Right Ear: External ear normal.      Left Ear: External ear normal.   Eyes:      Conjunctiva/sclera: Conjunctivae normal.   Neck:      Comments: Tracheostomy on  Tpiece  Cardiovascular:      Rate and Rhythm: Normal rate and regular rhythm.   Pulmonary:      Effort: No respiratory distress.      Breath sounds: No wheezing.      Comments: On T-piece  Wet upper airway sounds  Abdominal:      General: There is no distension.      Palpations: Abdomen is soft.      Tenderness: There is no abdominal tenderness.      Comments: PEG   Skin:     General: Skin is warm and dry.   Neurological:      Motor: Weakness present.      Comments: Follows commands  He is able to slightly move his left upper and lower extremity.  Nodding yes and no appropriately to questions.  Moves left toes when instructed   Psychiatric:         Mood and Affect: Mood is depressed.         Fluids    Intake/Output Summary (Last 24 hours) at 5/3/2025 1325  Last data filed at 5/3/2025 1110  Gross per 24 hour   Intake 650 ml   Output 400 ml   Net 250 ml        Laboratory        Recent Labs     05/01/25  0901   SODIUM 137   POTASSIUM 3.3*   CHLORIDE 94*   CO2 33   GLUCOSE 126*   BUN 24*   CREATININE 0.86   CALCIUM 10.0                     Imaging  [id number] VIEW   Final Result      No evidence of bowel obstruction.                  DX-CHEST-LIMITED (1 VIEW)   Final Result         No acute cardiopulmonary abnormalities are identified.      [id number] VIEW   Final Result         No specific finding to suggest small bowel obstruction.      DX-CHEST-PORTABLE (1 VIEW)   Final Result      Hypoinflation with bibasilar atelectasis.      US-RENAL   Final Result      1.  No hydronephrosis.      DX-CHEST-PORTABLE (1 VIEW)   Final Result         1.  Left basilar atelectasis and/or subtle infiltrates, similar to prior study      DX-CHEST-PORTABLE (1 VIEW)   Final Result         1.  Left basilar atelectasis, no focal infiltrate   2.  Cardiomegaly      DX-G.I. TUBE INJECTION, ANY TYPE   Final Result      Contrast from a PEG tube injection extends into the stomach without evidence of contrast leak.      CT-CHEST,ABDOMEN,PELVIS  WITH   Final Result      1.  Large amount of free intraperitoneal air within the abdomen again seen as was previously identified on chest x-ray by review of the patient's chart, a percutaneous gastrostomy tube was placed on 3/31/2025 and is free intraperitoneal air is possibly    secondary to that recent procedure.      2.  Bibasal atelectasis and small bilateral pleural effusions.      3.  Tracheostomy tube present.      4.  No evidence of bowel obstruction or free fluid within the abdomen or pelvis.      DX-CHEST-PORTABLE (1 VIEW)   Final Result         1.  Bibasilar atelectasis   2.  Intra-abdominal free air, can be associated with history of percutaneous gastrostomy, consider other viscus perforation as warranted. Could be further evaluated with CT of the abdomen with contrast as clinically appropriate.      These findings were discussed with the patient's clinician, Bravo Lala Iv, on 4/2/2025 7:57 AM.      DX-CHEST-PORTABLE (1 VIEW)   Final Result      No evidence of acute cardiopulmonary process.      DX-ABDOMEN FOR TUBE PLACEMENT   Final Result      1.  Enteric tube extends in the fundus of stomach.      DX-ABDOMEN FOR TUBE PLACEMENT   Final Result      The gastric tube has been removed and replaced with a small bowel feeding tube which terminate in the proximal stomach.      DX-CHEST-PORTABLE (1 VIEW)   Final Result      Atelectasis within the left lung base.      MR-THORACIC SPINE-WITH & W/O   Final Result      1.  There is no abnormal intramedullary T2 signal intensity in the thoracic spinal cord.   2.  Mild degenerative disease at T8-9.   3.  Left lower segmental consolidation.      MR-CERVICAL SPINE-WITH & W/O   Final Result      1.  Small areas of acute infarcts in the lower jero and upper medulla involving both sides of the midline. There are chronic infarcts in the jero and right inferior cerebellum.   2.  There is no abnormal intramedullary T2 signal intensity in the cervical spinal cord to  suggest demyelinating lesions. There is no MR evidence of compressive myelopathy.   3.  Mild degenerative disease.            MR-BRAIN-WITH & W/O   Final Result      1.  Small areas of acute infarcts in the lower jero and upper medulla involving both sides of the midline.   2.  There are chronic infarcts in the jero and right inferior cerebellum. . There are areas of chronic lacunae in the bilateral basal ganglia, right thalamus and right periventricular white matter.   3.  There are nonspecific T2 hyperintensities in the periventricular white matter likely representing chronic small vessel disease.   4.  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      MR-LUMBAR SPINE-WITH & W/O   Final Result      1.  Unremarkable pre and postcontrast MR examination of the lumbar spine.   2.  Clinical suspicious for GBS: There is no abnormal enhancement of the lumbar nerve roots.      DX-ABDOMEN FOR TUBE PLACEMENT   Final Result      NG tube tip projects at the peripyloric region.      DX-CHEST-PORTABLE (1 VIEW)   Final Result      1.  Low lung volumes without definite acute cardiopulmonary abnormality.   2.  Support apparatus as above.      CT-HEAD W/O   Final Result      1.  No acute intracranial abnormality.   2.  Remote right cerebellar infarct.               DX-CHEST-PORTABLE (1 VIEW)   Final Result      No acute cardiopulmonary disease evident.      DX-CHEST-PORTABLE (1 VIEW)   Final Result      No acute cardiopulmonary disease evident.      CT-CEREBRAL PERFUSION ANALYSIS   Final Result      1. Cerebral blood flow less than 30% possibly representing completed infarct = 0 mL. Based on distribution of this finding, this is unlikely to represent artifact.      2. T Max more than 6 seconds possibly representing combination of completed infarct and ischemia = 7 mL. Based on the distribution of this finding, this is possibly artifact.      3. Mismatched volume possibly  "representing ischemic brain/penumbra= 0 mL      4.  Please note that this cerebral perfusion study and report is Quantitative and targets supratentorial (cerebral) perfusion for evaluation of large vessel territory acute ischemia/infarction. For example, lacunar infarcts, and brainstem/posterior fossa    ischemia/infarction are not evaluated on this study.  Data acquisition is subject to artifacts which can yield non-anatomically plausible perfusion maps which may be due to motion, bolus timing, signal to noise ratio, or other technical factors.    Perfusion map abnormalities which show non-anatomic distributions are likely artifact.   This study is not \"stand-alone\" and should only be utilized for diagnosis, management/treatment in correlation with CT, CTA, and/or MRI and clinical factors.         CT-CTA NECK WITH & W/O-POST PROCESSING   Final Result      CT angiogram of the neck within normal limits.      CT-CTA HEAD WITH & W/O-POST PROCESS   Final Result      CT angiogram of the [place] within normal limits.      CT-HEAD W/O   Final Result      Head CT without contrast within normal limits. No evidence of acute cerebral infarction, hemorrhage or mass lesion.                    Assessment/Plan  * Acute ischemic stroke (HCC)- (present on admission)  Assessment & Plan  Acute ischemic stroke  MRI of the brain revealed acute infarcts of the lower jero and upper medulla.  He effectively is a quadriplegic and required tracheostomy and PEG tube.  Aspirin and statin  Blood pressure control  Family hope for functional recovery    Hypokalemia  Assessment & Plan  Replace as needed    Emesis  Assessment & Plan  Tube feeds held   Continue Reglan  Patient had not had a bowel movement    Patient now tolerating tube feeds  Resolved    Constipation  Assessment & Plan  KUB showed no obstruction  Bowel protocol    Improving    Hematuria  Assessment & Plan  Darkened urine, no gross hematuria  No recent grove trauma  Renal US " normal, no stones  Continue antibiotics for possible UTI given mild signs of infection on UA  If hematuria persists after keflex treatment, consider urology consult/referral    Proteus mirabilis pneumonia (HCC)- (present on admission)  Assessment & Plan  Treated with Zosyn de-escalated to Augmentin based on cultures from sputum    Acute neuromuscular respiratory failure (HCC)- (present on admission)  Assessment & Plan  Requiring tracheostomy    Anxiety  Assessment & Plan  Ativan for anxiety.     Type 2 diabetes mellitus with hyperglycemia, without long-term current use of insulin (HCC)- (present on admission)  Assessment & Plan  Details are unclear  Reportedly he is on metformin as an outpatient  Hemoglobin A1c 6.3  He does not require insulin    Primary hypertension- (present on admission)  Assessment & Plan  Blood pressure is appropriate with hydrochlorothiazide 25 mg daily and Norvasc 10 mg daily         VTE prophylaxis:    enoxaparin ppx    I have performed a physical exam and reviewed and updated ROS and Plan today (5/3/2025). In review of yesterday's note (5/2/2025), there are no changes except as documented above.    Greater than 48 minutes spent prepping to see patient (e.g. review of tests) obtaining and/or reviewing separately obtained history. Performing a medically appropriate examination and/ evaluation.  Counseling and educating the patient/family/caregiver.  Ordering medications, tests, or procedures.  Referring and communicating with other health care professionals.  Documenting clinical information in EPIC.  Independently interpreting results and communicating results to patient/family/caregiver.  Care coordination.

## 2025-05-03 NOTE — CARE PLAN
The patient is Stable - Low risk of patient condition declining or worsening    Shift Goals  Clinical Goals: Monitor Respiratory status, Neuro Checks, Skin Integrity  Patient Goals: Comfort  Family Goals: updates, rest    Progress made toward(s) clinical / shift goals:    Problem: Psychosocial - Patient Condition  Goal: Patient's ability to verbalize feelings about condition will improve  Outcome: Progressing     Problem: Neuro Status  Goal: Neuro status will remain stable or improve  Outcome: Progressing     Problem: Respiratory - Stroke Patient  Goal: Patient will achieve/maintain optimum respiratory rate/effort  Outcome: Progressing       Patient is not progressing towards the following goals:

## 2025-05-04 LAB
APPEARANCE UR: CLEAR
BACTERIA #/AREA URNS HPF: ABNORMAL /HPF
BILIRUB UR QL STRIP.AUTO: NEGATIVE
CASTS URNS QL MICRO: ABNORMAL /LPF (ref 0–2)
COLOR UR: ABNORMAL
EPITHELIAL CELLS 1715: ABNORMAL /HPF (ref 0–5)
GLUCOSE UR STRIP.AUTO-MCNC: NEGATIVE MG/DL
KETONES UR STRIP.AUTO-MCNC: ABNORMAL MG/DL
LEUKOCYTE ESTERASE UR QL STRIP.AUTO: ABNORMAL
MICRO URNS: ABNORMAL
NITRITE UR QL STRIP.AUTO: NEGATIVE
PH UR STRIP.AUTO: 6.5 [PH] (ref 5–8)
PROT UR QL STRIP: NEGATIVE MG/DL
RBC # URNS HPF: >100 /HPF (ref 0–2)
RBC UR QL AUTO: ABNORMAL
SP GR UR STRIP.AUTO: 1.03
UROBILINOGEN UR STRIP.AUTO-MCNC: 1 EU/DL
WBC #/AREA URNS HPF: ABNORMAL /HPF

## 2025-05-04 PROCEDURE — A9270 NON-COVERED ITEM OR SERVICE: HCPCS | Performed by: INTERNAL MEDICINE

## 2025-05-04 PROCEDURE — 81001 URINALYSIS AUTO W/SCOPE: CPT

## 2025-05-04 PROCEDURE — 700102 HCHG RX REV CODE 250 W/ 637 OVERRIDE(OP): Performed by: INTERNAL MEDICINE

## 2025-05-04 PROCEDURE — 99999 PR NO CHARGE: CPT | Performed by: STUDENT IN AN ORGANIZED HEALTH CARE EDUCATION/TRAINING PROGRAM

## 2025-05-04 PROCEDURE — 99232 SBSQ HOSP IP/OBS MODERATE 35: CPT | Performed by: STUDENT IN AN ORGANIZED HEALTH CARE EDUCATION/TRAINING PROGRAM

## 2025-05-04 PROCEDURE — 770001 HCHG ROOM/CARE - MED/SURG/GYN PRIV*

## 2025-05-04 PROCEDURE — 94640 AIRWAY INHALATION TREATMENT: CPT

## 2025-05-04 PROCEDURE — 700111 HCHG RX REV CODE 636 W/ 250 OVERRIDE (IP): Mod: JZ | Performed by: INTERNAL MEDICINE

## 2025-05-04 PROCEDURE — 94669 MECHANICAL CHEST WALL OSCILL: CPT

## 2025-05-04 RX ADMIN — ENOXAPARIN SODIUM 40 MG: 100 INJECTION SUBCUTANEOUS at 18:17

## 2025-05-04 RX ADMIN — AMLODIPINE BESYLATE 10 MG: 10 TABLET ORAL at 04:09

## 2025-05-04 RX ADMIN — GABAPENTIN 400 MG: 400 CAPSULE ORAL at 18:17

## 2025-05-04 RX ADMIN — ATORVASTATIN CALCIUM 80 MG: 80 TABLET, FILM COATED ORAL at 18:17

## 2025-05-04 RX ADMIN — ASPIRIN 81 MG: 81 TABLET, CHEWABLE ORAL at 04:09

## 2025-05-04 RX ADMIN — Medication 5 MG: at 21:24

## 2025-05-04 RX ADMIN — GABAPENTIN 400 MG: 400 CAPSULE ORAL at 12:06

## 2025-05-04 RX ADMIN — GABAPENTIN 400 MG: 400 CAPSULE ORAL at 04:09

## 2025-05-04 RX ADMIN — HYDROCHLOROTHIAZIDE 25 MG: 25 TABLET ORAL at 04:09

## 2025-05-04 ASSESSMENT — ENCOUNTER SYMPTOMS
BLURRED VISION: 0
COUGH: 1
WEAKNESS: 1
FOCAL WEAKNESS: 1
FALLS: 0
HEADACHES: 0
PALPITATIONS: 0
DOUBLE VISION: 0
SHORTNESS OF BREATH: 0
ABDOMINAL PAIN: 0

## 2025-05-04 NOTE — CARE PLAN
The patient is Stable - Low risk of patient condition declining or worsening    Shift Goals  Clinical Goals: Monitor respiratory status, Skin Integrity  Patient Goals: Comfort  Family Goals: updates, rest    Progress made toward(s) clinical / shift goals:    Problem: Knowledge Deficit - Standard  Goal: Patient and family/care givers will demonstrate understanding of plan of care, disease process/condition, diagnostic tests and medications  Outcome: Progressing     Problem: Neuro Status  Goal: Neuro status will remain stable or improve  Outcome: Progressing     Problem: Respiratory - Stroke Patient  Goal: Patient will achieve/maintain optimum respiratory rate/effort  Outcome: Progressing       Patient is not progressing towards the following goals:

## 2025-05-04 NOTE — PROGRESS NOTES
[facility] Medicine Daily Progress Note    Date of Service  5/4/2025    Chief Complaint  Jonna Brian is a 49 y.o. male admitted 3/25/2025 with headache and weakness.    Hospital Course  This is a 49 y.o. male who was initially admitted to the hospital on 3/25/2025 with global weakness, aphasia and ataxia.  His last known normal was 1630 on March 24.  He was initially admitted to the hospital floor, his CT head as well as CTA of the head and neck were within normal limits.  There was consideration for possible Guillain-Barré syndrome.     His condition continued to deteriorate and he underwent a lumbar puncture which revealed elevated protein but no evidence of infection.  He had progressive bulbar symptoms and weakness and was transferred to the ICU.  Unfortunately shortly after he arrived in the ICU he developed stridor and required emergent intubation on 3/27.       He then underwent an MRI of his brain and spine which unfortunately revealed small areas of acute infarcts in the lower jero and the upper medulla on both sides of the midline.  He also had chronic infarcts in his jero and right inferior cerebellum.  They were chronically Cuna in the bilateral basal ganglia, the right thalamus and the right periventricular white matter.  He had evidence of small vessel disease as well in the periventricular white matter.     Ultimately it appears he has been suffering multiple small strokes and his progressive decline was due to acute infarcts in the lower jero and upper medulla. Neurology recommended DAPT for 10 days with aspirin indefinitely.  Patient completed Plavix 4/4.  Since that time he has not recovered function, has required tracheostomy and PEG placement with the ultimate goal of transferring him to postacute medical to see what function may be regained in time.    Patient able to nod head, shakes head for yes/no, able to use alphabet sheet to make needs known. He continues to work with PT/OT/SLP therapies  with some minor signs of improvement. Paitent is medically cleared, plan for discharge to LTAC for ongoing management of tracheostomy tube, PEG tube, therapies.    Interval Problem Update  No acute events overnight.  On 4L T piece. Continue to monitor, manage secretions.  Nursing note patient with yousuf blood from penis. UA shows blood and RBCs. Hgb has been stable. Prior renal US with no acute findings, also received empiric antibiotics last month for hematuria which has not resolved symptoms.  Urology consulted for hematuria evaluation. Appreciate their recommendations.  Pending placement.      I have discussed this patient's plan of care and discharge plan at IDT rounds today with Case Management, Nursing, Nursing leadership, and other members of the IDT team.    Consultants/Specialty  critical care     Code Status  Full Code    Disposition  The patient is medically cleared for discharge to home or a post-acute facility.  Anticipate discharge to: a long-term acute care hospital    I have placed the appropriate orders for post-discharge needs.    Review of Systems  Review of Systems   Unable to perform ROS: Patient nonverbal   Constitutional:  Positive for malaise/fatigue.   HENT:  Positive for congestion.         Excessive secretions    Eyes:  Negative for blurred vision and double vision.   Respiratory:  Positive for cough. Negative for shortness of breath.    Cardiovascular:  Negative for chest pain and palpitations.   Gastrointestinal:  Negative for abdominal pain.   Genitourinary:  Negative for dysuria and frequency.   Musculoskeletal:  Negative for falls.   Neurological:  Positive for focal weakness and weakness. Negative for headaches.        Physical Exam  Temp:  [36.4 °C (97.5 °F)-37 °C (98.6 °F)] 37 °C (98.6 °F)  Pulse:  [80-99] 80  Resp:  [15-18] 15  BP: (113-127)/(68-83) 113/69  SpO2:  [95 %-98 %] 96 %    Physical Exam  Vitals and nursing note reviewed.   Constitutional:       General: He is not in  acute distress.     Appearance: He is ill-appearing.      Comments: Resting comfortably    HENT:      Right Ear: External ear normal.      Left Ear: External ear normal.   Eyes:      Conjunctiva/sclera: Conjunctivae normal.   Neck:      Comments: Tracheostomy on Tpiece  Cardiovascular:      Rate and Rhythm: Normal rate and regular rhythm.   Pulmonary:      Effort: No respiratory distress.      Breath sounds: No wheezing.      Comments: On T-piece  Wet upper airway sounds  Abdominal:      General: There is no distension.      Palpations: Abdomen is soft.      Tenderness: There is no abdominal tenderness.      Comments: PEG   Skin:     General: Skin is warm and dry.   Neurological:      Motor: Weakness present.      Comments: Follows commands  He is able to slightly move his left upper and lower extremity.  Nodding yes and no appropriately to questions.  Moves left toes when instructed   Psychiatric:         Mood and Affect: Mood is depressed.         Fluids    Intake/Output Summary (Last 24 hours) at 5/4/2025 1515  Last data filed at 5/4/2025 1213  Gross per 24 hour   Intake 800 ml   Output 1000 ml   Net -200 ml        Laboratory                              Imaging  [id number] VIEW   Final Result      No evidence of bowel obstruction.                  DX-CHEST-LIMITED (1 VIEW)   Final Result         No acute cardiopulmonary abnormalities are identified.      [id number] VIEW   Final Result         No specific finding to suggest small bowel obstruction.      DX-CHEST-PORTABLE (1 VIEW)   Final Result      Hypoinflation with bibasilar atelectasis.      US-RENAL   Final Result      1.  No hydronephrosis.      DX-CHEST-PORTABLE (1 VIEW)   Final Result         1.  Left basilar atelectasis and/or subtle infiltrates, similar to prior study      DX-CHEST-PORTABLE (1 VIEW)   Final Result         1.  Left basilar atelectasis, no focal infiltrate   2.  Cardiomegaly      DX-G.I. TUBE INJECTION, ANY TYPE   Final Result       Contrast from a PEG tube injection extends into the stomach without evidence of contrast leak.      CT-CHEST,ABDOMEN,PELVIS WITH   Final Result      1.  Large amount of free intraperitoneal air within the abdomen again seen as was previously identified on chest x-ray by review of the patient's chart, a percutaneous gastrostomy tube was placed on 3/31/2025 and is free intraperitoneal air is possibly    secondary to that recent procedure.      2.  Bibasal atelectasis and small bilateral pleural effusions.      3.  Tracheostomy tube present.      4.  No evidence of bowel obstruction or free fluid within the abdomen or pelvis.      DX-CHEST-PORTABLE (1 VIEW)   Final Result         1.  Bibasilar atelectasis   2.  Intra-abdominal free air, can be associated with history of percutaneous gastrostomy, consider other viscus perforation as warranted. Could be further evaluated with CT of the abdomen with contrast as clinically appropriate.      These findings were discussed with the patient's clinician, Bravo Lala Iv, on 4/2/2025 7:57 AM.      DX-CHEST-PORTABLE (1 VIEW)   Final Result      No evidence of acute cardiopulmonary process.      DX-ABDOMEN FOR TUBE PLACEMENT   Final Result      1.  Enteric tube extends in the fundus of stomach.      DX-ABDOMEN FOR TUBE PLACEMENT   Final Result      The gastric tube has been removed and replaced with a small bowel feeding tube which terminate in the proximal stomach.      DX-CHEST-PORTABLE (1 VIEW)   Final Result      Atelectasis within the left lung base.      MR-THORACIC SPINE-WITH & W/O   Final Result      1.  There is no abnormal intramedullary T2 signal intensity in the thoracic spinal cord.   2.  Mild degenerative disease at T8-9.   3.  Left lower segmental consolidation.      MR-CERVICAL SPINE-WITH & W/O   Final Result      1.  Small areas of acute infarcts in the lower jero and upper medulla involving both sides of the midline. There are chronic infarcts in the jero  and right inferior cerebellum.   2.  There is no abnormal intramedullary T2 signal intensity in the cervical spinal cord to suggest demyelinating lesions. There is no MR evidence of compressive myelopathy.   3.  Mild degenerative disease.            MR-BRAIN-WITH & W/O   Final Result      1.  Small areas of acute infarcts in the lower jero and upper medulla involving both sides of the midline.   2.  There are chronic infarcts in the jero and right inferior cerebellum. . There are areas of chronic lacunae in the bilateral basal ganglia, right thalamus and right periventricular white matter.   3.  There are nonspecific T2 hyperintensities in the periventricular white matter likely representing chronic small vessel disease.   4.  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      MR-LUMBAR SPINE-WITH & W/O   Final Result      1.  Unremarkable pre and postcontrast MR examination of the lumbar spine.   2.  Clinical suspicious for GBS: There is no abnormal enhancement of the lumbar nerve roots.      DX-ABDOMEN FOR TUBE PLACEMENT   Final Result      NG tube tip projects at the peripyloric region.      DX-CHEST-PORTABLE (1 VIEW)   Final Result      1.  Low lung volumes without definite acute cardiopulmonary abnormality.   2.  Support apparatus as above.      CT-HEAD W/O   Final Result      1.  No acute intracranial abnormality.   2.  Remote right cerebellar infarct.               DX-CHEST-PORTABLE (1 VIEW)   Final Result      No acute cardiopulmonary disease evident.      DX-CHEST-PORTABLE (1 VIEW)   Final Result      No acute cardiopulmonary disease evident.      CT-CEREBRAL PERFUSION ANALYSIS   Final Result      1. Cerebral blood flow less than 30% possibly representing completed infarct = 0 mL. Based on distribution of this finding, this is unlikely to represent artifact.      2. T Max more than 6 seconds possibly representing combination of completed infarct and  "ischemia = 7 mL. Based on the distribution of this finding, this is possibly artifact.      3. Mismatched volume possibly representing ischemic brain/penumbra= 0 mL      4.  Please note that this cerebral perfusion study and report is Quantitative and targets supratentorial (cerebral) perfusion for evaluation of large vessel territory acute ischemia/infarction. For example, lacunar infarcts, and brainstem/posterior fossa    ischemia/infarction are not evaluated on this study.  Data acquisition is subject to artifacts which can yield non-anatomically plausible perfusion maps which may be due to motion, bolus timing, signal to noise ratio, or other technical factors.    Perfusion map abnormalities which show non-anatomic distributions are likely artifact.   This study is not \"stand-alone\" and should only be utilized for diagnosis, management/treatment in correlation with CT, CTA, and/or MRI and clinical factors.         CT-CTA NECK WITH & W/O-POST PROCESSING   Final Result      CT angiogram of the neck within normal limits.      CT-CTA HEAD WITH & W/O-POST PROCESS   Final Result      CT angiogram of the [place] within normal limits.      CT-HEAD W/O   Final Result      Head CT without contrast within normal limits. No evidence of acute cerebral infarction, hemorrhage or mass lesion.                    Assessment/Plan  * Acute ischemic stroke (HCC)- (present on admission)  Assessment & Plan  Acute ischemic stroke  MRI of the brain revealed acute infarcts of the lower jero and upper medulla.  He effectively is a quadriplegic and required tracheostomy and PEG tube.  Aspirin and statin  Blood pressure control  Family hope for functional recovery    Hypokalemia  Assessment & Plan  Replace as needed    Emesis  Assessment & Plan  Tube feeds held   Continue Reglan  Patient had not had a bowel movement    Patient now tolerating tube feeds  Resolved    Constipation  Assessment & Plan  KUB showed no obstruction  Bowel " protocol    Improving    Hematuria  Assessment & Plan  Darkened urine, no gross hematuria  No recent grove trauma  Renal US normal, no stones  Completed empiric keflex for possible UTI  Ongoing hematuria, urology consulted    Proteus mirabilis pneumonia (HCC)- (present on admission)  Assessment & Plan  Treated with Zosyn de-escalated to Augmentin based on cultures from sputum    Acute neuromuscular respiratory failure (HCC)- (present on admission)  Assessment & Plan  Requiring tracheostomy    Anxiety  Assessment & Plan  Ativan for anxiety.     Type 2 diabetes mellitus with hyperglycemia, without long-term current use of insulin ([facility])- (present on admission)  Assessment & Plan  Details are unclear  Reportedly he is on metformin as an outpatient  Hemoglobin A1c 6.3  He does not require insulin    Primary hypertension- (present on admission)  Assessment & Plan  Blood pressure is appropriate with hydrochlorothiazide 25 mg daily and Norvasc 10 mg daily         VTE prophylaxis:    enoxaparin ppx    I have performed a physical exam and reviewed and updated ROS and Plan today (5/4/2025). In review of yesterday's note (5/3/2025), there are no changes except as documented above.

## 2025-05-04 NOTE — CARE PLAN
The patient is Stable - Low risk of patient condition declining or worsening    Shift Goals  Clinical Goals: neuro monitoring, respiratory monitoring  Patient Goals: comfort, sleep  Family Goals: updates, rest    Progress made toward(s) clinical / shift goals:    Problem: Neuro Status  Goal: Neuro status will remain stable or improve  Description: Target End Date:  Prior to discharge or change in level of careDocument on Neuro assessment in the Assessment flowsheet1.  Assess and monitor neurologic status per provider order/protocol/unit policy2.  Assess level of consciousness and orientation3.  Assess for speech, dysarthria, dysphagia, facial symmetry4.  Assess visual field, eye movements, gaze preference, pupil reaction and size5.  Assess muscle strength and motor response in all four extremities6.  Assess for sensation (numbness and tingling)7.  Assess basic neuro reflexes (cough, gag, corneal)8.  Identify changes in neuro status and report to provider for testing/treatment orders  Outcome: Progressing  Note: Q4 neuro checks, monitoring of sensation improvements on left arm     Problem: Respiratory - Stroke Patient  Goal: Patient will achieve/maintain optimum respiratory rate/effort  Description: Target End Date:  Prior to discharge or change in level of careDocument on Assessment flowsheet1.  Assess and monitor respiratory rate, rhythm, depth and effort of respiration2.  Oxygenation assessed throughout shift (recommendation of >94% for new stroke patients)3.  Oxygen administered and/or titrated per order4.  Collaboration with RT to administer medication/treatments per order5.  Patient educated on importance of turning, coughing, and deep breathing6.  Patient positioned for maximum ventilatory efficiency7.  Airway suctioning provided as needed8.  Incentive spirometry encouraged 5-10 times every hour or while awake  Outcome: Progressing  Note: Administered trach care and suctioning, monitored o2 status     Problem:  Risk for Aspiration  Goal: Patient's risk for aspiration will be absent or decrease  Description: Target End Date:  Prior to discharge or change in level of care1.   Complete dysphagia screening on admission2.   NPO until dysphagia screening complete or medically cleared3.   Collaborate with Speech Therapy, Clinical Dietitian and interdisciplinary team4.   Implement aspiration precautions5.   Assist patient up to chair for meals6.   Elevate head of bed 90 degrees if patient is unable to get out of bed7.   Encourage small bites8.   Ensure foods/liquids are of appropriate consistency9.   Assess for any signs/symptoms of [id number]. Assess breath sounds and vital signs after oral intake  Outcome: Progressing  Note: Ensured proper positioning and precautions were in place     Problem: Mobility - Stroke  Goal: Patient's capacity to carry out activities will improve  Description: Target End Date:  Prior to discharge or change in level of care1.  Assess for barriers to mobility/activity2.  Implement activity per interdisciplinary team recommendations3.  Target activity level identified and patient/family/caregiver aware of goal4.  Provide assistive devices5.  Instruct patient/caregiver on proper use of assistive/adaptive devices6.  Schedule activities and rest periods to decrease effects of fatigue7.  Encourage mobilization to extent of ability8.  Maintain proper body alignment9.  Provide adequate pain management to allow progressive [id number]. Implement pace maker precautions as needed  Outcome: Progressing  Note: Assessed mobility and sensation of affected limbs       Patient is not progressing towards the following goals:

## 2025-05-05 ENCOUNTER — APPOINTMENT (OUTPATIENT)
Dept: RADIOLOGY | Facility: MEDICAL CENTER | Age: 50
DRG: 004 | End: 2025-05-05
Payer: COMMERCIAL

## 2025-05-05 PROBLEM — K63.9 SIGMOID THICKENING: Status: ACTIVE | Noted: 2025-05-05

## 2025-05-05 LAB
ALBUMIN SERPL BCP-MCNC: 3.5 G/DL (ref 3.2–4.9)
ALBUMIN/GLOB SERPL: 0.9 G/DL
ALP SERPL-CCNC: 63 U/L (ref 30–99)
ALT SERPL-CCNC: 29 U/L (ref 2–50)
ANION GAP SERPL CALC-SCNC: 10 MMOL/L (ref 7–16)
AST SERPL-CCNC: 22 U/L (ref 12–45)
BILIRUB SERPL-MCNC: 0.3 MG/DL (ref 0.1–1.5)
BUN SERPL-MCNC: 24 MG/DL (ref 8–22)
CALCIUM ALBUM COR SERPL-MCNC: 10.1 MG/DL (ref 8.5–10.5)
CALCIUM SERPL-MCNC: 9.7 MG/DL (ref 8.5–10.5)
CHLORIDE SERPL-SCNC: 94 MMOL/L (ref 96–112)
CO2 SERPL-SCNC: 34 MMOL/L (ref 20–33)
CREAT SERPL-MCNC: 0.79 MG/DL (ref 0.5–1.4)
CRP SERPL HS-MCNC: 1.96 MG/DL (ref 0–0.75)
ERYTHROCYTE [DISTWIDTH] IN BLOOD BY AUTOMATED COUNT: 38 FL (ref 35.9–50)
GFR SERPLBLD CREATININE-BSD FMLA CKD-EPI: 110 ML/MIN/1.73 M 2
GLOBULIN SER CALC-MCNC: 3.8 G/DL (ref 1.9–3.5)
GLUCOSE SERPL-MCNC: 121 MG/DL (ref 65–99)
HCT VFR BLD AUTO: 40.6 % (ref 42–52)
HGB BLD-MCNC: 12.9 G/DL (ref 14–18)
MCH RBC QN AUTO: 27 PG (ref 27–33)
MCHC RBC AUTO-ENTMCNC: 31.8 G/DL (ref 32.3–36.5)
MCV RBC AUTO: 84.9 FL (ref 81.4–97.8)
PLATELET # BLD AUTO: 260 K/UL (ref 164–446)
PMV BLD AUTO: 8.9 FL (ref 9–12.9)
POTASSIUM SERPL-SCNC: 2.9 MMOL/L (ref 3.6–5.5)
PREALB SERPL-MCNC: 24.2 MG/DL (ref 18–38)
PROT SERPL-MCNC: 7.3 G/DL (ref 6–8.2)
RBC # BLD AUTO: 4.78 M/UL (ref 4.7–6.1)
SODIUM SERPL-SCNC: 138 MMOL/L (ref 135–145)
WBC # BLD AUTO: 6.8 K/UL (ref 4.8–10.8)

## 2025-05-05 PROCEDURE — 700111 HCHG RX REV CODE 636 W/ 250 OVERRIDE (IP): Mod: JZ

## 2025-05-05 PROCEDURE — 97110 THERAPEUTIC EXERCISES: CPT

## 2025-05-05 PROCEDURE — 94669 MECHANICAL CHEST WALL OSCILL: CPT

## 2025-05-05 PROCEDURE — A9270 NON-COVERED ITEM OR SERVICE: HCPCS | Performed by: INTERNAL MEDICINE

## 2025-05-05 PROCEDURE — 80053 COMPREHEN METABOLIC PANEL: CPT

## 2025-05-05 PROCEDURE — 700111 HCHG RX REV CODE 636 W/ 250 OVERRIDE (IP): Mod: JZ | Performed by: INTERNAL MEDICINE

## 2025-05-05 PROCEDURE — 700102 HCHG RX REV CODE 250 W/ 637 OVERRIDE(OP): Performed by: INTERNAL MEDICINE

## 2025-05-05 PROCEDURE — 94640 AIRWAY INHALATION TREATMENT: CPT

## 2025-05-05 PROCEDURE — 84134 ASSAY OF PREALBUMIN: CPT

## 2025-05-05 PROCEDURE — 700117 HCHG RX CONTRAST REV CODE 255

## 2025-05-05 PROCEDURE — 97535 SELF CARE MNGMENT TRAINING: CPT

## 2025-05-05 PROCEDURE — 97112 NEUROMUSCULAR REEDUCATION: CPT

## 2025-05-05 PROCEDURE — 86140 C-REACTIVE PROTEIN: CPT

## 2025-05-05 PROCEDURE — 85027 COMPLETE CBC AUTOMATED: CPT

## 2025-05-05 PROCEDURE — 99232 SBSQ HOSP IP/OBS MODERATE 35: CPT | Performed by: INTERNAL MEDICINE

## 2025-05-05 PROCEDURE — 36415 COLL VENOUS BLD VENIPUNCTURE: CPT

## 2025-05-05 PROCEDURE — 99231 SBSQ HOSP IP/OBS SF/LOW 25: CPT

## 2025-05-05 PROCEDURE — 74178 CT ABD&PLV WO CNTR FLWD CNTR: CPT

## 2025-05-05 PROCEDURE — 770001 HCHG ROOM/CARE - MED/SURG/GYN PRIV*

## 2025-05-05 RX ADMIN — HYDROCHLOROTHIAZIDE 25 MG: 25 TABLET ORAL at 04:44

## 2025-05-05 RX ADMIN — SENNOSIDES AND DOCUSATE SODIUM 2 TABLET: 50; 8.6 TABLET ORAL at 04:44

## 2025-05-05 RX ADMIN — ATORVASTATIN CALCIUM 80 MG: 80 TABLET, FILM COATED ORAL at 17:51

## 2025-05-05 RX ADMIN — GABAPENTIN 400 MG: 400 CAPSULE ORAL at 04:44

## 2025-05-05 RX ADMIN — SENNOSIDES AND DOCUSATE SODIUM 2 TABLET: 50; 8.6 TABLET ORAL at 17:51

## 2025-05-05 RX ADMIN — AMLODIPINE BESYLATE 10 MG: 10 TABLET ORAL at 04:44

## 2025-05-05 RX ADMIN — Medication 5 MG: at 23:08

## 2025-05-05 RX ADMIN — GABAPENTIN 400 MG: 400 CAPSULE ORAL at 12:39

## 2025-05-05 RX ADMIN — ENOXAPARIN SODIUM 40 MG: 100 INJECTION SUBCUTANEOUS at 17:51

## 2025-05-05 RX ADMIN — ASPIRIN 81 MG: 81 TABLET, CHEWABLE ORAL at 04:44

## 2025-05-05 RX ADMIN — IOHEXOL 95 ML: 350 INJECTION, SOLUTION INTRAVENOUS at 14:00

## 2025-05-05 RX ADMIN — ONDANSETRON 4 MG: 2 INJECTION INTRAMUSCULAR; INTRAVENOUS at 18:36

## 2025-05-05 RX ADMIN — GABAPENTIN 400 MG: 400 CAPSULE ORAL at 17:51

## 2025-05-05 ASSESSMENT — ENCOUNTER SYMPTOMS
WEAKNESS: 1
HEADACHES: 0
FALLS: 0
BLURRED VISION: 0
FOCAL WEAKNESS: 1
ABDOMINAL PAIN: 0
COUGH: 1
SHORTNESS OF BREATH: 0
DOUBLE VISION: 0
PALPITATIONS: 0

## 2025-05-05 ASSESSMENT — COGNITIVE AND FUNCTIONAL STATUS - GENERAL
TOILETING: TOTAL
EATING MEALS: TOTAL
DRESSING REGULAR LOWER BODY CLOTHING: TOTAL
DRESSING REGULAR UPPER BODY CLOTHING: TOTAL
PERSONAL GROOMING: TOTAL
HELP NEEDED FOR BATHING: TOTAL
SUGGESTED CMS G CODE MODIFIER DAILY ACTIVITY: CN
DAILY ACTIVITIY SCORE: 6

## 2025-05-05 ASSESSMENT — PAIN DESCRIPTION - PAIN TYPE
TYPE: ACUTE PAIN

## 2025-05-05 NOTE — PROGRESS NOTES
Bedside report received, Assume care.     A&O x 4, Able to make needs known.  Pain Assessment: 0/10 . Medication provided per MAR.  Continuous [place] monitoring in place.   PEG tube, CDI and clamped. Trach CDI, velcro attached, HOB greater the 30 degree, Pt at 95% on 4 liters, 28% via T piece. RT involved in care.  Q2 Turns in place with wedges. Parfo and float boot in place. Pt on Low air loss bed.      Bed in low position and locked, pt resting comfortably now, call light with in reach, all needs met at this time. Interventions will be executed per plan of care.

## 2025-05-05 NOTE — DISCHARGE PLANNING
Case Management Discharge Planning    Admission Date: 3/25/2025  GMLOS: 23.1  ALOS: 41    6-Clicks ADL Score: 6  6-Clicks Mobility Score: 6  PT and/or OT Eval ordered: Yes  Post-acute Referrals Ordered: Yes  Post-acute Choice Obtained: Yes  Has referral(s) been sent to post-acute provider:  Yes      Anticipated Discharge Dispo: Discharge Disposition: Disch to a long term care facility (63)    DME Needed: No    Action(s) Taken: Updated Provider/Nurse on Discharge Plan    Escalations Completed: Long Length of Stay Committee     Medically Clear: Yes    Next Steps: continue to follow up with skilled facilities.     Barriers to Discharge: Pending Placement, no long term insurance    Is the patient up for discharge tomorrow: No  Patient is making slow progress with therapies, still a total assist for bed mobility.  PT is stating LUE/LLE appears to be improving with therapy.     Peg tube, t-piece 4 liter os O2    Patient is orientated x 4,  can respond to questions, nod his head, able to use alphabet sheet to make needs known

## 2025-05-05 NOTE — CONSULTS
Attempted to see patient but he was off the floor. The case was discussed with the hospitalist. He has microscopic hematuria with reports of gross blood from the urethra. He is a paraplegic managed with a condom catheter. CT abd/pelvis with contrast and renal US are both unremarkable for stones, hydronephrosis, or renal mass. I recommend cystoscopy for further evaluation. We will coordinate a cystoscopy on the floor as he may not be discharged for some time.

## 2025-05-05 NOTE — PROGRESS NOTES
Surgical Progress Note    Author: ZANDER Castro Date & Time created: 2025   11:06 AM     Interval Events: Consultation for microhematuria. UA + microhematuria since 2017. In April, and May 2025, positive form RBC >100.    Patient is unable to communicate however, he does make eye contact and gives a thumbs up.  Unclear if he has noticed gross hematuria, or if he is a smoker     He does live in a facility after being hospitalize in 2025 for multiple small strokes making him ventilator dependent and requiring parenteral nutrition.    H/H 12.9/40.6    No hematuria noted in the urometer at bedside    ROS  Hemodynamics:  Temp (24hrs), Av.7 °C (98.1 °F), Min:36.4 °C (97.5 °F), Max:37 °C (98.6 °F)  Temperature: 36.4 °C (97.5 °F)  Pulse  Av.4  Min: 53  Max: 136   Blood Pressure: 130/76     Respiratory:    Respiration: 17, Pulse Oximetry: 96 %     Work Of Breathing / Effort: Within Normal Limits  RUL Breath Sounds: Clear After Suction, RML Breath Sounds: Clear After Suction, RLL Breath Sounds: Diminished, SHABNAM Breath Sounds: Clear After Suction, LLL Breath Sounds: Diminished  Neuro:  GCS       Fluids:    Intake/Output Summary (Last 24 hours) at 2025 1106  Last data filed at 2025 0900  Gross per 24 hour   Intake 1980 ml   Output 375 ml   Net 1605 ml        Current Diet Order   Procedures    Diet NPO Restrict to: With Tube Feed    Diet: Diet Tube Feed; Formula: Other - Specify formula comments in the field to the right (Glucerna 1.5 carton); Tube Feed Time Unit: Hours/Day; Select Bolus (If Indicated): Other (Specify in Comments) (Glucerna 1.5); Bolus Frequency: TID (At meal...     Physical Exam  Labs:  Recent Results (from the past 24 hours)   URINALYSIS    Collection Time: 25 12:30 PM    Specimen: Urine, Clean Catch   Result Value Ref Range    Color Dark Yellow     Character Clear     Specific Gravity 1.030 <1.035    Ph 6.5 5.0 - 8.0    Glucose Negative Negative mg/dL    Ketones Trace  (A) Negative mg/dL    Protein Negative Negative mg/dL    Bilirubin Negative Negative    Urobilinogen, Urine 1.0 <=1.0 EU/dL    Nitrite Negative Negative    Leukocyte Esterase Small (A) Negative    Occult Blood Large (A) Negative    Micro Urine Req Microscopic    URINE MICROSCOPIC (W/UA)    Collection Time: 05/04/25 12:30 PM   Result Value Ref Range    WBC 11-20 (A) /hpf    RBC >100 (A) 0 - 2 /hpf    Bacteria None Seen None /hpf    Epithelial Cells 0-2 0 - 5 /hpf    Urine Casts 0-2 0 - 2 /lpf   Comp Metabolic Panel (CMP) - Every Monday    Collection Time: 05/05/25  6:58 AM   Result Value Ref Range    Sodium 138 135 - 145 mmol/L    Potassium 2.9 (L) 3.6 - 5.5 mmol/L    Chloride 94 (L) 96 - 112 mmol/L    Co2 34 (H) 20 - 33 mmol/L    Anion Gap 10.0 7.0 - 16.0    Glucose 121 (H) 65 - 99 mg/dL    Bun 24 (H) 8 - 22 mg/dL    Creatinine 0.79 0.50 - 1.40 mg/dL    Calcium 9.7 8.5 - 10.5 mg/dL    Correct Calcium 10.1 8.5 - 10.5 mg/dL    AST(SGOT) 22 12 - 45 U/L    ALT(SGPT) 29 2 - 50 U/L    Alkaline Phosphatase 63 30 - 99 U/L    Total Bilirubin 0.3 0.1 - 1.5 mg/dL    Albumin 3.5 3.2 - 4.9 g/dL    Total Protein 7.3 6.0 - 8.2 g/dL    Globulin 3.8 (H) 1.9 - 3.5 g/dL    A-G Ratio 0.9 g/dL   CBC WITHOUT DIFFERENTIAL    Collection Time: 05/05/25  6:58 AM   Result Value Ref Range    WBC 6.8 4.8 - 10.8 K/uL    RBC 4.78 4.70 - 6.10 M/uL    Hemoglobin 12.9 (L) 14.0 - 18.0 g/dL    Hematocrit 40.6 (L) 42.0 - 52.0 %    MCV 84.9 81.4 - 97.8 fL    MCH 27.0 27.0 - 33.0 pg    MCHC 31.8 (L) 32.3 - 36.5 g/dL    RDW 38.0 35.9 - 50.0 fL    Platelet Count 260 164 - 446 K/uL    MPV 8.9 (L) 9.0 - 12.9 fL   ESTIMATED GFR    Collection Time: 05/05/25  6:58 AM   Result Value Ref Range    GFR (CKD-EPI) 110 >60 mL/min/1.73 m 2     Medical Decision Making, by Problem:  Active Hospital Problems    Diagnosis     Hypokalemia [E87.6]     Constipation [K59.00]     Emesis [R11.10]     Hematuria [R31.9]     Proteus mirabilis pneumonia (HCC) [J15.69]     Free  intraperitoneal air [K66.8]     Acute ischemic stroke (HCC) [I63.9]     Acute neuromuscular respiratory failure (HCC) [J96.00, G70.9]     Type 2 diabetes mellitus with hyperglycemia, without long-term current use of insulin (HCC) [E11.65]     Elevated LFTs [R79.89]     Primary hypertension [I10]      Plan:    I have ordered a CTU for the proper Microhematuria work up. I have communicated with hospitalist team that we will need family to consent to bedside cystoscopy for possible diagnostics of hematuria.    We will likely do cystoscopy later this week once imaging has resulted and consent has been given from family, as patient does not verbally communicate and mental capacity is difficult to assess.    Quality Measures:  Quality-Core Measures    Discussed patient condition with Hospitalist and Patient

## 2025-05-05 NOTE — PROGRESS NOTES
[facility] Medicine Daily Progress Note    Date of Service  5/5/2025    Chief Complaint  Jonna Brian is a 49 y.o. male admitted 3/25/2025 with headache and weakness.    Hospital Course  This is a 49 y.o. male who was initially admitted to the hospital on 3/25/2025 with global weakness, aphasia and ataxia.  His last known normal was 1630 on March 24.  He was initially admitted to the hospital floor, his CT head as well as CTA of the head and neck were within normal limits.  There was consideration for possible Guillain-Barré syndrome.     His condition continued to deteriorate and he underwent a lumbar puncture which revealed elevated protein but no evidence of infection.  He had progressive bulbar symptoms and weakness and was transferred to the ICU.  Unfortunately shortly after he arrived in the ICU he developed stridor and required emergent intubation on 3/27.       He then underwent an MRI of his brain and spine which unfortunately revealed small areas of acute infarcts in the lower jero and the upper medulla on both sides of the midline.  He also had chronic infarcts in his jero and right inferior cerebellum.  They were chronically Cuna in the bilateral basal ganglia, the right thalamus and the right periventricular white matter.  He had evidence of small vessel disease as well in the periventricular white matter.     Ultimately it appears he has been suffering multiple small strokes and his progressive decline was due to acute infarcts in the lower jero and upper medulla. Neurology recommended DAPT for 10 days with aspirin indefinitely.  Patient completed Plavix 4/4.  Since that time he has not recovered function, has required tracheostomy and PEG placement with the ultimate goal of transferring him to postacute medical to see what function may be regained in time.    Patient able to nod head, shakes head for yes/no, able to use alphabet sheet to make needs known. He continues to work with PT/OT/SLP therapies  with some minor signs of improvement. Paitent is medically cleared, plan for discharge to LTAC for ongoing management of tracheostomy tube, PEG tube, therapies.    Interval Problem Update  Patient was seen and examined at bedside.  No acute events overnight. Patient is resting comfortably in bed and in no acute distress.     On 4L T piece. Continue to monitor, manage secretions.  Nursing note patient with yousuf blood from penis. UA shows blood and RBCs. Hgb has been stable.   CT demonstrates wall thickening and hyperenhancement of rectosigmoid colon     - will need colonoscopy at undetermined time - outpatient referral placed although can consider inpatient given patients circumstances  Urology consulted for hematuria evaluation.   CT urogram largely unremarkable    - cystoscopy if able to get consent from family    I have discussed this patient's plan of care and discharge plan at IDT rounds today with Case Management, Nursing, Nursing leadership, and other members of the IDT team.    Consultants/Specialty  critical care     Code Status  Full Code    Disposition  The patient is medically cleared for discharge to home or a post-acute facility.  Anticipate discharge to: a long-term acute care hospital    I have placed the appropriate orders for post-discharge needs.    Review of Systems  Review of Systems   Unable to perform ROS: Patient nonverbal   Constitutional:  Positive for malaise/fatigue.   HENT:  Positive for congestion.         Excessive secretions    Eyes:  Negative for blurred vision and double vision.   Respiratory:  Positive for cough. Negative for shortness of breath.    Cardiovascular:  Negative for chest pain and palpitations.   Gastrointestinal:  Negative for abdominal pain.   Genitourinary:  Negative for dysuria and frequency.   Musculoskeletal:  Negative for falls.   Neurological:  Positive for focal weakness and weakness. Negative for headaches.        Physical Exam  Temp:  [36.4 °C (97.5 °F)-36.9  °C (98.4 °F)] 36.6 °C (97.9 °F)  Pulse:  [87-93] 93  Resp:  [16-18] 16  BP: (126-132)/(75-78) 126/78  SpO2:  [92 %-96 %] 95 %    Physical Exam  Vitals and nursing note reviewed.   Constitutional:       General: He is not in acute distress.     Appearance: He is ill-appearing.      Comments: Resting comfortably    HENT:      Right Ear: External ear normal.      Left Ear: External ear normal.   Eyes:      Conjunctiva/sclera: Conjunctivae normal.   Neck:      Comments: Tracheostomy on Tpiece  Cardiovascular:      Rate and Rhythm: Normal rate and regular rhythm.   Pulmonary:      Effort: No respiratory distress.      Breath sounds: No wheezing.      Comments: On T-piece  Wet upper airway sounds  Abdominal:      General: There is no distension.      Palpations: Abdomen is soft.      Tenderness: There is no abdominal tenderness.      Comments: PEG   Skin:     General: Skin is warm and dry.   Neurological:      Motor: Weakness present.      Comments: Follows commands  He is able to slightly move his left upper and lower extremity.  Nodding yes and no appropriately to questions.  Moves left toes when instructed   Psychiatric:         Mood and Affect: Mood is depressed.         Fluids    Intake/Output Summary (Last 24 hours) at 5/5/2025 1632  Last data filed at 5/5/2025 1611  Gross per 24 hour   Intake 1788 ml   Output 735 ml   Net 1053 ml        Laboratory  Recent Labs     05/05/25  0658   WBC 6.8   RBC 4.78   HEMOGLOBIN 12.9*   HEMATOCRIT 40.6*   MCV 84.9   MCH 27.0   MCHC 31.8*   RDW 38.0   PLATELETCT 260   MPV 8.9*       Recent Labs     05/05/25  0658   SODIUM 138   POTASSIUM 2.9*   CHLORIDE 94*   CO2 34*   GLUCOSE 121*   BUN 24*   CREATININE 0.79   CALCIUM 9.7                       Imaging  CT-ABDOMEN & PELVIS UROGRAM   Final Result      1.  No suspicious renal, ureteral or bladder mass lesions.   2.  No renal, ureteral or bladder stones. No hydronephrosis.   3.  Benign renal cysts which do not require imaging  follow-up.   4.  Wall thickening and hyperenhancement of rectosigmoid colon. Findings may be due to underdistention, although neoplasm is difficult to exclude. Recommend follow-up with colonoscopy.         [id number] VIEW   Final Result      No evidence of bowel obstruction.                  DX-CHEST-LIMITED (1 VIEW)   Final Result         No acute cardiopulmonary abnormalities are identified.      [id number] VIEW   Final Result         No specific finding to suggest small bowel obstruction.      DX-CHEST-PORTABLE (1 VIEW)   Final Result      Hypoinflation with bibasilar atelectasis.      US-RENAL   Final Result      1.  No hydronephrosis.      DX-CHEST-PORTABLE (1 VIEW)   Final Result         1.  Left basilar atelectasis and/or subtle infiltrates, similar to prior study      DX-CHEST-PORTABLE (1 VIEW)   Final Result         1.  Left basilar atelectasis, no focal infiltrate   2.  Cardiomegaly      DX-G.I. TUBE INJECTION, ANY TYPE   Final Result      Contrast from a PEG tube injection extends into the stomach without evidence of contrast leak.      CT-CHEST,ABDOMEN,PELVIS WITH   Final Result      1.  Large amount of free intraperitoneal air within the abdomen again seen as was previously identified on chest x-ray by review of the patient's chart, a percutaneous gastrostomy tube was placed on 3/31/2025 and is free intraperitoneal air is possibly    secondary to that recent procedure.      2.  Bibasal atelectasis and small bilateral pleural effusions.      3.  Tracheostomy tube present.      4.  No evidence of bowel obstruction or free fluid within the abdomen or pelvis.      DX-CHEST-PORTABLE (1 VIEW)   Final Result         1.  Bibasilar atelectasis   2.  Intra-abdominal free air, can be associated with history of percutaneous gastrostomy, consider other viscus perforation as warranted. Could be further evaluated with CT of the abdomen with contrast as clinically appropriate.      These findings were discussed  with the patient's clinician, Bravo Lala Iv, on 4/2/2025 7:57 AM.      DX-CHEST-PORTABLE (1 VIEW)   Final Result      No evidence of acute cardiopulmonary process.      DX-ABDOMEN FOR TUBE PLACEMENT   Final Result      1.  Enteric tube extends in the fundus of stomach.      DX-ABDOMEN FOR TUBE PLACEMENT   Final Result      The gastric tube has been removed and replaced with a small bowel feeding tube which terminate in the proximal stomach.      DX-CHEST-PORTABLE (1 VIEW)   Final Result      Atelectasis within the left lung base.      MR-THORACIC SPINE-WITH & W/O   Final Result      1.  There is no abnormal intramedullary T2 signal intensity in the thoracic spinal cord.   2.  Mild degenerative disease at T8-9.   3.  Left lower segmental consolidation.      MR-CERVICAL SPINE-WITH & W/O   Final Result      1.  Small areas of acute infarcts in the lower jero and upper medulla involving both sides of the midline. There are chronic infarcts in the jero and right inferior cerebellum.   2.  There is no abnormal intramedullary T2 signal intensity in the cervical spinal cord to suggest demyelinating lesions. There is no MR evidence of compressive myelopathy.   3.  Mild degenerative disease.            MR-BRAIN-WITH & W/O   Final Result      1.  Small areas of acute infarcts in the lower jero and upper medulla involving both sides of the midline.   2.  There are chronic infarcts in the jero and right inferior cerebellum. . There are areas of chronic lacunae in the bilateral basal ganglia, right thalamus and right periventricular white matter.   3.  There are nonspecific T2 hyperintensities in the periventricular white matter likely representing chronic small vessel disease.   4.  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      MR-LUMBAR SPINE-WITH & W/O   Final Result      1.  Unremarkable pre and postcontrast MR examination of the lumbar spine.   2.  Clinical  "suspicious for GBS: There is no abnormal enhancement of the lumbar nerve roots.      DX-ABDOMEN FOR TUBE PLACEMENT   Final Result      NG tube tip projects at the peripyloric region.      DX-CHEST-PORTABLE (1 VIEW)   Final Result      1.  Low lung volumes without definite acute cardiopulmonary abnormality.   2.  Support apparatus as above.      CT-HEAD W/O   Final Result      1.  No acute intracranial abnormality.   2.  Remote right cerebellar infarct.               DX-CHEST-PORTABLE (1 VIEW)   Final Result      No acute cardiopulmonary disease evident.      DX-CHEST-PORTABLE (1 VIEW)   Final Result      No acute cardiopulmonary disease evident.      CT-CEREBRAL PERFUSION ANALYSIS   Final Result      1. Cerebral blood flow less than 30% possibly representing completed infarct = 0 mL. Based on distribution of this finding, this is unlikely to represent artifact.      2. T Max more than 6 seconds possibly representing combination of completed infarct and ischemia = 7 mL. Based on the distribution of this finding, this is possibly artifact.      3. Mismatched volume possibly representing ischemic brain/penumbra= 0 mL      4.  Please note that this cerebral perfusion study and report is Quantitative and targets supratentorial (cerebral) perfusion for evaluation of large vessel territory acute ischemia/infarction. For example, lacunar infarcts, and brainstem/posterior fossa    ischemia/infarction are not evaluated on this study.  Data acquisition is subject to artifacts which can yield non-anatomically plausible perfusion maps which may be due to motion, bolus timing, signal to noise ratio, or other technical factors.    Perfusion map abnormalities which show non-anatomic distributions are likely artifact.   This study is not \"stand-alone\" and should only be utilized for diagnosis, management/treatment in correlation with CT, CTA, and/or MRI and clinical factors.         CT-CTA NECK WITH & W/O-POST PROCESSING   Final " Result      CT angiogram of the neck within normal limits.      CT-CTA HEAD WITH & W/O-POST PROCESS   Final Result      CT angiogram of the [place] within normal limits.      CT-HEAD W/O   Final Result      Head CT without contrast within normal limits. No evidence of acute cerebral infarction, hemorrhage or mass lesion.                    Assessment/Plan  * Acute ischemic stroke (HCC)- (present on admission)  Assessment & Plan  Acute ischemic stroke  MRI of the brain revealed acute infarcts of the lower jero and upper medulla.  He effectively is a quadriplegic and required tracheostomy and PEG tube.  Aspirin and statin  Blood pressure control  Family hope for functional recovery    Acute neuromuscular respiratory failure (HCC)- (present on admission)  Assessment & Plan  Requiring tracheostomy    Sigmoid thickening  Assessment & Plan  Wall thickening and hyperenhancement of rectosigmoid colon   Outpatient GI referral placed    Proteus mirabilis pneumonia (HCC)- (present on admission)  Assessment & Plan  Treated with Zosyn de-escalated to Augmentin based on cultures from sputum    Type 2 diabetes mellitus with hyperglycemia, without long-term current use of insulin (HCC)- (present on admission)  Assessment & Plan  Details are unclear  Reportedly he is on metformin as an outpatient  Hemoglobin A1c 6.3  He does not require insulin    Hypokalemia  Assessment & Plan  Replace as needed    Emesis  Assessment & Plan  Tube feeds held   Continue Reglan  Patient had not had a bowel movement    Patient now tolerating tube feeds  Resolved    Constipation  Assessment & Plan  KUB showed no obstruction  Bowel protocol    Improving    Anxiety  Assessment & Plan  Ativan for anxiety.     Hematuria  Assessment & Plan  Darkened urine, no gross hematuria  No recent grove trauma  Renal US normal, no stones  Completed empiric keflex for possible UTI  Ongoing hematuria, urology consulted    Primary hypertension- (present on  admission)  Assessment & Plan  Blood pressure is appropriate with hydrochlorothiazide 25 mg daily and Norvasc 10 mg daily         VTE prophylaxis:    enoxaparin ppx    I have performed a physical exam and reviewed and updated ROS and Plan today (5/5/2025). In review of yesterday's note (5/4/2025), there are no changes except as documented above.    Greater than 48 minutes spent prepping to see patient (e.g. review of tests) obtaining and/or reviewing separately obtained history. Performing a medically appropriate examination and/ evaluation.  Counseling and educating the patient/family/caregiver.  Ordering medications, tests, or procedures.  Referring and communicating with other health care professionals.  Documenting clinical information in EPIC.  Independently interpreting results and communicating results to patient/family/caregiver.  Care coordination.

## 2025-05-05 NOTE — DISCHARGE PLANNING
Medical Social Work  PC from patient's mother Susan, wanting to know when patient will be transferred to [facility].  SW stated they have declined and why, not making progress.  Susan stated that is not true, her son is making progress.  SW stated that referrals are being made to [facility] for possible acceptance.    Teams to DPA requesting a referral be sent to [facility].

## 2025-05-05 NOTE — CARE PLAN
The patient is Watcher - Medium risk of patient condition declining or worsening    Shift Goals  Clinical Goals: monitor respitary status,maintain skin integrity  Patient Goals: comfort,sleep  Family Goals: updates, rest    Progress made toward(s) clinical / shift goals:  stable neuro status  Problem: Neuro Status  Goal: Neuro status will remain stable or improve  Outcome: Progressing     Problem: Hemodynamic Monitoring  Goal: Patient's hemodynamics, fluid balance and neurologic status will be stable or improve  Outcome: Progressing     Problem: Respiratory - Stroke Patient  Goal: Patient will achieve/maintain optimum respiratory rate/effort  Outcome: Progressing       Patient is not progressing towards the following goals:

## 2025-05-05 NOTE — DISCHARGE PLANNING
1048  DPA emailed Armando with [facility] referral per LSW request    1109  DPA received email from Armando. Per Armando has received referral and will review

## 2025-05-05 NOTE — CARE PLAN
Problem: Knowledge Deficit - Standard  Goal: Patient and family/care givers will demonstrate understanding of plan of care, disease process/condition, diagnostic tests and medications  Outcome: Progressing     Problem: Psychosocial - Patient Condition  Goal: Patient's ability to verbalize feelings about condition will improve  Outcome: Progressing  Goal: Patient's ability to re-evaluate and adapt role responsibilities will improve  Outcome: Progressing     Problem: Dysphagia  Goal: Dysphagia will improve  Outcome: Progressing     Problem: Risk for Aspiration  Goal: Patient's risk for aspiration will be absent or decrease  Outcome: Progressing     Problem: Urinary Elimination  Goal: Establish and maintain regular urinary output  Outcome: Progressing   The patient is Stable - Low risk of patient condition declining or worsening    Shift Goals  Clinical Goals: monitor respitary status,maintain skin integrity  Patient Goals: comfort,sleep  Family Goals: updates, rest        Patient is not progressing towards the following goals:

## 2025-05-06 ENCOUNTER — APPOINTMENT (OUTPATIENT)
Dept: RADIOLOGY | Facility: MEDICAL CENTER | Age: 50
DRG: 004 | End: 2025-05-06
Attending: INTERNAL MEDICINE
Payer: COMMERCIAL

## 2025-05-06 LAB
ALBUMIN SERPL BCP-MCNC: 3.6 G/DL (ref 3.2–4.9)
ANION GAP SERPL CALC-SCNC: 9 MMOL/L (ref 7–16)
BUN SERPL-MCNC: 25 MG/DL (ref 8–22)
CALCIUM ALBUM COR SERPL-MCNC: 10 MG/DL (ref 8.5–10.5)
CALCIUM SERPL-MCNC: 9.7 MG/DL (ref 8.5–10.5)
CHLORIDE SERPL-SCNC: 94 MMOL/L (ref 96–112)
CO2 SERPL-SCNC: 35 MMOL/L (ref 20–33)
CREAT SERPL-MCNC: 0.8 MG/DL (ref 0.5–1.4)
ERYTHROCYTE [DISTWIDTH] IN BLOOD BY AUTOMATED COUNT: 38.8 FL (ref 35.9–50)
GFR SERPLBLD CREATININE-BSD FMLA CKD-EPI: 109 ML/MIN/1.73 M 2
GLUCOSE SERPL-MCNC: 128 MG/DL (ref 65–99)
HCT VFR BLD AUTO: 40.9 % (ref 42–52)
HGB BLD-MCNC: 13.1 G/DL (ref 14–18)
MAGNESIUM SERPL-MCNC: 2.1 MG/DL (ref 1.5–2.5)
MCH RBC QN AUTO: 27.2 PG (ref 27–33)
MCHC RBC AUTO-ENTMCNC: 32 G/DL (ref 32.3–36.5)
MCV RBC AUTO: 85 FL (ref 81.4–97.8)
PHOSPHATE SERPL-MCNC: 3.6 MG/DL (ref 2.5–4.5)
PLATELET # BLD AUTO: 277 K/UL (ref 164–446)
PMV BLD AUTO: 9.1 FL (ref 9–12.9)
POTASSIUM SERPL-SCNC: 2.8 MMOL/L (ref 3.6–5.5)
RBC # BLD AUTO: 4.81 M/UL (ref 4.7–6.1)
SODIUM SERPL-SCNC: 138 MMOL/L (ref 135–145)
WBC # BLD AUTO: 7.8 K/UL (ref 4.8–10.8)

## 2025-05-06 PROCEDURE — 97530 THERAPEUTIC ACTIVITIES: CPT | Mod: CQ

## 2025-05-06 PROCEDURE — 700111 HCHG RX REV CODE 636 W/ 250 OVERRIDE (IP): Mod: JZ | Performed by: INTERNAL MEDICINE

## 2025-05-06 PROCEDURE — A9270 NON-COVERED ITEM OR SERVICE: HCPCS | Performed by: INTERNAL MEDICINE

## 2025-05-06 PROCEDURE — 700102 HCHG RX REV CODE 250 W/ 637 OVERRIDE(OP): Performed by: INTERNAL MEDICINE

## 2025-05-06 PROCEDURE — 85027 COMPLETE CBC AUTOMATED: CPT

## 2025-05-06 PROCEDURE — 94640 AIRWAY INHALATION TREATMENT: CPT

## 2025-05-06 PROCEDURE — 36415 COLL VENOUS BLD VENIPUNCTURE: CPT

## 2025-05-06 PROCEDURE — 94669 MECHANICAL CHEST WALL OSCILL: CPT

## 2025-05-06 PROCEDURE — 80069 RENAL FUNCTION PANEL: CPT

## 2025-05-06 PROCEDURE — 99231 SBSQ HOSP IP/OBS SF/LOW 25: CPT

## 2025-05-06 PROCEDURE — 770020 HCHG ROOM/CARE - TELE (206)

## 2025-05-06 PROCEDURE — 51798 US URINE CAPACITY MEASURE: CPT

## 2025-05-06 PROCEDURE — 83735 ASSAY OF MAGNESIUM: CPT

## 2025-05-06 PROCEDURE — 97110 THERAPEUTIC EXERCISES: CPT | Mod: CQ

## 2025-05-06 PROCEDURE — 99233 SBSQ HOSP IP/OBS HIGH 50: CPT | Performed by: INTERNAL MEDICINE

## 2025-05-06 PROCEDURE — 74018 RADEX ABDOMEN 1 VIEW: CPT

## 2025-05-06 RX ADMIN — POTASSIUM BICARBONATE 25 MEQ: 978 TABLET, EFFERVESCENT ORAL at 13:48

## 2025-05-06 RX ADMIN — ENOXAPARIN SODIUM 40 MG: 100 INJECTION SUBCUTANEOUS at 17:38

## 2025-05-06 RX ADMIN — ASPIRIN 81 MG: 81 TABLET, CHEWABLE ORAL at 05:09

## 2025-05-06 RX ADMIN — GABAPENTIN 400 MG: 400 CAPSULE ORAL at 13:48

## 2025-05-06 RX ADMIN — AMLODIPINE BESYLATE 10 MG: 10 TABLET ORAL at 05:09

## 2025-05-06 RX ADMIN — POTASSIUM BICARBONATE 25 MEQ: 978 TABLET, EFFERVESCENT ORAL at 17:38

## 2025-05-06 RX ADMIN — POTASSIUM BICARBONATE 25 MEQ: 978 TABLET, EFFERVESCENT ORAL at 08:49

## 2025-05-06 RX ADMIN — SENNOSIDES AND DOCUSATE SODIUM 2 TABLET: 50; 8.6 TABLET ORAL at 17:38

## 2025-05-06 RX ADMIN — GABAPENTIN 400 MG: 400 CAPSULE ORAL at 17:38

## 2025-05-06 RX ADMIN — ATORVASTATIN CALCIUM 80 MG: 80 TABLET, FILM COATED ORAL at 17:38

## 2025-05-06 RX ADMIN — HYDROCHLOROTHIAZIDE 25 MG: 25 TABLET ORAL at 05:09

## 2025-05-06 ASSESSMENT — PAIN DESCRIPTION - PAIN TYPE
TYPE: ACUTE PAIN

## 2025-05-06 ASSESSMENT — COGNITIVE AND FUNCTIONAL STATUS - GENERAL
TURNING FROM BACK TO SIDE WHILE IN FLAT BAD: TOTAL
MOVING FROM LYING ON BACK TO SITTING ON SIDE OF FLAT BED: TOTAL
CLIMB 3 TO 5 STEPS WITH RAILING: TOTAL
STANDING UP FROM CHAIR USING ARMS: TOTAL
MOBILITY SCORE: 6
MOVING TO AND FROM BED TO CHAIR: TOTAL
WALKING IN HOSPITAL ROOM: TOTAL
SUGGESTED CMS G CODE MODIFIER MOBILITY: CN

## 2025-05-06 ASSESSMENT — ENCOUNTER SYMPTOMS
ABDOMINAL PAIN: 0
SENSORY CHANGE: 1
WEAKNESS: 1

## 2025-05-06 NOTE — PROGRESS NOTES
[facility] Medicine Daily Progress Note    Date of Service  5/6/2025    Chief Complaint  Jonna Brian is a 49 y.o. male admitted 3/25/2025 with stroke    Hospital Course  This is a 49 y.o. male who was initially admitted to the hospital on 3/25/2025 with global weakness, aphasia and ataxia.  His last known normal was 1630 on March 24.  He was initially admitted to the hospital floor, his CT head as well as CTA of the head and neck were within normal limits.  There was consideration for possible Guillain-Barré syndrome.     His condition continued to deteriorate and he underwent a lumbar puncture which revealed elevated protein but no evidence of infection.  He had progressive bulbar symptoms and weakness and was transferred to the ICU.  Unfortunately shortly after he arrived in the ICU he developed stridor and required emergent intubation on 3/27.       He then underwent an MRI of his brain and spine which unfortunately revealed small areas of acute infarcts in the lower jero and the upper medulla on both sides of the midline.  He also had chronic infarcts in his jero and right inferior cerebellum.  They were chronically Cuna in the bilateral basal ganglia, the right thalamus and the right periventricular white matter.  He had evidence of small vessel disease as well in the periventricular white matter.     Ultimately it appears he has been suffering multiple small strokes and his progressive decline was due to acute infarcts in the lower jero and upper medulla. Neurology recommended DAPT for 10 days with aspirin indefinitely.  Patient completed Plavix 4/4.  Since that time he has not recovered function, has required tracheostomy and PEG placement with the ultimate goal of transferring him to postacute medical to see what function may be regained in time.     Patient able to nod head, shakes head for yes/no, able to use alphabet sheet to make needs known. He continues to work with PT/OT/SLP therapies with some  minor signs of improvement. Paitent is medically cleared, plan for discharge to LTAC for ongoing management of tracheostomy tube, PEG tube, therapies.    Interval Problem Update  No blood in urine noted  He has some N/V after feeds yesterday, none today, nursing is spacing out boluses  Severe hypokalemia, repletion ordered  Patient able to nod approriately to questions. Family bedside and updated  LTACH in [place] pending insurance auth    I have discussed this patient's plan of care and discharge plan at IDT rounds today with Case Management, Nursing, Nursing leadership, and other members of the IDT team.    Consultants/Specialty  critical care, neurology, and urology    Code Status  Full Code    Disposition  The patient is medically cleared for discharge to home or a post-acute facility.  Anticipate discharge to: an inpatient rehabilitation hospital    I have placed the appropriate orders for post-discharge needs.    Review of Systems  Review of Systems   Cardiovascular:  Negative for chest pain.   Gastrointestinal:  Negative for abdominal pain.   Neurological:  Positive for sensory change and weakness.        Physical Exam  Temp:  [36.3 °C (97.3 °F)-36.7 °C (98.1 °F)] 36.7 °C (98.1 °F)  Pulse:  [] 93  Resp:  [16-18] 18  BP: (124-133)/(83-92) 133/90  SpO2:  [91 %-96 %] 93 %    Physical Exam  Vitals and nursing note reviewed.   Constitutional:       Appearance: He is ill-appearing.   HENT:      Head: Normocephalic.      Mouth/Throat:      Mouth: Mucous membranes are dry.   Eyes:      General:         Right eye: No discharge.         Left eye: No discharge.   Neck:      Comments: tracheostomy  Cardiovascular:      Comments: Distant heart sounds  Pulmonary:      Effort: No respiratory distress.      Breath sounds: Rhonchi present. No wheezing or rales.   Abdominal:      Palpations: Abdomen is soft.      Tenderness: There is no abdominal tenderness.      Comments: Feeding tube   Genitourinary:     Penis: Normal.     Musculoskeletal:         General: No swelling.      Cervical back: Neck supple.      Comments: Diffuse muscle atrophy   Skin:     General: Skin is warm and dry.   Neurological:      Mental Status: He is alert.      Comments: Nods appropriately to questions, subjected decreased sensation to right arm and leg         Fluids    Intake/Output Summary (Last 24 hours) at 5/6/2025 1615  Last data filed at 5/6/2025 1452  Gross per 24 hour   Intake 1106 ml   Output 1175 ml   Net -69 ml        Laboratory  Recent Labs     05/05/25  0658 05/06/25  0656   WBC 6.8 7.8   RBC 4.78 4.81   HEMOGLOBIN 12.9* 13.1*   HEMATOCRIT 40.6* 40.9*   MCV 84.9 85.0   MCH 27.0 27.2   MCHC 31.8* 32.0*   RDW 38.0 38.8   PLATELETCT 260 277   MPV 8.9* 9.1     Recent Labs     05/05/25  0658 05/06/25  0656   SODIUM 138 138   POTASSIUM 2.9* 2.8*   CHLORIDE 94* 94*   CO2 34* 35*   GLUCOSE 121* 128*   BUN 24* 25*   CREATININE 0.79 0.80   CALCIUM 9.7 9.7                   Imaging  CT-ABDOMEN & PELVIS UROGRAM   Final Result      1.  No suspicious renal, ureteral or bladder mass lesions.   2.  No renal, ureteral or bladder stones. No hydronephrosis.   3.  Benign renal cysts which do not require imaging follow-up.   4.  Wall thickening and hyperenhancement of rectosigmoid colon. Findings may be due to underdistention, although neoplasm is difficult to exclude. Recommend follow-up with colonoscopy.         [id number] VIEW   Final Result      No evidence of bowel obstruction.                  DX-CHEST-LIMITED (1 VIEW)   Final Result         No acute cardiopulmonary abnormalities are identified.      [id number] VIEW   Final Result         No specific finding to suggest small bowel obstruction.      DX-CHEST-PORTABLE (1 VIEW)   Final Result      Hypoinflation with bibasilar atelectasis.      US-RENAL   Final Result      1.  No hydronephrosis.      DX-CHEST-PORTABLE (1 VIEW)   Final Result         1.  Left basilar atelectasis and/or subtle infiltrates,  similar to prior study      DX-CHEST-PORTABLE (1 VIEW)   Final Result         1.  Left basilar atelectasis, no focal infiltrate   2.  Cardiomegaly      DX-G.I. TUBE INJECTION, ANY TYPE   Final Result      Contrast from a PEG tube injection extends into the stomach without evidence of contrast leak.      CT-CHEST,ABDOMEN,PELVIS WITH   Final Result      1.  Large amount of free intraperitoneal air within the abdomen again seen as was previously identified on chest x-ray by review of the patient's chart, a percutaneous gastrostomy tube was placed on 3/31/2025 and is free intraperitoneal air is possibly    secondary to that recent procedure.      2.  Bibasal atelectasis and small bilateral pleural effusions.      3.  Tracheostomy tube present.      4.  No evidence of bowel obstruction or free fluid within the abdomen or pelvis.      DX-CHEST-PORTABLE (1 VIEW)   Final Result         1.  Bibasilar atelectasis   2.  Intra-abdominal free air, can be associated with history of percutaneous gastrostomy, consider other viscus perforation as warranted. Could be further evaluated with CT of the abdomen with contrast as clinically appropriate.      These findings were discussed with the patient's clinician, Bravo Lala Iv, on 4/2/2025 7:57 AM.      DX-CHEST-PORTABLE (1 VIEW)   Final Result      No evidence of acute cardiopulmonary process.      DX-ABDOMEN FOR TUBE PLACEMENT   Final Result      1.  Enteric tube extends in the fundus of stomach.      DX-ABDOMEN FOR TUBE PLACEMENT   Final Result      The gastric tube has been removed and replaced with a small bowel feeding tube which terminate in the proximal stomach.      DX-CHEST-PORTABLE (1 VIEW)   Final Result      Atelectasis within the left lung base.      MR-THORACIC SPINE-WITH & W/O   Final Result      1.  There is no abnormal intramedullary T2 signal intensity in the thoracic spinal cord.   2.  Mild degenerative disease at T8-9.   3.  Left lower segmental consolidation.       MR-CERVICAL SPINE-WITH & W/O   Final Result      1.  Small areas of acute infarcts in the lower jero and upper medulla involving both sides of the midline. There are chronic infarcts in the ejro and right inferior cerebellum.   2.  There is no abnormal intramedullary T2 signal intensity in the cervical spinal cord to suggest demyelinating lesions. There is no MR evidence of compressive myelopathy.   3.  Mild degenerative disease.            MR-BRAIN-WITH & W/O   Final Result      1.  Small areas of acute infarcts in the lower jero and upper medulla involving both sides of the midline.   2.  There are chronic infarcts in the jero and right inferior cerebellum. . There are areas of chronic lacunae in the bilateral basal ganglia, right thalamus and right periventricular white matter.   3.  There are nonspecific T2 hyperintensities in the periventricular white matter likely representing chronic small vessel disease.   4.  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      MR-LUMBAR SPINE-WITH & W/O   Final Result      1.  Unremarkable pre and postcontrast MR examination of the lumbar spine.   2.  Clinical suspicious for GBS: There is no abnormal enhancement of the lumbar nerve roots.      DX-ABDOMEN FOR TUBE PLACEMENT   Final Result      NG tube tip projects at the peripyloric region.      DX-CHEST-PORTABLE (1 VIEW)   Final Result      1.  Low lung volumes without definite acute cardiopulmonary abnormality.   2.  Support apparatus as above.      CT-HEAD W/O   Final Result      1.  No acute intracranial abnormality.   2.  Remote right cerebellar infarct.               DX-CHEST-PORTABLE (1 VIEW)   Final Result      No acute cardiopulmonary disease evident.      DX-CHEST-PORTABLE (1 VIEW)   Final Result      No acute cardiopulmonary disease evident.      CT-CEREBRAL PERFUSION ANALYSIS   Final Result      1. Cerebral blood flow less than 30% possibly representing  "completed infarct = 0 mL. Based on distribution of this finding, this is unlikely to represent artifact.      2. T Max more than 6 seconds possibly representing combination of completed infarct and ischemia = 7 mL. Based on the distribution of this finding, this is possibly artifact.      3. Mismatched volume possibly representing ischemic brain/penumbra= 0 mL      4.  Please note that this cerebral perfusion study and report is Quantitative and targets supratentorial (cerebral) perfusion for evaluation of large vessel territory acute ischemia/infarction. For example, lacunar infarcts, and brainstem/posterior fossa    ischemia/infarction are not evaluated on this study.  Data acquisition is subject to artifacts which can yield non-anatomically plausible perfusion maps which may be due to motion, bolus timing, signal to noise ratio, or other technical factors.    Perfusion map abnormalities which show non-anatomic distributions are likely artifact.   This study is not \"stand-alone\" and should only be utilized for diagnosis, management/treatment in correlation with CT, CTA, and/or MRI and clinical factors.         CT-CTA NECK WITH & W/O-POST PROCESSING   Final Result      CT angiogram of the neck within normal limits.      CT-CTA HEAD WITH & W/O-POST PROCESS   Final Result      CT angiogram of the [place] within normal limits.      CT-HEAD W/O   Final Result      Head CT without contrast within normal limits. No evidence of acute cerebral infarction, hemorrhage or mass lesion.                    Assessment/Plan  * Acute ischemic stroke (HCC)- (present on admission)  Assessment & Plan  Acute ischemic stroke  MRI of the brain revealed acute infarcts of the lower jero and upper medulla.  He effectively is a quadriplegic and required tracheostomy and PEG tube.  Aspirin and statin  Blood pressure control  Family hope for functional recovery. LTACH pending  Speech following, capping trials  PTOT    Sigmoid " thickening  Assessment & Plan  Wall thickening and hyperenhancement of rectosigmoid colon   Outpatient GI referral placed    Hypokalemia  Assessment & Plan  Replace as needed    Emesis  Assessment & Plan  Patient now tolerating tube feeds with spaced out boluses    Constipation  Assessment & Plan  KUB showed no obstruction  Bowel protocol    Improving    Hematuria  Assessment & Plan  Darkened urine, no gross hematuria  No recent grove trauma  Renal US normal, no stones  Completed empiric keflex for possible UTI  urology consulted, following    Proteus mirabilis pneumonia (HCC)- (present on admission)  Assessment & Plan  Treated with Zosyn de-escalated to Augmentin based on cultures from sputum    Acute neuromuscular respiratory failure (HCC)- (present on admission)  Assessment & Plan  Requiring tracheostomy    Anxiety  Assessment & Plan  Ativan for anxiety.     Type 2 diabetes mellitus with hyperglycemia, without long-term current use of insulin (HCC)- (present on admission)  Assessment & Plan  Details are unclear  Reportedly he is on metformin as an outpatient  Hemoglobin A1c 6.3  He does not require insulin    Primary hypertension- (present on admission)  Assessment & Plan  SBP 120s   hydrochlorothiazide 25 mg daily and Norvasc 10 mg daily         VTE prophylaxis:    enoxaparin ppx      I have performed a physical exam and reviewed and updated ROS and Plan today (5/6/2025). In review of yesterday's note (5/5/2025), there are no changes except as documented above.

## 2025-05-06 NOTE — DISCHARGE PLANNING
Medical Social Work  SW informed by [facility] has accepted and will be submitting insurance authorization.

## 2025-05-06 NOTE — DISCHARGE PLANNING
Medical Social Work  PC from [place], [facility] , calling for an update.  LEXY stated that patient has been accepted by [facility] in [facility].  LEXY was asked if auth has been submitted yet.  LEXY stated provider has stated they are gong to submit.     LEXY was asked about transport, LEXY stated due to distance would more than likely go by air.  [place] stated she will follow up to see if they can pay for this.

## 2025-05-06 NOTE — DISCHARGE PLANNING
0742  DPA emailed Armando with [facility]    [phone]  Agency/Facility Name: [facility]  Spoke To: [facility]  Outcome: Per [facility] can Accept Pt. Augustine Roberts is traveling currently but will submit for INS AUTH.  LSW notified.

## 2025-05-06 NOTE — CARE PLAN
The patient is Stable - Low risk of patient condition declining or worsening    Shift Goals  Clinical Goals: respiratory status improved, Q4 neuro checks remain stable/improved, manage secretions, encourage turns to maintain skin integrity, full bed bath  Patient Goals: Get comfortable  Family Goals: support pt, updates    Progress made toward(s) clinical / shift goals:    Problem: Knowledge Deficit - Standard  Goal: Patient and family/care givers will demonstrate understanding of plan of care, disease process/condition, diagnostic tests and medications  Description: Target End Date:  1-3 days or as soon as patient condition allowsDocument in Patient Education1.  Patient and family/caregiver oriented to unit, equipment, visitation policy and means for communicating concern2.  Complete/review Learning Assessment3.  Assess knowledge level of disease process/condition, treatment plan, diagnostic tests and medications4.  Explain disease process/condition, treatment plan, diagnostic tests and medications  Outcome: Progressing     Problem: Neuro Status  Goal: Neuro status will remain stable or improve  Description: Target End Date:  Prior to discharge or change in level of careDocument on Neuro assessment in the Assessment flowsheet1.  Assess and monitor neurologic status per provider order/protocol/unit policy2.  Assess level of consciousness and orientation3.  Assess for speech, dysarthria, dysphagia, facial symmetry4.  Assess visual field, eye movements, gaze preference, pupil reaction and size5.  Assess muscle strength and motor response in all four extremities6.  Assess for sensation (numbness and tingling)7.  Assess basic neuro reflexes (cough, gag, corneal)8.  Identify changes in neuro status and report to provider for testing/treatment orders  Outcome: Progressing     Problem: Hemodynamic Monitoring  Goal: Patient's hemodynamics, fluid balance and neurologic status will be stable or improve  Description: Target  End Date:  Prior to discharge or change in level of care1.  Vital signs, pulse oximetry and cardiac monitor per provider order and/or policy2.  Frequent pulse checks performed post thrombectomy3.  Frequent monitoring for signs of bleeding post TPA administration4.  Proper management of IV infusions5.  Intake and output monitored per provider order6.  Daily weight obtained per unit policy or provider order7.  Peripheral pulses and capillary refill assessed as needed8.  Monitor for signs/symptoms of excessive bleeding9.  Body temperature assessed and fevers [id number]. Patient positioned for maximum circulation/cardiac output  Outcome: Progressing     Problem: Respiratory - Stroke Patient  Goal: Patient will achieve/maintain optimum respiratory rate/effort  Description: Target End Date:  Prior to discharge or change in level of careDocument on Assessment flowsheet1.  Assess and monitor respiratory rate, rhythm, depth and effort of respiration2.  Oxygenation assessed throughout shift (recommendation of >94% for new stroke patients)3.  Oxygen administered and/or titrated per order4.  Collaboration with RT to administer medication/treatments per order5.  Patient educated on importance of turning, coughing, and deep breathing6.  Patient positioned for maximum ventilatory efficiency7.  Airway suctioning provided as needed8.  Incentive spirometry encouraged 5-10 times every hour or while awake  Outcome: Progressing     Problem: Urinary Elimination  Goal: Establish and maintain regular urinary output  Description: Target End Date:  Prior to discharge or change in level of careDocument on I/O and Assessment flowsheets1.  Evaluate need to continue indwelling catheter every shift2.  Assess signs and symptoms of urinary retention3.  Assess post-void residual volumes4.  Implement bladder training program5.  Encourage scheduled voidings6.  Assist patient to sit on bedside commode or toilet for voiding7.  Educate patient and  family/caregiver on use and purpose of urine collection devices (document in Patient Education)  Outcome: Progressing     Problem: Self Care  Goal: Patient will have the ability to perform ADLs independently or with assistance (bathe, groom, dress, toilet and feed)  Description: Target End Date:  Prior to discharge or change in level of careDocument on ADL flowsheet1.  Assess the capability and level of deficiency to perform ADLs2.  Encourage family/care giver involvement3.  Provide assistive devices4.  Consider PT/OT evaluations5.  Maintain support, give positive feedback, encourage self-care allowing extra time and verbal cuing as needed6.  Avoid doing something for patients they can do themselves, but provide assistance as needed7.  Assist in anticipating/planning individual needs8.  Collaborate with Case Management and  to meet discharge needs  Outcome: Progressing  Note: Full bed bath this shift, oral care completed.        Patient is not progressing towards the following goals: N/A

## 2025-05-06 NOTE — CARE PLAN
The patient is Stable - Low risk of patient condition declining or worsening    Shift Goals  Clinical Goals: skin integrity and monitor secretions  Patient Goals: rest  Family Goals: support pt, updates    Progress made toward(s) clinical / shift goals:    Problem: Neuro Status  Goal: Neuro status will remain stable or improve  Outcome: Progressing  Note: Patient has remained A&O x4 throughout this shift, patient nods yes or no appropriately. Patient only moves right side to painful stimuli and left side he is able to move but does not move well against gravity. Patient has no complaints of numbness, tingling or decreased sensation.      Problem: Mobility - Stroke  Goal: Patient's capacity to carry out activities will improve  Outcome: Progressing  Note: Patient was able to work with PT during this shift, patient was shelley lifted to the wheelchair where he practiced neck exercises to regain control of his neck/head.        Patient is not progressing towards the following goals:

## 2025-05-06 NOTE — THERAPY
Physical Therapy   Daily Treatment     Patient Name: Jonna Brian  Age:  49 y.o., Sex:  male  Medical Record #: [id number]  Today's Date: 5/6/2025     Precautions  Precautions: Fall Risk;Swallow Precautions;PEG Tube;Tracheostomy   Comments: R riddhi    Assessment    Pt greeted and seen for PT treatment. Matthew lift used to transfer patient bed<> 20 in tilt in space WC w/ ROHO cushion for seated cervical therex and UE therex/PROM. Pt liked being in the chair but fatigued quickly after cervical therex and requested to return to bed. Pt currently limited by impaired balance, decreased strength and decreased activity tolerance which negatively impacts functional mobility. Pt will continue to benefit from skilled PT to address deficits.       Plan    Treatment Plan Status: Continue Current Treatment Plan  Type of Treatment: Bed Mobility, Family / Caregiver Training, Gait Training, Neuro Re-Education / Balance, Self Care / Home Evaluation, Stair Training, Therapeutic Activities, Therapeutic Exercise  Treatment Frequency: 3 Times per Week  Treatment Duration: Until Therapy Goals Met    DC Equipment Recommendations: Unable to determine at this time  Discharge Recommendations: Recommend post-acute placement for additional physical therapy services prior to discharge home       05/06/25 1445   Vitals   O2 (LPM) 4   O2 Delivery Device T-Piece   Vitals Comments no s/sx of distress   Pain 0 - 10 Group   Therapist Pain Assessment Post Activity Pain Same as Prior to Activity;Nurse Notified  (no reports of pain)   Cognition    Level of Consciousness Alert   Comments Continues appropriate head nod/shake, follows for all motor he is capable of   Sitting Lower Body Exercises   Tricep Press 1 set of 10;Left  (gravity eliminated)   Bicep Curls 1 set of 10  (AAROM intermittently within ROM against gravity)   Other Exercises sitting in chair, holding head up in midline and performing cervical rotation. cervial lateral flexion B/L, cervical  flex/ext with 3 second hold in extension.   Comments forearm supination/pronation, x10. Wrist flex/ext, gravity eliminated.   Bed Mobility    Rolling Total Assist to Lt.;Total Assist to Rt.   Skilled Intervention Verbal Cuing;Facilitation   Comments flat bed, 2 person.   Functional Mobility   Bed, Chair, Wheelchair Transfer Total Assist   Transfer Method Dependent Lift   Mobility bed<> 20in tilt in space WC.   Comments Pt sat up in WC for B UE and cervical therex, he liked sitting in the chair during session but did not want to stay in the chair due to neck fatigue after exercises and inadequate head support in WC.   Short Term Goals    Short Term Goal # 1 pt will move supine<>eob with min a in 6 tx for bed mobility.   Goal Outcome # 1 goal not met   Short Term Goal # 2 pt will sit at eob for 5 min with fair- balance in 6 tx for oob tolerance.   Goal Outcome # 2 Goal not met   Short Term Goal # 3 pt will complete sts with hemiwalker and min a in 6 tx for functional mobility.   Goal Outcome # 3 Goal not met   Short Term Goal # 4 pt will hold head in upright position for 30 seconds for 6 tx for posture.   Goal Outcome # 4 Progressing as expected   Supervising Physical Therapist (PTA Treatments Only)   Supervising Physical Therapist Kamilla Antoine

## 2025-05-06 NOTE — PROGRESS NOTES
Surgical Progress Note    Author: ZANDER Castro Date & Time created: 2025   2:58 PM     Interval Events:  Consultation for microhematuria. UA + microhematuria since . In April, and May 2025, positive form RBC >100.       CTU reviewed with patient. Imaging unremarkable    Patient unable to give thumbs up today when asked. Unclear if he understands imaging review or hematuria work up.    ROS  Hemodynamics:  Temp (24hrs), Av.6 °C (97.8 °F), Min:36.3 °C (97.3 °F), Max:36.7 °C (98.1 °F)  Temperature: 36.7 °C (98.1 °F), Monitored Temp: 36.3 °C (97.3 °F)  Pulse  Av.9  Min: 53  Max: 136   Blood Pressure: (!) 133/90     Respiratory:    Respiration: 18, Pulse Oximetry: 93 %     Work Of Breathing / Effort: Within Normal Limits  RUL Breath Sounds: Clear, RML Breath Sounds: Diminished, RLL Breath Sounds: Diminished, SHABNAM Breath Sounds: Clear, LLL Breath Sounds: Diminished  Neuro:  GCS       Fluids:    Intake/Output Summary (Last 24 hours) at 2025 1458  Last data filed at 2025 1452  Gross per 24 hour   Intake 1206 ml   Output 1175 ml   Net 31 ml        Current Diet Order   Procedures    Diet NPO Restrict to: With Tube Feed    Diet: Diet Tube Feed; Formula: Other - Specify formula comments in the field to the right (Glucerna 1.5 carton); Tube Feed Time Unit: Hours/Day; Select Bolus (If Indicated): Other (Specify in Comments) (Glucerna 1.5); Bolus Frequency: TID (At meal...     Physical Exam  Labs:  Recent Results (from the past 24 hours)   CRP Quantitive (Non-Cardiac)    Collection Time: 25  6:13 PM   Result Value Ref Range    Stat C-Reactive Protein 1.96 (H) 0.00 - 0.75 mg/dL   Prealbumin    Collection Time: 25  6:13 PM   Result Value Ref Range    Pre-Albumin 24.2 18.0 - 38.0 mg/dL   MAGNESIUM    Collection Time: 25  6:56 AM   Result Value Ref Range    Magnesium 2.1 1.5 - 2.5 mg/dL   Renal Function Panel    Collection Time: 25  6:56 AM   Result Value Ref Range    Sodium  138 135 - 145 mmol/L    Potassium 2.8 (L) 3.6 - 5.5 mmol/L    Chloride 94 (L) 96 - 112 mmol/L    Co2 35 (H) 20 - 33 mmol/L    Anion Gap 9.0 7.0 - 16.0    Glucose 128 (H) 65 - 99 mg/dL    Creatinine 0.80 0.50 - 1.40 mg/dL    Bun 25 (H) 8 - 22 mg/dL    Calcium 9.7 8.5 - 10.5 mg/dL    Correct Calcium 10.0 8.5 - 10.5 mg/dL    Phosphorus 3.6 2.5 - 4.5 mg/dL    Albumin 3.6 3.2 - 4.9 g/dL   CBC WITHOUT DIFFERENTIAL    Collection Time: 05/06/25  6:56 AM   Result Value Ref Range    WBC 7.8 4.8 - 10.8 K/uL    RBC 4.81 4.70 - 6.10 M/uL    Hemoglobin 13.1 (L) 14.0 - 18.0 g/dL    Hematocrit 40.9 (L) 42.0 - 52.0 %    MCV 85.0 81.4 - 97.8 fL    MCH 27.2 27.0 - 33.0 pg    MCHC 32.0 (L) 32.3 - 36.5 g/dL    RDW 38.8 35.9 - 50.0 fL    Platelet Count 277 164 - 446 K/uL    MPV 9.1 9.0 - 12.9 fL   ESTIMATED GFR    Collection Time: 05/06/25  6:56 AM   Result Value Ref Range    GFR (CKD-EPI) 109 >60 mL/min/1.73 m 2     Medical Decision Making, by Problem:  Active Hospital Problems    Diagnosis     Sigmoid thickening [K63.9]     Hypokalemia [E87.6]     Constipation [K59.00]     Emesis [R11.10]     Hematuria [R31.9]     Proteus mirabilis pneumonia (HCC) [J15.69]     Free intraperitoneal air [K66.8]     Acute ischemic stroke (HCC) [I63.9]     Acute neuromuscular respiratory failure (HCC) [J96.00, G70.9]     Type 2 diabetes mellitus with hyperglycemia, without long-term current use of insulin (HCC) [E11.65]     Elevated LFTs [R79.89]     Primary hypertension [I10]      Plan:    -CTU reviewed, indication for cystoscopy however, should be considered outpatient.  -Bladder scan requested by bedside  RN to ensure patient is adequately emptying    Quality Measures:  Quality-Core Measures    Discussed patient condition with Patient and Dr Odom

## 2025-05-07 LAB
ALBUMIN SERPL BCP-MCNC: 3.7 G/DL (ref 3.2–4.9)
ALBUMIN/GLOB SERPL: 0.9 G/DL
ALP SERPL-CCNC: 68 U/L (ref 30–99)
ALT SERPL-CCNC: 47 U/L (ref 2–50)
ANION GAP SERPL CALC-SCNC: 9 MMOL/L (ref 7–16)
AST SERPL-CCNC: 27 U/L (ref 12–45)
BILIRUB SERPL-MCNC: 0.4 MG/DL (ref 0.1–1.5)
BUN SERPL-MCNC: 27 MG/DL (ref 8–22)
CALCIUM ALBUM COR SERPL-MCNC: 10.3 MG/DL (ref 8.5–10.5)
CALCIUM SERPL-MCNC: 10.1 MG/DL (ref 8.5–10.5)
CHLORIDE SERPL-SCNC: 94 MMOL/L (ref 96–112)
CO2 SERPL-SCNC: 37 MMOL/L (ref 20–33)
CREAT SERPL-MCNC: 0.8 MG/DL (ref 0.5–1.4)
GFR SERPLBLD CREATININE-BSD FMLA CKD-EPI: 109 ML/MIN/1.73 M 2
GLOBULIN SER CALC-MCNC: 4 G/DL (ref 1.9–3.5)
GLUCOSE SERPL-MCNC: 141 MG/DL (ref 65–99)
MAGNESIUM SERPL-MCNC: 2.3 MG/DL (ref 1.5–2.5)
PHOSPHATE SERPL-MCNC: 3.3 MG/DL (ref 2.5–4.5)
POTASSIUM SERPL-SCNC: 3.1 MMOL/L (ref 3.6–5.5)
PROT SERPL-MCNC: 7.7 G/DL (ref 6–8.2)
SODIUM SERPL-SCNC: 140 MMOL/L (ref 135–145)

## 2025-05-07 PROCEDURE — 700102 HCHG RX REV CODE 250 W/ 637 OVERRIDE(OP): Performed by: INTERNAL MEDICINE

## 2025-05-07 PROCEDURE — A9270 NON-COVERED ITEM OR SERVICE: HCPCS | Performed by: INTERNAL MEDICINE

## 2025-05-07 PROCEDURE — 99231 SBSQ HOSP IP/OBS SF/LOW 25: CPT | Performed by: UROLOGY

## 2025-05-07 PROCEDURE — 51798 US URINE CAPACITY MEASURE: CPT

## 2025-05-07 PROCEDURE — 99232 SBSQ HOSP IP/OBS MODERATE 35: CPT | Performed by: INTERNAL MEDICINE

## 2025-05-07 PROCEDURE — 83735 ASSAY OF MAGNESIUM: CPT

## 2025-05-07 PROCEDURE — 94640 AIRWAY INHALATION TREATMENT: CPT

## 2025-05-07 PROCEDURE — 700111 HCHG RX REV CODE 636 W/ 250 OVERRIDE (IP): Mod: JZ | Performed by: INTERNAL MEDICINE

## 2025-05-07 PROCEDURE — 84100 ASSAY OF PHOSPHORUS: CPT

## 2025-05-07 PROCEDURE — 92507 TX SP LANG VOICE COMM INDIV: CPT

## 2025-05-07 PROCEDURE — 80053 COMPREHEN METABOLIC PANEL: CPT

## 2025-05-07 PROCEDURE — 770020 HCHG ROOM/CARE - TELE (206)

## 2025-05-07 PROCEDURE — 36415 COLL VENOUS BLD VENIPUNCTURE: CPT

## 2025-05-07 RX ADMIN — HYDROCHLOROTHIAZIDE 25 MG: 25 TABLET ORAL at 04:26

## 2025-05-07 RX ADMIN — GABAPENTIN 400 MG: 400 CAPSULE ORAL at 16:33

## 2025-05-07 RX ADMIN — Medication 5 MG: at 21:22

## 2025-05-07 RX ADMIN — SENNOSIDES AND DOCUSATE SODIUM 2 TABLET: 50; 8.6 TABLET ORAL at 04:25

## 2025-05-07 RX ADMIN — ATORVASTATIN CALCIUM 80 MG: 80 TABLET, FILM COATED ORAL at 16:32

## 2025-05-07 RX ADMIN — ASPIRIN 81 MG: 81 TABLET, CHEWABLE ORAL at 04:25

## 2025-05-07 RX ADMIN — POTASSIUM BICARBONATE 25 MEQ: 978 TABLET, EFFERVESCENT ORAL at 08:07

## 2025-05-07 RX ADMIN — POTASSIUM BICARBONATE 25 MEQ: 978 TABLET, EFFERVESCENT ORAL at 16:32

## 2025-05-07 RX ADMIN — ENOXAPARIN SODIUM 40 MG: 100 INJECTION SUBCUTANEOUS at 16:32

## 2025-05-07 RX ADMIN — POTASSIUM BICARBONATE 25 MEQ: 978 TABLET, EFFERVESCENT ORAL at 12:16

## 2025-05-07 RX ADMIN — AMLODIPINE BESYLATE 10 MG: 10 TABLET ORAL at 04:26

## 2025-05-07 RX ADMIN — GABAPENTIN 400 MG: 400 CAPSULE ORAL at 04:25

## 2025-05-07 RX ADMIN — GABAPENTIN 400 MG: 400 CAPSULE ORAL at 12:16

## 2025-05-07 ASSESSMENT — PAIN DESCRIPTION - PAIN TYPE
TYPE: ACUTE PAIN

## 2025-05-07 ASSESSMENT — FIBROSIS 4 INDEX: FIB4 SCORE: 0.69

## 2025-05-07 ASSESSMENT — ENCOUNTER SYMPTOMS
WEAKNESS: 1
SENSORY CHANGE: 1
SHORTNESS OF BREATH: 0
ABDOMINAL PAIN: 0

## 2025-05-07 NOTE — THERAPY
"Speech Language Pathology   Daily Treatment     Patient Name: Jonna Brian  AGE:  49 y.o., SEX:  male  Medical Record #: [id number]  Date of Service: 5/7/2025      Precautions:  Precautions: (P) Fall Risk, Swallow Precautions, PEG Tube, Tracheostomy , Other (See Comments)         Subjective  \"How\" pt asking how long will trach be in place with educ provided.       Assessment  Vitals stable throughout session with HR mid 80's and sats mid 90's. Tpiece at 4 liters and 28%. Cuff found in down position. Per call to RT, cuff has been down for a couple of weeks and pt has been tolerating. SLP communicated with RN to keep cuff deflated as pt tolerates and per RT. PMSV placement instructions, that are posted in room, updated to indicate cuff down/deflated.    Three deep suctions and greater than 5 oral suctions during session with pt tolerating PMSV for 37 minutes. Pt with poor to fair voice at the vowel level. Pt unable to grasp call light with RN collaboration regarding quad button. Pt unable to point but able to provide Y/N head nods to indicate basic needs using his field of 6 word board. Pt communicating his request for deep suction at end of session. Mild to moderate amount of oral and trach secretions that are thin and clear. PMSV removed after 37 minutes and T-piece replaced. SLP collaborated with RN.       Clinical Impressions  PMSV 15-30 min as tolerated with 1-1 suprev of trained staff. Cuff down and deflated at all times per RT as tolerated.       Recommendations  Treatment Completed: (P) Voice Prosthesis Training            Speech-Language Treatment  Speech-Language Treatment: (P) PMV, AAC training, voice training.         Voice Prosthesis Training  Placement: (P) Trained staff only  Duration: (P) 15-30 min 1-1 superv      SLP Treatment Plan  Treatment Plan: (P) Voice Prosthesis Training, Patient/Family/Caregiver Training, Speech-Language Treatment  SLP Frequency: (P) 3x Per Week  Estimated Duration: (P) Until " "Therapy Goals Met      Anticipated Discharge Needs  Discharge Recommendations: (P) Recommend post-acute placement for additional speech therapy services prior to discharge home  Therapy Recommendations Upon DC: (P) Dysphagia Training, AAC Training / Development, Patient / Family / Caregiver Education, Tracheostomy Training      Patient / Family Goals  Patient / Family Goal #1: (P) \"Swallow\"  Goal #1 Outcome: (P) Goal not met  Short Term Goals  Short Term Goal # 1: (P) Pt will tolerate PMV trials without negitive change to cardiopulmonary vitals for 20 minutes.  Goal Outcome # 1: (P) Progressing as expected  Short Term Goal # 1 B : (P) Patient will tolerate PMV trials for 30 minutes without negative changes to cardiopulmonary vitals.  Goal Outcome  # 1 B: (P) Progressing as expected  Short Term Goal # 2: (P) Pt will demo intelligible phonation at the word level w/ > 80% intelligibility w/ mod cueing  Goal Outcome # 2 : (P) Progressing slower than expected  Short Term Goal # 4: (P) Pt's family / caregivers will participate in training on utilizing AAC with the pt via demonstration teachback  Goal Outcome  # 4: (P) Goal not met      Sarah Rodrigues, HILARY  "

## 2025-05-07 NOTE — CARE PLAN
The patient is Watcher - Medium risk of patient condition declining or worsening    Shift Goals  Clinical Goals: monitor electrolytes and labs, telemetry monitoring, pulmonary hygeine  Patient Goals: rest  Family Goals: jennifer    Progress made toward(s) clinical / shift goals:      Patient A/O x4 but remains nonverbal. Patient suctioned 2-3 times each encounter to promote pulmonary hygiene with t piece. Patient potassium 2.8 provider was notified and pt placed on telemetry. Maintaining skin integrity with q2 turns and rotating foot drop brace and offloading boot with each turn.       Problem: Neuro Status  Goal: Neuro status will remain stable or improve  Outcome: Progressing       Problem: Respiratory - Stroke Patient  Goal: Patient will achieve/maintain optimum respiratory rate/effort  Outcome: Progressing       Patient is not progressing towards the following goals:      Problem: Psychosocial - Patient Condition  Goal: Patient's ability to verbalize feelings about condition will improve  Outcome: Not Progressing  Goal: Patient's ability to re-evaluate and adapt role responsibilities will improve  Outcome: Not Progressing     Problem: Dysphagia  Goal: Dysphagia will improve  Outcome: Not Progressing  Pt unable to handle secretions on own, requiring frequent suctioning     Problem: Risk for Aspiration  Goal: Patient's risk for aspiration will be absent or decrease  Outcome: Not Progressing  Pt at risk for aspiration with many thick secretions. Pt continues to be on aspiration precautions with tube bolus feeds.     Problem: Mobility - Stroke  Goal: Patient's capacity to carry out activities will improve  Outcome: Not Progressing  Pt requiring total care assistance with turns and ADLs.      Problem: Self Care  Goal: Patient will have the ability to perform ADLs independently or with assistance (bathe, groom, dress, toilet and feed)  Outcome: Not Progressing  Pt requiring assistance with all ADL's and not able to assist.

## 2025-05-07 NOTE — DISCHARGE PLANNING
[id number]  DPA emailed Armando with [facility]    [id number]  Agency/Facility Name: [facility]  Spoke To: [facility]  Outcome: Per Armando just heard from Patient INS about approval for single case agreement begin approved. Per Armando CM can arrange transport as early as tomorrow.  LSW notified.    [id number]  DPA emailed Armando with [facility]

## 2025-05-07 NOTE — CARE PLAN
The patient is Stable - Low risk of patient condition declining or worsening    Shift Goals  Clinical Goals: monitor labs, procedure clarification, tele monitoring stable, pulmonary hygiene  Patient Goals: Rest  Family Goals: Updates    Progress made toward(s) clinical / shift goals:    Problem: Knowledge Deficit - Standard  Goal: Patient and family/care givers will demonstrate understanding of plan of care, disease process/condition, diagnostic tests and medications  Outcome: Progressing     Problem: Optimal Care of the Stroke Patient  Goal: Optimal emergency care for the stroke patient  Outcome: Progressing  Goal: Optimal acute care for the stroke patient  Outcome: Progressing     Problem: Knowledge Deficit - Stroke Education  Goal: Patient's knowledge of stroke and risk factors will improve  Outcome: Progressing     Problem: Bowel Elimination  Goal: Establish and maintain regular bowel function  Outcome: Progressing       Patient is not progressing towards the following goals:

## 2025-05-07 NOTE — PROGRESS NOTES
Surgical Progress Note    Author: ZANDER Castro Date & Time created: 2025   2:46 PM     Interval Events:    Point of  present at bedside. Patient appears much more awake today. Discussion with patient about microhematuria and AUA guidelines for diagnostic cysto. Patient noded his head that he declined cystoscopy at this time.    We did emphasize the importance of family making this medical decision and defer to POA to make this decision    The plan is for patient to DC to a facility in [place] on Friday.       ROS  Hemodynamics:  Temp (24hrs), Av.6 °C (97.8 °F), Min:36.3 °C (97.3 °F), Max:36.8 °C (98.2 °F)  Temperature: 36.3 °C (97.3 °F), Monitored Temp: 36.4 °C (97.5 °F)  Pulse  Av  Min: 53  Max: 136   Blood Pressure: 121/79     Respiratory:    Respiration: 17, Pulse Oximetry: 97 %     Work Of Breathing / Effort: Within Normal Limits  RUL Breath Sounds: Clear After Suction, RML Breath Sounds: Diminished, RLL Breath Sounds: Diminished, SHABNAM Breath Sounds: Clear After Suction, LLL Breath Sounds: Diminished  Neuro:  GCS       Fluids:    Intake/Output Summary (Last 24 hours) at 2025 1446  Last data filed at 2025 1330  Gross per 24 hour   Intake 1822 ml   Output 1000 ml   Net 822 ml     Weight: 95.3 kg (210 lb 1.6 oz)  Current Diet Order   Procedures    Diet NPO Restrict to: With Tube Feed    Diet: Diet Tube Feed; Formula: Other - Specify formula comments in the field to the right (Glucerna 1.5 carton); Tube Feed Time Unit: Hours/Day; Select Bolus (If Indicated): Other (Specify in Comments) (Glucerna 1.5); Bolus Frequency: TID (At meal...     Physical Exam  Labs:  Recent Results (from the past 24 hours)   Renal Function Panel    Collection Time: 25  4:41 AM   Result Value Ref Range    Sodium 140 135 - 145 mmol/L    Potassium 3.1 (L) 3.6 - 5.5 mmol/L    Chloride 94 (L) 96 - 112 mmol/L    Co2 37 (H) 20 - 33 mmol/L    Anion Gap 9.0 7.0 - 16.0    Glucose 141 (H) 65 -  99 mg/dL    Creatinine 0.80 0.50 - 1.40 mg/dL    Bun 27 (H) 8 - 22 mg/dL    Calcium 10.1 8.5 - 10.5 mg/dL    Correct Calcium 10.3 8.5 - 10.5 mg/dL    Phosphorus 3.3 2.5 - 4.5 mg/dL    Albumin 3.7 3.2 - 4.9 g/dL   MAGNESIUM    Collection Time: 05/07/25  4:41 AM   Result Value Ref Range    Magnesium 2.3 1.5 - 2.5 mg/dL   Comp Metabolic Panel    Collection Time: 05/07/25  4:41 AM   Result Value Ref Range    AST(SGOT) 27 12 - 45 U/L    ALT(SGPT) 47 2 - 50 U/L    Alkaline Phosphatase 68 30 - 99 U/L    Total Bilirubin 0.4 0.1 - 1.5 mg/dL    Total Protein 7.7 6.0 - 8.2 g/dL    Globulin 4.0 (H) 1.9 - 3.5 g/dL    A-G Ratio 0.9 g/dL   ESTIMATED GFR    Collection Time: 05/07/25  4:41 AM   Result Value Ref Range    GFR (CKD-EPI) 109 >60 mL/min/1.73 m 2     Medical Decision Making, by Problem:  Active Hospital Problems    Diagnosis     Sigmoid thickening [K63.9]     Hypokalemia [E87.6]     Constipation [K59.00]     Emesis [R11.10]     Hematuria [R31.9]     Proteus mirabilis pneumonia (HCC) [J15.69]     Free intraperitoneal air [K66.8]     Acute ischemic stroke (HCC) [I63.9]     Acute neuromuscular respiratory failure (HCC) [J96.00, G70.9]     Type 2 diabetes mellitus with hyperglycemia, without long-term current use of insulin (HCC) [E11.65]     Elevated LFTs [R79.89]     Primary hypertension [I10]      Plan:  -Awaiting family response for cystoscopy.  -Consent in chart if family agrees that they would like cysto done while patient is in the hospital.    Quality Measures:  Quality-Core Measures    Discussed patient condition with Patient and Point of contact for family

## 2025-05-07 NOTE — PROGRESS NOTES
Monitor Summary: SR/ST , [place] 0.13, QRS 0.12, QT 0.32, with rare PVCs per strip from monitor room.

## 2025-05-07 NOTE — DISCHARGE PLANNING
Medical Social Work  Voice message from [facility], patient does have transport benefits.  Would need pre authorization request, patient has a 20% co-pay.      [facility] [phone]  [employer] [phone]  [facility] [phone]

## 2025-05-07 NOTE — PROGRESS NOTES
[facility] Medicine Daily Progress Note    Date of Service  5/7/2025    Chief Complaint  Jonna Brian is a 49 y.o. male admitted 3/25/2025 with stroke    Hospital Course  This is a 49 y.o. male who was initially admitted to the hospital on 3/25/2025 with global weakness, aphasia and ataxia.  His last known normal was 1630 on March 24.  He was initially admitted to the hospital floor, his CT head as well as CTA of the head and neck were within normal limits.  There was consideration for possible Guillain-Barré syndrome.     His condition continued to deteriorate and he underwent a lumbar puncture which revealed elevated protein but no evidence of infection.  He had progressive bulbar symptoms and weakness and was transferred to the ICU.  Unfortunately shortly after he arrived in the ICU he developed stridor and required emergent intubation on 3/27.       He then underwent an MRI of his brain and spine which unfortunately revealed small areas of acute infarcts in the lower jero and the upper medulla on both sides of the midline.  He also had chronic infarcts in his jero and right inferior cerebellum.  They were chronically Cuna in the bilateral basal ganglia, the right thalamus and the right periventricular white matter.  He had evidence of small vessel disease as well in the periventricular white matter.     Ultimately it appears he has been suffering multiple small strokes and his progressive decline was due to acute infarcts in the lower jero and upper medulla. Neurology recommended DAPT for 10 days with aspirin indefinitely.  Patient completed Plavix 4/4.  Since that time he has not recovered function, has required tracheostomy and PEG placement with the ultimate goal of transferring him to postacute medical to see what function may be regained in time.     Patient able to nod head, shakes head for yes/no, able to use alphabet sheet to make needs known. He continues to work with PT/OT/SLP therapies with some  minor signs of improvement. Gio is medically cleared, plan for discharge to LTAC for ongoing management of tracheostomy tube, PEG tube, therapies.    Interval Problem Update  He was doing ok with 1 carton boluses, but had N/V when 2 cartons given last night. KUB neg for obstruction. Will get gastric emptying study  HypoK, replete  Patient able to nod appropriately to questions, able to tell me he is hot using paper signs. He remembers vomiting last night. Denies abd pain or nausea currently. Denies SOB  Insurance auth for [facility] pending    I have discussed this patient's plan of care and discharge plan at IDT rounds today with Case Management, Nursing, Nursing leadership, and other members of the IDT team.    Consultants/Specialty  critical care, neurology, and urology    Code Status  Full Code    Disposition  The patient is not medically cleared for discharge to home or a post-acute facility.  Anticipate discharge to: an inpatient rehabilitation hospital    I have placed the appropriate orders for post-discharge needs.    Review of Systems  Review of Systems   Unable to perform ROS: Medical condition (nonverbal)   Respiratory:  Negative for shortness of breath.    Cardiovascular:  Negative for chest pain.   Gastrointestinal:  Negative for abdominal pain.   Neurological:  Positive for sensory change and weakness.        Physical Exam  Temp:  [36.3 °C (97.3 °F)-36.8 °C (98.2 °F)] 36.3 °C (97.3 °F)  Pulse:  [] 86  Resp:  [17-18] 17  BP: (110-127)/(76-89) 122/80  SpO2:  [95 %-98 %] 96 %    Physical Exam  Vitals and nursing note reviewed.   Constitutional:       Appearance: He is ill-appearing.   HENT:      Head: Normocephalic.      Mouth/Throat:      Mouth: Mucous membranes are dry.   Eyes:      General:         Right eye: No discharge.         Left eye: No discharge.   Neck:      Comments: tracheostomy  Cardiovascular:      Comments: Distant heart sounds  Pulmonary:      Effort: No respiratory distress.       Breath sounds: No wheezing, rhonchi or rales.   Abdominal:      Palpations: Abdomen is soft.      Tenderness: There is no abdominal tenderness.      Comments: Feeding tube   Genitourinary:     Penis: Normal.    Musculoskeletal:         General: No swelling.      Comments: Diffuse muscle atrophy   Skin:     General: Skin is warm and dry.   Neurological:      Mental Status: He is alert.      Comments: Nods appropriately to questions and follows commands, able to weakly move right hand and foot  No movement of left hand and foot         Fluids    Intake/Output Summary (Last 24 hours) at 5/7/2025 1231  Last data filed at 5/7/2025 0630  Gross per 24 hour   Intake 1345 ml   Output 1000 ml   Net 345 ml        Laboratory  Recent Labs     05/05/25  0658 05/06/25  0656   WBC 6.8 7.8   RBC 4.78 4.81   HEMOGLOBIN 12.9* 13.1*   HEMATOCRIT 40.6* 40.9*   MCV 84.9 85.0   MCH 27.0 27.2   MCHC 31.8* 32.0*   RDW 38.0 38.8   PLATELETCT 260 277   MPV 8.9* 9.1     Recent Labs     05/05/25  0658 05/06/25  0656 05/07/25  0441   SODIUM 138 138 140   POTASSIUM 2.9* 2.8* 3.1*   CHLORIDE 94* 94* 94*   CO2 34* 35* 37*   GLUCOSE 121* 128* 141*   BUN 24* 25* 27*   CREATININE 0.79 0.80 0.80   CALCIUM 9.7 9.7 10.1                   Imaging  [id number] VIEW   Final Result      Nonspecific bowel gas pattern.      CT-ABDOMEN & PELVIS UROGRAM   Final Result      1.  No suspicious renal, ureteral or bladder mass lesions.   2.  No renal, ureteral or bladder stones. No hydronephrosis.   3.  Benign renal cysts which do not require imaging follow-up.   4.  Wall thickening and hyperenhancement of rectosigmoid colon. Findings may be due to underdistention, although neoplasm is difficult to exclude. Recommend follow-up with colonoscopy.         [id number] VIEW   Final Result      No evidence of bowel obstruction.                  DX-CHEST-LIMITED (1 VIEW)   Final Result         No acute cardiopulmonary abnormalities are identified.      [id number]  VIEW   Final Result         No specific finding to suggest small bowel obstruction.      DX-CHEST-PORTABLE (1 VIEW)   Final Result      Hypoinflation with bibasilar atelectasis.      US-RENAL   Final Result      1.  No hydronephrosis.      DX-CHEST-PORTABLE (1 VIEW)   Final Result         1.  Left basilar atelectasis and/or subtle infiltrates, similar to prior study      DX-CHEST-PORTABLE (1 VIEW)   Final Result         1.  Left basilar atelectasis, no focal infiltrate   2.  Cardiomegaly      DX-G.I. TUBE INJECTION, ANY TYPE   Final Result      Contrast from a PEG tube injection extends into the stomach without evidence of contrast leak.      CT-CHEST,ABDOMEN,PELVIS WITH   Final Result      1.  Large amount of free intraperitoneal air within the abdomen again seen as was previously identified on chest x-ray by review of the patient's chart, a percutaneous gastrostomy tube was placed on 3/31/2025 and is free intraperitoneal air is possibly    secondary to that recent procedure.      2.  Bibasal atelectasis and small bilateral pleural effusions.      3.  Tracheostomy tube present.      4.  No evidence of bowel obstruction or free fluid within the abdomen or pelvis.      DX-CHEST-PORTABLE (1 VIEW)   Final Result         1.  Bibasilar atelectasis   2.  Intra-abdominal free air, can be associated with history of percutaneous gastrostomy, consider other viscus perforation as warranted. Could be further evaluated with CT of the abdomen with contrast as clinically appropriate.      These findings were discussed with the patient's clinician, Bravo Lala Iv, on 4/2/2025 7:57 AM.      DX-CHEST-PORTABLE (1 VIEW)   Final Result      No evidence of acute cardiopulmonary process.      DX-ABDOMEN FOR TUBE PLACEMENT   Final Result      1.  Enteric tube extends in the fundus of stomach.      DX-ABDOMEN FOR TUBE PLACEMENT   Final Result      The gastric tube has been removed and replaced with a small bowel feeding tube which  terminate in the proximal stomach.      DX-CHEST-PORTABLE (1 VIEW)   Final Result      Atelectasis within the left lung base.      MR-THORACIC SPINE-WITH & W/O   Final Result      1.  There is no abnormal intramedullary T2 signal intensity in the thoracic spinal cord.   2.  Mild degenerative disease at T8-9.   3.  Left lower segmental consolidation.      MR-CERVICAL SPINE-WITH & W/O   Final Result      1.  Small areas of acute infarcts in the lower jero and upper medulla involving both sides of the midline. There are chronic infarcts in the jero and right inferior cerebellum.   2.  There is no abnormal intramedullary T2 signal intensity in the cervical spinal cord to suggest demyelinating lesions. There is no MR evidence of compressive myelopathy.   3.  Mild degenerative disease.            MR-BRAIN-WITH & W/O   Final Result      1.  Small areas of acute infarcts in the lower jero and upper medulla involving both sides of the midline.   2.  There are chronic infarcts in the jero and right inferior cerebellum. . There are areas of chronic lacunae in the bilateral basal ganglia, right thalamus and right periventricular white matter.   3.  There are nonspecific T2 hyperintensities in the periventricular white matter likely representing chronic small vessel disease.   4.  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      MR-LUMBAR SPINE-WITH & W/O   Final Result      1.  Unremarkable pre and postcontrast MR examination of the lumbar spine.   2.  Clinical suspicious for GBS: There is no abnormal enhancement of the lumbar nerve roots.      DX-ABDOMEN FOR TUBE PLACEMENT   Final Result      NG tube tip projects at the peripyloric region.      DX-CHEST-PORTABLE (1 VIEW)   Final Result      1.  Low lung volumes without definite acute cardiopulmonary abnormality.   2.  Support apparatus as above.      CT-HEAD W/O   Final Result      1.  No acute intracranial  "abnormality.   2.  Remote right cerebellar infarct.               DX-CHEST-PORTABLE (1 VIEW)   Final Result      No acute cardiopulmonary disease evident.      DX-CHEST-PORTABLE (1 VIEW)   Final Result      No acute cardiopulmonary disease evident.      CT-CEREBRAL PERFUSION ANALYSIS   Final Result      1. Cerebral blood flow less than 30% possibly representing completed infarct = 0 mL. Based on distribution of this finding, this is unlikely to represent artifact.      2. T Max more than 6 seconds possibly representing combination of completed infarct and ischemia = 7 mL. Based on the distribution of this finding, this is possibly artifact.      3. Mismatched volume possibly representing ischemic brain/penumbra= 0 mL      4.  Please note that this cerebral perfusion study and report is Quantitative and targets supratentorial (cerebral) perfusion for evaluation of large vessel territory acute ischemia/infarction. For example, lacunar infarcts, and brainstem/posterior fossa    ischemia/infarction are not evaluated on this study.  Data acquisition is subject to artifacts which can yield non-anatomically plausible perfusion maps which may be due to motion, bolus timing, signal to noise ratio, or other technical factors.    Perfusion map abnormalities which show non-anatomic distributions are likely artifact.   This study is not \"stand-alone\" and should only be utilized for diagnosis, management/treatment in correlation with CT, CTA, and/or MRI and clinical factors.         CT-CTA NECK WITH & W/O-POST PROCESSING   Final Result      CT angiogram of the neck within normal limits.      CT-CTA HEAD WITH & W/O-POST PROCESS   Final Result      CT angiogram of the [place] within normal limits.      CT-HEAD W/O   Final Result      Head CT without contrast within normal limits. No evidence of acute cerebral infarction, hemorrhage or mass lesion.               NM-GASTRIC EMPTYING    (Results Pending)    "     Assessment/Plan  * Acute ischemic stroke (HCC)- (present on admission)  Assessment & Plan  Acute ischemic stroke  MRI of the brain revealed acute infarcts of the lower jero and upper medulla.  He effectively is a quadriplegic and required tracheostomy and PEG tube.  Aspirin and statin  Blood pressure control  Family hope for functional recovery. LTACH pending  Speech following, capping trials  PTOT    Sigmoid thickening  Assessment & Plan  Wall thickening and hyperenhancement of rectosigmoid colon   Outpatient GI referral placed    Hypokalemia  Assessment & Plan  Replace as needed    Emesis  Assessment & Plan  He was doing ok with 1 carton boluses, but had N/V when 2 cartons given last night.   KUB neg for obstruction.   Will get gastric emptying study, radiology test will be tomorrow  Hold tube feeds midnight, give 1 carton at a time for now    Constipation  Assessment & Plan  KUB showed no obstruction  Bowel protocol    Improving    Hematuria  Assessment & Plan  Darkened urine, no gross hematuria  No recent grove trauma  Renal US normal, no stones  Completed empiric keflex for possible UTI  urology consulted, following    Proteus mirabilis pneumonia ([facility])- (present on admission)  Assessment & Plan  Treated with Zosyn de-escalated to Augmentin based on cultures from sputum    Acute neuromuscular respiratory failure (HCC)- (present on admission)  Assessment & Plan  Requiring tracheostomy    Anxiety  Assessment & Plan  Ativan for anxiety.     Type 2 diabetes mellitus with hyperglycemia, without long-term current use of insulin ([facility])- (present on admission)  Assessment & Plan  Details are unclear  Reportedly he is on metformin as an outpatient  Hemoglobin A1c 6.3  He does not require insulin    Primary hypertension- (present on admission)  Assessment & Plan  -120s   hydrochlorothiazide 25 mg daily and Norvasc 10 mg daily         VTE prophylaxis:    enoxaparin ppx      I have performed a physical exam and  reviewed and updated ROS and Plan today (5/7/2025). In review of yesterday's note (5/6/2025), there are no changes except as documented above.

## 2025-05-07 NOTE — DISCHARGE PLANNING
Medical Social Work  DPA informed JUAN MIGUEL that [facility] has accepted patient and can take as soon as tomorrow.    SW spoke to patient and friend about acceptance and location of LTAC.  Patient noodded his head yes.    PC to patient's mother Natalie [phone], SW have a very long conversation with her on patient's acceptance.  Very concerned that patient will not have friends to come see him. Could not understand why patient was not accepted at [facility].  Asked about placing patient in facilities by her or her other son in [place].  JUAN MIGUEL stated that patient has an accepting facility which can provide for his needs.  SW stressed this is not a LONG TERM PLACEMENT.      SW will provide liaison name and number     SW discussed transport, that his insurance will cover but patient has 20% co-pay.  JUAN MIGUEL told that patient doesn't have very much money in his account/paid bills when closing out his apartment/electric etc and Child Support.    PC to [employer] [phone]; message left to call  PC to [employer], same company as [employer]    PC to [employer] [phone];  JUAN MIGUEL spoke to Juan Miguel Hung provided requested information including insurance provider  JUAN MIGUEL told that price would ruffle be $[id number]      PC from Debby at [employer], JUAN MIGUEL quoted $28, 488/if insurance is involved it can take up to 72 hours for a response.

## 2025-05-08 ENCOUNTER — APPOINTMENT (OUTPATIENT)
Dept: RADIOLOGY | Facility: MEDICAL CENTER | Age: 50
DRG: 004 | End: 2025-05-08
Attending: INTERNAL MEDICINE
Payer: COMMERCIAL

## 2025-05-08 LAB
ALBUMIN SERPL BCP-MCNC: 3.7 G/DL (ref 3.2–4.9)
ANION GAP SERPL CALC-SCNC: 8 MMOL/L (ref 7–16)
BUN SERPL-MCNC: 26 MG/DL (ref 8–22)
CALCIUM ALBUM COR SERPL-MCNC: 10.2 MG/DL (ref 8.5–10.5)
CALCIUM SERPL-MCNC: 10 MG/DL (ref 8.5–10.5)
CHLORIDE SERPL-SCNC: 98 MMOL/L (ref 96–112)
CO2 SERPL-SCNC: 35 MMOL/L (ref 20–33)
CREAT SERPL-MCNC: 0.71 MG/DL (ref 0.5–1.4)
ERYTHROCYTE [DISTWIDTH] IN BLOOD BY AUTOMATED COUNT: 39.9 FL (ref 35.9–50)
GFR SERPLBLD CREATININE-BSD FMLA CKD-EPI: 113 ML/MIN/1.73 M 2
GLUCOSE SERPL-MCNC: 128 MG/DL (ref 65–99)
HCT VFR BLD AUTO: 43.8 % (ref 42–52)
HGB BLD-MCNC: 14.1 G/DL (ref 14–18)
MCH RBC QN AUTO: 27.5 PG (ref 27–33)
MCHC RBC AUTO-ENTMCNC: 32.2 G/DL (ref 32.3–36.5)
MCV RBC AUTO: 85.5 FL (ref 81.4–97.8)
PHOSPHATE SERPL-MCNC: 3.8 MG/DL (ref 2.5–4.5)
PLATELET # BLD AUTO: 263 K/UL (ref 164–446)
PMV BLD AUTO: 9.1 FL (ref 9–12.9)
POTASSIUM SERPL-SCNC: 3.3 MMOL/L (ref 3.6–5.5)
RBC # BLD AUTO: 5.12 M/UL (ref 4.7–6.1)
SODIUM SERPL-SCNC: 141 MMOL/L (ref 135–145)
WBC # BLD AUTO: 7.8 K/UL (ref 4.8–10.8)

## 2025-05-08 PROCEDURE — 94640 AIRWAY INHALATION TREATMENT: CPT

## 2025-05-08 PROCEDURE — 99233 SBSQ HOSP IP/OBS HIGH 50: CPT | Performed by: INTERNAL MEDICINE

## 2025-05-08 PROCEDURE — 770020 HCHG ROOM/CARE - TELE (206)

## 2025-05-08 PROCEDURE — 94760 N-INVAS EAR/PLS OXIMETRY 1: CPT

## 2025-05-08 PROCEDURE — A9270 NON-COVERED ITEM OR SERVICE: HCPCS | Performed by: INTERNAL MEDICINE

## 2025-05-08 PROCEDURE — 700102 HCHG RX REV CODE 250 W/ 637 OVERRIDE(OP): Performed by: INTERNAL MEDICINE

## 2025-05-08 PROCEDURE — 36415 COLL VENOUS BLD VENIPUNCTURE: CPT

## 2025-05-08 PROCEDURE — 80069 RENAL FUNCTION PANEL: CPT

## 2025-05-08 PROCEDURE — 700111 HCHG RX REV CODE 636 W/ 250 OVERRIDE (IP): Mod: JZ | Performed by: INTERNAL MEDICINE

## 2025-05-08 PROCEDURE — 94669 MECHANICAL CHEST WALL OSCILL: CPT

## 2025-05-08 PROCEDURE — A9541 TC99M SULFUR COLLOID: HCPCS

## 2025-05-08 PROCEDURE — 85027 COMPLETE CBC AUTOMATED: CPT

## 2025-05-08 RX ORDER — POTASSIUM CHLORIDE 1500 MG/1
40 TABLET, EXTENDED RELEASE ORAL ONCE
Status: DISCONTINUED | OUTPATIENT
Start: 2025-05-08 | End: 2025-05-08

## 2025-05-08 RX ORDER — METOCLOPRAMIDE 10 MG/1
10 TABLET ORAL
Status: DISCONTINUED | OUTPATIENT
Start: 2025-05-08 | End: 2025-05-12

## 2025-05-08 RX ADMIN — Medication 5 MG: at 21:57

## 2025-05-08 RX ADMIN — GABAPENTIN 400 MG: 400 CAPSULE ORAL at 12:36

## 2025-05-08 RX ADMIN — SENNOSIDES AND DOCUSATE SODIUM 2 TABLET: 50; 8.6 TABLET ORAL at 18:18

## 2025-05-08 RX ADMIN — GABAPENTIN 400 MG: 400 CAPSULE ORAL at 18:18

## 2025-05-08 RX ADMIN — ATORVASTATIN CALCIUM 80 MG: 80 TABLET, FILM COATED ORAL at 18:27

## 2025-05-08 RX ADMIN — METOCLOPRAMIDE 10 MG: 10 TABLET ORAL at 18:18

## 2025-05-08 RX ADMIN — ENOXAPARIN SODIUM 40 MG: 100 INJECTION SUBCUTANEOUS at 18:19

## 2025-05-08 RX ADMIN — POTASSIUM BICARBONATE 50 MEQ: 978 TABLET, EFFERVESCENT ORAL at 15:22

## 2025-05-08 RX ADMIN — METOCLOPRAMIDE 10 MG: 10 TABLET ORAL at 12:36

## 2025-05-08 ASSESSMENT — PAIN DESCRIPTION - PAIN TYPE
TYPE: ACUTE PAIN

## 2025-05-08 ASSESSMENT — FIBROSIS 4 INDEX: FIB4 SCORE: 0.7

## 2025-05-08 ASSESSMENT — ENCOUNTER SYMPTOMS
ABDOMINAL PAIN: 0
SHORTNESS OF BREATH: 0
WEAKNESS: 1
SENSORY CHANGE: 1

## 2025-05-08 NOTE — PROGRESS NOTES
Monitor Summary  Rhythm SR  Rate rare PVCs  Ectopy   DC 0.12  QRS 0.09  QT 0.36  Per monitor room

## 2025-05-08 NOTE — PROGRESS NOTES
Monitor Summary: SR/ST , [place] .13, QRS .09, QT .36, with rare PVCs per strip from monitor room.

## 2025-05-08 NOTE — THERAPY
Occupational Therapy Contact Note    Patient Name: Jonna Brian  Age:  49 y.o., Sex:  male  Medical Record #: [id number]  Today's Date: 5/8/2025 05/08/25 0944   Interdisciplinary Plan of Care Collaboration   Collaboration Comments OT tx attempted. Pt off floor. Will attempt again.

## 2025-05-08 NOTE — PROGRESS NOTES
4 Eyes Skin Assessment Completed by STEVEN Bruno and STEVEN Camacho.    Head WDL  Ears WDL  Nose WDL  Mouth swollen bottom lip  Neck WDL  Breast/Chest WDL  Shoulder Blades WDL  Spine WDL  (R) Arm/Elbow/Hand Redness, Blanching, Bruising, and Discoloration  (L) Arm/Elbow/Hand Redness, Blanching, Bruising, and Discoloration  Abdomen PEG tube site  Groin WDL  Scrotum/Coccyx/Buttocks Blanching and Discoloration  (R) Leg Blanching, Scab, and Bruising, and dry skin  (L) Leg Blanching, Scab, Bruising, and Abrasion, and dry skin  (R) Heel/Foot/Toe Blanching, Discoloration, and Boggy, and dry skin  (L) Heel/Foot/Toe Blanching, Discoloration, and Boggy, and dry skin          Devices In Places Tele Box, Pulse Ox, Condom Cath, and Tracheostomy      Interventions In Place Heel Mepilex, Heel Float Boots, TAP System, Pillows, Elbow Mepilex, Q2 Turns, Low Air Loss Mattress, Barrier Cream, and ZFlo Pillow    Possible Skin Injury No    Pictures Uploaded Into Epic N/A  Wound Consult Placed N/A  RN Wound Prevention Protocol Ordered Yes

## 2025-05-08 NOTE — CARE PLAN
The patient is Stable - Low risk of patient condition declining or worsening    Shift Goals  Clinical Goals: pulmonary hygeine, frequent suctioning, stable neuro exams, NPO midnight for AM procedure  Patient Goals: rest  Family Goals: updates on plan of care    Progress made toward(s) clinical / shift goals:    Problem: Knowledge Deficit - Standard  Goal: Patient and family/care givers will demonstrate understanding of plan of care, disease process/condition, diagnostic tests and medications  Outcome: Progressing     Problem: Optimal Care of the Stroke Patient  Goal: Optimal emergency care for the stroke patient  Outcome: Progressing  Goal: Optimal acute care for the stroke patient  Outcome: Progressing     Problem: Knowledge Deficit - Stroke Education  Goal: Patient's knowledge of stroke and risk factors will improve  Outcome: Progressing     Problem: Psychosocial - Patient Condition  Goal: Patient's ability to verbalize feelings about condition will improve  Outcome: Progressing  Goal: Patient's ability to re-evaluate and adapt role responsibilities will improve  Outcome: Progressing     Problem: Discharge Planning - Stroke  Goal: Ensure Stroke Core Measures are met prior to discharge  Outcome: Progressing  Goal: Patient’s continuum of care needs will be met  Outcome: Progressing     Problem: Neuro Status  Goal: Neuro status will remain stable or improve  Outcome: Progressing     Problem: Hemodynamic Monitoring  Goal: Patient's hemodynamics, fluid balance and neurologic status will be stable or improve  Outcome: Progressing     Problem: Respiratory - Stroke Patient  Goal: Patient will achieve/maintain optimum respiratory rate/effort  Outcome: Progressing     Problem: Dysphagia  Goal: Dysphagia will improve  Outcome: Progressing     Problem: Risk for Aspiration  Goal: Patient's risk for aspiration will be absent or decrease  Outcome: Progressing     Problem: Urinary Elimination  Goal: Establish and maintain regular  urinary output  Outcome: Progressing     Problem: Bowel Elimination  Goal: Establish and maintain regular bowel function  Outcome: Progressing

## 2025-05-08 NOTE — PROGRESS NOTES
Monitor summary: SR/ST , [place] 0.13, QRS 0.08, QT 0.31, with rare PVCs and couplets per strip from monitor room.

## 2025-05-08 NOTE — PROGRESS NOTES
[facility] Medicine Daily Progress Note    Date of Service  5/8/2025    Chief Complaint  Jonna Brian is a 49 y.o. male admitted 3/25/2025 with stroke    Hospital Course  This is a 49 y.o. male who was initially admitted to the hospital on 3/25/2025 with global weakness, aphasia and ataxia.  His last known normal was 1630 on March 24.  He was initially admitted to the hospital floor, his CT head as well as CTA of the head and neck were within normal limits.  There was consideration for possible Guillain-Barré syndrome.     His condition continued to deteriorate and he underwent a lumbar puncture which revealed elevated protein but no evidence of infection.  He had progressive bulbar symptoms and weakness and was transferred to the ICU.  Unfortunately shortly after he arrived in the ICU he developed stridor and required emergent intubation on 3/27.       He then underwent an MRI of his brain and spine which unfortunately revealed small areas of acute infarcts in the lower jero and the upper medulla on both sides of the midline.  He also had chronic infarcts in his jero and right inferior cerebellum.  They were chronically Cuna in the bilateral basal ganglia, the right thalamus and the right periventricular white matter.  He had evidence of small vessel disease as well in the periventricular white matter.     Ultimately it appears he has been suffering multiple small strokes and his progressive decline was due to acute infarcts in the lower jero and upper medulla. Neurology recommended DAPT for 10 days with aspirin indefinitely.  Patient completed Plavix 4/4.  Since that time he has not recovered function, has required tracheostomy and PEG placement with the ultimate goal of transferring him to postacute medical to see what function may be regained in time.     Patient able to nod head, shakes head for yes/no, able to use alphabet sheet to make needs known. He continues to work with PT/OT/SLP therapies with some  minor signs of improvement. Veratenessence is medically cleared, plan for discharge to LTAC for ongoing management of tracheostomy tube, PEG tube, therapies.    Interval Problem Update  He was doing ok with 1 carton boluses, but had N/V when 2 cartons given last night. KUB neg for obstruction. Will get gastric emptying study  HypoK, replete  Patient able to nod appropriately to questions, able to tell me he is hot using paper signs. He remembers vomiting last night. Denies abd pain or nausea currently. Denies SOB  [facility]  5/8: Patient seen and examined had gastric emptying study today, which was reviewed and shows delayed emptying  I will add reglan to his regimen   Cont on tube feed  Hypokalemia : potassium of 3.3 replete   Close monitor for decompensation   I have discussed this patient's plan of care and discharge plan at IDT rounds today with Case Management, Nursing, Nursing leadership, and other members of the IDT team.    Consultants/Specialty  critical care, neurology, and urology    Code Status  Full Code    Disposition  The patient is not medically cleared for discharge to home or a post-acute facility.      I have placed the appropriate orders for post-discharge needs.    Review of Systems  Review of Systems   Unable to perform ROS: Medical condition (nonverbal)   Respiratory:  Negative for shortness of breath.    Cardiovascular:  Negative for chest pain.   Gastrointestinal:  Negative for abdominal pain.   Neurological:  Positive for sensory change and weakness.        Physical Exam  Temp:  [36.3 °C (97.3 °F)-36.7 °C (98.1 °F)] 36.4 °C (97.5 °F)  Pulse:  [] 109  Resp:  [16-20] 18  BP: (119-161)/() 161/107  SpO2:  [95 %-100 %] 95 %    Physical Exam  Vitals and nursing note reviewed.   Constitutional:       Appearance: He is ill-appearing.   HENT:      Head: Normocephalic.      Mouth/Throat:      Mouth: Mucous membranes are dry.   Eyes:      General:         Right eye: No  discharge.         Left eye: No discharge.   Neck:      Comments: tracheostomy  Cardiovascular:      Comments: Distant heart sounds  Pulmonary:      Effort: No respiratory distress.      Breath sounds: No wheezing, rhonchi or rales.   Abdominal:      Palpations: Abdomen is soft.      Tenderness: There is no abdominal tenderness.      Comments: Feeding tube   Genitourinary:     Penis: Normal.    Musculoskeletal:         General: No swelling.      Comments: Diffuse muscle atrophy   Skin:     General: Skin is warm and dry.   Neurological:      Mental Status: He is alert.      Comments: Nods appropriately to questions and follows commands, able to weakly move right hand and foot  No movement of left hand and foot         Fluids    Intake/Output Summary (Last 24 hours) at 5/8/2025 1402  Last data filed at 5/8/2025 1230  Gross per 24 hour   Intake 774 ml   Output 600 ml   Net 174 ml        Laboratory  Recent Labs     05/06/25  0656 05/08/25  0408   WBC 7.8 7.8   RBC 4.81 5.12   HEMOGLOBIN 13.1* 14.1   HEMATOCRIT 40.9* 43.8   MCV 85.0 85.5   MCH 27.2 27.5   MCHC 32.0* 32.2*   RDW 38.8 39.9   PLATELETCT 277 263   MPV 9.1 9.1     Recent Labs     05/06/25  0656 05/07/25  0441 05/08/25  0408   SODIUM 138 140 141   POTASSIUM 2.8* 3.1* 3.3*   CHLORIDE 94* 94* 98   CO2 35* 37* 35*   GLUCOSE 128* 141* 128*   BUN 25* 27* 26*   CREATININE 0.80 0.80 0.71   CALCIUM 9.7 10.1 10.0                   Imaging  NM-GASTRIC EMPTYING   Final Result      Delayed gastric emptying.         BS-[id number]-4 VIEW   Final Result      Nonspecific bowel gas pattern.      CT-ABDOMEN & PELVIS UROGRAM   Final Result      1.  No suspicious renal, ureteral or bladder mass lesions.   2.  No renal, ureteral or bladder stones. No hydronephrosis.   3.  Benign renal cysts which do not require imaging follow-up.   4.  Wall thickening and hyperenhancement of rectosigmoid colon. Findings may be due to underdistention, although neoplasm is difficult to exclude.  Recommend follow-up with colonoscopy.         QS-[id number]-8 VIEW   Final Result      No evidence of bowel obstruction.                  DX-CHEST-LIMITED (1 VIEW)   Final Result         No acute cardiopulmonary abnormalities are identified.      SS-ANDZOIR-2 VIEW   Final Result         No specific finding to suggest small bowel obstruction.      DX-CHEST-PORTABLE (1 VIEW)   Final Result      Hypoinflation with bibasilar atelectasis.      US-RENAL   Final Result      1.  No hydronephrosis.      DX-CHEST-PORTABLE (1 VIEW)   Final Result         1.  Left basilar atelectasis and/or subtle infiltrates, similar to prior study      DX-CHEST-PORTABLE (1 VIEW)   Final Result         1.  Left basilar atelectasis, no focal infiltrate   2.  Cardiomegaly      DX-G.I. TUBE INJECTION, ANY TYPE   Final Result      Contrast from a PEG tube injection extends into the stomach without evidence of contrast leak.      CT-CHEST,ABDOMEN,PELVIS WITH   Final Result      1.  Large amount of free intraperitoneal air within the abdomen again seen as was previously identified on chest x-ray by review of the patient's chart, a percutaneous gastrostomy tube was placed on 3/31/2025 and is free intraperitoneal air is possibly    secondary to that recent procedure.      2.  Bibasal atelectasis and small bilateral pleural effusions.      3.  Tracheostomy tube present.      4.  No evidence of bowel obstruction or free fluid within the abdomen or pelvis.      DX-CHEST-PORTABLE (1 VIEW)   Final Result         1.  Bibasilar atelectasis   2.  Intra-abdominal free air, can be associated with history of percutaneous gastrostomy, consider other viscus perforation as warranted. Could be further evaluated with CT of the abdomen with contrast as clinically appropriate.      These findings were discussed with the patient's clinician, Bravo Lala Iv, on 4/2/2025 7:57 AM.      DX-CHEST-PORTABLE (1 VIEW)   Final Result      No evidence of acute cardiopulmonary  process.      DX-ABDOMEN FOR TUBE PLACEMENT   Final Result      1.  Enteric tube extends in the fundus of stomach.      DX-ABDOMEN FOR TUBE PLACEMENT   Final Result      The gastric tube has been removed and replaced with a small bowel feeding tube which terminate in the proximal stomach.      DX-CHEST-PORTABLE (1 VIEW)   Final Result      Atelectasis within the left lung base.      MR-THORACIC SPINE-WITH & W/O   Final Result      1.  There is no abnormal intramedullary T2 signal intensity in the thoracic spinal cord.   2.  Mild degenerative disease at T8-9.   3.  Left lower segmental consolidation.      MR-CERVICAL SPINE-WITH & W/O   Final Result      1.  Small areas of acute infarcts in the lower jero and upper medulla involving both sides of the midline. There are chronic infarcts in the jero and right inferior cerebellum.   2.  There is no abnormal intramedullary T2 signal intensity in the cervical spinal cord to suggest demyelinating lesions. There is no MR evidence of compressive myelopathy.   3.  Mild degenerative disease.            MR-BRAIN-WITH & W/O   Final Result      1.  Small areas of acute infarcts in the lower jero and upper medulla involving both sides of the midline.   2.  There are chronic infarcts in the jero and right inferior cerebellum. . There are areas of chronic lacunae in the bilateral basal ganglia, right thalamus and right periventricular white matter.   3.  There are nonspecific T2 hyperintensities in the periventricular white matter likely representing chronic small vessel disease.   4.  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      MR-LUMBAR SPINE-WITH & W/O   Final Result      1.  Unremarkable pre and postcontrast MR examination of the lumbar spine.   2.  Clinical suspicious for GBS: There is no abnormal enhancement of the lumbar nerve roots.      DX-ABDOMEN FOR TUBE PLACEMENT   Final Result      NG tube tip projects at  "the peripyloric region.      DX-CHEST-PORTABLE (1 VIEW)   Final Result      1.  Low lung volumes without definite acute cardiopulmonary abnormality.   2.  Support apparatus as above.      CT-HEAD W/O   Final Result      1.  No acute intracranial abnormality.   2.  Remote right cerebellar infarct.               DX-CHEST-PORTABLE (1 VIEW)   Final Result      No acute cardiopulmonary disease evident.      DX-CHEST-PORTABLE (1 VIEW)   Final Result      No acute cardiopulmonary disease evident.      CT-CEREBRAL PERFUSION ANALYSIS   Final Result      1. Cerebral blood flow less than 30% possibly representing completed infarct = 0 mL. Based on distribution of this finding, this is unlikely to represent artifact.      2. T Max more than 6 seconds possibly representing combination of completed infarct and ischemia = 7 mL. Based on the distribution of this finding, this is possibly artifact.      3. Mismatched volume possibly representing ischemic brain/penumbra= 0 mL      4.  Please note that this cerebral perfusion study and report is Quantitative and targets supratentorial (cerebral) perfusion for evaluation of large vessel territory acute ischemia/infarction. For example, lacunar infarcts, and brainstem/posterior fossa    ischemia/infarction are not evaluated on this study.  Data acquisition is subject to artifacts which can yield non-anatomically plausible perfusion maps which may be due to motion, bolus timing, signal to noise ratio, or other technical factors.    Perfusion map abnormalities which show non-anatomic distributions are likely artifact.   This study is not \"stand-alone\" and should only be utilized for diagnosis, management/treatment in correlation with CT, CTA, and/or MRI and clinical factors.         CT-CTA NECK WITH & W/O-POST PROCESSING   Final Result      CT angiogram of the neck within normal limits.      CT-CTA HEAD WITH & W/O-POST PROCESS   Final Result      CT angiogram of the [place] " within normal limits.      CT-HEAD W/O   Final Result      Head CT without contrast within normal limits. No evidence of acute cerebral infarction, hemorrhage or mass lesion.                    Assessment/Plan  * Acute ischemic stroke (HCC)- (present on admission)  Assessment & Plan  Acute ischemic stroke  MRI of the brain revealed acute infarcts of the lower jero and upper medulla.  He effectively is a quadriplegic and required tracheostomy and PEG tube.  Aspirin and statin  Blood pressure control  Family hope for functional recovery. LTACH pending  Speech following, capping trials  PTOT    Sigmoid thickening  Assessment & Plan  Wall thickening and hyperenhancement of rectosigmoid colon   Outpatient GI referral placed    Hypokalemia  Assessment & Plan  Replace as needed    Emesis  Assessment & Plan  He was doing ok with 1 carton boluses, but had N/V when 2 cartons given last night.   KUB neg for obstruction.   Will get gastric emptying study, radiology test will be tomorrow  Hold tube feeds midnight, give 1 carton at a time for now    Constipation  Assessment & Plan  KUB showed no obstruction  Bowel protocol    Improving    Hematuria  Assessment & Plan  Darkened urine, no gross hematuria  No recent grove trauma  Renal US normal, no stones  Completed empiric keflex for possible UTI  urology consulted, following    Proteus mirabilis pneumonia ([facility])- (present on admission)  Assessment & Plan  Treated with Zosyn de-escalated to Augmentin based on cultures from sputum    Acute neuromuscular respiratory failure ([facility])- (present on admission)  Assessment & Plan  Requiring tracheostomy    Anxiety  Assessment & Plan  Ativan for anxiety.     Type 2 diabetes mellitus with hyperglycemia, without long-term current use of insulin ([facility])- (present on admission)  Assessment & Plan  Details are unclear  Reportedly he is on metformin as an outpatient  Hemoglobin A1c 6.3  He does not require insulin    Primary hypertension- (present on  admission)  Assessment & Plan  -120s   hydrochlorothiazide 25 mg daily and Norvasc 10 mg daily         VTE prophylaxis: lovenox ppx    Greater than 51 minutes spent prepping to see patient (e.g. review of tests) obtaining and/or reviewing separately obtained history. Performing a medically appropriate examination and/ evaluation.  Counseling and educating the patient/family/caregiver.  Ordering medications, tests, or procedures.  Referring and communicating with other health care professionals.  Documenting clinical information in EPIC.  Independently interpreting results and communicating results to patient/family/caregiver.  Care coordination.      I have performed a physical exam and reviewed and updated ROS and Plan today (5/8/2025). In review of yesterday's note (5/7/2025), there are no changes except as documented above.

## 2025-05-09 LAB
ANION GAP SERPL CALC-SCNC: 11 MMOL/L (ref 7–16)
ANION GAP SERPL CALC-SCNC: 11 MMOL/L (ref 7–16)
APPEARANCE UR: CLEAR
BACTERIA #/AREA URNS HPF: ABNORMAL /HPF
BILIRUB UR QL STRIP.AUTO: NEGATIVE
BUN SERPL-MCNC: 24 MG/DL (ref 8–22)
BUN SERPL-MCNC: 24 MG/DL (ref 8–22)
CALCIUM SERPL-MCNC: 9.2 MG/DL (ref 8.5–10.5)
CALCIUM SERPL-MCNC: 9.7 MG/DL (ref 8.5–10.5)
CASTS URNS QL MICRO: ABNORMAL /LPF (ref 0–2)
CHLORIDE SERPL-SCNC: 93 MMOL/L (ref 96–112)
CHLORIDE SERPL-SCNC: 93 MMOL/L (ref 96–112)
CO2 SERPL-SCNC: 32 MMOL/L (ref 20–33)
CO2 SERPL-SCNC: 34 MMOL/L (ref 20–33)
COLOR UR: ABNORMAL
CREAT SERPL-MCNC: 0.71 MG/DL (ref 0.5–1.4)
CREAT SERPL-MCNC: 0.77 MG/DL (ref 0.5–1.4)
EKG IMPRESSION: NORMAL
EPITHELIAL CELLS 1715: ABNORMAL /HPF (ref 0–5)
ERYTHROCYTE [DISTWIDTH] IN BLOOD BY AUTOMATED COUNT: 39.1 FL (ref 35.9–50)
ERYTHROCYTE [DISTWIDTH] IN BLOOD BY AUTOMATED COUNT: 39.6 FL (ref 35.9–50)
GFR SERPLBLD CREATININE-BSD FMLA CKD-EPI: 111 ML/MIN/1.73 M 2
GFR SERPLBLD CREATININE-BSD FMLA CKD-EPI: 113 ML/MIN/1.73 M 2
GLUCOSE SERPL-MCNC: 152 MG/DL (ref 65–99)
GLUCOSE SERPL-MCNC: 168 MG/DL (ref 65–99)
GLUCOSE UR STRIP.AUTO-MCNC: NEGATIVE MG/DL
HCT VFR BLD AUTO: 39.7 % (ref 42–52)
HCT VFR BLD AUTO: 41.5 % (ref 42–52)
HGB BLD-MCNC: 12.7 G/DL (ref 14–18)
HGB BLD-MCNC: 13.2 G/DL (ref 14–18)
KETONES UR STRIP.AUTO-MCNC: ABNORMAL MG/DL
LEUKOCYTE ESTERASE UR QL STRIP.AUTO: ABNORMAL
MAGNESIUM SERPL-MCNC: 2 MG/DL (ref 1.5–2.5)
MCH RBC QN AUTO: 27.3 PG (ref 27–33)
MCH RBC QN AUTO: 27.4 PG (ref 27–33)
MCHC RBC AUTO-ENTMCNC: 31.8 G/DL (ref 32.3–36.5)
MCHC RBC AUTO-ENTMCNC: 32 G/DL (ref 32.3–36.5)
MCV RBC AUTO: 85.7 FL (ref 81.4–97.8)
MCV RBC AUTO: 85.7 FL (ref 81.4–97.8)
MICRO URNS: ABNORMAL
NITRITE UR QL STRIP.AUTO: NEGATIVE
PH UR STRIP.AUTO: 7.5 [PH] (ref 5–8)
PLATELET # BLD AUTO: 275 K/UL (ref 164–446)
PLATELET # BLD AUTO: 293 K/UL (ref 164–446)
PMV BLD AUTO: 9 FL (ref 9–12.9)
PMV BLD AUTO: 9.1 FL (ref 9–12.9)
POTASSIUM SERPL-SCNC: 2.9 MMOL/L (ref 3.6–5.5)
POTASSIUM SERPL-SCNC: 3.3 MMOL/L (ref 3.6–5.5)
PROT UR QL STRIP: 30 MG/DL
RBC # BLD AUTO: 4.63 M/UL (ref 4.7–6.1)
RBC # BLD AUTO: 4.84 M/UL (ref 4.7–6.1)
RBC # URNS HPF: >100 /HPF (ref 0–2)
RBC UR QL AUTO: ABNORMAL
SODIUM SERPL-SCNC: 136 MMOL/L (ref 135–145)
SODIUM SERPL-SCNC: 138 MMOL/L (ref 135–145)
SP GR UR STRIP.AUTO: 1.02
TROPONIN T SERPL-MCNC: 22 NG/L (ref 6–19)
TROPONIN T SERPL-MCNC: 29 NG/L (ref 6–19)
UROBILINOGEN UR STRIP.AUTO-MCNC: 1 EU/DL
WBC # BLD AUTO: 7.1 K/UL (ref 4.8–10.8)
WBC # BLD AUTO: 7.4 K/UL (ref 4.8–10.8)
WBC #/AREA URNS HPF: ABNORMAL /HPF

## 2025-05-09 PROCEDURE — 99233 SBSQ HOSP IP/OBS HIGH 50: CPT | Performed by: INTERNAL MEDICINE

## 2025-05-09 PROCEDURE — 93010 ELECTROCARDIOGRAM REPORT: CPT | Performed by: INTERNAL MEDICINE

## 2025-05-09 PROCEDURE — 700102 HCHG RX REV CODE 250 W/ 637 OVERRIDE(OP): Performed by: INTERNAL MEDICINE

## 2025-05-09 PROCEDURE — 94640 AIRWAY INHALATION TREATMENT: CPT

## 2025-05-09 PROCEDURE — 36415 COLL VENOUS BLD VENIPUNCTURE: CPT

## 2025-05-09 PROCEDURE — 85027 COMPLETE CBC AUTOMATED: CPT

## 2025-05-09 PROCEDURE — 51798 US URINE CAPACITY MEASURE: CPT

## 2025-05-09 PROCEDURE — A9270 NON-COVERED ITEM OR SERVICE: HCPCS | Performed by: INTERNAL MEDICINE

## 2025-05-09 PROCEDURE — 81001 URINALYSIS AUTO W/SCOPE: CPT

## 2025-05-09 PROCEDURE — 700111 HCHG RX REV CODE 636 W/ 250 OVERRIDE (IP): Mod: JZ | Performed by: INTERNAL MEDICINE

## 2025-05-09 PROCEDURE — 84484 ASSAY OF TROPONIN QUANT: CPT

## 2025-05-09 PROCEDURE — 92507 TX SP LANG VOICE COMM INDIV: CPT

## 2025-05-09 PROCEDURE — 83735 ASSAY OF MAGNESIUM: CPT

## 2025-05-09 PROCEDURE — 93005 ELECTROCARDIOGRAM TRACING: CPT | Mod: TC | Performed by: INTERNAL MEDICINE

## 2025-05-09 PROCEDURE — 770020 HCHG ROOM/CARE - TELE (206)

## 2025-05-09 PROCEDURE — 97535 SELF CARE MNGMENT TRAINING: CPT

## 2025-05-09 PROCEDURE — 80048 BASIC METABOLIC PNL TOTAL CA: CPT

## 2025-05-09 PROCEDURE — 97112 NEUROMUSCULAR REEDUCATION: CPT

## 2025-05-09 RX ADMIN — METOCLOPRAMIDE 10 MG: 10 TABLET ORAL at 05:27

## 2025-05-09 RX ADMIN — ATORVASTATIN CALCIUM 80 MG: 80 TABLET, FILM COATED ORAL at 17:34

## 2025-05-09 RX ADMIN — SENNOSIDES AND DOCUSATE SODIUM 2 TABLET: 50; 8.6 TABLET ORAL at 05:27

## 2025-05-09 RX ADMIN — AMLODIPINE BESYLATE 10 MG: 10 TABLET ORAL at 05:27

## 2025-05-09 RX ADMIN — POTASSIUM BICARBONATE 50 MEQ: 978 TABLET, EFFERVESCENT ORAL at 13:53

## 2025-05-09 RX ADMIN — ASPIRIN 81 MG: 81 TABLET, CHEWABLE ORAL at 05:28

## 2025-05-09 RX ADMIN — HYDROCHLOROTHIAZIDE 25 MG: 25 TABLET ORAL at 05:27

## 2025-05-09 RX ADMIN — ENOXAPARIN SODIUM 40 MG: 100 INJECTION SUBCUTANEOUS at 17:34

## 2025-05-09 RX ADMIN — METOCLOPRAMIDE 10 MG: 10 TABLET ORAL at 00:46

## 2025-05-09 RX ADMIN — METOCLOPRAMIDE 10 MG: 10 TABLET ORAL at 13:53

## 2025-05-09 RX ADMIN — Medication 5 MG: at 20:53

## 2025-05-09 RX ADMIN — GABAPENTIN 400 MG: 400 CAPSULE ORAL at 05:27

## 2025-05-09 RX ADMIN — METOCLOPRAMIDE 10 MG: 10 TABLET ORAL at 17:34

## 2025-05-09 RX ADMIN — GABAPENTIN 400 MG: 400 CAPSULE ORAL at 13:53

## 2025-05-09 RX ADMIN — METOCLOPRAMIDE 10 MG: 10 TABLET ORAL at 23:43

## 2025-05-09 RX ADMIN — GABAPENTIN 400 MG: 400 CAPSULE ORAL at 17:34

## 2025-05-09 ASSESSMENT — PAIN DESCRIPTION - PAIN TYPE
TYPE: ACUTE PAIN

## 2025-05-09 ASSESSMENT — COGNITIVE AND FUNCTIONAL STATUS - GENERAL
SUGGESTED CMS G CODE MODIFIER DAILY ACTIVITY: CN
DAILY ACTIVITIY SCORE: 6
HELP NEEDED FOR BATHING: TOTAL
DRESSING REGULAR LOWER BODY CLOTHING: TOTAL
EATING MEALS: TOTAL
DRESSING REGULAR UPPER BODY CLOTHING: TOTAL
TOILETING: TOTAL
PERSONAL GROOMING: TOTAL

## 2025-05-09 ASSESSMENT — ENCOUNTER SYMPTOMS
WEAKNESS: 1
SHORTNESS OF BREATH: 0
ABDOMINAL PAIN: 0
SENSORY CHANGE: 1

## 2025-05-09 ASSESSMENT — FIBROSIS 4 INDEX: FIB4 SCORE: 0.63

## 2025-05-09 NOTE — DIETARY
"Nutrition Services Weekly Nutrition Support Update     Day 45 of admit. Jonna Brian is a 49 y.o. male with admitting DX of Acute weakness  Acute ischemic stroke     Tube feeding initiated on 3/2. Current TF via PEG  is Glucerna 1.5 cartons (6 total), providing 2136 kcal, 118 grams protein, and 1080 mL free water daily.       Nutrition Assessment:      Height: 185.4 cm (6' 0.99\")  Weight: 94.5 kg (208 lb 5.4 oz)  Weight to Use in Calculations: 96 kg (211 lb 10.3 oz)  Weight taken via bed scale  Body mass index is 27.49 kg/m². BMI classification: Overweight.  Wt Readings from Last 6 Encounters:   25 94.5 kg (208 lb 5.4 oz)   25 95.4 kg (210 lb 5.1 oz)   25 95.3 kg (210 lb 1.6 oz)   25 96.3 kg (212 lb 4.9 oz)   25 98.6 kg (217 lb 6 oz)      Weight trend: Stable    Re-estimation of nutrition needs is not indicated at this time.     Calculation/Equation: REE per MSJ x 1.1 = 2079 kcals  Total Calories / day: 2100 - 2300  (Calories / k - 24)  Total Grams Protein / day: 96 - 115 (Grams Protein / k - 1.2     Objective:  Per chart review- patient tolerated 1 bolus then experienced N/V when 2 cartons were given. KUB negative for obstruction. Gastric emptying study found delayed emptying. Reglan added to MAR.   Pertinent labs: Potassium 2.9, Glucose 152, Bun 24.   Pertinent meds: Lipitor, Reglan, Zofran, Compazine, BM meds. Klyte completed .  Skin/wounds: No pressure injuries documented. No edema noted.   Last BM   Current feeding remains appropriate to meet patient's nutritional needs.      Current diet order:   NPO     Nutrition Diagnosis:      Swallowing difficulty r/t stroke as evidenced by need for nutrition support.    Nutrition Dx Status: Ongoing     Nutrition Interventions:      Continue Glucerna 1.5 cartons, 6 per day.   Additional fluids per MD/DO  Diet upgrades per SLP.   Patient aware of active plan of care as appropriate.     Nutrition Monitoring and Evaluation: "     Monitor nutrition POC  Monitor vital signs pertinent to nutrition       RD following and will provide updated recommendations as indicated.

## 2025-05-09 NOTE — CARE PLAN
The patient is Stable - Low risk of patient condition declining or worsening    Shift Goals  Clinical Goals: stable q4 neuro exams, maintain skin integrity, ensure appropriate intake and output  Patient Goals: rest  Family Goals: updates    Engaging pt in activities and ADLs. Patient communicating with communication board when he needs to be repositioned or suctioned. Patient complained of wedges with q2 turns and we transitioned to turning with pillows. Rotating float boot and foot drop brace q2 hours. Patient pain well controlled, requiring frequent suctioning of trach.        Progress made toward(s) clinical / shift goals:    Problem: Knowledge Deficit - Standard  Goal: Patient and family/care givers will demonstrate understanding of plan of care, disease process/condition, diagnostic tests and medications  5/9/2025 0240 by Kiana Flores R.N.  Outcome: Progressing  5/9/2025 0239 by NASH Tompkins.N.  Outcome: Progressing     Problem: Optimal Care of the Stroke Patient  Goal: Optimal emergency care for the stroke patient  5/9/2025 0240 by Kiana Flores R.N.  Outcome: Progressing  5/9/2025 0239 by Kiana Flores R.N.  Outcome: Progressing  Goal: Optimal acute care for the stroke patient  5/9/2025 0240 by Kiana Flores R.N.  Outcome: Progressing  5/9/2025 0239 by Kiana Flores, R.N.  Outcome: Progressing     Problem: Knowledge Deficit - Stroke Education  Goal: Patient's knowledge of stroke and risk factors will improve  5/9/2025 0240 by NASH Tompkins.N.  Outcome: Progressing  5/9/2025 0239 by Kaina Flores R.N.  Outcome: Progressing     Problem: Psychosocial - Patient Condition  Goal: Patient's ability to verbalize feelings about condition will improve  5/9/2025 0240 by ZUNILDA TompkinsN.  Outcome: Progressing  5/9/2025 0239 by Kiana Flores R.N.  Outcome: Progressing  Goal: Patient's ability to re-evaluate and adapt role responsibilities will improve  5/9/2025 0240 by ZUNILDA TompkinsN.  Outcome:  Progressing  5/9/2025 0239 by Kiana Flores RRobN.  Outcome: Progressing     Problem: Discharge Planning - Stroke  Goal: Ensure Stroke Core Measures are met prior to discharge  5/9/2025 0240 by Kiana Flores R.N.  Outcome: Progressing  5/9/2025 0239 by ZUNILDA TompkinsN.  Outcome: Progressing  Goal: Patient’s continuum of care needs will be met  5/9/2025 0240 by Kiana Flores R.N.  Outcome: Progressing  5/9/2025 0239 by Kiana Flores R.N.  Outcome: Progressing     Problem: Neuro Status  Goal: Neuro status will remain stable or improve  5/9/2025 0240 by Kiana Flores R.N.  Outcome: Progressing  5/9/2025 0239 by NASH Tompkins.N.  Outcome: Progressing     Problem: Hemodynamic Monitoring  Goal: Patient's hemodynamics, fluid balance and neurologic status will be stable or improve  5/9/2025 0240 by Kiana Flores R.N.  Outcome: Progressing  5/9/2025 0239 by Kiana Flores R.N.  Outcome: Progressing     Problem: Respiratory - Stroke Patient  Goal: Patient will achieve/maintain optimum respiratory rate/effort  5/9/2025 0240 by Kiana Flores RKEYA.  Outcome: Progressing  5/9/2025 0239 by Kiana Flores R.N.  Outcome: Progressing     Problem: Dysphagia  Goal: Dysphagia will improve  5/9/2025 0240 by Kiana Flores R.N.  Outcome: Progressing  5/9/2025 0239 by Kiana Flores R.N.  Outcome: Not Progressing     Problem: Risk for Aspiration  Goal: Patient's risk for aspiration will be absent or decrease  5/9/2025 0240 by Kiana Flores R.N.  Outcome: Progressing  5/9/2025 0239 by Kiana Flores R.N.  Outcome: Progressing     Problem: Urinary Elimination  Goal: Establish and maintain regular urinary output  5/9/2025 0240 by Kiana Flores R.N.  Outcome: Progressing  5/9/2025 0239 by Kiana Flores R.N.  Outcome: Progressing     Problem: Self Care  Goal: Patient will have the ability to perform ADLs independently or with assistance (bathe, groom, dress, toilet and feed)  5/9/2025 0240 by Kiana Flores R.N.  Outcome:  Progressing  5/9/2025 0239 by Kiana Flores RRobN.  Outcome: Progressing     Problem: Communication  Goal: The ability to communicate needs accurately and effectively will improve  Outcome: Progressing       Patient is not progressing towards the following goals:      Problem: Dysphagia  Goal: Dysphagia will improve  5/9/2025 0240 by Kiana Flores R.N.  Outcome: Progressing  5/9/2025 0239 by NASH Tompkins.N.  Outcome: Not Progressing  Pt unable to manage secretions on own, and requiring suctioning.     Problem: Bowel Elimination  Goal: Establish and maintain regular bowel function  5/9/2025 0240 by ZUNILDA TompkinsN.  Outcome: Not Progressing  5/9/2025 0239 by NASH Tompkins.N.  Outcome: Not Progressing  Pt has not had BM on shift. Administered Metoclopramide per MAR to assist with gastric emptying.      Problem: Mobility - Stroke  Goal: Patient's capacity to carry out activities will improve  5/9/2025 0240 by ZUNILDA TompkinsN.  Outcome: Not Progressing  5/9/2025 0239 by Kiana Flores R.N.  Outcome: Not Progressing  Pt requiring max assist with mobility. Implementing q2 turns and range of motion to promote activity.

## 2025-05-09 NOTE — PROGRESS NOTES
Pt bladder scan this morning at 0539 was 420 ml there are no orders for straight cath. Notified on call provider. Per on call provider Lupillo Walsh okay to straight cath.

## 2025-05-09 NOTE — PROGRESS NOTES
Monitor summary: SR 82-93, NV .15, QRS .10, QT .34 with rare PVC's per strip from monitor room.

## 2025-05-09 NOTE — DISCHARGE PLANNING
Medical Socia Work  PC to [place] [facility] was provided name/number of attending for the doctor to doctor report  Dr Justin at [phone].    Voalte to Dr Rose with the above information to call this weekend.

## 2025-05-09 NOTE — CARE PLAN
The patient is Stable - Low risk of patient condition declining or worsening    Progress made toward(s) clinical / shift goals:  Problem: Knowledge Deficit - Standard  Goal: Patient and family/care givers will demonstrate understanding of plan of care, disease process/condition, diagnostic tests and medications  Description: Target End Date:  1-3 days or as soon as patient condition allowsDocument in Patient Education1.  Patient and family/caregiver oriented to unit, equipment, visitation policy and means for communicating concern2.  Complete/review Learning Assessment3.  Assess knowledge level of disease process/condition, treatment plan, diagnostic tests and medications4.  Explain disease process/condition, treatment plan, diagnostic tests and medications  Outcome: Progressing     Problem: Neuro Status  Goal: Neuro status will remain stable or improve  Description: Target End Date:  Prior to discharge or change in level of careDocument on Neuro assessment in the Assessment flowsheet1.  Assess and monitor neurologic status per provider order/protocol/unit policy2.  Assess level of consciousness and orientation3.  Assess for speech, dysarthria, dysphagia, facial symmetry4.  Assess visual field, eye movements, gaze preference, pupil reaction and size5.  Assess muscle strength and motor response in all four extremities6.  Assess for sensation (numbness and tingling)7.  Assess basic neuro reflexes (cough, gag, corneal)8.  Identify changes in neuro status and report to provider for testing/treatment orders  Outcome: Progressing       Shift Goals  Clinical Goals: Stable neuro status, Gastric study  Patient Goals: JAMARI  Family Goals: JAMARI    Patient is not progressing towards the following goals:

## 2025-05-09 NOTE — DISCHARGE PLANNING
Medical Social Work  PC to [employer] [phone];  LEXY spoke to Maida to obtain an update.  Documentation for patient's mother to sign/services has not been sent out, nor has [facility] been contacted.  LEXY asked when this will be done as [facility] has held a bed for Monday.  Maida stated she will reach out to patient's mother and send her documentation to sign.  LEXY will receive contract for cost shortly.  LEXY stated he will call back later today to obtain an update.

## 2025-05-09 NOTE — THERAPY
"Occupational Therapy  Daily Treatment     Patient Name: Jonna Brian  Age:  49 y.o., Sex:  male  Medical Record #: [id number]  Today's Date: 5/9/2025     Precautions: Fall Risk, PEG Tube, Tracheostomy , Swallow Precautions  Comments: r KATELYN, frequent suction-poor secretion management.    Assessment    Pt seen for OT tx. Pt received in cardiac chair having been pulled over dependently by RN. Engaged pt in therapeutic exercise as outlined below. Pt able to use arm skate on table with LUE and participate more in AAROM and PNF patterns in upright position versus bed-level. Able to move L hand on communication board to selected phrase and tap finger to make choice. Pt engaged in functional task of suction oral care and facial hygiene with [place] assist. Cannot yet reach for face without support, but demos gradual improvement in LUE strength. RUE remains flaccid. Also addressed head & neck control in sitting; slowly improving but ongoing weakness. Will continue to benefit from acute OT.     Plan    Treatment Plan Status: Continue Current Treatment Plan  Type of Treatment: Self Care / Activities of Daily Living, Adaptive Equipment, Cognitive Skill Development, Neuro Re-Education / Balance, Therapeutic Exercises, Therapeutic Activity, Family / Caregiver Training  Treatment Frequency: 3 Times per Week  Treatment Duration: Until Therapy Goals Met    DC Equipment Recommendations: Unable to determine at this time  Discharge Recommendations: Recommend post-acute placement for additional occupational therapy services prior to discharge home    Subjective    \"Hi. How are you?\" (with speaking valve in place during SLP session)     Objective       05/09/25 1117   Vitals   O2 Delivery Device T-Piece   Pain   Pain Scales Non Verbal Scale   Pain 0 - 10 Group   Therapist Pain Assessment Post Activity Pain Same as Prior to Activity;Nurse Notified  (no c/o or outward signs of pain)   Non Verbal Descriptors   Non Verbal Scale  Calm;Unlabored " Breathing   Cognition    Cognition / Consciousness X   Speech/ Communication Nods Appropriately;Intubated / Trached   Level of Consciousness Alert   Ability To Follow Commands 1 Step   Initiation Impaired   Comments When seated in chair, pt able to move hand to selected words on communication board and tap finger to choose phrases   Passive ROM Upper Body   Passive ROM Upper Body WDL   Active ROM Upper Body   Active ROM Upper Body  X   Dominant Hand Right   Comments No AROM to RUE; LUE limited by weakness (see Strength)   Strength Upper Body   Upper Body Strength  X   Lt Shoulder Flexion Strength 2- (P-)   Lt Shoulder Abduction Strength 2- (P-)   Lt Elbow Flexion Strength 3- (F-)   Lt Elbow Extension Strength 3- (F-)   Lt Forearm Supination Strength 3 (F)   Lt Forearm Pronation Strength 3 (F)   Lt Wrist Flexion Strength 3- (F-)   Lt Wrist Extension Strength 3- (F-)   Left  Impaired  (incomplete digit flex/ext; demos ~70% weak mass grasp)   Comments RUE 0/5   Sensation Upper Body   Upper Extremity Sensation  X   Comments Pt shakes head when asked if he can detect touch or PROM to RUE; nods yes when asked if he can detect touch to LUE   Upper Body Muscle Tone   Upper Body Muscle Tone  X   Rt Upper Extremity Muscle Tone Hypotonic   Lt Upper Extremity Muscle Tone Hypotonic   Comments R pronator and elbow extensor tone   Sitting Upper Body Exercises   Sitting Upper Body Exercises Yes   Comments AAROM and PROM to BUE all joints; PNF patterns 1&2 to LUE    as well as facilitated L shoulder flex/ext with supination/pronation; arm skate in cardiac chair    Other Treatments   Other Treatments Provided Ongoing head control exercises; pt tends to flex neck anteriorly and to R; can position in neutral but difficult to maintain   Activities of Daily Living   Grooming Maximal Assist  (suction oral care, facial hygiene with [place] assist and use of built-up handle)   Skilled Intervention Compensatory Strategies;Postural  Facilitation;Sequencing;Tactile Cuing;Verbal Cuing   Activity Tolerance   Sitting in Chair >30 min; up pre and post   Patient / Family Goals   Patient / Family Goal #1 to go home   Short Term Goals   Short Term Goal # 1 Pt will complete suction oral care in supported position with mod A using AE PRN   Goal Outcome # 1 Progressing slower than expected   Short Term Goal # 2 UB dressing with mod A   Goal Outcome # 2   (NT this session)   Short Term Goal # 3 BSC txf with max A   Goal Outcome # 3 Goal not met   Short Term Goal # 4 Pt will sit with min A >2 minutes for participation in ADL   Goal Outcome # 4 Goal not met   Education Group   Education Provided Other (comments)  (Head & neck control)   Additional Comments Ongoing instruction  on avoiding resting position of neck flexion; utilized tilt feature in cardiac chair for gravity facilitation   Interdisciplinary Plan of Care Collaboration   IDT Collaboration with  Nursing;Speech Therapist   Patient Position at End of Therapy Seated;Call Light within Reach   Collaboration Comments Pt up to cardiac chair in room; RN attempting to locate soft-touch call light   Session Information   Date / Session Number  5/9 #11 (2/3, 5/11)  (5/8 attempted tx; pt off floor)

## 2025-05-09 NOTE — PROGRESS NOTES
Monitor Summary: SR 75-92, MS .16, QRS .06, QT .36 with rare PVC and ST elevation per strip from monitor room

## 2025-05-09 NOTE — PROGRESS NOTES
[facility] Medicine Daily Progress Note    Date of Service  5/9/2025    Chief Complaint  Jonna Brian is a 49 y.o. male admitted 3/25/2025 with stroke    Hospital Course  This is a 49 y.o. male who was initially admitted to the hospital on 3/25/2025 with global weakness, aphasia and ataxia.  His last known normal was 1630 on March 24.  He was initially admitted to the hospital floor, his CT head as well as CTA of the head and neck were within normal limits.  There was consideration for possible Guillain-Barré syndrome.     His condition continued to deteriorate and he underwent a lumbar puncture which revealed elevated protein but no evidence of infection.  He had progressive bulbar symptoms and weakness and was transferred to the ICU.  Unfortunately shortly after he arrived in the ICU he developed stridor and required emergent intubation on 3/27.       He then underwent an MRI of his brain and spine which unfortunately revealed small areas of acute infarcts in the lower jero and the upper medulla on both sides of the midline.  He also had chronic infarcts in his jero and right inferior cerebellum.  They were chronically [place] in the bilateral basal ganglia, the right thalamus and the right periventricular white matter.  He had evidence of small vessel disease as well in the periventricular white matter.     Ultimately it appears he has been suffering multiple small strokes and his progressive decline was due to acute infarcts in the lower jero and upper medulla. Neurology recommended DAPT for 10 days with aspirin indefinitely.  Patient completed Plavix 4/4.  Since that time he has not recovered function, has required tracheostomy and PEG placement with the ultimate goal of transferring him to postacute medical to see what function may be regained in time.     Patient able to nod head, shakes head for yes/no, able to use alphabet sheet to make needs known. He continues to work with PT/OT/SLP therapies with some  minor signs of improvement. Gio is medically cleared, plan for discharge to LTAC for ongoing management of tracheostomy tube, PEG tube, therapies.    Interval Problem Update  He was doing ok with 1 carton boluses, but had N/V when 2 cartons given last night. KUB neg for obstruction. Will get gastric emptying study  HypoK, replete  Patient able to nod appropriately to questions, able to tell me he is hot using paper signs. He remembers vomiting last night. Denies abd pain or nausea currently. Denies SOB  [facility]  5/8: Patient seen and examined had gastric emptying study today, which was reviewed and shows delayed emptying  I will add reglan to his regimen   Cont on tube feed  Hypokalemia : potassium of 3.3 replete   Close monitor for decompensation   5/9: Patient seen and examined no nausea or vomiting. Afebrile, started on Reglan for his delayed gastric emptying yesterday, having urinary retention will place grove  Close monitor for decompensation   I have discussed this patient's plan of care and discharge plan at IDT rounds today with Case Management, Nursing, Nursing leadership, and other members of the IDT team.    Consultants/Specialty  critical care, neurology, and urology    Code Status  Full Code    Disposition  The patient is not medically cleared for discharge to home or a post-acute facility.      I have placed the appropriate orders for post-discharge needs.    Review of Systems  Review of Systems   Unable to perform ROS: Medical condition (nonverbal)   Respiratory:  Negative for shortness of breath.    Cardiovascular:  Negative for chest pain.   Gastrointestinal:  Negative for abdominal pain.   Neurological:  Positive for sensory change and weakness.        Physical Exam  Temp:  [36 °C (96.8 °F)-36.7 °C (98.1 °F)] 36.7 °C (98.1 °F)  Pulse:  [] 83  Resp:  [16-18] 18  BP: (108-161)/() 108/72  SpO2:  [91 %-100 %] 98 %    Physical Exam  Vitals and nursing note  reviewed.   Constitutional:       Appearance: He is ill-appearing.   HENT:      Head: Normocephalic.      Mouth/Throat:      Mouth: Mucous membranes are dry.   Eyes:      General:         Right eye: No discharge.         Left eye: No discharge.   Neck:      Comments: tracheostomy  Cardiovascular:      Comments: Distant heart sounds  Pulmonary:      Effort: No respiratory distress.      Breath sounds: No wheezing, rhonchi or rales.   Abdominal:      Palpations: Abdomen is soft.      Tenderness: There is no abdominal tenderness.      Comments: Feeding tube   Genitourinary:     Penis: Normal.    Musculoskeletal:         General: No swelling.      Comments: Diffuse muscle atrophy   Skin:     General: Skin is warm and dry.   Neurological:      Mental Status: He is alert.      Comments: Nods appropriately to questions and follows commands, able to weakly move right hand and foot  No movement of left hand and foot         Fluids    Intake/Output Summary (Last 24 hours) at 5/9/2025 1330  Last data filed at 5/9/2025 0438  Gross per 24 hour   Intake 1051 ml   Output 725 ml   Net 326 ml        Laboratory  Recent Labs     05/08/25  0408 05/09/25  0340   WBC 7.8 7.4   RBC 5.12 4.84   HEMOGLOBIN 14.1 13.2*   HEMATOCRIT 43.8 41.5*   MCV 85.5 85.7   MCH 27.5 27.3   MCHC 32.2* 31.8*   RDW 39.9 39.6   PLATELETCT 263 293   MPV 9.1 9.1     Recent Labs     05/07/25  0441 05/08/25  0408 05/09/25  0340   SODIUM 140 141 138   POTASSIUM 3.1* 3.3* 2.9*   CHLORIDE 94* 98 93*   CO2 37* 35* 34*   GLUCOSE 141* 128* 152*   BUN 27* 26* 24*   CREATININE 0.80 0.71 0.71   CALCIUM 10.1 10.0 9.7                   Imaging  NM-GASTRIC EMPTYING   Final Result      Delayed gastric emptying.         LE-CIVNKNP-2 VIEW   Final Result      Nonspecific bowel gas pattern.      CT-ABDOMEN & PELVIS UROGRAM   Final Result      1.  No suspicious renal, ureteral or bladder mass lesions.   2.  No renal, ureteral or bladder stones. No hydronephrosis.   3.  Benign renal  cysts which do not require imaging follow-up.   4.  Wall thickening and hyperenhancement of rectosigmoid colon. Findings may be due to underdistention, although neoplasm is difficult to exclude. Recommend follow-up with colonoscopy.         SI-LSMHVKL-5 VIEW   Final Result      No evidence of bowel obstruction.                  DX-CHEST-LIMITED (1 VIEW)   Final Result         No acute cardiopulmonary abnormalities are identified.      EB-[id number]-2 VIEW   Final Result         No specific finding to suggest small bowel obstruction.      DX-CHEST-PORTABLE (1 VIEW)   Final Result      Hypoinflation with bibasilar atelectasis.      US-RENAL   Final Result      1.  No hydronephrosis.      DX-CHEST-PORTABLE (1 VIEW)   Final Result         1.  Left basilar atelectasis and/or subtle infiltrates, similar to prior study      DX-CHEST-PORTABLE (1 VIEW)   Final Result         1.  Left basilar atelectasis, no focal infiltrate   2.  Cardiomegaly      DX-G.I. TUBE INJECTION, ANY TYPE   Final Result      Contrast from a PEG tube injection extends into the stomach without evidence of contrast leak.      CT-CHEST,ABDOMEN,PELVIS WITH   Final Result      1.  Large amount of free intraperitoneal air within the abdomen again seen as was previously identified on chest x-ray by review of the patient's chart, a percutaneous gastrostomy tube was placed on 3/31/2025 and is free intraperitoneal air is possibly    secondary to that recent procedure.      2.  Bibasal atelectasis and small bilateral pleural effusions.      3.  Tracheostomy tube present.      4.  No evidence of bowel obstruction or free fluid within the abdomen or pelvis.      DX-CHEST-PORTABLE (1 VIEW)   Final Result         1.  Bibasilar atelectasis   2.  Intra-abdominal free air, can be associated with history of percutaneous gastrostomy, consider other viscus perforation as warranted. Could be further evaluated with CT of the abdomen with contrast as clinically appropriate.       These findings were discussed with the patient's clinician, Bravo Lala Iv, on 4/2/2025 7:57 AM.      DX-CHEST-PORTABLE (1 VIEW)   Final Result      No evidence of acute cardiopulmonary process.      DX-ABDOMEN FOR TUBE PLACEMENT   Final Result      1.  Enteric tube extends in the fundus of stomach.      DX-ABDOMEN FOR TUBE PLACEMENT   Final Result      The gastric tube has been removed and replaced with a small bowel feeding tube which terminate in the proximal stomach.      DX-CHEST-PORTABLE (1 VIEW)   Final Result      Atelectasis within the left lung base.      MR-THORACIC SPINE-WITH & W/O   Final Result      1.  There is no abnormal intramedullary T2 signal intensity in the thoracic spinal cord.   2.  Mild degenerative disease at T8-9.   3.  Left lower segmental consolidation.      MR-CERVICAL SPINE-WITH & W/O   Final Result      1.  Small areas of acute infarcts in the lower jero and upper medulla involving both sides of the midline. There are chronic infarcts in the jero and right inferior cerebellum.   2.  There is no abnormal intramedullary T2 signal intensity in the cervical spinal cord to suggest demyelinating lesions. There is no MR evidence of compressive myelopathy.   3.  Mild degenerative disease.            MR-BRAIN-WITH & W/O   Final Result      1.  Small areas of acute infarcts in the lower jero and upper medulla involving both sides of the midline.   2.  There are chronic infarcts in the jero and right inferior cerebellum. . There are areas of chronic lacunae in the bilateral basal ganglia, right thalamus and right periventricular white matter.   3.  There are nonspecific T2 hyperintensities in the periventricular white matter likely representing chronic small vessel disease.   4.  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      MR-LUMBAR SPINE-WITH & W/O   Final Result      1.  Unremarkable pre and postcontrast MR examination of  "the lumbar spine.   2.  Clinical suspicious for GBS: There is no abnormal enhancement of the lumbar nerve roots.      DX-ABDOMEN FOR TUBE PLACEMENT   Final Result      NG tube tip projects at the peripyloric region.      DX-CHEST-PORTABLE (1 VIEW)   Final Result      1.  Low lung volumes without definite acute cardiopulmonary abnormality.   2.  Support apparatus as above.      CT-HEAD W/O   Final Result      1.  No acute intracranial abnormality.   2.  Remote right cerebellar infarct.               DX-CHEST-PORTABLE (1 VIEW)   Final Result      No acute cardiopulmonary disease evident.      DX-CHEST-PORTABLE (1 VIEW)   Final Result      No acute cardiopulmonary disease evident.      CT-CEREBRAL PERFUSION ANALYSIS   Final Result      1. Cerebral blood flow less than 30% possibly representing completed infarct = 0 mL. Based on distribution of this finding, this is unlikely to represent artifact.      2. T Max more than 6 seconds possibly representing combination of completed infarct and ischemia = 7 mL. Based on the distribution of this finding, this is possibly artifact.      3. Mismatched volume possibly representing ischemic brain/penumbra= 0 mL      4.  Please note that this cerebral perfusion study and report is Quantitative and targets supratentorial (cerebral) perfusion for evaluation of large vessel territory acute ischemia/infarction. For example, lacunar infarcts, and brainstem/posterior fossa    ischemia/infarction are not evaluated on this study.  Data acquisition is subject to artifacts which can yield non-anatomically plausible perfusion maps which may be due to motion, bolus timing, signal to noise ratio, or other technical factors.    Perfusion map abnormalities which show non-anatomic distributions are likely artifact.   This study is not \"stand-alone\" and should only be utilized for diagnosis, management/treatment in correlation with CT, CTA, and/or MRI and clinical factors.         CT-CTA NECK WITH " & W/O-POST PROCESSING   Final Result      CT angiogram of the neck within normal limits.      CT-CTA HEAD WITH & W/O-POST PROCESS   Final Result      CT angiogram of the [place] within normal limits.      CT-HEAD W/O   Final Result      Head CT without contrast within normal limits. No evidence of acute cerebral infarction, hemorrhage or mass lesion.                    Assessment/Plan  * Acute ischemic stroke (HCC)- (present on admission)  Assessment & Plan  Acute ischemic stroke  MRI of the brain revealed acute infarcts of the lower jero and upper medulla.  He effectively is a quadriplegic and required tracheostomy and PEG tube.  Aspirin and statin  Blood pressure control  Family hope for functional recovery. LTACH pending  Speech following, capping trials  PTOT    Sigmoid thickening  Assessment & Plan  Wall thickening and hyperenhancement of rectosigmoid colon   Outpatient GI referral placed    Hypokalemia  Assessment & Plan  Replace as needed    Emesis  Assessment & Plan  He was doing ok with 1 carton boluses, but had N/V when 2 cartons given last night.   KUB neg for obstruction.   Will get gastric emptying study, radiology test will be tomorrow  Hold tube feeds midnight, give 1 carton at a time for now    Constipation  Assessment & Plan  KUB showed no obstruction  Bowel protocol    Improving    Hematuria  Assessment & Plan  Darkened urine, no gross hematuria  No recent grove trauma  Renal US normal, no stones  Completed empiric keflex for possible UTI  urology consulted, following    Proteus mirabilis pneumonia ([facility])- (present on admission)  Assessment & Plan  Treated with Zosyn de-escalated to Augmentin based on cultures from sputum    Acute neuromuscular respiratory failure ([facility])- (present on admission)  Assessment & Plan  Requiring tracheostomy    Anxiety  Assessment & Plan  Ativan for anxiety.     Type 2 diabetes mellitus with hyperglycemia, without long-term current use of insulin ([facility])- (present  on admission)  Assessment & Plan  Details are unclear  Reportedly he is on metformin as an outpatient  Hemoglobin A1c 6.3  He does not require insulin    Primary hypertension- (present on admission)  Assessment & Plan  -120s   hydrochlorothiazide 25 mg daily and Norvasc 10 mg daily         Greater than 51 minutes spent prepping to see patient (e.g. review of tests) obtaining and/or reviewing separately obtained history. Performing a medically appropriate examination and/ evaluation.  Counseling and educating the patient/family/caregiver.  Ordering medications, tests, or procedures.  Referring and communicating with other health care professionals.  Documenting clinical information in EPIC.  Independently interpreting results and communicating results to patient/family/caregiver.  Care coordination.      VTE prophylaxis: lovenox ppx        I have performed a physical exam and reviewed and updated ROS and Plan today (5/9/2025). In review of yesterday's note (5/8/2025), there are no changes except as documented above.

## 2025-05-09 NOTE — DISCHARGE PLANNING
Late entry from 5/8  Medical Social Work  SW giver permission to go forward with scheduling flight from [facility] to [facility].    PC to Yunior at [facility], verified they have accepted patient, and can take him.  LEXY requested he contact patient's mother to provide an update on their facility.  Will email SW contact information on nursing report and Dr report.     PC to [employer] [phone]; LEXY spoke to Abhilash.  SW provided requested information, they will contact Yunior at [facility] to verify information given, will contact patients' mother for auth to transport.    Abhilash will call/email LEXY once transport is arranged for Monday.    PC to patient's mother to inform her the a representative from [employer] will be calling her.

## 2025-05-09 NOTE — THERAPY
"Speech Language Pathology   Daily Treatment     Patient Name: Jonna Brian  AGE:  49 y.o., SEX:  male  Medical Record #: [id number]  Date of Service: 5/9/2025      Precautions:  Precautions: (P) Fall Risk, PEG Tube, Swallow Precautions         Subjective  \"How are you.\"      Assessment    Stable vital before, during, and following PMSV at 98-99% sats, HR low 70's with 28% oxygen and 4 liters. Pt required greater than 5 oral suctions and 5 trach suctions during session. Pt with fair voice at the word level. Functional speech, at the word level, with pt asking OT and SLP \"How are you.\" Pt then able to state his son's name and corrected SLP's error. Pt's simple word board used with pt providing \"yes\" and \"no\" head nods to communicate suctioning needs. Pt tolerated valve for 16 minutes with back pressure when valve removed x2 and suctioning performed. PMSV removed end of session and t-piece reapplied. Cuff remained deflated per RT recommendation.     SLP obtained trialed round ball call button and pt unable to generate enough pressure for call button usage. SLP communicated with RN, OT and RT regarding concerns for pt to be able to use call button with quad button recommended. Also, consideration room change near nursing station r/t pt's trach, amount of secretions, and current inability to use call button was d/w RN.     Benefits of the [address] Mobile-Boogie Speaking Valve are: restores positive airway pressure with a bias-closed position;  better intensity and quality of voicing, improved swallow function and may reduce risks for aspiration, helps in stronger cough response, aides weaning from ventilator, decreases decannulation time, and assists in secretion management (Retrieved from http://www.[employer])      Clinical Impressions  PMSV 1-1 with trained staff for 15-30 min as tolerated. STRICT NPO.     Recommendations  Treatment Completed: (P) Voice Prosthesis Training            Speech-Language Treatment  Speech-Language " "Treatment: (P) PMSV, voice training, AAC    Cognitive Treatment  Supervision Needs Upons Discharge: (P) Direct assistance with IADLs (see below)    Voice Prosthesis Training  Placement: (P) Trained staff only  Duration: (P) 15-30 min 1-1 superv trained staff      SLP Treatment Plan  Treatment Plan: (P) Patient/Family/Caregiver Training, Voice Prosthesis Training, Speech-Language Treatment  SLP Frequency: (P) 3x Per Week  Estimated Duration: (P) Until Therapy Goals Met      Anticipated Discharge Needs  Discharge Recommendations: (P) Recommend post-acute placement for additional speech therapy services prior to discharge home  Therapy Recommendations Upon DC: (P) Patient / Family / Caregiver Education, AAC Training / Development, Tracheostomy Training      Patient / Family Goals  Patient / Family Goal #1: (P) \"Swallow\"  Goal #1 Outcome: (P) Goal not met  Short Term Goals  Short Term Goal # 1: Pt will tolerate PMV trials without negitive change to cardiopulmonary vitals for 20 minutes.  Goal Outcome # 1: (P) Goal not met  Short Term Goal # 1 B : (P) Patient will tolerate PMV trials for 30 minutes without negative changes to cardiopulmonary vitals.  Goal Outcome  # 1 B: (P) Progressing as expected  Short Term Goal # 2: (P) Pt will demo intelligible phonation at the word level w/ > 80% intelligibility w/ mod cueing  Goal Outcome # 2 : (P) Progressing slower than expected  Short Term Goal # 3: (P) Pt will express 5 target single words using the alphabet board via scanning w/ mod cueing and > 80% accuracy  Goal Outcome  # 3: (P) Progressing as expected  Short Term Goal # 4: (P) Pt's family / caregivers will participate in training on utilizing AAC with the pt via demonstration teachback  Goal Outcome  # 4: (P) Progressing as expected (caregivers using word board with staff pointing to the word and pt providing Y/N head nod)      Sarah Rodrigues, SLP  "

## 2025-05-10 LAB
EKG IMPRESSION: NORMAL
TROPONIN T SERPL-MCNC: 20 NG/L (ref 6–19)

## 2025-05-10 PROCEDURE — 700111 HCHG RX REV CODE 636 W/ 250 OVERRIDE (IP): Mod: JZ

## 2025-05-10 PROCEDURE — 700102 HCHG RX REV CODE 250 W/ 637 OVERRIDE(OP): Performed by: INTERNAL MEDICINE

## 2025-05-10 PROCEDURE — 84484 ASSAY OF TROPONIN QUANT: CPT

## 2025-05-10 PROCEDURE — A9270 NON-COVERED ITEM OR SERVICE: HCPCS | Performed by: INTERNAL MEDICINE

## 2025-05-10 PROCEDURE — 94760 N-INVAS EAR/PLS OXIMETRY 1: CPT

## 2025-05-10 PROCEDURE — 94640 AIRWAY INHALATION TREATMENT: CPT

## 2025-05-10 PROCEDURE — 93010 ELECTROCARDIOGRAM REPORT: CPT | Performed by: INTERNAL MEDICINE

## 2025-05-10 PROCEDURE — 93005 ELECTROCARDIOGRAM TRACING: CPT | Mod: TC

## 2025-05-10 PROCEDURE — 99233 SBSQ HOSP IP/OBS HIGH 50: CPT | Performed by: INTERNAL MEDICINE

## 2025-05-10 PROCEDURE — 700111 HCHG RX REV CODE 636 W/ 250 OVERRIDE (IP): Mod: JZ | Performed by: INTERNAL MEDICINE

## 2025-05-10 PROCEDURE — 36415 COLL VENOUS BLD VENIPUNCTURE: CPT

## 2025-05-10 PROCEDURE — 770020 HCHG ROOM/CARE - TELE (206)

## 2025-05-10 RX ADMIN — ENOXAPARIN SODIUM 40 MG: 100 INJECTION SUBCUTANEOUS at 18:18

## 2025-05-10 RX ADMIN — ONDANSETRON 4 MG: 2 INJECTION INTRAMUSCULAR; INTRAVENOUS at 15:43

## 2025-05-10 RX ADMIN — POTASSIUM BICARBONATE 50 MEQ: 978 TABLET, EFFERVESCENT ORAL at 15:02

## 2025-05-10 RX ADMIN — Medication 5 MG: at 22:13

## 2025-05-10 RX ADMIN — METOCLOPRAMIDE 10 MG: 10 TABLET ORAL at 23:55

## 2025-05-10 RX ADMIN — METOCLOPRAMIDE 10 MG: 10 TABLET ORAL at 04:51

## 2025-05-10 RX ADMIN — ATORVASTATIN CALCIUM 80 MG: 80 TABLET, FILM COATED ORAL at 18:23

## 2025-05-10 RX ADMIN — ASPIRIN 81 MG: 81 TABLET, CHEWABLE ORAL at 04:52

## 2025-05-10 RX ADMIN — GABAPENTIN 400 MG: 400 CAPSULE ORAL at 04:51

## 2025-05-10 RX ADMIN — GABAPENTIN 400 MG: 400 CAPSULE ORAL at 12:23

## 2025-05-10 RX ADMIN — METOCLOPRAMIDE 10 MG: 10 TABLET ORAL at 18:18

## 2025-05-10 RX ADMIN — POTASSIUM BICARBONATE 50 MEQ: 978 TABLET, EFFERVESCENT ORAL at 04:51

## 2025-05-10 RX ADMIN — GABAPENTIN 400 MG: 400 CAPSULE ORAL at 18:18

## 2025-05-10 RX ADMIN — HYDROCHLOROTHIAZIDE 25 MG: 25 TABLET ORAL at 04:52

## 2025-05-10 RX ADMIN — METOCLOPRAMIDE 10 MG: 10 TABLET ORAL at 12:23

## 2025-05-10 RX ADMIN — AMLODIPINE BESYLATE 10 MG: 10 TABLET ORAL at 04:52

## 2025-05-10 ASSESSMENT — ENCOUNTER SYMPTOMS
SENSORY CHANGE: 1
ABDOMINAL PAIN: 0
SHORTNESS OF BREATH: 0
WEAKNESS: 1

## 2025-05-10 ASSESSMENT — FIBROSIS 4 INDEX: FIB4 SCORE: 0.67

## 2025-05-10 NOTE — CARE PLAN
The patient is Stable - Low risk of patient condition declining or worsening    Shift Goals  Clinical Goals: Skin Integrity, Safety  Patient Goals: Comfort  Family Goals: JAMARI    Progress made toward(s) clinical / shift goals:    Problem: Knowledge Deficit - Standard  Goal: Patient and family/care givers will demonstrate understanding of plan of care, disease process/condition, diagnostic tests and medications  Outcome: Progressing     Problem: Bowel Elimination  Goal: Establish and maintain regular bowel function  Outcome: Progressing     Problem: Mobility - Stroke  Goal: Patient's capacity to carry out activities will improve  Outcome: Progressing       Patient is not progressing towards the following goals:

## 2025-05-10 NOTE — CARE PLAN
The patient is Stable - Low risk of patient condition declining or worsening    Shift Goals  Clinical Goals: Stable neuro status, maintain skin integrity  Patient Goals: Rest  Family Goals: JAMARI    Progress made toward(s) clinical / shift goals:    Problem: Optimal Care of the Stroke Patient  Goal: Optimal emergency care for the stroke patient  Description: Target End Date:  End of day 1Time of Onset1.  Time of last known well obtained2.  Patient and family/caregiver verbalize understanding of diagnosis, medications and testing3.  NIHSS performed and documented, including date and time, for ischemic stroke patients prior to any acute recanalization therapy (thrombolytics or mechanical) or within 12 hours of arrival if no intervention is warranted4.  Consults and referrals placed to appropriate departmentsMedications Administration as Ordered:1.  Implement appropriate reversal agents for INR greater than 1.52.  Pre-alteplase administration of antihypertensives for SBP >185 DBP >1103.  Post-alteplase administration of antihypertensives for SBP >185, DBP >1054.  Thrombolytic Therapy for qualifying ischemic stroke patients who arrive within 4.5 hours of time of Last Known Well. Thrombolytic therapy administered within 30 minutes or a documented reason for delay  Outcome: Progressing     Problem: Bowel Elimination  Goal: Establish and maintain regular bowel function  Description: Target End Date:  Prior to discharge or change in level of care1.   Note date of last BM2.   Educate about diet, fluid intake, medication and activity to promote bowel function3.   Educate signs and symptoms of constipation and interventions to implement4.   Pharmacologic bowel management per provider order5.   Regular toileting schedule6.   Upright position for toileting7.   High fiber diet8.   Encourage hydration9.   Collaborate with Clinical [id number]. Care and maintenance of ostomy if applicable  Outcome: Progressing       Patient is not  progressing towards the following goals:      Problem: Mobility - Stroke  Goal: Patient's capacity to carry out activities will improve  Description: Target End Date:  Prior to discharge or change in level of care1.  Assess for barriers to mobility/activity2.  Implement activity per interdisciplinary team recommendations3.  Target activity level identified and patient/family/caregiver aware of goal4.  Provide assistive devices5.  Instruct patient/caregiver on proper use of assistive/adaptive devices6.  Schedule activities and rest periods to decrease effects of fatigue7.  Encourage mobilization to extent of ability8.  Maintain proper body alignment9.  Provide adequate pain management to allow progressive [id number]. Implement pace maker precautions as needed  Outcome: Not Progressing     Problem: Self Care  Goal: Patient will have the ability to perform ADLs independently or with assistance (bathe, groom, dress, toilet and feed)  Description: Target End Date:  Prior to discharge or change in level of careDocument on ADL flowsheet1.  Assess the capability and level of deficiency to perform ADLs2.  Encourage family/care giver involvement3.  Provide assistive devices4.  Consider PT/OT evaluations5.  Maintain support, give positive feedback, encourage self-care allowing extra time and verbal cuing as needed6.  Avoid doing something for patients they can do themselves, but provide assistance as needed7.  Assist in anticipating/planning individual needs8.  Collaborate with Case Management and  to meet discharge needs  Outcome: Not Progressing

## 2025-05-10 NOTE — PROGRESS NOTES
[facility] Medicine Daily Progress Note    Date of Service  5/10/2025    Chief Complaint  Jonna Brian is a 49 y.o. male admitted 3/25/2025 with stroke    Hospital Course  This is a 49 y.o. male who was initially admitted to the hospital on 3/25/2025 with global weakness, aphasia and ataxia.  His last known normal was 1630 on March 24.  He was initially admitted to the hospital floor, his CT head as well as CTA of the head and neck were within normal limits.  There was consideration for possible Guillain-Barré syndrome.     His condition continued to deteriorate and he underwent a lumbar puncture which revealed elevated protein but no evidence of infection.  He had progressive bulbar symptoms and weakness and was transferred to the ICU.  Unfortunately shortly after he arrived in the ICU he developed stridor and required emergent intubation on 3/27.       He then underwent an MRI of his brain and spine which unfortunately revealed small areas of acute infarcts in the lower jero and the upper medulla on both sides of the midline.  He also had chronic infarcts in his jero and right inferior cerebellum.  They were chronically Cuna in the bilateral basal ganglia, the right thalamus and the right periventricular white matter.  He had evidence of small vessel disease as well in the periventricular white matter.     Ultimately it appears he has been suffering multiple small strokes and his progressive decline was due to acute infarcts in the lower jero and upper medulla. Neurology recommended DAPT for 10 days with aspirin indefinitely.  Patient completed Plavix 4/4.  Since that time he has not recovered function, has required tracheostomy and PEG placement with the ultimate goal of transferring him to postacute medical to see what function may be regained in time.     Patient able to nod head, shakes head for yes/no, able to use alphabet sheet to make needs known. He continues to work with PT/OT/SLP therapies with some  minor signs of improvement. Gio is medically cleared, plan for discharge to LTAC for ongoing management of tracheostomy tube, PEG tube, therapies.    Interval Problem Update  He was doing ok with 1 carton boluses, but had N/V when 2 cartons given last night. KUB neg for obstruction. Will get gastric emptying study  HypoK, replete  Patient able to nod appropriately to questions, able to tell me he is hot using paper signs. He remembers vomiting last night. Denies abd pain or nausea currently. Denies SOB  [facility] [place] [facility]  5/8: Patient seen and examined had gastric emptying study today, which was reviewed and shows delayed emptying  I will add reglan to his regimen   Cont on tube feed  Hypokalemia : potassium of 3.3 replete   Close monitor for decompensation   5/9: Patient seen and examined no nausea or vomiting. Afebrile, started on Reglan for his delayed gastric emptying yesterday, having urinary retention will place grove  5/10: Patient seen and examined afebrile there were concern for st changes on his EKG so I discussed with cardiology and they reviewed the EKG and does not look like an infarct , also  patient is non verbal but when asked if he has chest pain not shakes his head for no no need for any intervention medication management   Plan is for patient to go to LTACH in [place]   I did talk today with Dr. Justin  there for a peer to peer review   Close monitor for decompensation   I have discussed this patient's plan of care and discharge plan at IDT rounds today with Case Management, Nursing, Nursing leadership, and other members of the IDT team.    Consultants/Specialty  critical care, neurology, and urology    Code Status  Full Code    Disposition  Medically Cleared  I have placed the appropriate orders for post-discharge needs.    Review of Systems  Review of Systems   Unable to perform ROS: Medical condition (nonverbal)   Respiratory:  Negative for shortness of breath.     Cardiovascular:  Negative for chest pain.   Gastrointestinal:  Negative for abdominal pain.   Neurological:  Positive for sensory change and weakness.        Physical Exam  Temp:  [36.3 °C (97.3 °F)-37 °C (98.6 °F)] 36.6 °C (97.9 °F)  Pulse:  [] 85  Resp:  [15-18] 15  BP: (102-145)/(67-90) 133/90  SpO2:  [92 %-100 %] 97 %    Physical Exam  Vitals and nursing note reviewed.   Constitutional:       Appearance: He is ill-appearing.   HENT:      Head: Normocephalic.      Mouth/Throat:      Mouth: Mucous membranes are dry.   Eyes:      General:         Right eye: No discharge.         Left eye: No discharge.   Neck:      Comments: tracheostomy  Cardiovascular:      Comments: Distant heart sounds  Pulmonary:      Effort: No respiratory distress.      Breath sounds: No wheezing, rhonchi or rales.   Abdominal:      Palpations: Abdomen is soft.      Tenderness: There is no abdominal tenderness.      Comments: Feeding tube   Genitourinary:     Penis: Normal.    Musculoskeletal:         General: No swelling.      Comments: Diffuse muscle atrophy   Skin:     General: Skin is warm and dry.   Neurological:      Mental Status: He is alert.      Comments: Nods appropriately to questions and follows commands, able to weakly move right hand and foot  No movement of left hand and foot         Fluids    Intake/Output Summary (Last 24 hours) at 5/10/2025 1412  Last data filed at 5/10/2025 1300  Gross per 24 hour   Intake 1952 ml   Output 1350 ml   Net 602 ml        Laboratory  Recent Labs     05/08/25  0408 05/09/25  0340 05/09/25  2310   WBC 7.8 7.4 7.1   RBC 5.12 4.84 4.63*   HEMOGLOBIN 14.1 13.2* 12.7*   HEMATOCRIT 43.8 41.5* 39.7*   MCV 85.5 85.7 85.7   MCH 27.5 27.3 27.4   MCHC 32.2* 31.8* 32.0*   RDW 39.9 39.6 39.1   PLATELETCT 263 293 275   MPV 9.1 9.1 9.0     Recent Labs     05/08/25  0408 05/09/25  0340 05/09/25  2310   SODIUM 141 138 136   POTASSIUM 3.3* 2.9* 3.3*   CHLORIDE 98 93* 93*   CO2 35* 34* 32   GLUCOSE 128*  152* 168*   BUN 26* 24* 24*   CREATININE 0.71 0.71 0.77   CALCIUM 10.0 9.7 9.2                   Imaging  NM-GASTRIC EMPTYING   Final Result      Delayed gastric emptying.         [id number] VIEW   Final Result      Nonspecific bowel gas pattern.      CT-ABDOMEN & PELVIS UROGRAM   Final Result      1.  No suspicious renal, ureteral or bladder mass lesions.   2.  No renal, ureteral or bladder stones. No hydronephrosis.   3.  Benign renal cysts which do not require imaging follow-up.   4.  Wall thickening and hyperenhancement of rectosigmoid colon. Findings may be due to underdistention, although neoplasm is difficult to exclude. Recommend follow-up with colonoscopy.         [id number] VIEW   Final Result      No evidence of bowel obstruction.                  DX-CHEST-LIMITED (1 VIEW)   Final Result         No acute cardiopulmonary abnormalities are identified.      [id number] VIEW   Final Result         No specific finding to suggest small bowel obstruction.      DX-CHEST-PORTABLE (1 VIEW)   Final Result      Hypoinflation with bibasilar atelectasis.      US-RENAL   Final Result      1.  No hydronephrosis.      DX-CHEST-PORTABLE (1 VIEW)   Final Result         1.  Left basilar atelectasis and/or subtle infiltrates, similar to prior study      DX-CHEST-PORTABLE (1 VIEW)   Final Result         1.  Left basilar atelectasis, no focal infiltrate   2.  Cardiomegaly      DX-G.I. TUBE INJECTION, ANY TYPE   Final Result      Contrast from a PEG tube injection extends into the stomach without evidence of contrast leak.      CT-CHEST,ABDOMEN,PELVIS WITH   Final Result      1.  Large amount of free intraperitoneal air within the abdomen again seen as was previously identified on chest x-ray by review of the patient's chart, a percutaneous gastrostomy tube was placed on 3/31/2025 and is free intraperitoneal air is possibly    secondary to that recent procedure.      2.  Bibasal atelectasis and small bilateral pleural  effusions.      3.  Tracheostomy tube present.      4.  No evidence of bowel obstruction or free fluid within the abdomen or pelvis.      DX-CHEST-PORTABLE (1 VIEW)   Final Result         1.  Bibasilar atelectasis   2.  Intra-abdominal free air, can be associated with history of percutaneous gastrostomy, consider other viscus perforation as warranted. Could be further evaluated with CT of the abdomen with contrast as clinically appropriate.      These findings were discussed with the patient's clinician, Bravo Lala Iv, on 4/2/2025 7:57 AM.      DX-CHEST-PORTABLE (1 VIEW)   Final Result      No evidence of acute cardiopulmonary process.      DX-ABDOMEN FOR TUBE PLACEMENT   Final Result      1.  Enteric tube extends in the fundus of stomach.      DX-ABDOMEN FOR TUBE PLACEMENT   Final Result      The gastric tube has been removed and replaced with a small bowel feeding tube which terminate in the proximal stomach.      DX-CHEST-PORTABLE (1 VIEW)   Final Result      Atelectasis within the left lung base.      MR-THORACIC SPINE-WITH & W/O   Final Result      1.  There is no abnormal intramedullary T2 signal intensity in the thoracic spinal cord.   2.  Mild degenerative disease at T8-9.   3.  Left lower segmental consolidation.      MR-CERVICAL SPINE-WITH & W/O   Final Result      1.  Small areas of acute infarcts in the lower jero and upper medulla involving both sides of the midline. There are chronic infarcts in the jero and right inferior cerebellum.   2.  There is no abnormal intramedullary T2 signal intensity in the cervical spinal cord to suggest demyelinating lesions. There is no MR evidence of compressive myelopathy.   3.  Mild degenerative disease.            MR-BRAIN-WITH & W/O   Final Result      1.  Small areas of acute infarcts in the lower jero and upper medulla involving both sides of the midline.   2.  There are chronic infarcts in the jero and right inferior cerebellum. . There are areas of chronic  lacunae in the bilateral basal ganglia, right thalamus and right periventricular white matter.   3.  There are nonspecific T2 hyperintensities in the periventricular white matter likely representing chronic small vessel disease.   4.  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      MR-LUMBAR SPINE-WITH & W/O   Final Result      1.  Unremarkable pre and postcontrast MR examination of the lumbar spine.   2.  Clinical suspicious for GBS: There is no abnormal enhancement of the lumbar nerve roots.      DX-ABDOMEN FOR TUBE PLACEMENT   Final Result      NG tube tip projects at the peripyloric region.      DX-CHEST-PORTABLE (1 VIEW)   Final Result      1.  Low lung volumes without definite acute cardiopulmonary abnormality.   2.  Support apparatus as above.      CT-HEAD W/O   Final Result      1.  No acute intracranial abnormality.   2.  Remote right cerebellar infarct.               DX-CHEST-PORTABLE (1 VIEW)   Final Result      No acute cardiopulmonary disease evident.      DX-CHEST-PORTABLE (1 VIEW)   Final Result      No acute cardiopulmonary disease evident.      CT-CEREBRAL PERFUSION ANALYSIS   Final Result      1. Cerebral blood flow less than 30% possibly representing completed infarct = 0 mL. Based on distribution of this finding, this is unlikely to represent artifact.      2. T Max more than 6 seconds possibly representing combination of completed infarct and ischemia = 7 mL. Based on the distribution of this finding, this is possibly artifact.      3. Mismatched volume possibly representing ischemic brain/penumbra= 0 mL      4.  Please note that this cerebral perfusion study and report is Quantitative and targets supratentorial (cerebral) perfusion for evaluation of large vessel territory acute ischemia/infarction. For example, lacunar infarcts, and brainstem/posterior fossa    ischemia/infarction are not evaluated on this study.  Data acquisition is  "subject to artifacts which can yield non-anatomically plausible perfusion maps which may be due to motion, bolus timing, signal to noise ratio, or other technical factors.    Perfusion map abnormalities which show non-anatomic distributions are likely artifact.   This study is not \"stand-alone\" and should only be utilized for diagnosis, management/treatment in correlation with CT, CTA, and/or MRI and clinical factors.         CT-CTA NECK WITH & W/O-POST PROCESSING   Final Result      CT angiogram of the neck within normal limits.      CT-CTA HEAD WITH & W/O-POST PROCESS   Final Result      CT angiogram of the [place] within normal limits.      CT-HEAD W/O   Final Result      Head CT without contrast within normal limits. No evidence of acute cerebral infarction, hemorrhage or mass lesion.               EC-ECHOCARDIOGRAM COMPLETE W/O CONT    (Results Pending)        Assessment/Plan  * Acute ischemic stroke (HCC)- (present on admission)  Assessment & Plan  Acute ischemic stroke  MRI of the brain revealed acute infarcts of the lower jero and upper medulla.  He effectively is a quadriplegic and required tracheostomy and PEG tube.  Aspirin and statin  Blood pressure control  Family hope for functional recovery. LTACH pending  Speech following, capping trials  PTOT    Sigmoid thickening  Assessment & Plan  Wall thickening and hyperenhancement of rectosigmoid colon   Outpatient GI referral placed    Hypokalemia  Assessment & Plan  Replace as needed    Emesis  Assessment & Plan  He was doing ok with 1 carton boluses, but had N/V when 2 cartons given last night.   KUB neg for obstruction.   Will get gastric emptying study, radiology test will be tomorrow  Hold tube feeds midnight, give 1 carton at a time for now    Constipation  Assessment & Plan  KUB showed no obstruction  Bowel protocol    Improving    Hematuria  Assessment & Plan  Darkened urine, no gross hematuria  No recent grove trauma  Renal US normal, no " stones  Completed empiric keflex for possible UTI  urology consulted, following    Proteus mirabilis pneumonia (HCC)- (present on admission)  Assessment & Plan  Treated with Zosyn de-escalated to Augmentin based on cultures from sputum    Acute neuromuscular respiratory failure (HCC)- (present on admission)  Assessment & Plan  Requiring tracheostomy    Anxiety  Assessment & Plan  Ativan for anxiety.     Type 2 diabetes mellitus with hyperglycemia, without long-term current use of insulin (HCC)- (present on admission)  Assessment & Plan  Details are unclear  Reportedly he is on metformin as an outpatient  Hemoglobin A1c 6.3  He does not require insulin    Primary hypertension- (present on admission)  Assessment & Plan  -120s   hydrochlorothiazide 25 mg daily and Norvasc 10 mg daily             VTE prophylaxis: lovenox ppx    Greater than 51 minutes spent prepping to see patient (e.g. review of tests) obtaining and/or reviewing separately obtained history. Performing a medically appropriate examination and/ evaluation.  Counseling and educating the patient/family/caregiver.  Ordering medications, tests, or procedures.  Referring and communicating with other health care professionals.  Documenting clinical information in EPIC.  Independently interpreting results and communicating results to patient/family/caregiver.  Care coordination.      I have performed a physical exam and reviewed and updated ROS and Plan today (5/10/2025). In review of yesterday's note (5/9/2025), there are no changes except as documented above.

## 2025-05-10 NOTE — CARE PLAN
The patient is Stable - Low risk of patient condition declining or worsening  Shift Goals  Clinical Goals: Stable neuro status, maintain skin integrity  Patient Goals: JAMARI  Family Goals: JAMARI    Progress made toward(s) clinical / shift goals:    Problem: Neuro Status  Goal: Neuro status will remain stable or improve  Description: Target End Date:  Prior to discharge or change in level of careDocument on Neuro assessment in the Assessment flowsheet1.  Assess and monitor neurologic status per provider order/protocol/unit policy2.  Assess level of consciousness and orientation3.  Assess for speech, dysarthria, dysphagia, facial symmetry4.  Assess visual field, eye movements, gaze preference, pupil reaction and size5.  Assess muscle strength and motor response in all four extremities6.  Assess for sensation (numbness and tingling)7.  Assess basic neuro reflexes (cough, gag, corneal)8.  Identify changes in neuro status and report to provider for testing/treatment orders  Outcome: Progressing     Problem: Communication  Goal: The ability to communicate needs accurately and effectively will improve  Outcome: Progressing     Problem: Respiratory - Stroke Patient  Goal: Patient will achieve/maintain optimum respiratory rate/effort  Description: Target End Date:  Prior to discharge or change in level of careDocument on Assessment flowsheet1.  Assess and monitor respiratory rate, rhythm, depth and effort of respiration2.  Oxygenation assessed throughout shift (recommendation of >94% for new stroke patients)3.  Oxygen administered and/or titrated per order4.  Collaboration with RT to administer medication/treatments per order5.  Patient educated on importance of turning, coughing, and deep breathing6.  Patient positioned for maximum ventilatory efficiency7.  Airway suctioning provided as needed8.  Incentive spirometry encouraged 5-10 times every hour or while awake  Outcome: Progressing       Patient is not progressing towards  the following goals:

## 2025-05-11 LAB
ANION GAP SERPL CALC-SCNC: 11 MMOL/L (ref 7–16)
BUN SERPL-MCNC: 22 MG/DL (ref 8–22)
CALCIUM SERPL-MCNC: 9.6 MG/DL (ref 8.5–10.5)
CHLORIDE SERPL-SCNC: 94 MMOL/L (ref 96–112)
CO2 SERPL-SCNC: 33 MMOL/L (ref 20–33)
CREAT SERPL-MCNC: 0.81 MG/DL (ref 0.5–1.4)
GFR SERPLBLD CREATININE-BSD FMLA CKD-EPI: 109 ML/MIN/1.73 M 2
GLUCOSE SERPL-MCNC: 107 MG/DL (ref 65–99)
POTASSIUM SERPL-SCNC: 3.5 MMOL/L (ref 3.6–5.5)
SODIUM SERPL-SCNC: 138 MMOL/L (ref 135–145)

## 2025-05-11 PROCEDURE — 99232 SBSQ HOSP IP/OBS MODERATE 35: CPT | Performed by: INTERNAL MEDICINE

## 2025-05-11 PROCEDURE — 36415 COLL VENOUS BLD VENIPUNCTURE: CPT

## 2025-05-11 PROCEDURE — 700102 HCHG RX REV CODE 250 W/ 637 OVERRIDE(OP): Performed by: INTERNAL MEDICINE

## 2025-05-11 PROCEDURE — A9270 NON-COVERED ITEM OR SERVICE: HCPCS | Performed by: INTERNAL MEDICINE

## 2025-05-11 PROCEDURE — 94760 N-INVAS EAR/PLS OXIMETRY 1: CPT

## 2025-05-11 PROCEDURE — 80048 BASIC METABOLIC PNL TOTAL CA: CPT

## 2025-05-11 PROCEDURE — 770020 HCHG ROOM/CARE - TELE (206)

## 2025-05-11 PROCEDURE — 700111 HCHG RX REV CODE 636 W/ 250 OVERRIDE (IP): Mod: JZ | Performed by: INTERNAL MEDICINE

## 2025-05-11 PROCEDURE — 94640 AIRWAY INHALATION TREATMENT: CPT

## 2025-05-11 RX ADMIN — METOCLOPRAMIDE 10 MG: 10 TABLET ORAL at 12:47

## 2025-05-11 RX ADMIN — AMLODIPINE BESYLATE 10 MG: 10 TABLET ORAL at 04:15

## 2025-05-11 RX ADMIN — ATORVASTATIN CALCIUM 80 MG: 80 TABLET, FILM COATED ORAL at 17:13

## 2025-05-11 RX ADMIN — HYDROCHLOROTHIAZIDE 25 MG: 25 TABLET ORAL at 04:15

## 2025-05-11 RX ADMIN — METOCLOPRAMIDE 10 MG: 10 TABLET ORAL at 04:18

## 2025-05-11 RX ADMIN — POTASSIUM BICARBONATE 50 MEQ: 978 TABLET, EFFERVESCENT ORAL at 04:14

## 2025-05-11 RX ADMIN — ENOXAPARIN SODIUM 40 MG: 100 INJECTION SUBCUTANEOUS at 17:13

## 2025-05-11 RX ADMIN — GABAPENTIN 400 MG: 400 CAPSULE ORAL at 17:13

## 2025-05-11 RX ADMIN — GABAPENTIN 400 MG: 400 CAPSULE ORAL at 04:14

## 2025-05-11 RX ADMIN — ASPIRIN 81 MG: 81 TABLET, CHEWABLE ORAL at 04:14

## 2025-05-11 RX ADMIN — POTASSIUM BICARBONATE 50 MEQ: 978 TABLET, EFFERVESCENT ORAL at 12:47

## 2025-05-11 RX ADMIN — METOCLOPRAMIDE 10 MG: 10 TABLET ORAL at 17:14

## 2025-05-11 RX ADMIN — Medication 5 MG: at 21:54

## 2025-05-11 RX ADMIN — GABAPENTIN 400 MG: 400 CAPSULE ORAL at 12:47

## 2025-05-11 ASSESSMENT — ENCOUNTER SYMPTOMS
SHORTNESS OF BREATH: 0
WEAKNESS: 1
ABDOMINAL PAIN: 0
SENSORY CHANGE: 1

## 2025-05-11 NOTE — CARE PLAN
The patient is Stable - Low risk of patient condition declining or worsening    Shift Goals  Clinical Goals: Neuro Checks, Comfort, Skin INtegrity  Patient Goals: Comfort  Family Goals: JAMARI    Progress made toward(s) clinical / shift goals:    Problem: Knowledge Deficit - Standard  Goal: Patient and family/care givers will demonstrate understanding of plan of care, disease process/condition, diagnostic tests and medications  Outcome: Progressing     Problem: Knowledge Deficit - Stroke Education  Goal: Patient's knowledge of stroke and risk factors will improve  Outcome: Progressing     Problem: Psychosocial - Patient Condition  Goal: Patient's ability to verbalize feelings about condition will improve  Outcome: Progressing     Patient able to speak with PMSV for 20 minutes without changes in vital signs and ask questions about plan of care.     Patient is not progressing towards the following goals:

## 2025-05-11 NOTE — PROGRESS NOTES
[facility] Medicine Daily Progress Note    Date of Service  5/11/2025    Chief Complaint  Jonna Brian is a 49 y.o. male admitted 3/25/2025 with stroke    Hospital Course  This is a 49 y.o. male who was initially admitted to the hospital on 3/25/2025 with global weakness, aphasia and ataxia.  His last known normal was 1630 on March 24.  He was initially admitted to the hospital floor, his CT head as well as CTA of the head and neck were within normal limits.  There was consideration for possible Guillain-Barré syndrome.     His condition continued to deteriorate and he underwent a lumbar puncture which revealed elevated protein but no evidence of infection.  He had progressive bulbar symptoms and weakness and was transferred to the ICU.  Unfortunately shortly after he arrived in the ICU he developed stridor and required emergent intubation on 3/27.       He then underwent an MRI of his brain and spine which unfortunately revealed small areas of acute infarcts in the lower jero and the upper medulla on both sides of the midline.  He also had chronic infarcts in his jero and right inferior cerebellum.  They were chronically Cuna in the bilateral basal ganglia, the right thalamus and the right periventricular white matter.  He had evidence of small vessel disease as well in the periventricular white matter.     Ultimately it appears he has been suffering multiple small strokes and his progressive decline was due to acute infarcts in the lower jero and upper medulla. Neurology recommended DAPT for 10 days with aspirin indefinitely.  Patient completed Plavix 4/4.  Since that time he has not recovered function, has required tracheostomy and PEG placement with the ultimate goal of transferring him to postacute medical to see what function may be regained in time.     Patient able to nod head, shakes head for yes/no, able to use alphabet sheet to make needs known. He continues to work with PT/OT/SLP therapies with some  minor signs of improvement. Anjelt is medically cleared, plan for discharge to LTAC for ongoing management of tracheostomy tube, PEG tube, therapies.    Interval Problem Update  He was doing ok with 1 carton boluses, but had N/V when 2 cartons given last night. KUB neg for obstruction. Will get gastric emptying study  HypoK, replete  Patient able to nod appropriately to questions, able to tell me he is hot using paper signs. He remembers vomiting last night. Denies abd pain or nausea currently. Denies SOB  [facility] [place] [facility]  5/8: Patient seen and examined had gastric emptying study today, which was reviewed and shows delayed emptying  I will add reglan to his regimen   Cont on tube feed  Hypokalemia : potassium of 3.3 replete   Close monitor for decompensation   5/9: Patient seen and examined no nausea or vomiting. Afebrile, started on Reglan for his delayed gastric emptying yesterday, having urinary retention will place grove  5/10: Patient seen and examined afebrile there were concern for st changes on his EKG so I discussed with cardiology and they reviewed the EKG and does not look like an infarct , also  patient is non verbal but when asked if he has chest pain not shakes his head for no no need for any intervention medication management   Plan is for patient to go to LTACH in [place]   I did talk today with Dr. Nhan pagan for a peer to peer review   Close monitor for decompensation   5/11: Patient seen and examined afebrile no  vomiting   Resting in bed,  plan is  for [place] [place] I have already did the peer to per with Dr. Nhan pagan and he has been accepted   I have discussed this patient's plan of care and discharge plan at IDT rounds today with Case Management, Nursing, Nursing leadership, and other members of the IDT team.    Consultants/Specialty  critical care, neurology, and urology    Code Status  Full Code    Disposition  Medically Cleared  I have placed  the appropriate orders for post-discharge needs.    Review of Systems  Review of Systems   Unable to perform ROS: Medical condition (nonverbal)   Respiratory:  Negative for shortness of breath.    Cardiovascular:  Negative for chest pain.   Gastrointestinal:  Negative for abdominal pain.   Neurological:  Positive for sensory change and weakness.        Physical Exam  Temp:  [36.4 °C (97.5 °F)-37.1 °C (98.8 °F)] 36.4 °C (97.5 °F)  Pulse:  [87-98] 94  Resp:  [15-18] 18  BP: (104-132)/(56-86) 130/83  SpO2:  [94 %-97 %] 94 %    Physical Exam  Vitals and nursing note reviewed.   Constitutional:       Appearance: He is ill-appearing.   HENT:      Head: Normocephalic.      Mouth/Throat:      Mouth: Mucous membranes are dry.   Eyes:      General:         Right eye: No discharge.         Left eye: No discharge.   Neck:      Comments: tracheostomy  Cardiovascular:      Comments: Distant heart sounds  Pulmonary:      Effort: No respiratory distress.      Breath sounds: No wheezing, rhonchi or rales.   Abdominal:      Palpations: Abdomen is soft.      Tenderness: There is no abdominal tenderness.      Comments: Feeding tube   Genitourinary:     Penis: Normal.    Musculoskeletal:         General: No swelling.      Comments: Diffuse muscle atrophy   Skin:     General: Skin is warm and dry.   Neurological:      Mental Status: He is alert.      Comments: Nods appropriately to questions and follows commands, able to weakly move right hand and foot  No movement of left hand and foot         Fluids    Intake/Output Summary (Last 24 hours) at 5/11/2025 1340  Last data filed at 5/11/2025 1213  Gross per 24 hour   Intake 1357 ml   Output 650 ml   Net 707 ml        Laboratory  Recent Labs     05/09/25  0340 05/09/25  2310   WBC 7.4 7.1   RBC 4.84 4.63*   HEMOGLOBIN 13.2* 12.7*   HEMATOCRIT 41.5* 39.7*   MCV 85.7 85.7   MCH 27.3 27.4   MCHC 31.8* 32.0*   RDW 39.6 39.1   PLATELETCT 293 275   MPV 9.1 9.0     Recent Labs     05/09/25  0340  05/09/25  2310 05/11/25  0134   SODIUM 138 136 138   POTASSIUM 2.9* 3.3* 3.5*   CHLORIDE 93* 93* 94*   CO2 34* 32 33   GLUCOSE 152* 168* 107*   BUN 24* 24* 22   CREATININE 0.71 0.77 0.81   CALCIUM 9.7 9.2 9.6                   Imaging  NM-GASTRIC EMPTYING   Final Result      Delayed gastric emptying.         CT-SHQLLLL-3 VIEW   Final Result      Nonspecific bowel gas pattern.      CT-ABDOMEN & PELVIS UROGRAM   Final Result      1.  No suspicious renal, ureteral or bladder mass lesions.   2.  No renal, ureteral or bladder stones. No hydronephrosis.   3.  Benign renal cysts which do not require imaging follow-up.   4.  Wall thickening and hyperenhancement of rectosigmoid colon. Findings may be due to underdistention, although neoplasm is difficult to exclude. Recommend follow-up with colonoscopy.         HE-IJDBOGE-3 VIEW   Final Result      No evidence of bowel obstruction.                  DX-CHEST-LIMITED (1 VIEW)   Final Result         No acute cardiopulmonary abnormalities are identified.      CA-TBOQDPC-2 VIEW   Final Result         No specific finding to suggest small bowel obstruction.      DX-CHEST-PORTABLE (1 VIEW)   Final Result      Hypoinflation with bibasilar atelectasis.      US-RENAL   Final Result      1.  No hydronephrosis.      DX-CHEST-PORTABLE (1 VIEW)   Final Result         1.  Left basilar atelectasis and/or subtle infiltrates, similar to prior study      DX-CHEST-PORTABLE (1 VIEW)   Final Result         1.  Left basilar atelectasis, no focal infiltrate   2.  Cardiomegaly      DX-G.I. TUBE INJECTION, ANY TYPE   Final Result      Contrast from a PEG tube injection extends into the stomach without evidence of contrast leak.      CT-CHEST,ABDOMEN,PELVIS WITH   Final Result      1.  Large amount of free intraperitoneal air within the abdomen again seen as was previously identified on chest x-ray by review of the patient's chart, a percutaneous gastrostomy tube was placed on 3/31/2025 and is free  intraperitoneal air is possibly    secondary to that recent procedure.      2.  Bibasal atelectasis and small bilateral pleural effusions.      3.  Tracheostomy tube present.      4.  No evidence of bowel obstruction or free fluid within the abdomen or pelvis.      DX-CHEST-PORTABLE (1 VIEW)   Final Result         1.  Bibasilar atelectasis   2.  Intra-abdominal free air, can be associated with history of percutaneous gastrostomy, consider other viscus perforation as warranted. Could be further evaluated with CT of the abdomen with contrast as clinically appropriate.      These findings were discussed with the patient's clinician, Bravo Lala Iv, on 4/2/2025 7:57 AM.      DX-CHEST-PORTABLE (1 VIEW)   Final Result      No evidence of acute cardiopulmonary process.      DX-ABDOMEN FOR TUBE PLACEMENT   Final Result      1.  Enteric tube extends in the fundus of stomach.      DX-ABDOMEN FOR TUBE PLACEMENT   Final Result      The gastric tube has been removed and replaced with a small bowel feeding tube which terminate in the proximal stomach.      DX-CHEST-PORTABLE (1 VIEW)   Final Result      Atelectasis within the left lung base.      MR-THORACIC SPINE-WITH & W/O   Final Result      1.  There is no abnormal intramedullary T2 signal intensity in the thoracic spinal cord.   2.  Mild degenerative disease at T8-9.   3.  Left lower segmental consolidation.      MR-CERVICAL SPINE-WITH & W/O   Final Result      1.  Small areas of acute infarcts in the lower jero and upper medulla involving both sides of the midline. There are chronic infarcts in the jero and right inferior cerebellum.   2.  There is no abnormal intramedullary T2 signal intensity in the cervical spinal cord to suggest demyelinating lesions. There is no MR evidence of compressive myelopathy.   3.  Mild degenerative disease.            MR-BRAIN-WITH & W/O   Final Result      1.  Small areas of acute infarcts in the lower jero and upper medulla involving both  sides of the midline.   2.  There are chronic infarcts in the jero and right inferior cerebellum. . There are areas of chronic lacunae in the bilateral basal ganglia, right thalamus and right periventricular white matter.   3.  There are nonspecific T2 hyperintensities in the periventricular white matter likely representing chronic small vessel disease.   4.  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      MR-LUMBAR SPINE-WITH & W/O   Final Result      1.  Unremarkable pre and postcontrast MR examination of the lumbar spine.   2.  Clinical suspicious for GBS: There is no abnormal enhancement of the lumbar nerve roots.      DX-ABDOMEN FOR TUBE PLACEMENT   Final Result      NG tube tip projects at the peripyloric region.      DX-CHEST-PORTABLE (1 VIEW)   Final Result      1.  Low lung volumes without definite acute cardiopulmonary abnormality.   2.  Support apparatus as above.      CT-HEAD W/O   Final Result      1.  No acute intracranial abnormality.   2.  Remote right cerebellar infarct.               DX-CHEST-PORTABLE (1 VIEW)   Final Result      No acute cardiopulmonary disease evident.      DX-CHEST-PORTABLE (1 VIEW)   Final Result      No acute cardiopulmonary disease evident.      CT-CEREBRAL PERFUSION ANALYSIS   Final Result      1. Cerebral blood flow less than 30% possibly representing completed infarct = 0 mL. Based on distribution of this finding, this is unlikely to represent artifact.      2. T Max more than 6 seconds possibly representing combination of completed infarct and ischemia = 7 mL. Based on the distribution of this finding, this is possibly artifact.      3. Mismatched volume possibly representing ischemic brain/penumbra= 0 mL      4.  Please note that this cerebral perfusion study and report is Quantitative and targets supratentorial (cerebral) perfusion for evaluation of large vessel territory acute ischemia/infarction. For example,  "lacunar infarcts, and brainstem/posterior fossa    ischemia/infarction are not evaluated on this study.  Data acquisition is subject to artifacts which can yield non-anatomically plausible perfusion maps which may be due to motion, bolus timing, signal to noise ratio, or other technical factors.    Perfusion map abnormalities which show non-anatomic distributions are likely artifact.   This study is not \"stand-alone\" and should only be utilized for diagnosis, management/treatment in correlation with CT, CTA, and/or MRI and clinical factors.         CT-CTA NECK WITH & W/O-POST PROCESSING   Final Result      CT angiogram of the neck within normal limits.      CT-CTA HEAD WITH & W/O-POST PROCESS   Final Result      CT angiogram of the [place] within normal limits.      CT-HEAD W/O   Final Result      Head CT without contrast within normal limits. No evidence of acute cerebral infarction, hemorrhage or mass lesion.               EC-ECHOCARDIOGRAM COMPLETE W/O CONT    (Results Pending)        Assessment/Plan  * Acute ischemic stroke (HCC)- (present on admission)  Assessment & Plan  Acute ischemic stroke  MRI of the brain revealed acute infarcts of the lower jero and upper medulla.  He effectively is a quadriplegic and required tracheostomy and PEG tube.  Aspirin and statin  Blood pressure control  Family hope for functional recovery. LTACH pending  Speech following, capping trials  PTOT    Sigmoid thickening  Assessment & Plan  Wall thickening and hyperenhancement of rectosigmoid colon   Outpatient GI referral placed    Hypokalemia  Assessment & Plan  Replace as needed    Emesis  Assessment & Plan  He was doing ok with 1 carton boluses, but had N/V when 2 cartons given last night.   KUB neg for obstruction.   Will get gastric emptying study, radiology test will be tomorrow  Hold tube feeds midnight, give 1 carton at a time for now    Constipation  Assessment & Plan  KUB showed no obstruction  Bowel " protocol    Improving    Hematuria  Assessment & Plan  Darkened urine, no gross hematuria  No recent grove trauma  Renal US normal, no stones  Completed empiric keflex for possible UTI  urology consulted, following    Proteus mirabilis pneumonia (HCC)- (present on admission)  Assessment & Plan  Treated with Zosyn de-escalated to Augmentin based on cultures from sputum    Acute neuromuscular respiratory failure (HCC)- (present on admission)  Assessment & Plan  Requiring tracheostomy    Anxiety  Assessment & Plan  Ativan for anxiety.     Type 2 diabetes mellitus with hyperglycemia, without long-term current use of insulin (HCC)- (present on admission)  Assessment & Plan  Details are unclear  Reportedly he is on metformin as an outpatient  Hemoglobin A1c 6.3  He does not require insulin    Primary hypertension- (present on admission)  Assessment & Plan  -120s   hydrochlorothiazide 25 mg daily and Norvasc 10 mg daily             VTE prophylaxis: lovenox ppx      I have performed a physical exam and reviewed and updated ROS and Plan today (5/11/2025). In review of yesterday's note (5/10/2025), there are no changes except as documented above.

## 2025-05-11 NOTE — CARE PLAN
The patient is Watcher - Medium risk of patient condition declining or worsening    Shift Goals  Clinical Goals: monitor neuro status,maintain skin integrity  Patient Goals: comfort  Family Goals: JAMARI    Progress made toward(s) clinical / shift goals:  stable neuro status  Problem: Optimal Care of the Stroke Patient  Goal: Optimal acute care for the stroke patient  Outcome: Progressing     Problem: Neuro Status  Goal: Neuro status will remain stable or improve  Outcome: Progressing     Problem: Hemodynamic Monitoring  Goal: Patient's hemodynamics, fluid balance and neurologic status will be stable or improve  Outcome: Progressing       Patient is not progressing towards the following goals:

## 2025-05-12 ENCOUNTER — APPOINTMENT (OUTPATIENT)
Dept: CARDIOLOGY | Facility: MEDICAL CENTER | Age: 50
DRG: 004 | End: 2025-05-12
Attending: INTERNAL MEDICINE
Payer: COMMERCIAL

## 2025-05-12 LAB
ALBUMIN SERPL BCP-MCNC: 3.6 G/DL (ref 3.2–4.9)
ALBUMIN/GLOB SERPL: 1 G/DL
ALP SERPL-CCNC: 67 U/L (ref 30–99)
ALT SERPL-CCNC: 39 U/L (ref 2–50)
ANION GAP SERPL CALC-SCNC: 7 MMOL/L (ref 7–16)
AST SERPL-CCNC: 22 U/L (ref 12–45)
BILIRUB SERPL-MCNC: 0.2 MG/DL (ref 0.1–1.5)
BUN SERPL-MCNC: 23 MG/DL (ref 8–22)
CALCIUM ALBUM COR SERPL-MCNC: 10 MG/DL (ref 8.5–10.5)
CALCIUM SERPL-MCNC: 9.7 MG/DL (ref 8.5–10.5)
CHLORIDE SERPL-SCNC: 95 MMOL/L (ref 96–112)
CO2 SERPL-SCNC: 35 MMOL/L (ref 20–33)
CREAT SERPL-MCNC: 0.71 MG/DL (ref 0.5–1.4)
CRP SERPL HS-MCNC: 0.54 MG/DL (ref 0–0.75)
GFR SERPLBLD CREATININE-BSD FMLA CKD-EPI: 113 ML/MIN/1.73 M 2
GLOBULIN SER CALC-MCNC: 3.5 G/DL (ref 1.9–3.5)
GLUCOSE SERPL-MCNC: 122 MG/DL (ref 65–99)
LV EJECT FRACT  99904: 53
LV EJECT FRACT MOD 2C 99903: 47.55
LV EJECT FRACT MOD 4C 99902: 54.53
LV EJECT FRACT MOD BP 99901: 52.59
POTASSIUM SERPL-SCNC: 3.3 MMOL/L (ref 3.6–5.5)
PREALB SERPL-MCNC: 25.4 MG/DL (ref 18–38)
PROT SERPL-MCNC: 7.1 G/DL (ref 6–8.2)
SODIUM SERPL-SCNC: 137 MMOL/L (ref 135–145)

## 2025-05-12 PROCEDURE — 94760 N-INVAS EAR/PLS OXIMETRY 1: CPT

## 2025-05-12 PROCEDURE — A9270 NON-COVERED ITEM OR SERVICE: HCPCS | Performed by: INTERNAL MEDICINE

## 2025-05-12 PROCEDURE — 94640 AIRWAY INHALATION TREATMENT: CPT

## 2025-05-12 PROCEDURE — 99232 SBSQ HOSP IP/OBS MODERATE 35: CPT | Performed by: STUDENT IN AN ORGANIZED HEALTH CARE EDUCATION/TRAINING PROGRAM

## 2025-05-12 PROCEDURE — 700102 HCHG RX REV CODE 250 W/ 637 OVERRIDE(OP): Performed by: INTERNAL MEDICINE

## 2025-05-12 PROCEDURE — 93306 TTE W/DOPPLER COMPLETE: CPT

## 2025-05-12 PROCEDURE — A9270 NON-COVERED ITEM OR SERVICE: HCPCS | Performed by: STUDENT IN AN ORGANIZED HEALTH CARE EDUCATION/TRAINING PROGRAM

## 2025-05-12 PROCEDURE — 770020 HCHG ROOM/CARE - TELE (206)

## 2025-05-12 PROCEDURE — 84134 ASSAY OF PREALBUMIN: CPT

## 2025-05-12 PROCEDURE — 86140 C-REACTIVE PROTEIN: CPT

## 2025-05-12 PROCEDURE — 700111 HCHG RX REV CODE 636 W/ 250 OVERRIDE (IP): Mod: JZ | Performed by: INTERNAL MEDICINE

## 2025-05-12 PROCEDURE — 80053 COMPREHEN METABOLIC PANEL: CPT

## 2025-05-12 PROCEDURE — 700102 HCHG RX REV CODE 250 W/ 637 OVERRIDE(OP): Performed by: STUDENT IN AN ORGANIZED HEALTH CARE EDUCATION/TRAINING PROGRAM

## 2025-05-12 PROCEDURE — 93306 TTE W/DOPPLER COMPLETE: CPT | Mod: 26 | Performed by: INTERNAL MEDICINE

## 2025-05-12 PROCEDURE — 36415 COLL VENOUS BLD VENIPUNCTURE: CPT

## 2025-05-12 RX ORDER — GABAPENTIN 400 MG/1
400 CAPSULE ORAL 3 TIMES DAILY
DISCHARGE
Start: 2025-05-12

## 2025-05-12 RX ORDER — POLYETHYLENE GLYCOL 3350 17 G/17G
POWDER, FOR SOLUTION ORAL
DISCHARGE
Start: 2025-05-12

## 2025-05-12 RX ORDER — ATORVASTATIN CALCIUM 80 MG/1
80 TABLET, FILM COATED ORAL EVERY EVENING
DISCHARGE
Start: 2025-05-12 | End: 2026-06-16

## 2025-05-12 RX ORDER — BISACODYL 10 MG
10 SUPPOSITORY, RECTAL RECTAL
Refills: 0 | OUTPATIENT
Start: 2025-05-12

## 2025-05-12 RX ORDER — METOCLOPRAMIDE 10 MG/1
10 TABLET ORAL
DISCHARGE
Start: 2025-05-12

## 2025-05-12 RX ORDER — METOCLOPRAMIDE 10 MG/1
10 TABLET ORAL
Status: DISCONTINUED | OUTPATIENT
Start: 2025-05-12 | End: 2025-05-13 | Stop reason: HOSPADM

## 2025-05-12 RX ORDER — ACETAMINOPHEN 325 MG/1
650 TABLET ORAL EVERY 6 HOURS PRN
DISCHARGE
Start: 2025-05-12

## 2025-05-12 RX ORDER — ONDANSETRON 4 MG/1
4 TABLET, ORALLY DISINTEGRATING ORAL EVERY 4 HOURS PRN
DISCHARGE
Start: 2025-05-12

## 2025-05-12 RX ORDER — ONDANSETRON 2 MG/ML
4 INJECTION INTRAMUSCULAR; INTRAVENOUS EVERY 4 HOURS PRN
DISCHARGE
Start: 2025-05-12

## 2025-05-12 RX ORDER — ASPIRIN 81 MG/1
81 TABLET, CHEWABLE ORAL DAILY
DISCHARGE
Start: 2025-05-13

## 2025-05-12 RX ORDER — AMOXICILLIN 250 MG
2 CAPSULE ORAL 2 TIMES DAILY
Qty: 30 TABLET | Refills: 0 | OUTPATIENT
Start: 2025-05-12

## 2025-05-12 RX ADMIN — AMLODIPINE BESYLATE 10 MG: 10 TABLET ORAL at 04:18

## 2025-05-12 RX ADMIN — ACETAMINOPHEN 650 MG: 325 TABLET ORAL at 18:45

## 2025-05-12 RX ADMIN — POTASSIUM BICARBONATE 50 MEQ: 978 TABLET, EFFERVESCENT ORAL at 14:20

## 2025-05-12 RX ADMIN — METOCLOPRAMIDE 10 MG: 10 TABLET ORAL at 04:18

## 2025-05-12 RX ADMIN — METOCLOPRAMIDE 10 MG: 10 TABLET ORAL at 01:00

## 2025-05-12 RX ADMIN — METOCLOPRAMIDE 10 MG: 10 TABLET ORAL at 17:53

## 2025-05-12 RX ADMIN — ASPIRIN 81 MG: 81 TABLET, CHEWABLE ORAL at 04:19

## 2025-05-12 RX ADMIN — GABAPENTIN 400 MG: 400 CAPSULE ORAL at 04:18

## 2025-05-12 RX ADMIN — GABAPENTIN 400 MG: 400 CAPSULE ORAL at 12:44

## 2025-05-12 RX ADMIN — ENOXAPARIN SODIUM 40 MG: 100 INJECTION SUBCUTANEOUS at 17:01

## 2025-05-12 RX ADMIN — HYDROCHLOROTHIAZIDE 25 MG: 25 TABLET ORAL at 04:19

## 2025-05-12 RX ADMIN — ATORVASTATIN CALCIUM 80 MG: 80 TABLET, FILM COATED ORAL at 17:00

## 2025-05-12 RX ADMIN — Medication 5 MG: at 20:44

## 2025-05-12 RX ADMIN — GABAPENTIN 400 MG: 400 CAPSULE ORAL at 17:00

## 2025-05-12 RX ADMIN — METOCLOPRAMIDE 10 MG: 10 TABLET ORAL at 23:43

## 2025-05-12 RX ADMIN — POTASSIUM BICARBONATE 50 MEQ: 978 TABLET, EFFERVESCENT ORAL at 04:18

## 2025-05-12 RX ADMIN — METOCLOPRAMIDE 10 MG: 10 TABLET ORAL at 12:44

## 2025-05-12 ASSESSMENT — ENCOUNTER SYMPTOMS
SENSORY CHANGE: 1
WEAKNESS: 1
ABDOMINAL PAIN: 0
SHORTNESS OF BREATH: 0

## 2025-05-12 ASSESSMENT — PAIN DESCRIPTION - PAIN TYPE: TYPE: ACUTE PAIN

## 2025-05-12 ASSESSMENT — FIBROSIS 4 INDEX: FIB4 SCORE: 0.67

## 2025-05-12 NOTE — CARE PLAN
Problem: Aerosol Therapy  Goal: Improved hydration/ability to mobilize secretions and/or decreased airway edema  Description: Target End Date:  resolve prior to discharge or when underlying condition is resolved/stabilized1.  Implement heated or cool aerosol therapy2.  Assessed for optimal hydration, decreased edema and/or improved ability to mobilize secretions  Outcome: Progressing   7.0 portex trache  T piece 4L 28%

## 2025-05-12 NOTE — CARE PLAN
The patient is Stable - Low risk of patient condition declining or worsening    Shift Goals  Clinical Goals: Neuro Checks, Skin Integrity  Patient Goals: Comfort  Family Goals: JAMARI    Progress made toward(s) clinical / shift goals:    Problem: Neuro Status  Goal: Neuro status will remain stable or improve  Outcome: Progressing     Problem: Hemodynamic Monitoring  Goal: Patient's hemodynamics, fluid balance and neurologic status will be stable or improve  Outcome: Progressing     Problem: Respiratory - Stroke Patient  Goal: Patient will achieve/maintain optimum respiratory rate/effort  Outcome: Progressing       Patient is not progressing towards the following goals:

## 2025-05-12 NOTE — PROGRESS NOTES
Monitor summary: SR/ST , OK -0.17, QRS -0.08, QT -0.35, with (R) PVCs per strip from the monitor room.

## 2025-05-12 NOTE — PROGRESS NOTES
RN spoke with  from [facility]. Team will  patient from [facility] at 0920, flight arrives 1030, ETA to [facility] 1315.

## 2025-05-12 NOTE — DISCHARGE PLANNING
0944  DPA received email from [place] with [facility]. Per Armando requesting update for transport and progress notes for Pt.    1010  DPA emailed [place] with requested documentation.    1230  DPA emailed [place] transport time and requested number for report    1330  DPA received email from [place] with Report phone numbers. DPA FWD to EJ

## 2025-05-12 NOTE — CARE PLAN
The patient is Watcher - Medium risk of patient condition declining or worsening    Shift Goals  Clinical Goals: neuro checks,maintain skin integrity  Patient Goals: comfort  Family Goals: JAMARI    Progress made toward(s) clinical / shift goals:  stable neuro status,frequent trach suction  Problem: Neuro Status  Goal: Neuro status will remain stable or improve  Outcome: Progressing     Problem: Hemodynamic Monitoring  Goal: Patient's hemodynamics, fluid balance and neurologic status will be stable or improve  Outcome: Progressing     Problem: Risk for Aspiration  Goal: Patient's risk for aspiration will be absent or decrease  Outcome: Progressing       Patient is not progressing towards the following goals:

## 2025-05-12 NOTE — DISCHARGE PLANNING
Medical Social Work  PC to [facility] [phone]; LEXY spoke to Jennifer to obtain an update.  LEXY told that it is in request schedule, LEXY and [facility] will be notified later today 3/4 on transport time.    Teams to [facility] requesting update be provided to [facility].

## 2025-05-12 NOTE — PROGRESS NOTES
[facility] Medicine Daily Progress Note    Date of Service  5/12/2025    Chief Complaint  Jonna Brian is a 49 y.o. male admitted 3/25/2025 with stroke    Hospital Course  This is a 49 y.o. male who was initially admitted to the hospital on 3/25/2025 with global weakness, aphasia and ataxia.  His last known normal was 1630 on March 24.  He was initially admitted to the hospital floor, his CT head as well as CTA of the head and neck were within normal limits.  There was consideration for possible Guillain-Barré syndrome.     His condition continued to deteriorate and he underwent a lumbar puncture which revealed elevated protein but no evidence of infection.  He had progressive bulbar symptoms and weakness and was transferred to the ICU.  Unfortunately shortly after he arrived in the ICU he developed stridor and required emergent intubation on 3/27.       He then underwent an MRI of his brain and spine which unfortunately revealed small areas of acute infarcts in the lower jero and the upper medulla on both sides of the midline.  He also had chronic infarcts in his jero and right inferior cerebellum.  They were chronically Cuna in the bilateral basal ganglia, the right thalamus and the right periventricular white matter.  He had evidence of small vessel disease as well in the periventricular white matter.     Ultimately it appears he has been suffering multiple small strokes and his progressive decline was due to acute infarcts in the lower jero and upper medulla. Neurology recommended DAPT for 10 days with aspirin indefinitely.  Patient completed Plavix 4/4.  Since that time he has not recovered function, has required tracheostomy and PEG placement with the ultimate goal of transferring him to postacute medical to see what function may be regained in time.     Patient able to nod head, shakes head for yes/no, able to use alphabet sheet to make needs known. He continues to work with PT/OT/SLP therapies with some  minor signs of improvement. Gio is medically cleared, plan for discharge to LTAC for ongoing management of tracheostomy tube, PEG tube, therapies.    Interval Problem Update  He was doing ok with 1 carton boluses, but had N/V when 2 cartons given last night. KUB neg for obstruction. Will get gastric emptying study  HypoK, replete  Patient able to nod appropriately to questions, able to tell me he is hot using paper signs. He remembers vomiting last night. Denies abd pain or nausea currently. Denies SOB  [facility] [place] [facility]  5/8: Patient seen and examined had gastric emptying study today, which was reviewed and shows delayed emptying  I will add reglan to his regimen   Cont on tube feed  Hypokalemia : potassium of 3.3 replete   Close monitor for decompensation   5/9: Patient seen and examined no nausea or vomiting. Afebrile, started on Reglan for his delayed gastric emptying yesterday, having urinary retention will place grove  5/10: Patient seen and examined afebrile there were concern for st changes on his EKG so I discussed with cardiology and they reviewed the EKG and does not look like an infarct , also  patient is non verbal but when asked if he has chest pain not shakes his head for no no need for any intervention medication management   Plan is for patient to go to LTACH in [place]   I did talk today with Dr. Nhan pagan for a peer to peer review   Close monitor for decompensation   5/11: Patient seen and examined afebrile no  vomiting   Resting in bed,  plan is  for [place] [facility] [place] I have already did the peer to per with Dr. Justin there and he has been accepted       5/12 taking over care, chart reviewed.  Afebrile normal pulse and respiratory rate blood pressure of 100 120 pulse ox 95-97% on 4 L T-piece.  Potassium 3.3, p.o. replacement on, chloride 95, bicarb 35 glucose 122 BUN 23 normal renal and liver function.  [facility]      I have discussed  this patient's plan of care and discharge plan at IDT rounds today with Case Management, Nursing, Nursing leadership, and other members of the IDT team.    Consultants/Specialty  critical care, neurology, and urology    Code Status  Full Code    Disposition  The patient is not medically cleared for discharge to home or a post-acute facility.      I have placed the appropriate orders for post-discharge needs.    Review of Systems  Review of Systems   Unable to perform ROS: Medical condition (nonverbal)   Respiratory:  Negative for shortness of breath.    Cardiovascular:  Negative for chest pain.   Gastrointestinal:  Negative for abdominal pain.   Neurological:  Positive for sensory change and weakness.        Physical Exam  Temp:  [36.4 °C (97.5 °F)-37.2 °C (99 °F)] 36.8 °C (98.2 °F)  Pulse:  [85-98] 88  Resp:  [17-20] 17  BP: (105-137)/(69-96) 121/70  SpO2:  [92 %-98 %] 95 %    Physical Exam  Vitals and nursing note reviewed.   Constitutional:       Appearance: He is ill-appearing.   HENT:      Head: Normocephalic.      Mouth/Throat:      Mouth: Mucous membranes are dry.   Eyes:      General:         Right eye: No discharge.         Left eye: No discharge.   Neck:      Comments: tracheostomy  Cardiovascular:      Comments: Distant heart sounds  Pulmonary:      Effort: No respiratory distress.      Breath sounds: No wheezing, rhonchi or rales.   Abdominal:      Palpations: Abdomen is soft.      Tenderness: There is no abdominal tenderness.      Comments: Feeding tube   Genitourinary:     Penis: Normal.    Musculoskeletal:         General: No swelling.      Comments: Diffuse muscle atrophy   Skin:     General: Skin is warm and dry.   Neurological:      Mental Status: He is alert.      Comments: Nods appropriately to questions and follows commands, able to weakly move right hand and foot  No movement of left hand and foot         Fluids    Intake/Output Summary (Last 24 hours) at 5/12/2025 3022  Last data filed at  5/12/2025 1213  Gross per 24 hour   Intake 1457 ml   Output 1350 ml   Net 107 ml        Laboratory  Recent Labs     05/09/25  2310   WBC 7.1   RBC 4.63*   HEMOGLOBIN 12.7*   HEMATOCRIT 39.7*   MCV 85.7   MCH 27.4   MCHC 32.0*   RDW 39.1   PLATELETCT 275   MPV 9.0     Recent Labs     05/09/25  2310 05/11/25  0134 05/12/25  0338   SODIUM 136 138 137   POTASSIUM 3.3* 3.5* 3.3*   CHLORIDE 93* 94* 95*   CO2 32 33 35*   GLUCOSE 168* 107* 122*   BUN 24* 22 23*   CREATININE 0.77 0.81 0.71   CALCIUM 9.2 9.6 9.7                   Imaging  NM-GASTRIC EMPTYING   Final Result      Delayed gastric emptying.         [id number] VIEW   Final Result      Nonspecific bowel gas pattern.      CT-ABDOMEN & PELVIS UROGRAM   Final Result      1.  No suspicious renal, ureteral or bladder mass lesions.   2.  No renal, ureteral or bladder stones. No hydronephrosis.   3.  Benign renal cysts which do not require imaging follow-up.   4.  Wall thickening and hyperenhancement of rectosigmoid colon. Findings may be due to underdistention, although neoplasm is difficult to exclude. Recommend follow-up with colonoscopy.         [id number] VIEW   Final Result      No evidence of bowel obstruction.                  DX-CHEST-LIMITED (1 VIEW)   Final Result         No acute cardiopulmonary abnormalities are identified.      [id number] VIEW   Final Result         No specific finding to suggest small bowel obstruction.      DX-CHEST-PORTABLE (1 VIEW)   Final Result      Hypoinflation with bibasilar atelectasis.      US-RENAL   Final Result      1.  No hydronephrosis.      DX-CHEST-PORTABLE (1 VIEW)   Final Result         1.  Left basilar atelectasis and/or subtle infiltrates, similar to prior study      DX-CHEST-PORTABLE (1 VIEW)   Final Result         1.  Left basilar atelectasis, no focal infiltrate   2.  Cardiomegaly      DX-G.I. TUBE INJECTION, ANY TYPE   Final Result      Contrast from a PEG tube injection extends into the stomach without  evidence of contrast leak.      CT-CHEST,ABDOMEN,PELVIS WITH   Final Result      1.  Large amount of free intraperitoneal air within the abdomen again seen as was previously identified on chest x-ray by review of the patient's chart, a percutaneous gastrostomy tube was placed on 3/31/2025 and is free intraperitoneal air is possibly    secondary to that recent procedure.      2.  Bibasal atelectasis and small bilateral pleural effusions.      3.  Tracheostomy tube present.      4.  No evidence of bowel obstruction or free fluid within the abdomen or pelvis.      DX-CHEST-PORTABLE (1 VIEW)   Final Result         1.  Bibasilar atelectasis   2.  Intra-abdominal free air, can be associated with history of percutaneous gastrostomy, consider other viscus perforation as warranted. Could be further evaluated with CT of the abdomen with contrast as clinically appropriate.      These findings were discussed with the patient's clinician, Bravo Lala Iv, on 4/2/2025 7:57 AM.      DX-CHEST-PORTABLE (1 VIEW)   Final Result      No evidence of acute cardiopulmonary process.      DX-ABDOMEN FOR TUBE PLACEMENT   Final Result      1.  Enteric tube extends in the fundus of stomach.      DX-ABDOMEN FOR TUBE PLACEMENT   Final Result      The gastric tube has been removed and replaced with a small bowel feeding tube which terminate in the proximal stomach.      DX-CHEST-PORTABLE (1 VIEW)   Final Result      Atelectasis within the left lung base.      MR-THORACIC SPINE-WITH & W/O   Final Result      1.  There is no abnormal intramedullary T2 signal intensity in the thoracic spinal cord.   2.  Mild degenerative disease at T8-9.   3.  Left lower segmental consolidation.      MR-CERVICAL SPINE-WITH & W/O   Final Result      1.  Small areas of acute infarcts in the lower jero and upper medulla involving both sides of the midline. There are chronic infarcts in the jero and right inferior cerebellum.   2.  There is no abnormal intramedullary  T2 signal intensity in the cervical spinal cord to suggest demyelinating lesions. There is no MR evidence of compressive myelopathy.   3.  Mild degenerative disease.            MR-BRAIN-WITH & W/O   Final Result      1.  Small areas of acute infarcts in the lower jero and upper medulla involving both sides of the midline.   2.  There are chronic infarcts in the jero and right inferior cerebellum. . There are areas of chronic lacunae in the bilateral basal ganglia, right thalamus and right periventricular white matter.   3.  There are nonspecific T2 hyperintensities in the periventricular white matter likely representing chronic small vessel disease.   4.  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      MR-LUMBAR SPINE-WITH & W/O   Final Result      1.  Unremarkable pre and postcontrast MR examination of the lumbar spine.   2.  Clinical suspicious for GBS: There is no abnormal enhancement of the lumbar nerve roots.      DX-ABDOMEN FOR TUBE PLACEMENT   Final Result      NG tube tip projects at the peripyloric region.      DX-CHEST-PORTABLE (1 VIEW)   Final Result      1.  Low lung volumes without definite acute cardiopulmonary abnormality.   2.  Support apparatus as above.      CT-HEAD W/O   Final Result      1.  No acute intracranial abnormality.   2.  Remote right cerebellar infarct.               DX-CHEST-PORTABLE (1 VIEW)   Final Result      No acute cardiopulmonary disease evident.      DX-CHEST-PORTABLE (1 VIEW)   Final Result      No acute cardiopulmonary disease evident.      CT-CEREBRAL PERFUSION ANALYSIS   Final Result      1. Cerebral blood flow less than 30% possibly representing completed infarct = 0 mL. Based on distribution of this finding, this is unlikely to represent artifact.      2. T Max more than 6 seconds possibly representing combination of completed infarct and ischemia = 7 mL. Based on the distribution of this finding, this is  "possibly artifact.      3. Mismatched volume possibly representing ischemic brain/penumbra= 0 mL      4.  Please note that this cerebral perfusion study and report is Quantitative and targets supratentorial (cerebral) perfusion for evaluation of large vessel territory acute ischemia/infarction. For example, lacunar infarcts, and brainstem/posterior fossa    ischemia/infarction are not evaluated on this study.  Data acquisition is subject to artifacts which can yield non-anatomically plausible perfusion maps which may be due to motion, bolus timing, signal to noise ratio, or other technical factors.    Perfusion map abnormalities which show non-anatomic distributions are likely artifact.   This study is not \"stand-alone\" and should only be utilized for diagnosis, management/treatment in correlation with CT, CTA, and/or MRI and clinical factors.         CT-CTA NECK WITH & W/O-POST PROCESSING   Final Result      CT angiogram of the neck within normal limits.      CT-CTA HEAD WITH & W/O-POST PROCESS   Final Result      CT angiogram of the [place] within normal limits.      CT-HEAD W/O   Final Result      Head CT without contrast within normal limits. No evidence of acute cerebral infarction, hemorrhage or mass lesion.               EC-ECHOCARDIOGRAM COMPLETE W/O CONT    (Results Pending)        Assessment/Plan  * Acute ischemic stroke (HCC)- (present on admission)  Assessment & Plan  Acute ischemic stroke  MRI of the brain revealed acute infarcts of the lower jero and upper medulla.  He effectively is a quadriplegic and required tracheostomy and PEG tube.  Aspirin and statin  Blood pressure control  Family hope for functional recovery. LTACH pending  Speech following, capping trials  PTOT    Sigmoid thickening  Assessment & Plan  Wall thickening and hyperenhancement of rectosigmoid colon   Outpatient GI referral placed    Hypokalemia  Assessment & Plan  Replace as needed    Emesis  Assessment & Plan  He was " doing ok with 1 carton boluses, but had N/V when 2 cartons given last night.   KUB neg for obstruction.   Will get gastric emptying study, radiology test will be tomorrow  Hold tube feeds midnight, give 1 carton at a time for now    Constipation  Assessment & Plan  KUB showed no obstruction  Bowel protocol    Improving    Hematuria  Assessment & Plan  Darkened urine, no gross hematuria  No recent grove trauma  Renal US normal, no stones  Completed empiric keflex for possible UTI  urology consulted, following    Proteus mirabilis pneumonia (HCC)- (present on admission)  Assessment & Plan  Treated with Zosyn de-escalated to Augmentin based on cultures from sputum    Acute neuromuscular respiratory failure (HCC)- (present on admission)  Assessment & Plan  Requiring tracheostomy    Anxiety  Assessment & Plan  Ativan for anxiety.     Type 2 diabetes mellitus with hyperglycemia, without long-term current use of insulin (HCC)- (present on admission)  Assessment & Plan  Details are unclear  Reportedly he is on metformin as an outpatient  Hemoglobin A1c 6.3  He does not require insulin    Primary hypertension- (present on admission)  Assessment & Plan  -120s   hydrochlorothiazide 25 mg daily and Norvasc 10 mg daily             VTE prophylaxis: lovenox ppx      I have performed a physical exam and reviewed and updated ROS and Plan today (5/12/2025). In review of yesterday's note (5/11/2025), there are no changes except as documented above.  Total time spent 45 minutes. I spent greater than 50% of the time for patient care, counseling, and coordination on this date, including unit/floor time, and face-to-face time with the patient as per interval events, my own review of patient's imaging and lab analysis and developing my assessment and plan above.

## 2025-05-12 NOTE — DISCHARGE PLANNING
Medical Social Work  SW received email from [place] with  time, 9:20  Patient's friend Susan will be flying with patient.    Voalte to attending with an update on transport time.

## 2025-05-13 VITALS
TEMPERATURE: 97.2 F | DIASTOLIC BLOOD PRESSURE: 76 MMHG | HEART RATE: 91 BPM | SYSTOLIC BLOOD PRESSURE: 111 MMHG | OXYGEN SATURATION: 93 % | BODY MASS INDEX: 27.35 KG/M2 | WEIGHT: 206.35 LBS | HEIGHT: 73 IN | RESPIRATION RATE: 18 BRPM

## 2025-05-13 LAB — GLUCOSE BLD STRIP.AUTO-MCNC: 124 MG/DL (ref 65–99)

## 2025-05-13 PROCEDURE — 700102 HCHG RX REV CODE 250 W/ 637 OVERRIDE(OP): Performed by: INTERNAL MEDICINE

## 2025-05-13 PROCEDURE — 94640 AIRWAY INHALATION TREATMENT: CPT

## 2025-05-13 PROCEDURE — 94760 N-INVAS EAR/PLS OXIMETRY 1: CPT

## 2025-05-13 PROCEDURE — 700102 HCHG RX REV CODE 250 W/ 637 OVERRIDE(OP): Performed by: STUDENT IN AN ORGANIZED HEALTH CARE EDUCATION/TRAINING PROGRAM

## 2025-05-13 PROCEDURE — A9270 NON-COVERED ITEM OR SERVICE: HCPCS | Performed by: INTERNAL MEDICINE

## 2025-05-13 PROCEDURE — 82962 GLUCOSE BLOOD TEST: CPT

## 2025-05-13 PROCEDURE — A9270 NON-COVERED ITEM OR SERVICE: HCPCS | Performed by: STUDENT IN AN ORGANIZED HEALTH CARE EDUCATION/TRAINING PROGRAM

## 2025-05-13 PROCEDURE — 99239 HOSP IP/OBS DSCHRG MGMT >30: CPT | Performed by: INTERNAL MEDICINE

## 2025-05-13 RX ORDER — METOCLOPRAMIDE 10 MG/1
10 TABLET ORAL
Status: SHIPPED
Start: 2025-05-13

## 2025-05-13 RX ORDER — ATORVASTATIN CALCIUM 80 MG/1
80 TABLET, FILM COATED ORAL EVERY EVENING
Status: SHIPPED
Start: 2025-05-13 | End: 2026-06-17

## 2025-05-13 RX ORDER — ENOXAPARIN SODIUM 100 MG/ML
40 INJECTION SUBCUTANEOUS DAILY
Status: ACTIVE | DISCHARGE
Start: 2025-05-13

## 2025-05-13 RX ORDER — HYDROCHLOROTHIAZIDE 25 MG/1
25 TABLET ORAL DAILY
Status: SHIPPED
Start: 2025-05-14

## 2025-05-13 RX ORDER — AMOXICILLIN 250 MG
2 CAPSULE ORAL 2 TIMES DAILY
Status: SHIPPED
Start: 2025-05-13

## 2025-05-13 RX ORDER — ASPIRIN 81 MG/1
81 TABLET, CHEWABLE ORAL DAILY
Status: SHIPPED
Start: 2025-05-14

## 2025-05-13 RX ORDER — GABAPENTIN 400 MG/1
400 CAPSULE ORAL 3 TIMES DAILY
Status: SHIPPED
Start: 2025-05-13

## 2025-05-13 RX ORDER — AMLODIPINE BESYLATE 10 MG/1
10 TABLET ORAL DAILY
Status: SHIPPED
Start: 2025-05-14 | End: 2026-06-18

## 2025-05-13 RX ADMIN — ASPIRIN 81 MG: 81 TABLET, CHEWABLE ORAL at 04:18

## 2025-05-13 RX ADMIN — METOCLOPRAMIDE 10 MG: 10 TABLET ORAL at 06:21

## 2025-05-13 RX ADMIN — AMLODIPINE BESYLATE 10 MG: 10 TABLET ORAL at 04:17

## 2025-05-13 RX ADMIN — POTASSIUM BICARBONATE 50 MEQ: 978 TABLET, EFFERVESCENT ORAL at 04:17

## 2025-05-13 RX ADMIN — HYDROCHLOROTHIAZIDE 25 MG: 25 TABLET ORAL at 04:18

## 2025-05-13 RX ADMIN — GABAPENTIN 400 MG: 400 CAPSULE ORAL at 04:18

## 2025-05-13 ASSESSMENT — FIBROSIS 4 INDEX: FIB4 SCORE: 0.6

## 2025-05-13 NOTE — DISCHARGE SUMMARY
Discharge Summary    CHIEF COMPLAINT ON ADMISSION  Chief Complaint   Patient presents with    Possible Stroke     Pt BIB EMS for possible stroke. Pt LKW 1630 3/24. At 0400 3/25 pt noted to have global weakness, aphasia and ataxia. NIH 17 on arrival.        Reason for Admission  stroke alert    Admission Date  3/25/2025     CODE STATUS  Full Code    HPI & HOSPITAL COURSE  This is a 49 y.o. male here with HTN who was found with global weakness and aphasia. CT head as well as CTA of the head and neck were within normal limits.  There was consideration for possible Guillain-Barré syndrome. His condition continued to deteriorate and he underwent a lumbar puncture which revealed elevated protein but no evidence of infection.  He had progressive bulbar symptoms and weakness and was transferred to the ICU.  Unfortunately shortly after he arrived in the ICU he developed stridor and required emergent intubation on 3/27.  MRI of his brain and spine which unfortunately revealed small areas of acute infarcts in the lower jero and the upper medulla on both sides of the midline. He also had chronic infarcts in his jero and right inferior cerebellum. Ultimately it appears he has been suffering multiple small strokes and his progressive decline was due to acute infarcts in the lower jero and upper medulla. Neurology recommended DAPT for 10 days with aspirin indefinitely. Patient completed Plavix 4/4. Since that time he has not recovered function, has required tracheostomy and PEG placement with the ultimate goal of transferring him to postacute medical to see what function may be regained in time.   He was noted to have vomiting when given 2 cartons of tube feeding. Gastric emptying study showed delay, he was started on reglan and his cartons were spaced out with no further vomiting. His A1c on 3/26/25 was 6.3%. He did not required sliding scale insulin. Patient able to nod head, shakes head for yes/no, able to use alphabet sheet to  make needs known. He continues to work with PT/OT/SLP therapies with some minor signs of improvement.     Therefore, he is discharged in good and stable condition to a long-term acute care hospital.    The patient met 2-midnight criteria for an inpatient stay at the time of discharge.      FOLLOW UP ITEMS POST DISCHARGE  F/u neurology    DISCHARGE DIAGNOSES  Principal Problem:    Acute ischemic stroke (HCC) (POA: Yes)  Active Problems:    Primary hypertension (POA: Yes)    Elevated LFTs (POA: Unknown)    Type 2 diabetes mellitus with hyperglycemia, without long-term current use of insulin (HCC) (POA: Yes)    Acute neuromuscular respiratory failure (HCC) (POA: Yes)    Free intraperitoneal air (POA: Unknown)    Proteus mirabilis pneumonia (HCC) (POA: Yes)    Hematuria (POA: Unknown)    Constipation (POA: Unknown)    Emesis (POA: Unknown)    Hypokalemia (POA: Unknown)    Sigmoid thickening (POA: Unknown)  Resolved Problems:    * No resolved hospital problems. *      FOLLOW UP  No follow-up provider specified.    MEDICATIONS ON DISCHARGE     Medication List        START taking these medications        Instructions   aspirin 81 MG Chew chewable tablet  Start taking on: May 14, 2025  Commonly known as: Asa   1 Tablet by Enteral Tube route every day.  Dose: 81 mg     atorvastatin 80 MG tablet  Commonly known as: Lipitor   1 Tablet by Enteral Tube route every evening.  Dose: 80 mg     enoxaparin 40 MG/0.4ML Sosy inj  Commonly known as: Lovenox   Inject 40 mg under the skin every day at 6 PM.  Dose: 40 mg     gabapentin 400 MG Caps  Commonly known as: Neurontin   1 Capsule by Enteral Tube route 3 times a day.  Dose: 400 mg     melatonin 5 MG Tabs   1 Tablet by Enteral Tube route every evening.  Dose: 5 mg     metoclopramide 10 MG Tabs  Commonly known as: Reglan   1 Tablet by Enteral Tube route 3 times a day and at bedtime.  Dose: 10 mg     senna-docusate 8.6-50 MG Tabs  Commonly known as: Pericolace Or Senokot S   2 Tablets  by Enteral Tube route 2 times a day.  Dose: 2 Tablet            CHANGE how you take these medications        Instructions   amLODIPine 10 MG Tabs  Start taking on: May 14, 2025  What changed:   how to take this  when to take this  Commonly known as: Norvasc   1 Tablet by Enteral Tube route every day.  Dose: 10 mg     hydroCHLOROthiazide 25 MG Tabs  Start taking on: May 14, 2025  What changed: how to take this   1 Tablet by Enteral Tube route every day.  Dose: 25 mg            STOP taking these medications      metFORMIN  MG Tb24  Commonly known as: Glucophage XR              Allergies  Allergies   Allergen Reactions    Lisinopril Shortness of Breath     Closes throat       DIET  Orders Placed This Encounter   Procedures    Diet NPO Restrict to: With Tube Feed     Standing Status:   Standing     Number of Occurrences:   1     Diet NPO Restrict to::   With Tube Feed [4]    Diet: Diet Tube Feed; Formula: Other - Specify formula comments in the field to the right (Glucerna 1.5 carton); Tube Feed Time Unit: Hours/Day; Select Bolus (If Indicated): Other (Specify in Comments) (Glucerna 1.5); Bolus Frequency: TID (At meal...     Give ½ container of Glucerna 1.5 via PEG at 1st feeding. Advance each feeding ½ container until goal is reached. Goal for 6 cartons/d given TID (2 cartons at each meal time)     Standing Status:   Standing     Number of Occurrences:   1     Diet:   Diet Tube Feed [35]     Formula::   Other - Specify formula comments in the field to the right              Glucerna 1.5 carton     Route:   Gtube/PEG     Tube Feed Time Unit:   Hours/Day     Select Bolus (If Indicated)::   Other (Specify in Comments)              Glucerna 1.5     Bolus Frequency:   TID              At meals     Number of Cartons Per Day::   Six       ACTIVITY  As tolerated.    LINES, DRAINS, AND WOUNDS  This is an automated list. Peripheral IVs will be removed prior to discharge.  Peripheral IV 05/10/25 20 G Left Wrist (Active)    Site Assessment Clean;Dry;Intact 05/12/25 2000   Dressing Type Occlusive;Transparent 05/12/25 2000   Line Status Scrubbed the hub prior to access;Flushed;Saline locked 05/12/25 2000   Dressing Status Clean;Dry;Intact 05/12/25 2000   Dressing Intervention N/A 05/12/25 2000   Dressing Change Due 05/17/25 05/12/25 2000   Infiltration Grading ([facility], [facility]) 0 05/12/25 2000   Phlebitis Scale ([facility] Only) 0 05/12/25 2000     Urethral Catheter Non-latex 16 Fr. (Active)   Site Assessment Clean;Skin intact 05/12/25 2000   Collection Container Standard drainage bag 05/12/25 2000   Urinary Catheter Bundle Tamper Evident Seal in Place;Drainage Tube Extended;Drainage Bag Not Overfilled;Drainage Tubing Properly Secured;Drainage Bag Below Bladder Level and Not on Floor 05/12/25 2000   Securement Method Securing device (Describe) 05/12/25 2000   Output (mL) 600 mL 05/13/25 0600     Airway Trach Tracheostomy 7.0 (Active)   Site Assessment Clean;Dry;Intact 05/13/25 0147   Airway Tube Secured Velcro attachment 05/13/25 0147   Next Securing Device Change Due Date 05/15/25 05/11/25 0800   Cuffless No 05/13/25 0147   Cuff Pressure cmH2O (R.C.) 0 05/12/25 1506   Tracheostomy/Stoma Care/Inner Cannula Changed Yes 05/11/25 0259   Next Tracheostomy Due Date 05/29/25 05/05/25 0900   Extra Tracheostomy Tube at Bedside Yes 05/12/25 2000   Inline Suction Catheter Device Change Yes 05/08/25 1510   Next Closed Suction Device Change Due  05/11/25 05/08/25 1510     Wound 04/20/22 Incision Ankle Left adaptic, soft roll, 4x8, ace  (Active)       Peripheral IV 05/10/25 20 G Left Wrist (Active)   Site Assessment Clean;Dry;Intact 05/12/25 2000   Dressing Type Occlusive;Transparent 05/12/25 2000   Line Status Scrubbed the hub prior to access;Flushed;Saline locked 05/12/25 2000   Dressing Status Clean;Dry;Intact 05/12/25 2000   Dressing Intervention N/A 05/12/25 2000   Dressing Change Due 05/17/25 05/12/25 2000   Infiltration Grading ([facility], [facility]) 0  05/12/25 2000   Phlebitis Scale ([facility] Only) 0 05/12/25 2000           Urethral Catheter Non-latex 16 Fr. (Active)   Site Assessment Clean;Skin intact 05/12/25 2000   Collection Container Standard drainage bag 05/12/25 2000   Urinary Catheter Bundle Tamper Evident Seal in Place;Drainage Tube Extended;Drainage Bag Not Overfilled;Drainage Tubing Properly Secured;Drainage Bag Below Bladder Level and Not on Floor 05/12/25 2000   Securement Method Securing device (Describe) 05/12/25 2000   Output (mL) 600 mL 05/13/25 0600        MENTAL STATUS ON TRANSFER      alert       CONSULTATIONS  Critical care, neurology, surgery, GI    PROCEDURES  EC-ECHOCARDIOGRAM COMPLETE W/O CONT   Final Result      NM-GASTRIC EMPTYING   Final Result      Delayed gastric emptying.         [id number] VIEW   Final Result      Nonspecific bowel gas pattern.      CT-ABDOMEN & PELVIS UROGRAM   Final Result      1.  No suspicious renal, ureteral or bladder mass lesions.   2.  No renal, ureteral or bladder stones. No hydronephrosis.   3.  Benign renal cysts which do not require imaging follow-up.   4.  Wall thickening and hyperenhancement of rectosigmoid colon. Findings may be due to underdistention, although neoplasm is difficult to exclude. Recommend follow-up with colonoscopy.         [id number] VIEW   Final Result      No evidence of bowel obstruction.                  DX-CHEST-LIMITED (1 VIEW)   Final Result         No acute cardiopulmonary abnormalities are identified.      [id number] VIEW   Final Result         No specific finding to suggest small bowel obstruction.      DX-CHEST-PORTABLE (1 VIEW)   Final Result      Hypoinflation with bibasilar atelectasis.      US-RENAL   Final Result      1.  No hydronephrosis.      DX-CHEST-PORTABLE (1 VIEW)   Final Result         1.  Left basilar atelectasis and/or subtle infiltrates, similar to prior study      DX-CHEST-PORTABLE (1 VIEW)   Final Result         1.  Left basilar atelectasis, no focal  infiltrate   2.  Cardiomegaly      DX-G.I. TUBE INJECTION, ANY TYPE   Final Result      Contrast from a PEG tube injection extends into the stomach without evidence of contrast leak.      CT-CHEST,ABDOMEN,PELVIS WITH   Final Result      1.  Large amount of free intraperitoneal air within the abdomen again seen as was previously identified on chest x-ray by review of the patient's chart, a percutaneous gastrostomy tube was placed on 3/31/2025 and is free intraperitoneal air is possibly    secondary to that recent procedure.      2.  Bibasal atelectasis and small bilateral pleural effusions.      3.  Tracheostomy tube present.      4.  No evidence of bowel obstruction or free fluid within the abdomen or pelvis.      DX-CHEST-PORTABLE (1 VIEW)   Final Result         1.  Bibasilar atelectasis   2.  Intra-abdominal free air, can be associated with history of percutaneous gastrostomy, consider other viscus perforation as warranted. Could be further evaluated with CT of the abdomen with contrast as clinically appropriate.      These findings were discussed with the patient's clinician, Bravo Lala Iv, on 4/2/2025 7:57 AM.      DX-CHEST-PORTABLE (1 VIEW)   Final Result      No evidence of acute cardiopulmonary process.      DX-ABDOMEN FOR TUBE PLACEMENT   Final Result      1.  Enteric tube extends in the fundus of stomach.      DX-ABDOMEN FOR TUBE PLACEMENT   Final Result      The gastric tube has been removed and replaced with a small bowel feeding tube which terminate in the proximal stomach.      DX-CHEST-PORTABLE (1 VIEW)   Final Result      Atelectasis within the left lung base.      MR-THORACIC SPINE-WITH & W/O   Final Result      1.  There is no abnormal intramedullary T2 signal intensity in the thoracic spinal cord.   2.  Mild degenerative disease at T8-9.   3.  Left lower segmental consolidation.      MR-CERVICAL SPINE-WITH & W/O   Final Result      1.  Small areas of acute infarcts in the lower jero and upper  medulla involving both sides of the midline. There are chronic infarcts in the jero and right inferior cerebellum.   2.  There is no abnormal intramedullary T2 signal intensity in the cervical spinal cord to suggest demyelinating lesions. There is no MR evidence of compressive myelopathy.   3.  Mild degenerative disease.            MR-BRAIN-WITH & W/O   Final Result      1.  Small areas of acute infarcts in the lower jero and upper medulla involving both sides of the midline.   2.  There are chronic infarcts in the jero and right inferior cerebellum. . There are areas of chronic lacunae in the bilateral basal ganglia, right thalamus and right periventricular white matter.   3.  There are nonspecific T2 hyperintensities in the periventricular white matter likely representing chronic small vessel disease.   4.  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      MR-LUMBAR SPINE-WITH & W/O   Final Result      1.  Unremarkable pre and postcontrast MR examination of the lumbar spine.   2.  Clinical suspicious for GBS: There is no abnormal enhancement of the lumbar nerve roots.      DX-ABDOMEN FOR TUBE PLACEMENT   Final Result      NG tube tip projects at the peripyloric region.      DX-CHEST-PORTABLE (1 VIEW)   Final Result      1.  Low lung volumes without definite acute cardiopulmonary abnormality.   2.  Support apparatus as above.      CT-HEAD W/O   Final Result      1.  No acute intracranial abnormality.   2.  Remote right cerebellar infarct.               DX-CHEST-PORTABLE (1 VIEW)   Final Result      No acute cardiopulmonary disease evident.      DX-CHEST-PORTABLE (1 VIEW)   Final Result      No acute cardiopulmonary disease evident.      CT-CEREBRAL PERFUSION ANALYSIS   Final Result      1. Cerebral blood flow less than 30% possibly representing completed infarct = 0 mL. Based on distribution of this finding, this is unlikely to represent artifact.      2. T  "Max more than 6 seconds possibly representing combination of completed infarct and ischemia = 7 mL. Based on the distribution of this finding, this is possibly artifact.      3. Mismatched volume possibly representing ischemic brain/penumbra= 0 mL      4.  Please note that this cerebral perfusion study and report is Quantitative and targets supratentorial (cerebral) perfusion for evaluation of large vessel territory acute ischemia/infarction. For example, lacunar infarcts, and brainstem/posterior fossa    ischemia/infarction are not evaluated on this study.  Data acquisition is subject to artifacts which can yield non-anatomically plausible perfusion maps which may be due to motion, bolus timing, signal to noise ratio, or other technical factors.    Perfusion map abnormalities which show non-anatomic distributions are likely artifact.   This study is not \"stand-alone\" and should only be utilized for diagnosis, management/treatment in correlation with CT, CTA, and/or MRI and clinical factors.         CT-CTA NECK WITH & W/O-POST PROCESSING   Final Result      CT angiogram of the neck within normal limits.      CT-CTA HEAD WITH & W/O-POST PROCESS   Final Result      CT angiogram of the [place] within normal limits.      CT-HEAD W/O   Final Result      Head CT without contrast within normal limits. No evidence of acute cerebral infarction, hemorrhage or mass lesion.                     LABORATORY  Lab Results   Component Value Date    SODIUM 137 05/12/2025    POTASSIUM 3.3 (L) 05/12/2025    CHLORIDE 95 (L) 05/12/2025    CO2 35 (H) 05/12/2025    GLUCOSE 122 (H) 05/12/2025    BUN 23 (H) 05/12/2025    CREATININE 0.71 05/12/2025        Lab Results   Component Value Date    WBC 7.1 05/09/2025    HEMOGLOBIN 12.7 (L) 05/09/2025    HEMATOCRIT 39.7 (L) 05/09/2025    PLATELETCT 275 05/09/2025        Total time of the discharge process exceeds 34 minutes.  "

## 2025-05-13 NOTE — PROGRESS NOTES
Monitor summary: Sr-ST, HR , TN .18, QRS .09, QT .35 with rare pvc per strip from monitor room

## 2025-05-13 NOTE — PROGRESS NOTES
Monitor Summary: SR/ST , DE 0.16, QRS 0.07, QT 0.35, with rare PVCs per strip from monitor room.

## 2025-05-13 NOTE — PROGRESS NOTES
All personal effects with friend, Susan. Patient left with [facility] crew. Tele d/c'd, PIV in place, per receiving RN request.

## 2025-05-13 NOTE — DISCHARGE PLANNING
[phone]  DPA received email from [facility] needing MD to MD report. CM notified via Teams.    [phone]  DPA received email from Armando wanting orders or DC Summary faxed to [phone]  DPA emailed DC Summary to [place]

## 2025-05-13 NOTE — CARE PLAN
The patient is Stable - Low risk of patient condition declining or worsening    Shift Goals  Clinical Goals: neuro checks; monitor respiratory statusl maintain skin integrity  Patient Goals: rest  Family Goals: jennifer    Progress made toward(s) clinical / shift goals:    Problem: Knowledge Deficit - Standard  Goal: Patient and family/care givers will demonstrate understanding of plan of care, disease process/condition, diagnostic tests and medications  Outcome: Progressing     Problem: Neuro Status  Goal: Neuro status will remain stable or improve  Outcome: Progressing     Problem: Hemodynamic Monitoring  Goal: Patient's hemodynamics, fluid balance and neurologic status will be stable or improve  Outcome: Progressing     Problem: Respiratory - Stroke Patient  Goal: Patient will achieve/maintain optimum respiratory rate/effort  Outcome: Progressing       Patient is not progressing towards the following goals:

## (undated) DEVICE — ANCHOR GASTROINTESTINAL T-FASTNER

## (undated) DEVICE — SUTURE 3-0 ETHILON PS-1 (36PK/BX)

## (undated) DEVICE — BUTTON ENDOSCOPY DISPOSABLE

## (undated) DEVICE — DRAPE C-ARM LARGE 41IN X 74 IN - (10/BX 2BX/CA)

## (undated) DEVICE — BANDAGE ELASTIC 6 HONEYCOMB - 6X5YD LF (20/CA)"

## (undated) DEVICE — GUIDEPIN FOR 7.3MM CANNULATED SCREWS 2.8MM X 300MM (3TX6=18)(6EA/PK)

## (undated) DEVICE — DRAPE LARGE 3 QUARTER - (20/CA)

## (undated) DEVICE — CANISTER SUCTION 3000ML MECHANICAL FILTER AUTO SHUTOFF MEDI-VAC NONSTERILE LF DISP  (40EA/CA)

## (undated) DEVICE — GLOVE BIOGEL PI INDICATOR SZ 8.0 SURGICAL PF LF -(50/BX 4BX/CA)

## (undated) DEVICE — KIT ANESTHESIA W/CIRCUIT & 3/LT BAG W/FILTER (20EA/CA)

## (undated) DEVICE — LACTATED RINGERS INJ 1000 ML - (14EA/CA 60CA/PF)

## (undated) DEVICE — GOWN SURGEONS X-LARGE - DISP. (30/CA)

## (undated) DEVICE — GLOVE BIOGEL INDICATOR SZ 7.5 SURGICAL PF LTX - (50PR/BX 4BX/CA)

## (undated) DEVICE — PAD LAP STERILE 18 X 18 - (5/PK 40PK/CA)

## (undated) DEVICE — CHLORAPREP 26 ML APPLICATOR - ORANGE TINT(25/CA)

## (undated) DEVICE — GLOVE BIOGEL SZ 6.5 SURGICAL PF LTX (50PR/BX 4BX/CA)

## (undated) DEVICE — SUTURE GENERAL

## (undated) DEVICE — MASK ANESTHESIA ADULT  - (100/CA)

## (undated) DEVICE — BLOCK

## (undated) DEVICE — DRAPE C ARMOR (12EA/CA)

## (undated) DEVICE — HEAD HOLDER JUNIOR/ADULT

## (undated) DEVICE — KIT ROOM DECONTAMINATION

## (undated) DEVICE — PACK LOWER EXTREMITY - (2/CA)

## (undated) DEVICE — DRILL BIT

## (undated) DEVICE — SPEAR EYE SPNG 3ANG MLBL HNDL - (10/ST18ST/PK 180/PK 1PK/SP)

## (undated) DEVICE — PADDING CAST 6 IN STERILE - 6 X 4 YDS (24/CA)

## (undated) DEVICE — PROTECTOR ULNA NERVE - (36PR/CA)

## (undated) DEVICE — SENSOR SPO2 NEO LNCS ADHESIVE (20/BX) SEE USER NOTES

## (undated) DEVICE — WRAP CO-FLEX 4IN X 5YD STERIL - SELF-ADHERENT (18/CA)

## (undated) DEVICE — FILM CASSETTE ENDO

## (undated) DEVICE — BANDAGE ELASTIC 4 HONEYCOMB - 4"X5YD LF (20/CA)"

## (undated) DEVICE — DRESSING ABDOMINAL PAD STERILE 8 X 10" (360EA/CA)"

## (undated) DEVICE — SET LEADWIRE 5 LEAD BEDSIDE DISPOSABLE ECG (1SET OF 5/EA)

## (undated) DEVICE — SUCTION INSTRUMENT YANKAUER BULBOUS TIP W/O VENT (50EA/CA)

## (undated) DEVICE — STOCKINET BIAS 6 IN STERILE - (20/CA)

## (undated) DEVICE — SPLINT PLASTER 5 IN X 30 IN - (50EA/BX 6BX/CA)

## (undated) DEVICE — TUBING CLEARLINK DUO-VENT - C-FLO (48EA/CA)

## (undated) DEVICE — DRESSING PETROLEUM GAUZE 5 X 9" (50EA/BX 4BX/CA)"

## (undated) DEVICE — NEEDLE SAFETY 18 GA X 1 1/2 IN (100EA/BX)

## (undated) DEVICE — BLOCK BITE MAXI DENTAL RETENTION RIM (100EA/BX)

## (undated) DEVICE — GOWN WARMING STANDARD FLEX - (30/CA)

## (undated) DEVICE — TUBING PUMP WITH CONNECTOR REDEUCE (1EA)

## (undated) DEVICE — NEPTUNE 4 PORT MANIFOLD - (20/PK)

## (undated) DEVICE — WATER IRRIG. STER. 1500 ML - (9/CA)

## (undated) DEVICE — GLOVE BIOGEL PI INDICATOR SZ 7.0 SURGICAL PF LF - (50/BX 4BX/CA)

## (undated) DEVICE — KIT CUSTOM PROCEDURE SINGLE FOR ENDO (15/CA)

## (undated) DEVICE — COVER LIGHT HANDLE FLEXIBLE - SOFT (2EA/PK 80PK/CA)

## (undated) DEVICE — GLOVE SZ 7.5 BIOGEL PI MICRO - PF LF (50PR/BX)

## (undated) DEVICE — GLOVE BIOGEL INDICATOR SZ 6.5 SURGICAL PF LTX - (50PR/BX 4BX/CA)

## (undated) DEVICE — ELECTRODE DUAL RETURN W/ CORD - (50/PK)

## (undated) DEVICE — MASK, LARYNGEAL AIRWAY #4

## (undated) DEVICE — GLOVE BIOGEL ECLIPSE PF LATEX SIZE 8.0  (50PR/BX)

## (undated) DEVICE — SET EXTENSION WITH 2 PORTS (48EA/CA) ***PART #2C8610 IS A SUBSTITUTE*****

## (undated) DEVICE — GLOVE BIOGEL ECLIPSE  PF LATEX SIZE 6.5 (50PR/BX)

## (undated) DEVICE — TUBING PATIENT W/CONNECTOR REDEUCE (1EA)

## (undated) DEVICE — TUBE CONNECT SUCTION CLEAR 120 X 1/4" (50EA/CA)"

## (undated) DEVICE — TUBE CONNECTING SUCTION - CLEAR PLASTIC STERILE 72 IN (50EA/CA)

## (undated) DEVICE — SUTURE 3-0 VICRYL PLUS SH - 8X 18 INCH (12/BX)

## (undated) DEVICE — PADDING CAST 4 IN STERILE - 4 X 4 YDS (24/CA)

## (undated) DEVICE — EVICEL 2ML - (1/BX) REFRIGERATE UPON RECPT

## (undated) DEVICE — DRESSING TRANSPARENT FILM TEGADERM 4 X 4.75" (50EA/BX)"

## (undated) DEVICE — SODIUM CHL IRRIGATION 0.9% 1000ML (12EA/CA)

## (undated) DEVICE — ELECTRODE 850 FOAM ADHESIVE - HYDROGEL RADIOTRNSPRNT (50/PK)

## (undated) DEVICE — SLEEVE, VASO, THIGH, MED

## (undated) DEVICE — DRAPE ARTHROSCOPE (ACL) - (10/CA)

## (undated) DEVICE — SODIUM CHL. IRRIGATION 0.9% 3000ML (4EA/CA 65CA/PF)

## (undated) DEVICE — GLOVE SZ 7 BIOGEL PI MICRO - PF LF (50PR/BX 4BX/CA)

## (undated) DEVICE — BLADE SURGICAL #15 - (50/BX 3BX/CA)

## (undated) DEVICE — SYRINGE 30 ML LL (56/BX)

## (undated) DEVICE — GLOVE BIOGEL INDICATOR SZ 7SURGICAL PF LTX - (50/BX 4BX/CA)

## (undated) DEVICE — SUTURE 3-0 ETHILON FS-1 - (36/BX) 30 INCH

## (undated) DEVICE — DRILL BIT CANNULATED 5.0MM (3TX1=3)

## (undated) DEVICE — TOURNIQUET CUFF 34 X 4 ONE PORT DISP - STERILE (10/BX)

## (undated) DEVICE — SHAVER 3.5 AGGRESSIVE + FORMLA (5EA/SP)

## (undated) DEVICE — GLOVE BIOGEL PI INDICATOR SZ 7.5 SURGICAL PF LF -(50/BX 4BX/CA)

## (undated) DEVICE — SPONGE GAUZESTER 4 X 4 4PLY - (128PK/CA)

## (undated) DEVICE — POUCH FLUID COLLECTION INVISISHIELD - (10/BX)

## (undated) DEVICE — DRESSING 3X8 ADAPTIC GAUZE - NON-ADHERING (36/PK 6PK/BX)

## (undated) DEVICE — GLOVE BIOGEL ECLIPSE PF LATEX SIZE 7.5

## (undated) DEVICE — BANDAGE ELASTIC 6 IN X 5 YDS - LATEX FREE (10/BX)

## (undated) DEVICE — GLOVE BIOGEL SZ 7.5 SURGICAL PF LTX - (50PR/BX 4BX/CA)

## (undated) DEVICE — BLADE SAGITTAL SAW 9.4MM X 41MM X .4MM FINE TOOTH (1/EA)

## (undated) DEVICE — PADDING CAST 6 IN X 4 YDS - SOF-ROLL (6RL/BG 6BG/CA)

## (undated) DEVICE — GLOVE BIOGEL SZ 7 SURGICAL PF LTX - (50PR/BX 4BX/CA)